# Patient Record
Sex: MALE | Race: WHITE | Employment: OTHER | URBAN - METROPOLITAN AREA
[De-identification: names, ages, dates, MRNs, and addresses within clinical notes are randomized per-mention and may not be internally consistent; named-entity substitution may affect disease eponyms.]

---

## 2018-01-22 ENCOUNTER — HOSPITAL ENCOUNTER (OUTPATIENT)
Dept: NON INVASIVE DIAGNOSTICS | Age: 58
Discharge: HOME | End: 2018-01-22
Attending: INTERNAL MEDICINE
Payer: COMMERCIAL

## 2018-01-22 ENCOUNTER — HOSPITAL ENCOUNTER (OUTPATIENT)
Dept: ULTRASOUND IMAGING | Age: 58
Discharge: HOME | End: 2018-01-22
Attending: INTERNAL MEDICINE
Payer: COMMERCIAL

## 2018-01-22 DIAGNOSIS — R09.89 BILATERAL CAROTID BRUITS: ICD-10-CM

## 2018-01-22 DIAGNOSIS — R01.1 MURMUR, CARDIAC: ICD-10-CM

## 2018-01-22 PROCEDURE — 93880 EXTRACRANIAL BILAT STUDY: CPT

## 2018-01-22 PROCEDURE — 93306 TTE W/DOPPLER COMPLETE: CPT

## 2018-01-22 NOTE — PROGRESS NOTES
Let him know ECHO shows severe aortic stenosis-valve will likely need replacement. He should see a cardiologist. Surgeon wont be able to do hernia repair until this is addressed. Let me know.

## 2018-01-22 NOTE — PROGRESS NOTES
Spoke to patient ,detailed message was relayed and understanding expressed. Patient would like to go ahead with cardiology referral as recommended.

## 2018-01-22 NOTE — PROGRESS NOTES
Spoke to patient, message was relayed and understanding expressed. Patient will go ahead with recommendations.

## 2018-01-22 NOTE — PROGRESS NOTES
Left carotid artery shows left carotid artery has a moderate plaque with ulceration. He will need a CTA of the carotids.

## 2018-01-29 ENCOUNTER — HOSPITAL ENCOUNTER (OUTPATIENT)
Dept: CT IMAGING | Age: 58
Discharge: HOME | End: 2018-01-29
Attending: INTERNAL MEDICINE
Payer: COMMERCIAL

## 2018-01-29 DIAGNOSIS — I65.22 LEFT CAROTID STENOSIS: ICD-10-CM

## 2018-01-29 PROCEDURE — 70498 CT ANGIOGRAPHY NECK: CPT

## 2018-01-29 PROCEDURE — 74011000258 HC RX REV CODE- 258

## 2018-01-29 PROCEDURE — 74011636320 HC RX REV CODE- 636/320

## 2018-01-29 RX ORDER — SODIUM CHLORIDE 0.9 % (FLUSH) 0.9 %
10 SYRINGE (ML) INJECTION
Status: COMPLETED | OUTPATIENT
Start: 2018-01-29 | End: 2018-01-29

## 2018-01-29 RX ADMIN — Medication 10 ML: at 10:37

## 2018-01-29 RX ADMIN — SODIUM CHLORIDE 100 ML: 900 INJECTION, SOLUTION INTRAVENOUS at 10:37

## 2018-01-29 RX ADMIN — IOPAMIDOL 80 ML: 755 INJECTION, SOLUTION INTRAVENOUS at 10:36

## 2018-02-07 ENCOUNTER — HOSPITAL ENCOUNTER (OUTPATIENT)
Dept: LAB | Age: 58
Discharge: HOME | End: 2018-02-07
Attending: INTERNAL MEDICINE
Payer: COMMERCIAL

## 2018-02-07 DIAGNOSIS — R06.02 SHORTNESS OF BREATH: ICD-10-CM

## 2018-02-07 PROBLEM — Q23.1 BICUSPID AORTIC VALVE: Status: ACTIVE | Noted: 2018-02-07

## 2018-02-07 PROBLEM — I35.0 NONRHEUMATIC AORTIC VALVE STENOSIS: Status: ACTIVE | Noted: 2018-02-07

## 2018-02-07 LAB
ANION GAP SERPL CALC-SCNC: 4 MMOL/L
BASOPHILS # BLD: 0 K/UL (ref 0–0.2)
BASOPHILS NFR BLD: 0 % (ref 0–2)
BUN SERPL-MCNC: 31 MG/DL (ref 6–23)
CALCIUM SERPL-MCNC: 8.4 MG/DL (ref 8.3–10.4)
CHLORIDE SERPL-SCNC: 107 MMOL/L (ref 98–107)
CO2 SERPL-SCNC: 32 MMOL/L (ref 21–32)
CREAT SERPL-MCNC: 0.9 MG/DL (ref 0.8–1.5)
DIFFERENTIAL METHOD BLD: ABNORMAL
EOSINOPHIL # BLD: 0.5 K/UL (ref 0–0.8)
EOSINOPHIL NFR BLD: 5 % (ref 0.5–7.8)
ERYTHROCYTE [DISTWIDTH] IN BLOOD BY AUTOMATED COUNT: 14 % (ref 11.9–14.6)
GLUCOSE SERPL-MCNC: 97 MG/DL (ref 65–100)
HCT VFR BLD AUTO: 42.9 % (ref 41.1–50.3)
HGB BLD-MCNC: 14.2 G/DL (ref 13.6–17.2)
INR PPP: 1.1
LYMPHOCYTES # BLD: 2.2 K/UL (ref 0.5–4.6)
LYMPHOCYTES NFR BLD: 21 % (ref 13–44)
MCH RBC QN AUTO: 30.4 PG (ref 26.1–32.9)
MCHC RBC AUTO-ENTMCNC: 33.1 G/DL (ref 31.4–35)
MCV RBC AUTO: 91.9 FL (ref 79.6–97.8)
MONOCYTES # BLD: 0.9 K/UL (ref 0.1–1.3)
MONOCYTES NFR BLD: 9 % (ref 4–12)
NEUTS SEG # BLD: 6.7 K/UL (ref 1.7–8.2)
NEUTS SEG NFR BLD: 65 % (ref 43–78)
PLATELET # BLD AUTO: 206 K/UL (ref 150–450)
PMV BLD AUTO: 10 FL (ref 10.8–14.1)
POTASSIUM SERPL-SCNC: 4.1 MMOL/L (ref 3.5–5.1)
PROTHROMBIN TIME: 14.5 SEC (ref 11.5–14.5)
RBC # BLD AUTO: 4.67 M/UL (ref 4.23–5.67)
SODIUM SERPL-SCNC: 143 MMOL/L (ref 136–145)
WBC # BLD AUTO: 10.4 K/UL (ref 4.3–11.1)

## 2018-02-07 PROCEDURE — 85610 PROTHROMBIN TIME: CPT | Performed by: INTERNAL MEDICINE

## 2018-02-07 PROCEDURE — 85025 COMPLETE CBC W/AUTO DIFF WBC: CPT | Performed by: INTERNAL MEDICINE

## 2018-02-07 PROCEDURE — 80048 BASIC METABOLIC PNL TOTAL CA: CPT | Performed by: INTERNAL MEDICINE

## 2018-02-07 PROCEDURE — 36415 COLL VENOUS BLD VENIPUNCTURE: CPT | Performed by: INTERNAL MEDICINE

## 2018-02-12 ENCOUNTER — HOSPITAL ENCOUNTER (OUTPATIENT)
Dept: CARDIAC CATH/INVASIVE PROCEDURES | Age: 58
Discharge: HOME | End: 2018-02-12

## 2018-02-23 ENCOUNTER — HOSPITAL ENCOUNTER (OUTPATIENT)
Dept: CARDIAC CATH/INVASIVE PROCEDURES | Age: 58
Discharge: HOME | End: 2018-02-23
Attending: INTERNAL MEDICINE | Admitting: INTERNAL MEDICINE
Payer: COMMERCIAL

## 2018-02-23 VITALS
WEIGHT: 155 LBS | RESPIRATION RATE: 20 BRPM | SYSTOLIC BLOOD PRESSURE: 123 MMHG | HEART RATE: 60 BPM | BODY MASS INDEX: 20.54 KG/M2 | TEMPERATURE: 98 F | HEIGHT: 73 IN | DIASTOLIC BLOOD PRESSURE: 65 MMHG | OXYGEN SATURATION: 99 %

## 2018-02-23 LAB
ANION GAP SERPL CALC-SCNC: 4 MMOL/L (ref 7–16)
BUN SERPL-MCNC: 23 MG/DL (ref 6–23)
CALCIUM SERPL-MCNC: 8.6 MG/DL (ref 8.3–10.4)
CHLORIDE SERPL-SCNC: 106 MMOL/L (ref 98–107)
CO2 SERPL-SCNC: 31 MMOL/L (ref 21–32)
CREAT SERPL-MCNC: 0.78 MG/DL (ref 0.8–1.5)
ERYTHROCYTE [DISTWIDTH] IN BLOOD BY AUTOMATED COUNT: 14.1 % (ref 11.9–14.6)
GLUCOSE SERPL-MCNC: 110 MG/DL (ref 65–100)
HCT VFR BLD AUTO: 46.4 % (ref 41.1–50.3)
HGB BLD-MCNC: 16 G/DL (ref 13.6–17.2)
INR PPP: 1.1
MAGNESIUM SERPL-MCNC: 2 MG/DL (ref 1.8–2.4)
MCH RBC QN AUTO: 31.4 PG (ref 26.1–32.9)
MCHC RBC AUTO-ENTMCNC: 34.5 G/DL (ref 31.4–35)
MCV RBC AUTO: 91 FL (ref 79.6–97.8)
PLATELET # BLD AUTO: 182 K/UL (ref 150–450)
PMV BLD AUTO: 9.9 FL (ref 10.8–14.1)
POTASSIUM SERPL-SCNC: 4 MMOL/L (ref 3.5–5.1)
PROTHROMBIN TIME: 13.9 SEC (ref 11.5–14.5)
RBC # BLD AUTO: 5.1 M/UL (ref 4.23–5.67)
SODIUM SERPL-SCNC: 141 MMOL/L (ref 136–145)
WBC # BLD AUTO: 10.1 K/UL (ref 4.3–11.1)

## 2018-02-23 PROCEDURE — 93458 L HRT ARTERY/VENTRICLE ANGIO: CPT

## 2018-02-23 PROCEDURE — C1894 INTRO/SHEATH, NON-LASER: HCPCS

## 2018-02-23 PROCEDURE — 99153 MOD SED SAME PHYS/QHP EA: CPT

## 2018-02-23 PROCEDURE — 74011636320 HC RX REV CODE- 636/320: Performed by: INTERNAL MEDICINE

## 2018-02-23 PROCEDURE — 85027 COMPLETE CBC AUTOMATED: CPT | Performed by: INTERNAL MEDICINE

## 2018-02-23 PROCEDURE — 74011250636 HC RX REV CODE- 250/636

## 2018-02-23 PROCEDURE — 93005 ELECTROCARDIOGRAM TRACING: CPT | Performed by: INTERNAL MEDICINE

## 2018-02-23 PROCEDURE — 77030004534 HC CATH ANGI DX INFN CARD -A

## 2018-02-23 PROCEDURE — 74011250636 HC RX REV CODE- 250/636: Performed by: INTERNAL MEDICINE

## 2018-02-23 PROCEDURE — 77030019569 HC BND COMPR RAD TERU -B

## 2018-02-23 PROCEDURE — 85610 PROTHROMBIN TIME: CPT | Performed by: INTERNAL MEDICINE

## 2018-02-23 PROCEDURE — 77030015766

## 2018-02-23 PROCEDURE — 99152 MOD SED SAME PHYS/QHP 5/>YRS: CPT

## 2018-02-23 PROCEDURE — 74011000250 HC RX REV CODE- 250: Performed by: INTERNAL MEDICINE

## 2018-02-23 PROCEDURE — 80048 BASIC METABOLIC PNL TOTAL CA: CPT | Performed by: INTERNAL MEDICINE

## 2018-02-23 PROCEDURE — C1769 GUIDE WIRE: HCPCS

## 2018-02-23 PROCEDURE — 83735 ASSAY OF MAGNESIUM: CPT | Performed by: INTERNAL MEDICINE

## 2018-02-23 RX ORDER — SODIUM CHLORIDE 0.9 % (FLUSH) 0.9 %
5-10 SYRINGE (ML) INJECTION AS NEEDED
Status: DISCONTINUED | OUTPATIENT
Start: 2018-02-23 | End: 2018-02-23 | Stop reason: HOSPADM

## 2018-02-23 RX ORDER — LIDOCAINE HYDROCHLORIDE 20 MG/ML
15 SOLUTION OROPHARYNGEAL AS NEEDED
Status: DISCONTINUED | OUTPATIENT
Start: 2018-02-23 | End: 2018-02-23 | Stop reason: HOSPADM

## 2018-02-23 RX ORDER — SODIUM CHLORIDE 9 MG/ML
75 INJECTION, SOLUTION INTRAVENOUS CONTINUOUS
Status: DISCONTINUED | OUTPATIENT
Start: 2018-02-23 | End: 2018-02-23 | Stop reason: HOSPADM

## 2018-02-23 RX ORDER — GUAIFENESIN 100 MG/5ML
81-324 LIQUID (ML) ORAL
Status: DISCONTINUED | OUTPATIENT
Start: 2018-02-23 | End: 2018-02-23 | Stop reason: HOSPADM

## 2018-02-23 RX ORDER — ONDANSETRON 2 MG/ML
4 INJECTION INTRAMUSCULAR; INTRAVENOUS
Status: DISCONTINUED | OUTPATIENT
Start: 2018-02-23 | End: 2018-02-23 | Stop reason: HOSPADM

## 2018-02-23 RX ORDER — HEPARIN SODIUM 200 [USP'U]/100ML
3 INJECTION, SOLUTION INTRAVENOUS CONTINUOUS
Status: DISCONTINUED | OUTPATIENT
Start: 2018-02-23 | End: 2018-02-23 | Stop reason: HOSPADM

## 2018-02-23 RX ORDER — SODIUM CHLORIDE 0.9 % (FLUSH) 0.9 %
5-10 SYRINGE (ML) INJECTION EVERY 8 HOURS
Status: DISCONTINUED | OUTPATIENT
Start: 2018-02-23 | End: 2018-02-23 | Stop reason: HOSPADM

## 2018-02-23 RX ORDER — LIDOCAINE HYDROCHLORIDE 20 MG/ML
2-10 INJECTION, SOLUTION INFILTRATION; PERINEURAL
Status: DISCONTINUED | OUTPATIENT
Start: 2018-02-23 | End: 2018-02-23 | Stop reason: HOSPADM

## 2018-02-23 RX ORDER — ACETAMINOPHEN 325 MG/1
650 TABLET ORAL
Status: DISCONTINUED | OUTPATIENT
Start: 2018-02-23 | End: 2018-02-23 | Stop reason: HOSPADM

## 2018-02-23 RX ORDER — FENTANYL CITRATE 50 UG/ML
25-75 INJECTION, SOLUTION INTRAMUSCULAR; INTRAVENOUS
Status: DISCONTINUED | OUTPATIENT
Start: 2018-02-23 | End: 2018-02-23 | Stop reason: HOSPADM

## 2018-02-23 RX ORDER — HYDROCODONE BITARTRATE AND ACETAMINOPHEN 5; 325 MG/1; MG/1
1 TABLET ORAL
Status: DISCONTINUED | OUTPATIENT
Start: 2018-02-23 | End: 2018-02-23 | Stop reason: HOSPADM

## 2018-02-23 RX ORDER — DIAZEPAM 5 MG/1
5 TABLET ORAL ONCE
Status: DISCONTINUED | OUTPATIENT
Start: 2018-02-23 | End: 2018-02-23 | Stop reason: HOSPADM

## 2018-02-23 RX ORDER — MIDAZOLAM HYDROCHLORIDE 1 MG/ML
.5-2 INJECTION, SOLUTION INTRAMUSCULAR; INTRAVENOUS
Status: DISCONTINUED | OUTPATIENT
Start: 2018-02-23 | End: 2018-02-23 | Stop reason: HOSPADM

## 2018-02-23 RX ADMIN — MIDAZOLAM HYDROCHLORIDE 2 MG: 1 INJECTION, SOLUTION INTRAMUSCULAR; INTRAVENOUS at 13:01

## 2018-02-23 RX ADMIN — LIDOCAINE HYDROCHLORIDE 15 ML: 20 SOLUTION ORAL; TOPICAL at 13:00

## 2018-02-23 RX ADMIN — HEPARIN SODIUM 2 ML: 10000 INJECTION, SOLUTION INTRAVENOUS; SUBCUTANEOUS at 13:48

## 2018-02-23 RX ADMIN — FENTANYL CITRATE 50 MCG: 50 INJECTION, SOLUTION INTRAMUSCULAR; INTRAVENOUS at 13:00

## 2018-02-23 RX ADMIN — SODIUM CHLORIDE 75 ML/HR: 900 INJECTION, SOLUTION INTRAVENOUS at 11:15

## 2018-02-23 RX ADMIN — HEPARIN SODIUM 3 UNITS/HR: 200 INJECTION, SOLUTION INTRAVENOUS at 13:05

## 2018-02-23 RX ADMIN — MIDAZOLAM HYDROCHLORIDE 1 MG: 1 INJECTION, SOLUTION INTRAMUSCULAR; INTRAVENOUS at 13:46

## 2018-02-23 RX ADMIN — MIDAZOLAM HYDROCHLORIDE 2 MG: 1 INJECTION, SOLUTION INTRAMUSCULAR; INTRAVENOUS at 13:00

## 2018-02-23 RX ADMIN — LIDOCAINE HYDROCHLORIDE 60 MG: 20 INJECTION, SOLUTION INFILTRATION; PERINEURAL at 13:47

## 2018-02-23 RX ADMIN — IOPAMIDOL 120 ML: 755 INJECTION, SOLUTION INTRAVENOUS at 14:08

## 2018-02-23 RX ADMIN — FENTANYL CITRATE 25 MCG: 50 INJECTION, SOLUTION INTRAMUSCULAR; INTRAVENOUS at 13:46

## 2018-02-23 NOTE — ROUTINE PROCESS
TRANSFER - OUT REPORT:    YARON/[facility]  Dr. Herman Roland  RRA access    Versed 5mg, fentanyl 75mcg  YARON completed without difficulty  Diagnostic cath, pt to have valve surgery  TR band placed @ 1406 with 14ml air    Verbal report given to Frankey Crick RN(name) on [facility]  being transferred to cpru(unit) for routine progression of care       Report consisted of patients Situation, Background, Assessment and   Recommendations(SBAR). Information from the following report(s) Procedure Summary was reviewed with the receiving nurse. Lines:   Peripheral IV 02/23/18 Right Antecubital (Active)       Peripheral IV 02/23/18 Left Antecubital (Active)        Opportunity for questions and clarification was provided.       Patient transported with:   [employer]

## 2018-02-23 NOTE — PROCEDURES
Brief Cardiac Procedure Note    Patient: Zak Osuna MRN: [id number]  SSN: [id number]    YOB: 1960  Age: 59 y.o. Sex: male      Date of Procedure: 2/23/2018     Pre-procedure Diagnosis: Aortic Stenosis    Post-procedure Diagnosis: Aortic Stenosis    Procedure: Left Heart Catheterization and YARON    Brief Description of Procedure: As above    Performed By: Tomas Koroma MD     Assistants: None    Anesthesia: Moderate Sedation    Estimated Blood Loss: Less than 10 mL      Specimens: None    Implants: None    Findings: Severe Critical AS. Bicuspid on YARON  MG 53. Mild CAD. Normal LV function. Aortic root appears upper normal tin size. Complications: None    Recommendations: Continue medical therapy.     Signed By: Tomas Koroma MD     February 23, 2018

## 2018-02-23 NOTE — PROGRESS NOTES
Patient received to [address] [place] [address] room # 2  Ambulatory from [facility]. Patient scheduled for [facility] today with Dr Parminder Willis. Procedure reviewed & questions answered, voiced good understanding consent obtained & placed on chart. All medications and medical history reviewed. Will prep patient per orders. Patient & family updated on plan of care.

## 2018-02-23 NOTE — IP AVS SNAPSHOT
[address] 
[phone] Patient: Tacho Cristobal MRN: [id number] Discharge Summary 2/23/2018 Tacho Cristobal MRN[de-identified]  [id number] Admission Information Provider Pager Service Admission Date Expected D/C Date Branden Cervantes MD  CARDIAC CATH LAB 2/23/2018 Actual LOS Patient Class 0 days OUTPATIENT Follow-up Information Follow up With Details Comments Contact Info Delilah Carranza MD On 2/28/2018 A follow up appointment has been scheduled for you for Wednesday, February 28 at 3:30pm with Dr. Nneka Coelho. 217 [facility] 120 [facility] [address] [place] [id number] [phone] My Medications ASK your physician about these medications Instructions Each Dose to Equal  
 Morning Noon Evening Bedtime  
 aspirin delayed-release 81 mg tablet Your last dose was: Your next dose is: Take  by mouth daily. FISH -160-1,000 mg Cap Generic drug:  omega 3-dha-epa-fish oil Your last dose was: Your next dose is: Take  by mouth nightly. multivitamin tablet Commonly known as:  ONE A DAY Your last dose was: Your next dose is: Take 1 Tab by mouth daily. 1 Tab General Information Please provide this summary of care documentation to your next provider. Allergies No Known Allergies Current Immunizations  Never Reviewed No immunizations on file. Discharge Instructions Discharge Instructions HEART CATHETERIZATION/ANGIOGRAPHY DISCHARGE INSTRUCTIONS 1. Check puncture site frequently for swelling or bleeding.  If there is any bleeding, lie down and apply pressure over the area with a clean towel or washcloth. Call 911. Notify your doctor for any redness, swelling, drainage, or oozing from the puncture site. Notify your doctor for any fever or chills. 2. If the extremity becomes cold, numb, or painful call [facility] at [phone]. 
3. Activity should be limited for the next 48 hours. Climb stairs as little as possible and avoid any stooping, bending, or strenuous activity for 48 hours. No heavy lifting (anything over 10 pounds) for 3 days. 4. You may resume your usual diet. Drink more fluids than usual. 
5. Have a responsible person drive you home and stay with you for at least 24 hours after your heart catheterization/angiography. Do not drive for the next 24 hours. 6. You may remove bandage from your Right wrist in 24 hours. You may shower in 24 hours. No tub baths, hot tubs, or swimming for 1 week. Do not place any lotions, creams, powders, or ointments over puncture site for 1 week. You may place a clean band-aid over the puncture site each day for 5 days. Change daily. 7. Please continue your medications as prescribed by your physician. I have read the above instructions and have had the opportunity to ask questions. Patient: ________________________   Date: 2/23/2018 Witness: _______________________   Date: 2/23/2018 Discharge Orders None  
  
` Patient Signature:  ____________________________________________________________ Date:  ____________________________________________________________  
  
 [place] Current Provider Signature:  ____________________________________________________________ Date:  ____________________________________________________________

## 2018-02-23 NOTE — PROGRESS NOTES
TRANSFER - IN REPORT:    Verbal report received from [facility]) on Carol Hernandez  being received from cath lab(unit) for routine progression of care      Report consisted of patients Situation, Background, Assessment and   Recommendations(SBAR). Information from the following report(s) Procedure Summary was reviewed with the receiving nurse. Opportunity for questions and clarification was provided. Assessment completed upon patients arrival to unit and care assumed.

## 2018-02-25 LAB
ATRIAL RATE: 60 BPM
CALCULATED P AXIS, ECG09: 40 DEGREES
CALCULATED R AXIS, ECG10: 67 DEGREES
CALCULATED T AXIS, ECG11: 75 DEGREES
DIAGNOSIS, 93000: NORMAL
P-R INTERVAL, ECG05: 128 MS
Q-T INTERVAL, ECG07: 434 MS
QRS DURATION, ECG06: 96 MS
QTC CALCULATION (BEZET), ECG08: 434 MS
VENTRICULAR RATE, ECG03: 60 BPM

## 2018-02-27 NOTE — PROCEDURES
Anish  MR#: [id number]  : 1960  ACCOUNT #: [de-identified]   DATE OF SERVICE: 2018    PROCEDURE PERFORMED:  Left heart catheterization, selective coronary arteriography, left ventriculogram.      INDICATIONS:  Severe/critical aortic stenosis in a patient with a bicuspid aortic valve. Preoperative angiography was requested. TECHNICAL FACTORS:  After informed consent was obtained, the patient brought to the cardiac catheterization lab. The right radial artery was prepped and draped in usual sterile fashion. Utilizing modified Seldinger technique and micropuncture needle, the right radial artery was entered. A 6-[place] Terumo Slender sheath was placed without difficulty. A radial cocktail consisting of 2000 units of heparin, 2 mg of verapamil, 200 mcg of nitroglycerin was administered. A 5-[place] Tiger 4.0 catheter was used to selectively engage the ostium of the right coronary artery. Selective injections in various projections performed. Using the Tiger catheter and a J-wire, I was able to cross the aortic valve. This was then exchanged for a pigtail catheter. Hemodynamic measurements and left ventriculogram were obtained. Left ventricular aortic pressure gradient was obtained with pullback technique. A JL-3.5 catheter was used to selectively engage the ostium of the left main coronary artery. Selective injections in various projections were performed. At the conclusion of the diagnostic procedure, the radial sheath was removed and a pneumatic band was placed with excellent hemostasis. No complications were encountered. SEDATION:  The patient received 2 mg of Versed. Sedation start time was 1:42 p.m. with the procedure completion time of 2:01 p.m. Sedation was administered by Samira Cespedes RN, under my supervision. CONTRAST:  Isovue 120. HEMODYNAMICS   1. Aortic pressure 115/82.   2.  There was a 53 mm mean gradient across the aortic valve consistent with severe/critical aortic stenosis. 3.  Left ventricular end-diastolic pressure was 13. ANGIOGRAPHIC RESULTS   1. Left main coronary artery:  Large-caliber vessel. Angiographically normal.  2.  LAD:  Medium-caliber vessel. It contains a 30% proximal stenosis. Mid vessel contains 10% luminal irregularities. 3.  First diagonal artery is a medium-caliber vessel. 20% ostial stenosis. 4.  Left circumflex:  Medium-caliber nondominant vessel. Angiographically normal.  5.  First obtuse marginal artery:  Medium-caliber vessel. 20% ostial stenosis. 6.  Right coronary artery:  Medium-caliber dominant vessel. Angiographically normal.  7.  Right PDA:  Medium-caliber vessel. Normal.  8.  Right posterolateral branch:  Very small-caliber vessel. Appeared normal.  9.  Left ventriculogram performed in the CHAUDHRY projection shows low normal left ventricular systolic function, EF 05%. Mild diffuse inferior hypokinesis. The aortic valve is heavily calcified. 10.  Aortic angiography with visualization of the aortic root shows mild aortic regurgitation. Aortic root appears upper normal in size, but not significantly dilated. CONCLUSIONS   1. Mild nonobstructive coronary artery disease. 2.  Severe/critical aortic stenosis with mean gradient of 53.  3.  Low normal left ventricular systolic function with inferior hypokinesis. 4.  Upper normal ascending aorta without significant aneurysmal formation. 5.  1+ aortic regurgitation. PLAN:  CT Surgery evaluation for aortic valve replacement.       MD ANNEMARIE Morillo / PN  D: 02/27/2018 17:58     T: 02/27/2018 18:45  JOB #: [id number]

## 2018-02-28 PROBLEM — Z82.49 FAMILY HISTORY OF PREMATURE CAD: Status: ACTIVE | Noted: 2018-02-28

## 2018-02-28 PROBLEM — Z59.00 HOMELESS FAMILY: Chronic | Status: ACTIVE | Noted: 2018-02-28

## 2018-04-06 ENCOUNTER — HOSPITAL ENCOUNTER (OUTPATIENT)
Dept: ULTRASOUND IMAGING | Age: 58
Discharge: HOME | End: 2018-04-06
Attending: THORACIC SURGERY (CARDIOTHORACIC VASCULAR SURGERY)
Payer: COMMERCIAL

## 2018-04-06 ENCOUNTER — HOSPITAL ENCOUNTER (OUTPATIENT)
Dept: SURGERY | Age: 58
Discharge: HOME | End: 2018-04-06
Payer: COMMERCIAL

## 2018-04-06 ENCOUNTER — ANESTHESIA EVENT (OUTPATIENT)
Dept: SURGERY | Age: 58
DRG: 219 | End: 2018-04-06
Payer: COMMERCIAL

## 2018-04-06 ENCOUNTER — HOSPITAL ENCOUNTER (OUTPATIENT)
Dept: GENERAL RADIOLOGY | Age: 58
Discharge: HOME | End: 2018-04-06
Attending: THORACIC SURGERY (CARDIOTHORACIC VASCULAR SURGERY)
Payer: COMMERCIAL

## 2018-04-06 VITALS
SYSTOLIC BLOOD PRESSURE: 115 MMHG | WEIGHT: 155.38 LBS | OXYGEN SATURATION: 97 % | RESPIRATION RATE: 20 BRPM | HEIGHT: 73 IN | BODY MASS INDEX: 20.59 KG/M2 | DIASTOLIC BLOOD PRESSURE: 69 MMHG | HEART RATE: 77 BPM | TEMPERATURE: 98 F

## 2018-04-06 LAB
ALBUMIN SERPL-MCNC: 3.7 G/DL (ref 3.5–5)
ANION GAP SERPL CALC-SCNC: 5 MMOL/L (ref 7–16)
APPEARANCE UR: CLEAR
APTT PPP: 34 SEC (ref 23.2–35.3)
BACTERIA SPEC CULT: NORMAL
BILIRUB SERPL-MCNC: 0.3 MG/DL (ref 0.2–1.1)
BILIRUB UR QL: NEGATIVE
BUN SERPL-MCNC: 25 MG/DL (ref 6–23)
CALCIUM SERPL-MCNC: 9.1 MG/DL (ref 8.3–10.4)
CHLORIDE SERPL-SCNC: 106 MMOL/L (ref 98–107)
CO2 SERPL-SCNC: 30 MMOL/L (ref 21–32)
COLLECTION COMMENT, COLCM: NORMAL
COLOR UR: YELLOW
CREAT SERPL-MCNC: 0.86 MG/DL (ref 0.8–1.5)
ERYTHROCYTE [DISTWIDTH] IN BLOOD BY AUTOMATED COUNT: 13.5 % (ref 11.9–14.6)
EST. AVERAGE GLUCOSE BLD GHB EST-MCNC: 114 MG/DL
GLUCOSE SERPL-MCNC: 92 MG/DL (ref 65–100)
GLUCOSE UR STRIP.AUTO-MCNC: NEGATIVE MG/DL
HBA1C MFR BLD: 5.6 % (ref 4.8–6)
HCT VFR BLD AUTO: 45 % (ref 41.1–50.3)
HGB BLD-MCNC: 15.2 G/DL (ref 13.6–17.2)
HGB UR QL STRIP: NEGATIVE
INR PPP: 1.1
KETONES UR QL STRIP.AUTO: NEGATIVE MG/DL
LEUKOCYTE ESTERASE UR QL STRIP.AUTO: NEGATIVE
MCH RBC QN AUTO: 30.3 PG (ref 26.1–32.9)
MCHC RBC AUTO-ENTMCNC: 33.8 G/DL (ref 31.4–35)
MCV RBC AUTO: 89.6 FL (ref 79.6–97.8)
NITRITE UR QL STRIP.AUTO: NEGATIVE
PH UR STRIP: 6 [PH] (ref 5–9)
PLATELET # BLD AUTO: 179 K/UL (ref 150–450)
PMV BLD AUTO: 10.4 FL (ref 10.8–14.1)
POTASSIUM SERPL-SCNC: 3.9 MMOL/L (ref 3.5–5.1)
PROT UR STRIP-MCNC: NEGATIVE MG/DL
PROTHROMBIN TIME: 13.9 SEC (ref 11.5–14.5)
RBC # BLD AUTO: 5.02 M/UL (ref 4.23–5.67)
SERVICE CMNT-IMP: NORMAL
SODIUM SERPL-SCNC: 141 MMOL/L (ref 136–145)
SP GR UR REFRACTOMETRY: 1.02 (ref 1–1.02)
UROBILINOGEN UR QL STRIP.AUTO: 0.2 EU/DL (ref 0.2–1)
WBC # BLD AUTO: 9.4 K/UL (ref 4.3–11.1)

## 2018-04-06 PROCEDURE — 82247 BILIRUBIN TOTAL: CPT | Performed by: THORACIC SURGERY (CARDIOTHORACIC VASCULAR SURGERY)

## 2018-04-06 PROCEDURE — 82040 ASSAY OF SERUM ALBUMIN: CPT | Performed by: THORACIC SURGERY (CARDIOTHORACIC VASCULAR SURGERY)

## 2018-04-06 PROCEDURE — 81003 URINALYSIS AUTO W/O SCOPE: CPT | Performed by: THORACIC SURGERY (CARDIOTHORACIC VASCULAR SURGERY)

## 2018-04-06 PROCEDURE — 71046 X-RAY EXAM CHEST 2 VIEWS: CPT

## 2018-04-06 PROCEDURE — 87641 MR-STAPH DNA AMP PROBE: CPT | Performed by: THORACIC SURGERY (CARDIOTHORACIC VASCULAR SURGERY)

## 2018-04-06 PROCEDURE — 85730 THROMBOPLASTIN TIME PARTIAL: CPT | Performed by: THORACIC SURGERY (CARDIOTHORACIC VASCULAR SURGERY)

## 2018-04-06 PROCEDURE — 85027 COMPLETE CBC AUTOMATED: CPT | Performed by: THORACIC SURGERY (CARDIOTHORACIC VASCULAR SURGERY)

## 2018-04-06 PROCEDURE — 83036 HEMOGLOBIN GLYCOSYLATED A1C: CPT | Performed by: THORACIC SURGERY (CARDIOTHORACIC VASCULAR SURGERY)

## 2018-04-06 PROCEDURE — 94010 BREATHING CAPACITY TEST: CPT

## 2018-04-06 PROCEDURE — 85610 PROTHROMBIN TIME: CPT | Performed by: THORACIC SURGERY (CARDIOTHORACIC VASCULAR SURGERY)

## 2018-04-06 PROCEDURE — 93880 EXTRACRANIAL BILAT STUDY: CPT

## 2018-04-06 PROCEDURE — 80048 BASIC METABOLIC PNL TOTAL CA: CPT | Performed by: THORACIC SURGERY (CARDIOTHORACIC VASCULAR SURGERY)

## 2018-04-06 PROCEDURE — 93005 ELECTROCARDIOGRAM TRACING: CPT | Performed by: THORACIC SURGERY (CARDIOTHORACIC VASCULAR SURGERY)

## 2018-04-06 RX ORDER — AMIODARONE HYDROCHLORIDE 400 MG/1
600 TABLET ORAL ONCE
COMMUNITY
Start: 2018-04-09 | End: 2021-10-05

## 2018-04-06 RX ORDER — GUAIFENESIN 100 MG/5ML
81 LIQUID (ML) ORAL
COMMUNITY
End: 2020-03-06

## 2018-04-06 RX ORDER — METOPROLOL TARTRATE 50 MG/1
12.5 TABLET ORAL ONCE
COMMUNITY
Start: 2018-04-09 | End: 2021-10-05

## 2018-04-06 RX ORDER — SODIUM CHLORIDE 9 MG/ML
250 INJECTION, SOLUTION INTRAVENOUS AS NEEDED
Status: CANCELLED | OUTPATIENT
Start: 2018-04-06

## 2018-04-06 NOTE — PERIOP NOTES
Patient verified name, , and surgery as listed in [facility]. Patient provided medical/health information and PTA medications to the best of their ability. TYPE  CASE: 3  Labs per surgeon: cbc, bmp, pt/ptt, ha1c, nasal swab, bilirubin, albumin, urine, pfts, cxr, bilat carotid, -- all collected and sent to lab-- pt sent to x-ray with copy of orders and orders in cc----PT TO COME BACK MON 18  For type and cross, ffp, plt-- orders placed in cc--- verbal and  written instructions given to pt  Labs per anesthesia protocol: no add't  EKG:  Today-- also ekg, echo, cath all 2018 reviewed and oked by dr Lali Desai while she was in to see pt    Patient provided with and instructed on educaitonal handouts including Heart Guide to Surgery, blood transfusions, pain management, central line infection prevention, being on a ventilator and hand hygiene for the family and community, and [facility] brochure. Instructed that family must be present in building at all times. Incentive spirometry completed with return demonstration. FEV1 completed as ordered. Patient viewed pre-operative DVD. Instructed on chest-xray procedure and carotid ultrasound. Instructed on type and cross match procedure and not to remove green blood bank bracelet. Instructed on medications- Amiodarone, Lopressor and Mupirocin with Rx called into  [phone]--- spoke to daren. Mupirocin ointment to be used the night before surgery and the am of surgery. Hibiclens and instructions given per hospital policy. Instructed patient to continue previous medications as prescribed prior to surgery unless otherwise directed and to take the following medications the day of surgery according to anesthesia guidelines : mupirocin nasal oint . Instructed patient to hold  the following medications: fish oil, multi vit. Original medication prescription bottles was not visualized during patient appointment.      Patient teach back successful and patient demonstrates knowledge of instruction.        orders on chart and dated 4/5/18

## 2018-04-06 NOTE — ANESTHESIA PREPROCEDURE EVALUATION
Anesthetic History   No history of anesthetic complications            Review of Systems / Medical History  Patient summary reviewed and pertinent labs reviewed    Pulmonary          Smoker         Neuro/Psych     seizures (x 1 2001)    Psychiatric history     Cardiovascular      Valvular problems/murmurs        CAD    Exercise tolerance: >4 METS  Comments: LVH some inferior HK EF 55%  Critical stenosis RICKIE not stated \"heavily calcified bicuspid valve with critical stenosis\" with gradient 48    Denies CP, syncope, does have POOLE    Cath mild nonobstructive CAD,   GI/Hepatic/Renal  Within defined limits              Endo/Other             Other Findings   Comments: Depression  Hx drug abuse, none since 2001         Physical Exam    Airway  Mallampati: II  TM Distance: 4 - 6 cm  Neck ROM: normal range of motion   Mouth opening: Normal     Cardiovascular      Rate: normal    Murmur: Grade 3, Aortic area     Dental    Dentition: Upper partial plate     Pulmonary  Breath sounds clear to auscultation               Abdominal  GI exam deferred       Other Findings            Anesthetic Plan    ASA: 4  Anesthesia type: general    Monitoring Plan: Arterial line, [place], YARON and BIS      Induction: Intravenous  Anesthetic plan and risks discussed with: Patient      No dysphagia

## 2018-04-06 NOTE — PERIOP NOTES
todays test reviewed--ok for surg called office to inform-- spoke to david---[facility]  Had move pt to thurs--4/12/18-- so she was going to make a phone update appt tues 4/10/18-- and pt will have to come for type and cross, shweta, plt on 4/11/18---- she was going to call pt about this also was going to inform pt nasal swab was neg-- just use mupirocin nasal oint evening prior and am of surg.===front of chart marked--also david to have dr Elizabeth Millan to update h &p--- out of date---

## 2018-04-07 LAB
ATRIAL RATE: 59 BPM
CALCULATED P AXIS, ECG09: 60 DEGREES
CALCULATED R AXIS, ECG10: 61 DEGREES
CALCULATED T AXIS, ECG11: 84 DEGREES
DIAGNOSIS, 93000: NORMAL
P-R INTERVAL, ECG05: 134 MS
Q-T INTERVAL, ECG07: 460 MS
QRS DURATION, ECG06: 98 MS
QTC CALCULATION (BEZET), ECG08: 455 MS
VENTRICULAR RATE, ECG03: 59 BPM

## 2018-04-11 ENCOUNTER — HOSPITAL ENCOUNTER (OUTPATIENT)
Dept: LAB | Age: 58
Discharge: HOME | End: 2018-04-11
Payer: COMMERCIAL

## 2018-04-11 PROCEDURE — 86900 BLOOD TYPING SEROLOGIC ABO: CPT | Performed by: THORACIC SURGERY (CARDIOTHORACIC VASCULAR SURGERY)

## 2018-04-11 PROCEDURE — 86923 COMPATIBILITY TEST ELECTRIC: CPT | Performed by: THORACIC SURGERY (CARDIOTHORACIC VASCULAR SURGERY)

## 2018-04-11 PROCEDURE — 36415 COLL VENOUS BLD VENIPUNCTURE: CPT | Performed by: THORACIC SURGERY (CARDIOTHORACIC VASCULAR SURGERY)

## 2018-04-11 RX ORDER — SODIUM CHLORIDE 9 MG/ML
250 INJECTION, SOLUTION INTRAVENOUS AS NEEDED
Status: CANCELLED | OUTPATIENT
Start: 2018-04-11

## 2018-04-12 ENCOUNTER — APPOINTMENT (OUTPATIENT)
Dept: GENERAL RADIOLOGY | Age: 58
DRG: 219 | End: 2018-04-12
Attending: THORACIC SURGERY (CARDIOTHORACIC VASCULAR SURGERY)
Payer: COMMERCIAL

## 2018-04-12 ENCOUNTER — ANESTHESIA (OUTPATIENT)
Dept: SURGERY | Age: 58
DRG: 219 | End: 2018-04-12
Payer: COMMERCIAL

## 2018-04-12 ENCOUNTER — HOSPITAL ENCOUNTER (INPATIENT)
Age: 58
LOS: 5 days | Discharge: HOME HEALTH | DRG: 219 | End: 2018-04-17
Attending: THORACIC SURGERY (CARDIOTHORACIC VASCULAR SURGERY) | Admitting: THORACIC SURGERY (CARDIOTHORACIC VASCULAR SURGERY)
Payer: COMMERCIAL

## 2018-04-12 ENCOUNTER — ANESTHESIA EVENT (OUTPATIENT)
Dept: SURGERY | Age: 58
DRG: 219 | End: 2018-04-12
Payer: COMMERCIAL

## 2018-04-12 DIAGNOSIS — Z95.2 STATUS POST AORTIC VALVE REPLACEMENT: ICD-10-CM

## 2018-04-12 DIAGNOSIS — D50.0 BLOOD LOSS ANEMIA: ICD-10-CM

## 2018-04-12 DIAGNOSIS — I35.0 NONRHEUMATIC AORTIC VALVE STENOSIS: ICD-10-CM

## 2018-04-12 DIAGNOSIS — Z99.11 ENCOUNTER FOR WEANING FROM VENTILATOR (HCC): ICD-10-CM

## 2018-04-12 DIAGNOSIS — D69.59 THROMBOCYTOPENIA DUE TO EXTRA CORPOREAL BY-PASS CIRCULATION: ICD-10-CM

## 2018-04-12 DIAGNOSIS — R06.02 SHORTNESS OF BREATH: ICD-10-CM

## 2018-04-12 DIAGNOSIS — J95.821 RESPIRATORY FAILURE, POST-OPERATIVE (HCC): ICD-10-CM

## 2018-04-12 DIAGNOSIS — Q23.1 BICUSPID AORTIC VALVE: Primary | ICD-10-CM

## 2018-04-12 DIAGNOSIS — D62 POSTOPERATIVE ANEMIA DUE TO ACUTE BLOOD LOSS: ICD-10-CM

## 2018-04-12 DIAGNOSIS — F17.200 TOBACCO USE DISORDER: Chronic | ICD-10-CM

## 2018-04-12 DIAGNOSIS — R09.02 HYPOXIA: ICD-10-CM

## 2018-04-12 PROBLEM — Z82.49 FAMILY HISTORY OF PREMATURE CAD: Chronic | Status: ACTIVE | Noted: 2018-02-28

## 2018-04-12 LAB
ANION GAP SERPL CALC-SCNC: 5 MMOL/L (ref 7–16)
ANION GAP SERPL CALC-SCNC: 5 MMOL/L (ref 7–16)
APTT PPP: 35.3 SEC (ref 23.2–35.3)
APTT PPP: 45.8 SEC (ref 23.2–35.3)
ARTERIAL PATENCY WRIST A: ABNORMAL
ATRIAL RATE: 88 BPM
BASE DEFICIT BLD-SCNC: 1 MMOL/L
BASE DEFICIT BLD-SCNC: 2 MMOL/L
BASE DEFICIT BLDA-SCNC: 2.9 MMOL/L (ref 0–2)
BASE DEFICIT BLDA-SCNC: 3.8 MMOL/L (ref 0–2)
BASE DEFICIT BLDA-SCNC: 5.1 MMOL/L (ref 0–2)
BASE DEFICIT BLDA-SCNC: 5.2 MMOL/L (ref 0–2)
BASE EXCESS BLD CALC-SCNC: 1 MMOL/L
BASE EXCESS BLD CALC-SCNC: 1 MMOL/L
BASE EXCESS BLD CALC-SCNC: 4 MMOL/L
BASE EXCESS BLD CALC-SCNC: 4 MMOL/L
BDY SITE: ABNORMAL
BUN SERPL-MCNC: 18 MG/DL (ref 6–23)
BUN SERPL-MCNC: 21 MG/DL (ref 6–23)
CA-I BLD-MCNC: 1.08 MMOL/L (ref 1.12–1.32)
CA-I BLD-MCNC: 1.08 MMOL/L (ref 1.12–1.32)
CA-I BLD-MCNC: 1.1 MMOL/L (ref 1.12–1.32)
CA-I BLD-MCNC: 1.17 MMOL/L (ref 1.12–1.32)
CA-I BLD-MCNC: 1.17 MMOL/L (ref 1.12–1.32)
CA-I BLD-MCNC: 1.18 MMOL/L (ref 1.12–1.32)
CA-I BLD-SCNC: 1.03 MMOL/L (ref 1–1.3)
CA-I BLD-SCNC: 1.16 MMOL/L (ref 1–1.3)
CALCIUM SERPL-MCNC: 6.8 MG/DL (ref 8.3–10.4)
CALCIUM SERPL-MCNC: 7.4 MG/DL (ref 8.3–10.4)
CALCULATED P AXIS, ECG09: 66 DEGREES
CALCULATED R AXIS, ECG10: 72 DEGREES
CALCULATED T AXIS, ECG11: 134 DEGREES
CHLORIDE BLDA-SCNC: 111 MMOL/L (ref 98–106)
CHLORIDE BLDA-SCNC: 115 MMOL/L (ref 98–106)
CHLORIDE SERPL-SCNC: 114 MMOL/L (ref 98–107)
CHLORIDE SERPL-SCNC: 118 MMOL/L (ref 98–107)
CO2 SERPL-SCNC: 25 MMOL/L (ref 21–32)
CO2 SERPL-SCNC: 27 MMOL/L (ref 21–32)
COHGB MFR BLD: 0.2 % (ref 0.5–1.5)
COHGB MFR BLD: 0.3 % (ref 0.5–1.5)
COHGB MFR BLD: 0.7 % (ref 0.5–1.5)
COHGB MFR BLD: 1.1 % (ref 0.5–1.5)
CREAT SERPL-MCNC: 0.81 MG/DL (ref 0.8–1.5)
CREAT SERPL-MCNC: 0.97 MG/DL (ref 0.8–1.5)
DIAGNOSIS, 93000: NORMAL
DO-HGB BLD-MCNC: 1 % (ref 0–5)
DO-HGB BLD-MCNC: 1 % (ref 0–5)
DO-HGB BLD-MCNC: 2 % (ref 0–5)
DO-HGB BLD-MCNC: 3 % (ref 0–5)
ERYTHROCYTE [DISTWIDTH] IN BLOOD BY AUTOMATED COUNT: 13.4 % (ref 11.9–14.6)
ERYTHROCYTE [DISTWIDTH] IN BLOOD BY AUTOMATED COUNT: 13.6 % (ref 11.9–14.6)
ERYTHROCYTE [DISTWIDTH] IN BLOOD BY AUTOMATED COUNT: 13.6 % (ref 11.9–14.6)
FIBRINOGEN PPP-MCNC: 177 MG/DL (ref 190–501)
FIBRINOGEN PPP-MCNC: 230 MG/DL (ref 190–501)
GLUCOSE BLD STRIP.AUTO-MCNC: 109 MG/DL (ref 65–100)
GLUCOSE BLD STRIP.AUTO-MCNC: 113 MG/DL (ref 65–100)
GLUCOSE BLD STRIP.AUTO-MCNC: 113 MG/DL (ref 65–100)
GLUCOSE BLD STRIP.AUTO-MCNC: 118 MG/DL (ref 65–100)
GLUCOSE BLD STRIP.AUTO-MCNC: 119 MG/DL (ref 65–100)
GLUCOSE BLD STRIP.AUTO-MCNC: 128 MG/DL (ref 65–100)
GLUCOSE BLD STRIP.AUTO-MCNC: 129 MG/DL (ref 65–100)
GLUCOSE BLD STRIP.AUTO-MCNC: 133 MG/DL (ref 65–100)
GLUCOSE BLD STRIP.AUTO-MCNC: 148 MG/DL (ref 65–100)
GLUCOSE BLD STRIP.AUTO-MCNC: 158 MG/DL (ref 65–100)
GLUCOSE BLD STRIP.AUTO-MCNC: 160 MG/DL (ref 65–100)
GLUCOSE BLD STRIP.AUTO-MCNC: 160 MG/DL (ref 65–100)
GLUCOSE BLD STRIP.AUTO-MCNC: 161 MG/DL (ref 65–100)
GLUCOSE BLD STRIP.AUTO-MCNC: 163 MG/DL (ref 65–100)
GLUCOSE BLD STRIP.AUTO-MCNC: 173 MG/DL (ref 65–100)
GLUCOSE BLD STRIP.AUTO-MCNC: 181 MG/DL (ref 65–100)
GLUCOSE SERPL-MCNC: 125 MG/DL (ref 65–100)
GLUCOSE SERPL-MCNC: 169 MG/DL (ref 65–100)
HCO3 BLD-SCNC: 25.3 MMOL/L (ref 22–26)
HCO3 BLD-SCNC: 25.6 MMOL/L (ref 22–26)
HCO3 BLD-SCNC: 27 MMOL/L (ref 22–26)
HCO3 BLD-SCNC: 27.1 MMOL/L (ref 22–26)
HCO3 BLD-SCNC: 28.8 MMOL/L (ref 22–26)
HCO3 BLD-SCNC: 29 MMOL/L (ref 22–26)
HCO3 BLDA-SCNC: 21 MMOL/L (ref 22–26)
HCO3 BLDA-SCNC: 21 MMOL/L (ref 22–26)
HCO3 BLDA-SCNC: 23 MMOL/L (ref 22–26)
HCO3 BLDA-SCNC: 24 MMOL/L (ref 22–26)
HCT VFR BLD AUTO: 25.1 % (ref 41.1–50.3)
HCT VFR BLD AUTO: 25.7 % (ref 41.1–50.3)
HCT VFR BLD AUTO: 30.9 % (ref 41.1–50.3)
HCT VFR BLD AUTO: 34.7 % (ref 41.1–50.3)
HCT VFR BLD AUTO: 40.9 % (ref 41.1–50.3)
HGB BLD-MCNC: 10.2 G/DL (ref 13.6–17.2)
HGB BLD-MCNC: 11.5 G/DL (ref 13.6–17.2)
HGB BLD-MCNC: 13.7 G/DL (ref 13.6–17.2)
HGB BLD-MCNC: 8.3 G/DL (ref 13.6–17.2)
HGB BLD-MCNC: 8.5 G/DL (ref 13.6–17.2)
HGB BLDMV-MCNC: 11.5 GM/DL (ref 11.7–15)
HGB BLDMV-MCNC: 14.4 GM/DL (ref 11.7–15)
HGB BLDMV-MCNC: 9 GM/DL (ref 11.7–15)
HGB BLDMV-MCNC: 9.3 GM/DL (ref 11.7–15)
INR PPP: 1.5
INR PPP: 1.7
MAGNESIUM SERPL-MCNC: 2.1 MG/DL (ref 1.8–2.4)
MAGNESIUM SERPL-MCNC: 2.1 MG/DL (ref 1.8–2.4)
MAGNESIUM SERPL-MCNC: 2.9 MG/DL (ref 1.8–2.4)
MCH RBC QN AUTO: 29.7 PG (ref 26.1–32.9)
MCH RBC QN AUTO: 30 PG (ref 26.1–32.9)
MCH RBC QN AUTO: 30.1 PG (ref 26.1–32.9)
MCHC RBC AUTO-ENTMCNC: 33 G/DL (ref 31.4–35)
MCHC RBC AUTO-ENTMCNC: 33.1 G/DL (ref 31.4–35)
MCHC RBC AUTO-ENTMCNC: 33.5 G/DL (ref 31.4–35)
MCV RBC AUTO: 89.9 FL (ref 79.6–97.8)
MCV RBC AUTO: 90.1 FL (ref 79.6–97.8)
MCV RBC AUTO: 90.8 FL (ref 79.6–97.8)
METHGB MFR BLD: 0.5 % (ref 0–1.5)
METHGB MFR BLD: 0.6 % (ref 0–1.5)
METHGB MFR BLD: 0.7 % (ref 0–1.5)
METHGB MFR BLD: 0.7 % (ref 0–1.5)
OXYHGB MFR BLDA: 95.7 % (ref 94–97)
OXYHGB MFR BLDA: 96.4 % (ref 94–97)
OXYHGB MFR BLDA: 97.4 % (ref 94–97)
OXYHGB MFR BLDA: 97.7 % (ref 94–97)
P-R INTERVAL, ECG05: 200 MS
PCO2 BLD: 47.5 MMHG (ref 35–45)
PCO2 BLD: 49.3 MMHG (ref 35–45)
PCO2 BLD: 50.8 MMHG (ref 35–45)
PCO2 BLD: 51.7 MMHG (ref 35–45)
PCO2 BLD: 51.8 MMHG (ref 35–45)
PCO2 BLD: 63.9 MMHG (ref 35–45)
PCO2 BLDA: 43 MMHG (ref 35–45)
PCO2 BLDA: 44 MMHG (ref 35–45)
PCO2 BLDA: 46 MMHG (ref 35–45)
PCO2 BLDA: 52 MMHG (ref 35–45)
PEEP RESPIRATORY: 8 CM[H2O]
PH BLD: 7.21 [PH] (ref 7.35–7.45)
PH BLD: 7.3 [PH] (ref 7.35–7.45)
PH BLD: 7.33 [PH] (ref 7.35–7.45)
PH BLD: 7.33 [PH] (ref 7.35–7.45)
PH BLD: 7.38 [PH] (ref 7.35–7.45)
PH BLD: 7.39 [PH] (ref 7.35–7.45)
PH BLDA: 7.28 [PH] (ref 7.35–7.45)
PH BLDA: 7.28 [PH] (ref 7.35–7.45)
PH BLDA: 7.3 [PH] (ref 7.35–7.45)
PH BLDA: 7.34 [PH] (ref 7.35–7.45)
PLATELET # BLD AUTO: 117 K/UL (ref 150–450)
PLATELET # BLD AUTO: 130 K/UL (ref 150–450)
PLATELET # BLD AUTO: 76 K/UL (ref 150–450)
PMV BLD AUTO: 9.6 FL (ref 10.8–14.1)
PMV BLD AUTO: 9.8 FL (ref 10.8–14.1)
PMV BLD AUTO: 9.9 FL (ref 10.8–14.1)
PO2 BLD: 215 MMHG (ref 80–105)
PO2 BLD: 242 MMHG (ref 80–105)
PO2 BLD: 352 MMHG (ref 80–105)
PO2 BLD: 400 MMHG (ref 80–105)
PO2 BLD: 45 MMHG (ref 80–105)
PO2 BLD: 457 MMHG (ref 80–105)
PO2 BLDA: 100 MMHG (ref 80–105)
PO2 BLDA: 128 MMHG (ref 80–105)
PO2 BLDA: 283 MMHG (ref 80–105)
PO2 BLDA: 289 MMHG (ref 80–105)
POTASSIUM BLD-SCNC: 4.1 MMOL/L (ref 3.5–5.1)
POTASSIUM BLD-SCNC: 4.2 MMOL/L (ref 3.5–5.1)
POTASSIUM BLD-SCNC: 4.3 MMOL/L (ref 3.5–5.1)
POTASSIUM BLD-SCNC: 4.8 MMOL/L (ref 3.5–5.1)
POTASSIUM BLD-SCNC: 5 MMOL/L (ref 3.5–5.1)
POTASSIUM BLD-SCNC: 5.2 MMOL/L (ref 3.5–5.1)
POTASSIUM BLDA-SCNC: 4.22 MMOL/L (ref 3.5–5.3)
POTASSIUM BLDA-SCNC: 4.57 MMOL/L (ref 3.5–5.3)
POTASSIUM SERPL-SCNC: 4.3 MMOL/L (ref 3.5–5.1)
POTASSIUM SERPL-SCNC: 4.7 MMOL/L (ref 3.5–5.1)
POTASSIUM SERPL-SCNC: 4.8 MMOL/L (ref 3.5–5.1)
PROTHROMBIN TIME: 17.6 SEC (ref 11.5–14.5)
PROTHROMBIN TIME: 19.2 SEC (ref 11.5–14.5)
Q-T INTERVAL, ECG07: 452 MS
QRS DURATION, ECG06: 130 MS
QTC CALCULATION (BEZET), ECG08: 546 MS
RBC # BLD AUTO: 2.83 M/UL (ref 4.23–5.67)
RBC # BLD AUTO: 3.43 M/UL (ref 4.23–5.67)
RBC # BLD AUTO: 4.55 M/UL (ref 4.23–5.67)
RESP RATE: 14
RESP RATE: 16
RESP RATE: 16
SAO2 % BLD: 100 % (ref 95–98)
SAO2 % BLD: 77 % (ref 95–98)
SAO2 % BLD: 97 % (ref 92–98.5)
SAO2 % BLD: 98 % (ref 92–98.5)
SAO2 % BLD: 99 % (ref 92–98.5)
SAO2 % BLD: 99 % (ref 92–98.5)
SERVICE CMNT-IMP: ABNORMAL
SODIUM BLD-SCNC: 138 MMOL/L (ref 136–145)
SODIUM BLD-SCNC: 140 MMOL/L (ref 136–145)
SODIUM BLD-SCNC: 142 MMOL/L (ref 136–145)
SODIUM BLD-SCNC: 145 MMOL/L (ref 136–145)
SODIUM BLDA-SCNC: 139.2 MMOL/L (ref 135–148)
SODIUM BLDA-SCNC: 140.4 MMOL/L (ref 135–148)
SODIUM SERPL-SCNC: 146 MMOL/L (ref 136–145)
SODIUM SERPL-SCNC: 148 MMOL/L (ref 136–145)
VENTILATION MODE VENT: ABNORMAL
VENTRICULAR RATE, ECG03: 88 BPM
VT SETTING VENT: 550 ML
WBC # BLD AUTO: 13.1 K/UL (ref 4.3–11.1)
WBC # BLD AUTO: 22.7 K/UL (ref 4.3–11.1)
WBC # BLD AUTO: 25.5 K/UL (ref 4.3–11.1)

## 2018-04-12 PROCEDURE — P9012 CRYOPRECIPITATE EACH UNIT: HCPCS | Performed by: THORACIC SURGERY (CARDIOTHORACIC VASCULAR SURGERY)

## 2018-04-12 PROCEDURE — 74011250636 HC RX REV CODE- 250/636: Performed by: THORACIC SURGERY (CARDIOTHORACIC VASCULAR SURGERY)

## 2018-04-12 PROCEDURE — 77030027138 HC INCENT SPIROMETER -A

## 2018-04-12 PROCEDURE — 77030020751 HC FLTR TBNG TRNSFUS HAEM -A: Performed by: THORACIC SURGERY (CARDIOTHORACIC VASCULAR SURGERY)

## 2018-04-12 PROCEDURE — 74011000258 HC RX REV CODE- 258

## 2018-04-12 PROCEDURE — C1751 CATH, INF, PER/CENT/MIDLINE: HCPCS | Performed by: ANESTHESIOLOGY

## 2018-04-12 PROCEDURE — 74011250637 HC RX REV CODE- 250/637: Performed by: THORACIC SURGERY (CARDIOTHORACIC VASCULAR SURGERY)

## 2018-04-12 PROCEDURE — 74011000250 HC RX REV CODE- 250: Performed by: THORACIC SURGERY (CARDIOTHORACIC VASCULAR SURGERY)

## 2018-04-12 PROCEDURE — C1729 CATH, DRAINAGE: HCPCS | Performed by: THORACIC SURGERY (CARDIOTHORACIC VASCULAR SURGERY)

## 2018-04-12 PROCEDURE — 77030003037 HC SUT WRE STRNOTMY AEMC -B: Performed by: THORACIC SURGERY (CARDIOTHORACIC VASCULAR SURGERY)

## 2018-04-12 PROCEDURE — P9045 ALBUMIN (HUMAN), 5%, 250 ML: HCPCS

## 2018-04-12 PROCEDURE — 85730 THROMBOPLASTIN TIME PARTIAL: CPT | Performed by: THORACIC SURGERY (CARDIOTHORACIC VASCULAR SURGERY)

## 2018-04-12 PROCEDURE — 77030020407 HC IV BLD WRMR ST 3M -A: Performed by: ANESTHESIOLOGY

## 2018-04-12 PROCEDURE — 99253 IP/OBS CNSLTJ NEW/EST LOW 45: CPT | Performed by: INTERNAL MEDICINE

## 2018-04-12 PROCEDURE — 77030003034 HC SUT WRE CRD J&J -B: Performed by: THORACIC SURGERY (CARDIOTHORACIC VASCULAR SURGERY)

## 2018-04-12 PROCEDURE — 83735 ASSAY OF MAGNESIUM: CPT | Performed by: THORACIC SURGERY (CARDIOTHORACIC VASCULAR SURGERY)

## 2018-04-12 PROCEDURE — 85384 FIBRINOGEN ACTIVITY: CPT | Performed by: THORACIC SURGERY (CARDIOTHORACIC VASCULAR SURGERY)

## 2018-04-12 PROCEDURE — 77030018836 HC SOL IRR NACL ICUM -A: Performed by: THORACIC SURGERY (CARDIOTHORACIC VASCULAR SURGERY)

## 2018-04-12 PROCEDURE — 77030018729 HC ELECTRD DEFIB PAD CARD -B: Performed by: THORACIC SURGERY (CARDIOTHORACIC VASCULAR SURGERY)

## 2018-04-12 PROCEDURE — P9047 ALBUMIN (HUMAN), 25%, 50ML: HCPCS

## 2018-04-12 PROCEDURE — 77030005537 HC CATH URETH BARD -A: Performed by: THORACIC SURGERY (CARDIOTHORACIC VASCULAR SURGERY)

## 2018-04-12 PROCEDURE — 77030011640 HC PAD GRND REM COVD -A: Performed by: THORACIC SURGERY (CARDIOTHORACIC VASCULAR SURGERY)

## 2018-04-12 PROCEDURE — 77030026009 HC VLV AORT TIS MGNA EDWD -K2: Performed by: THORACIC SURGERY (CARDIOTHORACIC VASCULAR SURGERY)

## 2018-04-12 PROCEDURE — 77030009355 HC CRDPLG DEL SET QUES -C: Performed by: THORACIC SURGERY (CARDIOTHORACIC VASCULAR SURGERY)

## 2018-04-12 PROCEDURE — 77030016687: Performed by: THORACIC SURGERY (CARDIOTHORACIC VASCULAR SURGERY)

## 2018-04-12 PROCEDURE — 77030002986 HC SUT PROL J&J -A: Performed by: THORACIC SURGERY (CARDIOTHORACIC VASCULAR SURGERY)

## 2018-04-12 PROCEDURE — 77030020782 HC GWN BAIR PAWS FLX 3M -B: Performed by: ANESTHESIOLOGY

## 2018-04-12 PROCEDURE — 77030002888 HC SUT CHRMC J&J -A: Performed by: THORACIC SURGERY (CARDIOTHORACIC VASCULAR SURGERY)

## 2018-04-12 PROCEDURE — 74011250636 HC RX REV CODE- 250/636: Performed by: SURGERY

## 2018-04-12 PROCEDURE — 77030003422 HC NDL ASPIR NOVO -A: Performed by: THORACIC SURGERY (CARDIOTHORACIC VASCULAR SURGERY)

## 2018-04-12 PROCEDURE — 77030034927 HC PK PROC CPB INSPIRE PERF LIVA -F: Performed by: THORACIC SURGERY (CARDIOTHORACIC VASCULAR SURGERY)

## 2018-04-12 PROCEDURE — 77030034936 HC DEV MIN COR-KNOT KT LSIS -F: Performed by: THORACIC SURGERY (CARDIOTHORACIC VASCULAR SURGERY)

## 2018-04-12 PROCEDURE — 74011000250 HC RX REV CODE- 250

## 2018-04-12 PROCEDURE — 82962 GLUCOSE BLOOD TEST: CPT

## 2018-04-12 PROCEDURE — 77030033069 HC RLD QLC SGL COR-KNOT LSIS -B: Performed by: THORACIC SURGERY (CARDIOTHORACIC VASCULAR SURGERY)

## 2018-04-12 PROCEDURE — 77030013797 HC KT TRNSDUC PRSSR EDWD -A: Performed by: THORACIC SURGERY (CARDIOTHORACIC VASCULAR SURGERY)

## 2018-04-12 PROCEDURE — 77030012890

## 2018-04-12 PROCEDURE — B24BZZ4 ULTRASONOGRAPHY OF HEART WITH AORTA, TRANSESOPHAGEAL: ICD-10-PCS | Performed by: THORACIC SURGERY (CARDIOTHORACIC VASCULAR SURGERY)

## 2018-04-12 PROCEDURE — 5A1223Z PERFORMANCE OF CARDIAC PACING, CONTINUOUS: ICD-10-PCS | Performed by: THORACIC SURGERY (CARDIOTHORACIC VASCULAR SURGERY)

## 2018-04-12 PROCEDURE — 77030005401 HC CATH RAD ARRO -A: Performed by: ANESTHESIOLOGY

## 2018-04-12 PROCEDURE — 76010000113 HC CV SURG 1 TO 1.5 HR: Performed by: THORACIC SURGERY (CARDIOTHORACIC VASCULAR SURGERY)

## 2018-04-12 PROCEDURE — 77030008477 HC STYL SATN SLP COVD -A: Performed by: ANESTHESIOLOGY

## 2018-04-12 PROCEDURE — 86580 TB INTRADERMAL TEST: CPT | Performed by: THORACIC SURGERY (CARDIOTHORACIC VASCULAR SURGERY)

## 2018-04-12 PROCEDURE — 36600 WITHDRAWAL OF ARTERIAL BLOOD: CPT

## 2018-04-12 PROCEDURE — 84295 ASSAY OF SERUM SODIUM: CPT

## 2018-04-12 PROCEDURE — 84132 ASSAY OF SERUM POTASSIUM: CPT | Performed by: THORACIC SURGERY (CARDIOTHORACIC VASCULAR SURGERY)

## 2018-04-12 PROCEDURE — 5A1221Z PERFORMANCE OF CARDIAC OUTPUT, CONTINUOUS: ICD-10-PCS | Performed by: THORACIC SURGERY (CARDIOTHORACIC VASCULAR SURGERY)

## 2018-04-12 PROCEDURE — 30233K1 TRANSFUSION OF NONAUTOLOGOUS FROZEN PLASMA INTO PERIPHERAL VEIN, PERCUTANEOUS APPROACH: ICD-10-PCS | Performed by: THORACIC SURGERY (CARDIOTHORACIC VASCULAR SURGERY)

## 2018-04-12 PROCEDURE — 77030002966 HC SUT PDS J&J -A: Performed by: THORACIC SURGERY (CARDIOTHORACIC VASCULAR SURGERY)

## 2018-04-12 PROCEDURE — 74011250636 HC RX REV CODE- 250/636

## 2018-04-12 PROCEDURE — 77030002970 HC SUT PLEDG TELE -A: Performed by: THORACIC SURGERY (CARDIOTHORACIC VASCULAR SURGERY)

## 2018-04-12 PROCEDURE — 77030020751 HC FLTR TBNG TRNSFUS HAEM -A: Performed by: ANESTHESIOLOGY

## 2018-04-12 PROCEDURE — 77030018793 HC ORG SUT GAB FRAT TELE -B: Performed by: THORACIC SURGERY (CARDIOTHORACIC VASCULAR SURGERY)

## 2018-04-12 PROCEDURE — 77030002996 HC SUT SLK J&J -A: Performed by: THORACIC SURGERY (CARDIOTHORACIC VASCULAR SURGERY)

## 2018-04-12 PROCEDURE — 77030016564 HC BLD STRNL SAW4 CNMD -B: Performed by: THORACIC SURGERY (CARDIOTHORACIC VASCULAR SURGERY)

## 2018-04-12 PROCEDURE — 77030005521 HC CATH URETH FOL38 BARD -B: Performed by: THORACIC SURGERY (CARDIOTHORACIC VASCULAR SURGERY)

## 2018-04-12 PROCEDURE — 3E080GC INTRODUCTION OF OTHER THERAPEUTIC SUBSTANCE INTO HEART, OPEN APPROACH: ICD-10-PCS | Performed by: THORACIC SURGERY (CARDIOTHORACIC VASCULAR SURGERY)

## 2018-04-12 PROCEDURE — 30233M1 TRANSFUSION OF NONAUTOLOGOUS PLASMA CRYOPRECIPITATE INTO PERIPHERAL VEIN, PERCUTANEOUS APPROACH: ICD-10-PCS | Performed by: THORACIC SURGERY (CARDIOTHORACIC VASCULAR SURGERY)

## 2018-04-12 PROCEDURE — 88305 TISSUE EXAM BY PATHOLOGIST: CPT | Performed by: THORACIC SURGERY (CARDIOTHORACIC VASCULAR SURGERY)

## 2018-04-12 PROCEDURE — 77030014007 HC SPNG HEMSTAT J&J -B: Performed by: THORACIC SURGERY (CARDIOTHORACIC VASCULAR SURGERY)

## 2018-04-12 PROCEDURE — 77030018547 HC SUT ETHBND1 J&J -B: Performed by: THORACIC SURGERY (CARDIOTHORACIC VASCULAR SURGERY)

## 2018-04-12 PROCEDURE — 77030025646 HC AUTOTRNSFUS KT TERU -C: Performed by: THORACIC SURGERY (CARDIOTHORACIC VASCULAR SURGERY)

## 2018-04-12 PROCEDURE — C1769 GUIDE WIRE: HCPCS | Performed by: THORACIC SURGERY (CARDIOTHORACIC VASCULAR SURGERY)

## 2018-04-12 PROCEDURE — 77030008771 HC TU NG SALEM SUMP -A: Performed by: ANESTHESIOLOGY

## 2018-04-12 PROCEDURE — 77030019908 HC STETH ESOPH SIMS -A: Performed by: ANESTHESIOLOGY

## 2018-04-12 PROCEDURE — 94002 VENT MGMT INPAT INIT DAY: CPT

## 2018-04-12 PROCEDURE — 77030003010 HC SUT SURG STL J&J -B: Performed by: THORACIC SURGERY (CARDIOTHORACIC VASCULAR SURGERY)

## 2018-04-12 PROCEDURE — 77030031139 HC SUT VCRL2 J&J -A: Performed by: THORACIC SURGERY (CARDIOTHORACIC VASCULAR SURGERY)

## 2018-04-12 PROCEDURE — 74011250637 HC RX REV CODE- 250/637: Performed by: SURGERY

## 2018-04-12 PROCEDURE — 65610000006 HC RM INTENSIVE CARE

## 2018-04-12 PROCEDURE — 77030025827 HC BG BLD DNR AUTLG MEDT -A: Performed by: THORACIC SURGERY (CARDIOTHORACIC VASCULAR SURGERY)

## 2018-04-12 PROCEDURE — 74011000258 HC RX REV CODE- 258: Performed by: THORACIC SURGERY (CARDIOTHORACIC VASCULAR SURGERY)

## 2018-04-12 PROCEDURE — 76010000155 HC AUTO TRANSFUSION/CELL SAVER: Performed by: THORACIC SURGERY (CARDIOTHORACIC VASCULAR SURGERY)

## 2018-04-12 PROCEDURE — 74011000302 HC RX REV CODE- 302: Performed by: THORACIC SURGERY (CARDIOTHORACIC VASCULAR SURGERY)

## 2018-04-12 PROCEDURE — 02RF08Z REPLACEMENT OF AORTIC VALVE WITH ZOOPLASTIC TISSUE, OPEN APPROACH: ICD-10-PCS | Performed by: THORACIC SURGERY (CARDIOTHORACIC VASCULAR SURGERY)

## 2018-04-12 PROCEDURE — 85027 COMPLETE CBC AUTOMATED: CPT | Performed by: THORACIC SURGERY (CARDIOTHORACIC VASCULAR SURGERY)

## 2018-04-12 PROCEDURE — 74011636637 HC RX REV CODE- 636/637: Performed by: THORACIC SURGERY (CARDIOTHORACIC VASCULAR SURGERY)

## 2018-04-12 PROCEDURE — 77030002520 HC INSRT CLMP LATIS STLTH AMR -B: Performed by: THORACIC SURGERY (CARDIOTHORACIC VASCULAR SURGERY)

## 2018-04-12 PROCEDURE — 76010000201 HC CV SURG 3.5 TO 4 HR INTENSV-TIER 1: Performed by: THORACIC SURGERY (CARDIOTHORACIC VASCULAR SURGERY)

## 2018-04-12 PROCEDURE — 77030018548 HC SUT ETHBND2 J&J -B: Performed by: THORACIC SURGERY (CARDIOTHORACIC VASCULAR SURGERY)

## 2018-04-12 PROCEDURE — 85610 PROTHROMBIN TIME: CPT | Performed by: THORACIC SURGERY (CARDIOTHORACIC VASCULAR SURGERY)

## 2018-04-12 PROCEDURE — 0WCC0ZZ EXTIRPATION OF MATTER FROM MEDIASTINUM, OPEN APPROACH: ICD-10-PCS | Performed by: THORACIC SURGERY (CARDIOTHORACIC VASCULAR SURGERY)

## 2018-04-12 PROCEDURE — 82803 BLOOD GASES ANY COMBINATION: CPT

## 2018-04-12 PROCEDURE — 77030013292 HC BOWL MX PRSM J&J -A: Performed by: ANESTHESIOLOGY

## 2018-04-12 PROCEDURE — 76060000033 HC ANESTHESIA 1 TO 1.5 HR: Performed by: THORACIC SURGERY (CARDIOTHORACIC VASCULAR SURGERY)

## 2018-04-12 PROCEDURE — P9059 PLASMA, FRZ BETWEEN 8-24HOUR: HCPCS | Performed by: THORACIC SURGERY (CARDIOTHORACIC VASCULAR SURGERY)

## 2018-04-12 PROCEDURE — 77030016570 HC BLNKT BAIR HGGR 3M -B: Performed by: ANESTHESIOLOGY

## 2018-04-12 PROCEDURE — 77030008703 HC TU ET UNCUF COVD -A: Performed by: ANESTHESIOLOGY

## 2018-04-12 PROCEDURE — 71045 X-RAY EXAM CHEST 1 VIEW: CPT

## 2018-04-12 PROCEDURE — 77030013794 HC KT TRNSDUC BLD EDWD -B: Performed by: ANESTHESIOLOGY

## 2018-04-12 PROCEDURE — 93005 ELECTROCARDIOGRAM TRACING: CPT | Performed by: THORACIC SURGERY (CARDIOTHORACIC VASCULAR SURGERY)

## 2018-04-12 PROCEDURE — 80051 ELECTROLYTE PANEL: CPT

## 2018-04-12 PROCEDURE — 36430 TRANSFUSION BLD/BLD COMPNT: CPT

## 2018-04-12 PROCEDURE — 74011250636 HC RX REV CODE- 250/636: Performed by: ANESTHESIOLOGY

## 2018-04-12 PROCEDURE — 77030018792 HC NDL SUT TFSH -A: Performed by: THORACIC SURGERY (CARDIOTHORACIC VASCULAR SURGERY)

## 2018-04-12 PROCEDURE — 0W380ZZ CONTROL BLEEDING IN CHEST WALL, OPEN APPROACH: ICD-10-PCS | Performed by: THORACIC SURGERY (CARDIOTHORACIC VASCULAR SURGERY)

## 2018-04-12 PROCEDURE — 80048 BASIC METABOLIC PNL TOTAL CA: CPT | Performed by: THORACIC SURGERY (CARDIOTHORACIC VASCULAR SURGERY)

## 2018-04-12 PROCEDURE — 74011636637 HC RX REV CODE- 636/637

## 2018-04-12 PROCEDURE — 76060000038 HC ANESTHESIA 3.5 TO 4 HR: Performed by: THORACIC SURGERY (CARDIOTHORACIC VASCULAR SURGERY)

## 2018-04-12 PROCEDURE — 77030029099 HC BN WAX SSPC -A: Performed by: THORACIC SURGERY (CARDIOTHORACIC VASCULAR SURGERY)

## 2018-04-12 PROCEDURE — 85018 HEMOGLOBIN: CPT | Performed by: THORACIC SURGERY (CARDIOTHORACIC VASCULAR SURGERY)

## 2018-04-12 DEVICE — CARPENTIER-EDWARDS PERIMOUNT MAGNA EASE PERICARDIAL AORTIC BIOPROSTHESIS
Type: IMPLANTABLE DEVICE | Site: AORTIC VALVE | Status: FUNCTIONAL
Brand: CARPENTIER-EDWARDS PERIMOUNT MAGNA EASE PERICARDIAL BIOPROSTHESIS - AORTIC

## 2018-04-12 RX ORDER — PROPOFOL 10 MG/ML
0-50 VIAL (ML) INTRAVENOUS
Status: DISCONTINUED | OUTPATIENT
Start: 2018-04-12 | End: 2018-04-13

## 2018-04-12 RX ORDER — SODIUM CHLORIDE 9 MG/ML
INJECTION, SOLUTION INTRAVENOUS
Status: DISCONTINUED | OUTPATIENT
Start: 2018-04-12 | End: 2018-04-12 | Stop reason: HOSPADM

## 2018-04-12 RX ORDER — SODIUM CHLORIDE 9 MG/ML
250 INJECTION, SOLUTION INTRAVENOUS AS NEEDED
Status: DISCONTINUED | OUTPATIENT
Start: 2018-04-12 | End: 2018-04-13

## 2018-04-12 RX ORDER — SUFENTANIL CITRATE 50 UG/ML
INJECTION EPIDURAL; INTRAVENOUS AS NEEDED
Status: DISCONTINUED | OUTPATIENT
Start: 2018-04-12 | End: 2018-04-12

## 2018-04-12 RX ORDER — CEFAZOLIN SODIUM/WATER 2 G/20 ML
2 SYRINGE (ML) INTRAVENOUS EVERY 8 HOURS
Status: COMPLETED | OUTPATIENT
Start: 2018-04-12 | End: 2018-04-13

## 2018-04-12 RX ORDER — GUAIFENESIN 100 MG/5ML
81 LIQUID (ML) ORAL DAILY
Status: DISCONTINUED | OUTPATIENT
Start: 2018-04-13 | End: 2018-04-13

## 2018-04-12 RX ORDER — DEXTROSE 50 % IN WATER (D50W) INTRAVENOUS SYRINGE
25 AS NEEDED
Status: DISCONTINUED | OUTPATIENT
Start: 2018-04-12 | End: 2018-04-13

## 2018-04-12 RX ORDER — ATORVASTATIN CALCIUM 40 MG/1
80 TABLET, FILM COATED ORAL
Status: DISCONTINUED | OUTPATIENT
Start: 2018-04-12 | End: 2018-04-13

## 2018-04-12 RX ORDER — METOPROLOL TARTRATE 25 MG/1
25 TABLET, FILM COATED ORAL EVERY 12 HOURS
Status: DISCONTINUED | OUTPATIENT
Start: 2018-04-13 | End: 2018-04-13

## 2018-04-12 RX ORDER — AMIODARONE HYDROCHLORIDE 200 MG/1
200 TABLET ORAL EVERY 12 HOURS
Status: DISCONTINUED | OUTPATIENT
Start: 2018-04-12 | End: 2018-04-12

## 2018-04-12 RX ORDER — CHLORHEXIDINE GLUCONATE 1.2 MG/ML
10 RINSE ORAL 2 TIMES DAILY
Status: DISCONTINUED | OUTPATIENT
Start: 2018-04-12 | End: 2018-04-13

## 2018-04-12 RX ORDER — LIDOCAINE HYDROCHLORIDE 10 MG/ML
0.3 INJECTION INFILTRATION; PERINEURAL ONCE
Status: DISCONTINUED | OUTPATIENT
Start: 2018-04-12 | End: 2018-04-12 | Stop reason: HOSPADM

## 2018-04-12 RX ORDER — SODIUM CHLORIDE 0.9 % (FLUSH) 0.9 %
5-10 SYRINGE (ML) INJECTION AS NEEDED
Status: DISCONTINUED | OUTPATIENT
Start: 2018-04-12 | End: 2018-04-12 | Stop reason: HOSPADM

## 2018-04-12 RX ORDER — PROTAMINE SULFATE 10 MG/ML
25 INJECTION, SOLUTION INTRAVENOUS
Status: COMPLETED | OUTPATIENT
Start: 2018-04-12 | End: 2018-04-12

## 2018-04-12 RX ORDER — SODIUM CHLORIDE 0.9 % (FLUSH) 0.9 %
5-10 SYRINGE (ML) INJECTION EVERY 8 HOURS
Status: DISCONTINUED | OUTPATIENT
Start: 2018-04-12 | End: 2018-04-13

## 2018-04-12 RX ORDER — CEFAZOLIN SODIUM/WATER 2 G/20 ML
2 SYRINGE (ML) INTRAVENOUS ONCE
Status: COMPLETED | OUTPATIENT
Start: 2018-04-12 | End: 2018-04-12

## 2018-04-12 RX ORDER — NITROGLYCERIN 20 MG/100ML
INJECTION INTRAVENOUS
Status: DISCONTINUED | OUTPATIENT
Start: 2018-04-12 | End: 2018-04-12 | Stop reason: HOSPADM

## 2018-04-12 RX ORDER — SUFENTANIL CITRATE 50 UG/ML
INJECTION EPIDURAL; INTRAVENOUS AS NEEDED
Status: DISCONTINUED | OUTPATIENT
Start: 2018-04-12 | End: 2018-04-12 | Stop reason: HOSPADM

## 2018-04-12 RX ORDER — MIDAZOLAM HYDROCHLORIDE 1 MG/ML
2 INJECTION, SOLUTION INTRAMUSCULAR; INTRAVENOUS
Status: COMPLETED | OUTPATIENT
Start: 2018-04-12 | End: 2018-04-12

## 2018-04-12 RX ORDER — NALOXONE HYDROCHLORIDE 0.4 MG/ML
0.4 INJECTION, SOLUTION INTRAMUSCULAR; INTRAVENOUS; SUBCUTANEOUS AS NEEDED
Status: DISCONTINUED | OUTPATIENT
Start: 2018-04-12 | End: 2018-04-13

## 2018-04-12 RX ORDER — HEPARIN SODIUM 1000 [USP'U]/ML
INJECTION, SOLUTION INTRAVENOUS; SUBCUTANEOUS AS NEEDED
Status: DISCONTINUED | OUTPATIENT
Start: 2018-04-12 | End: 2018-04-12 | Stop reason: HOSPADM

## 2018-04-12 RX ORDER — MORPHINE SULFATE 2 MG/ML
3-5 INJECTION, SOLUTION INTRAMUSCULAR; INTRAVENOUS
Status: DISCONTINUED | OUTPATIENT
Start: 2018-04-12 | End: 2018-04-13

## 2018-04-12 RX ORDER — MUPIROCIN 20 MG/G
OINTMENT TOPICAL ONCE
Status: DISCONTINUED | OUTPATIENT
Start: 2018-04-12 | End: 2018-04-12 | Stop reason: HOSPADM

## 2018-04-12 RX ORDER — MIDAZOLAM HYDROCHLORIDE 1 MG/ML
1 INJECTION, SOLUTION INTRAMUSCULAR; INTRAVENOUS
Status: DISCONTINUED | OUTPATIENT
Start: 2018-04-12 | End: 2018-04-13

## 2018-04-12 RX ORDER — MUPIROCIN 20 MG/G
OINTMENT TOPICAL 2 TIMES DAILY
Status: DISCONTINUED | OUTPATIENT
Start: 2018-04-12 | End: 2018-04-13

## 2018-04-12 RX ORDER — METOPROLOL TARTRATE 25 MG/1
12.5 TABLET, FILM COATED ORAL ONCE
COMMUNITY
End: 2018-04-17

## 2018-04-12 RX ORDER — AMIODARONE HYDROCHLORIDE 200 MG/1
400 TABLET ORAL ONCE
Status: COMPLETED | OUTPATIENT
Start: 2018-04-12 | End: 2018-04-12

## 2018-04-12 RX ORDER — OXYCODONE AND ACETAMINOPHEN 5; 325 MG/1; MG/1
1 TABLET ORAL
Status: DISCONTINUED | OUTPATIENT
Start: 2018-04-12 | End: 2018-04-17 | Stop reason: HOSPADM

## 2018-04-12 RX ORDER — POTASSIUM CHLORIDE 14.9 MG/ML
10 INJECTION INTRAVENOUS AS NEEDED
Status: ACTIVE | OUTPATIENT
Start: 2018-04-12 | End: 2018-04-13

## 2018-04-12 RX ORDER — ALBUMIN HUMAN 50 G/1000ML
SOLUTION INTRAVENOUS AS NEEDED
Status: DISCONTINUED | OUTPATIENT
Start: 2018-04-12 | End: 2018-04-12 | Stop reason: HOSPADM

## 2018-04-12 RX ORDER — CEFAZOLIN SODIUM 1 G/3ML
INJECTION, POWDER, FOR SOLUTION INTRAMUSCULAR; INTRAVENOUS AS NEEDED
Status: DISCONTINUED | OUTPATIENT
Start: 2018-04-12 | End: 2018-04-12 | Stop reason: HOSPADM

## 2018-04-12 RX ORDER — ROCURONIUM BROMIDE 10 MG/ML
INJECTION, SOLUTION INTRAVENOUS AS NEEDED
Status: DISCONTINUED | OUTPATIENT
Start: 2018-04-12 | End: 2018-04-12 | Stop reason: HOSPADM

## 2018-04-12 RX ORDER — MAGNESIUM SULFATE 1 G/100ML
1 INJECTION INTRAVENOUS AS NEEDED
Status: DISCONTINUED | OUTPATIENT
Start: 2018-04-12 | End: 2018-04-13

## 2018-04-12 RX ORDER — SODIUM CHLORIDE 0.9 % (FLUSH) 0.9 %
5-10 SYRINGE (ML) INJECTION AS NEEDED
Status: DISCONTINUED | OUTPATIENT
Start: 2018-04-12 | End: 2018-04-13

## 2018-04-12 RX ORDER — SODIUM BICARBONATE 1 MEQ/ML
50 SYRINGE (ML) INTRAVENOUS AS NEEDED
Status: DISCONTINUED | OUTPATIENT
Start: 2018-04-12 | End: 2018-04-13

## 2018-04-12 RX ORDER — DEXTROSE, SODIUM CHLORIDE, AND POTASSIUM CHLORIDE 5; .45; .15 G/100ML; G/100ML; G/100ML
25 INJECTION INTRAVENOUS CONTINUOUS
Status: DISCONTINUED | OUTPATIENT
Start: 2018-04-12 | End: 2018-04-13

## 2018-04-12 RX ORDER — ETOMIDATE 2 MG/ML
INJECTION INTRAVENOUS AS NEEDED
Status: DISCONTINUED | OUTPATIENT
Start: 2018-04-12 | End: 2018-04-12 | Stop reason: HOSPADM

## 2018-04-12 RX ORDER — SODIUM CHLORIDE 9 MG/ML
25 INJECTION, SOLUTION INTRAVENOUS CONTINUOUS
Status: DISCONTINUED | OUTPATIENT
Start: 2018-04-12 | End: 2018-04-13

## 2018-04-12 RX ORDER — VECURONIUM BROMIDE FOR INJECTION 1 MG/ML
INJECTION, POWDER, LYOPHILIZED, FOR SOLUTION INTRAVENOUS AS NEEDED
Status: DISCONTINUED | OUTPATIENT
Start: 2018-04-12 | End: 2018-04-12 | Stop reason: HOSPADM

## 2018-04-12 RX ORDER — SODIUM CHLORIDE, SODIUM LACTATE, POTASSIUM CHLORIDE, CALCIUM CHLORIDE 600; 310; 30; 20 MG/100ML; MG/100ML; MG/100ML; MG/100ML
100 INJECTION, SOLUTION INTRAVENOUS CONTINUOUS
Status: DISCONTINUED | OUTPATIENT
Start: 2018-04-12 | End: 2018-04-12 | Stop reason: HOSPADM

## 2018-04-12 RX ORDER — SODIUM CHLORIDE, SODIUM LACTATE, POTASSIUM CHLORIDE, CALCIUM CHLORIDE 600; 310; 30; 20 MG/100ML; MG/100ML; MG/100ML; MG/100ML
INJECTION, SOLUTION INTRAVENOUS
Status: DISCONTINUED | OUTPATIENT
Start: 2018-04-12 | End: 2018-04-12 | Stop reason: HOSPADM

## 2018-04-12 RX ORDER — NOREPINEPHRINE BIT/0.9 % NACL 4MG/250ML
.05-.2 PLASTIC BAG, INJECTION (ML) INTRAVENOUS
Status: DISCONTINUED | OUTPATIENT
Start: 2018-04-12 | End: 2018-04-13

## 2018-04-12 RX ORDER — SODIUM CHLORIDE 9 MG/ML
250 INJECTION, SOLUTION INTRAVENOUS AS NEEDED
Status: DISCONTINUED | OUTPATIENT
Start: 2018-04-12 | End: 2018-04-12 | Stop reason: HOSPADM

## 2018-04-12 RX ORDER — SODIUM CHLORIDE 0.9 % (FLUSH) 0.9 %
5-10 SYRINGE (ML) INJECTION EVERY 8 HOURS
Status: DISCONTINUED | OUTPATIENT
Start: 2018-04-12 | End: 2018-04-12 | Stop reason: HOSPADM

## 2018-04-12 RX ORDER — INSULIN GLARGINE 100 [IU]/ML
45 INJECTION, SOLUTION SUBCUTANEOUS ONCE
Status: COMPLETED | OUTPATIENT
Start: 2018-04-12 | End: 2018-04-12

## 2018-04-12 RX ORDER — LIDOCAINE HCL/PF 100 MG/5ML
50-100 SYRINGE (ML) INTRAVENOUS
Status: DISCONTINUED | OUTPATIENT
Start: 2018-04-12 | End: 2018-04-13

## 2018-04-12 RX ORDER — AMIODARONE HYDROCHLORIDE 200 MG/1
200 TABLET ORAL EVERY 12 HOURS
Status: DISCONTINUED | OUTPATIENT
Start: 2018-04-12 | End: 2018-04-13

## 2018-04-12 RX ORDER — MIDAZOLAM HYDROCHLORIDE 1 MG/ML
INJECTION, SOLUTION INTRAMUSCULAR; INTRAVENOUS AS NEEDED
Status: DISCONTINUED | OUTPATIENT
Start: 2018-04-12 | End: 2018-04-12 | Stop reason: HOSPADM

## 2018-04-12 RX ORDER — FENTANYL CITRATE 50 UG/ML
INJECTION, SOLUTION INTRAMUSCULAR; INTRAVENOUS AS NEEDED
Status: DISCONTINUED | OUTPATIENT
Start: 2018-04-12 | End: 2018-04-12 | Stop reason: HOSPADM

## 2018-04-12 RX ORDER — NITROGLYCERIN 20 MG/100ML
10-100 INJECTION INTRAVENOUS
Status: DISCONTINUED | OUTPATIENT
Start: 2018-04-12 | End: 2018-04-13

## 2018-04-12 RX ORDER — PROTAMINE SULFATE 10 MG/ML
INJECTION, SOLUTION INTRAVENOUS AS NEEDED
Status: DISCONTINUED | OUTPATIENT
Start: 2018-04-12 | End: 2018-04-12 | Stop reason: HOSPADM

## 2018-04-12 RX ORDER — AMIODARONE HYDROCHLORIDE 200 MG/1
200 TABLET ORAL ONCE
COMMUNITY
End: 2018-04-17

## 2018-04-12 RX ADMIN — MIDAZOLAM HYDROCHLORIDE 1 MG: 1 INJECTION, SOLUTION INTRAMUSCULAR; INTRAVENOUS at 16:19

## 2018-04-12 RX ADMIN — PROPOFOL 5 MCG/KG/MIN: 10 INJECTION, EMULSION INTRAVENOUS at 17:50

## 2018-04-12 RX ADMIN — VECURONIUM BROMIDE FOR INJECTION 4 MG: 1 INJECTION, POWDER, LYOPHILIZED, FOR SOLUTION INTRAVENOUS at 09:45

## 2018-04-12 RX ADMIN — MIDAZOLAM HYDROCHLORIDE 2 MG: 1 INJECTION, SOLUTION INTRAMUSCULAR; INTRAVENOUS at 07:41

## 2018-04-12 RX ADMIN — MIDAZOLAM 1 MG: 1 INJECTION INTRAMUSCULAR; INTRAVENOUS at 14:25

## 2018-04-12 RX ADMIN — OXYCODONE HYDROCHLORIDE AND ACETAMINOPHEN 1 TABLET: 5; 325 TABLET ORAL at 20:48

## 2018-04-12 RX ADMIN — TUBERCULIN PURIFIED PROTEIN DERIVATIVE 5 UNITS: 5 INJECTION INTRADERMAL at 20:36

## 2018-04-12 RX ADMIN — AMIODARONE HYDROCHLORIDE 200 MG: 200 TABLET ORAL at 20:49

## 2018-04-12 RX ADMIN — SUFENTANIL CITRATE 50 MCG: 50 INJECTION EPIDURAL; INTRAVENOUS at 10:03

## 2018-04-12 RX ADMIN — SODIUM CHLORIDE, SODIUM LACTATE, POTASSIUM CHLORIDE, AND CALCIUM CHLORIDE 100 ML/HR: 600; 310; 30; 20 INJECTION, SOLUTION INTRAVENOUS at 06:31

## 2018-04-12 RX ADMIN — ETOMIDATE 6 MG: 2 INJECTION INTRAVENOUS at 09:11

## 2018-04-12 RX ADMIN — FENTANYL CITRATE 50 MCG: 50 INJECTION, SOLUTION INTRAMUSCULAR; INTRAVENOUS at 15:52

## 2018-04-12 RX ADMIN — MIDAZOLAM HYDROCHLORIDE 2 MG: 1 INJECTION, SOLUTION INTRAMUSCULAR; INTRAVENOUS at 15:52

## 2018-04-12 RX ADMIN — FAMOTIDINE 20 MG: 10 INJECTION, SOLUTION INTRAVENOUS at 21:28

## 2018-04-12 RX ADMIN — FENTANYL CITRATE 50 MCG: 50 INJECTION, SOLUTION INTRAMUSCULAR; INTRAVENOUS at 16:40

## 2018-04-12 RX ADMIN — MIDAZOLAM HYDROCHLORIDE 2 MG: 1 INJECTION, SOLUTION INTRAMUSCULAR; INTRAVENOUS at 09:03

## 2018-04-12 RX ADMIN — SUFENTANIL CITRATE 50 MCG: 50 INJECTION EPIDURAL; INTRAVENOUS at 10:19

## 2018-04-12 RX ADMIN — SUFENTANIL CITRATE 50 MCG: 50 INJECTION EPIDURAL; INTRAVENOUS at 10:01

## 2018-04-12 RX ADMIN — Medication 2 G: at 21:08

## 2018-04-12 RX ADMIN — MIDAZOLAM HYDROCHLORIDE 3 MG: 1 INJECTION, SOLUTION INTRAMUSCULAR; INTRAVENOUS at 09:08

## 2018-04-12 RX ADMIN — ROCURONIUM BROMIDE 50 MG: 10 INJECTION, SOLUTION INTRAVENOUS at 09:11

## 2018-04-12 RX ADMIN — DEXTROSE MONOHYDRATE, SODIUM CHLORIDE, AND POTASSIUM CHLORIDE 25 ML/HR: 50; 4.5; 1.49 INJECTION, SOLUTION INTRAVENOUS at 17:38

## 2018-04-12 RX ADMIN — SODIUM CHLORIDE, SODIUM LACTATE, POTASSIUM CHLORIDE, CALCIUM CHLORIDE: 600; 310; 30; 20 INJECTION, SOLUTION INTRAVENOUS at 09:35

## 2018-04-12 RX ADMIN — SUFENTANIL CITRATE 50 MCG: 50 INJECTION EPIDURAL; INTRAVENOUS at 10:04

## 2018-04-12 RX ADMIN — MIDAZOLAM HYDROCHLORIDE 5 MG: 1 INJECTION, SOLUTION INTRAMUSCULAR; INTRAVENOUS at 11:21

## 2018-04-12 RX ADMIN — ROCURONIUM BROMIDE 50 MG: 10 INJECTION, SOLUTION INTRAVENOUS at 11:21

## 2018-04-12 RX ADMIN — PROTAMINE SULFATE 200 MG: 10 INJECTION, SOLUTION INTRAVENOUS at 11:57

## 2018-04-12 RX ADMIN — CHLORHEXIDINE GLUCONATE 10 ML: 1.2 RINSE ORAL at 20:47

## 2018-04-12 RX ADMIN — CHLORHEXIDINE GLUCONATE 10 ML: 1.2 RINSE ORAL at 17:42

## 2018-04-12 RX ADMIN — INSULIN GLARGINE 45 UNITS: 100 INJECTION, SOLUTION SUBCUTANEOUS at 22:12

## 2018-04-12 RX ADMIN — SODIUM CHLORIDE: 9 INJECTION, SOLUTION INTRAVENOUS at 15:48

## 2018-04-12 RX ADMIN — CEFAZOLIN SODIUM 2 G: 1 INJECTION, POWDER, FOR SOLUTION INTRAMUSCULAR; INTRAVENOUS at 15:56

## 2018-04-12 RX ADMIN — MUPIROCIN: 20 OINTMENT TOPICAL at 17:43

## 2018-04-12 RX ADMIN — MIDAZOLAM 1 MG: 1 INJECTION INTRAMUSCULAR; INTRAVENOUS at 15:22

## 2018-04-12 RX ADMIN — Medication 2 G: at 09:45

## 2018-04-12 RX ADMIN — MIDAZOLAM 1 MG: 1 INJECTION INTRAMUSCULAR; INTRAVENOUS at 13:35

## 2018-04-12 RX ADMIN — Medication 10 ML: at 14:00

## 2018-04-12 RX ADMIN — ATORVASTATIN CALCIUM 80 MG: 40 TABLET, FILM COATED ORAL at 20:47

## 2018-04-12 RX ADMIN — VECURONIUM BROMIDE FOR INJECTION 4 MG: 1 INJECTION, POWDER, LYOPHILIZED, FOR SOLUTION INTRAVENOUS at 10:30

## 2018-04-12 RX ADMIN — MIDAZOLAM HYDROCHLORIDE 2 MG: 1 INJECTION, SOLUTION INTRAMUSCULAR; INTRAVENOUS at 15:58

## 2018-04-12 RX ADMIN — VECURONIUM BROMIDE FOR INJECTION 1 MG: 1 INJECTION, POWDER, LYOPHILIZED, FOR SOLUTION INTRAVENOUS at 16:12

## 2018-04-12 RX ADMIN — VECURONIUM BROMIDE FOR INJECTION 5 MG: 1 INJECTION, POWDER, LYOPHILIZED, FOR SOLUTION INTRAVENOUS at 15:56

## 2018-04-12 RX ADMIN — SODIUM CHLORIDE 25 ML/HR: 900 INJECTION, SOLUTION INTRAVENOUS at 13:39

## 2018-04-12 RX ADMIN — SUFENTANIL CITRATE 50 MCG: 50 INJECTION EPIDURAL; INTRAVENOUS at 09:11

## 2018-04-12 RX ADMIN — CEFAZOLIN SODIUM 2 G: 1 INJECTION, POWDER, FOR SOLUTION INTRAMUSCULAR; INTRAVENOUS at 12:54

## 2018-04-12 RX ADMIN — MIDAZOLAM 1 MG: 1 INJECTION INTRAMUSCULAR; INTRAVENOUS at 13:58

## 2018-04-12 RX ADMIN — VECURONIUM BROMIDE FOR INJECTION 2 MG: 1 INJECTION, POWDER, LYOPHILIZED, FOR SOLUTION INTRAVENOUS at 10:04

## 2018-04-12 RX ADMIN — SODIUM CHLORIDE 1 G: 900 INJECTION, SOLUTION INTRAVENOUS at 09:45

## 2018-04-12 RX ADMIN — SODIUM CHLORIDE 500 MG/HR: 9 INJECTION, SOLUTION INTRAVENOUS at 09:55

## 2018-04-12 RX ADMIN — AMIODARONE HYDROCHLORIDE 400 MG: 200 TABLET ORAL at 06:32

## 2018-04-12 RX ADMIN — MIDAZOLAM 1 MG: 1 INJECTION INTRAMUSCULAR; INTRAVENOUS at 14:55

## 2018-04-12 RX ADMIN — MORPHINE SULFATE 4 MG: 2 INJECTION, SOLUTION INTRAMUSCULAR; INTRAVENOUS at 17:10

## 2018-04-12 RX ADMIN — HEPARIN SODIUM 25000 UNITS: 1000 INJECTION, SOLUTION INTRAVENOUS; SUBCUTANEOUS at 10:10

## 2018-04-12 RX ADMIN — ALBUMIN HUMAN 250 ML: 50 SOLUTION INTRAVENOUS at 12:10

## 2018-04-12 RX ADMIN — PROTAMINE SULFATE 25 MG: 10 INJECTION, SOLUTION INTRAVENOUS at 13:43

## 2018-04-12 RX ADMIN — Medication 10 ML: at 20:48

## 2018-04-12 RX ADMIN — NITROGLYCERIN 10 MCG/MIN: 20 INJECTION INTRAVENOUS at 11:41

## 2018-04-12 RX ADMIN — NOREPINEPHRINE BITARTRATE 0.05 MCG/KG/MIN: 1 INJECTION INTRAVENOUS at 18:30

## 2018-04-12 RX ADMIN — SODIUM CHLORIDE: 9 INJECTION, SOLUTION INTRAVENOUS at 09:35

## 2018-04-12 RX ADMIN — ALBUMIN HUMAN 250 ML: 50 SOLUTION INTRAVENOUS at 16:06

## 2018-04-12 NOTE — ANESTHESIA POSTPROCEDURE EVALUATION
Post-Anesthesia Evaluation and Assessment    Patient: Bishnu Harding MRN: [id number]  SSN: [id number]    YOB: 1960  Age: 59 y.o. Sex: male       Cardiovascular Function/Vital Signs  Visit Vitals    /42    Pulse (!) 115    Temp 36.8 °C (98.2 °F)    Resp 16    Ht 6' 1\" (1.854 m)    Wt 70.8 kg (156 lb 3 oz)    SpO2 99%    BMI 20.61 kg/m2       Patient is status post general anesthesia for Procedure(s):  AORTIC VALVE REPLACEMENT  ESOPHAGEAL TRANS ECHOCARDIOGRAM / ANES PROBE  . Nausea/Vomiting: None    Postoperative hydration reviewed and adequate. Pain:  Pain Scale 1: Visual (04/12/18 1250)  Pain Intensity 1: 0 (04/12/18 1250)   Managed    Neurological Status:   Neuro (WDL): Within Defined Limits (04/12/18 4661)   Sedated     Mental Status and Level of Consciousness: Sedated    Pulmonary Status:     Adequate oxygenation and airway patent, intubated     Complications related to anesthesia: None    Post-anesthesia assessment completed. No immediate concerns. SVR in 600s, and our nursing staff is treating with volume and pressors.     Signed By: Hannah Retana MD     April 12, 2018

## 2018-04-12 NOTE — PROGRESS NOTES
TRANSFER - IN REPORT:    Verbal report received from [facility]) on Moreno [place]  being received from OR(unit) for routine post - op      Report consisted of patients Situation, Background, Assessment and   Recommendations(SBAR). Information from the following report(s) OR Summary, Procedure Summary and Intake/Output was reviewed with the receiving nurse. Opportunity for questions and clarification was provided. Assessment completed upon patients arrival to unit and care assumed. Primary Rn is Clifford CORDOVA Dual skin assessment completed. No redness or breakdown noted.

## 2018-04-12 NOTE — PROGRESS NOTES
Bedside shift report received from [place], [facility].     Rg Franz RN

## 2018-04-12 NOTE — CONSULTS
Cardiovascular ICU Consult Note: 4/12/2018  Timothy Moreira  Admission Date: 4/12/2018     The patient's chart is reviewed and the patient is discussed with the staff. HPI:     Patient is seen at the request of Dr. Eder Sanz for respiratory management status post cardiac surgery. Patient had aortic valve replacement surgery. He was recently referred to cardiology after he was found to have a heart murmur during an exam. Echo and cardiac cath revealed severe AS. He also has mild, nonobstructive CAD. He has smoked all of his adult life - about 40 years, 1/2 pack per day. His wife has noticed some dyspnea but no wheezing. He has never been evaluated for any chronic lung disease. He is currently is sedated in CV-ICU and orally intubated receiving mechanical ventilation. Post-op his course has been marked by 800mL bloody output from mediastinal tube with hypotension and he has been given protamine, cryo and FFP/ blood is ordered. Currently SBP is in 100s. Prior to Admission Medications   Prescriptions Last Dose Informant Patient Reported? Taking?   amiodarone (CORDARONE) 200 mg tablet 4/11/2018 at 1700  Yes Yes   Sig: Take 200 mg by mouth once. aspirin 81 mg chewable tablet 4/12/2018 at 0330  Yes Yes   Sig: Take 81 mg by mouth nightly. metoprolol tartrate (LOPRESSOR) 25 mg tablet 4/11/2018 at 1700  Yes Yes   Sig: Take 12.5 mg by mouth once. multivitamin (ONE A DAY) tablet 4/5/2018 at Unknown time  Yes Yes   Sig: Take 1 Tab by mouth daily. Stopped 4/6/18   mupirocin calcium (BACTROBAN NASAL) 2 % nasal ointment 4/12/2018 at 0330  Yes Yes   Sig: by Both Nostrils route two (2) times a day. omega 3-dha-epa-fish oil (FISH OIL) 100-160-1,000 mg cap 4/5/2018 at Unknown time  Yes Yes   Sig: Take  by mouth nightly.  Stopped 4/6/18      Facility-Administered Medications: None       Review of Systems  A comprehensive review of systems was negative except for: Constitutional: positive for none  Eyes: positive for contacts/glasses  Ears, nose, mouth, throat, and face: positive for none  Respiratory: positive for dyspnea on exertion  Cardiovascular: positive for dyspnea  Gastrointestinal: positive for none  Genitourinary: positive for none  Integument/breast: positive for none  Hematologic/lymphatic: positive for none  Musculoskeletal: positive for none  Neurological: positive for previous seizure associated with alcohol withdrawal  Behvioral/Psych: positive for history of depression with drug use. Now in recovery  Endocrine: positive for none  Allergic/Immunologic: positive for none    Past Medical History:   Diagnosis Date    CAD (coronary artery disease)     no mi/ no stents-- followed by dr. Rodger Melendez Drug addiction in remission [facility])     none since 2001    Family history of premature CAD 2/28/2018    Homeless family     living with step dtr at present    Seizures ([facility] [address] 2001    x 1--- ETOH  WITHDRAWAL-- pt thinks---no meds    Severe aortic stenosis     last echo 2/2018    Tobacco use disorder      Past Surgical History:   Procedure Laterality Date    HX HEENT  age 27's    wisdom teeth removed     Social History     Social History    Marital status:      Spouse name: N/A    Number of children: N/A    Years of education: N/A     Occupational History     [address] [place] [address]      Social History Main Topics    Smoking status: Current Every Day Smoker     Packs/day: 0.50     Years: 40.00     Types: Cigarettes    Smokeless tobacco: Never Used    Alcohol use Yes      Comment: rare    Drug use: No    Sexual activity: Not on file     Other Topics Concern    Not on file     Social History Narrative    .  Lives with wife     Family History   Problem Relation Age of Onset    Cancer Mother     Coronary Artery Disease Mother     Cancer Father     Heart Disease Brother     Coronary Artery Disease Brother     Cancer Sister      breast     No Known Allergies    Current Facility-Administered Medications   Medication Dose Route Frequency    sodium chloride (NS) flush 5-10 mL  5-10 mL IntraVENous Q8H    sodium chloride (NS) flush 5-10 mL  5-10 mL IntraVENous PRN    0.9% sodium chloride infusion  25 mL/hr IntraVENous CONTINUOUS    dextrose 5% - 0.45% NaCl with KCl 20 mEq/L infusion  25 mL/hr IntraVENous CONTINUOUS    sodium chloride (NS) flush 5-10 mL  5-10 mL IntraVENous Q8H    sodium chloride (NS) flush 5-10 mL  5-10 mL IntraVENous PRN    oxyCODONE-acetaminophen (PERCOCET) 5-325 mg per tablet 1 Tab  1 Tab Oral Q4H PRN    morphine injection 3-5 mg  3-5 mg IntraVENous Q1H PRN    naloxone (NARCAN) injection 0.4 mg  0.4 mg IntraVENous PRN    mupirocin (BACTROBAN) 2 % ointment   Both Nostrils BID    ceFAZolin (ANCEF) 2 g/20 mL in sterile water IV syringe  2 g IntraVENous Q8H    sodium bicarbonate 8.4 % (1 mEq/mL) injection 50 mEq  50 mEq IntraVENous PRN    EPINEPHrine (ADRENALIN) 4 mg in 0.9% sodium chloride 250 mL infusion  0.05-0.1 mcg/kg/min IntraVENous TITRATE    nitroglycerin (Tridil) 200 mcg/ml infusion   mcg/min IntraVENous TITRATE    PHENYLephrine (ALHAJI-SYNEPHRINE) 30 mg in 0.9% sodium chloride 250 mL infusion   mcg/min IntraVENous TITRATE    lidocaine (PF) (XYLOCAINE) 100 mg/5 mL (2 %) injection syringe  mg   mg IntraVENous ONCE PRN    amiodarone (CORDARONE) tablet 200 mg  200 mg Oral Q12H    [START ON 4/13/2018] metoprolol tartrate (LOPRESSOR) tablet 25 mg  25 mg Oral Q12H    atorvastatin (LIPITOR) tablet 80 mg  80 mg Oral QHS    insulin regular (NOVOLIN R, HUMULIN R) 100 Units in 0.9% sodium chloride 100 mL infusion  1 Units/hr IntraVENous TITRATE    dextrose (D50W) injection syrg 12.5 g  25 mL IntraVENous PRN    [START ON 4/13/2018] aspirin chewable tablet 81 mg  81 mg Oral DAILY    magnesium sulfate 1 g/100 ml IVPB (premix or compounded)  1 g IntraVENous PRN    potassium chloride 10 mEq in 50 ml IVPB 10 mEq IntraVENous PRN    midazolam (VERSED) injection 1 mg  1 mg IntraVENous Q1H PRN    propofol (DIPRIVAN) infusion  0-50 mcg/kg/min IntraVENous TITRATE    meperidine (DEMEROL) injection 25 mg  25 mg IntraVENous Q1H PRN    chlorhexidine (PERIDEX) 0.12 % mouthwash 10 mL  10 mL Oral BID    tuberculin injection 5 Units  5 Units IntraDERMal ONCE         Objective:     Vitals:    04/12/18 0604 04/12/18 1250   BP: 127/62 100/42   Pulse: (!) 54 86   Resp: 18 14   Temp: 97.5 °F (36.4 °C) 98.2 °F (36.8 °C)   SpO2: 99% 98%   Weight: 70.8 kg (156 lb 3 oz)    Height: 6' 1\" (1.854 m)        Intake and Output:      04/12 0701 - 04/12 1900  In: 1050 [I.V.:400]  Out: 750 [Urine:750]    Physical Exam:          Constitutional:  Sedated, intubated and mechanically ventilated. HEENT:  Sclera clear, pupils equal, oral mucosa moist and orally intubated  RESPIRATORY:  Intubated and mechanically ventilated   CARDIOVASCULAR:  RRR with no M,G,R  ABDOMEN:  soft; positive bowel sounds present  EXTREMITIES:  warm with no cyanosis, no lower leg edema. SKIN:  no jaundice or ecchymosis   NEURO:  sedated. Musculoskeletal: cannot assess - sedated    CHEST XRAY:    4/12/18 4/6/2018    IMPRESSION:   No acute cardiopulmonary process evident by plain film imaging. LINES:  Gomez, swan toyin, arterial line. Oral ETT, oral gastric tube, chest tube(s)    DRIPS:  Epinephrine, D5 1/2NS, Insulin gtt, nitroglycerin, Kobi, propofol    CI:  3.0    Ventilator Settings  Mode FIO2 Rate Tidal Volume Pressure PEEP   SIMV, PRVC, Pressure support  70 %    550 ml  10 cm H2O  8 cm H20      Peak airway pressure: 20 cm H2O   Minute ventilation: 7.8 l/min     ABG:   Recent Labs      04/12/18   1252   PH  7.28*   PCO2  52*   PO2  283*   HCO3  24      PFT 4/6 : no airflow obstruction, normal PFT    LAB      No results for input(s): WBC, HGB, HCT, PLT, INR, HGBEXT, HCTEXT, PLTEXT, HGBEXT, HCTEXT, PLTEXT in the last 72 hours.     No lab exists for component: INREXT, INREXT  No results for input(s): NA, K, CL, CO2, GLU, BUN, CREA, MG, PHOS, CA, ALB, TBIL, GPT, SGOT, BNPP in the last 72 hours. No lab exists for component: TROIP    Assessment and Plan :  (Medical Decision Making)     Hospital Problems  Date Reviewed: 4/12/2018          Codes Class Noted POA    Status post aortic valve replacement ICD-10-CM: Z95.2  ICD-9-CM: V43.3  4/12/2018 No    Per CT surgery    Respiratory failure, post-operative ([facility] [address] ICD-10-CM: Z88.629  ICD-9-CM: 518.51  4/12/2018 No    Intubated, will wean per protocol    Hypoxia ICD-10-CM: R09.02  ICD-9-CM: 799.02  4/12/2018 No    Intubated, will wean once bleeding resolved. * (Principal)Nonrheumatic aortic valve stenosis ICD-10-CM: I35.0  ICD-9-CM: 424.1  2/7/2018 Yes        Tobacco use disorder (Chronic) ICD-10-CM: F47.184  ICD-9-CM: 305.1  Unknown Yes    Chronic      Encounter for weaning from vent:  Hold on vent weaning until bleeding stabilized  Anemia due to acute blood loss: Active bleeding from mediastinal chest tube     Plan:          --Wean ventilator per protocol  --IS every hour while awake once extubated  --Bronchodilators per protocol  --Review CXR    Elin Browne NP    I have spoken with and examined the patient. I agree with the above assessment and plan as documented. Dr. Duval [place] is following bleeding closely. For now agree with supportive care and reversal of coagulopathy. Will hold on vent weaning until bleeding has stabilized. Gen: sedated  Lungs:  CTAB  Heart:  RRR  Abd: +BS  Ext: no edema    --Agree with transfusion of blood products for resuscitation and coagulopathy correction  --Hold off on vent weaning until bleeding has stabilized. --Available for assistance if needed; don't hesitate to call [phone].     Vern Jeffries MD      More than 50% of time documented was spent face-to-face contact with the patient and in the care of the patient on the floor/unit where the patient is located

## 2018-04-12 NOTE — PERIOP NOTES
TRANSFER - OUT REPORT:    Verbal report given to Clifford ALBA on Neisha Hackett  being transferred to CVICU(unit) for routine progression of care       Report consisted of patients Situation, Background, Assessment and   Recommendations(SBAR). Information from the following report(s) OR Summary was reviewed with the receiving nurse. Lines:   Double Lumen 04/12/18 Right Internal jugular (Active)       Yaneth Ao Triple 04/12/18 Right Neck (Active)       Peripheral IV 04/12/18 Right Hand (Active)   Site Assessment Clean, dry, & intact 4/12/2018  6:30 AM   Phlebitis Assessment 0 4/12/2018  6:30 AM   Infiltration Assessment 0 4/12/2018  6:30 AM   Dressing Status Clean, dry, & intact 4/12/2018  6:30 AM   Dressing Type Tape;Transparent 4/12/2018  6:30 AM   Hub Color/Line Status Infusing;Patent 4/12/2018  6:30 AM       Arterial Line 04/12/18 Left Radial artery (Active)        Opportunity for questions and clarification was provided.       Patient transported with:   Monitor  O2 @ 15 liters  Registered Nurse   CRNA

## 2018-04-12 NOTE — PROGRESS NOTES
TRANSFER - IN REPORT:    Verbal report received from [facility]) on Con Jeronimo  being received from OR(unit) for routine post - op      Report consisted of patients Situation, Background, Assessment and   Recommendations(SBAR). Information from the following report(s) OR Summary, Procedure Summary and Intake/Output was reviewed with the receiving nurse. Opportunity for questions and clarification was provided. Assessment completed upon patients arrival to unit and care assumed. Primary RN is [phone]

## 2018-04-12 NOTE — ANESTHESIA PROCEDURE NOTES
YARON for monitoring, evaluate surgical repair, and look for intracardiac air prior to coming off bypass  Date/Time: 4/12/2018 9:25 AM  Ordering Provider: Claudio Avalos performed, 09:18  Risks and benefits discussed with the patient and plans are to proceed    Procedure Note  YARON prepump:    Normal hypertrophied LV with normal contractily, mild MR, mild TR, severely thickened and stenotic AV    YARON postpump:    YARON done prior to weaning from CPB, minimal intracardiac air with extensive manipulation to try to agitate and remove the remaining air. The prosthetic aortic valve was well positioned without a perivalvular leak.   Performed by: Lucas Amador  Authorized by: Lucas Amador

## 2018-04-12 NOTE — ANESTHESIA PREPROCEDURE EVALUATION
Anesthetic History   No history of anesthetic complications            Review of Systems / Medical History  Patient summary reviewed and pertinent labs reviewed    Pulmonary          Smoker         Neuro/Psych     seizures (x 1 2001)    Psychiatric history     Cardiovascular      Valvular problems/murmurs        CAD    Exercise tolerance: >4 METS  Comments: LVH some inferior HK EF 55%  Critical stenosis RICKIE not stated \"heavily calcified bicuspid valve with critical stenosis\" with gradient 48    Denies CP, syncope, does have POOLE    Cath mild nonobstructive CAD,   GI/Hepatic/Renal  Within defined limits              Endo/Other             Other Findings   Comments: Depression  Hx drug abuse, none since 2001           Physical Exam    Airway  Mallampati: II  TM Distance: 4 - 6 cm  Neck ROM: normal range of motion   Mouth opening: Normal     Cardiovascular      Rate: normal    Murmur: Grade 3, Aortic area     Dental    Dentition: Upper partial plate     Pulmonary  Breath sounds clear to auscultation               Abdominal  GI exam deferred       Other Findings            Anesthetic Plan    ASA: 4  Anesthesia type: general    Monitoring Plan: Arterial line, [place], YARON and BIS      Induction: Intravenous  Anesthetic plan and risks discussed with: Patient      preop is from AVR earlier today, significant chest tube output and concern for bleeding, plan reexploration

## 2018-04-12 NOTE — PROGRESS NOTES
Patient's Chest tube output > 250/hr. Dr. Brie Davies ordered to keep pt sedated and give blood products. Dr. Brie Davies to bedside to assess pt. Wife has been at bedside and Dr. Andrade Rincon has updated family. Awaiting OR staff to arrive for pt to go to OR.

## 2018-04-12 NOTE — ANESTHESIA PROCEDURE NOTES
Right IJ MAC introducer with pulmonary artery catheter    Start time: 4/12/2018 9:18 AM  End time: 4/12/2018 9:28 AM  Performed by: Andrez Parra  Authorized by: Ruthy LASSITER     Indications: vascular access, central pressure monitoring and need for vasopressors  Preanesthetic Checklist: patient identified, risks and benefits discussed, anesthesia consent, site marked, patient being monitored and timeout performed    Timeout Time: 09:18       Pre-procedure: All elements of maximal sterile barrier technique followed? Yes    2% Chlorhexidine for cutaneous antisepsis, Hand hygiene performed prior to catheter insertion and Ultrasound guidance    Sterile Ultrasound Technique followed?: Yes        Ultrasound Image Stored? Image stored    Procedure:   Prep:  Chlorhexidine    Orientation:  Right  Patient position:  Trendelenburg  Catheter type:  Double lumen  Catheter size:  9 Fr  Catheter length:  12 cm  Number of attempts:  1  Successful placement: Yes      Assessment:   Post-procedure:  Catheter secured and sterile dressing applied  Assessment:  Guidewire removal verified, blood return through all ports and free fluid flow  Insertion:  Uncomplicated  Patient tolerance:  Patient tolerated the procedure well with no immediate complications  Ultrasound used to identify location and patency of the right internal jugular vein, and then I cannulated it easily with an 18g angiocath. Manometry fell and then wire placed and was visualized with ultrasound. The MAC introducer threaded easily and then the PA catheter threaded on the second attempt into the PA with minimal ectopy at approximately 50cm and I left it at approximately 54cm in a position that it would not wedge.

## 2018-04-12 NOTE — BRIEF OP NOTE
BRIEF OPERATIVE NOTE    Date of Procedure: 4/12/2018   Preoperative Diagnosis: Nonrheumatic aortic valve stenosis [I35.0]  Bicuspid aortic valve [Q23.1]  Postoperative Diagnosis: Nonrheumatic aortic valve stenosis [I35.0]    Procedure(s):  AORTIC VALVE REPLACEMENT  ESOPHAGEAL TRANS ECHOCARDIOGRAM / ANES PROBE    Surgeon(s) and Role:     * Anna Goncalves MD - Primary         Surgical Assistant:     Surgical Staff:  Circ-1: Shari Thayer  Perfusionist: Bindu Reyes  Scrub Tech-1: Carmela Calabrese  Scrub Tech-2: Laureen Ibarra  Scrub RN-1: Neli Camargo RN  Event Time In   Incision Start 0957   Incision Close 1233     Anesthesia: General   Estimated Blood Loss: minimal  Specimens:   ID Type Source Tests Collected by Time Destination   1 : Aortiv Valve Preservative Aortic Valve  Anna Goncalves MD 4/12/2018 1021 Pathology      Findings: as   Complications: none  Implants:   Implant Name Type Inv.  Item Serial No.  Lot No. LRB No. Used Action   VALVE AORT PERIMT MAGNA 25MM -- PERIMOUNT - [id number]   VALVE AORT PERIMT MAGNA 25MM -- PERIMOUNT [id number] Hailo   N/A 1 Implanted

## 2018-04-12 NOTE — IP AVS SNAPSHOT
303 [facility] 
 
 
 2329 [facility] [address] [facility] 
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  They will call you within 24 hours of discharge to schedule your home health visit. [facility] [address] [place] [address] 
[phone] [employer]   You may go between 8:00 a.m. and 12:00 p.m. to apply for SNAP/Food Stamp Benefits or apply online. [facility] [address] 
[phone] 
[email or web address] [employer] can contact them about public housing and Section [address]. [address] [place] [address]. [address] 
[phone] 
[email or web address]  
 [facility] On 5/29/2018 Your Cardiac Rehab orientation is scheduled for May 29th at 1pm. Please arrive at 12:45 to check in with registration in the lobby before coming to your appt on the 2nd floor. [address] [place]olas Smalls [address] 
[phone] Jose Rafael Anthony MD On 5/9/2018 2:30  [facility] [address] [facility] CARDIOVASC THORACIC [address] [place] [address] 
[phone] Luis Fernando Sandhu MD On 4/24/2018 10:00 AM [address] [facility] [address] [facility] Cardiology [facility] [address] 
[phone] Carmen Gray MD   [address]  [phone] Your Scheduled Appointments Tuesday April 24, 2018 10:00 AM EDT TRANSITIONAL CARE MANAGEMENT with Georgiana Pimentel MD  
[address] ([address] [facility]) [address] 
[address] Misha Osborn 79  
[phone] Wednesday May 09, 2018  2:30 PM EDT Discharge Followup with Jackie Mcclure MD  
1141 [facility] ([address] [id number] [phone] Tuesday May 29, 2018  1:00 PM EDT  
ORIENTATION with An[facility] ([facility] [address]  
[phone] Discharge Orders Procedure Order Date Status Priority Quantity Spec Type Associated Dx REFERRAL TO CARDIAC [facility] 04/17/18 0931 Normal Routine 1  Status post aortic valve replacement [[id number]] A check jaylin indicates which time of day the medication should be taken. My Medications START taking these medications Instructions Each Dose to Equal  
 Morning Noon Evening Bedtime  
 acetaminophen 325 mg tablet Commonly known as:  TYLENOL Your next dose is:  As needed for mild pain Over the counter medication, use for pain as needed prn  
     
   
   
   
  
 atorvastatin 20 mg tablet Commonly known as:  LIPITOR Your next dose is: Tonight Take 1 Tab by mouth nightly. 20 mg  
    
   
   
   
  
  
 ferrous sulfate 325 mg (65 mg iron) tablet Start taking on:  4/18/2018 Your next dose is:  Tomorrow Take 1 Tab by mouth daily (with breakfast). 325 mg  
    
  
   
   
   
  
 guaiFENesin 1,200 mg Ta12 ER tablet Commonly known as:  Kade & Kade Your next dose is:  As needed for chest congestion Over the counter, take as needed to keep secretions thin and easy to cough up  
     
   
   
   
  
 oxyCODONE-acetaminophen 5-325 mg per tablet Commonly known as:  PERCOCET Your next dose is:  As Needed for mod to severe pain Take 1 Tab by mouth every four (4) hours as needed. Max Daily Amount: 6 Tabs. 1 Tab  
    
   
   
   
  
 senna-docusate 8.6-50 mg per tablet Commonly known as:  Lender Motto Your next dose is:  As needed for constipation Over the counter Senakot and Colace for constipation as needed  
     
   
   
   
  
 traMADol 50 mg tablet Commonly known as:  ULTRAM  
Your next dose is:  As needed for mild to moderate pain Take 1 Tab by mouth every six (6) hours as needed. Max Daily Amount: 200 mg.  
 50 mg CHANGE how you take these medications Instructions Each Dose to Equal  
 Morning Noon Evening Bedtime  
 metoprolol tartrate 25 mg tablet Commonly known as:  LOPRESSOR What changed:   
- how much to take - when to take this Your next dose is: This evening Take 1 Tab by mouth every twelve (12) hours. 25 mg CONTINUE taking these medications Instructions Each Dose to Equal  
 Morning Noon Evening Bedtime  
 aspirin 81 mg chewable tablet Your next dose is:  tomorrow Take 81 mg by mouth nightly. 81 mg FISH -160-1,000 mg Cap Generic drug:  omega 3-dha-epa-fish oil Your next dose is: Tonight Take  by mouth nightly. Stopped 4/6/18  
     
   
   
  
   
  
 multivitamin tablet Commonly known as:  ONE A DAY Your next dose is:  Tomorrow Take 1 Tab by mouth daily. Stopped 4/6/18  
 1 Tab STOP taking these medications BACTROBAN NASAL 2 % nasal ointment Generic drug:  mupirocin calcium CORDARONE 200 mg tablet Generic drug:  amiodarone Where to Get Your Medications Information on where to get these meds will be given to you by the nurse or doctor. ! Ask your nurse or doctor about these medications acetaminophen 325 mg tablet  
 atorvastatin 20 mg tablet  
 ferrous sulfate 325 mg (65 mg iron) tablet  
 guaiFENesin 1,200 mg Ta12 ER tablet  
 metoprolol tartrate 25 mg tablet  
 oxyCODONE-acetaminophen 5-325 mg per tablet  
 senna-docusate 8.6-50 mg per tablet  
 traMADol 50 mg tablet Opioid Education Prescription Opioids: What You Need to Know: 
 
Prescription opioids can be used to help relieve moderate-to-severe pain and are often prescribed following a surgery or injury, or for certain health conditions. These medications can be an important part of treatment but also come with serious risks. Opioids are strong pain medicines. Examples include hydrocodone, oxycodone, fentanyl, and morphine. Heroin is an example of an illegal opioid. It is important to work with your health care provider to make sure you are getting the safest, most effective care. WHAT ARE THE RISKS AND SIDE EFFECTS OF OPIOID USE? Prescription opioids carry serious risks of addiction and overdose, especially with prolonged use. An opioid overdose, often marked by slow breathing, can cause sudden death. The use of prescription opioids can have a number of side effects as well, even when taken as directed. · Tolerance-meaning you might need to take more of a medication for the same pain relief · Physical dependence-meaning you have symptoms of withdrawal when the medication is stopped. Withdrawal symptoms can include nausea, sweating, chills, diarrhea, stomach cramps, and muscle aches. Withdrawal can last up to several weeks, depending on which drug you took and how long you took it. · Increased sensitivity to pain · Constipation · Nausea, vomiting, and dry mouth · Sleepiness and dizziness · Confusion · Depression · Low levels of testosterone that can result in lower sex drive, energy, and strength · Itching and sweating RISKS ARE GREATER WITH:      
 · History of drug misuse, substance use disorder, or overdose · Mental health conditions (such as depression or anxiety) · Sleep apnea · Older age (72 years or older) · Pregnancy Avoid alcohol while taking prescription opioids. Also, unless specifically advised by your health care provider, medications to avoid include: · Benzodiazepines (such as Xanax or Valium) · Muscle relaxants (such as Soma or Flexeril) · Hypnotics (such as Ambien or Lunesta) · Other prescription opioids KNOW YOUR OPTIONS Talk to your health care provider about ways to manage your pain that don't involve prescription opioids. Some of these options may actually work better and have fewer risks and side effects. Options may include: 
· Pain relievers such as acetaminophen, ibuprofen, and naproxen · Some medications that are also used for depression or seizures · Physical therapy and exercise · Counseling to help patients learn how to cope better with triggers of pain and stress. · Application of heat or cold compress · Massage therapy · Relaxation techniques Be Informed Make sure you know the name of your medication, how much and how often to take it, and its potential risks & side effects. IF YOU ARE PRESCRIBED OPIOIDS FOR PAIN: 
· Never take opioids in greater amounts or more often than prescribed. Remember the goal is not to be pain-free but to manage your pain at a tolerable level. · Follow up with your primary care provider to: · Work together to create a plan on how to manage your pain. · Talk about ways to help manage your pain that don't involve prescription opioids. · Talk about any and all concerns and side effects. · Help prevent misuse and abuse. · Never sell or share prescription opioids · Help prevent misuse and abuse. · Store prescription opioids in a secure place and out of reach of others (this may include visitors, children, friends, and family). · Safely dispose of unused/unwanted prescription opioids: Find your community drug take-back program or your pharmacy mail-back program, or flush them down the toilet, following guidance from the Food and Drug Administration (www.fda.gov/Drugs/ResourcesForYou). · Visit www.cdc.gov/drugoverdose to learn about the risks of opioid abuse and overdose. · If you believe you may be struggling with addiction, tell your health care provider and ask for guidance or call [address] [place] [address] at [phone]. Discharge Instructions None [employer] Announcement We are excited to announce that we are making your provider's discharge notes available to you in [employer]. You will see these notes when they are completed and signed by the physician that discharged you from your recent hospital stay. If you have any questions or concerns about any information you see in [employer], please call the Health Information Department where you were seen or reach out to your Primary Care Provider for more information about your plan of care. Introducing [facility]! [employer] introduces [employer] patient portal. Now you can access parts of your medical record, email your doctor's office, and request medication refills online. 1. In your internet browser, go to [email or web address] 2. Click on the First Time User? Click Here link in the Sign In box. You will see the New Member Sign Up page. 3. Enter your [employer] Access Code exactly as it appears below. You will not need to use this code after youve completed the sign-up process. If you do not sign up before the expiration date, you must request a new code. · [employer] Access Code: [email or web address] Expires: 4/19/2018 11:38 AM 
 
4.  Enter the last four digits of your Social Security Number (xxxx) and Date of Birth (mm/dd/yyyy) as indicated and click Submit. You will be taken to the next sign-up page. 5. Create a Sherpaat ID. This will be your [employer] login ID and cannot be changed, so think of one that is secure and easy to remember. 6. Create a [employer] password. You can change your password at any time. 7. Enter your Password Reset Question and Answer. This can be used at a later time if you forget your password. 8. Enter your e-mail address. You will receive e-mail notification when new information is available in [address] 9. Click Sign Up. You can now view and download portions of your medical record. 10. Click the Download Summary menu link to download a portable copy of your medical information. If you have questions, please visit the Frequently Asked Questions section of the [employer] website. Remember, [employer] is NOT to be used for urgent needs. For medical emergencies, dial 911. Now available from your iPhone and Android! Introducing Cristian Barnes As a [facility]ga [facility] patient, I wanted to make you aware of our electronic visit tool called Cristian Barnes. [facility] 24/7 allows you to connect within minutes with a medical provider 24 hours a day, seven days a week via a mobile device or tablet or logging into a secure website from your computer. You can access Cristian Barnes from anywhere in the [place]. A virtual visit might be right for you when you have a simple condition and feel like you just dont want to get out of bed, or cant get away from work for an appointment, when your regular [facility] Mackay provider is not available (evenings, weekends or holidays), or when youre out of town and need minor care. Electronic visits cost only $49 and if the [facility] Mackay 24/7 provider determines a prescription is needed to treat your condition, one can be electronically transmitted to a nearby pharmacy*. Please take a moment to enroll today if you have not already done so. The enrollment process is free and takes just a few minutes. To enroll, please download the [employer] 24/7 lulu to your tablet or phone, or visit [email or web address] to enroll on your computer. And, as an [address] [place] [address] patient with a [employer] account, the results of your visits will be scanned into your electronic medical record and your primary care provider will be able to view the scanned results. We urge you to continue to see your regular [employer] provider for your ongoing medical care. And while your primary care provider may not be the one available when you seek a [employer] virtual visit, the peace of mind you get from getting a real diagnosis real time can be priceless. For more information on [employer], view our Frequently Asked Questions (FAQs) at [email or web address]. Sincerely, 
 
Isabel Diane MD 
Chief Medical Officer [address] [place] [address] *:  certain medications cannot be prescribed via [employer] Providers Seen During Your Hospitalization Provider Specialty Primary office phone Salvador Carrillo MD Cardiothoracic Surgery [phone] Immunizations Administered for This Admission Name Date  
 TB Skin Test (PPD) Intradermal  Deferred (), 4/12/2018 Your Primary Care Physician (PCP) Primary Care Physician Office Phone Office Fax [facility] [phone] You are allergic to the following No active allergies Recent Documentation Height Weight BMI Smoking Status 1.854 m 71.5 kg 20.79 kg/m2 Current Every Day Smoker Emergency Contacts Name Discharge Info Relation Home Work Mobile [address] [place] [address] Robertwillam CAREGIVER [3] Spouse [3] [phone] Patient Belongings The following personal items are in your possession at time of discharge: Dental Appliances: Partials, Uppers  Visual Aid: Glasses      Home Medications: None   Jewelry: None  Clothing: Footwear, Pants, Shirt, Socks, Undergarments, Jacket/Coat    Other Valuables: Eyeglasses  Personal Items Sent to Safe: none Discharge Instructions Attachments/References CARDIAC REHABILITATION (ENGLISH) AORTIC VALVE REPLACEMENT: POST-OP (ENGLISH) HEALTHY DIET: HEART (ENGLISH) SECONDHAND SMOKE (ENGLISH) SMOKING: STOPPING (ENGLISH) HEART ATTACK: MEDICINE TO REDUCE RISK (ENGLISH) Patient Handouts Cardiac Rehabilitation: Care Instructions Your Care Instructions Cardiac rehabilitation is a program for people who have a heart problem, such as a heart attack, heart failure, or a heart valve disease. The program includes exercise, lifestyle changes, education, and emotional support. Cardiac rehab can help you improve the quality of your life through better overall health. It can help you lose weight and feel better about yourself. On your cardiac rehab team, you may have your doctor, a nurse specialist, a dietitian, and a physical therapist. They will design your cardiac rehab program specifically for you. You will learn how to reduce your risk for heart problems, how to manage stress, and how to eat a heart-healthy diet. By the end of the program, you will be ready to maintain a healthier lifestyle on your own. Follow-up care is a key part of your treatment and safety. Be sure to make and go to all appointments, and call your doctor if you are having problems. It's also a good idea to know your test results and keep a list of the medicines you take. How can you care for yourself at home? · Take your medicines exactly as prescribed. Call your doctor if you think you are having a problem with your medicine. You will get more details on the specific medicines your doctor prescribes. · Weigh yourself every day if your doctor tells you to.  Watch for sudden weight gain. Weigh yourself on the same scale with the same amount of clothing at the same time of day. · Plan your meals so that you are eating heart-healthy foods. ¨ Eat a variety of foods daily. Fresh fruits and vegetables and whole-grains are good choices. ¨ Limit your fat intake, especially saturated and trans fat. ¨ Limit salt (sodium). ¨ Increase fiber in your diet. ¨ Limit alcohol. · Learn how to take your pulse so that you can track your heart rate during exercise. · Always check with your doctor before you begin a new exercise program. 
· Warm up before you exercise and cool down afterward for at least 15 minutes each. This will help your heart gradually prepare for and recover from exercise and avoid pushing your heart too hard. · Stop exercising if you have any unusual discomfort, such as chest pain. · Do not smoke. Smoking can make heart problems worse. If you need help quitting, talk to your doctor about stop-smoking programs and medicines. These can increase your chances of quitting for good. When should you call for help? Call 911 anytime you think you may need emergency care. For example, call if: 
? · You have severe trouble breathing. ? · You cough up pink, foamy mucus and you have trouble breathing. ? · You have symptoms of a heart attack. These may include: ¨ Chest pain or pressure, or a strange feeling in the chest. 
¨ Sweating. ¨ Shortness of breath. ¨ Nausea or vomiting. ¨ Pain, pressure, or a strange feeling in the back, neck, jaw, or upper belly or in one or both shoulders or arms. ¨ Lightheadedness or sudden weakness. ¨ A fast or irregular heartbeat. After you call 911, the  may tell you to chew 1 adult-strength or 2 to 4 low-dose aspirin. Wait for an ambulance. Do not try to drive yourself.   
? · You have angina symptoms (such as chest pain or pressure) that do not go away with rest or are not getting better within 5 minutes after you take a dose of nitroglycerin. ? · You have symptoms of a stroke. These may include: 
¨ Sudden numbness, tingling, weakness, or loss of movement in your face, arm, or leg, especially on only one side of your body. ¨ Sudden vision changes. ¨ Sudden trouble speaking. ¨ Sudden confusion or trouble understanding simple statements. ¨ Sudden problems with walking or balance. ¨ A sudden, severe headache that is different from past headaches. ? · You passed out (lost consciousness). ?Call your doctor now or seek immediate medical care if: 
? · You have new or increased shortness of breath. ? · You are dizzy or lightheaded, or you feel like you may faint. ? · You gain weight suddenly, such as more than 2 to 3 pounds in a day or 5 pounds in a week. (Your doctor may suggest a different range of weight gain.) ? · You have increased swelling in your legs, ankles, or feet. ? Watch closely for changes in your health, and be sure to contact your doctor if you have any problems. Where can you learn more? Go to http://ruthyBuzzillamarshal.info/. Enter L113 in the search box to learn more about \"Cardiac Rehabilitation: Care Instructions. \" Current as of: September 21, 2016 Content Version: 11.4 © 2693-6491 [employer]. Care instructions adapted under license by [employer] (which disclaims liability or warranty for this information). If you have questions about a medical condition or this instruction, always ask your healthcare professional. [place] [address] any warranty or liability for your use of this information. Aortic Valve Replacement: What to Expect at Home Your Recovery You have had surgery to replace your heart's aortic valve. Your doctor did the surgery through a cut, called an incision, in your chest. 
You will feel tired and sore for the first few weeks after surgery.  You may have some brief, sharp pains on either side of your chest. Your chest, shoulders, and upper back may ache. The incision in your chest may be sore or swollen. These symptoms usually get better after 4 to 6 weeks. You will probably be able to do many of your usual activities after 4 to 6 weeks. But for at least 6 weeks, you will not be able to lift heavy objects or do activities that strain your chest or upper arm muscles. At first you may notice that you get tired easily and need to rest often. It may take 1 to 2 months to get your energy back. Some people find that they are more emotional after this surgery. You may cry easily or show emotion in ways that are unusual for you. This is common and may last for up to a year. Some people get depressed after this surgery. Talk with your doctor if you have sadness that continues or you are concerned about how you are feeling. Treatment and other support can help you feel better. Even though you have a new aortic valve, it is still important to eat a heart-healthy diet, get regular exercise, not smoke, take your heart medicines, and reduce stress. Your doctor may recommend that you work with a nurse, a dietitian, and a physical therapist to make these changes. This is sometimes called cardiac rehabilitation. This care sheet gives you a general idea about how long it will take for you to recover. But each person recovers at a different pace. Follow the steps below to get better as quickly as possible. How can you care for yourself at home? Activity ? · Rest when you feel tired. Getting enough sleep will help you recover. Try to sleep on your back while your breastbone (sternum) heals. This usually takes about 4 to 6 weeks. ? · Try to walk each day. Start by walking a little more than you did the day before. Bit by bit, increase the amount you walk. Walking boosts blood flow and helps prevent pneumonia and constipation. ? · Avoid strenuous activities, such as bicycle riding, jogging, weight lifting, or heavy aerobic exercise, until your doctor says it is okay. ? · For 3 months, avoid activities that strain your chest or upper arm muscles. This includes pushing a  or vacuum, mopping floors, or swinging a golf club or tennis racquet. ? · For at least 6 weeks, avoid lifting anything that would make you strain. This may include a child, heavy grocery bags and milk containers, a heavy briefcase or backpack, or cat litter or dog food bags. ? · Hold a pillow firmly over your chest incision when you cough or take deep breaths. This will support your chest and reduce your pain. ? · Do breathing exercises at home as instructed by your doctor. This will help prevent pneumonia. ? · Ask your doctor when you can drive again. ? · You will probably need to take 4 to 12 weeks off from work. It depends on the type of work you do and how you feel. ? · You may shower as usual. Pat the incision dry. Do not take a bath for the first 3 weeks, or until your doctor tells you it is okay. ? · Do not swim or use a hot tub for at least 1 month, or until your doctor says it is okay. ? · Ask your doctor when it is okay for you to have sex. Diet ? · Eat a heart-healthy, low-salt diet. If you have not been eating this way, talk to your doctor. You also may want to talk to a dietitian. A dietitian can help you plan meals and learn about healthy foods. ? · Drink plenty of fluids (unless your doctor tells you not to). ? · You may notice that your bowel movements are not regular right after your surgery. This is common. Try to avoid constipation and straining with bowel movements. You may want to take a fiber supplement every day. If you have not had a bowel movement after a couple of days, ask your doctor about taking a mild laxative. Medicines ? · Your doctor will tell you if and when you can restart your medicines. He or she will also give you instructions about taking any new medicines. ? · If you take blood thinners, such as warfarin (Coumadin), clopidogrel (Plavix), or aspirin, be sure to talk to your doctor. He or she will tell you if and when to start taking those medicines again. Make sure that you understand exactly what your doctor wants you to do. ? · Be safe with medicines. Take your medicines exactly as prescribed. Call your doctor if you think you are having a problem with your medicine. ? · Take pain medicines exactly as directed. ¨ If the doctor gave you a prescription medicine for pain, take it as prescribed. ¨ If you are not taking a prescription pain medicine, ask your doctor if you can take an over-the-counter medicine. ¨ Do not take aspirin, ibuprofen (Advil, Motrin), naproxen (Aleve), or other nonsteroidal anti-inflammatory drugs (NSAIDs) unless your doctor says it is okay. ? · If you think your pain medicine is making you sick to your stomach: 
¨ Take your medicine after meals (unless your doctor has told you not to). ¨ Ask your doctor for a different pain medicine. ? · If your doctor prescribed antibiotics, take them as directed. Do not stop taking them just because you feel better. You need to take the full course of antibiotics. ? · Your doctor may give you a blood thinner to prevent blood clots. If you take a blood thinner, be sure you get instructions about how to take your medicine safely. Blood thinners can cause serious bleeding problems. Incision care ? · If you have strips of tape on the incision the doctor made, leave the tape on for a week or until it falls off.  
? · Wash the area daily with warm, soapy water and pat it dry. Don't use hydrogen peroxide or alcohol, which may delay healing. You may cover the area with a gauze bandage if it weeps or rubs against clothing. Change the bandage every day. ? · Keep the area clean and dry. Other instructions ? · Keep track of your weight. Weigh yourself every day at the same time of day, on the same scale, in the same amount of clothing. A sudden increase in weight can be a sign of a problem with your heart. Tell your doctor if you suddenly gain weight, such as 3 pounds or more in 2 to 3 days. ? · Be sure to tell all your doctors and your dentist that you have an artificial aortic valve. This is important, because you may need to take antibiotics before certain procedures to prevent infection. Follow-up care is a key part of your treatment and safety. Be sure to make and go to all appointments, and call your doctor if you are having problems. It's also a good idea to know your test results and keep a list of the medicines you take. When should you call for help? Call 911 anytime you think you may need emergency care. For example, call if: 
? · You passed out (lost consciousness). ? · You have trouble breathing. ? · You have severe pain in your chest.  
? · You have symptoms of a stroke. These may include: 
¨ Sudden numbness, tingling, weakness, or loss of movement in your face, arm, or leg, especially on only one side of your body. ¨ Sudden vision changes. ¨ Sudden trouble speaking. ¨ Sudden confusion or trouble understanding simple statements. ¨ Sudden problems with walking or balance. ¨ A sudden, severe headache that is different from past headaches. ? · You have symptoms of a heart attack. These may include: ¨ Chest pain or pressure, or a strange feeling in the chest. 
¨ Sweating. ¨ Shortness of breath. ¨ Nausea or vomiting. ¨ Pain, pressure, or a strange feeling in the back, neck, jaw, or upper belly or in one or both shoulders orarms. ¨ Lightheadedness or sudden weakness. ¨ A fast or irregular heartbeat. ? After you call 911, the  may tell you to chew 1 adult-strength or 2 to 4 low-dose aspirin. Wait for anambulance. Do not try to drive yourself. ?Call your doctor now or seek immediate medical care if: 
? · You have pain that does not get better after you take pain medicine. ? · You have loose stitches, or your incision comes open. ? · Bright red blood has soaked through the bandage over your incision. ? · You have signs of infection, such as: 
¨ Increased pain, swelling, warmth, or redness. ¨ Red streaks leading from the incision. ¨ Pus draining from the incision. ¨ A fever. ? · You have signs of a blood clot, such as: 
¨ Pain in your calf, back of the knee, thigh, or groin. ¨ Redness and swelling in your leg or groin. ? · You have new or changed symptoms of heart failure, such as: ¨ New or increased shortness of breath. ¨ New or worse swelling in your legs, ankles, or feet. ¨ Sudden weight gain, such as more than 2 to 3 pounds in a day or 5 pounds in a week. (Your doctor may suggest a different range of weight gain.) ¨ Feeling dizzy or lightheaded or like you may faint. ¨ Feeling so tired or weak that you cannot do your usual activities. ¨ Not sleeping well. Shortness of breath wakes you at night. You need extra pillows to prop yourself up to breathe easier. ? Watch closely for changes in your health, and be sure to contact your doctor if you have any problems. Where can you learn more? Go to http://ruthy-marshal.info/. Enter R903 in the search box to learn more about \"Aortic Valve Replacement: What to Expect at Home. \" Current as of: September 21, 2016 Content Version: 11.4 © 5408-5378 [employer]. Care instructions adapted under license by [employer] (which disclaims liability or warranty for this information). If you have questions about a medical condition or this instruction, always ask your healthcare professional. [address] any warranty or liability for your use of this information. Heart-Healthy Diet: Care Instructions Your Care Instructions A heart-healthy diet has lots of vegetables, fruits, nuts, beans, and whole grains, and is low in salt. It limits foods that are high in saturated fat, such as meats, cheeses, and fried foods. It may be hard to change your diet, but even small changes can lower your risk of heart attack and heart disease. Follow-up care is a key part of your treatment and safety. Be sure to make and go to all appointments, and call your doctor if you are having problems. It's also a good idea to know your test results and keep a list of the medicines you take. How can you care for yourself at home? Watch your portions · Learn what a serving is. A \"serving\" and a \"portion\" are not always the same thing. Make sure that you are not eating larger portions than are recommended. For example, a serving of pasta is ½ cup. A serving size of meat is 2 to 3 ounces. A 3-ounce serving is about the size of a deck of cards. Measure serving sizes until you are good at [place]" them. Keep in mind that restaurants often serve portions that are 2 or 3 times the size of one serving. · To keep your energy level up and keep you from feeling hungry, eat often but in smaller portions. · Eat only the number of calories you need to stay at a healthy weight. If you need to lose weight, eat fewer calories than your body burns (through exercise and other physical activity). Eat more fruits and vegetables · Eat a variety of fruit and vegetables every day. Dark green, deep orange, red, or yellow fruits and vegetables are especially good for you. Examples include spinach, carrots, peaches, and berries. · Keep carrots, celery, and other veggies handy for snacks. Buy fruit that is in season and store it where you can see it so that you will be tempted to eat it. · Cook dishes that have a lot of veggies in them, such as stir-fries and soups. Limit saturated and trans fat · Read food labels, and try to avoid saturated and trans fats. They increase your risk of heart disease. Trans fat is found in many processed foods such as cookies and crackers. · Use olive or canola oil when you cook. Try cholesterol-lowering spreads, such as Benecol or Take Control. · Bake, broil, grill, or steam foods instead of frying them. · Choose lean meats instead of high-fat meats such as hot dogs and sausages. Cut off all visible fat when you prepare meat. · Eat fish, skinless poultry, and meat alternatives such as soy products instead of high-fat meats. Soy products, such as tofu, may be especially good for your heart. · Choose low-fat or fat-free milk and dairy products. Eat fish · Eat at least two servings of fish a week. Certain fish, such as salmon and tuna, contain omega-3 fatty acids, which may help reduce your risk of heart attack. Eat foods high in fiber · Eat a variety of grain products every day. Include whole-grain foods that have lots of fiber and nutrients. Examples of whole-grain foods include oats, whole wheat bread, and brown rice. · Buy whole-grain breads and cereals, instead of white bread or pastries. Limit salt and sodium · Limit how much salt and sodium you eat to help lower your blood pressure. · Taste food before you salt it. Add only a little salt when you think you need it. With time, your taste buds will adjust to less salt. · Eat fewer snack items, fast foods, and other high-salt, processed foods. Check food labels for the amount of sodium in packaged foods. · Choose low-sodium versions of canned goods (such as soups, vegetables, and beans). Limit sugar · Limit drinks and foods with added sugar. These include candy, desserts, and soda pop. Limit alcohol · Limit alcohol to no more than 2 drinks a day for men and 1 drink a day for women. Too much alcohol can cause health problems. When should you call for help? Watch closely for changes in your health, and be sure to contact your doctor if: 
? · You would like help planning heart-healthy meals. Where can you learn more? Go to http://ruthy-marshal.info/. Enter V137 in the search box to learn more about \"Heart-Healthy Diet: Care Instructions. \" Current as of: September 21, 2016 Content Version: 11.4 © 9139-5802 [employer]. Care instructions adapted under license by [employer] (which disclaims liability or warranty for this information). If you have questions about a medical condition or this instruction, always ask your healthcare professional. [place] [address] any warranty or liability for your use of this information. Secondhand Smoke: Care Instructions Your Care Instructions Secondhand smoke comes from the burning end of a cigarette, cigar, or pipe and the smoke that a smoker exhales. The smoke contains nicotine and many other harmful chemicals. Breathing secondhand smoke can cause or worsen health problems including cancer, asthma, coronary artery disease, and respiratory infections. It can make your eyes and nose burn and cause a sore throat. Secondhand smoke is especially bad for babies and young children whose lungs are still developing. Babies whose parents smoke are more likely to have ear infections, pneumonia, and bronchitis in the first few years of their lives. Secondhand smoke can make asthma symptoms worse in children. If you are pregnant, it is important that you not smoke and that you avoid secondhand smoke. You are more likely to give birth to a baby who weighs less than expected (low birth weight) if you smoke. And your baby may have a greater risk for sudden infant death syndrome (SIDS). Babies whose mothers are exposed to secondhand smoke during pregnancy have a higher risk for health problems. Follow-up care is a key part of your treatment and safety. Be sure to make and go to all appointments, and call your doctor if you are having problems. It's also a good idea to know your test results and keep a list of the medicines you take. How can you care for yourself at home? · Do not smoke or let anyone else smoke in your home. If people must smoke, ask them to go outside. · If people do smoke in your home, choose a room where you can open a window or use a fan to get the smoke outside. · Do not let anyone smoke in your car. If someone must smoke, pull over in a safe place and let him or her smoke away from the car. · Ask your employer to make sure that you have a smoke-free work area. · Make sure that your children are not exposed to secondhand smoke at day care, school, and after-school programs. · Try to choose nonsmoking bars, restaurants, and other public places when you go out. · Help your family and friends who smoke to quit by encouraging them to try. Tell them about treatment resources. Having support from others often helps. · If you smoke, quit. Quitting is hard, but there are ways to boost your chance of quitting tobacco for good. ¨ Use nicotine gum, patches, or lozenges. Call a quitline. Ask your doctor about stop-smoking programs and medicines. ¨ Keep trying. When should you call for help? Watch closely for changes in your health, and be sure to contact your doctor if you have any problems. Where can you learn more? Go to http://ruthy-marshal.info/. Enter L004 in the search box to learn more about \"Secondhand Smoke: Care Instructions. \" Current as of: March 20, 2017 Content Version: 11.4 © 1962-9250 [employer], [employer]. Care instructions adapted under license by [employer] (which disclaims liability or warranty for this information).  If you have questions about a medical condition or this instruction, always ask your healthcare professional. [address] any warranty or liability for your use of this information. Stopping Smoking: Care Instructions Your Care Instructions Cigarette smokers crave the nicotine in cigarettes. Giving it up is much harder than simply changing a habit. Your body has to stop craving the nicotine. It is hard to quit, but you can do it. There are many tools that people use to quit smoking. You may find that combining tools works best for you. There are several steps to quitting. First you get ready to quit. Then you get support to help you. After that, you learn new skills and behaviors to become a nonsmoker. For many people, a necessary step is getting and using medicine. Your doctor will help you set up the plan that best meets your needs. You may want to attend a smoking cessation program to help you quit smoking. When you choose a program, look for one that has proven success. Ask your doctor for ideas. You will greatly increase your chances of success if you take medicine as well as get counseling or join a cessation program. 
Some of the changes you feel when you first quit tobacco are uncomfortable. Your body will miss the nicotine at first, and you may feel short-tempered and grumpy. You may have trouble sleeping or concentrating. Medicine can help you deal with these symptoms. You may struggle with changing your smoking habits and rituals. The last step is the tricky one: Be prepared for the smoking urge to continue for a time. This is a lot to deal with, but keep at it. You will feel better. Follow-up care is a key part of your treatment and safety. Be sure to make and go to all appointments, and call your doctor if you are having problems. It's also a good idea to know your test results and keep a list of the medicines you take. How can you care for yourself at home? · Ask your family, friends, and coworkers for support. You have a better chance of quitting if you have help and support. · Join a support group, such as Nicotine Anonymous, for people who are trying to quit smoking. · Consider signing up for a smoking cessation program, such as the American Lung Association's Freedom from Smoking program. 
· Set a quit date. Pick your date carefully so that it is not right in the middle of a big deadline or stressful time. Once you quit, do not even take a puff. Get rid of all ashtrays and lighters after your last cigarette. Clean your house and your clothes so that they do not smell of smoke. · Learn how to be a nonsmoker. Think about ways you can avoid those things that make you reach for a cigarette. ¨ Avoid situations that put you at greatest risk for smoking. For some people, it is hard to have a drink with friends without smoking. For others, they might skip a coffee break with coworkers who smoke. ¨ Change your daily routine. Take a different route to work or eat a meal in a different place. · Cut down on stress. Calm yourself or release tension by doing an activity you enjoy, such as reading a book, taking a hot bath, or gardening. · Talk to your doctor or pharmacist about nicotine replacement therapy, which replaces the nicotine in your body. You still get nicotine but you do not use tobacco. Nicotine replacement products help you slowly reduce the amount of nicotine you need. These products come in several forms, many of them available over-the-counter: ¨ Nicotine patches ¨ Nicotine gum and lozenges ¨ Nicotine inhaler · Ask your doctor about bupropion (Wellbutrin) or varenicline (Chantix), which are prescription medicines. They do not contain nicotine. They help you by reducing withdrawal symptoms, such as stress and anxiety. · Some people find hypnosis, acupuncture, and massage helpful for ending the smoking habit. · Eat a healthy diet and get regular exercise. Having healthy habits will help your body move past its craving for nicotine. · Be prepared to keep trying. Most people are not successful the first few times they try to quit. Do not get mad at yourself if you smoke again. Make a list of things you learned and think about when you want to try again, such as next week, next month, or next year. Where can you learn more? Go to http://ruthy-marshal.info/. Enter S027 in the search box to learn more about \"Stopping Smoking: Care Instructions. \" Current as of: March 20, 2017 Content Version: 11.4 © 3883-5355 [employer]. Care instructions adapted under license by [employer] (which disclaims liability or warranty for this information). If you have questions about a medical condition or this instruction, always ask your healthcare professional. [place] [address] any warranty or liability for your use of this information. Reducing Heart Attack Risk With Daily Medicine: Care Instructions Your Care Instructions Heart disease is the number one cause of death. If you are at risk for heart disease, there are many medicines that can reduce your risk. These include: · ACE inhibitors. These are a type of blood pressure medicine. They can reduce the risk of heart attacks and strokes if you are at high risk. · Statin medicines. These lower cholesterol. They can also reduce the risk of heart disease and strokes. · Aspirin. It can help certain people lower their risk of a heart attack or stroke. · Beta-blocker medicines. These are a type of blood pressure and heart medicine. They can reduce the chance of early death if you have had a heart attack. All medicines can cause side effects. So it is important to understand the pros and cons of any medicine you take. It is also important to take your medicines exactly as your doctor tells you to. Follow-up care is a key part of your treatment and safety. Be sure to make and go to all appointments, and call your doctor if you are having problems. It's also a good idea to know your test results and keep a list of the medicines you take. ACE inhibitors ACE (angiotensin-converting enzyme) inhibitors are used for three main reasons. They lower blood pressure, protect the kidneys, and prevent heart attacks and strokes. Examples include benazepril (Lotensin), lisinopril (Prinivil, Zestril), and ramipril (Altace). Before you start taking an ACE inhibitor, make sure your doctor knows if: 
· You are taking a water pill (diuretic). · You are taking potassium pills or using salt substitutes. · You are pregnant or breastfeeding. · You have had a kidney transplant or other kidney problems. ACE inhibitors can cause side effects. Call your doctor right away if you have: · Trouble breathing. · Swelling in your face, head, neck, or tongue. · Dizziness or lightheadedness. · A dry cough. Statins Statins lower cholesterol. Examples include atorvastatin (Lipitor), lovastatin (Mevacor), pravastatin (Pravachol), and simvastatin (Zocor). Before you start taking a statin, make sure your doctor knows if: 
· You have had a kidney transplant or other kidney problems. · You have liver disease. · You take any other prescription medicine, over-the-counter medicine, vitamins, supplements, or herbal remedies. · You are pregnant or breastfeeding. Statins can cause side effects. Call your doctor right away if you have: · New, severe muscle aches. · Brown urine. Aspirin Taking an aspirin every day can lower your risk for a heart attack. A heart attack occurs when a blood vessel in the heart gets blocked. When this happens, oxygen can't get to the heart muscle, and part of the heart dies. Aspirin can help prevent blood clots that can block the blood vessels. Talk to your doctor before you start taking aspirin every day. He or she may recommend that you take one low-dose aspirin (81 mg) tablet each day, with a meal and a full glass of water. Taking aspirin isn't right for everyone. This is because it can cause serious bleeding. And you may not be able to use aspirin if you: 
· Have asthma. · Have an ulcer or other stomach problem. · Take some other medicine (called a blood thinner) that prevents blood clots. · Are allergic to aspirin. Before having a surgery or procedure, tell your doctor or dentist that you take aspirin. He or she will tell you if you should stop taking aspirin beforehand. Make sure that you understand exactly what your doctor wants you to do. Aspirin can cause side effects. Call your doctor right away if you have: · Unusual bleeding or bruising. · Nausea, vomiting, or heartburn. · Black or bloody stools. Beta-blockers Beta-blockers are used for three main reasons. They lower blood pressure, relieve angina symptoms (such as chest pain or pressure), and reduce the chances of a second heart attack. They include atenolol (Tenormin), carvedilol (Coreg), and metoprolol (Lopressor). Before you start taking a beta-blocker, make sure your doctor knows if you have: · Severe asthma or frequent asthma attacks. · A very slow pulse (less than 55 beats a minute). Beta-blockers can cause side effects. Call your doctor right away if you have: · Wheezing or trouble breathing. · Dizziness or lightheadedness. · Asthma that gets worse. When should you call for help? Watch closely for changes in your health, and be sure to contact your doctor if you have any problems. Where can you learn more? Go to http://ruthy-marshal.info/. Enter R428 in the search box to learn more about \"Reducing Heart Attack Risk With Daily Medicine: Care Instructions. \" Current as of: September 21, 2016 Content Version: 11.4 © 8137-6083 [employer]wise[employer]. Care instructions adapted under license by [employer] (which disclaims liability or warranty for this information). If you have questions about a medical condition or this instruction, always ask your healthcare professional. [address] any warranty or liability for your use of this information. Please provide this summary of care documentation to your next provider. Signatures-by signing, you are acknowledging that this After Visit Summary has been reviewed with you and you have received a copy. Patient Signature:  ____________________________________________________________ Date:  ____________________________________________________________  
  
Eloisa Echols Provider Signature:  ____________________________________________________________ Date:  ____________________________________________________________

## 2018-04-12 NOTE — ANESTHESIA PROCEDURE NOTES
Arterial Line Placement    Start time: 4/12/2018 9:03 AM  End time: 4/12/2018 9:06 AM  Performed by: Mary Lou Schmidt by: Inna Nunez     Pre-Procedure  Indications:  Arterial pressure monitoring and blood sampling  Preanesthetic Checklist: patient identified, risks and benefits discussed, anesthesia consent, site marked, patient being monitored, timeout performed and patient being monitored    Timeout Time: 09:02        Procedure:   Prep:  ChloraPrep  Seldinger Technique?: No    Orientation:  Left  Location:  Radial artery  Catheter size:  20 G  Number of attempts:  1  Cont Cardiac Output Sensor: No      Assessment:   Post-procedure:  Line secured and sterile dressing applied  Patient Tolerance:  Patient tolerated the procedure well with no immediate complications

## 2018-04-12 NOTE — PROGRESS NOTES
Patient in atrial tach per monitor with  and 's. Dr. Zoe Severe notified. Orders received for amio bolus and gtt in addition to cardiology consult.

## 2018-04-12 NOTE — BRIEF OP NOTE
BRIEF OPERATIVE NOTE    Date of Procedure: 4/12/2018   Preoperative Diagnosis: CAD  Postoperative Diagnosis: CAD    Procedure(s):  CARDIAC RE ENTRY  Surgeon(s) and Role:     * Beartiz Sin MD - Primary         Surgical Assistant:     Surgical Staff:  Circ-1: Marisela Orona  Perfusionist: Josephine Berger  Scrub Tech-1: Lee Hernandez  Scrub RN-1: Samantha Fontenot RN  Event Time In   Incision Start 1604   Incision Close 1650     Anesthesia: General   Estimated Blood Loss: minimal  Specimens: * No specimens in log *   Findings: small clot   Complications: none  Implants: * No implants in log *

## 2018-04-12 NOTE — CONSULTS
[address] Cardiology Consult                Date of  Admission: 4/12/2018  5:26 AM     Primary Care Physician: Dr. Ghassan Abbott  Primary Cardiologist: Dr. Renée Avila  Referring Physician: Dr. Chinmay Narvaez Physician: Dr. Kandace Jimenez    CC/Reason for consult: SVT       Srinivasan Keenan is a 59 y.o. male who underwent AVR today. Post operatively, in the CVICU, he developed tachycardia. Rhythm looks consistent with atrial tachycardia. He was hypotensive. Tachycardia resolved without treatment. He is placed on pressor support for hypotension.      PMH Includes: severe AS, bicuspid AV, tobacco abuse     Patient Active Problem List   Diagnosis Code    Tobacco use disorder F17.200    Nonrheumatic aortic valve stenosis I35.0    Bicuspid aortic valve Q23.1    Shortness of breath R06.02    Homeless family Z59.0   [de-identified] Family history of premature CAD Z80.55    Status post aortic valve replacement Z95.2    Respiratory failure, post-operative ([address] J95.821    Hypoxia R09.02       Past Medical History:   Diagnosis Date    CAD (coronary artery disease)     no mi/ no stents-- followed by dr. Dar Carranza Drug addiction in remission [facility])     none since 2001    Family history of premature CAD 2/28/2018    Homeless family     living with step dtr at present    Seizures ([facility] [address] 2001    x 1--- ETOH  WITHDRAWAL-- pt thinks---no meds    Severe aortic stenosis     last echo 2/2018    Tobacco use disorder       Past Surgical History:   Procedure Laterality Date    HX HEENT  age 27's    wisdom teeth removed     No Known Allergies   Family History   Problem Relation Age of Onset    Cancer Mother     Coronary Artery Disease Mother     Cancer Father     Heart Disease Brother     Coronary Artery Disease Brother     Cancer Sister      breast        Current Facility-Administered Medications   Medication Dose Route Frequency    sodium chloride (NS) flush 5-10 mL  5-10 mL IntraVENous Q8H    sodium chloride (NS) flush 5-10 mL  5-10 mL IntraVENous PRN    0.9% sodium chloride infusion  25 mL/hr IntraVENous CONTINUOUS    dextrose 5% - 0.45% NaCl with KCl 20 mEq/L infusion  25 mL/hr IntraVENous CONTINUOUS    sodium chloride (NS) flush 5-10 mL  5-10 mL IntraVENous Q8H    sodium chloride (NS) flush 5-10 mL  5-10 mL IntraVENous PRN    oxyCODONE-acetaminophen (PERCOCET) 5-325 mg per tablet 1 Tab  1 Tab Oral Q4H PRN    morphine injection 3-5 mg  3-5 mg IntraVENous Q1H PRN    naloxone (NARCAN) injection 0.4 mg  0.4 mg IntraVENous PRN    mupirocin (BACTROBAN) 2 % ointment   Both Nostrils BID    ceFAZolin (ANCEF) 2 g/20 mL in sterile water IV syringe  2 g IntraVENous Q8H    sodium bicarbonate 8.4 % (1 mEq/mL) injection 50 mEq  50 mEq IntraVENous PRN    EPINEPHrine (ADRENALIN) 4 mg in 0.9% sodium chloride 250 mL infusion  0.05-0.1 mcg/kg/min IntraVENous TITRATE    nitroglycerin (Tridil) 200 mcg/ml infusion   mcg/min IntraVENous TITRATE    PHENYLephrine (ALHAJI-SYNEPHRINE) 30 mg in 0.9% sodium chloride 250 mL infusion   mcg/min IntraVENous TITRATE    lidocaine (PF) (XYLOCAINE) 100 mg/5 mL (2 %) injection syringe  mg   mg IntraVENous ONCE PRN    [START ON 4/13/2018] metoprolol tartrate (LOPRESSOR) tablet 25 mg  25 mg Oral Q12H    atorvastatin (LIPITOR) tablet 80 mg  80 mg Oral QHS    insulin regular (NOVOLIN R, HUMULIN R) 100 Units in 0.9% sodium chloride 100 mL infusion  1 Units/hr IntraVENous TITRATE    dextrose (D50W) injection syrg 12.5 g  25 mL IntraVENous PRN    [START ON 4/13/2018] aspirin chewable tablet 81 mg  81 mg Oral DAILY    magnesium sulfate 1 g/100 ml IVPB (premix or compounded)  1 g IntraVENous PRN    potassium chloride 10 mEq in 50 ml IVPB  10 mEq IntraVENous PRN    midazolam (VERSED) injection 1 mg  1 mg IntraVENous Q1H PRN    propofol (DIPRIVAN) infusion  0-50 mcg/kg/min IntraVENous TITRATE    meperidine (DEMEROL) injection 25 mg  25 mg IntraVENous Q1H PRN    chlorhexidine (PERIDEX) 0.12 % mouthwash 10 mL  10 mL Oral BID    tuberculin injection 5 Units  5 Units IntraDERMal ONCE    amiodarone (CORDARONE) 450 mg in dextrose 5% 250 mL infusion  1 mg/min IntraVENous TITRATE    amiodarone (CORDARONE) 450 mg in dextrose 5% 250 mL infusion  0.5-1 mg/min IntraVENous TITRATE       Review of Systems   Unable to perform ROS: intubated          Physical Exam  Vitals:    04/12/18 1250 04/12/18 1300 04/12/18 1303 04/12/18 1315   BP: 100/42      Pulse: 86 86 85 (!) 115   Resp: 14 14 14 16   Temp: 98.2 °F (36.8 °C)      SpO2: 98% 99% 99% 99%   Weight:       Height:           Physical Exam:  General: Well Developed, Well Nourished, No Acute Distress  HEENT: pupils equal and round, no abnormalities noted  Neck: supple, no JVD  Heart: S1S2 with tachycardic rate, regular rhythm   Lungs: mostly clear bilaterally  Abd: soft, nontender  Ext: warm, no edema  Skin: warm and dry  Psychiatric: pt intubated  Neurologic: pt intubated      Cardiographics    Telemetry: sinus tachycardia     Labs:   Recent Labs      04/12/18   1308   NA  146*   K  4.3   MG  2.9*   BUN  21   CREA  0.97   GLU  125*   WBC  25.5*   HGB  13.7   HCT  40.9*   PLT  130*   INR  1.7       Assessment/Plan:      Principal Problem:    Nonrheumatic aortic valve stenosis (2/7/2018)- S/P AVR- supportive care  S/p AVR    Active Problems:    Tobacco use disorder ()  Cessation will be stressed      Status post aortic valve replacement (4/12/2018)  On pressor support - as needed      Respiratory failure, post-operative ([address] (4/12/2018)  On vent      Hypoxia (4/12/2018)    On vent     SVT  Rhythm consistent with atrial tachycardia, will start IV amiodarone and monitor       Thank you very much for this referral. We appreciate the opportunity to participate in this patient's care. We will follow along with above stated plan. Fredi Sorenson PA-C  Consulting MD: Dr. Sandra Bhat:    Patient seen and examined by me.   Agree with above note by physician extender. Key findings are:  Intubated immediately post AVR, no ROS possible. Nursing notes acute onset increase in regular tachycardia to 120-130's consistent with atrial tachycardia/accelerated junctional, shortly after moving patient for XR. Large volume blood drainage from mediastinal tubes at that time, but after drainage stopped, converted back to sinus 90-100bpm.  Still on ary gtt at present, titrate as needed to keep SBP>100. CV- tachy but RRR without murmur  Lungs- Clear bilaterally anteriorly, decreased/coarse post op  Abd- soft, nontender, nondistended  Ext- no edema    Plan: As above. IV amio without bolus for now, follow on tele, pacing wires in place. Chest tube drainage abrupt, now stopped (? Clotted off transiently?). We will follow with you. Continue ary as needed, keep SBP >100mmHg. Recheck BMP, CBC in AM.  Serial H/H and ? Re-exploration if chest tube large volume drainage continues. .. ANN MARIE.  Cassandra Nova MD  [facility] Cardiology  Pager [phone]

## 2018-04-12 NOTE — PROGRESS NOTES
Patient out from operating room and placed on the ventilator on documented settings. Patient is orally intubated with a # 8.0 ET Tube secured at the 23 cm jaylin at the lip. Breath sounds are diminished. Trachea is midline. Negative for subcutaneous air, chest excursion is symmetric. Negative for pitting edema. Patient is also Negative for cyanosis. Patient has a Left Radial arterial line. Patient has 1 Chest tube. All alarms are set and audible. Resuscitation bag is at the head of the bed.       Ventilator Settings  Mode FIO2 Rate Tidal Volume Pressure PEEP I:E Ratio   SIMV, PRVC, Pressure support  70 %   14 550 ml  10 cm H2O  8 cm H20  1:3.33      Peak airway pressure: 20 cm H2O   Minute ventilation: 7.8 l/min     ABG:   Recent Labs      04/12/18   1252   PH  7.28*   PCO2  52*   PO2  283*   HCO3  24         Gayle Huerta, RT

## 2018-04-12 NOTE — PROGRESS NOTES
FAST TRACK PROTOCOL     Verbal report received from [facility] on Nigel Cardona  being received from OR(unit) for routine post - op      Report consisted of patients Situation, Background, Assessment and   Recommendations(SBAR). Information from the following report(s) OR Summary, Procedure Summary and Intake/Output was reviewed with the receiving nurse. Opportunity for questions and clarification was provided. Assessment completed upon patients arrival to unit and care assumed.        Risk Factors:      Surgery > 3 hours

## 2018-04-12 NOTE — PROGRESS NOTES
[address] [facility]  Lydia Montiel. MD Ermelinda Jacobs. MD Ananda Euceda. MD Daisy Araujocy Garth                                                                                                                                                         [id number]  1960           CHIEF COMPLAINT:  Severe AS     HISTORY OF PRESENT ILLNESS:  The patient is a 59 y.o. male who is seen in Consultation for Severe Aortic Stenosis. He established himself with PCP for evaluation of a hernia when a concerning heart murmur was detected. The patient admits to a \"heart murmur\" his whole life. He was referred on to [facility] Cardiology and has undergone an extensive work up for a Bicuspid Aortic Valve with Severe stenosis. YARON 18 showed severe stenosis of his bicuspid valve with no thrombus in the LEAH, no PFO or aortic root dilation with  LV function 55-60%. TTE 18 showed RICKIE 0.4cm2, peak velocity of AV 4.05 m/s and mean gradient 44 mmHg. LHC 18 showed Mild nonobstructive coronary artery disease, Severe/Critical AS with mean gradient 53 mmHg, and LV function with inferior hypokinesis, upper ascending aorta normal and +1 regurgitation.      Risk Factors: Severe/critical AS, mild non obs CAD, Tobacco use, and homelessness. Family history premature CAD significant for brother is  from heart disease/MI -age 59.            Past Medical History:   Diagnosis Date    Family history of premature CAD 2018    Homeless family 2018    Psychiatric disorder      Seizures ([address]       once - not on medications currently    Tobacco use disorder           History reviewed.  No pertinent surgical history.           Family History   Problem Relation Age of Onset    Cancer Mother      Coronary Artery Disease Mother      Cancer Father      Heart Disease Brother      Coronary Artery Disease Brother           Social History            Social History    Marital status:        Spouse name: N/A    Number of children: N/A    Years of education: N/A           Occupational History    [address]                Social History Main Topics    Smoking status: Current Every Day Smoker       Packs/day: 0.50       Years: 40.00       Types: Cigarettes    Smokeless tobacco: Never Used    Alcohol use Yes          Comment: rare    Drug use: No    Sexual activity: Yes       Partners: Female           Other Topics Concern    Not on file      Social History Narrative         No Known Allergies            Current Outpatient Prescriptions on File Prior to Visit   Medication Sig Dispense Refill    multivitamin (ONE A DAY) tablet Take 1 Tab by mouth daily.        aspirin delayed-release 81 mg tablet Take  by mouth daily.        omega 3-dha-epa-fish oil (FISH OIL) 100-160-1,000 mg cap Take  by mouth nightly.          No current facility-administered medications on file prior to visit.          REVIEW OF SYSTEMS:  GENERAL:  No weight loss. No fever. EYES:  No diplopia. No vision loss. ENTM:  No hearing loss. No trouble swallowing. No hoarseness. CARDIAC:  See present illness. PULMONARY:  Denies asthma or chronic pulmonary disease. GI:  No change in bowel habits. No bleeding. :  No dysuria. No history of renal stones or renal insufficiency. MS:  Denies osteoarthritis. NEURO:  +seizure disorder history with last seizure once and not currently on medications , no history of CVA or TIA. HEME/LYMPHATIC:  No history of bleeding tendencies. No hx DVT or PE  PSYCHIATRIC:  No history of depression.     PHYSICAL EXAMINATION:  GENERAL APPEARANCE:  Well developed. Well nourished. Alert, cooperative and oriented times three. HEENT:  Normocephalic. Extraocular muscles are intact. No scleral icterus. NECK/LYMPHATIC:  Supple with referred aortic sounds in carotid systems. No jugular venous distention. LUNGS: Lungs are clear to auscultation. HEART:  Heart is regular rate and rhythm with a murmur. ABDOMEN:  Soft and non-tender. SKIN:  No rashes, cyanosis, jaundice, ecchymoses or evidence of skin breakdown present. EXTREMITIES:  Full range of motion. No deformity. No peripheral edema. VASCULAR:  Pulses are full and equal at the radial arteries and the popliteal arteries bilaterally. There is no venous stasis disease. NEURO:  Grossly intact.     IMPRESSION:       Encounter Diagnoses   Name Primary?  Bicuspid aortic valve Yes    Nonrheumatic aortic valve stenosis      Shortness of breath      Tobacco use disorder      Homeless family           PLAN:  I discussed in detail Aortic Valve Replacement, both the alternatives to and risks and benefits of Mechanical valve vs Bioprosthetic valve. Patient saw our teaching video. Risk  include but are not limited to:  1. Bleeding  2. Infection including mediastinitis  3. Myocardial infarction  4. Stroke  5. Return to the operating room  6. Renal or Pulmonary complications  7. Potential death  8. STS risk score approximately 0.6%  The patient is currently homeless and will contact us when he establishes a place to live for he and his wife then will call to schedule surgery.    from [facility] provided written information for shelter and other housing options for this patient.          I have personally viewed the cardiac catheterization and reviewed labs, chest x-ray and echocardiogram.     Eliezer Cruz MD           Office Visit on 2/28/2018              Detailed Report         Note Details   Author Radhika Arboleda NP File Time 02/28/18 9989   Author Type Nurse Practitioner Status Unsigned   Last  Radhika Arboleda NP Specialty Cardiothoracic Surgery

## 2018-04-12 NOTE — OP NOTES
[facility] REPORT    James Che  MR#: [id number]  : 1960  ACCOUNT #: [de-identified]   DATE OF SERVICE: 2018    PREOPERATIVE DIAGNOSIS:  Severe aortic stenosis. POSTOPERATIVE DIAGNOSIS:  Severe aortic stenosis. ATTENDING SURGEON:  Soila Chairez MD.    ASSISTANT:      ANESTHESIA:  General endotracheal with transesophageal echo. ESTIMATED D LOSS:      SPECIMENS REMOVED:      IMPLANTS:      OPERATIVE PROCEDURE PERFORMED:  Aortic valve replacement with a 25 mm Magna Ease Cherelle-Prince pericardial valve. OPERATIVE FINDINGS:  Aorta was soft. He did have a strongly palpable thrill. Aortic valve was a true bicuspid valve with two commissures, heavily calcified and formed in a fish mouth fashion. INDICATIONS:  The patient is a very pleasant 64-year-old gentleman who has had a lifelong heart murmur, who presented for hernia surgery and was referred to [facility] Cardiology for this murmur. He underwent echo, which showed an RICKIE of 0.4 cm2 with a 53 mm mean gradient across the valve with normal LV function. Left heart catheterization showed clean coronaries. DESCRIPTION OF PROCEDURE:  After informed consent was obtained, the patient was brought to the operating suite and placed in supine position and underwent general endotracheal anesthesia without difficulty. Transesophageal echo performed which confirmed severe AS. He was prepped and draped in the usual sterile fashion. A standard median sternotomy incision was then made. Sternum carefully divided. Pericardium was tacked into a pericardial well. Systemic heparinization was then given. Aorta palpated and noted to be soft. Pursestrings were placed in the aorta, right atrium and right pulmonary vein. After adequate ACT was obtained, he was cannulated through these pursestrings.   Cardiopulmonary bypass was then instituted without difficulty with good decompression of the heart. At this point, the aorta was cross clamped. He received one dose of antegrade del Nido cardioplegia. A left ventricular vent was placed in the pulmonary vein. The aorta was opened in hockey stick fashion, revealing a true bicuspid with two commissures of the valve that had calcified and formed a fish mouth appearance. This was meticulously debrided, taking great care not drop any debris in the left ventricle. After adequate debridement of the annulus, the left ventricle was copiously irrigated with iced saline and measured at 25 mm Magna Ease. Sutures were then passed through the annulus. The valve was marked and sutures passed through sewing ring, and the valve seated without difficulty. Both left and right coronary ostia were easily visualized. The aortotomy was closed in double layer fashion with pledgeted 4-0 Prolene. He was then placed in steep Trendelenburg position, warmed to 37 degrees Centigrade, given a hot shot dose of cardioplegia. Meticulous deairing maneuvers were undertaken and were noted to be adequate by YARON. Crossclamp was removed. Ventricular pacing wires were placed upon the heart and brought out through separate stab incisions inferiorly and affixed to the skin. He was initially VVI paced but soon resumed his normal sinus rhythm. He was then weaned from cardiopulmonary bypass without difficulty and decannulated. All pursestrings were tied and noted to be hemostatic. Protamine sulfate was given. Meticulous hemostasis was achieved. Pericardium was copiously irrigated with warm saline. A single 32-Bahamian mediastinal tube was brought through a separate stab incision inferiorly and affixed to the skin. The sternum was reapproximated with stainless steel wire, fascia with #1 PDS, subcutaneous closed with 3-0 Vicryl, skin with 4-0 Vicryl subcuticular. COMPLICATIONS:  None.     COUNTS:  All instrument and sponge counts were correct at the end of the case.      MD EVELIN Kraus / MN  D: 04/12/2018 12:59     T: 04/12/2018 15:20  JOB #: [id number]

## 2018-04-13 ENCOUNTER — APPOINTMENT (OUTPATIENT)
Dept: GENERAL RADIOLOGY | Age: 58
DRG: 219 | End: 2018-04-13
Attending: INTERNAL MEDICINE
Payer: COMMERCIAL

## 2018-04-13 ENCOUNTER — APPOINTMENT (OUTPATIENT)
Dept: GENERAL RADIOLOGY | Age: 58
DRG: 219 | End: 2018-04-13
Attending: THORACIC SURGERY (CARDIOTHORACIC VASCULAR SURGERY)
Payer: COMMERCIAL

## 2018-04-13 PROBLEM — D62 POSTOPERATIVE ANEMIA DUE TO ACUTE BLOOD LOSS: Status: ACTIVE | Noted: 2018-04-13

## 2018-04-13 PROBLEM — D69.59 THROMBOCYTOPENIA DUE TO EXTRA CORPOREAL BY-PASS CIRCULATION: Status: ACTIVE | Noted: 2018-04-13

## 2018-04-13 LAB
ANION GAP SERPL CALC-SCNC: 5 MMOL/L (ref 7–16)
ATRIAL RATE: 95 BPM
BLD PROD TYP BPU: NORMAL
BPU ID: NORMAL
BUN SERPL-MCNC: 19 MG/DL (ref 6–23)
CALCIUM SERPL-MCNC: 7.1 MG/DL (ref 8.3–10.4)
CALCULATED P AXIS, ECG09: 62 DEGREES
CALCULATED R AXIS, ECG10: 52 DEGREES
CALCULATED T AXIS, ECG11: 81 DEGREES
CHLORIDE SERPL-SCNC: 117 MMOL/L (ref 98–107)
CO2 SERPL-SCNC: 26 MMOL/L (ref 21–32)
CREAT SERPL-MCNC: 0.72 MG/DL (ref 0.8–1.5)
DIAGNOSIS, 93000: NORMAL
ERYTHROCYTE [DISTWIDTH] IN BLOOD BY AUTOMATED COUNT: 13.9 % (ref 11.9–14.6)
GLUCOSE BLD STRIP.AUTO-MCNC: 108 MG/DL (ref 65–100)
GLUCOSE BLD STRIP.AUTO-MCNC: 114 MG/DL (ref 65–100)
GLUCOSE BLD STRIP.AUTO-MCNC: 120 MG/DL (ref 65–100)
GLUCOSE BLD STRIP.AUTO-MCNC: 132 MG/DL (ref 65–100)
GLUCOSE BLD STRIP.AUTO-MCNC: 137 MG/DL (ref 65–100)
GLUCOSE BLD STRIP.AUTO-MCNC: 151 MG/DL (ref 65–100)
GLUCOSE BLD STRIP.AUTO-MCNC: 151 MG/DL (ref 65–100)
GLUCOSE BLD STRIP.AUTO-MCNC: 157 MG/DL (ref 65–100)
GLUCOSE BLD STRIP.AUTO-MCNC: 97 MG/DL (ref 65–100)
GLUCOSE SERPL-MCNC: 121 MG/DL (ref 65–100)
HCT VFR BLD AUTO: 23.4 % (ref 41.1–50.3)
HGB BLD-MCNC: 7.7 G/DL (ref 13.6–17.2)
MAGNESIUM SERPL-MCNC: 2.1 MG/DL (ref 1.8–2.4)
MCH RBC QN AUTO: 29.8 PG (ref 26.1–32.9)
MCHC RBC AUTO-ENTMCNC: 32.9 G/DL (ref 31.4–35)
MCV RBC AUTO: 90.7 FL (ref 79.6–97.8)
MM INDURATION POC: 0 MM (ref 0–5)
P-R INTERVAL, ECG05: 204 MS
PLATELET # BLD AUTO: 81 K/UL (ref 150–450)
PMV BLD AUTO: 10 FL (ref 10.8–14.1)
POTASSIUM SERPL-SCNC: 4.1 MMOL/L (ref 3.5–5.1)
PPD POC: NORMAL NEGATIVE
Q-T INTERVAL, ECG07: 286 MS
QRS DURATION, ECG06: 102 MS
QTC CALCULATION (BEZET), ECG08: 359 MS
RBC # BLD AUTO: 2.58 M/UL (ref 4.23–5.67)
SODIUM SERPL-SCNC: 148 MMOL/L (ref 136–145)
STATUS OF UNIT,%ST: NORMAL
UNIT DIVISION, %UDIV: 0
VENTRICULAR RATE, ECG03: 95 BPM
WBC # BLD AUTO: 10.7 K/UL (ref 4.3–11.1)

## 2018-04-13 PROCEDURE — 82962 GLUCOSE BLOOD TEST: CPT

## 2018-04-13 PROCEDURE — 65660000004 HC RM CVT STEPDOWN

## 2018-04-13 PROCEDURE — 99233 SBSQ HOSP IP/OBS HIGH 50: CPT | Performed by: INTERNAL MEDICINE

## 2018-04-13 PROCEDURE — 74011000250 HC RX REV CODE- 250: Performed by: THORACIC SURGERY (CARDIOTHORACIC VASCULAR SURGERY)

## 2018-04-13 PROCEDURE — 97530 THERAPEUTIC ACTIVITIES: CPT

## 2018-04-13 PROCEDURE — 74011250636 HC RX REV CODE- 250/636: Performed by: THORACIC SURGERY (CARDIOTHORACIC VASCULAR SURGERY)

## 2018-04-13 PROCEDURE — 85027 COMPLETE CBC AUTOMATED: CPT | Performed by: THORACIC SURGERY (CARDIOTHORACIC VASCULAR SURGERY)

## 2018-04-13 PROCEDURE — 77010033678 HC OXYGEN DAILY

## 2018-04-13 PROCEDURE — 97161 PT EVAL LOW COMPLEX 20 MIN: CPT

## 2018-04-13 PROCEDURE — 71045 X-RAY EXAM CHEST 1 VIEW: CPT

## 2018-04-13 PROCEDURE — 83735 ASSAY OF MAGNESIUM: CPT | Performed by: THORACIC SURGERY (CARDIOTHORACIC VASCULAR SURGERY)

## 2018-04-13 PROCEDURE — 93005 ELECTROCARDIOGRAM TRACING: CPT | Performed by: THORACIC SURGERY (CARDIOTHORACIC VASCULAR SURGERY)

## 2018-04-13 PROCEDURE — 74011250637 HC RX REV CODE- 250/637: Performed by: THORACIC SURGERY (CARDIOTHORACIC VASCULAR SURGERY)

## 2018-04-13 PROCEDURE — 74022 RADEX COMPL AQT ABD SERIES: CPT

## 2018-04-13 PROCEDURE — 80048 BASIC METABOLIC PNL TOTAL CA: CPT | Performed by: THORACIC SURGERY (CARDIOTHORACIC VASCULAR SURGERY)

## 2018-04-13 RX ORDER — NITROGLYCERIN 400 UG/1
1 SPRAY ORAL
Status: DISCONTINUED | OUTPATIENT
Start: 2018-04-13 | End: 2018-04-17 | Stop reason: HOSPADM

## 2018-04-13 RX ORDER — ASPIRIN 81 MG/1
81 TABLET ORAL DAILY
Status: DISCONTINUED | OUTPATIENT
Start: 2018-04-14 | End: 2018-04-17 | Stop reason: HOSPADM

## 2018-04-13 RX ORDER — SODIUM CHLORIDE 0.9 % (FLUSH) 0.9 %
5-10 SYRINGE (ML) INJECTION EVERY 8 HOURS
Status: DISCONTINUED | OUTPATIENT
Start: 2018-04-13 | End: 2018-04-17 | Stop reason: HOSPADM

## 2018-04-13 RX ORDER — POTASSIUM CHLORIDE 20 MEQ/1
40 TABLET, EXTENDED RELEASE ORAL
Status: DISCONTINUED | OUTPATIENT
Start: 2018-04-13 | End: 2018-04-17 | Stop reason: HOSPADM

## 2018-04-13 RX ORDER — METOPROLOL TARTRATE 25 MG/1
25 TABLET, FILM COATED ORAL EVERY 12 HOURS
Status: DISCONTINUED | OUTPATIENT
Start: 2018-04-13 | End: 2018-04-17 | Stop reason: HOSPADM

## 2018-04-13 RX ORDER — MUPIROCIN 20 MG/G
OINTMENT TOPICAL 2 TIMES DAILY
Status: DISCONTINUED | OUTPATIENT
Start: 2018-04-13 | End: 2018-04-17 | Stop reason: HOSPADM

## 2018-04-13 RX ORDER — LANOLIN ALCOHOL/MO/W.PET/CERES
400 CREAM (GRAM) TOPICAL
Status: DISCONTINUED | OUTPATIENT
Start: 2018-04-13 | End: 2018-04-17 | Stop reason: HOSPADM

## 2018-04-13 RX ORDER — LANOLIN ALCOHOL/MO/W.PET/CERES
1 CREAM (GRAM) TOPICAL
Status: DISCONTINUED | OUTPATIENT
Start: 2018-04-13 | End: 2018-04-17 | Stop reason: HOSPADM

## 2018-04-13 RX ORDER — MAG HYDROX/ALUMINUM HYD/SIMETH 200-200-20
30 SUSPENSION, ORAL (FINAL DOSE FORM) ORAL
Status: DISCONTINUED | OUTPATIENT
Start: 2018-04-13 | End: 2018-04-17 | Stop reason: HOSPADM

## 2018-04-13 RX ORDER — FUROSEMIDE 40 MG/1
40 TABLET ORAL DAILY
Status: COMPLETED | OUTPATIENT
Start: 2018-04-14 | End: 2018-04-16

## 2018-04-13 RX ORDER — DOCUSATE SODIUM 100 MG/1
100 CAPSULE, LIQUID FILLED ORAL DAILY
Status: DISCONTINUED | OUTPATIENT
Start: 2018-04-14 | End: 2018-04-14

## 2018-04-13 RX ORDER — ACETAMINOPHEN 325 MG/1
650 TABLET ORAL
Status: DISCONTINUED | OUTPATIENT
Start: 2018-04-13 | End: 2018-04-17 | Stop reason: HOSPADM

## 2018-04-13 RX ORDER — POTASSIUM CHLORIDE 750 MG/1
10 TABLET, EXTENDED RELEASE ORAL DAILY
Status: COMPLETED | OUTPATIENT
Start: 2018-04-14 | End: 2018-04-16

## 2018-04-13 RX ORDER — SODIUM CHLORIDE 0.9 % (FLUSH) 0.9 %
5-10 SYRINGE (ML) INJECTION AS NEEDED
Status: DISCONTINUED | OUTPATIENT
Start: 2018-04-13 | End: 2018-04-17 | Stop reason: HOSPADM

## 2018-04-13 RX ORDER — ONDANSETRON 2 MG/ML
4 INJECTION INTRAMUSCULAR; INTRAVENOUS
Status: DISCONTINUED | OUTPATIENT
Start: 2018-04-13 | End: 2018-04-17 | Stop reason: HOSPADM

## 2018-04-13 RX ORDER — ATORVASTATIN CALCIUM 40 MG/1
80 TABLET, FILM COATED ORAL
Status: DISCONTINUED | OUTPATIENT
Start: 2018-04-13 | End: 2018-04-17 | Stop reason: HOSPADM

## 2018-04-13 RX ORDER — POTASSIUM CHLORIDE 20 MEQ/1
20 TABLET, EXTENDED RELEASE ORAL
Status: DISCONTINUED | OUTPATIENT
Start: 2018-04-13 | End: 2018-04-17 | Stop reason: HOSPADM

## 2018-04-13 RX ORDER — TRAMADOL HYDROCHLORIDE 50 MG/1
50 TABLET ORAL
Status: DISCONTINUED | OUTPATIENT
Start: 2018-04-13 | End: 2018-04-17 | Stop reason: HOSPADM

## 2018-04-13 RX ORDER — FAMOTIDINE 20 MG/1
20 TABLET, FILM COATED ORAL 2 TIMES DAILY
Status: DISCONTINUED | OUTPATIENT
Start: 2018-04-13 | End: 2018-04-13

## 2018-04-13 RX ORDER — AMIODARONE HYDROCHLORIDE 200 MG/1
200 TABLET ORAL EVERY 12 HOURS
Status: DISCONTINUED | OUTPATIENT
Start: 2018-04-13 | End: 2018-04-17 | Stop reason: HOSPADM

## 2018-04-13 RX ORDER — FUROSEMIDE 10 MG/ML
20 INJECTION INTRAMUSCULAR; INTRAVENOUS ONCE
Status: COMPLETED | OUTPATIENT
Start: 2018-04-13 | End: 2018-04-13

## 2018-04-13 RX ORDER — FAMOTIDINE 20 MG/1
20 TABLET, FILM COATED ORAL EVERY 12 HOURS
Status: DISCONTINUED | OUTPATIENT
Start: 2018-04-13 | End: 2018-04-17 | Stop reason: HOSPADM

## 2018-04-13 RX ADMIN — FAMOTIDINE 20 MG: 10 INJECTION, SOLUTION INTRAVENOUS at 09:07

## 2018-04-13 RX ADMIN — FAMOTIDINE 20 MG: 20 TABLET ORAL at 19:28

## 2018-04-13 RX ADMIN — TAPENTADOL HYDROCHLORIDE 100 MG: 50 TABLET, FILM COATED ORAL at 18:04

## 2018-04-13 RX ADMIN — Medication 2 G: at 04:38

## 2018-04-13 RX ADMIN — OXYCODONE HYDROCHLORIDE AND ACETAMINOPHEN 1 TABLET: 5; 325 TABLET ORAL at 01:31

## 2018-04-13 RX ADMIN — ATORVASTATIN CALCIUM 80 MG: 40 TABLET, FILM COATED ORAL at 19:28

## 2018-04-13 RX ADMIN — FERROUS SULFATE TAB 325 MG (65 MG ELEMENTAL FE) 325 MG: 325 (65 FE) TAB at 09:07

## 2018-04-13 RX ADMIN — METOPROLOL TARTRATE 12.5 MG: 25 TABLET ORAL at 09:31

## 2018-04-13 RX ADMIN — OXYCODONE HYDROCHLORIDE AND ACETAMINOPHEN 1 TABLET: 5; 325 TABLET ORAL at 21:09

## 2018-04-13 RX ADMIN — AMIODARONE HYDROCHLORIDE 200 MG: 200 TABLET ORAL at 09:07

## 2018-04-13 RX ADMIN — Medication 10 ML: at 15:00

## 2018-04-13 RX ADMIN — Medication 10 ML: at 19:29

## 2018-04-13 RX ADMIN — METOPROLOL TARTRATE 25 MG: 25 TABLET ORAL at 19:29

## 2018-04-13 RX ADMIN — Medication 10 ML: at 05:45

## 2018-04-13 RX ADMIN — FUROSEMIDE 20 MG: 10 INJECTION, SOLUTION INTRAMUSCULAR; INTRAVENOUS at 09:07

## 2018-04-13 RX ADMIN — OXYCODONE HYDROCHLORIDE AND ACETAMINOPHEN 1 TABLET: 5; 325 TABLET ORAL at 05:44

## 2018-04-13 RX ADMIN — MUPIROCIN: 20 OINTMENT TOPICAL at 19:43

## 2018-04-13 RX ADMIN — OXYCODONE HYDROCHLORIDE AND ACETAMINOPHEN 1 TABLET: 5; 325 TABLET ORAL at 16:00

## 2018-04-13 RX ADMIN — TAPENTADOL HYDROCHLORIDE 50 MG: 50 TABLET, FILM COATED ORAL at 13:31

## 2018-04-13 RX ADMIN — MUPIROCIN: 20 OINTMENT TOPICAL at 09:08

## 2018-04-13 RX ADMIN — AMIODARONE HYDROCHLORIDE 200 MG: 200 TABLET ORAL at 19:29

## 2018-04-13 RX ADMIN — TAPENTADOL HYDROCHLORIDE 50 MG: 50 TABLET, FILM COATED ORAL at 13:21

## 2018-04-13 RX ADMIN — Medication 10 ML: at 18:05

## 2018-04-13 NOTE — PROGRESS NOTES
Cardiology called updated rhythm on sinus tach vs a fib orders received to give amiodarone and lopressor early.  Vital signs stable and will continue to monitor

## 2018-04-13 NOTE — PROGRESS NOTES
Bedside and Verbal shift change report given to Rose Mendez (oncoming nurse) by Inessa Galan and Joel Sun (offgoing nurse). Report included the following information SBAR, Kardex, Intake/Output, MAR, Recent Results and Med Rec Status.

## 2018-04-13 NOTE — PROGRESS NOTES
Respiratory Mechanics completed and are as follows:  Weaning Parameters  Spontaneous Breathing Trial Complete: Yes  Resp Rate Observed: 12  Ve: 8.4  VT: 700  RSBI: 17  NIF: -30  VC: 984  Barton Agitation Sedation Scale (RASS): Alert and calm  Patient extubated to Airvo with 40L flow and 40% FiO2. Patient is able to communicate and is negative for stridor. Breath sounds are clear. No complications with extubation.      Janusz Caro, RT

## 2018-04-13 NOTE — PROGRESS NOTES
Problem: Mobility Impaired (Adult and Pediatric)  Goal: *Acute Goals and Plan of Care (Insert Text)  STG:  (1.)Mr. Mercedez Robledo will move from supine to sit and sit to supine , scoot up and down and roll side to side with STAND BY ASSIST within 3 treatment day(s). (2.)Mr. Mercedez Robledo will transfer from bed to chair and chair to bed with SUPERVISION using the least restrictive device within 3 treatment day(s). (3.)Mr. Mercedez Robledo will ambulate with SUPERVISION for 250 feet with the least restrictive device within 3 treatment day(s). (4.)Mr. Mercedez Robledo will perform standing static and dynamic balance activities x 15 minutes with SUPERVISION to improve safety within 3 day(s). (5.)Mr. Mercedez Robledo will perform LE exercises with 1 to 2 cues for form within 3 days to improve strength for functional transfers and ambulation. (6.)Mr. Mercedez Robledo will maintain stable vital signs throughout all functional mobility within 3 days. LTG:  (1.)Mr. Mercedez Robledo will move from supine to sit and sit to supine , scoot up and down and roll side to side in bed with MODIFIED INDEPENDENCE within 7 treatment day(s). (2.)Mr. Mercedez Robledo will transfer from bed to chair and chair to bed with MODIFIED INDEPENDENCE using the least restrictive device within 7 treatment day(s). (3.)Mr. Mercedez Robledo will ambulate with MODIFIED INDEPENDENCE for 500+ feet with the least restrictive device within 7 treatment day(s). (4.)Mr. Mercedez Robledo will perform standing static and dynamic balance activities x 15+ minutes with MODIFIED INDEPENDENCE to improve safety within 7 day(s). (5.)Mr. Mercedez Robledo will ascend and descend 5 stairs using 0-2 hand rail(s) with MODIFIED INDEPENDENCE to improve functional mobility and safety within 7 day(s).   ________________________________________________________________________________________________      PHYSICAL THERAPY: Initial Assessment, AM 4/13/2018  INPATIENT: Hospital Day: 2  Payor[place] BLUE [place] / [place] / Product Type: PPO / NAME/AGE/GENDER: Eligio Stack is a 59 y.o. male   PRIMARY DIAGNOSIS: Nonrheumatic aortic valve stenosis [I35.0]  Bicuspid aortic valve [Q23.1] Nonrheumatic aortic valve stenosis Nonrheumatic aortic valve stenosis  Procedure(s) (LRB):  CARDIAC RE ENTRY (N/A)  1 Day Post-Op  ICD-10: Treatment Diagnosis:    · Difficulty in walking, Not elsewhere classified (R26.2)   Precaution/Allergies:  Review of patient's allergies indicates no known allergies. ASSESSMENT:     Mr. Cassidy Clemens is a 59 y.o. s/p above surgery. He lives in his daughter's home currently with spouse and typically performs all mobility independently, drives and works. He is sitting in chair on contact and is agreeable to therapy. RN removed O2 prior to mobility, patient's SpO2 98% on room air. He stood with cues and ambulated with minimal assist 100' pushing CVICU walker. No c/o lightheadedness, SOB and patient with fair dynamic standing balance. SPO2 >90% upon return to chair. Feel he will progress well with therapy. Patient reports some concern about d/c because he has been staying with his daughter. Eligio Stack is currently functioning below his baseline and would benefit from skilled PT during acute care stay to maximize safety and independence with functional mobility. This section established at most recent assessment   PROBLEM LIST (Impairments causing functional limitations):  1. Decreased Strength  2. Decreased ADL/Functional Activities  3. Decreased Transfer Abilities  4. Decreased Ambulation Ability/Technique  5. Decreased Balance  6. Increased Pain  7. Decreased Activity Tolerance  8. Decreased Pacing Skills  9. Increased Shortness of Breath  10. Decreased Knowledge of Precautions  11. Decreased [place] with Home Exercise Program   INTERVENTIONS PLANNED: (Benefits and precautions of physical therapy have been discussed with the patient.)  1. Balance Exercise  2. Bed Mobility  3. Family Education  4.  Gait Training  5. Home Exercise Program (HEP)  6. Therapeutic Activites  7. Therapeutic Exercise/Strengthening  8. Transfer Training  9. Patient Education  10. Group Therapy     TREATMENT PLAN: Frequency/Duration: twice daily for duration of hospital stay  Rehabilitation Potential For Stated Goals: Good     RECOMMENDED REHABILITATION/EQUIPMENT: (at time of discharge pending progress): Due to the probability of continued deficits (see above) this patient will likely need continued skilled physical therapy after discharge. Equipment:    None at this time              HISTORY:   History of Present Injury/Illness (Reason for Referral): Nonrheumatic aortic valve stenosis [I35.0]  Bicuspid aortic valve [Q23.1] Nonrheumatic aortic valve stenosis Nonrheumatic aortic valve stenosis  Procedure(s) (LRB):  CARDIAC RE ENTRY (N/A)  Past Medical History/Comorbidities:   Mr. Saúl Mason  has a past medical history of CAD (coronary artery disease); Depression; Drug addiction in remission [facility]); Family history of premature CAD (2/28/2018); Homeless family; Seizures ([facility] [address] (2001); Severe aortic stenosis; and Tobacco use disorder. Mr. Saúl Mason  has a past surgical history that includes hx heent (age 27's). Social History/Living Environment:   Home Environment: Private residence  One/Two Story Residence: One story  Living Alone: No  Support Systems: Family member(s)  Patient Expects to be Discharged to[de-identified] Private residence  Current DME Used/Available at Home: None  Prior Level of Function/Work/Activity:  Patient independent with all mobility, drives and works at baseline.      Number of Personal Factors/Comorbidities that affect the Plan of Care: 1-2: MODERATE COMPLEXITY   EXAMINATION:   Most Recent Physical Functioning:   Gross Assessment:  AROM: Generally decreased, functional  PROM: Generally decreased, functional  Strength: Generally decreased, functional  Coordination: Generally decreased, functional  Tone: Normal  Sensation: Intact Posture:  Posture (WDL): Exceptions to WDL  Posture Assessment: Forward head  Balance:  Sitting: Intact  Standing: Impaired  Standing - Static: Good  Standing - Dynamic : Fair Bed Mobility:     Wheelchair Mobility:     Transfers:  Sit to Stand: Minimum assistance  Stand to Sit: Minimum assistance  Gait:     Base of Support: Center of gravity altered;Narrowed  Speed/[place]: Slow;Shuffled;Pace decreased (<100 feet/min)  Step Length: Right shortened;Left shortened  Gait Abnormalities: Decreased step clearance; Path deviations;Trunk sway increased  Distance (ft): 100 Feet (ft)  Assistive Device: Walker, rolling  Ambulation - Level of Assistance: Minimal assistance  Interventions: Manual cues; Safety awareness training;Verbal cues; Visual/Demos      Body Structures Involved:  1. Nerves  2. Heart  3. Lungs  4. Bones  5. Joints  6. Muscles  7. Ligaments Body Functions Affected:  1. Sensory/Pain  2. Cardio  3. Respiratory  4. Neuromusculoskeletal  5. Movement Related Activities and Participation Affected:  1. Mobility  2. Self Care  3. Domestic Life  4. Interpersonal Interactions and Relationships  5. Community, Social and [place]   Number of elements that affect the Plan of Care: 4+: HIGH COMPLEXITY   CLINICAL PRESENTATION:   Presentation: Stable and uncomplicated: LOW COMPLEXITY   CLINICAL DECISION MAKIN [facility] Inpatient Short Form  How much difficulty does the patient currently have. .. Unable A Lot A Little None   1. Turning over in bed (including adjusting bedclothes, sheets and blankets)? [] 1   [] 2   [x] 3   [] 4   2. Sitting down on and standing up from a chair with arms ( e.g., wheelchair, bedside commode, etc.)   [] 1   [] 2   [x] 3   [] 4   3. Moving from lying on back to sitting on the side of the bed? [] 1   [] 2   [x] 3   [] 4   How much help from another person does the patient currently need. .. Total A Lot A Little None   4.   Moving to and from a bed to a chair (including a wheelchair)? [] 1   [] 2   [x] 3   [] 4   5. Need to walk in hospital room? [] 1   [] 2   [x] 3   [] 4   6. Climbing 3-5 steps with a railing? [] 1   [x] 2   [] 3   [] 4   © 2007, Trustees of [address] [place] [address] [address] [id number], under license to [employer]. All rights reserved      Score:  Initial: 17 Most Recent: X (Date: -- )    Interpretation of Tool:  Represents activities that are increasingly more difficult (i.e. Bed mobility, Transfers, Gait). Score 24 23 22-20 19-15 14-10 9-7 6     Modifier CH CI CJ CK CL CM CN      ? Mobility - Walking and Moving Around:     - CURRENT STATUS: CK - 40%-59% impaired, limited or restricted    - GOAL STATUS: CI - 1%-19% impaired, limited or restricted    - D/C STATUS:  ---------------To be determined---------------  Payor[place]UE CR[place] / [place] [facility] / Product Type: PPO /      Medical Necessity:     · Patient demonstrates good rehab potential due to higher previous functional level. Reason for Services/Other Comments:  · Patient continues to require modification of therapeutic interventions to increase complexity of exercises. Use of outcome tool(s) and clinical judgement create a POC that gives a: Clear prediction of patient's progress: LOW COMPLEXITY            TREATMENT:   (In addition to Assessment/Re-Assessment sessions the following treatments were rendered)   Pre-treatment Symptoms/Complaints:    Pain: Initial:   Pain Intensity 1: 2  Post Session:  2/10     Assessment/Reassessment only, no treatment provided today    Braces/Orthotics/Lines/Etc:   · IV  · simon catheter  · chest tube  · O2 Device: Nasal cannula  Treatment/Session Assessment:    · Response to Treatment:  Patient tolerated treatment well with fatigue.   · Interdisciplinary Collaboration:   o Physical Therapist  o Registered Nurse  · After treatment position/precautions:   o Up in chair  o Bed/Chair-wheels locked  o Bed in low position  o Call light within reach  o RN notified   · Compliance with Program/Exercises: Will assess as treatment progresses. · Recommendations/Intent for next treatment session: \"Next visit will focus on advancements to more challenging activities and reduction in assistance provided\".   Total Treatment Duration:  PT Patient Time In/Time Out  Time In: 0909  Time Out: 37[address] [facility]

## 2018-04-13 NOTE — PROGRESS NOTES
RT at bedside. Pt able to follow commands. Pt able to lift head off pillow. Pt nods appropriately. Pt extubated to Airvo 40l/min and 40% FiO2. No stridor noted. Bilateral breath sounds equal. Pt able to vocalize name.     Kj Callahan RN

## 2018-04-13 NOTE — PROGRESS NOTES
Initial visit to assess pt's spiritual needs. Ministry of presence & prayer to demonstrate caring & concern, convey emotional & spiritual support.     carlos Osman, MDiv,ThM,PhD

## 2018-04-13 NOTE — ANESTHESIA POSTPROCEDURE EVALUATION
Post-Anesthesia Evaluation and Assessment    Patient: Eligio Stack MRN: [id number]  SSN: [id number]    YOB: 1960  Age: 59 y.o. Sex: male       Cardiovascular Function/Vital Signs  Visit Vitals    BP 93/54    Pulse 83    Temp 37.8 °C (100 °F)    Resp 8    Ht 6' 1\" (1.854 m)    Wt 70.8 kg (156 lb 3 oz)    SpO2 100%    BMI 20.61 kg/m2       Patient is status post general anesthesia for Procedure(s):  CARDIAC RE ENTRY. Nausea/Vomiting: None    Postoperative hydration reviewed and adequate. Pain:  Pain Scale 1: Visual (04/12/18 2129)  Pain Intensity 1: 0 (04/12/18 2129)   Managed    Neurological Status:   Neuro (WDL): Within Defined Limits (04/12/18 9611)  Neuro  Neurologic State: Alert;Eyes open spontaneously (04/12/18 2100)  Orientation Level: Oriented to person;Oriented to place;Oriented to situation;Disoriented to time (04/12/18 2100)  Cognition: Follows commands (04/12/18 1900)  Speech: Intubated; Nods appropriately (04/12/18 1900)  Assessment L Pupil: Brisk;Round (04/12/18 1900)  Size L Pupil (mm): 2 (04/12/18 1900)  Assessment R Pupil: Brisk;Round (04/12/18 1900)  Size R Pupil (mm): 2 (04/12/18 1900)  LUE Motor Response: Purposeful;Spontaneous  (04/12/18 1900)  LLE Motor Response: Purposeful;Spontaneous  (04/12/18 1900)  RUE Motor Response: Purposeful;Spontaneous  (04/12/18 1900)  RLE Motor Response: Purposeful;Spontaneous  (04/12/18 1900)   Following commands    Mental Status and Level of Consciousness: Arousable    Pulmonary Status:   O2 Device: Heated; Hi flow nasal cannula (04/12/18 2300)   Adequate oxygenation and airway patent    Complications related to anesthesia: None    Post-anesthesia assessment completed.  No immediate concerns    Signed By: Estevan Yo MD     April 13, 2018

## 2018-04-13 NOTE — PROGRESS NOTES
[facility] CARDIOLOGY PROGRESS NOTE           4/13/2018 3:33 PM    Admit Date: 4/12/2018         Subjective: 59 yom s/p AVR yesterday. C/o abdominal pain and mild SOB. Otherwise doing well post surgery. Short run of atrial tach, had to go back to OR 2/2 bleeding from his chest tube but since has been steadily improving. ROS:  Cardiovascular:  As noted above    Objective:      Vitals:    04/13/18 1432 04/13/18 1447 04/13/18 1502 04/13/18 1515   BP: 99/51 98/51 108/57    Pulse: 89 94 89 96   Resp: 15 (!) 56 22 19   Temp:       SpO2: 94% 96% 97% 95%   Weight:       Height:           On telemetry:      Physical Exam:  General: Well Developed, Well Nourished, No Acute Distress, Alert & Oriented x 3, Appropriate mood  Neck: supple, no JVD  Heart: S1S2 with RRR without murmurs or gallops  Lungs: Clear throughout auscultation bilaterally without adventitious sounds  Abd: soft, nontender, nondistended, with good bowel sounds  Ext: no edema bilaterally  Skin: warm and dry      Data Review:   Recent Labs      04/13/18   0314  04/12/18   2104  04/12/18   1720   04/12/18   1308   NA  148*   --   148*   --   146*   K  4.1  4.8  4.7   --   4.3   MG  2.1  2.1  2.1   --   2.9*   BUN  19   --   18   --   21   CREA  0.72*   --   0.81   --   0.97   GLU  121*   --   169*   --   125*   WBC  10.7   --   13.1*   < >  25.5*   HGB  7.7*  8.3*  8.5*   < >  13.7   HCT  23.4*  25.1*  25.7*   < >  40.9*   PLT  81*   --   76*   < >  130*   INR   --    --   1.5   --   1.7    < > = values in this interval not displayed. No results for input(s): Guera Higgins in the last 72 hours. Assessment/Plan:     Principal Problem:    Nonrheumatic aortic valve stenosis (2/7/2018)      Overview: 4/12/18 (Dr Arya Mclean) Aortic valve replacement with a 25 mm Magna Ease       Cherelle-Prince pericardial valve      4/12/18 (Dr Arya Mclean) Cardiac reentry with evacuation of clot.     Active Problems:    Tobacco use disorder () Status post aortic valve replacement (4/12/2018)      Respiratory failure, post-operative (HCC) (4/12/2018)      Hypoxia (4/12/2018)      Thrombocytopenia due to extra corporeal by-pass circulation (4/13/2018)      Postoperative anemia due to acute blood loss (4/13/2018)    1) AV - s/p surgery doing well POD#1  2) Anemia - stable H/H 7.7/23.4 today will continue to monitor would prefer above 8  3) Hypoxia - improving with respirometer  4) Tobacco abuse - counseling as outpatient      Lex Galvez MD  4/13/2018 3:33 PM

## 2018-04-13 NOTE — PROGRESS NOTES
Problem: Mobility Impaired (Adult and Pediatric)  Goal: *Acute Goals and Plan of Care (Insert Text)  STG:  (1.)Mr. Mitch Skinner will move from supine to sit and sit to supine , scoot up and down and roll side to side with STAND BY ASSIST within 3 treatment day(s). (2.)Mr. Mitch Skinner will transfer from bed to chair and chair to bed with SUPERVISION using the least restrictive device within 3 treatment day(s). (3.)Mr. Mitch Skinner will ambulate with SUPERVISION for 250 feet with the least restrictive device within 3 treatment day(s). (4.)Mr. Mitch Skinner will perform standing static and dynamic balance activities x 15 minutes with SUPERVISION to improve safety within 3 day(s). (5.)Mr. Mitch Skinner will perform LE exercises with 1 to 2 cues for form within 3 days to improve strength for functional transfers and ambulation. (6.)Mr. Mitch Skinner will maintain stable vital signs throughout all functional mobility within 3 days. LTG:  (1.)Mr. Mitch Skinner will move from supine to sit and sit to supine , scoot up and down and roll side to side in bed with MODIFIED INDEPENDENCE within 7 treatment day(s). (2.)Mr. Mitch Skinner will transfer from bed to chair and chair to bed with MODIFIED INDEPENDENCE using the least restrictive device within 7 treatment day(s). (3.)Mr. Mitch Skinner will ambulate with MODIFIED INDEPENDENCE for 500+ feet with the least restrictive device within 7 treatment day(s). (4.)Mr. Mitch Skinner will perform standing static and dynamic balance activities x 15+ minutes with MODIFIED INDEPENDENCE to improve safety within 7 day(s). (5.)Mr. Mitch Skinner will ascend and descend 5 stairs using 0-2 hand rail(s) with MODIFIED INDEPENDENCE to improve functional mobility and safety within 7 day(s).   ________________________________________________________________________________________________      PHYSICAL THERAPY: Daily Note, Treatment Day: Day of Assessment, PM 4/13/2018  INPATIENT: Hospital Day: 2  Payor: AURELIA HERRON / Plan: [address] [facility] [place] / Product Type: PPO /      NAME/AGE/GENDER: Partha Rodriguez is a 59 y.o. male   PRIMARY DIAGNOSIS: Nonrheumatic aortic valve stenosis [I35.0]  Bicuspid aortic valve [Q23.1] Nonrheumatic aortic valve stenosis Nonrheumatic aortic valve stenosis  Procedure(s) (LRB):  CARDIAC RE ENTRY (N/A)  1 Day Post-Op  ICD-10: Treatment Diagnosis:    · Difficulty in walking, Not elsewhere classified (R26.2)   Precaution/Allergies:  Review of patient's allergies indicates no known allergies. ASSESSMENT:     Mr. Julian Giles is a 59 y.o. s/p above surgery. He lives in his daughter's home currently with spouse and typically performs all mobility independently, drives and works. He is sitting in chair on contact and is agreeable to therapy. RN removed O2 prior to mobility, patient's SpO2 98% on room air. He stood with cues and ambulated with minimal assist 100' pushing CVICU walker. No c/o lightheadedness, SOB and patient with fair dynamic standing balance. SPO2 >90% upon return to chair. Feel he will progress well with therapy. Patient reports some concern about d/c because he has been staying with his daughter. Partha Rodriguez is currently functioning below his baseline and would benefit from skilled PT during acute care stay to maximize safety and independence with functional mobility. PM Note: patient sitting in recliner on contact, on room air and agreeable to therapy. CGA to stand and minimal assist to ambulate 100' with rolling walker. Frequent cues required for posture and pursed lip breathing technique during ambulation. Impaired balance appreciated. He fatigued quickly and c/o SOB (spO2 95% on room air post ambulation). No progress this PM, however feel he will progress well with continued therapy. Will continue therapy efforts. This section established at most recent assessment   PROBLEM LIST (Impairments causing functional limitations):  1. Decreased Strength  2.  Decreased ADL/Functional Activities  3. Decreased Transfer Abilities  4. Decreased Ambulation Ability/Technique  5. Decreased Balance  6. Increased Pain  7. Decreased Activity Tolerance  8. Decreased Pacing Skills  9. Increased Shortness of Breath  10. Decreased Knowledge of Precautions  11. Decreased [place] with Home Exercise Program   INTERVENTIONS PLANNED: (Benefits and precautions of physical therapy have been discussed with the patient.)  1. Balance Exercise  2. Bed Mobility  3. Family Education  4. Gait Training  5. Home Exercise Program (HEP)  6. Therapeutic Activites  7. Therapeutic Exercise/Strengthening  8. Transfer Training  9. Patient Education  10. Group Therapy     TREATMENT PLAN: Frequency/Duration: twice daily for duration of hospital stay  Rehabilitation Potential For Stated Goals: Good     RECOMMENDED REHABILITATION/EQUIPMENT: (at time of discharge pending progress): Due to the probability of continued deficits (see above) this patient will likely need continued skilled physical therapy after discharge. Equipment:    None at this time              HISTORY:   History of Present Injury/Illness (Reason for Referral): Nonrheumatic aortic valve stenosis [I35.0]  Bicuspid aortic valve [Q23.1] Nonrheumatic aortic valve stenosis Nonrheumatic aortic valve stenosis  Procedure(s) (LRB):  CARDIAC RE ENTRY (N/A)  Past Medical History/Comorbidities:   Mr. Elvia Polanco  has a past medical history of CAD (coronary artery disease); Depression; Drug addiction in remission [facility]); Family history of premature CAD (2/28/2018); Homeless family; Seizures ([facility] [address] (2001); Severe aortic stenosis; and Tobacco use disorder. Mr. Elvia Polanco  has a past surgical history that includes hx heent (age 27's).   Social History/Living Environment:   Home Environment: Private residence  One/Two Story Residence: One story  Living Alone: No  Support Systems: Family member(s)  Patient Expects to be Discharged to[de-identified] Private residence  Current DME Used/Available at Home: None  Prior Level of Function/Work/Activity:  Patient independent with all mobility, drives and works at baseline. Number of Personal Factors/Comorbidities that affect the Plan of Care: 1-2: MODERATE COMPLEXITY   EXAMINATION:   Most Recent Physical Functioning:   Gross Assessment:  AROM: Generally decreased, functional  PROM: Generally decreased, functional  Strength: Generally decreased, functional  Coordination: Generally decreased, functional  Tone: Normal  Sensation: Intact               Posture:  Posture (WDL): Exceptions to WDL  Posture Assessment: Forward head  Balance:  Sitting: Intact  Standing: Impaired  Standing - Static: Good  Standing - Dynamic : Fair Bed Mobility:     Wheelchair Mobility:     Transfers:  Sit to Stand: Contact guard assistance  Stand to Sit: Contact guard assistance  Interventions: Safety awareness training;Manual cues; Verbal cues; Visual cues  Gait:     Base of Support: Narrowed; Center of gravity altered  Speed/[place]: Slow  Step Length: Left shortened;Right shortened  Gait Abnormalities: Decreased step clearance; Path deviations;Trunk sway increased  Distance (ft): 100 Feet (ft)  Assistive Device: Walker, rolling  Ambulation - Level of Assistance: Minimal assistance  Interventions: Safety awareness training;Verbal cues; Visual/Demos      Body Structures Involved:  1. Nerves  2. Heart  3. Lungs  4. Bones  5. Joints  6. Muscles  7. Ligaments Body Functions Affected:  1. Sensory/Pain  2. Cardio  3. Respiratory  4. Neuromusculoskeletal  5. Movement Related Activities and Participation Affected:  1. Mobility  2. Self Care  3. Domestic Life  4. Interpersonal Interactions and Relationships  5.  Community, Social and [place]   Number of elements that affect the Plan of Care: 4+: HIGH COMPLEXITY   CLINICAL PRESENTATION:   Presentation: Stable and uncomplicated: LOW COMPLEXITY   CLINICAL DECISION MAKIN [facility] Inpatient Short Form  How much difficulty does the patient currently have. .. Unable A Lot A Little None   1. Turning over in bed (including adjusting bedclothes, sheets and blankets)? [] 1   [] 2   [x] 3   [] 4   2. Sitting down on and standing up from a chair with arms ( e.g., wheelchair, bedside commode, etc.)   [] 1   [] 2   [x] 3   [] 4   3. Moving from lying on back to sitting on the side of the bed? [] 1   [] 2   [x] 3   [] 4   How much help from another person does the patient currently need. .. Total A Lot A Little None   4. Moving to and from a bed to a chair (including a wheelchair)? [] 1   [] 2   [x] 3   [] 4   5. Need to walk in hospital room? [] 1   [] 2   [x] 3   [] 4   6. Climbing 3-5 steps with a railing? [] 1   [x] 2   [] 3   [] 4   © 2007, Trustees of [address] [place] [address], under license to [employer]. All rights reserved      Score:  Initial: 17 Most Recent: X (Date: -- )    Interpretation of Tool:  Represents activities that are increasingly more difficult (i.e. Bed mobility, Transfers, Gait). Score 24 23 22-20 19-15 14-10 9-7 6     Modifier CH CI CJ CK CL CM CN      ? Mobility - Walking and Moving Around:     - CURRENT STATUS: CK - 40%-59% impaired, limited or restricted    - GOAL STATUS: CI - 1%-19% impaired, limited or restricted    - D/C STATUS:  ---------------To be determined---------------  Payor: BLUE CROSS / Plan: [place] [facility] / Product Type: PPO /      Medical Necessity:     · Patient demonstrates good rehab potential due to higher previous functional level. Reason for Services/Other Comments:  · Patient continues to require modification of therapeutic interventions to increase complexity of exercises.    Use of outcome tool(s) and clinical judgement create a POC that gives a: Clear prediction of patient's progress: LOW COMPLEXITY            TREATMENT:   (In addition to Assessment/Re-Assessment sessions the following treatments were rendered)   Pre-treatment Symptoms/Complaints:    Pain: Initial:   Pain Intensity 1: 0  Post Session:  2/10     Therapeutic Activity: (    12 minutes): Therapeutic activities including Chair transfers, Ambulation on level ground and standing balance activities to improve mobility, strength, balance and activity tolerance. Required minimal Safety awareness training;Verbal cues; Visual/Demos to promote static and dynamic balance in standing. Braces/Orthotics/Lines/Etc:   · O2 Room Air   Treatment/Session Assessment:    · Response to Treatment:  Patient tolerated treatment well with fatigue. · Interdisciplinary Collaboration:   o Physical Therapist  o Registered Nurse  · After treatment position/precautions:   o Up in chair  o Bed/Chair-wheels locked  o Bed in low position  o Call light within reach  o RN notified   · Compliance with Program/Exercises: Will assess as treatment progresses. · Recommendations/Intent for next treatment session: \"Next visit will focus on advancements to more challenging activities and reduction in assistance provided\".   Total Treatment Duration:  PT Patient Time In/Time Out  Time In: 1501  Time Out: [id number] [facility]

## 2018-04-13 NOTE — PROGRESS NOTES
TRANSFER - IN REPORT:    Verbal report received from Clifford(name) on Ferd Liter  being received from CVICU(unit) for routine progression of care      Report consisted of patients Situation, Background, Assessment and   Recommendations(SBAR). Information from the following report(s) SBAR, Procedure Summary, Intake/Output, MAR and Recent Results was reviewed with the receiving nurse. Opportunity for questions and clarification was provided. Assessment completed upon patients arrival to unit and care assumed. Dual skin assessment performed patient had no bruises, and skin breakdown.  Allevyn was removed and repositioned, sacrum skin was intact with no skin break down

## 2018-04-13 NOTE — PROGRESS NOTES
Pt's left radial art line removed at this time. Manual pressure held until hemostasis achieved. No bleeding or hematoma at site. Pressure dressing to site. Will monitor. Pt's [place] removed at this time. Pt given instructions for [place] pull and Pt v/u. [place] removed without difficulty, no ectopy seen on monitor. Line hub capped. Pt tolerated procedure well. No acute distress noted. VSS throughout. Will continue to monitor.      Helen Prasad RN

## 2018-04-13 NOTE — PROGRESS NOTES
Ventilator check complete; patient has a #8. 0 ET tube secured at the 23 at the lip. Patient is sedated. Patient is able to follow commands. Breath sounds are clear and diminished. Trachea is midline, Negative for subcutaneous air, and chest excursion is symmetric. Patient is also Negative for cyanosis and is Negative for pitting edema. All alarms are set and audible. Resuscitation bag is at the head of the bed.       Ventilator Settings  Mode FIO2 Rate Tidal Volume Pressure PEEP I:E Ratio   Pressure support  30 %      8 cm H2O  8 cm H20  1:3.33      Peak airway pressure: 17.5 cm H2O   Minute ventilation: 9.6 l/min     ABG:   Recent Labs      04/12/18   2045  04/12/18   1712  04/12/18   1410   PH  7.34*  7.30*  7.28*   PCO2  43  44  46*   PO2  100  289*  128*   HCO3  23  21*  21*         Radha Coley, RT

## 2018-04-13 NOTE — PROGRESS NOTES
Chest tubes off suction for >2 hours, no air leak noted with breathing or coughing. Pt ambulatory in hallway 200 feet prior to chest tube removal with minimal output after.     Chest tubes removed per orders during maximum inspiration. Sutures triple tied. Petroleum gauze placed over site and bandaged with 4x4 and tape. Pacer wires isolated and taped.     SWAN introducer cath removed with cath tip intact. Pressure held until hemostasis achieved. Site bandaged with gauze, antibiotic ointment, and tegaderm.     Pt remains resting in NAD, no s/sx SOB, SaO2 98% on supplemental O2 at 2lnc, breath sounds CTA bilaterally and diminished in bases as prior, no subcutaneous emphysema noted.  Will continue to monitor.

## 2018-04-13 NOTE — PROGRESS NOTES
TRANSFER - OUT REPORT:    Verbal report given to PARTH Costa on Con Jeronimo  being transferred to [facility] for routine progression of care       Report consisted of patients Situation, Background, Assessment and Recommendations(SBAR). Information from the following report(s) SBAR, Procedure Summary, MAR, Recent Results and Cardiac Rhythm NSR, ST was reviewed with the receiving nurse. Opportunity for questions and clarification was provided.

## 2018-04-13 NOTE — PROGRESS NOTES
POD 1 Day Post-Op    Procedure:  Procedure(s):  CARDIAC RE ENTRY      Subjective:     Patient has No significant medical complaints      Objective:     Patient Vitals for the past 8 hrs:   BP Temp Pulse Resp SpO2 Weight   18 0733 122/55 99.5 °F (37.5 °C) 96 (!) 32 98 % -   18 0631 99/52 - 91 16 99 % -   18 0616 94/54 - 89 20 98 % -   18 0602 93/50 - 90 15 99 % -   18 0548 98/52 - 94 26 98 % -   18 0517 102/57 - 93 - 99 % -   18 0502 101/59 - 96 - 98 % -   18 0500 - - - - - 162 lb 1.6 oz (73.5 kg)   18 0447 104/57 - 95 13 98 % -   18 0431 103/52 - 92 - 100 % -   18 0416 109/54 - 94 (!) 44 100 % -   18 0402 103/56 - 91 14 100 % -   18 0347 103/59 - 91 15 100 % -   18 0332 98/55 - 90 - 100 % -   18 0317 100/58 - 91 9 100 % -   18 0302 97/56 - 90 - 100 % -   18 0300 - 100.2 °F (37.9 °C) - - - -   18 0246 101/52 - 89 16 100 % -   18 0231 94/55 - 86 10 100 % -   18 0217 96/52 - 86 - 100 % -   18 0202 102/55 - 87 28 100 % -   18 0200 - 100.1 °F (37.8 °C) - - - -   18 0144 - - 87 17 100 % -   18 0130 - - 88 14 100 % -   18 0125 92/50 - 89 (!) 0 100 % -   18 0101 (!) 86/53 - 82 - 100 % -   18 0100 - 100.1 °F (37.8 °C) - - - -   18 0044 - - 86 (!) 0 100 % -   18 0029 - - 85 - 100 % -     Temp (24hrs), Av.4 °F (37.4 °C), Min:97.4 °F (36.3 °C), Max:100.3 °F (37.9 °C)        Hemodynamics  PAP Systolic: 26 PAP  CO (l/min): 7.4 l/min CO  CI (l/min/m2): 3.8 l/min/m2 CI     0701 - 1900  In: 1295.2 [P.O.:360; I.V.:935.2]  Out: 108 [Urine:88]  1901 -  0700  In: 8954 [P.O.:360; I.V.:3400]  Out: 4483 [Urine:3310]    CT Drainage              total of all CT's    Heart:  regular rate and rhythm, S1, S2 normal, no murmur, click, rub or gallop  Lung:  clear to auscultation bilaterally  Neuro: Grossly non focal  Incisions: Clean, dry, and intact    Labs:  Recent Results (from the past 24 hour(s))   POC CG8I    Collection Time: 04/12/18  9:49 AM   Result Value Ref Range    pH (POC) 7.377 7.35 - 7.45      pCO2 (POC) 49.3 (H) 35 - 45 MMHG    pO2 (POC) 400 (H) 80 - 105 MMHG    HCO3 (POC) 29.0 (H) 22 - 26 MMOL/L    sO2 (POC) 100 (H) 95 - 98 %    Base excess (POC) 4 mmol/L    Sodium,  136 - 145 MMOL/L    Potassium, POC 4.2 3.5 - 5.1 MMOL/L    Glucose,  (H) 65 - 100 MG/DL    Calcium, ionized (POC) 1.17 1.12 - 1.32 mmol/L   POC CG8I    Collection Time: 04/12/18 10:19 AM   Result Value Ref Range    pH (POC) 7.391 7.35 - 7.45      pCO2 (POC) 47.5 (H) 35 - 45 MMHG    pO2 (POC) 242 (H) 80 - 105 MMHG    HCO3 (POC) 28.8 (H) 22 - 26 MMOL/L    sO2 (POC) 100 (H) 95 - 98 %    Base excess (POC) 4 mmol/L    Sodium,  136 - 145 MMOL/L    Potassium, POC 4.1 3.5 - 5.1 MMOL/L    Glucose,  (H) 65 - 100 MG/DL    Calcium, ionized (POC) 1.18 1.12 - 1.32 mmol/L   POC CG8I    Collection Time: 04/12/18 10:47 AM   Result Value Ref Range    pH (POC) 7.333 (L) 7.35 - 7.45      pCO2 (POC) 50.8 (H) 35 - 45 MMHG    pO2 (POC) 45 (L) 80 - 105 MMHG    HCO3 (POC) 27.0 (H) 22 - 26 MMOL/L    sO2 (POC) 77 (L) 95 - 98 %    Base excess (POC) 1 mmol/L    Sodium,  136 - 145 MMOL/L    Potassium, POC 5.2 (H) 3.5 - 5.1 MMOL/L    Glucose,  (H) 65 - 100 MG/DL    Calcium, ionized (POC) 1.08 (L) 1.12 - 1.32 mmol/L   POC CG8I    Collection Time: 04/12/18 11:20 AM   Result Value Ref Range    pH (POC) 7.327 (L) 7.35 - 7.45      pCO2 (POC) 51.8 (H) 35 - 45 MMHG    pO2 (POC) 215 (H) 80 - 105 MMHG    HCO3 (POC) 27.1 (H) 22 - 26 MMOL/L    sO2 (POC) 100 (H) 95 - 98 %    Base excess (POC) 1 mmol/L    Sodium,  136 - 145 MMOL/L    Potassium, POC 4.8 3.5 - 5.1 MMOL/L    Glucose,  (H) 65 - 100 MG/DL    Calcium, ionized (POC) 1.10 (L) 1.12 - 1.32 mmol/L   POC CG8I    Collection Time: 04/12/18 12:19 PM   Result Value Ref Range    pH (POC) 7.297 (L) 7.35 - 7.45 pCO2 (POC) 51.7 (H) 35 - 45 MMHG    pO2 (POC) 457 (H) 80 - 105 MMHG    HCO3 (POC) 25.3 22 - 26 MMOL/L    sO2 (POC) 100 (H) 95 - 98 %    Base deficit (POC) 1 mmol/L    Sodium,  136 - 145 MMOL/L    Potassium, POC 4.3 3.5 - 5.1 MMOL/L    Glucose,  (H) 65 - 100 MG/DL    Calcium, ionized (POC) 1.17 1.12 - 1.32 mmol/L   BLOOD GAS & LYTES, ARTERIAL    Collection Time: 04/12/18 12:52 PM   Result Value Ref Range    pH 7.28 (L) 7.35 - 7.45      PCO2 52 (H) 35 - 45 mmHg    PO2 283 (H) 80 - 105 mmHg    BICARBONATE 24 22 - 26 mmol/L    BASE DEFICIT 3.8 (H) 0 - 2 mmol/L    TOTAL HEMOGLOBIN 14.4 11.7 - 15.0 GM/DL    O2 SAT 99 (H) 92 - 98.5 %    Arterial O2 Hgb 97.4 (H) 94 - 97 %    CARBOXYHEMOGLOBIN 1.1 0.5 - 1.5 %    METHEMOGLOBIN 0.6 0.0 - 1.5 %    DEOXYHEMOGLOBIN 1 0.0 - 5.0 %    Sodium 139.2 135 - 148 MMOL/L    POTASSIUM 4.22 3.5 - 5.3 MMOL/L    CHLORIDE 111 (H) 98 - 106      Calcium, ionized 1.16 1.0 - 1.3 mmol/L    SITE JOHN     ALLENS TEST NA     MODE SIMV PRVC     Tidal volume 550.0      RATE 14.0      PEEP/CPAP 8.0     GLUCOSE, POC    Collection Time: 04/12/18 12:57 PM   Result Value Ref Range    Glucose (POC) 118 (H) 65 - 100 mg/dL   EKG, 12 LEAD, INITIAL    Collection Time: 04/12/18  1:04 PM   Result Value Ref Range    Ventricular Rate 88 BPM    Atrial Rate 88 BPM    P-R Interval 200 ms    QRS Duration 130 ms    Q-T Interval 452 ms    QTC Calculation (Bezet) 546 ms    Calculated P Axis 66 degrees    Calculated R Axis 72 degrees    Calculated T Axis 134 degrees    Diagnosis       Normal sinus rhythm  Non-specific intra-ventricular conduction block Incomplete left bundle branch   block  Cannot rule out Anterior infarct , age undetermined  T wave abnormality, consider lateral ischemia  Abnormal ECG  When compared with ECG of 06-APR-2018 09:28,  Significant changes have occurred  Confirmed by CEBE  MD (), Emmett Pena ([id number]) on 4/12/2018 0:37:43 PM     METABOLIC PANEL, BASIC    Collection Time: 04/12/18  1:08 PM Result Value Ref Range    Sodium 146 (H) 136 - 145 mmol/L    Potassium 4.3 3.5 - 5.1 mmol/L    Chloride 114 (H) 98 - 107 mmol/L    CO2 27 21 - 32 mmol/L    Anion gap 5 (L) 7 - 16 mmol/L    Glucose 125 (H) 65 - 100 mg/dL    BUN 21 6 - 23 MG/DL    Creatinine 0.97 0.8 - 1.5 MG/DL    GFR est AA >60 >60 ml/min/1.73m2    GFR est non-AA >60 >60 ml/min/1.73m2    Calcium 7.4 (L) 8.3 - 10.4 MG/DL   MAGNESIUM    Collection Time: 04/12/18  1:08 PM   Result Value Ref Range    Magnesium 2.9 (H) 1.8 - 2.4 mg/dL   CBC W/O DIFF    Collection Time: 04/12/18  1:08 PM   Result Value Ref Range    WBC 25.5 (H) 4.3 - 11.1 K/uL    RBC 4.55 4.23 - 5.67 M/uL    HGB 13.7 13.6 - 17.2 g/dL    HCT 40.9 (L) 41.1 - 50.3 %    MCV 89.9 79.6 - 97.8 FL    MCH 30.1 26.1 - 32.9 PG    MCHC 33.5 31.4 - 35.0 g/dL    RDW 13.4 11.9 - 14.6 %    PLATELET 962 (L) 639 - 450 K/uL    MPV 9.9 (L) 10.8 - 14.1 FL   PTT    Collection Time: 04/12/18  1:08 PM   Result Value Ref Range    aPTT 45.8 (H) 23.2 - 35.3 SEC   PROTHROMBIN TIME + INR    Collection Time: 04/12/18  1:08 PM   Result Value Ref Range    Prothrombin time 19.2 (H) 11.5 - 14.5 sec    INR 1.7     FIBRINOGEN    Collection Time: 04/12/18  1:08 PM   Result Value Ref Range    Fibrinogen 177 (L) 190 - 501 mg/dL   HGB & HCT    Collection Time: 04/12/18  1:35 PM   Result Value Ref Range    HGB 11.5 (L) 13.6 - 17.2 g/dL    HCT 34.7 (L) 41.1 - 50.3 %   CRYOPRECIPITATE, ALLOCATE    Collection Time: 04/12/18  1:45 PM   Result Value Ref Range    Unit number [id number]     Blood component type CRYO,5U Thaw     Unit division 00     Status of unit TRANSFUSED     Unit number [id number]     Blood component type CRYO,5U Thaw     Unit division 00     Status of unit TRANSFUSED    BLOOD GAS, ARTERIAL    Collection Time: 04/12/18  2:10 PM   Result Value Ref Range    pH 7.28 (L) 7.35 - 7.45      PCO2 46 (H) 35 - 45 mmHg    PO2 128 (H) 80 - 105 mmHg    BICARBONATE 21 (L) 22 - 26 mmol/L    BASE DEFICIT 5.2 (H) 0 - 2 mmol/L    TOTAL HEMOGLOBIN 11.5 (L) 11.7 - 15.0 GM/DL    O2 SAT 98 92 - 98.5 %    Arterial O2 Hgb 96.4 94 - 97 %    CARBOXYHEMOGLOBIN 0.7 0.5 - 1.5 %    METHEMOGLOBIN 0.5 0.0 - 1.5 %    DEOXYHEMOGLOBIN 2 0.0 - 5.0 %    SITE JOHN     ALLENS TEST NA     MODE SIMV PRVC     Tidal volume 550.0      RATE 16.0      PEEP/CPAP 8.0     GLUCOSE, POC    Collection Time: 04/12/18  2:14 PM   Result Value Ref Range    Glucose (POC) 113 (H) 65 - 100 mg/dL   FFP, ALLOCATE    Collection Time: 04/12/18  2:15 PM   Result Value Ref Range    Unit number [id number]     Blood component type FP 24h, Thaw     Unit division 00     Status of unit TRANSFUSED     Unit number [id number]     Blood component type FP 24h, Thaw     Unit division 00     Status of unit TRANSFUSED    CBC W/O DIFF    Collection Time: 04/12/18  2:45 PM   Result Value Ref Range    WBC 22.7 (H) 4.3 - 11.1 K/uL    RBC 3.43 (L) 4.23 - 5.67 M/uL    HGB 10.2 (L) 13.6 - 17.2 g/dL    HCT 30.9 (L) 41.1 - 50.3 %    MCV 90.1 79.6 - 97.8 FL    MCH 29.7 26.1 - 32.9 PG    MCHC 33.0 31.4 - 35.0 g/dL    RDW 13.6 11.9 - 14.6 %    PLATELET 046 (L) 971 - 450 K/uL    MPV 9.8 (L) 10.8 - 14.1 FL   GLUCOSE, POC    Collection Time: 04/12/18  2:45 PM   Result Value Ref Range    Glucose (POC) 129 (H) 65 - 100 mg/dL   POC CG8I    Collection Time: 04/12/18  4:01 PM   Result Value Ref Range    pH (POC) 7.210 (L) 7.35 - 7.45      pCO2 (POC) 63.9 (H) 35 - 45 MMHG    pO2 (POC) 352 (H) 80 - 105 MMHG    HCO3 (POC) 25.6 22 - 26 MMOL/L    sO2 (POC) 100 (H) 95 - 98 %    Base deficit (POC) 2 mmol/L    Sodium,  136 - 145 MMOL/L    Potassium, POC 5.0 3.5 - 5.1 MMOL/L    Glucose,  (H) 65 - 100 MG/DL    Calcium, ionized (POC) 1.08 (L) 1.12 - 1.32 mmol/L   BLOOD GAS & LYTES, ARTERIAL    Collection Time: 04/12/18  5:12 PM   Result Value Ref Range    pH 7.30 (L) 7.35 - 7.45      PCO2 44 35 - 45 mmHg    PO2 289 (H) 80 - 105 mmHg    BICARBONATE 21 (L) 22 - 26 mmol/L    BASE DEFICIT 5.1 (H) 0 - 2 mmol/L    TOTAL HEMOGLOBIN 9.3 (L) 11.7 - 15.0 GM/DL    O2 SAT 99 (H) 92 - 98.5 %    Arterial O2 Hgb 97.7 (H) 94 - 97 %    CARBOXYHEMOGLOBIN 0.3 (L) 0.5 - 1.5 %    METHEMOGLOBIN 0.7 0.0 - 1.5 %    DEOXYHEMOGLOBIN 1 0.0 - 5.0 %    Sodium 140.4 135 - 148 MMOL/L    POTASSIUM 4.57 3.5 - 5.3 MMOL/L    CHLORIDE 115 (H) 98 - 106      Calcium, ionized 1.03 1.0 - 1.3 mmol/L    SITE JOHN     ALLENS TEST NA     MODE SIMV PRVC     Tidal volume 550.0      RATE 16.0      PEEP/CPAP 8.0     GLUCOSE, POC    Collection Time: 04/12/18  5:16 PM   Result Value Ref Range    Glucose (POC) 181 (H) 65 - 100 mg/dL   CBC W/O DIFF    Collection Time: 04/12/18  5:20 PM   Result Value Ref Range    WBC 13.1 (H) 4.3 - 11.1 K/uL    RBC 2.83 (L) 4.23 - 5.67 M/uL    HGB 8.5 (L) 13.6 - 17.2 g/dL    HCT 25.7 (L) 41.1 - 50.3 %    MCV 90.8 79.6 - 97.8 FL    MCH 30.0 26.1 - 32.9 PG    MCHC 33.1 31.4 - 35.0 g/dL    RDW 13.6 11.9 - 14.6 %    PLATELET 76 (L) 274 - 450 K/uL    MPV 9.6 (L) 10.8 - 72.5 FL   METABOLIC PANEL, BASIC    Collection Time: 04/12/18  5:20 PM   Result Value Ref Range    Sodium 148 (H) 136 - 145 mmol/L    Potassium 4.7 3.5 - 5.1 mmol/L    Chloride 118 (H) 98 - 107 mmol/L    CO2 25 21 - 32 mmol/L    Anion gap 5 (L) 7 - 16 mmol/L    Glucose 169 (H) 65 - 100 mg/dL    BUN 18 6 - 23 MG/DL    Creatinine 0.81 0.8 - 1.5 MG/DL    GFR est AA >60 >60 ml/min/1.73m2    GFR est non-AA >60 >60 ml/min/1.73m2    Calcium 6.8 (L) 8.3 - 10.4 MG/DL   MAGNESIUM    Collection Time: 04/12/18  5:20 PM   Result Value Ref Range    Magnesium 2.1 1.8 - 2.4 mg/dL   PTT    Collection Time: 04/12/18  5:20 PM   Result Value Ref Range    aPTT 35.3 23.2 - 35.3 SEC   FIBRINOGEN    Collection Time: 04/12/18  5:20 PM   Result Value Ref Range    Fibrinogen 230 190 - 501 mg/dL   PROTHROMBIN TIME + INR    Collection Time: 04/12/18  5:20 PM   Result Value Ref Range    Prothrombin time 17.6 (H) 11.5 - 14.5 sec    INR 1.5     GLUCOSE, POC    Collection Time: 04/12/18  6:19 PM Result Value Ref Range    Glucose (POC) 173 (H) 65 - 100 mg/dL   GLUCOSE, POC    Collection Time: 04/12/18  7:17 PM   Result Value Ref Range    Glucose (POC) 163 (H) 65 - 100 mg/dL   GLUCOSE, POC    Collection Time: 04/12/18  8:05 PM   Result Value Ref Range    Glucose (POC) 158 (H) 65 - 100 mg/dL   BLOOD GAS, ARTERIAL    Collection Time: 04/12/18  8:45 PM   Result Value Ref Range    pH 7.34 (L) 7.35 - 7.45      PCO2 43 35 - 45 mmHg    PO2 100 80 - 105 mmHg    BICARBONATE 23 22 - 26 mmol/L    BASE DEFICIT 2.9 (H) 0 - 2 mmol/L    TOTAL HEMOGLOBIN 9.0 (L) 11.7 - 15.0 GM/DL    O2 SAT 97 92 - 98.5 %    Arterial O2 Hgb 95.7 94 - 97 %    CARBOXYHEMOGLOBIN 0.2 (L) 0.5 - 1.5 %    METHEMOGLOBIN 0.7 0.0 - 1.5 %    DEOXYHEMOGLOBIN 3 0.0 - 5.0 %    SITE JOHN     ALLENS TEST NA     MODE PSV 8     PEEP/CPAP 8.0      Respiratory comment: Lula ALBA at 4 12 2018 8 50 42 PM. Read back.     GLUCOSE, POC    Collection Time: 04/12/18  9:03 PM   Result Value Ref Range    Glucose (POC) 160 (H) 65 - 100 mg/dL   MAGNESIUM    Collection Time: 04/12/18  9:04 PM   Result Value Ref Range    Magnesium 2.1 1.8 - 2.4 mg/dL   POTASSIUM    Collection Time: 04/12/18  9:04 PM   Result Value Ref Range    Potassium 4.8 3.5 - 5.1 mmol/L   HGB & HCT    Collection Time: 04/12/18  9:04 PM   Result Value Ref Range    HGB 8.3 (L) 13.6 - 17.2 g/dL    HCT 25.1 (L) 41.1 - 50.3 %   GLUCOSE, POC    Collection Time: 04/12/18 10:16 PM   Result Value Ref Range    Glucose (POC) 148 (H) 65 - 100 mg/dL   GLUCOSE, POC    Collection Time: 04/12/18 11:06 PM   Result Value Ref Range    Glucose (POC) 133 (H) 65 - 100 mg/dL   GLUCOSE, POC    Collection Time: 04/12/18 11:58 PM   Result Value Ref Range    Glucose (POC) 120 (H) 65 - 100 mg/dL   GLUCOSE, POC    Collection Time: 04/13/18  1:27 AM   Result Value Ref Range    Glucose (POC) 108 (H) 65 - 100 mg/dL   GLUCOSE, POC    Collection Time: 04/13/18  2:30 AM   Result Value Ref Range    Glucose (POC) 97 65 - 100 mg/dL MAGNESIUM    Collection Time: 04/13/18  3:14 AM   Result Value Ref Range    Magnesium 2.1 1.8 - 2.4 mg/dL   METABOLIC PANEL, BASIC    Collection Time: 04/13/18  3:14 AM   Result Value Ref Range    Sodium 148 (H) 136 - 145 mmol/L    Potassium 4.1 3.5 - 5.1 mmol/L    Chloride 117 (H) 98 - 107 mmol/L    CO2 26 21 - 32 mmol/L    Anion gap 5 (L) 7 - 16 mmol/L    Glucose 121 (H) 65 - 100 mg/dL    BUN 19 6 - 23 MG/DL    Creatinine 0.72 (L) 0.8 - 1.5 MG/DL    GFR est AA >60 >60 ml/min/1.73m2    GFR est non-AA >60 >60 ml/min/1.73m2    Calcium 7.1 (L) 8.3 - 10.4 MG/DL   CBC W/O DIFF    Collection Time: 04/13/18  3:14 AM   Result Value Ref Range    WBC 10.7 4.3 - 11.1 K/uL    RBC 2.58 (L) 4.23 - 5.67 M/uL    HGB 7.7 (L) 13.6 - 17.2 g/dL    HCT 23.4 (L) 41.1 - 50.3 %    MCV 90.7 79.6 - 97.8 FL    MCH 29.8 26.1 - 32.9 PG    MCHC 32.9 31.4 - 35.0 g/dL    RDW 13.9 11.9 - 14.6 %    PLATELET 81 (L) 430 - 450 K/uL    MPV 10.0 (L) 10.8 - 14.1 FL   GLUCOSE, POC    Collection Time: 04/13/18  4:16 AM   Result Value Ref Range    Glucose (POC) 132 (H) 65 - 100 mg/dL   GLUCOSE, POC    Collection Time: 04/13/18  5:33 AM   Result Value Ref Range    Glucose (POC) 114 (H) 65 - 100 mg/dL   EKG, 12 LEAD, SUBSEQUENT    Collection Time: 04/13/18  6:30 AM   Result Value Ref Range    Ventricular Rate 95 BPM    Atrial Rate 95 BPM    P-R Interval 204 ms    QRS Duration 102 ms    Q-T Interval 286 ms    QTC Calculation (Bezet) 359 ms    Calculated P Axis 62 degrees    Calculated R Axis 52 degrees    Calculated T Axis 81 degrees    Diagnosis       Normal sinus rhythm  Voltage criteria for left ventricular hypertrophy  Nonspecific ST and T wave abnormality  Abnormal ECG  When compared with ECG of 12-APR-2018 13:04,  Significant changes have occurred  Confirmed by Beau Spence MD (), GHADA KIRBY ([id number]) on 4/13/2018 8:02:11 AM         Assessment:     Principal Problem:    Nonrheumatic aortic valve stenosis (2/7/2018)    Active Problems:    Tobacco use disorder ()      Status post aortic valve replacement (4/12/2018)      Respiratory failure, post-operative ([address] (4/12/2018)      Hypoxia (4/12/2018)        Plan/Recommendations/Medical Decision Making:     Discontinue:  chest tubes    See orders    Recheck Hct, transfuse as needed, to floor, protocol

## 2018-04-13 NOTE — OP NOTES
[facility] REPORT    James Che  MR#: [id number]  : 1960  ACCOUNT #: [de-identified]   DATE OF SERVICE: 2018    PREOPERATIVE DIAGNOSIS:  Postoperative bleed. POSTOPERATIVE DIAGNOSIS:  Postoperative bleed. PROCEDURE PERFORMED:  Cardiac reentry with evacuation of clot. SURGEON: Soila Chairez MD    ASSISTANT:      ANESTHESIA:  General endotracheal.     ESTIMATED BLOOD LOSS:      SPECIMENS REMOVED:     IMPLANTS:      INDICATION:  The patient is status post AVR early this morning where he is about an hour postoperatively noted to have increased chest tube output. This continued despite correction of his coagulopathy with dropping hematocrit. He was taken back to the operating room for reexploration. DESCRIPTION:  The patient was taken back to the operating room and placed in supine position. General anesthesia was then induced. He was prepped and draped in usual sterile fashion. The previous incision was then entered without difficulty. Sternal wires identified, clipped and removed. A Jurado retractor was then placed. He was noted to have a small to moderate amount of clot in his mediastinum. It was thoroughly irrigated with antibiotic saline. He was noted to have 3 thymic fat pad oozing vessels which were cauterized. There was also noted to be significant amount of sternal marrow bleeding at the superior portion. This was treated with bone wax. There was a small amount of oozing at the vent site. This was taken care of with a 4-0 Prolene. We drove his pressure up to 301 systolic and there were 2 oozing sites along his aortotomy. These were taken care of with pledgeted 4-0 Prolene. Again, the pericardium was copiously irrigated with warm antibiotic saline. There was no further bleeding noted. Chest tube was replaced.   The sternum was reapproximated with stainless-steel wire, fascia with #1 PDS, subcuticular with 3-0 Vicryl, skin was closed with 4-0 Vicryl subcuticular. COMPLICATIONS:  None. All instrument and sponge counts were correct at the end of the case.       MD EVELIN Tellez / Lauren  D: 04/12/2018 17:00     T: 04/12/2018 20:04  JOB #: [id number]

## 2018-04-13 NOTE — PROGRESS NOTES
Critical Care Daily Progress Note: 4/13/2018    JoA-nn Campbell   Admission Date: 4/12/2018         The patient's chart is reviewed and the patient is discussed with the staff.        Subjective:     Up in a chair  Complains of some abdominal pain more then CP    Current Facility-Administered Medications   Medication Dose Route Frequency    0.9% sodium chloride infusion  25 mL/hr IntraVENous CONTINUOUS    dextrose 5% - 0.45% NaCl with KCl 20 mEq/L infusion  25 mL/hr IntraVENous CONTINUOUS    sodium chloride (NS) flush 5-10 mL  5-10 mL IntraVENous Q8H    sodium chloride (NS) flush 5-10 mL  5-10 mL IntraVENous PRN    oxyCODONE-acetaminophen (PERCOCET) 5-325 mg per tablet 1 Tab  1 Tab Oral Q4H PRN    morphine injection 3-5 mg  3-5 mg IntraVENous Q1H PRN    naloxone (NARCAN) injection 0.4 mg  0.4 mg IntraVENous PRN    mupirocin (BACTROBAN) 2 % ointment   Both Nostrils BID    sodium bicarbonate 8.4 % (1 mEq/mL) injection 50 mEq  50 mEq IntraVENous PRN    EPINEPHrine (ADRENALIN) 4 mg in 0.9% sodium chloride 250 mL infusion  0.05-0.1 mcg/kg/min IntraVENous TITRATE    nitroglycerin (Tridil) 200 mcg/ml infusion   mcg/min IntraVENous TITRATE    PHENYLephrine (ALHAJI-SYNEPHRINE) 30 mg in 0.9% sodium chloride 250 mL infusion   mcg/min IntraVENous TITRATE    lidocaine (PF) (XYLOCAINE) 100 mg/5 mL (2 %) injection syringe  mg   mg IntraVENous ONCE PRN    metoprolol tartrate (LOPRESSOR) tablet 25 mg  25 mg Oral Q12H    atorvastatin (LIPITOR) tablet 80 mg  80 mg Oral QHS    insulin regular (NOVOLIN R, HUMULIN R) 100 Units in 0.9% sodium chloride 100 mL infusion  1 Units/hr IntraVENous TITRATE    dextrose (D50W) injection syrg 12.5 g  25 mL IntraVENous PRN    aspirin chewable tablet 81 mg  81 mg Oral DAILY    magnesium sulfate 1 g/100 ml IVPB (premix or compounded)  1 g IntraVENous PRN    potassium chloride 10 mEq in 50 ml IVPB  10 mEq IntraVENous PRN    midazolam (VERSED) injection 1 mg  1 mg IntraVENous Q1H PRN    propofol (DIPRIVAN) infusion  0-50 mcg/kg/min IntraVENous TITRATE    meperidine (DEMEROL) injection 25 mg  25 mg IntraVENous Q1H PRN    chlorhexidine (PERIDEX) 0.12 % mouthwash 10 mL  10 mL Oral BID    tuberculin injection 5 Units  5 Units IntraDERMal ONCE    amiodarone (CORDARONE) 450 mg in dextrose 5% 250 mL infusion  1 mg/min IntraVENous TITRATE    amiodarone (CORDARONE) 450 mg in dextrose 5% 250 mL infusion  0.5-1 mg/min IntraVENous TITRATE    0.9% sodium chloride infusion 250 mL  250 mL IntraVENous PRN    0.9% sodium chloride infusion 250 mL  250 mL IntraVENous PRN    NOREPINephrine (LEVOPHED) 4 mg/250 mL (16 mcg/mL) in NS infusion  0.05-0.2 mcg/kg/min IntraVENous TITRATE    amiodarone (CORDARONE) tablet 200 mg  200 mg Oral Q12H    famotidine (PF) (PEPCID) 20 mg in sodium chloride 0.9% 10 mL injection  20 mg IntraVENous Q12H       Review of Systems    Constitutional:  negative for fever, chills, sweats  Cardiovascular:  negative for chest pain, palpitations, syncope, edema  Gastrointestinal:  negative for dysphagia, reflux, vomiting, diarrhea, abdominal pain, or melena  Neurologic:  negative for focal weakness, numbness, headache      Objective:     Vitals:    04/13/18 0517 04/13/18 0548 04/13/18 0602 04/13/18 0616   BP: 102/57 98/52 93/50 94/54   Pulse: 93 94 90 89   Resp:  26 15 20   Temp:       SpO2: 99% 98% 99% 98%   Weight:       Height:           Intake and Output:   04/11 0701 - 04/12 1900  In: 5086 [I.V.:3400]  Out: 3305 [Urine:2190]  04/12 1901 - 04/13 0700  In: 360 [P.O.:360]  Out: 1410 [Urine:1120]    Physical Exam:          Constitutional:  the patient is well developed and in no acute distress  EENMT:  Sclera clear, pupils equal, oral mucosa moist  Respiratory: clear  Cardiovascular:  RRR without M,G,R  Gastrointestinal: soft and non-tender; with positive bowel sounds. Musculoskeletal: warm without cyanosis. There is no lower leg edema.   Skin: no jaundice or rashes, sternal  Wound-  Neurologic: no gross neuro deficits     Psychiatric:  alert and oriented x 3    LINES:  RIJ central line, [place], CT    DRIPS:   None     CXR:       LAB  Recent Labs      04/13/18   0533  04/13/18   0416  04/13/18   0230  04/13/18   0127  04/12/18   2358   GLUCPOC  114*  132*  97  108*  120*      Recent Labs      04/13/18   0314  04/12/18   2104  04/12/18   1720  04/12/18   1445   04/12/18   1308   WBC  10.7   --   13.1*  22.7*   --   25.5*   HGB  7.7*  8.3*  8.5*  10.2*   < >  13.7   HCT  23.4*  25.1*  25.7*  30.9*   < >  40.9*   PLT  81*   --   76*  117*   --   130*   INR   --    --   1.5   --    --   1.7    < > = values in this interval not displayed. Recent Labs      04/13/18   0314  04/12/18   2104  04/12/18   1720  04/12/18   1308   NA  148*   --   148*  146*   K  4.1  4.8  4.7  4.3   CL  117*   --   118*  114*   CO2  26   --   25  27   GLU  121*   --   169*  125*   BUN  19   --   18  21   CREA  0.72*   --   0.81  0.97   MG  2.1  2.1  2.1  2.9*   CA  7.1*   --   6.8*  7.4*     Recent Labs      04/12/18   2045  04/12/18   1712  04/12/18   1410   PH  7.34*  7.30*  7.28*   PCO2  43  44  46*   PO2  100  289*  128*   HCO3  23  21*  21*         Assessment:  (Medical Decision Making)     Hospital Problems  Date Reviewed: 4/12/2018          Codes Class Noted POA    Status post aortic valve replacement ICD-10-CM: Z95.2  ICD-9-CM: V43.3  4/12/2018 No        Respiratory failure, post-operative ([address] extubated  ICD-10-CM: X20.014  ICD-9-CM: 518.51  4/12/2018 No        Hypoxia ICD-10-CM: R09.02  ICD-9-CM: 799.02  4/12/2018 No        * (Principal)Nonrheumatic aortic valve stenosis ICD-10-CM: I35.0  ICD-9-CM: 424.1  2/7/2018 Yes        Tobacco use disorder (Chronic) ICD-10-CM: F17.200  ICD-9-CM: 305.1  Unknown Yes              Plan:  (Medical Decision Making)     --  Will get abdominal films right now  , ?  CXR with free air , he is having abdominal pain as well,     More than 50% of the time documented was spent in face-to-face contact with the patient and in the care of the patient on the floor/unit where the patient is located.     Giuliano Melchor MD

## 2018-04-14 ENCOUNTER — HOME HEALTH ADMISSION (OUTPATIENT)
Dept: HOME HEALTH SERVICES | Facility: HOME HEALTH | Age: 58
End: 2018-04-14

## 2018-04-14 LAB
ANION GAP SERPL CALC-SCNC: 5 MMOL/L (ref 7–16)
BUN SERPL-MCNC: 26 MG/DL (ref 6–23)
CALCIUM SERPL-MCNC: 8.1 MG/DL (ref 8.3–10.4)
CHLORIDE SERPL-SCNC: 108 MMOL/L (ref 98–107)
CO2 SERPL-SCNC: 29 MMOL/L (ref 21–32)
CREAT SERPL-MCNC: 0.79 MG/DL (ref 0.8–1.5)
ERYTHROCYTE [DISTWIDTH] IN BLOOD BY AUTOMATED COUNT: 14 % (ref 11.9–14.6)
GLUCOSE BLD STRIP.AUTO-MCNC: 119 MG/DL (ref 65–100)
GLUCOSE BLD STRIP.AUTO-MCNC: 142 MG/DL (ref 65–100)
GLUCOSE BLD STRIP.AUTO-MCNC: 168 MG/DL (ref 65–100)
GLUCOSE BLD STRIP.AUTO-MCNC: 173 MG/DL (ref 65–100)
GLUCOSE SERPL-MCNC: 139 MG/DL (ref 65–100)
HCT VFR BLD AUTO: 22.7 % (ref 41.1–50.3)
HCT VFR BLD AUTO: 23.7 % (ref 41.1–50.3)
HGB BLD-MCNC: 7.6 G/DL (ref 13.6–17.2)
HGB BLD-MCNC: 7.9 G/DL (ref 13.6–17.2)
MAGNESIUM SERPL-MCNC: 2.2 MG/DL (ref 1.8–2.4)
MCH RBC QN AUTO: 30 PG (ref 26.1–32.9)
MCHC RBC AUTO-ENTMCNC: 33.5 G/DL (ref 31.4–35)
MCV RBC AUTO: 89.7 FL (ref 79.6–97.8)
MM INDURATION POC: 0 MM (ref 0–5)
PLATELET # BLD AUTO: 85 K/UL (ref 150–450)
PMV BLD AUTO: 10.6 FL (ref 10.8–14.1)
POTASSIUM SERPL-SCNC: 4.1 MMOL/L (ref 3.5–5.1)
PPD POC: NORMAL NEGATIVE
RBC # BLD AUTO: 2.53 M/UL (ref 4.23–5.67)
SODIUM SERPL-SCNC: 142 MMOL/L (ref 136–145)
WBC # BLD AUTO: 19.1 K/UL (ref 4.3–11.1)

## 2018-04-14 PROCEDURE — 65660000004 HC RM CVT STEPDOWN

## 2018-04-14 PROCEDURE — P9016 RBC LEUKOCYTES REDUCED: HCPCS | Performed by: THORACIC SURGERY (CARDIOTHORACIC VASCULAR SURGERY)

## 2018-04-14 PROCEDURE — 36430 TRANSFUSION BLD/BLD COMPNT: CPT

## 2018-04-14 PROCEDURE — 83735 ASSAY OF MAGNESIUM: CPT | Performed by: THORACIC SURGERY (CARDIOTHORACIC VASCULAR SURGERY)

## 2018-04-14 PROCEDURE — 30233N1 TRANSFUSION OF NONAUTOLOGOUS RED BLOOD CELLS INTO PERIPHERAL VEIN, PERCUTANEOUS APPROACH: ICD-10-PCS | Performed by: THORACIC SURGERY (CARDIOTHORACIC VASCULAR SURGERY)

## 2018-04-14 PROCEDURE — 74011250637 HC RX REV CODE- 250/637: Performed by: NURSE PRACTITIONER

## 2018-04-14 PROCEDURE — 97110 THERAPEUTIC EXERCISES: CPT

## 2018-04-14 PROCEDURE — 80048 BASIC METABOLIC PNL TOTAL CA: CPT | Performed by: THORACIC SURGERY (CARDIOTHORACIC VASCULAR SURGERY)

## 2018-04-14 PROCEDURE — 85027 COMPLETE CBC AUTOMATED: CPT | Performed by: THORACIC SURGERY (CARDIOTHORACIC VASCULAR SURGERY)

## 2018-04-14 PROCEDURE — 74011636637 HC RX REV CODE- 636/637: Performed by: THORACIC SURGERY (CARDIOTHORACIC VASCULAR SURGERY)

## 2018-04-14 PROCEDURE — 36415 COLL VENOUS BLD VENIPUNCTURE: CPT | Performed by: THORACIC SURGERY (CARDIOTHORACIC VASCULAR SURGERY)

## 2018-04-14 PROCEDURE — 85018 HEMOGLOBIN: CPT | Performed by: NURSE PRACTITIONER

## 2018-04-14 PROCEDURE — 99232 SBSQ HOSP IP/OBS MODERATE 35: CPT | Performed by: INTERNAL MEDICINE

## 2018-04-14 PROCEDURE — 82962 GLUCOSE BLOOD TEST: CPT

## 2018-04-14 PROCEDURE — 74011250637 HC RX REV CODE- 250/637: Performed by: THORACIC SURGERY (CARDIOTHORACIC VASCULAR SURGERY)

## 2018-04-14 PROCEDURE — 74011250636 HC RX REV CODE- 250/636: Performed by: NURSE PRACTITIONER

## 2018-04-14 PROCEDURE — 97530 THERAPEUTIC ACTIVITIES: CPT

## 2018-04-14 RX ORDER — AMOXICILLIN 250 MG
2 CAPSULE ORAL
Status: DISCONTINUED | OUTPATIENT
Start: 2018-04-14 | End: 2018-04-17 | Stop reason: HOSPADM

## 2018-04-14 RX ORDER — SODIUM CHLORIDE 9 MG/ML
250 INJECTION, SOLUTION INTRAVENOUS AS NEEDED
Status: DISCONTINUED | OUTPATIENT
Start: 2018-04-14 | End: 2018-04-17 | Stop reason: HOSPADM

## 2018-04-14 RX ORDER — FUROSEMIDE 10 MG/ML
20 INJECTION INTRAMUSCULAR; INTRAVENOUS ONCE
Status: COMPLETED | OUTPATIENT
Start: 2018-04-14 | End: 2018-04-14

## 2018-04-14 RX ADMIN — Medication 10 ML: at 21:03

## 2018-04-14 RX ADMIN — OXYCODONE HYDROCHLORIDE AND ACETAMINOPHEN 1 TABLET: 5; 325 TABLET ORAL at 17:26

## 2018-04-14 RX ADMIN — AMIODARONE HYDROCHLORIDE 200 MG: 200 TABLET ORAL at 20:55

## 2018-04-14 RX ADMIN — MUPIROCIN: 20 OINTMENT TOPICAL at 09:20

## 2018-04-14 RX ADMIN — INSULIN HUMAN 5 UNITS: 100 INJECTION, SOLUTION PARENTERAL at 09:19

## 2018-04-14 RX ADMIN — ACETAMINOPHEN 650 MG: 325 TABLET ORAL at 20:54

## 2018-04-14 RX ADMIN — Medication 10 ML: at 16:18

## 2018-04-14 RX ADMIN — Medication 10 ML: at 13:38

## 2018-04-14 RX ADMIN — DOCUSATE SODIUM 100 MG: 100 CAPSULE, LIQUID FILLED ORAL at 09:18

## 2018-04-14 RX ADMIN — FAMOTIDINE 20 MG: 20 TABLET ORAL at 20:55

## 2018-04-14 RX ADMIN — FUROSEMIDE 20 MG: 10 INJECTION, SOLUTION INTRAMUSCULAR; INTRAVENOUS at 16:15

## 2018-04-14 RX ADMIN — FERROUS SULFATE TAB 325 MG (65 MG ELEMENTAL FE) 325 MG: 325 (65 FE) TAB at 09:18

## 2018-04-14 RX ADMIN — INSULIN HUMAN 5 UNITS: 100 INJECTION, SOLUTION PARENTERAL at 12:58

## 2018-04-14 RX ADMIN — Medication 10 ML: at 05:40

## 2018-04-14 RX ADMIN — TAPENTADOL HYDROCHLORIDE 100 MG: 50 TABLET, FILM COATED ORAL at 20:56

## 2018-04-14 RX ADMIN — OXYCODONE HYDROCHLORIDE AND ACETAMINOPHEN 1 TABLET: 5; 325 TABLET ORAL at 05:39

## 2018-04-14 RX ADMIN — STANDARDIZED SENNA CONCENTRATE AND DOCUSATE SODIUM 2 TABLET: 8.6; 5 TABLET, FILM COATED ORAL at 20:54

## 2018-04-14 RX ADMIN — FUROSEMIDE 40 MG: 40 TABLET ORAL at 09:18

## 2018-04-14 RX ADMIN — METOPROLOL TARTRATE 25 MG: 25 TABLET ORAL at 09:19

## 2018-04-14 RX ADMIN — ATORVASTATIN CALCIUM 80 MG: 40 TABLET, FILM COATED ORAL at 20:54

## 2018-04-14 RX ADMIN — METOPROLOL TARTRATE 25 MG: 25 TABLET ORAL at 20:54

## 2018-04-14 RX ADMIN — FAMOTIDINE 20 MG: 20 TABLET ORAL at 09:18

## 2018-04-14 RX ADMIN — POTASSIUM CHLORIDE 10 MEQ: 10 TABLET, EXTENDED RELEASE ORAL at 09:18

## 2018-04-14 RX ADMIN — OXYCODONE HYDROCHLORIDE AND ACETAMINOPHEN 1 TABLET: 5; 325 TABLET ORAL at 09:21

## 2018-04-14 RX ADMIN — AMIODARONE HYDROCHLORIDE 200 MG: 200 TABLET ORAL at 09:18

## 2018-04-14 RX ADMIN — MUPIROCIN: 20 OINTMENT TOPICAL at 21:04

## 2018-04-14 RX ADMIN — OXYCODONE HYDROCHLORIDE AND ACETAMINOPHEN 1 TABLET: 5; 325 TABLET ORAL at 12:56

## 2018-04-14 RX ADMIN — TAPENTADOL HYDROCHLORIDE 100 MG: 50 TABLET, FILM COATED ORAL at 00:53

## 2018-04-14 NOTE — PROGRESS NOTES
Call received from telemetry stating patient occasionally \"dropping a beat\". [id number] [facility]. No new orders received at this time.

## 2018-04-14 NOTE — PROGRESS NOTES
HARLAN assessed pt s/p AVR on 4/12. He and his wife live with his daughter and FRANCES in a one level home with two steps at entrance. He is ADL-independent, doesn't use anything for ambulation, and doesn't have or need any DMEs at this time. He confirmed his PCP and insurance as listed and doesn't have any trouble getting medications. STR vs HH was discussed and he prefers [facility] and confirmed that someone could be with him for the first week 24/7. He also wanted to discuss SSDI and the requirements. SW explained that this procedure was done so that he could get back to his prior level of functioning. He currently works at [employer] as a  and does other jobs there. He wife works at [employer] but is currently out on short-term disability through them. Short-term and long-term disability through his employer were discussed. HARLAN explained that his present condition would typically fall under short-term disability and that Dr. Stoddard Nurse' office can complete the paperwork for him. He also mentioned that he will have a hernia repair soon and wanted to know if that would qualify for SSDI. HARLAN advised that more than likely not. He also inquired at [employer] (SNAP). His income from work is approximately $1,600 and his wife's is $1,500 and he is unsure of his daughter's and FRANCES's.  DSS (for SNAP) might just use his and his wife's income and with them being on employer-sponsored STD, they might qualify (unsure of the amount they will be getting during this time of STD). DSS threshold is 130% of the FPL. For a two person household, the FPL is $16,460 annually or $1371.67 monthly. HARLAN notified him of this and how he can apply on the website at https://scmapp.sc.gov/ or in person at the office. He understood. HARLAN notified Maryellen Lizz with [facility] of the referral.    Care Management Interventions  PCP Verified by CM: Yes  Last Visit to PCP: 02/14/18  Mode of Transport at Discharge:  Other (see comment) (Family)  Transition of Care Consult (CM Consult): [address]: Yes  Physical Therapy Consult: Yes  Current Support Network: Relative's Home  Confirm Follow Up Transport: Family  Plan discussed with Pt/Family/Caregiver: Yes  Freedom of Choice Offered: Yes  Discharge Location  Discharge Placement: Home with home health [facility])

## 2018-04-14 NOTE — PROGRESS NOTES
[facility] CARDIOLOGY PROGRESS NOTE           4/14/2018 12:18 PM    Admit Date: 4/12/2018         Subjective: s/p AVR with atrial arrhythmias. Doing well now, had an episode of ? heart block this AM.  Now Stable eating breakfast    ROS:  Cardiovascular:  As noted above    Objective:      Vitals:    04/14/18 1120 04/14/18 1126 04/14/18 1131 04/14/18 1136   BP: (!) 88/54 90/56 91/56 96/53   Pulse: 89 84 90 84   Resp: 16 16 16 16   Temp: 97.8 °F (36.6 °C) 98.4 °F (36.9 °C) 97.6 °F (36.4 °C) 97.8 °F (36.6 °C)   SpO2: 96% 97% 100%    Weight:       Height:             Physical Exam:  General: Well Developed, Well Nourished, No Acute Distress, Alert & Oriented x 3, Appropriate mood  Neck: supple, no JVD  Heart: S1S2 with RRR without murmurs or gallops  Lungs: Clear throughout auscultation bilaterally without adventitious sounds  Abd: soft, nontender, nondistended, with good bowel sounds  Ext: no edema bilaterally  Skin: warm and dry      Data Review:   Recent Labs      04/14/18   0419  04/13/18   0314   04/12/18   1720   04/12/18   1308   NA  142  148*   --   148*   --   146*   K  4.1  4.1   < >  4.7   --   4.3   MG  2.2  2.1   < >  2.1   --   2.9*   BUN  26*  19   --   18   --   21   CREA  0.79*  0.72*   --   0.81   --   0.97   GLU  139*  121*   --   169*   --   125*   WBC  19.1*  10.7   --   13.1*   < >  25.5*   HGB  7.6*  7.7*   < >  8.5*   < >  13.7   HCT  22.7*  23.4*   < >  25.7*   < >  40.9*   PLT  85*  81*   --   76*   < >  130*   INR   --    --    --   1.5   --   1.7    < > = values in this interval not displayed. No results for input(s): Christopher Huntleys in the last 72 hours. Assessment/Plan:     Principal Problem:    Nonrheumatic aortic valve stenosis (2/7/2018)      Overview: 4/12/18 (Dr Abelino Duke) Aortic valve replacement with a 25 mm Magna Ease       Cherelle-Prince pericardial valve      4/12/18 (Dr Abelino Duke) Cardiac reentry with evacuation of clot.     Active Problems: Tobacco use disorder ()      Status post aortic valve replacement (4/12/2018)      Respiratory failure, post-operative (HCC) (4/12/2018)      Hypoxia (4/12/2018)      Thrombocytopenia due to extra corporeal by-pass circulation (4/13/2018)      Postoperative anemia due to acute blood loss (4/13/2018)    1) AVR - stable post op  2) Atrial arrhythmias - likely related to AVR now in NSR still has pacing wires will follow continue amiodarone and metoprolol  3) HLD - statin  4) DM - SSI       Amanda Polanco MD  4/14/2018 12:18 PM

## 2018-04-14 NOTE — PROGRESS NOTES
Roberta Black  Admission Date: 4/12/2018             Daily Progress Note: 4/14/2018    The patient's chart is reviewed and the patient is discussed with the staff. 58 yo male with a history of tobacco / drug abuse, depression, AS, and CAD. He was recently referred to Cardiology after he was found to have a heart murmur during an exam. Echo and cardiac cath revealed severe AS. He also has mild, nonobstructive CAD. He has smoked all of his adult life - about 40 years, 1/2 pack per day. His wife has noticed some dyspnea but no wheezing. He has never been evaluated for any chronic lung disease. Patient is now s/p AVR on 4/12/18 per Dr. Mtia Mo. Post-op his course has been marked by 800mL bloody output from mediastinal tube with hypotension and he has been given protamine, cryo, and FFP/ blood. Required return to OR with evacuation of clot on the same day. Subjective:     Currently on o2 at 2 lpm via NC with o2 sat 92%. Occasional, minimally productive cough. Using IS and drawing around 500 ml. Incisional pain controlled with pain medications.       Current Facility-Administered Medications   Medication Dose Route Frequency    sodium chloride (NS) flush 5-10 mL  5-10 mL IntraVENous Q8H    sodium chloride (NS) flush 5-10 mL  5-10 mL IntraVENous PRN    metoprolol tartrate (LOPRESSOR) tablet 25 mg  25 mg Oral Q12H    furosemide (LASIX) tablet 40 mg  40 mg Oral DAILY    docusate sodium (COLACE) capsule 100 mg  100 mg Oral DAILY    alum-mag hydroxide-simeth (MYLANTA) oral suspension 30 mL  30 mL Oral Q4H PRN    ondansetron (ZOFRAN) injection 4 mg  4 mg IntraVENous Q6H PRN    acetaminophen (TYLENOL) tablet 650 mg  650 mg Oral Q4H PRN    atorvastatin (LIPITOR) tablet 80 mg  80 mg Oral QHS    insulin regular (NOVOLIN R, HUMULIN R) injection   SubCUTAneous AC&HS    amiodarone (CORDARONE) tablet 200 mg  200 mg Oral Q12H    mupirocin (BACTROBAN) 2 % ointment   Both Nostrils BID  aspirin delayed-release tablet 81 mg  81 mg Oral DAILY    magnesium oxide (MAG-OX) tablet 400 mg  400 mg Oral TID PRN    magnesium oxide (MAG-OX) tablet 400 mg  400 mg Oral QID PRN    potassium chloride (KLOR-CON) tablet 10 mEq  10 mEq Oral DAILY    potassium chloride (K-DUR, KLOR-CON) SR tablet 20 mEq  20 mEq Oral BID PRN    potassium chloride (K-DUR, KLOR-CON) SR tablet 40 mEq  40 mEq Oral BID PRN    nitroglycerin (NITROLINGUAL) sublingual 0.4 mg/spray  1 Spray SubLINGual Q5MIN PRN    tapentadol (NUCYNTA) tablet 100 mg  100 mg Oral Q4H PRN    ferrous sulfate tablet 325 mg  1 Tab Oral DAILY WITH BREAKFAST    traMADol (ULTRAM) tablet 50 mg  50 mg Oral Q6H PRN    famotidine (PEPCID) tablet 20 mg  20 mg Oral Q12H    oxyCODONE-acetaminophen (PERCOCET) 5-325 mg per tablet 1 Tab  1 Tab Oral Q4H PRN       Review of Systems  Constitutional: negative for fever, chills, sweats  Cardiovascular: negative for chest pain, palpitations, syncope, edema  Gastrointestinal:  negative for dysphagia, reflux, vomiting, diarrhea, abdominal pain, or melena  Neurologic:  negative for focal weakness, numbness, headache    Objective:     Vitals:    04/13/18 1943 04/13/18 2248 04/14/18 0346 04/14/18 0744   BP: 121/61 113/58 101/58 104/60   Pulse: 100 (!) 104 100 99   Resp: 18 18 18 16   Temp: 99.5 °F (37.5 °C) 99 °F (37.2 °C) 99.4 °F (37.4 °C) 98.3 °F (36.8 °C)   SpO2: 95% 93% 92% 92%   Weight:   162 lb (73.5 kg)    Height:         Intake and Output:   04/12 1901 - 04/14 0700  In: 2497.4 [P.O.:1460; I.V.:1037.4]  Out: 2662 [Urine:2317]       Physical Exam:   Constitution:  the patient is well developed and in no acute distress  EENMT:  Sclera clear, pupils equal, oral mucosa moist  Respiratory: diminished bilaterally, O2 at 2 lpm  Cardiovascular:  RRR without M,G,R  Gastrointestinal: soft and non-tender; with positive bowel sounds. Musculoskeletal: warm without cyanosis. There is no lower leg edema.   Skin:  no jaundice or rashes, midline sternal incision without redness, swelling or drainage   Neurologic: no gross neuro deficits     Psychiatric:  alert and oriented x 3    CXR:   4/13 4/13 KUB  IMPRESSION: Probable small pneumoperitoneum. Left lateral decubitus view of the  abdomen or CT scan would help definitively differentiate this from a small  basilar pneumothorax if clinically indicated. LAB  Recent Labs      04/14/18   0547  04/13/18   2047  04/13/18   1759  04/13/18   1114  04/13/18   0917   GLUCPOC  173*  151*  137*  151*  157*      Recent Labs      04/14/18 0419 04/13/18 0314  04/12/18   2104 04/12/18   1720  04/12/18   1445   04/12/18   1308   WBC  19.1*  10.7   --   13.1*  22.7*   --   25.5*   HGB  7.6*  7.7*  8.3*  8.5*  10.2*   < >  13.7   HCT  22.7*  23.4*  25.1*  25.7*  30.9*   < >  40.9*   PLT  85*  81*   --   76*  117*   --   130*   INR   --    --    --   1.5   --    --   1.7    < > = values in this interval not displayed. Recent Labs      04/14/18 0419 04/13/18 0314 04/12/18 2104 04/12/18   1720   NA  142  148*   --   148*   K  4.1  4.1  4.8  4.7   CL  108*  117*   --   118*   CO2  29  26   --   25   GLU  139*  121*   --   169*   BUN  26*  19   --   18   CREA  0.79*  0.72*   --   0.81   MG  2.2  2.1  2.1  2.1   CA  8.1*  7.1*   --   6.8*     Recent Labs      04/12/18 2045 04/12/18   1712  04/12/18   1410   PH  7.34*  7.30*  7.28*   PCO2  43  44  46*   PO2  100  289*  128*   HCO3  23  21*  21*     No results for input(s): LCAD, LAC in the last 72 hours.       Assessment:  (Medical Decision Making)     Hospital Problems  Date Reviewed: 4/14/2018          Codes Class Noted POA    Thrombocytopenia due to extra corporeal by-pass circulation ICD-10-CM: D69.59  ICD-9-CM: 287.49  4/13/2018 No    plt 85      Postoperative anemia due to acute blood loss ICD-10-CM: D62  ICD-9-CM: 285.1  4/13/2018 No    Hgb 7.6  Transfuse today per primary      Status post aortic valve replacement ICD-10-CM: Z95.2  ICD-9-CM: V43.3  4/12/2018 No        Respiratory failure, post-operative ([facility] [address] ICD-10-CM: Y60.755  ICD-9-CM: 518.51  4/12/2018 No    Currently on o2 at 2 lpm      Hypoxia ICD-10-CM: R09.02  ICD-9-CM: 799.02  4/12/2018 No        * (Principal)Nonrheumatic aortic valve stenosis ICD-10-CM: I35.0  ICD-9-CM: 424.1  2/7/2018 Yes     4/12/18 (Dr Jacki Camara) Aortic valve replacement with a 25 mm Magna Ease Cherelle-Prince pericardial valve  4/12/18 (Dr Jacki Camara) Cardiac reentry with evacuation of clot. Tobacco use disorder (Chronic) ICD-10-CM: F17.200  ICD-9-CM: 305.1  Unknown Yes            BS range 137-173    WBC 19.1      Plan:  (Medical Decision Making)     --wean O2 as tolerated  --continue IS  --transfusing PRBCs today per CT surgery  --PT following for mobility - ambulated 100' yesterday    More than 50% of the time documented was spent in face-to-face contact with the patient and in the care of the patient on the floor/unit where the patient is located. Quan Tovar, NP        Lungs: decreased sounds in the left lung base no wheezing  Heart S1 and S2 audible, no murmers or rubs appreciated  Other     Check saturation on RA and with exertion. I have spoken with and examined the patient. I have reviewed the history, examination, assessment, and plan and agree with the above. Elva Jung MD      This note was signed electronically.

## 2018-04-14 NOTE — PROGRESS NOTES
[facility] [place] - INPATIENT  Face to Face Encounter    Patients Name: Yamini Slaughter    YOB: 1960    Ordering Physician: Emiliano Basurto    Primary Diagnosis: Nonrheumatic aortic valve stenosis [I35.0]  Bicuspid aortic valve [Q23.1]    Date of Face to Face:   4/13/2018                                  Face to Face Encounter findings are related to primary reason for home care:   yes. 1. I certify that the patient needs intermittent care as follows: skilled nursing care:  skilled observation/assessment, patient education  physical therapy: strengthening and pt/caregiver education    2. I certify that this patient is homebound, that is: 1) patient requires the use of a None device, special transportation, or assistance of another to leave the home; or 2) patient's condition makes leaving the home medically contraindicated; and 3) patient has a normal inability to leave the home and leaving the home requires considerable and taxing effort. Patient may leave the home for infrequent and short duration for medical reasons, and occasional absences for non-medical reasons. Homebound status is due to the following functional limitations: Patient currently under activity restrictions secondary to recent surgical procedure, this hinders their ability to safely leave the home. Patient with strength deficits limiting the performance of all ADL's without caregiver assistance or the use of an assistive device. 3. I certify that this patient is under my care and that I, or a nurse practitioner or [phone], or clinical nurse specialist, or certified nurse midwife, working with me, had a Face-to-Face Encounter that meets the physician Face-to-Face Encounter requirements. The following are the clinical findings from the [address] [place] [address] encounter that support the need for skilled services and is a summary of the encounter:     See hospital chart.       Reza Mason LMSW  4/14/2018      THE FOLLOWING TO BE COMPLETED BY THE COMMUNITY PHYSICIAN:    I concur with the findings described above from the F2F encounter that this patient is homebound and in need of a skilled service.     Certifying Physician: _____________________________________      Printed Certifying Physician Name: _____________________________________    Date: _________________

## 2018-04-14 NOTE — PROGRESS NOTES
POD 2 Day Post-Op    Procedure:  Procedure(s):  CARDIAC RE ENTRY      Subjective:     Patient has No significant medical complaints. Denies abdominal pain. Objective:     Patient Vitals for the past 8 hrs:   BP Temp Pulse Resp SpO2 Weight   18 0744 104/60 98.3 °F (36.8 °C) 99 16 92 % -   18 0346 101/58 99.4 °F (37.4 °C) 100 18 92 % 162 lb (73.5 kg)     Temp (24hrs), Av °F (37.2 °C), Min:98.3 °F (36.8 °C), Max:99.5 °F (37.5 °C)        Hemodynamics  PAP Systolic: 26 PAP  CO (l/min): 7.4 l/min CO  CI (l/min/m2): 3.8 l/min/m2 CI     0701 -  1900  In: 120 [P.O.:120]  Out: -    1901 -  0700  In: 2497.4 [P.O.:1460; I.V.:1037.4]  Out: 2662 [Urine:2317]        Heart:  regular rate and rhythm, S1, S2 normal, no murmur, click, rub or gallop  Lung:  clear to auscultation bilaterally  Neuro: Grossly non focal  Abd: soft, NT with exam  Incisions: Clean, dry, and intact.   TPW: intact    Labs:  Recent Results (from the past 24 hour(s))   GLUCOSE, POC    Collection Time: 18  9:17 AM   Result Value Ref Range    Glucose (POC) 157 (H) 65 - 100 mg/dL   GLUCOSE, POC    Collection Time: 18 11:14 AM   Result Value Ref Range    Glucose (POC) 151 (H) 65 - 100 mg/dL   GLUCOSE, POC    Collection Time: 18  5:59 PM   Result Value Ref Range    Glucose (POC) 137 (H) 65 - 100 mg/dL   PLEASE READ & DOCUMENT PPD TEST IN 24 HRS    Collection Time: 18  8:11 PM   Result Value Ref Range    PPD  Negative    mm Induration 0 mm   GLUCOSE, POC    Collection Time: 18  8:47 PM   Result Value Ref Range    Glucose (POC) 151 (H) 65 - 812 mg/dL   METABOLIC PANEL, BASIC    Collection Time: 18  4:19 AM   Result Value Ref Range    Sodium 142 136 - 145 mmol/L    Potassium 4.1 3.5 - 5.1 mmol/L    Chloride 108 (H) 98 - 107 mmol/L    CO2 29 21 - 32 mmol/L    Anion gap 5 (L) 7 - 16 mmol/L    Glucose 139 (H) 65 - 100 mg/dL    BUN 26 (H) 6 - 23 MG/DL    Creatinine 0.79 (L) 0.8 - 1.5 MG/DL    GFR est AA >60 >60 ml/min/1.73m2    GFR est non-AA >60 >60 ml/min/1.73m2    Calcium 8.1 (L) 8.3 - 10.4 MG/DL   MAGNESIUM    Collection Time: 04/14/18  4:19 AM   Result Value Ref Range    Magnesium 2.2 1.8 - 2.4 mg/dL   CBC W/O DIFF    Collection Time: 04/14/18  4:19 AM   Result Value Ref Range    WBC 19.1 (H) 4.3 - 11.1 K/uL    RBC 2.53 (L) 4.23 - 5.67 M/uL    HGB 7.6 (L) 13.6 - 17.2 g/dL    HCT 22.7 (L) 41.1 - 50.3 %    MCV 89.7 79.6 - 97.8 FL    MCH 30.0 26.1 - 32.9 PG    MCHC 33.5 31.4 - 35.0 g/dL    RDW 14.0 11.9 - 14.6 %    PLATELET 85 (L) 047 - 450 K/uL    MPV 10.6 (L) 10.8 - 14.1 FL   GLUCOSE, POC    Collection Time: 04/14/18  5:47 AM   Result Value Ref Range    Glucose (POC) 173 (H) 65 - 100 mg/dL       Assessment:     Principal Problem:    Nonrheumatic aortic valve stenosis (2/7/2018)      Overview: 4/12/18 (Dr Duval [place]) Aortic valve replacement with a 25 mm Magna Ease       Cherelle-Prince pericardial valve      4/12/18 (Dr Mukund Mcgowan) Cardiac reentry with evacuation of clot.     Active Problems:    Tobacco use disorder ()      Status post aortic valve replacement (4/12/2018)      Respiratory failure, post-operative (HCC) (4/12/2018)      Hypoxia (4/12/2018)      Thrombocytopenia due to extra corporeal by-pass circulation (4/13/2018)      Postoperative anemia due to acute blood loss (4/13/2018)        Plan/Recommendations/Medical Decision Making:   Hgb down 7.6 today- tachy, weak, pale- will transfuse x 1 PRBC  Pneumoperitoneum on KUB today- defer to Dr Kelly Palma any additional imaging  Cardiology following- rate control addressed- will hold off on pulling TPW today  IS  Pain control

## 2018-04-14 NOTE — PROGRESS NOTES
Patient asleep with O2 saturation of 85%. Placed on [facility]. O2 saturation increased to 91%. Temperature of 100.0 F. Bedside report received from Madelyn Richardson RN. PM assessment complete. Denies any needs. Will continue to monitor.

## 2018-04-14 NOTE — PROGRESS NOTES
Patient placed on room air by physician. Spot check shows 02 sat of 97% on room air. Patient voices no complaints.

## 2018-04-14 NOTE — PROGRESS NOTES
Problem: Mobility Impaired (Adult and Pediatric)  Goal: *Acute Goals and Plan of Care (Insert Text)  STG:  (1.)Mr. Baldemar Capellan will move from supine to sit and sit to supine , scoot up and down and roll side to side with STAND BY ASSIST within 3 treatment day(s). (2.)Mr. Baldemar Capellan will transfer from bed to chair and chair to bed with SUPERVISION using the least restrictive device within 3 treatment day(s). (3.)Mr. Baldemar Capellan will ambulate with SUPERVISION for 250 feet with the least restrictive device within 3 treatment day(s). (4.)Mr. Baldemar Capellan will perform standing static and dynamic balance activities x 15 minutes with SUPERVISION to improve safety within 3 day(s). (5.)Mr. Baldemar Capellan will perform LE exercises with 1 to 2 cues for form within 3 days to improve strength for functional transfers and ambulation. (6.)Mr. Baldemar Capellan will maintain stable vital signs throughout all functional mobility within 3 days. LTG:  (1.)Mr. Baldemar Capellan will move from supine to sit and sit to supine , scoot up and down and roll side to side in bed with MODIFIED INDEPENDENCE within 7 treatment day(s). (2.)Mr. Baldemar Capellan will transfer from bed to chair and chair to bed with MODIFIED INDEPENDENCE using the least restrictive device within 7 treatment day(s). (3.)Mr. Baldemar Capellan will ambulate with MODIFIED INDEPENDENCE for 500+ feet with the least restrictive device within 7 treatment day(s). (4.)Mr. Baldemar Capellan will perform standing static and dynamic balance activities x 15+ minutes with MODIFIED INDEPENDENCE to improve safety within 7 day(s). (5.)Mr. Baldemar Capellan will ascend and descend 5 stairs using 0-2 hand rail(s) with MODIFIED INDEPENDENCE to improve functional mobility and safety within 7 day(s).   ________________________________________________________________________________________________      PHYSICAL THERAPY: Daily Note, Treatment Day: 1st, AM 4/14/2018  INPATIENT: Hospital Day: 3  Payor: Kayla Riley / Plan: David Menjivar [id number] / Product Type: PPO /      NAME/AGE/GENDER: Miguel A Lackey is a 59 y.o. male   PRIMARY DIAGNOSIS: Nonrheumatic aortic valve stenosis [I35.0]  Bicuspid aortic valve [Q23.1] Nonrheumatic aortic valve stenosis Nonrheumatic aortic valve stenosis  Procedure(s) (LRB):  CARDIAC RE ENTRY (N/A)  2 Days Post-Op  ICD-10: Treatment Diagnosis:    · Difficulty in walking, Not elsewhere classified (R26.2)   Precaution/Allergies:  Review of patient's allergies indicates no known allergies. ASSESSMENT:     Mr. Hari Bower presents sitting up in bedside chair. He is agreeable to treatment today. He increased his ambulation distance this treatment with rolling walker. He ambulated on 2 liters of O2 and his O2 sats were 98%. He was instructed in seated exercises. He demonstrated good technique and ROM with all exercises. Good progress noted with mobility. Will continue PT efforts. This section established at most recent assessment   PROBLEM LIST (Impairments causing functional limitations):  1. Decreased Strength  2. Decreased ADL/Functional Activities  3. Decreased Transfer Abilities  4. Decreased Ambulation Ability/Technique  5. Decreased Balance  6. Increased Pain  7. Decreased Activity Tolerance  8. Decreased Pacing Skills  9. Increased Shortness of Breath  10. Decreased Knowledge of Precautions  11. Decreased [place] with Home Exercise Program   INTERVENTIONS PLANNED: (Benefits and precautions of physical therapy have been discussed with the patient.)  1. Balance Exercise  2. Bed Mobility  3. Family Education  4. Gait Training  5. Home Exercise Program (HEP)  6. Therapeutic Activites  7. Therapeutic Exercise/Strengthening  8. Transfer Training  9. Patient Education  10.  Group Therapy     TREATMENT PLAN: Frequency/Duration: twice daily for duration of hospital stay  Rehabilitation Potential For Stated Goals: Good     RECOMMENDED REHABILITATION/EQUIPMENT: (at time of discharge pending progress): Due to the probability of continued deficits (see above) this patient will likely need continued skilled physical therapy after discharge. Equipment:    None at this time              HISTORY:   History of Present Injury/Illness (Reason for Referral): Nonrheumatic aortic valve stenosis [I35.0]  Bicuspid aortic valve [Q23.1] Nonrheumatic aortic valve stenosis Nonrheumatic aortic valve stenosis  Procedure(s) (LRB):  CARDIAC RE ENTRY (N/A)  Past Medical History/Comorbidities:   Mr. Kamala Reilly  has a past medical history of CAD (coronary artery disease); Depression; Drug addiction in remission [facility]); Family history of premature CAD (2/28/2018); Homeless family; Seizures ([facility] [address] (2001); Severe aortic stenosis; and Tobacco use disorder. Mr. Kamala Reilly  has a past surgical history that includes hx heent (age 27's). Social History/Living Environment:   Home Environment: Private residence  One/Two Story Residence: One story  Living Alone: No  Support Systems: Family member(s)  Patient Expects to be Discharged to[de-identified] Private residence  Current DME Used/Available at Home: None  Prior Level of Function/Work/Activity:  Patient independent with all mobility, drives and works at baseline. Number of Personal Factors/Comorbidities that affect the Plan of Care: 1-2: MODERATE COMPLEXITY   EXAMINATION:   Most Recent Physical Functioning:   Gross Assessment:                  Posture:  Posture (WDL): Exceptions to WDL  Posture Assessment:  Forward head  Balance:  Sitting: Intact  Standing: Impaired  Standing - Static: Good  Standing - Dynamic : Fair Bed Mobility:     Wheelchair Mobility:     Transfers:  Sit to Stand: Stand-by assistance  Stand to Sit: Stand-by assistance  Interventions: Safety awareness training;Verbal cues  Gait:     Base of Support: Narrowed  Speed/[place]: Slow;Shuffled  Step Length: Left shortened;Right shortened  Gait Abnormalities: Decreased step clearance  Distance (ft): 240 Feet (ft)  Assistive Device: Walker, rolling  Ambulation - Level of Assistance: Stand-by assistance  Interventions: Safety awareness training;Verbal cues      Body Structures Involved:  1. Nerves  2. Heart  3. Lungs  4. Bones  5. Joints  6. Muscles  7. Ligaments Body Functions Affected:  1. Sensory/Pain  2. Cardio  3. Respiratory  4. Neuromusculoskeletal  5. Movement Related Activities and Participation Affected:  1. Mobility  2. Self Care  3. Domestic Life  4. Interpersonal Interactions and Relationships  5. Community, Social and [place]   Number of elements that affect the Plan of Care: 4+: HIGH COMPLEXITY   CLINICAL PRESENTATION:   Presentation: Stable and uncomplicated: LOW COMPLEXITY   CLINICAL DECISION MAKIN [facility] Mobility Inpatient Short Form  How much difficulty does the patient currently have. .. Unable A Lot A Little None   1. Turning over in bed (including adjusting bedclothes, sheets and blankets)? [] 1   [] 2   [x] 3   [] 4   2. Sitting down on and standing up from a chair with arms ( e.g., wheelchair, bedside commode, etc.)   [] 1   [] 2   [x] 3   [] 4   3. Moving from lying on back to sitting on the side of the bed? [] 1   [] 2   [x] 3   [] 4   How much help from another person does the patient currently need. .. Total A Lot A Little None   4. Moving to and from a bed to a chair (including a wheelchair)? [] 1   [] 2   [x] 3   [] 4   5. Need to walk in hospital room? [] 1   [] 2   [x] 3   [] 4   6. Climbing 3-5 steps with a railing? [] 1   [x] 2   [] 3   [] 4   © , Trustees of [address] [place] [address] [address] [address], under license to [employer]. All rights reserved      Score:  Initial: 17 Most Recent: X (Date: -- )    Interpretation of Tool:  Represents activities that are increasingly more difficult (i.e. Bed mobility, Transfers, Gait). Score 24 23 22-20 19-15 14-10 9-7 6     Modifier CH CI CJ CK CL CM CN      ?  Mobility - Walking and Moving Around:     - CURRENT STATUS: CK - 40%-59% impaired, limited or restricted    - GOAL STATUS: CI - 1%-19% impaired, limited or restricted    - D/C STATUS:  ---------------To be determined---------------  Payor: BLUE CROSS / Plan: [place] BLUE CROSS [facility] / Product Type: PPO /      Medical Necessity:     · Patient demonstrates good rehab potential due to higher previous functional level. Reason for Services/Other Comments:  · Patient continues to require modification of therapeutic interventions to increase complexity of exercises. Use of outcome tool(s) and clinical judgement create a POC that gives a: Clear prediction of patient's progress: LOW COMPLEXITY            TREATMENT:   (In addition to Assessment/Re-Assessment sessions the following treatments were rendered)   Pre-treatment Symptoms/Complaints:    Pain: Initial:   Pain Intensity 1:  (None noted)  Post Session:  2/10     Therapeutic Activity: (    13 minutes): Therapeutic activities including Chair transfers, Ambulation on level ground and standing balance activities to improve mobility, strength, balance and activity tolerance. Required minimal Safety awareness training;Verbal cues to promote static and dynamic balance in standing. Therapeutic Exercise: (12 Minutes):  Exercises per grid below to improve mobility, strength and activity tolerance. Required minimal verbal cues to promote proper body mechanics and promote proper body breathing techniques. Progressed repetitions and complexity of movement as indicated.        Date:  4-14-18 Date:   Date:     ACTIVITY/EXERCISE AM PM AM PM AM PM   Ambulation:           Distance  Device  Duration         Seated Heel Raises x20        Seated Toe Raises x20        Seated Long Arc Quads x20        Seated Marching x20        Seated Hip Abduction x20                 B = bilateral; AA = active assistive; A = active; P = passive          Braces/Orthotics/Lines/Etc:   · O2 Room Air   Treatment/Session Assessment:    · Response to Treatment:  Patient tolerated treatment well with fatigue. · Interdisciplinary Collaboration:   o Physical Therapy Assistant  o Registered Nurse  · After treatment position/precautions:   o Up in chair  o Bed/Chair-wheels locked  o Bed in low position  o Call light within reach  o RN notified   · Compliance with Program/Exercises: Will assess as treatment progresses. · Recommendations/Intent for next treatment session: \"Next visit will focus on advancements to more challenging activities and reduction in assistance provided\".   Total Treatment Duration:  PT Patient Time In/Time Out  Time In: 0945  Time Out: [address] [facility], [facility]

## 2018-04-15 LAB
ABO + RH BLD: NORMAL
ANION GAP SERPL CALC-SCNC: 8 MMOL/L (ref 7–16)
BLD PROD TYP BPU: NORMAL
BLOOD GROUP ANTIBODIES SERPL: NORMAL
BPU ID: NORMAL
BUN SERPL-MCNC: 34 MG/DL (ref 6–23)
CALCIUM SERPL-MCNC: 8.1 MG/DL (ref 8.3–10.4)
CHLORIDE SERPL-SCNC: 107 MMOL/L (ref 98–107)
CO2 SERPL-SCNC: 28 MMOL/L (ref 21–32)
CREAT SERPL-MCNC: 0.73 MG/DL (ref 0.8–1.5)
CROSSMATCH RESULT,%XM: NORMAL
ERYTHROCYTE [DISTWIDTH] IN BLOOD BY AUTOMATED COUNT: 14.1 % (ref 11.9–14.6)
GLUCOSE BLD STRIP.AUTO-MCNC: 112 MG/DL (ref 65–100)
GLUCOSE BLD STRIP.AUTO-MCNC: 114 MG/DL (ref 65–100)
GLUCOSE BLD STRIP.AUTO-MCNC: 146 MG/DL (ref 65–100)
GLUCOSE BLD STRIP.AUTO-MCNC: 94 MG/DL (ref 65–100)
GLUCOSE SERPL-MCNC: 85 MG/DL (ref 65–100)
HCT VFR BLD AUTO: 23.9 % (ref 41.1–50.3)
HGB BLD-MCNC: 7.9 G/DL (ref 13.6–17.2)
MCH RBC QN AUTO: 29.6 PG (ref 26.1–32.9)
MCHC RBC AUTO-ENTMCNC: 33.1 G/DL (ref 31.4–35)
MCV RBC AUTO: 89.5 FL (ref 79.6–97.8)
PLATELET # BLD AUTO: 84 K/UL (ref 150–450)
PMV BLD AUTO: 11.2 FL (ref 10.8–14.1)
POTASSIUM SERPL-SCNC: 3.7 MMOL/L (ref 3.5–5.1)
RBC # BLD AUTO: 2.67 M/UL (ref 4.23–5.67)
SODIUM SERPL-SCNC: 143 MMOL/L (ref 136–145)
SPECIMEN EXP DATE BLD: NORMAL
STATUS OF UNIT,%ST: NORMAL
UNIT DIVISION, %UDIV: 0
WBC # BLD AUTO: 15.2 K/UL (ref 4.3–11.1)

## 2018-04-15 PROCEDURE — 97110 THERAPEUTIC EXERCISES: CPT

## 2018-04-15 PROCEDURE — 65660000004 HC RM CVT STEPDOWN

## 2018-04-15 PROCEDURE — 36415 COLL VENOUS BLD VENIPUNCTURE: CPT | Performed by: NURSE PRACTITIONER

## 2018-04-15 PROCEDURE — 80048 BASIC METABOLIC PNL TOTAL CA: CPT | Performed by: NURSE PRACTITIONER

## 2018-04-15 PROCEDURE — 74011250637 HC RX REV CODE- 250/637: Performed by: NURSE PRACTITIONER

## 2018-04-15 PROCEDURE — 82962 GLUCOSE BLOOD TEST: CPT

## 2018-04-15 PROCEDURE — 77030012891

## 2018-04-15 PROCEDURE — 99232 SBSQ HOSP IP/OBS MODERATE 35: CPT | Performed by: INTERNAL MEDICINE

## 2018-04-15 PROCEDURE — 97530 THERAPEUTIC ACTIVITIES: CPT

## 2018-04-15 PROCEDURE — 74011250637 HC RX REV CODE- 250/637: Performed by: THORACIC SURGERY (CARDIOTHORACIC VASCULAR SURGERY)

## 2018-04-15 PROCEDURE — 85027 COMPLETE CBC AUTOMATED: CPT | Performed by: NURSE PRACTITIONER

## 2018-04-15 RX ORDER — BISACODYL 5 MG
10 TABLET, DELAYED RELEASE (ENTERIC COATED) ORAL DAILY PRN
Status: DISCONTINUED | OUTPATIENT
Start: 2018-04-15 | End: 2018-04-17 | Stop reason: HOSPADM

## 2018-04-15 RX ORDER — ADHESIVE BANDAGE
30 BANDAGE TOPICAL DAILY PRN
Status: DISCONTINUED | OUTPATIENT
Start: 2018-04-15 | End: 2018-04-17 | Stop reason: HOSPADM

## 2018-04-15 RX ORDER — GUAIFENESIN 600 MG/1
1200 TABLET, EXTENDED RELEASE ORAL EVERY 12 HOURS
Status: DISCONTINUED | OUTPATIENT
Start: 2018-04-15 | End: 2018-04-17 | Stop reason: HOSPADM

## 2018-04-15 RX ADMIN — AMIODARONE HYDROCHLORIDE 200 MG: 200 TABLET ORAL at 09:05

## 2018-04-15 RX ADMIN — POTASSIUM CHLORIDE 20 MEQ: 20 TABLET, EXTENDED RELEASE ORAL at 09:03

## 2018-04-15 RX ADMIN — FAMOTIDINE 20 MG: 20 TABLET ORAL at 09:05

## 2018-04-15 RX ADMIN — MUPIROCIN: 20 OINTMENT TOPICAL at 09:06

## 2018-04-15 RX ADMIN — ATORVASTATIN CALCIUM 80 MG: 40 TABLET, FILM COATED ORAL at 21:38

## 2018-04-15 RX ADMIN — OXYCODONE HYDROCHLORIDE AND ACETAMINOPHEN 1 TABLET: 5; 325 TABLET ORAL at 16:19

## 2018-04-15 RX ADMIN — TAPENTADOL HYDROCHLORIDE 100 MG: 50 TABLET, FILM COATED ORAL at 09:09

## 2018-04-15 RX ADMIN — OXYCODONE HYDROCHLORIDE AND ACETAMINOPHEN 1 TABLET: 5; 325 TABLET ORAL at 07:25

## 2018-04-15 RX ADMIN — MAGNESIUM HYDROXIDE 30 ML: 400 SUSPENSION ORAL at 15:24

## 2018-04-15 RX ADMIN — AMIODARONE HYDROCHLORIDE 200 MG: 200 TABLET ORAL at 21:38

## 2018-04-15 RX ADMIN — METOPROLOL TARTRATE 25 MG: 25 TABLET ORAL at 09:05

## 2018-04-15 RX ADMIN — FERROUS SULFATE TAB 325 MG (65 MG ELEMENTAL FE) 325 MG: 325 (65 FE) TAB at 09:03

## 2018-04-15 RX ADMIN — GUAIFENESIN 1200 MG: 600 TABLET, EXTENDED RELEASE ORAL at 21:38

## 2018-04-15 RX ADMIN — Medication 10 ML: at 21:38

## 2018-04-15 RX ADMIN — FUROSEMIDE 40 MG: 40 TABLET ORAL at 09:04

## 2018-04-15 RX ADMIN — POTASSIUM CHLORIDE 10 MEQ: 10 TABLET, EXTENDED RELEASE ORAL at 09:04

## 2018-04-15 RX ADMIN — POTASSIUM CHLORIDE 20 MEQ: 20 TABLET, EXTENDED RELEASE ORAL at 16:18

## 2018-04-15 RX ADMIN — STANDARDIZED SENNA CONCENTRATE AND DOCUSATE SODIUM 2 TABLET: 8.6; 5 TABLET, FILM COATED ORAL at 21:38

## 2018-04-15 RX ADMIN — GUAIFENESIN 1200 MG: 600 TABLET, EXTENDED RELEASE ORAL at 09:04

## 2018-04-15 RX ADMIN — METOPROLOL TARTRATE 25 MG: 25 TABLET ORAL at 21:38

## 2018-04-15 RX ADMIN — FAMOTIDINE 20 MG: 20 TABLET ORAL at 21:38

## 2018-04-15 RX ADMIN — OXYCODONE HYDROCHLORIDE AND ACETAMINOPHEN 1 TABLET: 5; 325 TABLET ORAL at 21:44

## 2018-04-15 RX ADMIN — MUPIROCIN: 20 OINTMENT TOPICAL at 21:39

## 2018-04-15 RX ADMIN — Medication 10 ML: at 06:00

## 2018-04-15 RX ADMIN — Medication 10 ML: at 15:27

## 2018-04-15 NOTE — PROGRESS NOTES
POD 3 Day Post-Op    Procedure:  Procedure(s):  CARDIAC RE ENTRY      Subjective:     Patient has No significant medical complaints. Feeling better, independent      Objective:     Patient Vitals for the past 8 hrs:   BP Temp Pulse Resp SpO2 Weight   04/15/18 0759 107/54 98.1 °F (36.7 °C) 84 20 92 % -   04/15/18 0337 98/53 98.2 °F (36.8 °C) 88 18 97 % 161 lb 8 oz (73.3 kg)     Temp (24hrs), Av.2 °F (36.8 °C), Min:97 °F (36.1 °C), Max:100 °F (37.8 °C)        Hemodynamics  PAP Systolic: 26 PAP  CO (l/min): 7.4 l/min CO  CI (l/min/m2): 3.8 l/min/m2 CI        1901 - 04/15 0700  In: 1120 [P.O.:810]  Out: -         Heart:  regular rate and rhythm, S1, S2 normal, no murmur, click, rub or gallop  Lung:  clear to auscultation bilaterally, coughing sputum  Neuro: Grossly non focal  Abd: soft, NT with exam  Incisions: Clean, dry, and intact.   TPW: removed    Labs:  Recent Results (from the past 24 hour(s))   GLUCOSE, POC    Collection Time: 18 11:27 AM   Result Value Ref Range    Glucose (POC) 168 (H) 65 - 100 mg/dL   GLUCOSE, POC    Collection Time: 18  4:02 PM   Result Value Ref Range    Glucose (POC) 119 (H) 65 - 100 mg/dL   HGB & HCT    Collection Time: 18  7:45 PM   Result Value Ref Range    HGB 7.9 (L) 13.6 - 17.2 g/dL    HCT 23.7 (L) 41.1 - 50.3 %   GLUCOSE, POC    Collection Time: 18  9:02 PM   Result Value Ref Range    Glucose (POC) 142 (H) 65 - 100 mg/dL   PLEASE READ & DOCUMENT PPD TEST IN 48 HRS    Collection Time: 18  9:09 PM   Result Value Ref Range    PPD  Negative    mm Induration 0 mm   CBC W/O DIFF    Collection Time: 04/15/18  5:35 AM   Result Value Ref Range    WBC 15.2 (H) 4.3 - 11.1 K/uL    RBC 2.67 (L) 4.23 - 5.67 M/uL    HGB 7.9 (L) 13.6 - 17.2 g/dL    HCT 23.9 (L) 41.1 - 50.3 %    MCV 89.5 79.6 - 97.8 FL    MCH 29.6 26.1 - 32.9 PG    MCHC 33.1 31.4 - 35.0 g/dL    RDW 14.1 11.9 - 14.6 %    PLATELET 84 (L) 788 - 450 K/uL    MPV 11.2 10.8 - 83.6 FL   METABOLIC PANEL, BASIC    Collection Time: 04/15/18  5:35 AM   Result Value Ref Range    Sodium 143 136 - 145 mmol/L    Potassium 3.7 3.5 - 5.1 mmol/L    Chloride 107 98 - 107 mmol/L    CO2 28 21 - 32 mmol/L    Anion gap 8 7 - 16 mmol/L    Glucose 85 65 - 100 mg/dL    BUN 34 (H) 6 - 23 MG/DL    Creatinine 0.73 (L) 0.8 - 1.5 MG/DL    GFR est AA >60 >60 ml/min/1.73m2    GFR est non-AA >60 >60 ml/min/1.73m2    Calcium 8.1 (L) 8.3 - 10.4 MG/DL   GLUCOSE, POC    Collection Time: 04/15/18  6:03 AM   Result Value Ref Range    Glucose (POC) 94 65 - 100 mg/dL       Assessment:     Principal Problem:    Nonrheumatic aortic valve stenosis (2/7/2018)      Overview: 4/12/18 (Dr Sondra Bowie) Aortic valve replacement with a 25 mm Magna Ease       Cherelle-Prince pericardial valve      4/12/18 (Dr Sondra Bowie) Cardiac reentry with evacuation of clot. Active Problems:    Tobacco use disorder ()      Status post aortic valve replacement (4/12/2018)      Respiratory failure, post-operative (HCC) (4/12/2018)      Hypoxia (4/12/2018)      Thrombocytopenia due to extra corporeal by-pass circulation (4/13/2018)      Postoperative anemia due to acute blood loss (4/13/2018)        Plan/Recommendations/Medical Decision Making:   Hgb down 7.6 today- tachy, weak, pale- will transfuse x 1 PRBC  Pneumoperitoneum on KUB today- monitor but pt asymptomatic  Cardiology following- rate control addressed- TPW to be removed.   IS  Pain control

## 2018-04-15 NOTE — PROGRESS NOTES
Bedside and Verbal shift change report given to Yissel Mancini (oncoming nurse) by Sheeba Mason (offgoing nurse). Report included the following information SBAR, Kardex, Intake/Output, MAR, Recent Results and Med Rec Status.

## 2018-04-15 NOTE — PROGRESS NOTES
Bedside and Verbal shift change report given to Michael Mayo (oncoming nurse) by Shailesh San (offgoing nurse). Report included the following information SBAR, Kardex, Intake/Output, MAR, Recent Results and Med Rec Status.

## 2018-04-15 NOTE — PROGRESS NOTES
[facility] CARDIOLOGY PROGRESS NOTE           4/15/2018 9:02 AM    Admit Date: 4/12/2018         Subjective: did well overnight no new atrial arrthymias    ROS:  Cardiovascular:  As noted above    Objective:      Vitals:    04/14/18 1920 04/14/18 2322 04/15/18 0337 04/15/18 0759   BP:  102/57 98/53 107/54   Pulse:  77 88 84   Resp:  18 18 20   Temp:  98.8 °F (37.1 °C) 98.2 °F (36.8 °C) 98.1 °F (36.7 °C)   SpO2: 91% 94% 97% 92%   Weight:   73.3 kg (161 lb 8 oz)    Height:           On telemetry:      Physical Exam:  General: Well Developed, Well Nourished, No Acute Distress, Alert & Oriented x 3, Appropriate mood  Neck: supple, no JVD  Heart: S1S2 with RRR without murmurs or gallops  Lungs: Clear throughout auscultation bilaterally without adventitious sounds  Abd: soft, nontender, nondistended, with good bowel sounds  Ext: no edema bilaterally  Skin: warm and dry      Data Review:   Recent Labs      04/15/18   0535  04/14/18   1945  04/14/18   0419  04/13/18   0314   04/12/18   1720   04/12/18   1308   NA  143   --   142  148*   --   148*   --   146*   K  3.7   --   4.1  4.1   < >  4.7   --   4.3   MG   --    --   2.2  2.1   < >  2.1   --   2.9*   BUN  34*   --   26*  19   --   18   --   21   CREA  0.73*   --   0.79*  0.72*   --   0.81   --   0.97   GLU  85   --   139*  121*   --   169*   --   125*   WBC  15.2*   --   19.1*  10.7   --   13.1*   < >  25.5*   HGB  7.9*  7.9*  7.6*  7.7*   < >  8.5*   < >  13.7   HCT  23.9*  23.7*  22.7*  23.4*   < >  25.7*   < >  40.9*   PLT  84*   --   85*  81*   --   76*   < >  130*   INR   --    --    --    --    --   1.5   --   1.7    < > = values in this interval not displayed. No results for input(s): Christopher Puga in the last 72 hours.       EKG: yesterday show normal MT interval    Assessment/Plan:     Principal Problem:    Nonrheumatic aortic valve stenosis (2/7/2018)      Overview: 4/12/18 (Dr Abelino Duke) Aortic valve replacement with a 25 mm Magna Ease       Cherelle-Prince pericardial valve      4/12/18 (Dr Jesus [facility] [employer]) Cardiac reentry with evacuation of clot.     Active Problems:    Tobacco use disorder ()      Status post aortic valve replacement (4/12/2018)      Respiratory failure, post-operative (HCC) (4/12/2018)      Hypoxia (4/12/2018)      Thrombocytopenia due to extra corporeal by-pass circulation (4/13/2018)      Postoperative anemia due to acute blood loss (4/13/2018)    1) Atrial tach/cari - okay to pull wires, continue amio and metop  2) AVR - stable  3) HLD - continue Lipitor  4) CAD - continue ASA  5) Anemia - CT to give 1 unit today      John Hernandez MD  4/15/2018 9:02 AM

## 2018-04-15 NOTE — PROGRESS NOTES
Miguelina Metzger  Admission Date: 4/12/2018             Daily Progress Note: 4/15/2018    The patient's chart is reviewed and the patient is discussed with the staff. 58 yo male with a history of tobacco / drug abuse, depression, AS, and CAD. He was recently referred to Cardiology after he was found to have a heart murmur during an exam. Echo and cardiac cath revealed severe AS. He also has mild, nonobstructive CAD. He has smoked all of his adult life - about 40 years, 1/2 pack per day. His wife has noticed some dyspnea but no wheezing. He has never been evaluated for any chronic lung disease. Patient is now s/p AVR on 4/12/18 per Dr. Annita Munoz. Post-op his course has been marked by 800mL bloody output from mediastinal tube with hypotension and he has been given protamine, cryo, and FFP/ blood. Required return to OR with evacuation of clot on the same day. Subjective:     RA sat 97% at rest and 93% with ambulation - sleeping o2 sat 85% and was placed on O2 at 2 lpm. + cough productive of moderate amounts of tan mucus. Using IS and drawing around 750+ ml. Incisional pain controlled with pain medications. Admits to snoring, witnessed apneas, ? Waking with choking, has daytime somnolence but works night shift, denies am HA.       Current Facility-Administered Medications   Medication Dose Route Frequency    0.9% sodium chloride infusion 250 mL  250 mL IntraVENous PRN    senna-docusate (PERICOLACE) 8.6-50 mg per tablet 2 Tab  2 Tab Oral QHS    sodium chloride (NS) flush 5-10 mL  5-10 mL IntraVENous Q8H    sodium chloride (NS) flush 5-10 mL  5-10 mL IntraVENous PRN    metoprolol tartrate (LOPRESSOR) tablet 25 mg  25 mg Oral Q12H    furosemide (LASIX) tablet 40 mg  40 mg Oral DAILY    alum-mag hydroxide-simeth (MYLANTA) oral suspension 30 mL  30 mL Oral Q4H PRN    ondansetron (ZOFRAN) injection 4 mg  4 mg IntraVENous Q6H PRN    acetaminophen (TYLENOL) tablet 650 mg  650 mg Oral Q4H PRN    atorvastatin (LIPITOR) tablet 80 mg  80 mg Oral QHS    insulin regular (NOVOLIN R, HUMULIN R) injection   SubCUTAneous AC&HS    amiodarone (CORDARONE) tablet 200 mg  200 mg Oral Q12H    mupirocin (BACTROBAN) 2 % ointment   Both Nostrils BID    aspirin delayed-release tablet 81 mg  81 mg Oral DAILY    magnesium oxide (MAG-OX) tablet 400 mg  400 mg Oral TID PRN    magnesium oxide (MAG-OX) tablet 400 mg  400 mg Oral QID PRN    potassium chloride (KLOR-CON) tablet 10 mEq  10 mEq Oral DAILY    potassium chloride (K-DUR, KLOR-CON) SR tablet 20 mEq  20 mEq Oral BID PRN    potassium chloride (K-DUR, KLOR-CON) SR tablet 40 mEq  40 mEq Oral BID PRN    nitroglycerin (NITROLINGUAL) sublingual 0.4 mg/spray  1 Spray SubLINGual Q5MIN PRN    tapentadol (NUCYNTA) tablet 100 mg  100 mg Oral Q4H PRN    ferrous sulfate tablet 325 mg  1 Tab Oral DAILY WITH BREAKFAST    traMADol (ULTRAM) tablet 50 mg  50 mg Oral Q6H PRN    famotidine (PEPCID) tablet 20 mg  20 mg Oral Q12H    oxyCODONE-acetaminophen (PERCOCET) 5-325 mg per tablet 1 Tab  1 Tab Oral Q4H PRN       Review of Systems  Constitutional: negative for fever, chills, sweats  Cardiovascular: negative for chest pain, palpitations, syncope, edema  Gastrointestinal:  negative for dysphagia, reflux, vomiting, diarrhea, abdominal pain, or melena  Neurologic:  negative for focal weakness, numbness, headache    Objective:     Vitals:    04/14/18 1918 04/14/18 1920 04/14/18 2322 04/15/18 0337   BP:   102/57 98/53   Pulse:   77 88   Resp:   18 18   Temp:   98.8 °F (37.1 °C) 98.2 °F (36.8 °C)   SpO2: 91% 91% 94% 97%   Weight:    161 lb 8 oz (73.3 kg)   Height:         Intake and Output:   04/13 1901 - 04/15 0700  In: 1120 [P.O.:810]  Out: -        Physical Exam:   Constitution:  the patient is well developed and in no acute distress  EENMT:  Sclera clear, pupils equal, oral mucosa moist  Respiratory: diminished bilaterally with crackles at posterior bases, O2 at 2 lpm  Cardiovascular:  RRR without M,G,R  Gastrointestinal: soft and non-tender; with positive bowel sounds. Musculoskeletal: warm without cyanosis. There is no lower leg edema. Skin:  no jaundice or rashes, midline sternal incision without redness, swelling or drainage   Neurologic: no gross neuro deficits     Psychiatric:  alert and oriented x 3    CXR:   4/13 4/13 KUB  IMPRESSION: Probable small pneumoperitoneum. Left lateral decubitus view of the  abdomen or CT scan would help definitively differentiate this from a small  basilar pneumothorax if clinically indicated. LAB  Recent Labs      04/15/18   0603  04/14/18   2102  04/14/18   1602  04/14/18   1127  04/14/18   0547   GLUCPOC  94  142*  119*  168*  173*      Recent Labs      04/15/18   0535  04/14/18   1945  04/14/18   0419  04/13/18   0314   04/12/18   1720   04/12/18   1308   WBC  15.2*   --   19.1*  10.7   --   13.1*   < >  25.5*   HGB  7.9*  7.9*  7.6*  7.7*   < >  8.5*   < >  13.7   HCT  23.9*  23.7*  22.7*  23.4*   < >  25.7*   < >  40.9*   PLT  84*   --   85*  81*   --   76*   < >  130*   INR   --    --    --    --    --   1.5   --   1.7    < > = values in this interval not displayed. Recent Labs      04/15/18   0535  04/14/18   0419 04/13/18   0314  04/12/18   2104   NA  143  142  148*   --    K  3.7  4.1  4.1  4.8   CL  107  108*  117*   --    CO2  28  29  26   --    GLU  85  139*  121*   --    BUN  34*  26*  19   --    CREA  0.73*  0.79*  0.72*   --    MG   --   2.2  2.1  2.1   CA  8.1*  8.1*  7.1*   --      Recent Labs      04/12/18   2045  04/12/18   1712  04/12/18   1410   PH  7.34*  7.30*  7.28*   PCO2  43  44  46*   PO2  100  289*  128*   HCO3  23  21*  21*     No results for input(s): LCAD, LAC in the last 72 hours.       Assessment:  (Medical Decision Making)     Hospital Problems  Date Reviewed: 4/14/2018          Codes Class Noted POA    Thrombocytopenia due to extra corporeal by-pass circulation ICD-10-CM: D69.59  ICD-9-CM: 287.49  4/13/2018 No    plt 84      Postoperative anemia due to acute blood loss ICD-10-CM: D62  ICD-9-CM: 285.1  4/13/2018 No    Hgb 7.9 post transfusion      Status post aortic valve replacement ICD-10-CM: Z95.2  ICD-9-CM: V43.3  4/12/2018 No        Respiratory failure, post-operative ([address] ICD-10-CM: L33.387  ICD-9-CM: 518.51  4/12/2018 No    Currently on o2 at 2 lpm      Hypoxia ICD-10-CM: R09.02  ICD-9-CM: 799.02  4/12/2018 No        * (Principal)Nonrheumatic aortic valve stenosis ICD-10-CM: I35.0  ICD-9-CM: 424.1  2/7/2018 Yes     4/12/18 (Dr Hetal Diggs) Aortic valve replacement with a 25 mm Magna Ease Cherelle-Prince pericardial valve  4/12/18 (Dr Hetal Diggs) Cardiac reentry with evacuation of clot. Tobacco use disorder (Chronic) ICD-10-CM: F17.200  ICD-9-CM: 305.1  Unknown Yes         WBC 19.1 >>>15.2    Plan:  (Medical Decision Making)     --add mucinex  --patient requesting nicotine patch - will defer to CT surgery  --wean O2 as tolerated  --will check IAN on RA - + snoring / witnessed apneas / ? Waking with choking  --continue IS  --s/p transfusion 1 unit PRBCs - follow up Hgb 7.9  --PT following for mobility - ambulated 240' yesterday    More than 50% of the time documented was spent in face-to-face contact with the patient and in the care of the patient on the floor/unit where the patient is located. Nya Barroso, EFREN      Lungs: decreased sounds in the bases. No wheezing  Heart S1 and S2 audible, no murmers or rubs appreciated  Other    Does have narrow airway -- Modified Buck Stage III. At risk for DIEGO if overnight shows marked hypxomeia. Further recommendations based on results. I have spoken with and examined the patient. I have reviewed the history, examination, assessment, and plan and agree with the above. Abel Cruz MD      This note was signed electronically. Errors are unfortunately her likely due to dictation software.

## 2018-04-15 NOTE — PROGRESS NOTES
Problem: Mobility Impaired (Adult and Pediatric)  Goal: *Acute Goals and Plan of Care (Insert Text)  STG:  (1.)Mr. Madeline Denny will move from supine to sit and sit to supine , scoot up and down and roll side to side with STAND BY ASSIST within 3 treatment day(s). (2.)Mr. Madeline Denny will transfer from bed to chair and chair to bed with SUPERVISION using the least restrictive device within 3 treatment day(s). (3.)Mr. Madeline Denny will ambulate with SUPERVISION for 250 feet with the least restrictive device within 3 treatment day(s). (4.)Mr. Madeline Denny will perform standing static and dynamic balance activities x 15 minutes with SUPERVISION to improve safety within 3 day(s). (5.)Mr. Madeline Denny will perform LE exercises with 1 to 2 cues for form within 3 days to improve strength for functional transfers and ambulation. (6.)Mr. Madeline Denny will maintain stable vital signs throughout all functional mobility within 3 days. LTG:  (1.)Mr. Madeline Denny will move from supine to sit and sit to supine , scoot up and down and roll side to side in bed with MODIFIED INDEPENDENCE within 7 treatment day(s). (2.)Mr. Madeline Denny will transfer from bed to chair and chair to bed with MODIFIED INDEPENDENCE using the least restrictive device within 7 treatment day(s). (3.)Mr. Madeline Denny will ambulate with MODIFIED INDEPENDENCE for 500+ feet with the least restrictive device within 7 treatment day(s). (4.)Mr. Madeline Denny will perform standing static and dynamic balance activities x 15+ minutes with MODIFIED INDEPENDENCE to improve safety within 7 day(s). (5.)Mr. Madeline Denny will ascend and descend 5 stairs using 0-2 hand rail(s) with MODIFIED INDEPENDENCE to improve functional mobility and safety within 7 day(s).   ________________________________________________________________________________________________      PHYSICAL THERAPY: Daily Note, Treatment Day: 1st, AM 4/15/2018  INPATIENT: Hospital Day: 4  Payor: Vazquez Frances / Plan: David Menjivar 954 / Product Type: PPO /      NAME/AGE/GENDER: Kristan Terrazas is a 59 y.o. male   PRIMARY DIAGNOSIS: Nonrheumatic aortic valve stenosis [I35.0]  Bicuspid aortic valve [Q23.1] Nonrheumatic aortic valve stenosis Nonrheumatic aortic valve stenosis  Procedure(s) (LRB):  CARDIAC RE ENTRY (N/A)  3 Days Post-Op  ICD-10: Treatment Diagnosis:    · Difficulty in walking, Not elsewhere classified (R26.2)   Precaution/Allergies:  Review of patient's allergies indicates no known allergies. ASSESSMENT:     Mr. Elvia Polanco presents sitting up in bedside chair. He is agreeable to treatment today. He ambulated on room air per RN. His O2 sats were 89 -90%, and 87-88% during exercises, but after doing his breathing he was 93%. RN requested he be put back on oxygen while sitting in room. Good progress noted with mobility. Patient left up in chair with all needs in reach and RN notified. Will continue PT efforts. This section established at most recent assessment   PROBLEM LIST (Impairments causing functional limitations):  1. Decreased Strength  2. Decreased ADL/Functional Activities  3. Decreased Transfer Abilities  4. Decreased Ambulation Ability/Technique  5. Decreased Balance  6. Increased Pain  7. Decreased Activity Tolerance  8. Decreased Pacing Skills  9. Increased Shortness of Breath  10. Decreased Knowledge of Precautions  11. Decreased [place] with Home Exercise Program   INTERVENTIONS PLANNED: (Benefits and precautions of physical therapy have been discussed with the patient.)  1. Balance Exercise  2. Bed Mobility  3. Family Education  4. Gait Training  5. Home Exercise Program (HEP)  6. Therapeutic Activites  7. Therapeutic Exercise/Strengthening  8. Transfer Training  9. Patient Education  10.  Group Therapy     TREATMENT PLAN: Frequency/Duration: twice daily for duration of hospital stay  Rehabilitation Potential For Stated Goals: Good     RECOMMENDED REHABILITATION/EQUIPMENT: (at time of discharge pending progress): Due to the probability of continued deficits (see above) this patient will likely need continued skilled physical therapy after discharge. Equipment:    None at this time              HISTORY:   History of Present Injury/Illness (Reason for Referral): Nonrheumatic aortic valve stenosis [I35.0]  Bicuspid aortic valve [Q23.1] Nonrheumatic aortic valve stenosis Nonrheumatic aortic valve stenosis  Procedure(s) (LRB):  CARDIAC RE ENTRY (N/A)  Past Medical History/Comorbidities:   Mr. Alison Mendez  has a past medical history of CAD (coronary artery disease); Depression; Drug addiction in remission [facility]); Family history of premature CAD (2/28/2018); Homeless family; Seizures ([facility] [address] (2001); Severe aortic stenosis; and Tobacco use disorder. Mr. Alison Mendez  has a past surgical history that includes hx heent (age 27's). Social History/Living Environment:   Home Environment: Private residence  One/Two Story Residence: One story  Living Alone: No  Support Systems: Family member(s)  Patient Expects to be Discharged to[de-identified] Private residence  Current DME Used/Available at Home: None  Prior Level of Function/Work/Activity:  Patient independent with all mobility, drives and works at baseline. Number of Personal Factors/Comorbidities that affect the Plan of Care: 1-2: MODERATE COMPLEXITY   EXAMINATION:   Most Recent Physical Functioning:   Gross Assessment:                  Posture:     Balance:    Bed Mobility:     Wheelchair Mobility:     Transfers:  Sit to Stand: Stand-by assistance  Stand to Sit: Stand-by assistance  Gait:     Base of Support: Narrowed  Speed/[place]: Shuffled; Slow  Step Length: Left shortened;Right shortened  Gait Abnormalities: Decreased step clearance  Distance (ft): 240 Feet (ft)  Assistive Device: Walker, rolling  Ambulation - Level of Assistance: Stand-by assistance  Interventions: Safety awareness training      Body Structures Involved:  1. Nerves  2. Heart  3. Lungs  4. Bones  5. Joints  6. Muscles  7. Ligaments Body Functions Affected:  1. Sensory/Pain  2. Cardio  3. Respiratory  4. Neuromusculoskeletal  5. Movement Related Activities and Participation Affected:  1. Mobility  2. Self Care  3. Domestic Life  4. Interpersonal Interactions and Relationships  5. Community, Social and [place]   Number of elements that affect the Plan of Care: 4+: HIGH COMPLEXITY   CLINICAL PRESENTATION:   Presentation: Stable and uncomplicated: LOW COMPLEXITY   CLINICAL DECISION MAKIN [facility] Mobility Inpatient Short Form  How much difficulty does the patient currently have. .. Unable A Lot A Little None   1. Turning over in bed (including adjusting bedclothes, sheets and blankets)? [] 1   [] 2   [x] 3   [] 4   2. Sitting down on and standing up from a chair with arms ( e.g., wheelchair, bedside commode, etc.)   [] 1   [] 2   [x] 3   [] 4   3. Moving from lying on back to sitting on the side of the bed? [] 1   [] 2   [x] 3   [] 4   How much help from another person does the patient currently need. .. Total A Lot A Little None   4. Moving to and from a bed to a chair (including a wheelchair)? [] 1   [] 2   [x] 3   [] 4   5. Need to walk in hospital room? [] 1   [] 2   [x] 3   [] 4   6. Climbing 3-5 steps with a railing? [] 1   [x] 2   [] 3   [] 4   © , Trustees of [address] [place] [address] [address] [address], under license to [employer]. All rights reserved      Score:  Initial: 17 Most Recent: X (Date: -- )    Interpretation of Tool:  Represents activities that are increasingly more difficult (i.e. Bed mobility, Transfers, Gait). Score 24 23 22-20 19-15 14-10 9-7 6     Modifier CH CI CJ CK CL CM CN      ?  Mobility - Walking and Moving Around:     - CURRENT STATUS: CK - 40%-59% impaired, limited or restricted    - GOAL STATUS: CI - 1%-19% impaired, limited or restricted    - D/C STATUS:  ---------------To be determined---------------  Payor: BLUE CROSS / Plan: [place] BLUE [facility] / Product Type: PPO /      Medical Necessity:     · Patient demonstrates good rehab potential due to higher previous functional level. Reason for Services/Other Comments:  · Patient continues to require modification of therapeutic interventions to increase complexity of exercises. Use of outcome tool(s) and clinical judgement create a POC that gives a: Clear prediction of patient's progress: LOW COMPLEXITY            TREATMENT:   (In addition to Assessment/Re-Assessment sessions the following treatments were rendered)   Pre-treatment Symptoms/Complaints: \"Been waiting on you \"  Pain: Initial:     no complaints Post Session:  No complaints     Therapeutic Activity: (    13 minutes): Therapeutic activities including Chair transfers, Ambulation on level ground and standing balance activities to improve mobility, strength, balance and activity tolerance. Required minimal Safety awareness training to promote static and dynamic balance in standing. Therapeutic Exercise: ( 10 minutes):  Exercises per grid below to improve mobility, strength and activity tolerance. Required minimal verbal cues to promote proper body mechanics and promote proper body breathing techniques. Progressed repetitions and complexity of movement as indicated. Date:  4-14-18 Date:  4-15-18 Date:     ACTIVITY/EXERCISE AM PM AM PM AM PM   Ambulation:           Distance  Device  Duration         Seated Heel Raises x20  X 20 B      Seated Toe Raises x20  X 20 B      Seated Long Arc Quads x20  X 20 B      Seated Marching x20  X 20 B      Seated Hip Abduction x20  X 20 B               B = bilateral; AA = active assistive; A = active; P = passive          Braces/Orthotics/Lines/Etc:   · O2 Room Air   Treatment/Session Assessment:    · Response to Treatment:  Patient tolerated treatment well with fatigue.   · Interdisciplinary Collaboration:   o Physical Therapy Assistant  o Registered Nurse  · After treatment position/precautions:   o Up in chair  o Bed/Chair-wheels locked  o Bed in low position  o Call light within reach  o RN notified   · Compliance with Program/Exercises: Will assess as treatment progresses. · Recommendations/Intent for next treatment session: \"Next visit will focus on advancements to more challenging activities and reduction in assistance provided\".   Total Treatment Duration: 23 minutes  PT Patient Time In/Time Out  Time In: 1010  Time Out: 1492 [address]

## 2018-04-16 LAB
GLUCOSE BLD STRIP.AUTO-MCNC: 106 MG/DL (ref 65–100)
HCT VFR BLD AUTO: 23.7 % (ref 41.1–50.3)
HGB BLD-MCNC: 7.9 G/DL (ref 13.6–17.2)
MAGNESIUM SERPL-MCNC: 2.3 MG/DL (ref 1.8–2.4)
MM INDURATION POC: 0 MM (ref 0–5)
POTASSIUM SERPL-SCNC: 3.7 MMOL/L (ref 3.5–5.1)
PPD POC: NEGATIVE NEGATIVE

## 2018-04-16 PROCEDURE — 77030012891

## 2018-04-16 PROCEDURE — 74011250637 HC RX REV CODE- 250/637: Performed by: NURSE PRACTITIONER

## 2018-04-16 PROCEDURE — 97530 THERAPEUTIC ACTIVITIES: CPT

## 2018-04-16 PROCEDURE — 97110 THERAPEUTIC EXERCISES: CPT

## 2018-04-16 PROCEDURE — 83735 ASSAY OF MAGNESIUM: CPT | Performed by: THORACIC SURGERY (CARDIOTHORACIC VASCULAR SURGERY)

## 2018-04-16 PROCEDURE — 36415 COLL VENOUS BLD VENIPUNCTURE: CPT | Performed by: NURSE PRACTITIONER

## 2018-04-16 PROCEDURE — 65660000004 HC RM CVT STEPDOWN

## 2018-04-16 PROCEDURE — 85018 HEMOGLOBIN: CPT | Performed by: NURSE PRACTITIONER

## 2018-04-16 PROCEDURE — 82962 GLUCOSE BLOOD TEST: CPT

## 2018-04-16 PROCEDURE — 74011250637 HC RX REV CODE- 250/637: Performed by: THORACIC SURGERY (CARDIOTHORACIC VASCULAR SURGERY)

## 2018-04-16 PROCEDURE — 84132 ASSAY OF SERUM POTASSIUM: CPT | Performed by: THORACIC SURGERY (CARDIOTHORACIC VASCULAR SURGERY)

## 2018-04-16 PROCEDURE — 99232 SBSQ HOSP IP/OBS MODERATE 35: CPT | Performed by: INTERNAL MEDICINE

## 2018-04-16 RX ADMIN — METOPROLOL TARTRATE 25 MG: 25 TABLET ORAL at 08:53

## 2018-04-16 RX ADMIN — Medication 10 ML: at 06:18

## 2018-04-16 RX ADMIN — GUAIFENESIN 1200 MG: 600 TABLET, EXTENDED RELEASE ORAL at 08:52

## 2018-04-16 RX ADMIN — STANDARDIZED SENNA CONCENTRATE AND DOCUSATE SODIUM 2 TABLET: 8.6; 5 TABLET, FILM COATED ORAL at 20:51

## 2018-04-16 RX ADMIN — POTASSIUM CHLORIDE 20 MEQ: 20 TABLET, EXTENDED RELEASE ORAL at 17:21

## 2018-04-16 RX ADMIN — ATORVASTATIN CALCIUM 80 MG: 40 TABLET, FILM COATED ORAL at 20:50

## 2018-04-16 RX ADMIN — MUPIROCIN: 20 OINTMENT TOPICAL at 20:41

## 2018-04-16 RX ADMIN — Medication 10 ML: at 20:51

## 2018-04-16 RX ADMIN — MUPIROCIN: 20 OINTMENT TOPICAL at 08:59

## 2018-04-16 RX ADMIN — FUROSEMIDE 40 MG: 40 TABLET ORAL at 08:54

## 2018-04-16 RX ADMIN — POTASSIUM CHLORIDE 20 MEQ: 20 TABLET, EXTENDED RELEASE ORAL at 09:46

## 2018-04-16 RX ADMIN — FAMOTIDINE 20 MG: 20 TABLET ORAL at 08:52

## 2018-04-16 RX ADMIN — POTASSIUM CHLORIDE 10 MEQ: 10 TABLET, EXTENDED RELEASE ORAL at 08:55

## 2018-04-16 RX ADMIN — GUAIFENESIN 1200 MG: 600 TABLET, EXTENDED RELEASE ORAL at 20:50

## 2018-04-16 RX ADMIN — Medication 10 ML: at 13:02

## 2018-04-16 RX ADMIN — AMIODARONE HYDROCHLORIDE 200 MG: 200 TABLET ORAL at 20:48

## 2018-04-16 RX ADMIN — METOPROLOL TARTRATE 25 MG: 25 TABLET ORAL at 20:51

## 2018-04-16 RX ADMIN — AMIODARONE HYDROCHLORIDE 200 MG: 200 TABLET ORAL at 08:55

## 2018-04-16 RX ADMIN — FERROUS SULFATE TAB 325 MG (65 MG ELEMENTAL FE) 325 MG: 325 (65 FE) TAB at 08:53

## 2018-04-16 RX ADMIN — FAMOTIDINE 20 MG: 20 TABLET ORAL at 20:48

## 2018-04-16 RX ADMIN — OXYCODONE HYDROCHLORIDE AND ACETAMINOPHEN 1 TABLET: 5; 325 TABLET ORAL at 08:59

## 2018-04-16 NOTE — PROGRESS NOTES
Problem: Mobility Impaired (Adult and Pediatric)  Goal: *Acute Goals and Plan of Care (Insert Text)  STG:  (1.)Mr. Severa Cash will move from supine to sit and sit to supine , scoot up and down and roll side to side with STAND BY ASSIST within 3 treatment day(s). (2.)Mr. Severa Cash will transfer from bed to chair and chair to bed with SUPERVISION using the least restrictive device within 3 treatment day(s). (3.)Mr. Severa Cash will ambulate with SUPERVISION for 250 feet with the least restrictive device within 3 treatment day(s). (4.)Mr. Severa Cash will perform standing static and dynamic balance activities x 15 minutes with SUPERVISION to improve safety within 3 day(s). (5.)Mr. Severa Cash will perform LE exercises with 1 to 2 cues for form within 3 days to improve strength for functional transfers and ambulation. (6.)Mr. Severa Cash will maintain stable vital signs throughout all functional mobility within 3 days. LTG:  (1.)Mr. Severa Cash will move from supine to sit and sit to supine , scoot up and down and roll side to side in bed with MODIFIED INDEPENDENCE within 7 treatment day(s). (2.)Mr. Severa Cash will transfer from bed to chair and chair to bed with MODIFIED INDEPENDENCE using the least restrictive device within 7 treatment day(s). (3.)Mr. Severa Cash will ambulate with MODIFIED INDEPENDENCE for 500+ feet with the least restrictive device within 7 treatment day(s). (4.)Mr. Severa Cash will perform standing static and dynamic balance activities x 15+ minutes with MODIFIED INDEPENDENCE to improve safety within 7 day(s). (5.)Mr. Severa Cash will ascend and descend 5 stairs using 0-2 hand rail(s) with MODIFIED INDEPENDENCE to improve functional mobility and safety within 7 day(s).   ________________________________________________________________________________________________      PHYSICAL THERAPY: Daily Note, Treatment Day: 2nd, PM 4/16/2018  INPATIENT: Hospital Day: 5  Payor: Hadley Epley / Plan: David Menjivar 954 / Product Type: PPO /      NAME/AGE/GENDER: Miguel A Lackey is a 59 y.o. male   PRIMARY DIAGNOSIS: Nonrheumatic aortic valve stenosis [I35.0]  Bicuspid aortic valve [Q23.1] Nonrheumatic aortic valve stenosis Nonrheumatic aortic valve stenosis  Procedure(s) (LRB):  CARDIAC RE ENTRY (N/A)  4 Days Post-Op  ICD-10: Treatment Diagnosis:    · Difficulty in walking, Not elsewhere classified (R26.2)   Precaution/Allergies:  Review of patient's allergies indicates no known allergies. ASSESSMENT:     Mr. Hari Bower presents standing in doorway agreeable to treatment. He increased his ambulation distance with no assistive device this treatment. He returned to room and participated in seated exercises for increased number of repetitions. Good progress noted with mobility and activity tolerance. Will continue PT efforts. PM NOTE: Patient presents coming out of bathroom agreeable to treatment. He increased his ambulation distance with no assistive device and Supervision. He returned to room and was instructed in standing exercises this treatment. Good progress toward goals noted this treatment. Will continue PT efforts. This section established at most recent assessment   PROBLEM LIST (Impairments causing functional limitations):  1. Decreased Strength  2. Decreased ADL/Functional Activities  3. Decreased Transfer Abilities  4. Decreased Ambulation Ability/Technique  5. Decreased Balance  6. Increased Pain  7. Decreased Activity Tolerance  8. Decreased Pacing Skills  9. Increased Shortness of Breath  10. Decreased Knowledge of Precautions  11. Decreased Rufe with Home Exercise Program   INTERVENTIONS PLANNED: (Benefits and precautions of physical therapy have been discussed with the patient.)  1. Balance Exercise  2. Bed Mobility  3. Family Education  4. Gait Training  5. Home Exercise Program (HEP)  6. Therapeutic Activites  7. Therapeutic Exercise/Strengthening  8. Transfer Training  9. Patient Education  10.  Group Therapy     TREATMENT PLAN: Frequency/Duration: twice daily for duration of hospital stay  Rehabilitation Potential For Stated Goals: Good     RECOMMENDED REHABILITATION/EQUIPMENT: (at time of discharge pending progress): Due to the probability of continued deficits (see above) this patient will likely need continued skilled physical therapy after discharge. Equipment:    None at this time              HISTORY:   History of Present Injury/Illness (Reason for Referral): Nonrheumatic aortic valve stenosis [I35.0]  Bicuspid aortic valve [Q23.1] Nonrheumatic aortic valve stenosis Nonrheumatic aortic valve stenosis  Procedure(s) (LRB):  CARDIAC RE ENTRY (N/A)  Past Medical History/Comorbidities:   Mr. Cheyanne Mae  has a past medical history of CAD (coronary artery disease); Depression; Drug addiction in remission [facility]); Family history of premature CAD (2/28/2018); Homeless family; Seizures ([facility] [address] (2001); Severe aortic stenosis; and Tobacco use disorder. Mr. Cheyanne Mae  has a past surgical history that includes hx heent (age 27's). Social History/Living Environment:   Home Environment: Private residence  One/Two Story Residence: One story  Living Alone: No  Support Systems: Family member(s)  Patient Expects to be Discharged to[de-identified] Private residence  Current DME Used/Available at Home: None  Prior Level of Function/Work/Activity:  Patient independent with all mobility, drives and works at baseline. Number of Personal Factors/Comorbidities that affect the Plan of Care: 1-2: MODERATE COMPLEXITY   EXAMINATION:   Most Recent Physical Functioning:   Gross Assessment:                  Posture:  Posture (WDL): Exceptions to WDL  Posture Assessment:  Forward head  Balance:  Sitting: Intact  Standing: Impaired  Standing - Static: Good  Standing - Dynamic : Fair Bed Mobility:     Wheelchair Mobility:     Transfers:  Sit to Stand: Supervision  Stand to Sit: Supervision  Gait:     Base of Support: Narrowed  Speed/Laurel: Pace decreased (<100 feet/min)  Step Length: Left shortened;Right shortened  Gait Abnormalities: Decreased step clearance  Distance (ft): 490 Feet (ft)  Assistive Device:  (none)  Ambulation - Level of Assistance: Stand-by assistance  Number of Stairs Trained: 3 (x 2)  Stairs - Level of Assistance: Stand-by assistance  Rail Use: Both  Interventions: Safety awareness training;Verbal cues      Body Structures Involved:  1. Nerves  2. Heart  3. Lungs  4. Bones  5. Joints  6. Muscles  7. Ligaments Body Functions Affected:  1. Sensory/Pain  2. Cardio  3. Respiratory  4. Neuromusculoskeletal  5. Movement Related Activities and Participation Affected:  1. Mobility  2. Self Care  3. Domestic Life  4. Interpersonal Interactions and Relationships  5. Community, Social and [place]   Number of elements that affect the Plan of Care: 4+: HIGH COMPLEXITY   CLINICAL PRESENTATION:   Presentation: Stable and uncomplicated: LOW COMPLEXITY   CLINICAL DECISION MAKIN [facility] Mobility Inpatient Short Form  How much difficulty does the patient currently have. .. Unable A Lot A Little None   1. Turning over in bed (including adjusting bedclothes, sheets and blankets)? [] 1   [] 2   [x] 3   [] 4   2. Sitting down on and standing up from a chair with arms ( e.g., wheelchair, bedside commode, etc.)   [] 1   [] 2   [x] 3   [] 4   3. Moving from lying on back to sitting on the side of the bed? [] 1   [] 2   [x] 3   [] 4   How much help from another person does the patient currently need. .. Total A Lot A Little None   4. Moving to and from a bed to a chair (including a wheelchair)? [] 1   [] 2   [x] 3   [] 4   5. Need to walk in hospital room? [] 1   [] 2   [x] 3   [] 4   6. Climbing 3-5 steps with a railing? [] 1   [x] 2   [] 3   [] 4   © , Trustees of [address] [place] [address] [address] [address], under license to [employer].  All rights reserved      Score:  Initial: 17 Most Recent: X (Date: -- )    Interpretation of Tool: Represents activities that are increasingly more difficult (i.e. Bed mobility, Transfers, Gait). Score 24 23 22-20 19-15 14-10 9-7 6     Modifier CH CI CJ CK CL CM CN      ? Mobility - Walking and Moving Around:     - CURRENT STATUS: CK - 40%-59% impaired, limited or restricted    - GOAL STATUS: CI - 1%-19% impaired, limited or restricted    - D/C STATUS:  ---------------To be determined---------------  Payor: BLUE CROSS / Plan: [place] [employer] [place] / Product Type: PPO /      Medical Necessity:     · Patient demonstrates good rehab potential due to higher previous functional level. Reason for Services/Other Comments:  · Patient continues to require modification of therapeutic interventions to increase complexity of exercises. Use of outcome tool(s) and clinical judgement create a POC that gives a: Clear prediction of patient's progress: LOW COMPLEXITY            TREATMENT:   (In addition to Assessment/Re-Assessment sessions the following treatments were rendered)   Pre-treatment Symptoms/Complaints:  \"I may go home tomorrow. \"  Pain: Initial:   Pain Intensity 1: 0 no complaints Post Session:  No complaints     Therapeutic Activity: (    10 minutes): Therapeutic activities including Chair transfers, Ambulation on level ground and standing balance activities to improve mobility, strength, balance and activity tolerance. Required minimal Safety awareness training;Verbal cues to promote static and dynamic balance in standing. Therapeutic Exercise: (15 Minutes):  Exercises per grid below to improve mobility, strength and activity tolerance. Required minimal verbal cues to promote proper body mechanics and promote proper body breathing techniques. Progressed repetitions and complexity of movement as indicated.        Date:  4-14-18 Date:  4-15-18 Date:  4-16-18   ACTIVITY/EXERCISE AM PM AM PM AM PM   Ambulation:           Distance  Device  Duration         Seated Heel Raises x20  X 20 B  x30    Seated Toe Raises x20  X 20 B  x30    Seated Long Arc Quads x20  X 20 B  x30    Seated Marching x20  X 20 B  x30    Seated Hip Abduction x20  X 20 B      Standing heel raises      x10   Standing toe raises      x10   Standing marching      x10   Standing hp abduction      x10   Standing hip extension      x10   Standing hamstring curls      x10   Standing mini squats      x10   B = bilateral; AA = active assistive; A = active; P = passive    Braces/Orthotics/Lines/Etc:   · O2 Room Air   Treatment/Session Assessment:    · Response to Treatment:  Patient tolerated treatment well with fatigue. · Interdisciplinary Collaboration:   o Physical Therapy Assistant  o Registered Nurse  · After treatment position/precautions:   o Up in chair  o Bed/Chair-wheels locked  o Bed in low position  o Call light within reach  o RN notified   · Compliance with Program/Exercises: Will assess as treatment progresses. · Recommendations/Intent for next treatment session: \"Next visit will focus on advancements to more challenging activities and reduction in assistance provided\".   Total Treatment Duration: 23 minutes  PT Patient Time In/Time Out  Time In: 1355  Time Out: 157 [facility], [facility]

## 2018-04-16 NOTE — PROGRESS NOTES
Problem: Falls - Risk of  Goal: *Absence of Falls  Document Unruly Fall Risk and appropriate interventions in the flowsheet.    Outcome: Progressing Towards Goal  Fall Risk Interventions:  Mobility Interventions: Assess mobility with egress test, Communicate number of staff needed for ambulation/transfer, Patient to call before getting OOB         Medication Interventions: Assess postural VS orthostatic hypotension, Bed/chair exit alarm, Evaluate medications/consider consulting pharmacy, Teach patient to arise slowly    Elimination Interventions: Bed/chair exit alarm, Call light in reach

## 2018-04-16 NOTE — PROGRESS NOTES
Pt had questions about low-income housing. SW informed him that Section 8 opens up their application annually and he would have to following the local news to see when it opens or their website at [email or web address] net. HARLAN also told him about other public, subsidized housing that is available that he would have to call to find out when they take applications and then go to fill them out. He understood. Information in follow-up. CM following.

## 2018-04-16 NOTE — PROGRESS NOTES
Luz Marina Eric  Admission Date: 4/12/2018             Daily Progress Note: 4/16/2018    The patient's chart is reviewed and the patient is discussed with the staff. 60 yo male with a history of tobacco / drug abuse, depression, AS, and CAD. He was recently referred to Cardiology after he was found to have a heart murmur during an exam. Echo and cardiac cath revealed severe AS. He also has mild, nonobstructive CAD. He has smoked all of his adult life - about 40 years, 1/2 pack per day. His wife has noticed some dyspnea but no wheezing. He has never been evaluated for any chronic lung disease. Patient is now s/p AVR on 4/12/18 per Dr. Margarito Ruiz. Post-op his course has been marked by 800mL bloody output from mediastinal tube with hypotension and he has been given protamine, cryo, and FFP/ blood. Required return to OR with evacuation of clot on the same day. Subjective:      On RA   No complains      Current Facility-Administered Medications   Medication Dose Route Frequency    guaiFENesin ER (MUCINEX) tablet 1,200 mg  1,200 mg Oral Q12H    bisacodyl (DULCOLAX) tablet 10 mg  10 mg Oral DAILY PRN    magnesium hydroxide (MILK OF MAGNESIA) 400 mg/5 mL oral suspension 30 mL  30 mL Oral DAILY PRN    0.9% sodium chloride infusion 250 mL  250 mL IntraVENous PRN    senna-docusate (PERICOLACE) 8.6-50 mg per tablet 2 Tab  2 Tab Oral QHS    sodium chloride (NS) flush 5-10 mL  5-10 mL IntraVENous Q8H    sodium chloride (NS) flush 5-10 mL  5-10 mL IntraVENous PRN    metoprolol tartrate (LOPRESSOR) tablet 25 mg  25 mg Oral Q12H    alum-mag hydroxide-simeth (MYLANTA) oral suspension 30 mL  30 mL Oral Q4H PRN    ondansetron (ZOFRAN) injection 4 mg  4 mg IntraVENous Q6H PRN    acetaminophen (TYLENOL) tablet 650 mg  650 mg Oral Q4H PRN    atorvastatin (LIPITOR) tablet 80 mg  80 mg Oral QHS    insulin regular (NOVOLIN R, HUMULIN R) injection   SubCUTAneous AC&HS    amiodarone (CORDARONE) tablet 200 mg  200 mg Oral Q12H    mupirocin (BACTROBAN) 2 % ointment   Both Nostrils BID    aspirin delayed-release tablet 81 mg  81 mg Oral DAILY    magnesium oxide (MAG-OX) tablet 400 mg  400 mg Oral TID PRN    magnesium oxide (MAG-OX) tablet 400 mg  400 mg Oral QID PRN    potassium chloride (K-DUR, KLOR-CON) SR tablet 20 mEq  20 mEq Oral BID PRN    potassium chloride (K-DUR, KLOR-CON) SR tablet 40 mEq  40 mEq Oral BID PRN    nitroglycerin (NITROLINGUAL) sublingual 0.4 mg/spray  1 Spray SubLINGual Q5MIN PRN    tapentadol (NUCYNTA) tablet 100 mg  100 mg Oral Q4H PRN    ferrous sulfate tablet 325 mg  1 Tab Oral DAILY WITH BREAKFAST    traMADol (ULTRAM) tablet 50 mg  50 mg Oral Q6H PRN    famotidine (PEPCID) tablet 20 mg  20 mg Oral Q12H    oxyCODONE-acetaminophen (PERCOCET) 5-325 mg per tablet 1 Tab  1 Tab Oral Q4H PRN       Review of Systems  Constitutional: negative for fever, chills, sweats  Cardiovascular: negative for chest pain, palpitations, syncope, edema  Gastrointestinal:  negative for dysphagia, reflux, vomiting, diarrhea, abdominal pain, or melena  Neurologic:  negative for focal weakness, numbness, headache    Objective:     Vitals:    04/15/18 2300 04/16/18 0307 04/16/18 0540 04/16/18 0730   BP: 106/57 133/60  117/57   Pulse: 75 89  85   Resp: 18 18  20   Temp: 100 °F (37.8 °C) 99.7 °F (37.6 °C)  98.1 °F (36.7 °C)   SpO2: 92% 92%  91%   Weight:   186 lb 6.4 oz (84.6 kg)    Height:         Intake and Output:   04/14 1901 - 04/16 0700  In: 1000 [P.O.:1000]  Out: -   04/16 0701 - 04/16 1900  In: 120 [P.O.:120]  Out: -     Physical Exam:   Constitution:  the patient is well developed and in no acute distress  EENMT:  Sclera clear, pupils equal, oral mucosa moist  Respiratory: diminished bilaterally with crackles at posterior bases, O2 at 2 lpm  Cardiovascular:  RRR without M,G,R  Gastrointestinal: soft and non-tender; with positive bowel sounds. Musculoskeletal: warm without cyanosis.  There is no lower leg edema. Skin:  no jaundice or rashes, midline sternal incision without redness, swelling or drainage   Neurologic: no gross neuro deficits     Psychiatric:  alert and oriented x 3    CXR:   4/13 4/13 KUB  IMPRESSION: Probable small pneumoperitoneum. Left lateral decubitus view of the  abdomen or CT scan would help definitively differentiate this from a small  basilar pneumothorax if clinically indicated. LAB  Recent Labs      04/16/18   0602  04/15/18   2049  04/15/18   1600  04/15/18   1116  04/15/18   0603   GLUCPOC  106*  146*  112*  114*  94      Recent Labs      04/16/18   0508  04/15/18   0535  04/14/18   1945  04/14/18   0419   WBC   --   15.2*   --   19.1*   HGB  7.9*  7.9*  7.9*  7.6*   HCT  23.7*  23.9*  23.7*  22.7*   PLT   --   84*   --   85*     Recent Labs      04/16/18   0508  04/15/18   0535  04/14/18   0419   NA   --   143  142   K  3.7  3.7  4.1   CL   --   107  108*   CO2   --   28  29   GLU   --   85  139*   BUN   --   34*  26*   CREA   --   0.73*  0.79*   MG  2.3   --   2.2   CA   --   8.1*  8.1*     No results for input(s): PH, PCO2, PO2, HCO3 in the last 72 hours. No results for input(s): LCAD, LAC in the last 72 hours.       Assessment:  (Medical Decision Making)     Hospital Problems  Date Reviewed: 4/14/2018          Codes Class Noted POA    Thrombocytopenia due to extra corporeal by-pass circulation ICD-10-CM: D69.59  ICD-9-CM: 287.49  4/13/2018 No    plt 84      Postoperative anemia due to acute blood loss ICD-10-CM: D62  ICD-9-CM: 285.1  4/13/2018 No    Hgb 7.9 post transfusion      Status post aortic valve replacement ICD-10-CM: Z95.2  ICD-9-CM: V43.3  4/12/2018 No        Respiratory failure, post-operative ([address] ICD-10-CM: R88.495  ICD-9-CM: 518.51  4/12/2018 No    Currently on o2 at 2 lpm      Hypoxia ICD-10-CM: R09.02  ICD-9-CM: 799.02  4/12/2018 No        * (Principal)Nonrheumatic aortic valve stenosis ICD-10-CM: I35.0  ICD-9-CM: 424.1  2/7/2018 Yes 4/12/18 (Dr Kristine Pereyra) Aortic valve replacement with a 25 mm Magna Ease Cherelle-Prince pericardial valve  4/12/18 (Dr Kristine Pereyra) Cardiac reentry with evacuation of clot. Tobacco use disorder (Chronic) ICD-10-CM: F17.200  ICD-9-CM: 305.1  Unknown Yes         WBC 19.1 >>>15.2    Plan:  (Medical Decision Making)     --  -on RA,home in AM  -abnormal overnight oximetry, has symptoms of DIEGO ,agree to be set up for PSG ,and we will   add mucinex      This note was signed electronically. Errors are unfortunately her likely due to dictation software.

## 2018-04-16 NOTE — PROGRESS NOTES
Subjective:   No complaint    Objective:     Patient Vitals for the past 8 hrs:   BP Temp Pulse Resp SpO2 Weight   18 0730 117/57 98.1 °F (36.7 °C) 85 20 91 % -   18 0540 - - - - - 186 lb 6.4 oz (84.6 kg)   18 0307 133/60 99.7 °F (37.6 °C) 89 18 92 % -     Temp (24hrs), Av.1 °F (37.3 °C), Min:98.1 °F (36.7 °C), Max:100.4 °F (38 °C)        Heart:  regular rate and rhythm, S1, S2 normal, no murmur, click, rub or gallop  Lung:  clear to auscultation bilaterally   Incisions: Clean, dry, and intact    Labs:  Recent Results (from the past 24 hour(s))   GLUCOSE, POC    Collection Time: 04/15/18 11:16 AM   Result Value Ref Range    Glucose (POC) 114 (H) 65 - 100 mg/dL   GLUCOSE, POC    Collection Time: 04/15/18  4:00 PM   Result Value Ref Range    Glucose (POC) 112 (H) 65 - 100 mg/dL   GLUCOSE, POC    Collection Time: 04/15/18  8:49 PM   Result Value Ref Range    Glucose (POC) 146 (H) 65 - 100 mg/dL   PLEASE READ & DOCUMENT PPD TEST IN 72 HRS    Collection Time: 04/15/18  9:00 PM   Result Value Ref Range    PPD negative Negative    mm Induration 0 mm   HGB & HCT    Collection Time: 18  5:08 AM   Result Value Ref Range    HGB 7.9 (L) 13.6 - 17.2 g/dL    HCT 23.7 (L) 41.1 - 50.3 %   MAGNESIUM    Collection Time: 18  5:08 AM   Result Value Ref Range    Magnesium 2.3 1.8 - 2.4 mg/dL   POTASSIUM    Collection Time: 18  5:08 AM   Result Value Ref Range    Potassium 3.7 3.5 - 5.1 mmol/L   GLUCOSE, POC    Collection Time: 18  6:02 AM   Result Value Ref Range    Glucose (POC) 106 (H) 65 - 100 mg/dL       Assessment:     Principal Problem:    Nonrheumatic aortic valve stenosis (2018)      Overview: 18 (Dr Krys Coburn) Aortic valve replacement with a 25 mm Magna Ease       Cherelle-Prince pericardial valve      18 (Dr Krys Coburn) Cardiac reentry with evacuation of clot.     Active Problems:    Tobacco use disorder ()      Status post aortic valve replacement (2018) Respiratory failure, post-operative ([facility] [address] (4/12/2018)      Hypoxia (4/12/2018)      Thrombocytopenia due to extra corporeal by-pass circulation (4/13/2018)      Postoperative anemia due to acute blood loss (4/13/2018)        Plan/Recommendations/Medical Decision Making:     Stable, Hct low but asymptomatic, home in am    See orders

## 2018-04-16 NOTE — PROGRESS NOTES
[facility] CARDIOLOGY PROGRESS NOTE           4/16/2018 8:32 AM    Admit Date: 4/12/2018         Subjective: no complaints remains in NSR    ROS:  Cardiovascular:  As noted above    Objective:      Vitals:    04/15/18 2300 04/16/18 0307 04/16/18 0540 04/16/18 0730   BP: 106/57 133/60  117/57   Pulse: 75 89  85   Resp: 18 18  20   Temp: 100 °F (37.8 °C) 99.7 °F (37.6 °C)  98.1 °F (36.7 °C)   SpO2: 92% 92%  91%   Weight:   84.6 kg (186 lb 6.4 oz)    Height:           On telemetry: no arrhythmia were noted      Physical Exam:  General: Well Developed, Well Nourished, No Acute Distress, Alert & Oriented x 3, Appropriate mood  Neck: supple, no JVD  Heart: S1S2 with RRR without murmurs or gallops  Lungs: Clear throughout auscultation bilaterally without adventitious sounds  Abd: soft, nontender, nondistended, with good bowel sounds  Ext: no edema bilaterally  Skin: warm and dry      Data Review:   Recent Labs      04/16/18   0508  04/15/18   0535   04/14/18   0419   NA   --   143   --   142   K  3.7  3.7   --   4.1   MG  2.3   --    --   2.2   BUN   --   34*   --   26*   CREA   --   0.73*   --   0.79*   GLU   --   85   --   139*   WBC   --   15.2*   --   19.1*   HGB  7.9*  7.9*   < >  7.6*   HCT  23.7*  23.9*   < >  22.7*   PLT   --   84*   --   85*    < > = values in this interval not displayed. No results for input(s): [place] in the last 72 hours. Assessment/Plan:     Principal Problem:    Nonrheumatic aortic valve stenosis (2/7/2018)      Overview: 4/12/18 (Dr Cindy Solomon) Aortic valve replacement with a 25 mm Magna Ease       Cherelle-Prince pericardial valve      4/12/18 (Dr Cindy Solomon) Cardiac reentry with evacuation of clot.     Active Problems:    Tobacco use disorder ()      Status post aortic valve replacement (4/12/2018)      Respiratory failure, post-operative (H[facility]) (4/12/2018)      Hypoxia (4/12/2018)      Thrombocytopenia due to extra corporeal by-pass circulation (4/13/2018)      Postoperative anemia due to acute blood loss (4/13/2018)    1) AVR surgery - temp wires pulled yesterday doing well on metop and amiodarone  2) Anemia - HGB less that 8 consider transfusion will defer to CTS  3) HTN - BP is low  4) Fluid mgmt euvolemic continue yvonne Knight MD  4/16/2018 8:32 AM

## 2018-04-16 NOTE — PROGRESS NOTES
Cardiac Rehab: Spoke with patient regarding referral to cardiac rehab. Patient meets admission criteria based on Aortic Valve Replacement(4/12/18). Discussed lifestyle modifications to promote cardiac wellness. Patient indicated that he wants to participate in the cardiac rehab program if financial obligation allows. We will pre-cert with pt insurance and inform him of estimation of coverage. Pt patient will be contacted. Cardiologist is Dr. Hannah Christianson.       Thank you,  JOE CardosoN, RN  Cardiopulmonary Rehabilitation Nurse Liaison  [facility]

## 2018-04-16 NOTE — PROGRESS NOTES
Problem: Mobility Impaired (Adult and Pediatric)  Goal: *Acute Goals and Plan of Care (Insert Text)  STG:  (1.)Mr. Alison Mendez will move from supine to sit and sit to supine , scoot up and down and roll side to side with STAND BY ASSIST within 3 treatment day(s). (2.)Mr. Alison Mendez will transfer from bed to chair and chair to bed with SUPERVISION using the least restrictive device within 3 treatment day(s). (3.)Mr. Alison Mendez will ambulate with SUPERVISION for 250 feet with the least restrictive device within 3 treatment day(s). (4.)Mr. Alison Mendez will perform standing static and dynamic balance activities x 15 minutes with SUPERVISION to improve safety within 3 day(s). (5.)Mr. Alison Mendez will perform LE exercises with 1 to 2 cues for form within 3 days to improve strength for functional transfers and ambulation. (6.)Mr. Alison Mendez will maintain stable vital signs throughout all functional mobility within 3 days. LTG:  (1.)Mr. Alison Mendez will move from supine to sit and sit to supine , scoot up and down and roll side to side in bed with MODIFIED INDEPENDENCE within 7 treatment day(s). (2.)Mr. Alison Mendez will transfer from bed to chair and chair to bed with MODIFIED INDEPENDENCE using the least restrictive device within 7 treatment day(s). (3.)Mr. Alison Mendez will ambulate with MODIFIED INDEPENDENCE for 500+ feet with the least restrictive device within 7 treatment day(s). (4.)Mr. Ailson Mendez will perform standing static and dynamic balance activities x 15+ minutes with MODIFIED INDEPENDENCE to improve safety within 7 day(s). (5.)Mr. Alison Mendez will ascend and descend 5 stairs using 0-2 hand rail(s) with MODIFIED INDEPENDENCE to improve functional mobility and safety within 7 day(s).   ________________________________________________________________________________________________      PHYSICAL THERAPY: Daily Note, Treatment Day: 2nd, AM 4/16/2018  INPATIENT: Hospital Day: 5  Payor: Lanier Rubinstein / Plan: David Menjivar 954 / Product Type: PPO /      NAME/AGE/GENDER: Jabari Rea is a 59 y.o. male   PRIMARY DIAGNOSIS: Nonrheumatic aortic valve stenosis [I35.0]  Bicuspid aortic valve [Q23.1] Nonrheumatic aortic valve stenosis Nonrheumatic aortic valve stenosis  Procedure(s) (LRB):  CARDIAC RE ENTRY (N/A)  4 Days Post-Op  ICD-10: Treatment Diagnosis:    · Difficulty in walking, Not elsewhere classified (R26.2)   Precaution/Allergies:  Review of patient's allergies indicates no known allergies. ASSESSMENT:     Mr. Whitney Jauregui presents standing in doorway agreeable to treatment. He increased his ambulation distance with no assistive device this treatment. He returned to room and participated in seated exercises for increased number of repetitions. Good progress noted with mobility and activity tolerance. Will continue PT efforts. This section established at most recent assessment   PROBLEM LIST (Impairments causing functional limitations):  1. Decreased Strength  2. Decreased ADL/Functional Activities  3. Decreased Transfer Abilities  4. Decreased Ambulation Ability/Technique  5. Decreased Balance  6. Increased Pain  7. Decreased Activity Tolerance  8. Decreased Pacing Skills  9. Increased Shortness of Breath  10. Decreased Knowledge of Precautions  11. Decreased [place] with Home Exercise Program   INTERVENTIONS PLANNED: (Benefits and precautions of physical therapy have been discussed with the patient.)  1. Balance Exercise  2. Bed Mobility  3. Family Education  4. Gait Training  5. Home Exercise Program (HEP)  6. Therapeutic Activites  7. Therapeutic Exercise/Strengthening  8. Transfer Training  9. Patient Education  10.  Group Therapy     TREATMENT PLAN: Frequency/Duration: twice daily for duration of hospital stay  Rehabilitation Potential For Stated Goals: Good     RECOMMENDED REHABILITATION/EQUIPMENT: (at time of discharge pending progress): Due to the probability of continued deficits (see above) this patient will likely need continued skilled physical therapy after discharge. Equipment:    None at this time              HISTORY:   History of Present Injury/Illness (Reason for Referral): Nonrheumatic aortic valve stenosis [I35.0]  Bicuspid aortic valve [Q23.1] Nonrheumatic aortic valve stenosis Nonrheumatic aortic valve stenosis  Procedure(s) (LRB):  CARDIAC RE ENTRY (N/A)  Past Medical History/Comorbidities:   Mr. Omar Hong  has a past medical history of CAD (coronary artery disease); Depression; Drug addiction in remission [facility]); Family history of premature CAD (2/28/2018); Homeless family; Seizures ([facility] [address] (2001); Severe aortic stenosis; and Tobacco use disorder. Mr. Omar Hong  has a past surgical history that includes hx heent (age 27's). Social History/Living Environment:   Home Environment: Private residence  One/Two Story Residence: One story  Living Alone: No  Support Systems: Family member(s)  Patient Expects to be Discharged to[de-identified] Private residence  Current DME Used/Available at Home: None  Prior Level of Function/Work/Activity:  Patient independent with all mobility, drives and works at baseline. Number of Personal Factors/Comorbidities that affect the Plan of Care: 1-2: MODERATE COMPLEXITY   EXAMINATION:   Most Recent Physical Functioning:   Gross Assessment:                  Posture:  Posture (WDL): Exceptions to WDL  Posture Assessment: Forward head  Balance:  Sitting: Intact  Standing: Impaired  Standing - Static: Good  Standing - Dynamic : Fair Bed Mobility:     Wheelchair Mobility:     Transfers:  Sit to Stand:  (Patient is standing upon contacd)  Stand to Sit: Stand-by assistance  Gait:     Base of Support: Narrowed  Speed/[place]: Shuffled; Slow  Step Length: Left shortened;Right shortened  Gait Abnormalities: Decreased step clearance  Distance (ft): 250 Feet (ft)  Assistive Device:  (handheld assistance)  Ambulation - Level of Assistance: Stand-by assistance  Interventions: Safety awareness training;Verbal cues Body Structures Involved:  1. Nerves  2. Heart  3. Lungs  4. Bones  5. Joints  6. Muscles  7. Ligaments Body Functions Affected:  1. Sensory/Pain  2. Cardio  3. Respiratory  4. Neuromusculoskeletal  5. Movement Related Activities and Participation Affected:  1. Mobility  2. Self Care  3. Domestic Life  4. Interpersonal Interactions and Relationships  5. Community, Social and [place]   Number of elements that affect the Plan of Care: 4+: HIGH COMPLEXITY   CLINICAL PRESENTATION:   Presentation: Stable and uncomplicated: LOW COMPLEXITY   CLINICAL DECISION MAKIN [facility] Inpatient Short Form  How much difficulty does the patient currently have. .. Unable A Lot A Little None   1. Turning over in bed (including adjusting bedclothes, sheets and blankets)? [] 1   [] 2   [x] 3   [] 4   2. Sitting down on and standing up from a chair with arms ( e.g., wheelchair, bedside commode, etc.)   [] 1   [] 2   [x] 3   [] 4   3. Moving from lying on back to sitting on the side of the bed? [] 1   [] 2   [x] 3   [] 4   How much help from another person does the patient currently need. .. Total A Lot A Little None   4. Moving to and from a bed to a chair (including a wheelchair)? [] 1   [] 2   [x] 3   [] 4   5. Need to walk in hospital room? [] 1   [] 2   [x] 3   [] 4   6. Climbing 3-5 steps with a railing? [] 1   [x] 2   [] 3   [] 4   © , Trustees of [address] [place], [place] [id number], under license to [employer]. All rights reserved      Score:  Initial: 17 Most Recent: X (Date: -- )    Interpretation of Tool:  Represents activities that are increasingly more difficult (i.e. Bed mobility, Transfers, Gait). Score 24 23 22-20 19-15 14-10 9-7 6     Modifier CH CI CJ CK CL CM CN      ?  Mobility - Walking and Moving Around:     - CURRENT STATUS: CK - 40%-59% impaired, limited or restricted    - GOAL STATUS: CI - 1%-19% impaired, limited or restricted    - D/C STATUS:  ---------------To be determined---------------  Payor: BLUE CROSS / Plan: [place] BLUE [facility] / Product Type: PPO /      Medical Necessity:     · Patient demonstrates good rehab potential due to higher previous functional level. Reason for Services/Other Comments:  · Patient continues to require modification of therapeutic interventions to increase complexity of exercises. Use of outcome tool(s) and clinical judgement create a POC that gives a: Clear prediction of patient's progress: LOW COMPLEXITY            TREATMENT:   (In addition to Assessment/Re-Assessment sessions the following treatments were rendered)   Pre-treatment Symptoms/Complaints:  \"I will try it without the walker. \"  Pain: Initial:   Pain Intensity 1: 0 no complaints Post Session:  No complaints     Therapeutic Activity: (    8 minutes): Therapeutic activities including Chair transfers, Ambulation on level ground and standing balance activities to improve mobility, strength, balance and activity tolerance. Required minimal Safety awareness training;Verbal cues to promote static and dynamic balance in standing. Therapeutic Exercise: (15 Minutes):  Exercises per grid below to improve mobility, strength and activity tolerance. Required minimal verbal cues to promote proper body mechanics and promote proper body breathing techniques. Progressed repetitions and complexity of movement as indicated.        Date:  4-14-18 Date:  4-15-18 Date:  4-16-18   ACTIVITY/EXERCISE AM PM AM PM AM PM   Ambulation:           Distance  Device  Duration         Seated Heel Raises x20  X 20 B  x30    Seated Toe Raises x20  X 20 B  x30    Seated Long Arc Quads x20  X 20 B  x30    Seated Marching x20  X 20 B  x30    Seated Hip Abduction x20  X 20 B               B = bilateral; AA = active assistive; A = active; P = passive    Braces/Orthotics/Lines/Etc:   · O2 Room Air   Treatment/Session Assessment:    · Response to Treatment:  Patient tolerated treatment well with fatigue. · Interdisciplinary Collaboration:   o Physical Therapy Assistant  o Registered Nurse  · After treatment position/precautions:   o Up in chair  o Bed/Chair-wheels locked  o Bed in low position  o Call light within reach  o RN notified   · Compliance with Program/Exercises: Will assess as treatment progresses. · Recommendations/Intent for next treatment session: \"Next visit will focus on advancements to more challenging activities and reduction in assistance provided\".   Total Treatment Duration: 23 minutes  PT Patient Time In/Time Out  Time In: 1055  Time Out: 87 Leslie Holcomb, PTA

## 2018-04-17 VITALS
DIASTOLIC BLOOD PRESSURE: 56 MMHG | HEIGHT: 73 IN | OXYGEN SATURATION: 92 % | HEART RATE: 84 BPM | RESPIRATION RATE: 18 BRPM | BODY MASS INDEX: 20.89 KG/M2 | TEMPERATURE: 99.8 F | WEIGHT: 157.6 LBS | SYSTOLIC BLOOD PRESSURE: 108 MMHG

## 2018-04-17 PROBLEM — R09.02 HYPOXIA: Status: RESOLVED | Noted: 2018-04-12 | Resolved: 2018-04-17

## 2018-04-17 LAB
ERYTHROCYTE [DISTWIDTH] IN BLOOD BY AUTOMATED COUNT: 13.7 % (ref 11.9–14.6)
HCT VFR BLD AUTO: 26.3 % (ref 41.1–50.3)
HGB BLD-MCNC: 8.7 G/DL (ref 13.6–17.2)
MCH RBC QN AUTO: 30 PG (ref 26.1–32.9)
MCHC RBC AUTO-ENTMCNC: 33.1 G/DL (ref 31.4–35)
MCV RBC AUTO: 90.7 FL (ref 79.6–97.8)
PLATELET # BLD AUTO: 186 K/UL (ref 150–450)
PMV BLD AUTO: 9.6 FL (ref 10.8–14.1)
RBC # BLD AUTO: 2.9 M/UL (ref 4.23–5.67)
WBC # BLD AUTO: 11.8 K/UL (ref 4.3–11.1)

## 2018-04-17 PROCEDURE — 97530 THERAPEUTIC ACTIVITIES: CPT

## 2018-04-17 PROCEDURE — 85027 COMPLETE CBC AUTOMATED: CPT | Performed by: NURSE PRACTITIONER

## 2018-04-17 PROCEDURE — 74011250637 HC RX REV CODE- 250/637: Performed by: THORACIC SURGERY (CARDIOTHORACIC VASCULAR SURGERY)

## 2018-04-17 PROCEDURE — 77030012891

## 2018-04-17 PROCEDURE — 36415 COLL VENOUS BLD VENIPUNCTURE: CPT | Performed by: NURSE PRACTITIONER

## 2018-04-17 PROCEDURE — 97110 THERAPEUTIC EXERCISES: CPT

## 2018-04-17 PROCEDURE — 74011250637 HC RX REV CODE- 250/637: Performed by: NURSE PRACTITIONER

## 2018-04-17 RX ORDER — METOPROLOL TARTRATE 25 MG/1
25 TABLET, FILM COATED ORAL EVERY 12 HOURS
Qty: 60 TAB | Refills: 5 | Status: SHIPPED | OUTPATIENT
Start: 2018-04-17 | End: 2018-05-08 | Stop reason: SDUPTHER

## 2018-04-17 RX ORDER — AMOXICILLIN 250 MG
CAPSULE ORAL
Qty: 1 TAB | Refills: 0 | Status: SHIPPED
Start: 2018-04-17 | End: 2018-04-24

## 2018-04-17 RX ORDER — OXYCODONE AND ACETAMINOPHEN 5; 325 MG/1; MG/1
1 TABLET ORAL
Qty: 30 TAB | Refills: 0 | Status: SHIPPED | OUTPATIENT
Start: 2018-04-17 | End: 2018-05-31

## 2018-04-17 RX ORDER — ATORVASTATIN CALCIUM 20 MG/1
20 TABLET, FILM COATED ORAL
Qty: 30 TAB | Refills: 5 | Status: SHIPPED | OUTPATIENT
Start: 2018-04-17 | End: 2018-05-08 | Stop reason: SDUPTHER

## 2018-04-17 RX ORDER — TRAMADOL HYDROCHLORIDE 50 MG/1
50 TABLET ORAL
Qty: 20 TAB | Refills: 0 | Status: SHIPPED | OUTPATIENT
Start: 2018-04-17 | End: 2018-05-31

## 2018-04-17 RX ORDER — ACETAMINOPHEN 325 MG/1
TABLET ORAL
Qty: 1 TAB | Refills: 0 | Status: SHIPPED
Start: 2018-04-17 | End: 2018-04-24

## 2018-04-17 RX ORDER — LANOLIN ALCOHOL/MO/W.PET/CERES
325 CREAM (GRAM) TOPICAL
Qty: 30 TAB | Refills: 0 | Status: SHIPPED | OUTPATIENT
Start: 2018-04-18 | End: 2018-05-08 | Stop reason: SDUPTHER

## 2018-04-17 RX ADMIN — FERROUS SULFATE TAB 325 MG (65 MG ELEMENTAL FE) 325 MG: 325 (65 FE) TAB at 08:39

## 2018-04-17 RX ADMIN — TRAMADOL HYDROCHLORIDE 50 MG: 50 TABLET, FILM COATED ORAL at 08:39

## 2018-04-17 RX ADMIN — ASPIRIN 81 MG: 81 TABLET, COATED ORAL at 11:21

## 2018-04-17 RX ADMIN — FAMOTIDINE 20 MG: 20 TABLET ORAL at 08:39

## 2018-04-17 RX ADMIN — METOPROLOL TARTRATE 25 MG: 25 TABLET ORAL at 08:39

## 2018-04-17 RX ADMIN — AMIODARONE HYDROCHLORIDE 200 MG: 200 TABLET ORAL at 08:39

## 2018-04-17 RX ADMIN — GUAIFENESIN 1200 MG: 600 TABLET, EXTENDED RELEASE ORAL at 08:39

## 2018-04-17 RX ADMIN — Medication 10 ML: at 05:10

## 2018-04-17 NOTE — PROGRESS NOTES
[facility] CARDIOLOGY PROGRESS NOTE           4/17/2018 8:32 AM    Admit Date: 4/12/2018         Subjective: no complaints remains in NSR prob home today    ROS:  Cardiovascular:  As noted above    Objective:      Vitals:    04/16/18 2226 04/17/18 0342 04/17/18 0344 04/17/18 0642   BP: 113/57 128/63  108/56   Pulse: 77 84  84   Resp: 18 18 18   Temp: 98.8 °F (37.1 °C) 99.5 °F (37.5 °C)  99.8 °F (37.7 °C)   SpO2: 92% 92%  92%   Weight:   71.5 kg (157 lb 9.6 oz)    Height:   6' 1\" (1.854 m)        On telemetry: no arrhythmia were noted      Physical Exam:  General: Well Developed, Well Nourished, No Acute Distress, Alert & Oriented x 3, Appropriate mood  Neck: supple, no JVD  Heart: S1S2 with RRR without murmurs or gallops  Lungs: Clear throughout auscultation bilaterally without adventitious sounds  Abd: soft, nontender, nondistended, with good bowel sounds  Ext: no edema bilaterally  Skin: warm and dry      Data Review:   Recent Labs      04/16/18   0508  04/15/18   0535   NA   --   143   K  3.7  3.7   MG  2.3   --    BUN   --   34*   CREA   --   0.73*   GLU   --   85   WBC   --   15.2*   HGB  7.9*  7.9*   HCT  23.7*  23.9*   PLT   --   84*       No results for input(s): TNIPOC, TROIQ in the last 72 hours. Assessment/Plan:     Principal Problem:    Nonrheumatic aortic valve stenosis (2/7/2018)      Overview: 4/12/18 (Dr Malcolm Mercedes) Aortic valve replacement with a 25 mm Magna Ease       Cherelle-Prince pericardial valve      4/12/18 (Dr Malcolm Mercedes) Cardiac reentry with evacuation of clot.     Active Problems:    Tobacco use disorder ()      Status post aortic valve replacement (4/12/2018)      Respiratory failure, post-operative (HCC) (4/12/2018)      Hypoxia (4/12/2018)      Thrombocytopenia due to extra corporeal by-pass circulation (4/13/2018)      Postoperative anemia due to acute blood loss (4/13/2018)    1) AVR surgery - temp wires pulled yesterday doing well on metop and amiodarone  2) Anemia - HGB less that 8 consider transfusion will defer to CTS  3) HTN - BP is low  4) Fluid mgmt euvolemic continue yvonne Matute MD  4/17/2018 8:32 AM

## 2018-04-17 NOTE — PROGRESS NOTES
Chart reviewed. Patient still on RA. In shower when tried to see. No additional pulmonary input at this time. We will sign off but please let us know if new issues arise.      Kimberly Vee MD

## 2018-04-17 NOTE — PROGRESS NOTES
Cardiac Rehab: Spoke with patient regarding referral to cardiac rehab. Patient meets admission criteria based on AVR(4/12/18). Discussed lifestyle modifications to promote cardiac wellness. Patient indicated that he want to participate in the cardiac rehab program. He states that he is scheduled to have hernia surgery in 3 to 4 weeks as allowed. Orientation has been scheduled for 5/29/18 at 1pm.  Cardiologist is Dr. Bryce Farmer.       Thank you,  Eddie Miller, JOEN, RN  Cardiopulmonary Rehabilitation Nurse Liaison  [facility]

## 2018-04-17 NOTE — PROGRESS NOTES
Bedside shift report received from Lupillo Colbert RN (offgoing nurse). Report given to Frida Goetz RN. Vital signs stable. No complaints present. Patient in stable condition.

## 2018-04-17 NOTE — PROGRESS NOTES
Patient shown discharge video and given the recovery diary. Opportunity for questions provided. Patient voices understanding.

## 2018-04-17 NOTE — PROGRESS NOTES
Problem: Mobility Impaired (Adult and Pediatric)  Goal: *Acute Goals and Plan of Care (Insert Text)  STG:  (1.)Mr. Fisher will move from supine to sit and sit to supine , scoot up and down and roll side to side with STAND BY ASSIST within 3 treatment day(s). (2.)Mr. Fisher will transfer from bed to chair and chair to bed with SUPERVISION using the least restrictive device within 3 treatment day(s). (3.)Mr. Fisher will ambulate with SUPERVISION for 250 feet with the least restrictive device within 3 treatment day(s). (4.)Mr. Fisher will perform standing static and dynamic balance activities x 15 minutes with SUPERVISION to improve safety within 3 day(s). (5.)Mr. Fisher will perform LE exercises with 1 to 2 cues for form within 3 days to improve strength for functional transfers and ambulation. (6.)Mr. Fisher will maintain stable vital signs throughout all functional mobility within 3 days. LTG:  (1.)Mr. Fisher will move from supine to sit and sit to supine , scoot up and down and roll side to side in bed with MODIFIED INDEPENDENCE within 7 treatment day(s). (2.)Mr. Fisher will transfer from bed to chair and chair to bed with MODIFIED INDEPENDENCE using the least restrictive device within 7 treatment day(s). (3.)Mr. Fisher will ambulate with MODIFIED INDEPENDENCE for 500+ feet with the least restrictive device within 7 treatment day(s). (4.)Mr. Fisher will perform standing static and dynamic balance activities x 15+ minutes with MODIFIED INDEPENDENCE to improve safety within 7 day(s). (5.)Mr. Fisher will ascend and descend 5 stairs using 0-2 hand rail(s) with MODIFIED INDEPENDENCE to improve functional mobility and safety within 7 day(s).   ________________________________________________________________________________________________      PHYSICAL THERAPY: Daily Note, Treatment Day: 3rd, AM 4/17/2018  INPATIENT: Hospital Day: 6  Payor: Rajeev Valdes / Plan: [address] / Product Type: PPO /      NAME/AGE/GENDER: Chris Harden is a 59 y.o. male   PRIMARY DIAGNOSIS: Nonrheumatic aortic valve stenosis [I35.0]  Bicuspid aortic valve [Q23.1] Nonrheumatic aortic valve stenosis Nonrheumatic aortic valve stenosis  Procedure(s) (LRB):  CARDIAC RE ENTRY (N/A)  5 Days Post-Op  ICD-10: Treatment Diagnosis:    · Difficulty in walking, Not elsewhere classified (R26.2)   Precaution/Allergies:  Review of patient's allergies indicates no known allergies. ASSESSMENT:     Mr. Itzel Meza presents sitting in chair, and is agreeable to treatment. Patient perform sit to stand transfer, Supervision. Patient ambulated 500 ft, SBA. Patient returned to room, and performed standing exercises, verbal cues for good exercise technique. Gave patient a home exercise program consisting of seated and standing exercises. Patient perform stand to sit transfer, Supervision. Patient is making good progress toward goals. Will continue PT efforts. This section established at most recent assessment   PROBLEM LIST (Impairments causing functional limitations):  1. Decreased Strength  2. Decreased ADL/Functional Activities  3. Decreased Transfer Abilities  4. Decreased Ambulation Ability/Technique  5. Decreased Balance  6. Increased Pain  7. Decreased Activity Tolerance  8. Decreased Pacing Skills  9. Increased Shortness of Breath  10. Decreased Knowledge of Precautions  11. Decreased [place] with Home Exercise Program   INTERVENTIONS PLANNED: (Benefits and precautions of physical therapy have been discussed with the patient.)  1. Balance Exercise  2. Bed Mobility  3. Family Education  4. Gait Training  5. Home Exercise Program (HEP)  6. Therapeutic Activites  7. Therapeutic Exercise/Strengthening  8. Transfer Training  9. Patient Education  10.  Group Therapy     TREATMENT PLAN: Frequency/Duration: twice daily for duration of hospital stay  Rehabilitation Potential For Stated Goals: Good     RECOMMENDED REHABILITATION/EQUIPMENT: (at time of discharge pending progress): Due to the probability of continued deficits (see above) this patient will likely need continued skilled physical therapy after discharge. Equipment:    None at this time              HISTORY:   History of Present Injury/Illness (Reason for Referral): Nonrheumatic aortic valve stenosis [I35.0]  Bicuspid aortic valve [Q23.1] Nonrheumatic aortic valve stenosis Nonrheumatic aortic valve stenosis  Procedure(s) (LRB):  CARDIAC RE ENTRY (N/A)  Past Medical History/Comorbidities:   Mr. Karl Spears  has a past medical history of CAD (coronary artery disease); Depression; Drug addiction in remission [facility]); Family history of premature CAD (2/28/2018); Homeless family; Seizures ([address] (2001); Severe aortic stenosis; and Tobacco use disorder. Mr. Karl Spears  has a past surgical history that includes hx heent (age 27's). Social History/Living Environment:   Home Environment: Private residence  One/Two Story Residence: One story  Living Alone: No  Support Systems: Family member(s)  Patient Expects to be Discharged to[de-identified] Private residence  Current DME Used/Available at Home: None  Prior Level of Function/Work/Activity:  Patient independent with all mobility, drives and works at baseline. Number of Personal Factors/Comorbidities that affect the Plan of Care: 1-2: MODERATE COMPLEXITY   EXAMINATION:   Most Recent Physical Functioning:   Gross Assessment:                  Posture:  Posture (WDL): Exceptions to WDL  Posture Assessment:  Forward head  Balance:  Sitting: Intact  Standing: Impaired  Standing - Static: Good  Standing - Dynamic : Fair Bed Mobility:     Wheelchair Mobility:     Transfers:  Sit to Stand: Supervision  Stand to Sit: Supervision  Gait:     Base of Support: Narrowed  Speed/[place]: Pace decreased (<100 feet/min)  Gait Abnormalities: Decreased step clearance  Distance (ft): 500 Feet (ft)  Ambulation - Level of Assistance: Stand-by assistance      Body Structures Involved:  1. Nerves  2. Heart  3. Lungs  4. Bones  5. Joints  6. Muscles  7. Ligaments Body Functions Affected:  1. Sensory/Pain  2. Cardio  3. Respiratory  4. Neuromusculoskeletal  5. Movement Related Activities and Participation Affected:  1. Mobility  2. Self Care  3. Domestic Life  4. Interpersonal Interactions and Relationships  5. Community, Social and [place]   Number of elements that affect the Plan of Care: 4+: HIGH COMPLEXITY   CLINICAL PRESENTATION:   Presentation: Stable and uncomplicated: LOW COMPLEXITY   CLINICAL DECISION MAKIN [facility] Inpatient Short Form  How much difficulty does the patient currently have. .. Unable A Lot A Little None   1. Turning over in bed (including adjusting bedclothes, sheets and blankets)? [] 1   [] 2   [x] 3   [] 4   2. Sitting down on and standing up from a chair with arms ( e.g., wheelchair, bedside commode, etc.)   [] 1   [] 2   [x] 3   [] 4   3. Moving from lying on back to sitting on the side of the bed? [] 1   [] 2   [x] 3   [] 4   How much help from another person does the patient currently need. .. Total A Lot A Little None   4. Moving to and from a bed to a chair (including a wheelchair)? [] 1   [] 2   [x] 3   [] 4   5. Need to walk in hospital room? [] 1   [] 2   [x] 3   [] 4   6. Climbing 3-5 steps with a railing? [] 1   [x] 2   [] 3   [] 4   © , Trustees of [address] [place] [address] [address] [id number], under license to [employer]. All rights reserved      Score:  Initial: 17 Most Recent: X (Date: -- )    Interpretation of Tool:  Represents activities that are increasingly more difficult (i.e. Bed mobility, Transfers, Gait). Score 24 23 22-20 19-15 14-10 9-7 6     Modifier CH CI CJ CK CL CM CN      ?  Mobility - Walking and Moving Around:     - CURRENT STATUS: CK - 40%-59% impaired, limited or restricted    - GOAL STATUS: CI - 1%-19% impaired, limited or restricted    - D/C STATUS:  ---------------To be determined---------------  Payor: BLUE CROSS / Plan: [place] BLUE [employer] [facility] / Product Type: PPO /      Medical Necessity:     · Patient demonstrates good rehab potential due to higher previous functional level. Reason for Services/Other Comments:  · Patient continues to require modification of therapeutic interventions to increase complexity of exercises. Use of outcome tool(s) and clinical judgement create a POC that gives a: Clear prediction of patient's progress: LOW COMPLEXITY            TREATMENT:   (In addition to Assessment/Re-Assessment sessions the following treatments were rendered)   Pre-treatment Symptoms/Complaints:  \"I keep waiting for them to tell me to go. \"  Pain: Initial:   Pain Intensity 1: 0 no complaints Post Session:  No complaints     Therapeutic Activity: (    11 minutes): Therapeutic activities including Chair transfers, Ambulation on level ground and standing balance activities to improve mobility, strength, balance and activity tolerance. Required minimal verbal cues   to promote static and dynamic balance in standing. Therapeutic Exercise: (13 Minutes):  Exercises per grid below to improve mobility, strength and activity tolerance. Required minimal verbal cues to promote proper body mechanics and promote proper body breathing techniques. Progressed repetitions and complexity of movement as indicated.        Date:  4-14-18 Date:  4-15-18 Date:  4-16-18 Date:  4-17-18    ACTIVITY/EXERCISE AM PM AM PM AM PM AM    Ambulation:           Distance  Device  Duration           Seated Heel Raises x20  X 20 B  x30      Seated Toe Raises x20  X 20 B  x30      Seated Long Arc Quads x20  X 20 B  x30      Seated Marching x20  X 20 B  x30      Seated Hip Abduction x20  X 20 B        Standing heel raises      x10 x15    Standing toe raises      x10 x15    Standing marching      x10 x10    Standing hp abduction      x10 x10    Standing hip extension      x10 x10    Standing hamstring curls      x10 x10    Standing mini squats      x10 x10    B = bilateral; AA = active assistive; A = active; P = passive    Braces/Orthotics/Lines/Etc:   · O2 Room Air   Treatment/Session Assessment:    · Response to Treatment:  Patient tolerated treatment well with fatigue. · Interdisciplinary Collaboration:   o Physical Therapy Assistant  o Registered Nurse  o SPTA  · After treatment position/precautions:   o Up in chair  o Bed/Chair-wheels locked  o Bed in low position  o Call light within reach  o RN notified  o Family at bedside   · Compliance with Program/Exercises: Will assess as treatment progresses. · Recommendations/Intent for next treatment session: \"Next visit will focus on advancements to more challenging activities and reduction in assistance provided\".   Total Treatment Duration: 24 minutes  PT Patient Time In/Time Out  Time In: 1355  Time Out: [facility], [facility]

## 2018-04-17 NOTE — PROGRESS NOTES
Subjective:   No complaints  Feels good    Objective:     Patient Vitals for the past 8 hrs:   BP Temp Pulse Resp SpO2 Height Weight   18 0642 108/56 99.8 °F (37.7 °C) 84 18 92 % - -   18 0344 - - - - - 6' 1\" (1.854 m) 157 lb 9.6 oz (71.5 kg)   18 0342 128/63 99.5 °F (37.5 °C) 84 18 92 % - -     Temp (24hrs), Av.1 °F (37.3 °C), Min:98.7 °F (37.1 °C), Max:99.8 °F (37.7 °C)        Heart:  regular rate and rhythm, S1, S2 normal, no murmur, click, rub or gallop  Lung:  clear to auscultation bilaterally  Incisions: Clean, dry, and intact    Labs:  No results found for this or any previous visit (from the past 24 hour(s)). Assessment:     Principal Problem:    Nonrheumatic aortic valve stenosis (2018)      Overview: 18 (Dr Margie Benjamin) Aortic valve replacement with a 25 mm Magna Ease       Cherelle-Prince pericardial valve      18 (Dr Margie Benjamin) Cardiac reentry with evacuation of clot.     Active Problems:    Tobacco use disorder ()      Status post aortic valve replacement (2018)      Respiratory failure, post-operative (HCC) (2018)      Hypoxia (2018)      Thrombocytopenia due to extra corporeal by-pass circulation (2018)      Postoperative anemia due to acute blood loss (2018)        Plan/Recommendations/Medical Decision Making:     Stable, Hct low but asymptomatic- rechecking cbc prior to DC  Smoking cessation 100% discussed  Staying at daughters home     See orders

## 2018-04-17 NOTE — PROGRESS NOTES
Discharge instructions reviewed with patient and his wife. Medications, incision care, activity restrictions and appointments discussed. Time allowed for questions. Patient escorted to discharge area via wheelchair where his wife drove him home. DISCHARGE SUMMARY from Nurse    PATIENT INSTRUCTIONS:    After general anesthesia or intravenous sedation, for 24 hours or while taking prescription Narcotics:  · Limit your activities  · Do not drive and operate hazardous machinery  · Do not make important personal or business decisions  · Do  not drink alcoholic beverages  · If you have not urinated within 8 hours after discharge, please contact your surgeon on call. Report the following to your surgeon:  · Excessive pain, swelling, redness or odor of or around the surgical area  · Temperature over 100.5  · Nausea and vomiting lasting longer than 4 hours or if unable to take medications  · Any signs of decreased circulation or nerve impairment to extremity: change in color, persistent  numbness, tingling, coldness or increase pain  · Any questions    What to do at Home:  Recommended   *  Please give a list of your current medications to your Primary Care Provider. *  Please update this list whenever your medications are discontinued, doses are      changed, or new medications (including over-the-counter products) are added. *  Please carry medication information at all times in case of emergency situations. These are general instructions for a healthy lifestyle:    No smoking/ No tobacco products/ Avoid exposure to second hand smoke  Surgeon General's Warning:  Quitting smoking now greatly reduces serious risk to your health.     Obesity, smoking, and sedentary lifestyle greatly increases your risk for illness    A healthy diet, regular physical exercise & weight monitoring are important for maintaining a healthy lifestyle    You may be retaining fluid if you have a history of heart failure or if you experience any of the following symptoms:  Weight gain of 3 pounds or more overnight or 5 pounds in a week, increased swelling in our hands or feet or shortness of breath while lying flat in bed. Please call your doctor as soon as you notice any of these symptoms; do not wait until your next office visit. Recognize signs and symptoms of STROKE:    F-face looks uneven    A-arms unable to move or move unevenly    S-speech slurred or non-existent    T-time-call 911 as soon as signs and symptoms begin-DO NOT go       Back to bed or wait to see if you get better-TIME IS BRAIN. Warning Signs of HEART ATTACK     Call 911 if you have these symptoms:   Chest discomfort. Most heart attacks involve discomfort in the center of the chest that lasts more than a few minutes, or that goes away and comes back. It can feel like uncomfortable pressure, squeezing, fullness, or pain.  Discomfort in other areas of the upper body. Symptoms can include pain or discomfort in one or both arms, the back, neck, jaw, or stomach.  Shortness of breath with or without chest discomfort.  Other signs may include breaking out in a cold sweat, nausea, or lightheadedness. Don't wait more than five minutes to call [address]! Fast action can save your life. Calling 911 is almost always the fastest way to get lifesaving treatment. Emergency Medical Services staff can begin treatment when they arrive  up to an hour sooner than if someone gets to the hospital by car. The discharge information has been reviewed with the patient and spouse. The patient and spouse verbalized understanding. Discharge medications reviewed with the patient and spouse and appropriate educational materials and side effects teaching were provided.   ___________________________________________________________________________________________________________________________________

## 2018-04-17 NOTE — PROGRESS NOTES
Bedside shift report received from Marycarmen Askew RN (offgoing nurse). Report given to Kristi Dahl RN. Vital signs stable. No complaints present. Patient in stable condition.

## 2018-04-17 NOTE — PROGRESS NOTES
HARLAN spoke with Lis Villegas NP, who said pt is discharging today. HARLAN notified [facility] and [facility] of this. Care Management Interventions  PCP Verified by CM: Yes  Last Visit to PCP: 02/14/18  Mode of Transport at Discharge:  Other (see comment) (Family)  Transition of Care Consult (CM Consult): [address]: Yes  Physical Therapy Consult: Yes  Current Support Network: Relative's Home  Confirm Follow Up Transport: Family  Plan discussed with Pt/Family/Caregiver: Yes  Freedom of Choice Offered: Yes  Discharge Location  Discharge Placement: Home with [facility])

## 2018-04-17 NOTE — DISCHARGE SUMMARY
Physician Discharge Summary     Patient: Parveen Monterroso MRN: [id number]  SSN: [id number]    YOB: 1960  Age: 59 y.o. Sex: male       Admit Date: 4/12/2018    Discharge Date: 4/17/2018      Admitting Physician: Salvador Carrillo MD     Discharge Physician: Monique Potts NP    Admission Diagnoses: Nonrheumatic aortic valve stenosis [I35.0]  Bicuspid aortic valve [Q23.1]    Discharge Diagnoses:   Problem List as of 4/17/2018  Date Reviewed: 4/17/2018          Codes Class Noted - Resolved    Thrombocytopenia due to extra corporeal by-pass circulation ICD-10-CM: D69.59  ICD-9-CM: 287.49  4/13/2018 - Present        Postoperative anemia due to acute blood loss ICD-10-CM: D62  ICD-9-CM: 285.1  4/13/2018 - Present        Status post aortic valve replacement ICD-10-CM: Z95.2  ICD-9-CM: V43.3  4/12/2018 - Present        Respiratory failure, post-operative ([facility] [address] ICD-10-CM: P36.216  ICD-9-CM: 518.51  4/12/2018 - Present        Homeless family (Chronic) ICD-10-CM: Z59.0  ICD-9-CM: V60.0  2/28/2018 - Present        Family history of premature CAD (Chronic) ICD-10-CM: Z82.49  ICD-9-CM: V17.3  2/28/2018 - Present        * (Principal)Nonrheumatic aortic valve stenosis ICD-10-CM: I35.0  ICD-9-CM: 424.1  2/7/2018 - Present    Overview Signed 4/13/2018 11:57 AM by Monique Potts NP     4/12/18 (Dr Elgin Maddox) Aortic valve replacement with a 25 mm Magna Ease Cherelle-Prince pericardial valve  4/12/18 (Dr Elgin Maddox) Cardiac reentry with evacuation of clot.              Bicuspid aortic valve ICD-10-CM: Q23.1  ICD-9-CM: 746.4  2/7/2018 - Present        Shortness of breath ICD-10-CM: R06.02  ICD-9-CM: 786.05  2/7/2018 - Present        Tobacco use disorder (Chronic) ICD-10-CM: F17.200  ICD-9-CM: 305.1  Unknown - Present        RESOLVED: Hypoxia ICD-10-CM: R09.02  ICD-9-CM: 799.02  4/12/2018 - 4/17/2018               Procedures for this admission: Procedure(s):  CARDIAC RE ENTRY    Discharged Condition: stable    Hospital Course: The patient is a very pleasant 52-year-old gentleman who has had a lifelong heart murmur, who presented for hernia surgery and was referred to [facility] for this murmur. He underwent echo, which showed an RICKIE of 0.4 cm2 with a 53 mm mean gradient across the valve with normal LV function. Left heart catheterization showed clean coronaries. 4/12/18 (Dr Jacki Camara) performed successful Aortic valve replacement with a 25 mm Magna Ease Cherelle-Prince pericardial valve. The patient tolerated the surgery well and transitioned to stepdown POD 1. His recovery has been slow and steady with him receiving 1 UPRBC POD 3  for Hgb down to 7.6. Today at discharge Hgb up to 8.7. He is discharged on oral Fe for 30 days. He otherwise has with him remained in NSR, afebrile, and hemodynamically stable. All incisions are healing. He has done well participating in PT and IS. He smoked until admission and he was slow to wean off oxygen, and did have copious thick sputum post op. He is discharged on Mucinex to keep secretions thin. He is stable on RA and will continue IS as directed at discharge. 100% smoking cessation discussed. He is stable for discharge today. Consults: Cardiac Rehab, Cardiology and Pulmonary/Critical Care    Significant Diagnostic Studies: labs  HgbA1C: 5.6  microbiology  radiology: CXR 4/13 There is a small lucency in the right hemidiaphragm suggesting small  pneumoperitoneum. Basilar pneumothorax less likely. Left lower lobe atelectasis. Otherwise lungs are clear. Right IJ introducer sheath and sternal wires and  epicardial pacing wires remain. Bowel gas pattern is unremarkable.    cardiac graphics: Telemetry and ECG    Treatments:   Cardiac Meds: metoprolol, furosemide, amiodarone and potassium  Anticoagulation: ASA  Statins: atorvastatin  IV Hydration  Antibiotics: perioperative  Analgesia  Insulin: Regular - Sliding Scale  Respiratory Therapy: O2 and albuterol/atropine nebulizer  Therapies: PT  Surgery: as above    Discharge Exam:   BP Temp Pulse Resp SpO2 Height Weight    18 0642 108/56 99.8 °F (37.7 °C) 84 18 92 % - -   18 0344 - - - - - 6' 1\" (1.854 m) 157 lb 9.6 oz (71.5 kg)   18 0342 128/63 99.5 °F (37.5 °C) 84 18 92 % - -     Temp (24hrs), Av.1 °F (37.3 °C), Min:98.7 °F (37.1 °C), Max:99.8 °F (37.7 °C)         Heart:  regular rate and rhythm, S1, S2 normal, no murmur, click, rub or gallop  Lung:  clear to auscultation bilaterally  Incisions: Clean, dry, and intact      Disposition: Home Health Care [facility]     Patient Instructions:   Current Discharge Medication List      START taking these medications    Details   acetaminophen (TYLENOL) 325 mg tablet Over the counter medication, use for pain as needed prn  Qty: 1 Tab, Refills: 0      atorvastatin (LIPITOR) 20 mg tablet Take 1 Tab by mouth nightly. Qty: 30 Tab, Refills: 5      ferrous sulfate 325 mg (65 mg iron) tablet Take 1 Tab by mouth daily (with breakfast). Qty: 30 Tab, Refills: 0      guaiFENesin ER (MUCINEX) 1,200 mg Ta12 ER tablet Over the counter, take as needed to keep secretions thin and easy to cough up  Qty: 30 Tab, Refills: 0      oxyCODONE-acetaminophen (PERCOCET) 5-325 mg per tablet Take 1 Tab by mouth every four (4) hours as needed. Max Daily Amount: 6 Tabs. Qty: 30 Tab, Refills: 0    Associated Diagnoses: Status post aortic valve replacement      senna-docusate (PERICOLACE) 8.6-50 mg per tablet Over the counter Senakot and Colace for constipation as needed  Qty: 1 Tab, Refills: 0      traMADol (ULTRAM) 50 mg tablet Take 1 Tab by mouth every six (6) hours as needed. Max Daily Amount: 200 mg. Qty: 20 Tab, Refills: 0    Associated Diagnoses: Status post aortic valve replacement         CONTINUE these medications which have CHANGED    Details   metoprolol tartrate (LOPRESSOR) 25 mg tablet Take 1 Tab by mouth every twelve (12) hours.   Qty: 60 Tab, Refills: 5 CONTINUE these medications which have NOT CHANGED    Details   aspirin 81 mg chewable tablet Take 81 mg by mouth nightly. multivitamin (ONE A DAY) tablet Take 1 Tab by mouth daily. Stopped 4/6/18      omega 3-dha-epa-fish oil (FISH OIL) 100-160-1,000 mg cap Take  by mouth nightly. Stopped 4/6/18         STOP taking these medications       mupirocin calcium (BACTROBAN NASAL) 2 % nasal ointment Comments:   Reason for Stopping:         amiodarone (CORDARONE) 200 mg tablet Comments:   Reason for Stopping:               Reference discharge instructions as provided by nursing for diet, labs, medications, activity, wound care and any outpatient referrals. Follow-up Appointments   Procedures    FOLLOW UP VISIT Appointment in: [facility] [address] [facility]     Standing Status:   Standing     Number of Occurrences:   1     Order Specific Question:   Appointment in     Answer: One Week    FOLLOW UP VISIT Appointment in: Other (Specify) Dr Grace Trevizo     Standing Status:   Standing     Number of Occurrences:   1     Standing Expiration Date:   4/18/2018     Order Specific Question:   Appointment in     Answer:    Other (Specify)        Signed:  Jimmy Sumner NP  4/17/2018  9:32 AM

## 2018-04-17 NOTE — PROGRESS NOTES
Problem: Falls - Risk of  Goal: *Absence of Falls  Document Unruly Fall Risk and appropriate interventions in the flowsheet.    Outcome: Progressing Towards Goal  Fall Risk Interventions:  Mobility Interventions: Assess mobility with egress test, Bed/chair exit alarm, Communicate number of staff needed for ambulation/transfer         Medication Interventions: Assess postural VS orthostatic hypotension, Bed/chair exit alarm, Patient to call before getting OOB, Teach patient to arise slowly    Elimination Interventions: Call light in reach, Bed/chair exit alarm, Urinal in reach

## 2018-04-19 ENCOUNTER — HOME CARE VISIT (OUTPATIENT)
Dept: SCHEDULING | Facility: HOME HEALTH | Age: 58
End: 2018-04-19

## 2018-04-20 ENCOUNTER — TELEPHONE (OUTPATIENT)
Dept: HOME HEALTH SERVICES | Facility: HOME HEALTH | Age: 58
End: 2018-04-20

## 2018-04-20 NOTE — TELEPHONE ENCOUNTER
[address] [place] [address] Lion Mandel Pharmacist consult. Mr. Darya Young was discharged prior to my consult. I have called and gotten voice mail. I left my name and cell phone number. I asked him to call me with any medication questions or issues.

## 2018-05-02 ENCOUNTER — HOSPITAL ENCOUNTER (OUTPATIENT)
Dept: GENERAL RADIOLOGY | Age: 58
Discharge: HOME | End: 2018-05-02
Payer: COMMERCIAL

## 2018-05-02 ENCOUNTER — HOSPITAL ENCOUNTER (OUTPATIENT)
Dept: LAB | Age: 58
Discharge: HOME | End: 2018-05-02
Payer: COMMERCIAL

## 2018-05-02 DIAGNOSIS — R06.02 SHORTNESS OF BREATH: ICD-10-CM

## 2018-05-02 DIAGNOSIS — Q23.1 BICUSPID AORTIC VALVE: ICD-10-CM

## 2018-05-02 DIAGNOSIS — I35.0 NONRHEUMATIC AORTIC VALVE STENOSIS: ICD-10-CM

## 2018-05-02 LAB
ALBUMIN SERPL-MCNC: 3.1 G/DL (ref 3.5–5)
ALBUMIN/GLOB SERPL: 0.7 {RATIO}
ALP SERPL-CCNC: 102 U/L (ref 50–136)
ALT SERPL-CCNC: 22 U/L (ref 12–65)
ANION GAP SERPL CALC-SCNC: 2 MMOL/L
AST SERPL-CCNC: 17 U/L (ref 15–37)
BASOPHILS # BLD: 0.1 K/UL (ref 0–0.2)
BASOPHILS NFR BLD: 1 % (ref 0–2)
BILIRUB SERPL-MCNC: 0.4 MG/DL (ref 0.2–1.1)
BNP SERPL-MCNC: 228 PG/ML
BUN SERPL-MCNC: 20 MG/DL (ref 6–23)
CALCIUM SERPL-MCNC: 8.8 MG/DL (ref 8.3–10.4)
CHLORIDE SERPL-SCNC: 106 MMOL/L (ref 98–107)
CO2 SERPL-SCNC: 33 MMOL/L (ref 21–32)
CREAT SERPL-MCNC: 0.9 MG/DL (ref 0.8–1.5)
DIFFERENTIAL METHOD BLD: ABNORMAL
EOSINOPHIL # BLD: 0.3 K/UL (ref 0–0.8)
EOSINOPHIL NFR BLD: 3 % (ref 0.5–7.8)
ERYTHROCYTE [DISTWIDTH] IN BLOOD BY AUTOMATED COUNT: 13.4 % (ref 11.9–14.6)
GLOBULIN SER CALC-MCNC: 4.5 G/DL
GLUCOSE SERPL-MCNC: 93 MG/DL (ref 65–100)
HCT VFR BLD AUTO: 34.1 % (ref 41.1–50.3)
HGB BLD-MCNC: 10.6 G/DL (ref 13.6–17.2)
LYMPHOCYTES # BLD: 1.3 K/UL (ref 0.5–4.6)
LYMPHOCYTES NFR BLD: 14 % (ref 13–44)
MAGNESIUM SERPL-MCNC: 2.2 MG/DL (ref 1.8–2.4)
MCH RBC QN AUTO: 28.3 PG (ref 26.1–32.9)
MCHC RBC AUTO-ENTMCNC: 31.1 G/DL (ref 31.4–35)
MCV RBC AUTO: 91.2 FL (ref 79.6–97.8)
MONOCYTES # BLD: 1 K/UL (ref 0.1–1.3)
MONOCYTES NFR BLD: 10 % (ref 4–12)
NEUTS SEG # BLD: 6.9 K/UL (ref 1.7–8.2)
NEUTS SEG NFR BLD: 72 % (ref 43–78)
PLATELET # BLD AUTO: 365 K/UL (ref 150–450)
PMV BLD AUTO: 8.3 FL (ref 10.8–14.1)
POTASSIUM SERPL-SCNC: 4.3 MMOL/L (ref 3.5–5.1)
PROT SERPL-MCNC: 7.6 G/DL (ref 6.3–8.2)
RBC # BLD AUTO: 3.74 M/UL (ref 4.23–5.67)
SODIUM SERPL-SCNC: 141 MMOL/L (ref 136–145)
WBC # BLD AUTO: 9.5 K/UL (ref 4.3–11.1)

## 2018-05-02 PROCEDURE — 80053 COMPREHEN METABOLIC PANEL: CPT | Performed by: INTERNAL MEDICINE

## 2018-05-02 PROCEDURE — 85025 COMPLETE CBC W/AUTO DIFF WBC: CPT | Performed by: INTERNAL MEDICINE

## 2018-05-02 PROCEDURE — 83880 ASSAY OF NATRIURETIC PEPTIDE: CPT | Performed by: INTERNAL MEDICINE

## 2018-05-02 PROCEDURE — 83735 ASSAY OF MAGNESIUM: CPT | Performed by: INTERNAL MEDICINE

## 2018-05-02 PROCEDURE — 71046 X-RAY EXAM CHEST 2 VIEWS: CPT

## 2018-05-02 PROCEDURE — 36415 COLL VENOUS BLD VENIPUNCTURE: CPT | Performed by: INTERNAL MEDICINE

## 2018-05-02 NOTE — PROGRESS NOTES
Please call him. CXR was good. Anemia getting better on labs. But, I do feel that some lasix should help him breathe better by getting off some fluid. Take lasix 40 per day in the AM, follow weights and any edema. Take for next 4-5 days until I see him in the office. Call us for more issues in the meantime.   Thanks

## 2018-05-03 NOTE — PROGRESS NOTES
I called and informed the patient of results and Dr. Yaakov Almendarez response. Patient verbalized understanding and appreciation. rx sent to pharmacy.

## 2018-05-06 ENCOUNTER — HOSPITAL ENCOUNTER (OUTPATIENT)
Dept: SLEEP MEDICINE | Age: 58
Discharge: HOME | End: 2018-05-06
Payer: COMMERCIAL

## 2018-05-06 PROCEDURE — 95810 POLYSOM 6/> YRS 4/> PARAM: CPT

## 2018-05-07 PROBLEM — I35.0 NONRHEUMATIC AORTIC VALVE STENOSIS: Status: RESOLVED | Noted: 2018-02-07 | Resolved: 2018-05-07

## 2018-05-07 PROBLEM — I50.32 DIASTOLIC CHF, CHRONIC (HCC): Status: ACTIVE | Noted: 2018-05-07

## 2018-05-17 PROBLEM — G47.33 OSA (OBSTRUCTIVE SLEEP APNEA): Status: ACTIVE | Noted: 2018-05-17

## 2018-05-17 PROBLEM — R09.02 HYPOXEMIA: Status: ACTIVE | Noted: 2018-05-17

## 2018-05-17 PROBLEM — G47.31 CENTRAL SLEEP APNEA: Status: ACTIVE | Noted: 2018-05-17

## 2018-05-30 ENCOUNTER — HOSPITAL ENCOUNTER (OUTPATIENT)
Dept: LAB | Age: 58
Discharge: HOME | End: 2018-05-30
Payer: COMMERCIAL

## 2018-05-30 DIAGNOSIS — I50.32 DIASTOLIC CHF, CHRONIC (HCC): ICD-10-CM

## 2018-05-30 LAB
ANION GAP SERPL CALC-SCNC: 3 MMOL/L
BNP SERPL-MCNC: 72 PG/ML
BUN SERPL-MCNC: 22 MG/DL (ref 6–23)
CALCIUM SERPL-MCNC: 8.6 MG/DL (ref 8.3–10.4)
CHLORIDE SERPL-SCNC: 106 MMOL/L (ref 98–107)
CO2 SERPL-SCNC: 31 MMOL/L (ref 21–32)
CREAT SERPL-MCNC: 0.8 MG/DL (ref 0.8–1.5)
GLUCOSE SERPL-MCNC: 82 MG/DL (ref 65–100)
MAGNESIUM SERPL-MCNC: 2.2 MG/DL (ref 1.8–2.4)
POTASSIUM SERPL-SCNC: 4.4 MMOL/L (ref 3.5–5.1)
SODIUM SERPL-SCNC: 140 MMOL/L (ref 136–145)

## 2018-05-30 PROCEDURE — 36415 COLL VENOUS BLD VENIPUNCTURE: CPT | Performed by: INTERNAL MEDICINE

## 2018-05-30 PROCEDURE — 80048 BASIC METABOLIC PNL TOTAL CA: CPT | Performed by: INTERNAL MEDICINE

## 2018-05-30 PROCEDURE — 83735 ASSAY OF MAGNESIUM: CPT | Performed by: INTERNAL MEDICINE

## 2018-05-30 PROCEDURE — 83880 ASSAY OF NATRIURETIC PEPTIDE: CPT | Performed by: INTERNAL MEDICINE

## 2018-05-31 PROBLEM — Z01.810 PRE-OPERATIVE CARDIOVASCULAR EXAMINATION: Status: ACTIVE | Noted: 2018-05-31

## 2018-06-13 ENCOUNTER — ANESTHESIA EVENT (OUTPATIENT)
Dept: SURGERY | Age: 58
End: 2018-06-13
Payer: COMMERCIAL

## 2018-06-13 ENCOUNTER — APPOINTMENT (OUTPATIENT)
Dept: CT IMAGING | Age: 58
End: 2018-06-13
Attending: EMERGENCY MEDICINE
Payer: COMMERCIAL

## 2018-06-13 ENCOUNTER — HOSPITAL ENCOUNTER (EMERGENCY)
Age: 58
Discharge: HOME | End: 2018-06-13
Attending: EMERGENCY MEDICINE
Payer: COMMERCIAL

## 2018-06-13 VITALS
RESPIRATION RATE: 17 BRPM | WEIGHT: 155 LBS | OXYGEN SATURATION: 97 % | BODY MASS INDEX: 20.54 KG/M2 | TEMPERATURE: 98.4 F | HEART RATE: 64 BPM | HEIGHT: 73 IN | DIASTOLIC BLOOD PRESSURE: 74 MMHG | SYSTOLIC BLOOD PRESSURE: 121 MMHG

## 2018-06-13 DIAGNOSIS — K40.90 LEFT INGUINAL HERNIA: Primary | ICD-10-CM

## 2018-06-13 DIAGNOSIS — R10.32 ABDOMINAL PAIN, LLQ (LEFT LOWER QUADRANT): ICD-10-CM

## 2018-06-13 LAB
ALBUMIN SERPL-MCNC: 3.8 G/DL (ref 3.5–5)
ALBUMIN/GLOB SERPL: 0.9 {RATIO} (ref 1.2–3.5)
ALP SERPL-CCNC: 90 U/L (ref 50–136)
ALT SERPL-CCNC: 44 U/L (ref 12–65)
ANION GAP SERPL CALC-SCNC: 8 MMOL/L (ref 7–16)
AST SERPL-CCNC: 25 U/L (ref 15–37)
BASOPHILS # BLD: 0.1 K/UL (ref 0–0.2)
BASOPHILS NFR BLD: 0 % (ref 0–2)
BILIRUB SERPL-MCNC: 0.5 MG/DL (ref 0.2–1.1)
BUN SERPL-MCNC: 20 MG/DL (ref 6–23)
CALCIUM SERPL-MCNC: 9.2 MG/DL (ref 8.3–10.4)
CHLORIDE SERPL-SCNC: 103 MMOL/L (ref 98–107)
CO2 SERPL-SCNC: 31 MMOL/L (ref 21–32)
CREAT SERPL-MCNC: 0.93 MG/DL (ref 0.8–1.5)
DIFFERENTIAL METHOD BLD: ABNORMAL
EOSINOPHIL # BLD: 0.3 K/UL (ref 0–0.8)
EOSINOPHIL NFR BLD: 3 % (ref 0.5–7.8)
ERYTHROCYTE [DISTWIDTH] IN BLOOD BY AUTOMATED COUNT: 14.1 % (ref 11.9–14.6)
GLOBULIN SER CALC-MCNC: 4.4 G/DL (ref 2.3–3.5)
GLUCOSE SERPL-MCNC: 100 MG/DL (ref 65–100)
HCT VFR BLD AUTO: 43.5 % (ref 41.1–50.3)
HGB BLD-MCNC: 14.4 G/DL (ref 13.6–17.2)
IMM GRANULOCYTES # BLD: 0.1 K/UL (ref 0–0.5)
IMM GRANULOCYTES NFR BLD AUTO: 1 % (ref 0–5)
LACTATE BLD-SCNC: 0.6 MMOL/L (ref 0.5–1.9)
LIPASE SERPL-CCNC: 207 U/L (ref 73–393)
LYMPHOCYTES # BLD: 1.6 K/UL (ref 0.5–4.6)
LYMPHOCYTES NFR BLD: 15 % (ref 13–44)
MCH RBC QN AUTO: 28.3 PG (ref 26.1–32.9)
MCHC RBC AUTO-ENTMCNC: 33.1 G/DL (ref 31.4–35)
MCV RBC AUTO: 85.6 FL (ref 79.6–97.8)
MONOCYTES # BLD: 0.7 K/UL (ref 0.1–1.3)
MONOCYTES NFR BLD: 6 % (ref 4–12)
NEUTS SEG # BLD: 8.5 K/UL (ref 1.7–8.2)
NEUTS SEG NFR BLD: 75 % (ref 43–78)
PLATELET # BLD AUTO: 167 K/UL (ref 150–450)
PMV BLD AUTO: 8.9 FL (ref 10.8–14.1)
POTASSIUM SERPL-SCNC: 4.2 MMOL/L (ref 3.5–5.1)
PROT SERPL-MCNC: 8.2 G/DL (ref 6.3–8.2)
RBC # BLD AUTO: 5.08 M/UL (ref 4.23–5.67)
SODIUM SERPL-SCNC: 142 MMOL/L (ref 136–145)
WBC # BLD AUTO: 11.2 K/UL (ref 4.3–11.1)

## 2018-06-13 PROCEDURE — 96375 TX/PRO/DX INJ NEW DRUG ADDON: CPT | Performed by: EMERGENCY MEDICINE

## 2018-06-13 PROCEDURE — 96376 TX/PRO/DX INJ SAME DRUG ADON: CPT | Performed by: EMERGENCY MEDICINE

## 2018-06-13 PROCEDURE — 83605 ASSAY OF LACTIC ACID: CPT

## 2018-06-13 PROCEDURE — 74177 CT ABD & PELVIS W/CONTRAST: CPT

## 2018-06-13 PROCEDURE — 74011250636 HC RX REV CODE- 250/636: Performed by: EMERGENCY MEDICINE

## 2018-06-13 PROCEDURE — 80053 COMPREHEN METABOLIC PANEL: CPT | Performed by: EMERGENCY MEDICINE

## 2018-06-13 PROCEDURE — 74011000258 HC RX REV CODE- 258: Performed by: EMERGENCY MEDICINE

## 2018-06-13 PROCEDURE — 85025 COMPLETE CBC W/AUTO DIFF WBC: CPT | Performed by: EMERGENCY MEDICINE

## 2018-06-13 PROCEDURE — 99284 EMERGENCY DEPT VISIT MOD MDM: CPT | Performed by: EMERGENCY MEDICINE

## 2018-06-13 PROCEDURE — 81003 URINALYSIS AUTO W/O SCOPE: CPT | Performed by: EMERGENCY MEDICINE

## 2018-06-13 PROCEDURE — 96374 THER/PROPH/DIAG INJ IV PUSH: CPT | Performed by: EMERGENCY MEDICINE

## 2018-06-13 PROCEDURE — 96361 HYDRATE IV INFUSION ADD-ON: CPT | Performed by: EMERGENCY MEDICINE

## 2018-06-13 PROCEDURE — 74011636320 HC RX REV CODE- 636/320: Performed by: EMERGENCY MEDICINE

## 2018-06-13 PROCEDURE — 83690 ASSAY OF LIPASE: CPT | Performed by: EMERGENCY MEDICINE

## 2018-06-13 RX ORDER — ONDANSETRON 2 MG/ML
4 INJECTION INTRAMUSCULAR; INTRAVENOUS
Status: COMPLETED | OUTPATIENT
Start: 2018-06-13 | End: 2018-06-13

## 2018-06-13 RX ORDER — MORPHINE SULFATE 2 MG/ML
4 INJECTION, SOLUTION INTRAMUSCULAR; INTRAVENOUS ONCE
Status: COMPLETED | OUTPATIENT
Start: 2018-06-13 | End: 2018-06-13

## 2018-06-13 RX ORDER — SODIUM CHLORIDE 0.9 % (FLUSH) 0.9 %
10 SYRINGE (ML) INJECTION
Status: COMPLETED | OUTPATIENT
Start: 2018-06-13 | End: 2018-06-13

## 2018-06-13 RX ORDER — ONDANSETRON 4 MG/1
4 TABLET, ORALLY DISINTEGRATING ORAL
Qty: 8 TAB | Refills: 0 | Status: SHIPPED | OUTPATIENT
Start: 2018-06-13 | End: 2018-06-17

## 2018-06-13 RX ORDER — HYDROCODONE BITARTRATE AND ACETAMINOPHEN 5; 325 MG/1; MG/1
1 TABLET ORAL
Qty: 3 TAB | Refills: 0 | Status: SHIPPED | OUTPATIENT
Start: 2018-06-13 | End: 2018-06-14

## 2018-06-13 RX ADMIN — MORPHINE SULFATE 4 MG: 2 INJECTION, SOLUTION INTRAMUSCULAR; INTRAVENOUS at 15:00

## 2018-06-13 RX ADMIN — Medication 10 ML: at 18:42

## 2018-06-13 RX ADMIN — ONDANSETRON 4 MG: 2 INJECTION INTRAMUSCULAR; INTRAVENOUS at 18:55

## 2018-06-13 RX ADMIN — SODIUM CHLORIDE 1000 ML: 900 INJECTION, SOLUTION INTRAVENOUS at 15:00

## 2018-06-13 RX ADMIN — IOPAMIDOL 100 ML: 755 INJECTION, SOLUTION INTRAVENOUS at 18:42

## 2018-06-13 RX ADMIN — SODIUM CHLORIDE 100 ML: 900 INJECTION, SOLUTION INTRAVENOUS at 18:42

## 2018-06-13 RX ADMIN — MORPHINE SULFATE 4 MG: 2 INJECTION, SOLUTION INTRAMUSCULAR; INTRAVENOUS at 18:55

## 2018-06-13 RX ADMIN — ONDANSETRON 4 MG: 2 INJECTION INTRAMUSCULAR; INTRAVENOUS at 15:00

## 2018-06-13 NOTE — ED TRIAGE NOTES
Patient reports having inguinal hernia surgery scheduled for tomorrow.  Pain worse today, reports some vomiting

## 2018-06-13 NOTE — DISCHARGE INSTRUCTIONS
Hernia: Care Instructions  Your Care Instructions    A hernia develops when tissue bulges through a weak spot in the wall of your belly. The groin area and the navel are common areas for a hernia. A hernia can also develop near the area of a surgery you had before. Pressure from lifting, straining, or coughing can tear the weak area, causing the hernia to bulge and be painful. If you cannot push a hernia back into place, the tissue may become trapped outside the belly wall. If the hernia gets twisted and loses its blood supply, it will swell and die. This is called a strangulated hernia. It usually causes a lot of pain. It needs treatment right away. Some hernias need to be repaired to prevent a strangulated hernia. If your hernia causes symptoms or is large, you may need surgery. Follow-up care is a key part of your treatment and safety. Be sure to make and go to all appointments, and call your doctor if you are having problems. It's also a good idea to know your test results and keep a list of the medicines you take. How can you care for yourself at home? · Take care when lifting heavy objects. · Stay at a healthy weight. · Do not smoke. Smoking can cause coughing, which can cause your hernia to bulge. If you need help quitting, talk to your doctor about stop-smoking programs and medicines. These can increase your chances of quitting for good. · Talk with your doctor before wearing a corset or truss for a hernia. These devices are not recommended for treating hernias and sometimes can do more harm than good. There may be certain situations when your doctor thinks a truss would work, but these are rare. When should you call for help? Call your doctor now or seek immediate medical care if:  ? · You have new or worse belly pain. ? · You are vomiting. ? · You cannot pass stools or gas. ? · You cannot push the hernia back into place with gentle pressure when you are lying down.    ? · The area over the hernia turns red or becomes tender. ? Watch closely for changes in your health, and be sure to contact your doctor if you have any problems. Where can you learn more? Go to http://ruthy-marshal.info/. Enter C129 in the search box to learn more about \"Hernia: Care Instructions. \"  Current as of: May 12, 2017  Content Version: 11.4  © 8071-0291 [employer]. Care instructions adapted under license by [employer] (which disclaims liability or warranty for this information). If you have questions about a medical condition or this instruction, always ask your healthcare professional. [place] [address] any warranty or liability for your use of this information. Abdominal Pain: Care Instructions  Your Care Instructions    Abdominal pain has many possible causes. Some aren't serious and get better on their own in a few days. Others need more testing and treatment. If your pain continues or gets worse, you need to be rechecked and may need more tests to find out what is wrong. You may need surgery to correct the problem. Don't ignore new symptoms, such as fever, nausea and vomiting, urination problems, pain that gets worse, and dizziness. These may be signs of a more serious problem. Your doctor may have recommended a follow-up visit in the next 8 to 12 hours. If you are not getting better, you may need more tests or treatment. The doctor has checked you carefully, but problems can develop later. If you notice any problems or new symptoms, get medical treatment right away. Follow-up care is a key part of your treatment and safety. Be sure to make and go to all appointments, and call your doctor if you are having problems. It's also a good idea to know your test results and keep a list of the medicines you take. How can you care for yourself at home? · Rest until you feel better.   · To prevent dehydration, drink plenty of fluids, enough so that your urine is light yellow or clear like water. Choose water and other caffeine-free clear liquids until you feel better. If you have kidney, heart, or liver disease and have to limit fluids, talk with your doctor before you increase the amount of fluids you drink. · If your stomach is upset, eat mild foods, such as rice, dry toast or crackers, bananas, and applesauce. Try eating several small meals instead of two or three large ones. · Wait until 48 hours after all symptoms have gone away before you have spicy foods, alcohol, and drinks that contain caffeine. · Do not eat foods that are high in fat. · Avoid anti-inflammatory medicines such as aspirin, ibuprofen (Advil, Motrin), and naproxen (Aleve). These can cause stomach upset. Talk to your doctor if you take daily aspirin for another health problem. When should you call for help? Call 911 anytime you think you may need emergency care. For example, call if:  ? · You passed out (lost consciousness). ? · You pass maroon or very bloody stools. ? · You vomit blood or what looks like coffee grounds. ? · You have new, severe belly pain. ?Call your doctor now or seek immediate medical care if:  ? · Your pain gets worse, especially if it becomes focused in one area of your belly. ? · You have a new or higher fever. ? · Your stools are black and look like tar, or they have streaks of blood. ? · You have unexpected vaginal bleeding. ? · You have symptoms of a urinary tract infection. These may include:  ¨ Pain when you urinate. ¨ Urinating more often than usual.  ¨ Blood in your urine. ? · You are dizzy or lightheaded, or you feel like you may faint. ? Watch closely for changes in your health, and be sure to contact your doctor if:  ? · You are not getting better after 1 day (24 hours). Where can you learn more? Go to http://ruthy-marshal.info/.   Enter G431 in the search box to learn more about \"Abdominal Pain: Care Instructions. \"  Current as of: March 20, 2017  Content Version: 11.4  © 6541-3114 [email or web address]. Care instructions adapted under license by [employer] (which disclaims liability or warranty for this information). If you have questions about a medical condition or this instruction, always ask your healthcare professional. [address] any warranty or liability for your use of this information. Inguinal Hernia: Care Instructions  Your Care Instructions    An inguinal hernia occurs when tissue bulges through a weak spot in your groin area. You may see or feel a tender bulge in the groin or scrotum. You may also have pain, pressure or burning, or a feeling that something has \"given way. \"  Hernias are caused by a weakness in the belly wall. The bulge or discomfort may occur after heavy lifting, straining, or coughing. Hernias do not heal on their own, and they tend to get worse over time. If your hernia does not bother you, you most likely can wait to have surgery. Your hernia may get worse, but it may not. In some cases, hernias that are small and painless may never need to be repaired. Follow-up care is a key part of your treatment and safety. Be sure to make and go to all appointments, and call your doctor if you are having problems. It's also a good idea to know your test results and keep a list of the medicines you take. How can you care for yourself at home? · Take pain medicines exactly as directed. ¨ If the doctor gave you a prescription medicine for pain, take it as prescribed. ¨ If you are not taking a prescription pain medicine, ask your doctor if you can take an over-the-counter medicine. · Use proper lifting techniques, and avoid heavy lifting if you can. To lift things more safely, bend your knees and let your arms and legs do the work. Keep your back straight, and do not bend over at the waist. Keep the load as close to your body as you can.  Move your feet instead of turning or twisting your body. · Lose weight if you are overweight. · Include fruits, vegetables, legumes, and whole grains in your diet each day. These foods are high in fiber and will make it easier to avoid straining during bowel movements. · Do not smoke. Smoking can cause coughing, which can cause your hernia to bulge. If you need help quitting, talk to your doctor about stop-smoking programs and medicines. These can increase your chances of quitting for good. When should you call for help? Call your doctor now or seek immediate medical care if:  ? · You have new or worse belly pain. ? · You are vomiting. ? · You cannot pass stools or gas. ? · You cannot push the hernia back into place with gentle pressure when you are lying down. ? · The area over the hernia turns red or becomes tender. ? Watch closely for changes in your health, and be sure to contact your doctor if you have any problems. Where can you learn more? Go to http://ruthy-marshal.info/. Enter C679 in the search box to learn more about \"Inguinal Hernia: Care Instructions. \"  Current as of: May 12, 2017  Content Version: 11.4  © 0060-9514 [employer]. Care instructions adapted under license by [employer] (which disclaims liability or warranty for this information). If you have questions about a medical condition or this instruction, always ask your healthcare professional. [place] [address] any warranty or liability for your use of this information. Inguinal Hernia Repair: Before Your Surgery  What is inguinal hernia repair? Inguinal hernia repair is a type of surgery. An inguinal hernia is a bulge under the skin in your groin. It happens when there is a weak spot in the groin muscle and a piece of the intestines or tissue pokes through the muscle. This can be painful. You may have pain when you're active.  Or it may be painful when you strain during a bowel movement or lift something heavy. Surgery can help with your pain. It can also prevent serious problems that can happen if an organ or tissue gets stuck in the hernia. There are two ways to do this surgery. In open surgery, the doctor makes one cut near the hernia. This cut is called an incision. In laparoscopic surgery, the doctor makes several very small incisions and uses a thin, lighted scope and small tools. During surgery, the doctor pushes the bulge back in place. He or she may place a piece of mesh on top of the bulge to help keep it in place. The doctor then sews the healthy tissue back together. Laparoscopic surgery leaves several small scars. Open surgery leaves one long scar. The scars fade with time. After the surgery, you can probably return to light activity after 1 to 3 weeks. How long it takes will depend on the type of surgery. Follow-up care is a key part of your treatment and safety. Be sure to make and go to all appointments, and call your doctor if you are having problems. It's also a good idea to know your test results and keep a list of the medicines you take. What happens before surgery? ?Surgery can be stressful. This information will help you understand what you can expect. And it will help you safely prepare for surgery. ? Preparing for surgery  ? · Understand exactly what surgery is planned, along with the risks, benefits, and other options. · Tell your doctors ALL the medicines, vitamins, supplements, and herbal remedies you take. Some of these can increase the risk of bleeding or interact with anesthesia. ? · If you take aspirin or some other blood thinner, be sure to talk to your doctor. He or she will tell you if you should stop taking it before your surgery. Make sure that you understand exactly what your doctor wants you to do.   ? · Your doctor will tell you which medicines to take or stop before your surgery.  You may need to stop taking certain medicines a week or more before surgery. So talk to your doctor as soon as you can.   ? · If you have an advance directive, let your doctor know. It may include a living will and a durable power of  for health care. Bring a copy to the hospital. If you don't have one, you may want to prepare one. It lets your doctor and loved ones know your health care wishes. Doctors advise that everyone prepare these papers before any type of surgery or procedure. What happens on the day of surgery? · Follow the instructions exactly about when to stop eating and drinking. If you don't, your surgery may be canceled. If your doctor told you to take your medicines on the day of surgery, take them with only a sip of water. ? · Take a bath or shower before you come in for your surgery. Do not apply lotions, perfumes, deodorants, or nail polish. ? · Do not shave the surgical site yourself. ? · Take off all jewelry and piercings. And take out contact lenses, if you wear them. ? At the hospital or surgery center   · Bring a picture ID. ? · The area for surgery is often marked to make sure there are no errors. ? · You will be kept comfortable and safe by your anesthesia provider. The anesthesia may make you sleep. Or it may just numb the area being worked on. ? · The surgery will take about 1 to 2 hours. Going home   · Be sure you have someone to drive you home. Anesthesia and pain medicine make it unsafe for you to drive. ? · You will be given more specific instructions about recovering from your surgery. They will cover things like diet, wound care, follow-up care, driving, and getting back to your normal routine. When should you call your doctor? · You have questions or concerns. ? · You don't understand how to prepare for your surgery. ? · You become ill before the surgery (such as fever, flu, or a cold). ? · You need to reschedule or have changed your mind about having the surgery.    Where can you learn more? Go to http://ruthy-marshal.info/. Enter 972 [id number] in the search box to learn more about \"Inguinal Hernia Repair: Before Your Surgery. \"  Current as of: May 12, 2017  Content Version: 11.4  © 3432-5749 [employer]. Care instructions adapted under license by [employer] (which disclaims liability or warranty for this information). If you have questions about a medical condition or this instruction, always ask your healthcare professional. [address] any warranty or liability for your use of this information.

## 2018-06-13 NOTE — PERIOP NOTES
Pt is on his way to the emergency room at this time with his hernia pain he stated. he is vomiting at this time. He will call us with update.

## 2018-06-13 NOTE — ED NOTES
I have reviewed discharge instructions with the patient. The patient verbalized understanding. Patient left ED via Discharge Method: wheelchair to Home with spouse. Kole Rucker Opportunity for questions and clarification provided. Patient given 2 scripts. To continue your aftercare when you leave the hospital, you may receive an automated call from our care team to check in on how you are doing. This is a free service and part of our promise to provide the best care and service to meet your aftercare needs.  If you have questions, or wish to unsubscribe from this service please call [phone]. Thank you for Choosing our [facility] Emergency Department.

## 2018-06-13 NOTE — ED PROVIDER NOTES
HPI Comments: 57-year-old male with history of left inguinal hernia scheduled to undergo left inguinal hernia repair on tomorrow with Dr. Reinier Pittman presents with complaint of worsening left lower quadrant pain without radiation with associated nausea and vomiting since this morning. Reports 2-3 episodes of NBNB emesis. Denies fever, chills, testicular pain or swelling, dysuria, hematuria, flank pain, chest pain, shortness of breath, diarrhea, constipation, melena, hematochezia. States that he contacted Dr. Linda John office and was instructed to present to ED for evaluation. Patient is a 59 y.o. male presenting with vomiting. The history is provided by the patient. No  was used. Vomiting    This is a new problem. The current episode started 3 to 5 hours ago. The problem occurs 2 to 4 times per day. The problem has not changed since onset. The emesis has an appearance of stomach contents. There has been no fever. Associated symptoms include abdominal pain. Pertinent negatives include no chills, no fever, no sweats, no diarrhea, no headaches, no arthralgias, no myalgias, no cough and no headaches. Past Medical History:   Diagnosis Date    CAD (coronary artery disease)     no mi/ no stents-- followed by dr. Sheree Truong; affirms SOB with 1 flight of steps.     Depression     Drug addiction in remission ([facility] [address]     none since 2001    Family history of premature CAD 2/28/2018    Homeless family     living with step dtr at present    Seizures ([facility] [address] 2001    x 1--- ETOH  WITHDRAWAL-- pt thinks---no meds    Severe aortic stenosis     last echo 2/2018    Sleep apnea     uses CPAP    Tobacco use disorder        Past Surgical History:   Procedure Laterality Date    CARDIAC SURG PROCEDURE UNLIST  04/12/2018    AVR    HX HEENT  age 27's    wisdom teeth removed         Family History:   Problem Relation Age of Onset    Cancer Mother     Coronary Artery Disease Mother     Heart Disease Brother 64     MI X 3    Coronary Artery Disease Brother     Cancer Sister      breast       Social History     Social History    Marital status:      Spouse name: N/A    Number of children: N/A    Years of education: N/A     Occupational History     [address]      Social History Main Topics    Smoking status: Former Smoker     Packs/day: 0.50     Years: 40.00     Types: Cigarettes    Smokeless tobacco: Never Used      Comment: quit April '18    Alcohol use No    Drug use: No    Sexual activity: Not on file     Other Topics Concern    Not on file     Social History Narrative    . Lives with wife         ALLERGIES: Review of patient's allergies indicates no known allergies. Review of Systems   Constitutional: Negative for chills and fever. HENT: Negative for congestion, facial swelling and sore throat. Respiratory: Negative for cough and shortness of breath. Cardiovascular: Negative for chest pain, palpitations and leg swelling. Gastrointestinal: Positive for abdominal pain, nausea and vomiting. Negative for anal bleeding, blood in stool, constipation and diarrhea. Genitourinary: Negative for dysuria, flank pain, hematuria, penile pain, penile swelling, scrotal swelling and testicular pain. Musculoskeletal: Negative for arthralgias, back pain, gait problem and myalgias. Skin: Negative for pallor and wound. Neurological: Negative for dizziness, weakness, numbness and headaches. Psychiatric/Behavioral: Negative for agitation and confusion. Vitals:    06/13/18 1356   BP: 128/78   Pulse: 66   Resp: 16   Temp: 97.9 °F (36.6 °C)   SpO2: 98%   Weight: 70.3 kg (155 lb)   Height: 6' 1\" (1.854 m)            Physical Exam   Constitutional: He is oriented to person, place, and time. He appears well-developed and well-nourished. Patient well-appearing in no acute distress. HENT:   Head: Normocephalic.    Mouth/Throat: Oropharynx is clear and moist.   MMM. Eyes: Conjunctivae and EOM are normal. Pupils are equal, round, and reactive to light. Neck: Normal range of motion. No JVD present. No tracheal deviation present. Cardiovascular: Normal rate, regular rhythm, normal heart sounds and intact distal pulses. Pulses 2+ and equal throughout. Pulmonary/Chest: Effort normal and breath sounds normal. He exhibits no tenderness. CTAB. Abdominal: Soft. Bowel sounds are normal. He exhibits no mass. There is tenderness. There is no rebound and no guarding. Mild-moderate LLQ TTP overlying L inguinal hernia; reducible; no overlying skin changes noted. No CVAT. Genitourinary:   Genitourinary Comments: Patient denies  exam.    Musculoskeletal: Normal range of motion. He exhibits no edema, tenderness or deformity. Neurological: He is alert and oriented to person, place, and time. No cranial nerve deficit. Coordination normal.   Skin: Skin is warm and dry. No rash noted. No erythema. Nursing note and vitals reviewed. MDM  Number of Diagnoses or Management Options  Abdominal pain, LLQ (left lower quadrant): new and requires workup  Left inguinal hernia: new and requires workup  Diagnosis management comments: Lactic acid within normal limits. No concern currently of strangulation or incarceration. Vital signs stable. Patient afebrile. Pain improved. Patient states ready for discharge home. Will discharge home with Zofran and pain control. Patient scheduled for hernia repair in the morning w/ Dr. Lindy Solomon.  Patient given strict return precautions.        Amount and/or Complexity of Data Reviewed  Clinical lab tests: ordered and reviewed  Tests in the radiology section of CPT®: ordered and reviewed  Tests in the medicine section of CPT®: ordered and reviewed  Review and summarize past medical records: yes  Discuss the patient with other providers: yes  Independent visualization of images, tracings, or specimens: yes    Risk of Complications, Morbidity, and/or Mortality  Presenting problems: moderate  Diagnostic procedures: moderate  Management options: moderate    Patient Progress  Patient progress: stable        ED Course   Comment By Time   CT abd/pelvis IMPRESSION:  1. Small left inguinal hernia containing a portion of small bowel without obstruction. 2. Sigmoid diverticulosis. Giorgi Cifuentes MD 06/13 1906       Procedures    Results Include:    Recent Results (from the past 24 hour(s))   CBC WITH AUTOMATED DIFF    Collection Time: 06/13/18  2:04 PM   Result Value Ref Range    WBC 11.2 (H) 4.3 - 11.1 K/uL    RBC 5.08 4.23 - 5.67 M/uL    HGB 14.4 13.6 - 17.2 g/dL    HCT 43.5 41.1 - 50.3 %    MCV 85.6 79.6 - 97.8 FL    MCH 28.3 26.1 - 32.9 PG    MCHC 33.1 31.4 - 35.0 g/dL    RDW 14.1 11.9 - 14.6 %    PLATELET 448 171 - 721 K/uL    MPV 8.9 (L) 10.8 - 14.1 FL    DF AUTOMATED      NEUTROPHILS 75 43 - 78 %    LYMPHOCYTES 15 13 - 44 %    MONOCYTES 6 4.0 - 12.0 %    EOSINOPHILS 3 0.5 - 7.8 %    BASOPHILS 0 0.0 - 2.0 %    IMMATURE GRANULOCYTES 1 0.0 - 5.0 %    ABS. NEUTROPHILS 8.5 (H) 1.7 - 8.2 K/UL    ABS. LYMPHOCYTES 1.6 0.5 - 4.6 K/UL    ABS. MONOCYTES 0.7 0.1 - 1.3 K/UL    ABS. EOSINOPHILS 0.3 0.0 - 0.8 K/UL    ABS. BASOPHILS 0.1 0.0 - 0.2 K/UL    ABS. IMM. GRANS. 0.1 0.0 - 0.5 K/UL   METABOLIC PANEL, COMPREHENSIVE    Collection Time: 06/13/18  2:04 PM   Result Value Ref Range    Sodium 142 136 - 145 mmol/L    Potassium 4.2 3.5 - 5.1 mmol/L    Chloride 103 98 - 107 mmol/L    CO2 31 21 - 32 mmol/L    Anion gap 8 7 - 16 mmol/L    Glucose 100 65 - 100 mg/dL    BUN 20 6 - 23 MG/DL    Creatinine 0.93 0.8 - 1.5 MG/DL    GFR est AA >60 >60 ml/min/1.73m2    GFR est non-AA >60 >60 ml/min/1.73m2    Calcium 9.2 8.3 - 10.4 MG/DL    Bilirubin, total 0.5 0.2 - 1.1 MG/DL    ALT (SGPT) 44 12 - 65 U/L    AST (SGOT) 25 15 - 37 U/L    Alk.  phosphatase 90 50 - 136 U/L    Protein, total 8.2 6.3 - 8.2 g/dL    Albumin 3.8 3.5 - 5.0 g/dL    Globulin 4.4 (H) 2.3 - 3.5 g/dL    A-G Ratio 0.9 (L) 1.2 - 3.5     LIPASE    Collection Time: 06/13/18  2:04 PM   Result Value Ref Range    Lipase 207 73 - 393 U/L   POC LACTIC ACID    Collection Time: 06/13/18  3:27 PM   Result Value Ref Range    Lactic Acid (POC) 0.6 0.5 - 1.9 mmol/L           Nino Bynum MD; 6/13/2018 @3:04 PM Voice dictation software was used during the making of this note. This software is not perfect and grammatical and other typographical errors may be present.   This note has not been proofread for errors.  ===================================================================

## 2018-06-14 ENCOUNTER — ANESTHESIA (OUTPATIENT)
Dept: SURGERY | Age: 58
End: 2018-06-14
Payer: COMMERCIAL

## 2018-06-14 ENCOUNTER — HOSPITAL ENCOUNTER (OUTPATIENT)
Age: 58
Setting detail: OUTPATIENT SURGERY
Discharge: HOME | End: 2018-06-14
Attending: SURGERY | Admitting: SURGERY
Payer: COMMERCIAL

## 2018-06-14 VITALS
BODY MASS INDEX: 20.45 KG/M2 | RESPIRATION RATE: 16 BRPM | WEIGHT: 155 LBS | HEART RATE: 70 BPM | OXYGEN SATURATION: 96 % | DIASTOLIC BLOOD PRESSURE: 70 MMHG | SYSTOLIC BLOOD PRESSURE: 152 MMHG | TEMPERATURE: 97.3 F

## 2018-06-14 DIAGNOSIS — K40.90 LEFT INGUINAL HERNIA: Primary | ICD-10-CM

## 2018-06-14 PROCEDURE — 76210000063 HC OR PH I REC FIRST 0.5 HR: Performed by: SURGERY

## 2018-06-14 PROCEDURE — 76060000035 HC ANESTHESIA 2 TO 2.5 HR: Performed by: SURGERY

## 2018-06-14 PROCEDURE — 74011250636 HC RX REV CODE- 250/636: Performed by: SURGERY

## 2018-06-14 PROCEDURE — 77030019940 HC BLNKT HYPOTHRM STRY -B: Performed by: ANESTHESIOLOGY

## 2018-06-14 PROCEDURE — 76210000026 HC REC RM PH II 1 TO 1.5 HR: Performed by: SURGERY

## 2018-06-14 PROCEDURE — 77030032490 HC SLV COMPR SCD KNE COVD -B: Performed by: SURGERY

## 2018-06-14 PROCEDURE — 77030016151 HC PROTCTR LNS DFOG COVD -B: Performed by: SURGERY

## 2018-06-14 PROCEDURE — 77030020268 HC MISC GENERAL SUPPLY: Performed by: SURGERY

## 2018-06-14 PROCEDURE — 74011250636 HC RX REV CODE- 250/636: Performed by: ANESTHESIOLOGY

## 2018-06-14 PROCEDURE — C1781 MESH (IMPLANTABLE): HCPCS | Performed by: SURGERY

## 2018-06-14 PROCEDURE — 74011250636 HC RX REV CODE- 250/636

## 2018-06-14 PROCEDURE — 77030008703 HC TU ET UNCUF COVD -A: Performed by: ANESTHESIOLOGY

## 2018-06-14 PROCEDURE — 77030019908 HC STETH ESOPH SIMS -A: Performed by: ANESTHESIOLOGY

## 2018-06-14 PROCEDURE — 74011000250 HC RX REV CODE- 250

## 2018-06-14 PROCEDURE — 77030035277 HC OBTRTR BLDELSS DISP INTU -B: Performed by: SURGERY

## 2018-06-14 PROCEDURE — 76010000876 HC OR TIME 2 TO 2.5HR INTENSV - TIER 2: Performed by: SURGERY

## 2018-06-14 PROCEDURE — 77030031139 HC SUT VCRL2 J&J -A: Performed by: SURGERY

## 2018-06-14 PROCEDURE — 77030008522 HC TBNG INSUF LAPRO STRY -B: Performed by: SURGERY

## 2018-06-14 PROCEDURE — 77030034849: Performed by: SURGERY

## 2018-06-14 PROCEDURE — 77030022704 HC SUT VLOC COVD -B: Performed by: SURGERY

## 2018-06-14 PROCEDURE — 77030008477 HC STYL SATN SLP COVD -A: Performed by: ANESTHESIOLOGY

## 2018-06-14 PROCEDURE — 77030021158 HC TRCR BLN GELPRT AMR -B: Performed by: SURGERY

## 2018-06-14 PROCEDURE — 74011000250 HC RX REV CODE- 250: Performed by: SURGERY

## 2018-06-14 PROCEDURE — 77030034744 HC WRMR SCOPE DISP STRL ADLR -A: Performed by: SURGERY

## 2018-06-14 DEVICE — MESH HERN L W4.1XL6.2IN L POLYPR L PORE KNIT LT 3DMAX: Type: IMPLANTABLE DEVICE | Site: GROIN | Status: FUNCTIONAL

## 2018-06-14 RX ORDER — DEXAMETHASONE SODIUM PHOSPHATE 4 MG/ML
INJECTION, SOLUTION INTRA-ARTICULAR; INTRALESIONAL; INTRAMUSCULAR; INTRAVENOUS; SOFT TISSUE AS NEEDED
Status: DISCONTINUED | OUTPATIENT
Start: 2018-06-14 | End: 2018-06-14 | Stop reason: HOSPADM

## 2018-06-14 RX ORDER — ACETAMINOPHEN 10 MG/ML
INJECTION, SOLUTION INTRAVENOUS AS NEEDED
Status: DISCONTINUED | OUTPATIENT
Start: 2018-06-14 | End: 2018-06-14 | Stop reason: HOSPADM

## 2018-06-14 RX ORDER — NEOSTIGMINE METHYLSULFATE 1 MG/ML
INJECTION INTRAVENOUS AS NEEDED
Status: DISCONTINUED | OUTPATIENT
Start: 2018-06-14 | End: 2018-06-14 | Stop reason: HOSPADM

## 2018-06-14 RX ORDER — CEFAZOLIN SODIUM/WATER 2 G/20 ML
2 SYRINGE (ML) INTRAVENOUS ONCE
Status: COMPLETED | OUTPATIENT
Start: 2018-06-14 | End: 2018-06-14

## 2018-06-14 RX ORDER — PROPOFOL 10 MG/ML
INJECTION, EMULSION INTRAVENOUS AS NEEDED
Status: DISCONTINUED | OUTPATIENT
Start: 2018-06-14 | End: 2018-06-14 | Stop reason: HOSPADM

## 2018-06-14 RX ORDER — OXYCODONE HYDROCHLORIDE 5 MG/1
5 TABLET ORAL
Status: DISCONTINUED | OUTPATIENT
Start: 2018-06-14 | End: 2018-06-14 | Stop reason: HOSPADM

## 2018-06-14 RX ORDER — GLYCOPYRROLATE 0.2 MG/ML
INJECTION INTRAMUSCULAR; INTRAVENOUS AS NEEDED
Status: DISCONTINUED | OUTPATIENT
Start: 2018-06-14 | End: 2018-06-14 | Stop reason: HOSPADM

## 2018-06-14 RX ORDER — SODIUM CHLORIDE, SODIUM LACTATE, POTASSIUM CHLORIDE, CALCIUM CHLORIDE 600; 310; 30; 20 MG/100ML; MG/100ML; MG/100ML; MG/100ML
100 INJECTION, SOLUTION INTRAVENOUS CONTINUOUS
Status: DISCONTINUED | OUTPATIENT
Start: 2018-06-14 | End: 2018-06-14 | Stop reason: HOSPADM

## 2018-06-14 RX ORDER — LIDOCAINE HYDROCHLORIDE 20 MG/ML
INJECTION, SOLUTION EPIDURAL; INFILTRATION; INTRACAUDAL; PERINEURAL AS NEEDED
Status: DISCONTINUED | OUTPATIENT
Start: 2018-06-14 | End: 2018-06-14 | Stop reason: HOSPADM

## 2018-06-14 RX ORDER — MIDAZOLAM HYDROCHLORIDE 1 MG/ML
2 INJECTION, SOLUTION INTRAMUSCULAR; INTRAVENOUS ONCE
Status: COMPLETED | OUTPATIENT
Start: 2018-06-14 | End: 2018-06-14

## 2018-06-14 RX ORDER — HYDROMORPHONE HYDROCHLORIDE 2 MG/ML
0.5 INJECTION, SOLUTION INTRAMUSCULAR; INTRAVENOUS; SUBCUTANEOUS
Status: DISCONTINUED | OUTPATIENT
Start: 2018-06-14 | End: 2018-06-14 | Stop reason: HOSPADM

## 2018-06-14 RX ORDER — FENTANYL CITRATE 50 UG/ML
100 INJECTION, SOLUTION INTRAMUSCULAR; INTRAVENOUS ONCE
Status: DISCONTINUED | OUTPATIENT
Start: 2018-06-14 | End: 2018-06-14 | Stop reason: HOSPADM

## 2018-06-14 RX ORDER — FENTANYL CITRATE 50 UG/ML
INJECTION, SOLUTION INTRAMUSCULAR; INTRAVENOUS AS NEEDED
Status: DISCONTINUED | OUTPATIENT
Start: 2018-06-14 | End: 2018-06-14 | Stop reason: HOSPADM

## 2018-06-14 RX ORDER — NALOXONE HYDROCHLORIDE 0.4 MG/ML
0.04 INJECTION, SOLUTION INTRAMUSCULAR; INTRAVENOUS; SUBCUTANEOUS
Status: DISCONTINUED | OUTPATIENT
Start: 2018-06-14 | End: 2018-06-14 | Stop reason: HOSPADM

## 2018-06-14 RX ORDER — ROCURONIUM BROMIDE 10 MG/ML
INJECTION, SOLUTION INTRAVENOUS AS NEEDED
Status: DISCONTINUED | OUTPATIENT
Start: 2018-06-14 | End: 2018-06-14 | Stop reason: HOSPADM

## 2018-06-14 RX ORDER — ONDANSETRON 2 MG/ML
INJECTION INTRAMUSCULAR; INTRAVENOUS AS NEEDED
Status: DISCONTINUED | OUTPATIENT
Start: 2018-06-14 | End: 2018-06-14 | Stop reason: HOSPADM

## 2018-06-14 RX ORDER — MIDAZOLAM HYDROCHLORIDE 1 MG/ML
2 INJECTION, SOLUTION INTRAMUSCULAR; INTRAVENOUS
Status: DISCONTINUED | OUTPATIENT
Start: 2018-06-14 | End: 2018-06-14 | Stop reason: HOSPADM

## 2018-06-14 RX ORDER — BUPIVACAINE HYDROCHLORIDE 2.5 MG/ML
INJECTION, SOLUTION EPIDURAL; INFILTRATION; INTRACAUDAL AS NEEDED
Status: DISCONTINUED | OUTPATIENT
Start: 2018-06-14 | End: 2018-06-14 | Stop reason: HOSPADM

## 2018-06-14 RX ORDER — OXYCODONE AND ACETAMINOPHEN 5; 325 MG/1; MG/1
2 TABLET ORAL
Qty: 40 TAB | Refills: 0 | Status: SHIPPED | OUTPATIENT
Start: 2018-06-14 | End: 2018-07-17

## 2018-06-14 RX ORDER — LIDOCAINE HYDROCHLORIDE 10 MG/ML
0.1 INJECTION INFILTRATION; PERINEURAL AS NEEDED
Status: DISCONTINUED | OUTPATIENT
Start: 2018-06-14 | End: 2018-06-14 | Stop reason: HOSPADM

## 2018-06-14 RX ORDER — SUCCINYLCHOLINE CHLORIDE 20 MG/ML
INJECTION INTRAMUSCULAR; INTRAVENOUS AS NEEDED
Status: DISCONTINUED | OUTPATIENT
Start: 2018-06-14 | End: 2018-06-14 | Stop reason: HOSPADM

## 2018-06-14 RX ADMIN — PROPOFOL 200 MG: 10 INJECTION, EMULSION INTRAVENOUS at 07:26

## 2018-06-14 RX ADMIN — GLYCOPYRROLATE 0.6 MG: 0.2 INJECTION INTRAMUSCULAR; INTRAVENOUS at 09:24

## 2018-06-14 RX ADMIN — ROCURONIUM BROMIDE 20 MG: 10 INJECTION, SOLUTION INTRAVENOUS at 07:36

## 2018-06-14 RX ADMIN — DEXAMETHASONE SODIUM PHOSPHATE 4 MG: 4 INJECTION, SOLUTION INTRA-ARTICULAR; INTRALESIONAL; INTRAMUSCULAR; INTRAVENOUS; SOFT TISSUE at 08:08

## 2018-06-14 RX ADMIN — SODIUM CHLORIDE, SODIUM LACTATE, POTASSIUM CHLORIDE, AND CALCIUM CHLORIDE: 600; 310; 30; 20 INJECTION, SOLUTION INTRAVENOUS at 08:36

## 2018-06-14 RX ADMIN — MIDAZOLAM HYDROCHLORIDE 2 MG: 1 INJECTION, SOLUTION INTRAMUSCULAR; INTRAVENOUS at 07:13

## 2018-06-14 RX ADMIN — FENTANYL CITRATE 100 MCG: 50 INJECTION, SOLUTION INTRAMUSCULAR; INTRAVENOUS at 07:24

## 2018-06-14 RX ADMIN — ROCURONIUM BROMIDE 5 MG: 10 INJECTION, SOLUTION INTRAVENOUS at 09:09

## 2018-06-14 RX ADMIN — LIDOCAINE HYDROCHLORIDE 60 MG: 20 INJECTION, SOLUTION EPIDURAL; INFILTRATION; INTRACAUDAL; PERINEURAL at 07:26

## 2018-06-14 RX ADMIN — ROCURONIUM BROMIDE 10 MG: 10 INJECTION, SOLUTION INTRAVENOUS at 08:29

## 2018-06-14 RX ADMIN — FENTANYL CITRATE 50 MCG: 50 INJECTION, SOLUTION INTRAMUSCULAR; INTRAVENOUS at 08:51

## 2018-06-14 RX ADMIN — ROCURONIUM BROMIDE 10 MG: 10 INJECTION, SOLUTION INTRAVENOUS at 07:26

## 2018-06-14 RX ADMIN — ACETAMINOPHEN 1000 MG: 10 INJECTION, SOLUTION INTRAVENOUS at 09:22

## 2018-06-14 RX ADMIN — ROCURONIUM BROMIDE 10 MG: 10 INJECTION, SOLUTION INTRAVENOUS at 08:03

## 2018-06-14 RX ADMIN — SUCCINYLCHOLINE CHLORIDE 160 MG: 20 INJECTION INTRAMUSCULAR; INTRAVENOUS at 07:26

## 2018-06-14 RX ADMIN — ONDANSETRON 4 MG: 2 INJECTION INTRAMUSCULAR; INTRAVENOUS at 08:15

## 2018-06-14 RX ADMIN — FENTANYL CITRATE 50 MCG: 50 INJECTION, SOLUTION INTRAMUSCULAR; INTRAVENOUS at 08:06

## 2018-06-14 RX ADMIN — SODIUM CHLORIDE, SODIUM LACTATE, POTASSIUM CHLORIDE, AND CALCIUM CHLORIDE 100 ML/HR: 600; 310; 30; 20 INJECTION, SOLUTION INTRAVENOUS at 06:25

## 2018-06-14 RX ADMIN — Medication 2 G: at 07:36

## 2018-06-14 RX ADMIN — NEOSTIGMINE METHYLSULFATE 3 MG: 1 INJECTION INTRAVENOUS at 09:24

## 2018-06-14 NOTE — OP NOTES
[facility] REPORT    Magalie Gaming  MR#: [id number]  : 1960  ACCOUNT #: [de-identified]   DATE OF SERVICE: 2018    A 78-year-old male. PREOPERATIVE DIAGNOSIS:  Left inguinal hernia. POSTOPERATIVE DIAGNOSIS:  Left inguinal hernia. PROCEDURE PERFORMED:  Robotic left inguinal hernia repair with 3DMax large mesh. ANESTHESIA:  General endotracheal.    SURGEON:  Alanna Calle DO    ASSISTANT:  None. COMPLICATIONS:  None. CONDITION:  Stable. COUNTS:  Sponge count, needle count and instrument count all correct x3. SPECIMENS REMOVED:  None. IMPLANTS:  Large 3DMax mesh. ESTIMATED BLOOD LOSS:  Minimal.    DESCRIPTION OF PROCEDURE:  This is a 78-year-old male with left inguinal hernia. He was prepared for robotic left inguinal hernia repair with mesh, possible bilateral repair. Consent was obtained by describing the procedure to the patient including potential complications to include infection, bleeding, use of mesh and possible bilateral repair. Consent was obtained, placed on the chart. He was administered Ancef 2 grams IV preoperatively, taken to the operating room, placed in a supine position. General anesthesia was initiated without complications. He was then prepped and draped in usual sterile fashion. Timeout was taken to confirm the patient and proper procedure. Following this, a supraumbilical incision was planned. 0.25% Marcaine with epinephrine was used to anesthetize skin and subcutaneous tissue. A #11 scalpel blade was used to make a skin incision. Bovie cauterization was used to dissect down to the rectus fascia. The fascia was then incised and then 0 Vicryl sutures was placed as an anchoring stitch. The peritoneum was elevated between tonsil Schnidt, entered with Metzenbaum scissors. Tricia trocar was placed into the peritoneum and the balloon was insufflated.   We then insufflated the abdomen using CO2 gas to 15 mmHg. The laparoscope was passed into the abdomen. A brief survey revealed a large left inguinal hernia and no evidence of right inguinal hernia. We decided to proceed with left inguinal hernia repair. A large 3DMax mesh left-sided was placed into the peritoneum, as well as a 2-0 Vicryl and 2-0 V-Loc suture. The mesh was then placed and centered over the hernia defect and then the peritoneum was scored along the superior edge. We then dissected in the preperitoneal plane down to the pubic tubercle medial and just inside the ASIS laterally. We then began a long tedious dissection to dissect the hernia sac from the inguinal canal.  The hernia was large and deep and traction revealed in some tearing. We decided to transect the hernia sac and then reflect the peritoneum inferiorly. At this point, the preperitoneal space was developed. We placed the mesh into the preperitoneal space and centered over the defect with a nice even orientation with minimal wrinkling. The mesh was then anchored at the pubic tubercle with 2-0 Vicryl along the rectus muscle medially with a 2-0 Vicryl. We then anchored the mesh laterally with 2-0 Vicryl just inside the ASIS and then along the superior edge. The mesh once again had a nice even orientation with minimal wrinkling. At this point, we reapproximated the peritoneum using a 2-0 V-Loc in a simple running fashion. The hernia defect, hernia sac was then reapproximated using a 2-0 Vicryl in simple running fashion. At this point, we concluded the case. We removed all needles and accounted for these on the back table. We performed a brief survey of the abdomen revealing no evidence of bowel or bladder injury. There was no evidence of bleeding or bile staining or evidence of arcing injury. At this point, we removed the robot from the patient safely, all trocars were removed in the usual fashion.   The periumbilical port site was closed with 0 Vicryl in figure-of-eight fashion. We irrigated each incision with saline until clear then reapproximated skin edges using a 3-0 Vicryl in an interrupted fashion. Mastisol and Steri-Strips were placed on top the incision. Sterile dressing applied. The patient will be extubated and transferred to [facility]. FINDINGS:  A 33-year-old male with a left inguinal hernia. Robotic left inguinal hernia repair with mesh was performed without immediate complications. There was no evidence of right inguinal hernia. He tolerated the procedure well. DO LARRY ABAD /   D: 06/14/2018 09:34     T: 06/14/2018 10:25  JOB #: [id number]

## 2018-06-14 NOTE — ANESTHESIA POSTPROCEDURE EVALUATION
Post-Anesthesia Evaluation and Assessment    Patient: Bishnu Harding MRN: [id number]  SSN: [id number]    YOB: 1960  Age: 59 y.o. Sex: male       Cardiovascular Function/Vital Signs  Visit Vitals    /60    Pulse 68    Temp 36.3 °C (97.3 °F)    Resp 18    Wt 70.3 kg (155 lb)    SpO2 96%    BMI 20.45 kg/m2       Patient is status post general anesthesia for Procedure(s):  ROBOTIC ASSISTED LEFT INGUINAL HERNIA REPAIR  WITH MESH . Nausea/Vomiting: None    Postoperative hydration reviewed and adequate. Pain:  Pain Scale 1: Numeric (0 - 10) (06/14/18 0948)  Pain Intensity 1: 0 (06/14/18 0948)   Managed    Neurological Status:   Neuro (WDL): Exceptions to WDL (06/14/18 2832)  Neuro  Neurologic State: Drowsy (06/14/18 0948)   At baseline    Mental Status and Level of Consciousness: Awake. Pulmonary Status:   O2 Device: Nasal cannula (06/14/18 0948)   Adequate oxygenation and airway patent    Complications related to anesthesia: None    Post-anesthesia assessment completed.  No concerns    Signed By: Brandyn Moraes MD     June 14, 2018

## 2018-06-14 NOTE — H&P (VIEW-ONLY)
Donal Hercules DO  [address]  [place]  Phone [phone]   Fax [phone]      Date of visit: 2018     Primary/Requesting provider: Hansel Gottron, MD    Chief Complaint   Patient presents with    Follow-up     Left Inguinal Hernia              Name: Tiney Kanner      MRN: [id number]       : 1960       Age: 59 y.o. Sex: male        PCP: Hansel Gottron, MD       CC:    Chief Complaint   Patient presents with    Follow-up     Left Inguinal Hernia        HPI:     Tiney Kanner is a 59 y.o. male who presents for evaluation of left inguinal hernia. This is a 68-year-old male with a left inguinal hernia that was seen back in January complaining of left inguinal pain for 6 weeks. At that time he was to see his cardiologist in February and he has since been cleared by the cardiologist to proceed with surgery. Patient states that the hernia has become larger since January. It causes him pain rated 6 out of 10 when he is lifting and straining. When he is at rest the pain is better. There is a visible bulge in left inguinal region. He has no previous inguinal hernia repair as a child. He denies any associated testicle pain. He denies any associated nausea vomiting or fever. So far the hernia is reducible by lying down and massaging the hernia back inside. Isaac Bradford      PMH:    Past Medical History:   Diagnosis Date    CAD (coronary artery disease)     no mi/ no stents-- followed by dr. Esperanza Elizondo Drug addiction in remission [facility])     none since     Family history of premature CAD 2018    Homeless family     living with step dtr at present    Seizures ([facility] [address] 2001    x 1--- ETOH  WITHDRAWAL-- pt thinks---no meds    Severe aortic stenosis     last echo 2018    Tobacco use disorder        PSH:    Past Surgical History:   Procedure Laterality Date    HX HEENT  age 27's    wisdom teeth removed       MEDS:    Current Outpatient Prescriptions   Medication Sig    ferrous sulfate 325 mg (65 mg iron) tablet Take 1 Tab by mouth daily (with breakfast).  atorvastatin (LIPITOR) 20 mg tablet Take 1 Tab by mouth nightly.  metoprolol tartrate (LOPRESSOR) 25 mg tablet Take 1 Tab by mouth every twelve (12) hours.  furosemide (LASIX) 40 mg tablet Take 1 Tab by mouth daily.  aspirin 81 mg chewable tablet Take 81 mg by mouth nightly. No current facility-administered medications for this visit. ALLERGIES:      No Known Allergies    SH:    Social History   Substance Use Topics    Smoking status: Former Smoker     Packs/day: 0.50     Years: 40.00     Types: Cigarettes    Smokeless tobacco: Never Used    Alcohol use Yes      Comment: rare       FH:    Family History   Problem Relation Age of Onset   Mary Carmen Asa Cancer Mother     Coronary Artery Disease Mother     Cancer Father     Heart Disease Brother     Coronary Artery Disease Brother     Cancer Sister      breast         Review of Systems   Constitutional: Negative. HENT: Negative. Eyes: Negative.         -wears glasses    Respiratory: Negative. Negative for cough, hemoptysis, sputum production and shortness of breath. Cardiovascular: Positive for chest pain. Negative for palpitations, orthopnea, claudication, leg swelling and PND. Gastrointestinal: Negative. Negative for abdominal pain, blood in stool, constipation, diarrhea, heartburn, nausea and vomiting. Genitourinary: Negative. Musculoskeletal: Negative. Skin:        -left groin discomfort    Neurological: Negative. Negative for headaches. Endo/Heme/Allergies: Bruises/bleeds easily. Psychiatric/Behavioral: Negative. Negative for depression, hallucinations, substance abuse and suicidal ideas. The patient is not nervous/anxious.            Physical Exam:     Visit Vitals    /77    Pulse 66    Ht 6' (1.829 m)    Wt 144 lb (65.3 kg)    BMI 19.53 kg/m2         Physical Exam Constitutional: He is oriented to person, place, and time and well-developed, well-nourished, and in no distress. No distress. HENT:   Head: Normocephalic and atraumatic. Eyes: EOM are normal. Pupils are equal, round, and reactive to light. Neck: Normal range of motion. Neck supple. No tracheal deviation present. No thyromegaly present. Cardiovascular: Normal rate and regular rhythm. No murmur heard. Pulmonary/Chest: Effort normal and breath sounds normal. No respiratory distress. He has no wheezes. He has no rales. Abdominal: Soft. Bowel sounds are normal. He exhibits no distension. There is no tenderness. There is no rebound and no guarding. Musculoskeletal: Normal range of motion. He exhibits no edema. Neurological: He is alert and oriented to person, place, and time. No cranial nerve deficit. Skin: Skin is warm and dry. Psychiatric: Affect normal.       Assessment/Plan:  Timothy Moreira is a 59 y.o. male who has signs and symptoms consistent with left inguinal hernia. I have recommended a robotic left inguinal hernia repair with mesh possible bilateral repair. I discussed the details the patient today in the office. I explained risk and benefits and risk of missed visits associated with anesthesia as well as mesh. The bleeding he wishes to be per schedule at the next available date and time. ICD-10-CM ICD-9-CM    1. Left inguinal hernia K40.90 550.90        I went through the risks of bleeding, infection and anesthesia.      Counseling time:not applicable    Signed: James Valverde DO   6/4/2018  3:21 PM

## 2018-06-14 NOTE — IP AVS SNAPSHOT
[address] [facility] [address] 
 
 
 [address] [facility] [address] [facility] 
[phone] Patient: Shania Gonzalez MRN: [id number] OBL:92/15/[birth year]26 About your hospitalization You were admitted on:  June 14, 2018 You last received care in the:  [facility] OP PACU You were discharged on:  June 14, 2018 Why you were hospitalized Your primary diagnosis was:  Not on File Follow-up Information Follow up With Details Comments Contact Info Kacy Hager MD   [address] [facility] [address],[address] 
[phone] Sonia Estrella, DO  Keep appointment as scheduled. [address] [facility] [address] [facility] [address] 
[phone] Your Scheduled Appointments Tuesday June 26, 2018  1:00 PM EDT Global Post Op with NILTON Mcguire  
[facility] ([address] [facility]) [address] [id number] [phone] Tuesday July 24, 2018 11:20 AM EDT  
(Arrive by 11:00 AM) Return appointment with [address] [place] [address] [address] ([facility]) [address] [facility] [address] [place] [address] [place] [address]  
[phone] Discharge Orders None A check jaylin indicates which time of day the medication should be taken. My Medications START taking these medications Instructions Each Dose to Equal  
 Morning Noon Evening Bedtime  
 oxyCODONE-acetaminophen 5-325 mg per tablet Commonly known as:  PERCOCET Your last dose was: Your next dose is: Take 2 Tabs by mouth every four (4) hours as needed for Pain. Max Daily Amount: 12 Tabs. 2 Tab CHANGE how you take these medications Instructions Each Dose to Equal  
 Morning Noon Evening Bedtime  
 furosemide 40 mg tablet Commonly known as:  LASIX What changed:  additional instructions Your last dose was: Your next dose is: Take 1 Tab by mouth daily. 40 mg CONTINUE taking these medications Instructions Each Dose to Equal  
 Morning Noon Evening Bedtime  
 aspirin 81 mg chewable tablet Your last dose was: Your next dose is: Take 81 mg by mouth nightly. 81 mg  
    
   
   
   
  
 atorvastatin 20 mg tablet Commonly known as:  LIPITOR Your last dose was: Your next dose is: Take 1 Tab by mouth nightly. 20 mg  
    
   
   
   
  
 ferrous sulfate 325 mg (65 mg iron) tablet Your last dose was: Your next dose is: Take 1 Tab by mouth daily (with breakfast). 325 mg HYDROcodone-acetaminophen 5-325 mg per tablet Commonly known as:  Josephine Spencer Your last dose was: Your next dose is: Take 1 Tab by mouth every eight (8) hours as needed for Pain for up to 1 day. Max Daily Amount: 3 Tabs. 1 Tab  
    
   
   
   
  
 metoprolol tartrate 25 mg tablet Commonly known as:  LOPRESSOR Your last dose was: Your next dose is: Take 1 Tab by mouth every twelve (12) hours. 25 mg  
    
   
   
   
  
 ondansetron 4 mg disintegrating tablet Commonly known as:  ZOFRAN ODT Your last dose was: Your next dose is: Take 1 Tab by mouth every twelve (12) hours as needed for Nausea for up to 4 days. 4 mg STOOL SOFTENER PO Your last dose was: Your next dose is: Take  by mouth as needed. Where to Get Your Medications Information on where to get these meds will be given to you by the nurse or doctor. ! Ask your nurse or doctor about these medications  
  oxyCODONE-acetaminophen 5-325 mg per tablet Opioid Education Prescription Opioids: What You Need to Know: Prescription opioids can be used to help relieve moderate-to-severe pain and are often prescribed following a surgery or injury, or for certain health conditions. These medications can be an important part of treatment but also come with serious risks. Opioids are strong pain medicines. Examples include hydrocodone, oxycodone, fentanyl, and morphine. Heroin is an example of an illegal opioid. It is important to work with your health care provider to make sure you are getting the safest, most effective care. WHAT ARE THE RISKS AND SIDE EFFECTS OF OPIOID USE? Prescription opioids carry serious risks of addiction and overdose, especially with prolonged use. An opioid overdose, often marked by slow breathing, can cause sudden death. The use of prescription opioids can have a number of side effects as well, even when taken as directed. · Tolerance-meaning you might need to take more of a medication for the same pain relief · Physical dependence-meaning you have symptoms of withdrawal when the medication is stopped. Withdrawal symptoms can include nausea, sweating, chills, diarrhea, stomach cramps, and muscle aches. Withdrawal can last up to several weeks, depending on which drug you took and how long you took it. · Increased sensitivity to pain · Constipation · Nausea, vomiting, and dry mouth · Sleepiness and dizziness · Confusion · Depression · Low levels of testosterone that can result in lower sex drive, energy, and strength · Itching and sweating RISKS ARE GREATER WITH:      
· History of drug misuse, substance use disorder, or overdose · Mental health conditions (such as depression or anxiety) · Sleep apnea · Older age (72 years or older) · Pregnancy Avoid alcohol while taking prescription opioids. Also, unless specifically advised by your health care provider, medications to avoid include: · Benzodiazepines (such as Xanax or Valium) · Muscle relaxants (such as Soma or Flexeril) · Hypnotics (such as Ambien or Lunesta) · Other prescription opioids KNOW YOUR OPTIONS Talk to your health care provider about ways to manage your pain that don't involve prescription opioids. Some of these options may actually work better and have fewer risks and side effects. Options may include: 
· Pain relievers such as acetaminophen, ibuprofen, and naproxen · Some medications that are also used for depression or seizures · Physical therapy and exercise · Counseling to help patients learn how to cope better with triggers of pain and stress. · Application of heat or cold compress · Massage therapy · Relaxation techniques Be Informed Make sure you know the name of your medication, how much and how often to take it, and its potential risks & side effects. IF YOU ARE PRESCRIBED OPIOIDS FOR PAIN: 
· Never take opioids in greater amounts or more often than prescribed. Remember the goal is not to be pain-free but to manage your pain at a tolerable level. · Follow up with your primary care provider to: · Work together to create a plan on how to manage your pain. · Talk about ways to help manage your pain that don't involve prescription opioids. · Talk about any and all concerns and side effects. · Help prevent misuse and abuse. · Never sell or share prescription opioids · Help prevent misuse and abuse. · Store prescription opioids in a secure place and out of reach of others (this may include visitors, children, friends, and family). · Safely dispose of unused/unwanted prescription opioids: Find your community drug take-back program or your pharmacy mail-back program, or flush them down the toilet, following guidance from the Food and Drug Administration (www.fda.gov/Drugs/ResourcesForYou). · Visit www.cdc.gov/drugoverdose to learn about the risks of opioid abuse and overdose.  
· If you believe you may be struggling with addiction, tell your health care provider and ask for guidance or call Leeanna [employer] at 4-368-776-YXUN. Discharge Instructions Cholecystectomy: What to Expect at [facility] Your Recovery After your surgery, it is normal to feel weak and tired for several days after you return home. Your belly may be swollen. If you had laparoscopic surgery, you may also have pain in your shoulder for about 24 hours. You may have gas or need to burp a lot at first, and a few people get diarrhea. The diarrhea usually goes away in 2 to 4 weeks, but it may last longer. How quickly you recover depends on whether you had a laparoscopic or open surgery. · For a laparoscopic surgery, most people can go back to work or their normal routine in 1 to 2 weeks, but it may take longer, depending on the type of work you do. · For an open surgery, it will probably take 4 to 6 weeks before you get back to your normal routine. This care sheet gives you a general idea about how long it will take for you to recover. However, each person recovers at a different pace. Follow the steps below to get better as quickly as possible. How can you care for yourself at home? Activity ? · Rest when you feel tired. Getting enough sleep will help you recover. ? · Try to walk each day. Start out by walking a little more than you did the day before. Gradually increase the amount you walk. Walking boosts blood flow and helps prevent pneumonia and constipation. ? · For about 2 to 4 weeks, avoid lifting anything that would make you strain. This may include a child, heavy grocery bags and milk containers, a heavy briefcase or backpack, cat litter or dog food bags, or a vacuum . ? · Avoid strenuous activities, such as biking, jogging, weightlifting, and aerobic exercise, until your doctor says it is okay. ? · You may shower 24 to 48 hours after surgery, if your doctor okays it. Pat the cut (incision) dry. Do not take a bath for the first 2 weeks, or until your doctor tells you it is okay. ? · You may drive when you are no longer taking pain medicine and can quickly move your foot from the gas pedal to the brake. You must also be able to sit comfortably for a long period of time, even if you do not plan to go far. You might get caught in traffic. ? · For a laparoscopic surgery, most people can go back to work or their normal routine in 1 to 2 weeks, but it may take longer. For an open surgery, it will probably take 4 to 6 weeks before you get back to your normal routine. ? · Your doctor will tell you when you can have sex again. ? Diet ? · Eat smaller meals more often instead of fewer larger meals. You can eat a normal diet, but avoid eating fatty foods for about 1 month. Fatty foods include hamburger, whole milk, cheese, and many snack foods. If your stomach is upset, try bland, low-fat foods like plain rice, broiled chicken, toast, and yogurt. ? · Drink plenty of fluids (unless your doctor tells you not to). ? · If you have diarrhea, try avoiding spicy foods, dairy products, fatty foods, and alcohol. You can also watch to see if specific foods cause it, and stop eating them. If the diarrhea continues for more than 2 weeks, talk to your doctor. ? · You may notice that your bowel movements are not regular right after your surgery. This is common. Try to avoid constipation and straining with bowel movements. You may want to take a fiber supplement every day. If you have not had a bowel movement after a couple of days, ask your doctor about taking a mild laxative. Medicines ? · Your doctor will tell you if and when you can restart your medicines. He or she will also give you instructions about taking any new medicines. ? · If you take blood thinners, such as warfarin (Coumadin), clopidogrel (Plavix), or aspirin, be sure to talk to your doctor.  He or she will tell you if and when to start taking those medicines again. Make sure that you understand exactly what your doctor wants you to do. ? · Take pain medicines exactly as directed. ¨ If the doctor gave you a prescription medicine for pain, take it as prescribed. ¨ If you are not taking a prescription pain medicine, take an over-the-counter medicine such as acetaminophen (Tylenol), ibuprofen (Advil, Motrin), or naproxen (Aleve). Read and follow all instructions on the label. ¨ Do not take two or more pain medicines at the same time unless the doctor told you to. Many pain medicines contain acetaminophen, which is Tylenol. Too much Tylenol can be harmful. ? · If you think your pain medicine is making you sick to your stomach: 
¨ Take your medicine after meals (unless your doctor tells you not to). ¨ Ask your doctor for a different pain medicine. ? · If your doctor prescribed antibiotics, take them as directed. Do not stop taking them just because you feel better. You need to take the full course of antibiotics. Incision care ? · If you have strips of tape on the incision, or cut, leave the tape on for a week or until it falls off.  
? · After 24 to 48 hours, wash the area daily with warm, soapy water, and pat it dry. ? · You may have staples to hold the cut together. Keep them dry until your doctor takes them out. This is usually in 7 to 10 days. ? · Keep the area clean and dry. You may cover it with a gauze bandage if it weeps or rubs against clothing. Change the bandage every day. ?Ice ? · To reduce swelling and pain, put ice or a cold pack on your belly for 10 to 20 minutes at a time. Do this every 1 to 2 hours. Put a thin cloth between the ice and your skin. Follow-up care is a key part of your treatment and safety. Be sure to make and go to all appointments, and call your doctor if you are having problems. It's also a good idea to know your test results and keep a list of the medicines you take. When should you call for help? Call 911 anytime you think you may need emergency care. For example, call if: 
? · You passed out (lost consciousness). ? · You are short of breath. Teresa Loose ? Call your doctor now or seek immediate medical care if: 
? · You are sick to your stomach and cannot drink fluids. ? · You have pain that does not get better when you take your pain medicine. ? · You cannot pass stools or gas. ? · You have signs of infection, such as: 
¨ Increased pain, swelling, warmth, or redness. ¨ Red streaks leading from the incision. ¨ Pus draining from the incision. ¨ A fever. ? · Bright red blood has soaked through the bandage over your incision. ? · You have loose stitches, or your incision comes open. ? · You have signs of a blood clot in your leg (called a deep vein thrombosis), such as: 
¨ Pain in your calf, back of knee, thigh, or groin. ¨ Redness and swelling in your leg or groin. ? Watch closely for any changes in your health, and be sure to contact your doctor if you have any problems. Where can you learn more? Go to http://ruthy-marshal.info/. Enter [phone] in the search box to learn more about \"Cholecystectomy: What to Expect at Home. \" Current as of: May 12, 2017 Content Version: 11.4 © 2668-2489 Narr8. Care instructions adapted under license by [employer] (which disclaims liability or warranty for this information). If you have questions about a medical condition or this instruction, always ask your healthcare professional. [place] [address] any warranty or liability for your use of this information. After general anesthesia or intravenous sedation, for 24 hours or while taking prescription Narcotics: · Limit your activities · Do not drive and operate hazardous machinery · Do not make important personal or business decisions · Do  not drink alcoholic beverages · If you have not urinated within 8 hours after discharge, please contact your surgeon on call. *  Please give a list of your current medications to your Primary Care Provider. *  Please update this list whenever your medications are discontinued, doses are 
    changed, or new medications (including over-the-counter products) are added. *  Please carry medication information at all times in case of emergency situations. These are general instructions for a healthy lifestyle: No smoking/ No tobacco products/ Avoid exposure to second hand smoke Surgeon General's Warning:  Quitting smoking now greatly reduces serious risk to your health. Obesity, smoking, and sedentary lifestyle greatly increases your risk for illness A healthy diet, regular physical exercise & weight monitoring are important for maintaining a healthy lifestyle You may be retaining fluid if you have a history of heart failure or if you experience any of the following symptoms:  Weight gain of 3 pounds or more overnight or 5 pounds in a week, increased swelling in our hands or feet or shortness of breath while lying flat in bed. Please call your doctor as soon as you notice any of these symptoms; do not wait until your next office visit. Recognize signs and symptoms of STROKE: 
F-face looks uneven A-arms unable to move or move unevenly S-speech slurred or non-existent T-time-call 911 as soon as signs and symptoms begin-DO NOT go Back to bed or wait to see if you get better-TIME IS BRAIN. Introducing [facility]! Dear Olga Rees: 
Thank you for requesting a Mr[employer] Youth account. Our records indicate that you already have an active Mr[employer] Youth account. You can access your account anytime at [email or web address]. [employer]/Sevence Did you know that you can access your hospital and ER discharge instructions at any time in Mr. Youth? You can also review all of your test results from your hospital stay or ER visit. Additional Information If you have questions, please visit the Frequently Asked Questions section of the MyChart website at [email or web address] Remember, [employer] is NOT to be used for urgent needs. For medical emergencies, dial 911. Now available from your iPhone and Android! Introducing Cristian Barnes As a [employer] [facility] patient, I wanted to make you aware of our electronic visit tool called Cristian Barnes. Healthcare IT/DynaPump allows you to connect within minutes with a medical provider 24 hours a day, seven days a week via a mobile device or tablet or logging into a secure website from your computer. You can access Cristian Barnes from anywhere in the [place]. A virtual visit might be right for you when you have a simple condition and feel like you just dont want to get out of bed, or cant get away from work for an appointment, when your regular [facility] provider is not available (evenings, weekends or holidays), or when youre out of town and need minor care. Electronic visits cost only $49 and if the Healthcare [employer]/DynaPump provider determines a prescription is needed to treat your condition, one can be electronically transmitted to a nearby pharmacy*. Please take a moment to enroll today if you have not already done so. The enrollment process is free and takes just a few minutes. To enroll, please download the "[employer]" lulu to your tablet or phone, or visit [email or web address] to enroll on your computer. And, as an [address] [place] [address] patient with a Swift[employer] account, the results of your visits will be scanned into your electronic medical record and your primary care provider will be able to view the scanned results. We urge you to continue to see your regular [facility] provider for your ongoing medical care.   And while your primary care provider may not be the one available when you seek a Cristian Barnes virtual visit, the peace of mind you get from getting a real diagnosis real time can be priceless. For more information on Cristian Barnes, view our Frequently Asked Questions (FAQs) at [email or web address]. Sincerely, 
 
Sabas Steel MD 
Chief Medical Officer [employer] *:  certain medications cannot be prescribed via Cristian Barnes Providers Seen During Your Hospitalization Provider Specialty Primary office phone Shira Mirna, [address] [facility] [phone] Your Primary Care Physician (PCP) Primary Care Physician Office Phone Office Fax Angely Yap, [address] [facility] [phone] You are allergic to the following No active allergies Recent Documentation Weight BMI Smoking Status 70.3 kg 20.45 kg/m2 Former Smoker Emergency Contacts Name Discharge Info Relation Home Work Mobile [address] CAREGIVER [3] Spouse [3] [phone] Xochitl Ribeiro  Daughter [21] [phone] Patient Belongings The following personal items are in your possession at time of discharge: 
  Dental Appliances: Uppers         Home Medications: Kept at bedside   Jewelry: None  Clothing: Footwear, Undergarments, Pants, Shirt    Other Valuables: None Please provide this summary of care documentation to your next provider. Signatures-by signing, you are acknowledging that this After Visit Summary has been reviewed with you and you have received a copy. Patient Signature:  ____________________________________________________________ Date:  ____________________________________________________________  
  
Evans Mince Provider Signature:  ____________________________________________________________ Date:  ____________________________________________________________

## 2018-06-14 NOTE — ANESTHESIA PREPROCEDURE EVALUATION
Anesthetic History   No history of anesthetic complications            Review of Systems / Medical History  Pertinent labs reviewed    Pulmonary        Sleep apnea: CPAP  Smoker         Neuro/Psych         Psychiatric history     Cardiovascular      Valvular problems/murmurs (s/p AVR 4/18): aortic stenosis            Exercise tolerance: >4 METS     GI/Hepatic/Renal  Within defined limits              Endo/Other  Within defined limits           Other Findings            Physical Exam    Airway  Mallampati: II  TM Distance: 4 - 6 cm  Neck ROM: normal range of motion   Mouth opening: Normal     Cardiovascular          Murmur: Grade 2, Aortic area     Dental  No notable dental hx       Pulmonary  Breath sounds clear to auscultation               Abdominal  GI exam deferred       Other Findings            Anesthetic Plan    ASA: 3  Anesthesia type: general          Induction: Intravenous  Anesthetic plan and risks discussed with: Patient

## 2018-06-14 NOTE — BRIEF OP NOTE
BRIEF OPERATIVE NOTE    Date of Procedure: 6/14/2018   Preoperative Diagnosis: Hernia, inguinal, left [K40.90]  Postoperative Diagnosis: Hernia, inguinal, left [K40.90]    Procedure(s):  ROBOTIC ASSISTED LEFT INGUINAL HERNIA REPAIR  WITH MESH   Surgeon(s) and Role:     * Sienna Pineda, DO - Primary         Surgical Assistant: None    Surgical Staff:  Circ-1: Bernadette Ramos RN  Scrub Tech-1: Juan Carlos Torres  Scrub Tech-2: Nu Russell  Event Time In   Incision Start 8014   Incision Close 0930     Anesthesia: General   Estimated Blood Loss: Minimal  Specimens: * No specimens in log *   Findings: Large LIH indirect. No RIH   Complications: none  Implants:   Implant Name Type Inv.  Item Serial No.  Lot No. LRB No. Used Action   MESH ORBIT FLR 3DMAX 10.3X15.7 --  - [id number]   MESH ORBIT FLR 3DMAX 10.3X15.7 --    [address] [place] [id number] Left 1 Implanted   Render Blank [address], [address] [facility]

## 2018-06-14 NOTE — ADDENDUM NOTE
Addendum  created 06/14/18 1045 by Satish Easley CRNA    Anesthesia Intra LDAs edited, LDA properties accepted

## 2018-06-14 NOTE — DISCHARGE INSTRUCTIONS
Cholecystectomy: What to Expect at [address] [place] [address]  After your surgery, it is normal to feel weak and tired for several days after you return home. Your belly may be swollen. If you had laparoscopic surgery, you may also have pain in your shoulder for about 24 hours. You may have gas or need to burp a lot at first, and a few people get diarrhea. The diarrhea usually goes away in 2 to 4 weeks, but it may last longer. How quickly you recover depends on whether you had a laparoscopic or open surgery. · For a laparoscopic surgery, most people can go back to work or their normal routine in 1 to 2 weeks, but it may take longer, depending on the type of work you do. · For an open surgery, it will probably take 4 to 6 weeks before you get back to your normal routine. This care sheet gives you a general idea about how long it will take for you to recover. However, each person recovers at a different pace. Follow the steps below to get better as quickly as possible. How can you care for yourself at home? Activity  ? · Rest when you feel tired. Getting enough sleep will help you recover. ? · Try to walk each day. Start out by walking a little more than you did the day before. Gradually increase the amount you walk. Walking boosts blood flow and helps prevent pneumonia and constipation. ? · For about 2 to 4 weeks, avoid lifting anything that would make you strain. This may include a child, heavy grocery bags and milk containers, a heavy briefcase or backpack, cat litter or dog food bags, or a vacuum . ? · Avoid strenuous activities, such as biking, jogging, weightlifting, and aerobic exercise, until your doctor says it is okay. ? · You may shower 24 to 48 hours after surgery, if your doctor okays it. Pat the cut (incision) dry. Do not take a bath for the first 2 weeks, or until your doctor tells you it is okay.    ? · You may drive when you are no longer taking pain medicine and can quickly move your foot from the gas pedal to the brake. You must also be able to sit comfortably for a long period of time, even if you do not plan to go far. You might get caught in traffic. ? · For a laparoscopic surgery, most people can go back to work or their normal routine in 1 to 2 weeks, but it may take longer. For an open surgery, it will probably take 4 to 6 weeks before you get back to your normal routine. ? · Your doctor will tell you when you can have sex again. ? Diet  ? · Eat smaller meals more often instead of fewer larger meals. You can eat a normal diet, but avoid eating fatty foods for about 1 month. Fatty foods include hamburger, whole milk, cheese, and many snack foods. If your stomach is upset, try bland, low-fat foods like plain rice, broiled chicken, toast, and yogurt. ? · Drink plenty of fluids (unless your doctor tells you not to). ? · If you have diarrhea, try avoiding spicy foods, dairy products, fatty foods, and alcohol. You can also watch to see if specific foods cause it, and stop eating them. If the diarrhea continues for more than 2 weeks, talk to your doctor. ? · You may notice that your bowel movements are not regular right after your surgery. This is common. Try to avoid constipation and straining with bowel movements. You may want to take a fiber supplement every day. If you have not had a bowel movement after a couple of days, ask your doctor about taking a mild laxative. Medicines  ? · Your doctor will tell you if and when you can restart your medicines. He or she will also give you instructions about taking any new medicines. ? · If you take blood thinners, such as warfarin (Coumadin), clopidogrel (Plavix), or aspirin, be sure to talk to your doctor. He or she will tell you if and when to start taking those medicines again. Make sure that you understand exactly what your doctor wants you to do. ? · Take pain medicines exactly as directed.   ¨ If the doctor gave you a prescription medicine for pain, take it as prescribed. ¨ If you are not taking a prescription pain medicine, take an over-the-counter medicine such as acetaminophen (Tylenol), ibuprofen (Advil, Motrin), or naproxen (Aleve). Read and follow all instructions on the label. ¨ Do not take two or more pain medicines at the same time unless the doctor told you to. Many pain medicines contain acetaminophen, which is Tylenol. Too much Tylenol can be harmful. ? · If you think your pain medicine is making you sick to your stomach:  ¨ Take your medicine after meals (unless your doctor tells you not to). ¨ Ask your doctor for a different pain medicine. ? · If your doctor prescribed antibiotics, take them as directed. Do not stop taking them just because you feel better. You need to take the full course of antibiotics. Incision care  ? · If you have strips of tape on the incision, or cut, leave the tape on for a week or until it falls off.   ? · After 24 to 48 hours, wash the area daily with warm, soapy water, and pat it dry. ? · You may have staples to hold the cut together. Keep them dry until your doctor takes them out. This is usually in 7 to 10 days. ? · Keep the area clean and dry. You may cover it with a gauze bandage if it weeps or rubs against clothing. Change the bandage every day. ?Ice  ? · To reduce swelling and pain, put ice or a cold pack on your belly for 10 to 20 minutes at a time. Do this every 1 to 2 hours. Put a thin cloth between the ice and your skin. Follow-up care is a key part of your treatment and safety. Be sure to make and go to all appointments, and call your doctor if you are having problems. It's also a good idea to know your test results and keep a list of the medicines you take. When should you call for help? Call 911 anytime you think you may need emergency care. For example, call if:  ? · You passed out (lost consciousness). ? · You are short of breath. Betha Lute ? Call your doctor now or seek immediate medical care if:  ? · You are sick to your stomach and cannot drink fluids. ? · You have pain that does not get better when you take your pain medicine. ? · You cannot pass stools or gas. ? · You have signs of infection, such as:  ¨ Increased pain, swelling, warmth, or redness. ¨ Red streaks leading from the incision. ¨ Pus draining from the incision. ¨ A fever. ? · Bright red blood has soaked through the bandage over your incision. ? · You have loose stitches, or your incision comes open. ? · You have signs of a blood clot in your leg (called a deep vein thrombosis), such as:  ¨ Pain in your calf, back of knee, thigh, or groin. ¨ Redness and swelling in your leg or groin. ? Watch closely for any changes in your health, and be sure to contact your doctor if you have any problems. Where can you learn more? Go to http://ruthy-marshal.info/. Enter [phone] in the search box to learn more about \"Cholecystectomy: What to Expect at Home. \"  Current as of: May 12, 2017  Content Version: 11.4  © 9422-4382 [employer]. Care instructions adapted under license by [employer] (which disclaims liability or warranty for this information). If you have questions about a medical condition or this instruction, always ask your healthcare professional. [address] any warranty or liability for your use of this information. After general anesthesia or intravenous sedation, for 24 hours or while taking prescription Narcotics:  · Limit your activities  · Do not drive and operate hazardous machinery  · Do not make important personal or business decisions  · Do  not drink alcoholic beverages  · If you have not urinated within 8 hours after discharge, please contact your surgeon on call. *  Please give a list of your current medications to your Primary Care Provider.   *  Please update this list whenever your medications are discontinued, doses are      changed, or new medications (including over-the-counter products) are added. *  Please carry medication information at all times in case of emergency situations. These are general instructions for a healthy lifestyle:  No smoking/ No tobacco products/ Avoid exposure to second hand smoke  Surgeon General's Warning:  Quitting smoking now greatly reduces serious risk to your health. Obesity, smoking, and sedentary lifestyle greatly increases your risk for illness  A healthy diet, regular physical exercise & weight monitoring are important for maintaining a healthy lifestyle    You may be retaining fluid if you have a history of heart failure or if you experience any of the following symptoms:  Weight gain of 3 pounds or more overnight or 5 pounds in a week, increased swelling in our hands or feet or shortness of breath while lying flat in bed. Please call your doctor as soon as you notice any of these symptoms; do not wait until your next office visit. Recognize signs and symptoms of STROKE:  F-face looks uneven  A-arms unable to move or move unevenly  S-speech slurred or non-existent  T-time-call 911 as soon as signs and symptoms begin-DO NOT go       Back to bed or wait to see if you get better-TIME IS BRAIN.

## 2018-06-14 NOTE — INTERVAL H&P NOTE
H&P Update:  Maria Elena Baer was seen and examined. History and physical has been reviewed. Significant clinical changes have occurred as noted: Had some LLQ abdominal pain with N/V. Was sent to the ED yesterday. CT showed LIH with small intestine without obstruction.     Signed By: Chelsea aVlverde DO     June 14, 2018 6:59 AM

## 2018-06-14 NOTE — PERIOP NOTES
Advised patient during discharge instructions to stop taking Hydrocodone that is at home while he is taking the Percocet. Patient and friend voiced understanding.

## 2018-07-18 PROBLEM — R09.02 HYPOXEMIA: Status: RESOLVED | Noted: 2018-05-17 | Resolved: 2018-07-18

## 2018-07-18 PROBLEM — G47.33 OSA (OBSTRUCTIVE SLEEP APNEA): Status: RESOLVED | Noted: 2018-05-17 | Resolved: 2018-07-18

## 2018-07-18 PROBLEM — Z82.49 FAMILY HISTORY OF PREMATURE CAD: Chronic | Status: RESOLVED | Noted: 2018-02-28 | Resolved: 2018-07-18

## 2018-07-18 PROBLEM — R06.02 SHORTNESS OF BREATH: Status: RESOLVED | Noted: 2018-02-07 | Resolved: 2018-07-18

## 2018-07-18 PROBLEM — D69.59 THROMBOCYTOPENIA DUE TO EXTRA CORPOREAL BY-PASS CIRCULATION: Status: RESOLVED | Noted: 2018-04-13 | Resolved: 2018-07-18

## 2018-07-18 PROBLEM — Q23.1 BICUSPID AORTIC VALVE: Status: RESOLVED | Noted: 2018-02-07 | Resolved: 2018-07-18

## 2018-07-18 PROBLEM — J95.821 RESPIRATORY FAILURE, POST-OPERATIVE (HCC): Status: RESOLVED | Noted: 2018-04-12 | Resolved: 2018-07-18

## 2018-07-18 PROBLEM — D62 POSTOPERATIVE ANEMIA DUE TO ACUTE BLOOD LOSS: Status: RESOLVED | Noted: 2018-04-13 | Resolved: 2018-07-18

## 2018-07-18 PROBLEM — Z59.00 HOMELESS FAMILY: Chronic | Status: RESOLVED | Noted: 2018-02-28 | Resolved: 2018-07-18

## 2018-07-18 PROBLEM — Z01.810 PRE-OPERATIVE CARDIOVASCULAR EXAMINATION: Status: RESOLVED | Noted: 2018-05-31 | Resolved: 2018-07-18

## 2018-07-18 PROBLEM — G47.31 CENTRAL SLEEP APNEA: Status: RESOLVED | Noted: 2018-05-17 | Resolved: 2018-07-18

## 2018-07-18 PROBLEM — Z95.2 S/P AORTIC VALVE REPLACEMENT: Status: ACTIVE | Noted: 2018-04-01

## 2018-09-21 PROBLEM — I10 ESSENTIAL HYPERTENSION: Status: ACTIVE | Noted: 2018-09-21

## 2018-11-07 ENCOUNTER — HOSPITAL ENCOUNTER (OUTPATIENT)
Dept: PHYSICAL THERAPY | Age: 58
Discharge: HOME | End: 2018-11-07
Payer: COMMERCIAL

## 2018-11-07 NOTE — PROGRESS NOTES
Patient came to PT eval but declined to sign consent form because of legal issues. He left and said he would reschedule later.

## 2018-11-26 ENCOUNTER — HOSPITAL ENCOUNTER (OUTPATIENT)
Dept: PHYSICAL THERAPY | Age: 58
Discharge: HOME | End: 2018-11-26
Payer: COMMERCIAL

## 2018-11-26 PROCEDURE — 97161 PT EVAL LOW COMPLEX 20 MIN: CPT

## 2018-11-26 PROCEDURE — 97110 THERAPEUTIC EXERCISES: CPT

## 2018-11-26 NOTE — THERAPY EVALUATION
Joan Katerina  : 1960  Payor: Stephany Burgos / Plan: Replaced by [facility] / Product Type: PPO /  [id number]  at [facility] TISHA DICKERSON  [facility], [address], [facility] [address]  [phone]   [phone]       OUTPATIENT PHYSICAL THERAPY:Initial Assessment 2018     ICD-10: Treatment Diagnosis: Pain in left shoulder (M25.512)   Precautions/Allergies:   Patient has no known allergies. MD Orders: Evaluate and treat MEDICAL/REFERRING DIAGNOSIS:  Pain in left shoulder [M25.512]   DATE OF ONSET: ~2018  REFERRING PHYSICIAN: Janel Thakur MD  RETURN PHYSICIAN APPOINTMENT: 19     INITIAL ASSESSMENT:  Mr. Davida Mendoza presents with complaints of L shoulder pain. Pain, weakness, and decreased posture are limiting normalized use and function of left UE. He will benefit from PT for guided shoulder rehab to promote return to normalized use of the UE with daily and work activities. PROBLEM LIST (Impacting functional limitations):  1. Pain left shoulder   2. Weakness left shoulder  3. Decreased posture INTERVENTIONS PLANNED:  1. Thermal and electric modalities, manual therapies for pain   2. Manual therapies, therapeutic exercises, HEP for ROM    3. Therapeutic exercises and HEP for strength   TREATMENT PLAN:  Effective Dates: 2018 TO 2019 (60 days). Frequency/Duration: 2 times a week for 60 days  GOALS: (Goals have been discussed and agreed upon with patient.)  Discharge Goals: Time Frame: 4-6 weeks  1. Pt will report 0/10 shoulder pain with active movements and work activities. 2. Pt will increase UE strength to 5/5 for improved mobility. 3. Pt will demonstrate improved sitting and standing posture for decreased stress on the shoulder. 4. Pt will be independent with HEP.   Rehabilitation Potential For Stated Goals: Good  Regarding Kole Dawkins's therapy, I certify that the treatment plan above will be carried out by a therapist or under their direction. Thank you for this referral,    Xavier Ocasio, PT                The information in this section was collected on 11-27-18 (except where otherwise noted). HISTORY:   History of Present Injury/Illness (Reason for Referral): Shoulder pain for a few months. Pain with reaching back or far out. No pain at rest. Sharp pain if he moves it a certain way. Pain at worst 8-9/10. Pain with sleeping on the left side. Past Medical History/Comorbidities: Mr. Vero Ramachandran  has a past medical history of CAD (coronary artery disease), Drug addiction in remission ([facility] Dyslipidemia, DIEGO on CPAP, Recurrent depression ([facility] [address] S/P aortic valve replacement, Seizures ([facility] [address] Sigmoid diverticulosis, and Tobacco use disorder. Mr. Vero Ramachandran  has a past surgical history that includes hx hernia repair (Left, 06/14/2018); pr cardiac surg procedure unlist (04/12/2018); hx heart catheterization (02/23/2018); ROBOTIC ASSISTED LEFT INGUINAL HERNIA REPAIR  WITH MESH (Left, 6/14/2018); CARDIAC RE ENTRY (N/A, 4/12/2018); AORTIC VALVE REPLACEMENT (N/A, 4/12/2018); and ESOPHAGEAL TRANS ECHOCARDIOGRAM / ANES PROBE   (N/A, 4/12/2018). Social History/Living Environment:  Lives with wife. 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 Ambulatory/Rehab Services H2 Model Falls Risk Assessment    Risk Factors:       (1)  Gender [Male] Ability to Rise from Chair:       (0)  Ability to rise in a single movement    Falls Prevention Plan:       No modifications necessary   Total: (5 or greater = High Risk): 1    ©2010 [facility] [employer]. All Rights Reserved. [facility] States Patent #[id number].  Federal Law prohibits the replication, distribution or use without written permission from [facility] [employer]       Current Medications:       Current Outpatient Medications:     atorvastatin (LIPITOR) 20 mg tablet, Take 1 Tab by mouth nightly., Disp: 30 Tab, Rfl: 5   metoprolol tartrate (LOPRESSOR) 25 mg tablet, Take 1 Tab by mouth every twelve (12) hours. , Disp: 60 Tab, Rfl: 5    ferrous sulfate 325 mg (65 mg iron) tablet, Take 1 Tab by mouth daily (with breakfast). , Disp: 90 Tab, Rfl: 3    escitalopram oxalate (LEXAPRO) 10 mg tablet, Take 1 Tab by mouth daily. , Disp: 30 Tab, Rfl: 2    docusate sodium (STOOL SOFTENER PO), Take  by mouth as needed. , Disp: , Rfl:     furosemide (LASIX) 40 mg tablet, Take 1 Tab by mouth daily. (Patient taking differently: Take 40 mg by mouth daily. Pt is taking prn- about every 3 days), Disp: 30 Tab, Rfl: 5    aspirin 81 mg chewable tablet, Take 81 mg by mouth nightly., Disp: , Rfl:    Date Last Reviewed:  11-27-18   Number of Personal Factors/Comorbidities that affect the Plan of Care: 1-2: MODERATE COMPLEXITY   EXAMINATION:     Observation/Orthostatic Postural Assessment: He sits with rounded shoulders and forward head posture.    Palpation: no tenderness to palpation     ROM:      LUE AROM  L Shoulder Flexion: 140  L Shoulder Internal Rotation: 65(at 90 deg abd, T9 behind back with pain)  L Shoulder External Rotation: 45(at neutr, 90 at 90 deg abd with pain)          RUE AROM  R Shoulder Flexion: 140  R Shoulder Internal Rotation: (60 at 90 deg abd, T9 behind back)  R Shoulder External Rotation: 65(at neutral, 90 at 90 deg abd)         Strength:   LUE Strength  L Scapular Depression: 4(lower trap)  L Scapular Retraction: 4+(middle trap)  L Shoulder Flexion: 5  L Shoulder Extension: 5  L Shoulder ABduction: 4+  L Shoulder Internal Rotation: 5  L Shoulder External Rotation: 4    RUE Strength  R Scapular Depression: 4(lower trap)  R Scapular Retraction: 4+(middle trap)  R Shoulder Flexion: 5  R Shoulder Extension: 5  R Shoulder ABduction: 5  R Shoulder Internal Rotation: 5  R Shoulder External Rotation: 5         Special Tests:  Impingement tests: negative Neers, negative Montaño alem; Rotator Cuff tests: negative full can, negative subscap lift off; Labral test: positive speeds; Cervical/Thoracic Clearing tests: stiffness to thoracic spine with central PAs in prone. Neurological Screen: Upper quarter Neural Tension Tests: negative median nerve glide  Functional Mobility:  Sit to/from Stand: independent. Bed Mobility: independent. Independent with basic mobility. independent with dressing and grooming ADL's. Body Structures Involved:  1. Joints  2. Muscles Body Functions Affected:  1. Neuromusculoskeletal Activities and Participation Affected:  1. Community, Social and [place]   Number of elements (examined above) that affect the Plan of Care: 3: MODERATE COMPLEXITY   CLINICAL PRESENTATION:   Presentation: Stable and uncomplicated: LOW COMPLEXITY   CLINICAL DECISION MAKING:   Outcome Measure: Tool Used: Disabilities of the Arm, Shoulder and Hand (DASH) Questionnaire - Quick Version  Score:  Initial: 22/55  Most Recent: X/55 (Date: -- )   Interpretation of Score: The DASH is designed to measure the activities of daily living in person's with upper extremity dysfunction or pain. Each section is scored on a 1-5 scale, 5 representing the greatest disability. The scores of each section are added together for a total score of 55. This number is divided by 11, followed by subtracting 1 and multiplying by 25 to get a percent score of disability. This value represents the percentage disability: 0-20% minimal disability; 20-40% moderate disability; 40-60% severe disability; % dependent for care or exaggerated symptom behavior. Minimal detectable change is 12%. Medical Necessity:   · Patient is expected to demonstrate progress in strength and range of motion to improve mobility. Reason for Services/Other Comments:  · Patient continues to require skilled intervention due to increased pain, decreased UE strength, and decreased posture.    Use of outcome tool(s) and clinical judgement create a POC that gives a: Clear prediction of patient's progress: LOW COMPLEXITY            TREATMENT:   (In addition to Assessment/Re-Assessment sessions the following treatments were rendered)  Pre-treatment Symptoms/Complaints:  Pt reports shoulder pain if he moves his arm a certain way. Pain: Initial:   0/10 at rest Post Session:  0/10       Therapeutic Exercise (15 Minutes):  Exercises to improve mobility and strength. Required moderate visual and verbal cues to promote proper body alignment. Pt supine and PROM to the Left shoulder for ER at neutral, ER at 90 deg abd, IR, and forward elevation. Instructed in sleeper stretch in side lying for IR ROM. Prone scapular retraction 10x, prone middle trap 10x with verbal cues for scapula positioning. Sitting scapular retraction with moderate verbal and visual cues for posture. Discussed trying to turn palms up when folding laundry and reaching overhead to decreased shoulder impingement. Pt verbalizes understanding. Manual Therapy (     ): none today  Therapeutic Modalities:                                                                                             HEP: Handout given for sitting scapular retraction and sleeper stretch. Patient verbalizes understanding. Treatment/Session Assessment:    · Response to Treatment:  Good return demonstration of HEP. · Compliance with Program/Exercises: Will assess as treatment progresses. · Recommendations/Intent for next treatment session: \"Next visit will focus on postural strengthening\".  L shoulder ROM  Total Treatment Duration: 45 Minutes  PT Patient Time In/Time Out  Time In: 1250  Time Out: 101 Dates

## 2018-11-28 ENCOUNTER — HOSPITAL ENCOUNTER (OUTPATIENT)
Dept: PHYSICAL THERAPY | Age: 58
Discharge: HOME | End: 2018-11-28
Payer: COMMERCIAL

## 2018-11-28 PROCEDURE — 97110 THERAPEUTIC EXERCISES: CPT

## 2018-11-28 NOTE — PROGRESS NOTES
Beau Lozano  : 1960  Payor: Cherry Gomez / Plan: [place] [employer] / Product Type: PPO /  [address]  at [facility] TISHA DICKERSON  [address] [facility], [address] [place] [address],[address], [facility] [address]  [phone]   [phone]       OUTPATIENT PHYSICAL THERAPY:Daily Note 2018     ICD-10: Treatment Diagnosis: Pain in left shoulder (M25.512)   Precautions/Allergies:   Patient has no known allergies. MD Orders: Evaluate and treat MEDICAL/REFERRING DIAGNOSIS:  Pain in left shoulder [M25.512]   DATE OF ONSET: ~2018  REFERRING PHYSICIAN: Vincenzo De La Rosa MD  RETURN PHYSICIAN APPOINTMENT: 19     INITIAL ASSESSMENT:  Mr. Ottoniel Allison presents with complaints of L shoulder pain. Pain, weakness, and decreased posture are limiting normalized use and function of left UE. He will benefit from PT for guided shoulder rehab to promote return to normalized use of the UE with daily and work activities. PROBLEM LIST (Impacting functional limitations):  1. Pain left shoulder   2. Weakness left shoulder  3. Decreased posture INTERVENTIONS PLANNED:  1. Thermal and electric modalities, manual therapies for pain   2. Manual therapies, therapeutic exercises, HEP for ROM    3. Therapeutic exercises and HEP for strength   TREATMENT PLAN:  Effective Dates: 2018 TO 2019 (60 days). Frequency/Duration: 2 times a week for 60 days  GOALS: (Goals have been discussed and agreed upon with patient.)  Discharge Goals: Time Frame: 4-6 weeks  1. Pt will report 0/10 shoulder pain with active movements and work activities. 2. Pt will increase UE strength to 5/5 for improved mobility. 3. Pt will demonstrate improved sitting and standing posture for decreased stress on the shoulder. 4. Pt will be independent with HEP.   Rehabilitation Potential For Stated Goals: Good  Regarding Billie Dawkins's therapy, I certify that the treatment plan above will be carried out by a therapist or under their direction. Thank you for this referral,    Meron Kasper PT                The information in this section was collected on 11-27-18 (except where otherwise noted). HISTORY:   History of Present Injury/Illness (Reason for Referral): Shoulder pain for a few months. Pain with reaching back or far out. No pain at rest. Sharp pain if he moves it a certain way. Pain at worst 8-9/10. Pain with sleeping on the left side. Past Medical History/Comorbidities: Mr. Bel Peoples  has a past medical history of CAD (coronary artery disease), Drug addiction in remission ([facility] [address] Dyslipidemia, DIEGO on CPAP, Recurrent depression ([facility] [address] S/P aortic valve replacement, Seizures ([facility] [address] Sigmoid diverticulosis, and Tobacco use disorder. Mr. Bel Peoples  has a past surgical history that includes hx hernia repair (Left, 06/14/2018); pr cardiac surg procedure unlist (04/12/2018); hx heart catheterization (02/23/2018); ROBOTIC ASSISTED LEFT INGUINAL HERNIA REPAIR  WITH MESH (Left, 6/14/2018); CARDIAC RE ENTRY (N/A, 4/12/2018); AORTIC VALVE REPLACEMENT (N/A, 4/12/2018); and ESOPHAGEAL TRANS ECHOCARDIOGRAM / ANES PROBE   (N/A, 4/12/2018). Social History/Living Environment:  Lives with wife. 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 Ambulatory/Rehab Services H2 Model Falls Risk Assessment    Risk Factors:       (1)  Gender [Male] Ability to Rise from Chair:       (0)  Ability to rise in a single movement    Falls Prevention Plan:       No modifications necessary   Total: (5 or greater = High Risk): 1    ©2010 [facility] [employer]. All Rights Reserved. [facility] Patent #[id number].  Federal Law prohibits the replication, distribution or use without written permission from [employer]       Current Medications:       Current Outpatient Medications:     atorvastatin (LIPITOR) 20 mg tablet, Take 1 Tab by mouth nightly., Disp: 30 Tab, Rfl: 5   metoprolol tartrate (LOPRESSOR) 25 mg tablet, Take 1 Tab by mouth every twelve (12) hours. , Disp: 60 Tab, Rfl: 5    ferrous sulfate 325 mg (65 mg iron) tablet, Take 1 Tab by mouth daily (with breakfast). , Disp: 90 Tab, Rfl: 3    escitalopram oxalate (LEXAPRO) 10 mg tablet, Take 1 Tab by mouth daily. , Disp: 30 Tab, Rfl: 2    docusate sodium (STOOL SOFTENER PO), Take  by mouth as needed. , Disp: , Rfl:     furosemide (LASIX) 40 mg tablet, Take 1 Tab by mouth daily. (Patient taking differently: Take 40 mg by mouth daily. Pt is taking prn- about every 3 days), Disp: 30 Tab, Rfl: 5    aspirin 81 mg chewable tablet, Take 81 mg by mouth nightly., Disp: , Rfl:    Date Last Reviewed:  11-27-18   Number of Personal Factors/Comorbidities that affect the Plan of Care: 1-2: MODERATE COMPLEXITY   EXAMINATION:     Observation/Orthostatic Postural Assessment: He sits with rounded shoulders and forward head posture. Palpation: no tenderness to palpation     ROM:                           Strength:                  Special Tests:  Impingement tests: negative Neers, negative Montaño alem; Rotator Cuff tests: negative full can, negative subscap lift off; Labral test: positive speeds; Cervical/Thoracic Clearing tests: stiffness to thoracic spine with central PAs in prone. Neurological Screen: Upper quarter Neural Tension Tests: negative median nerve glide  Functional Mobility:  Sit to/from Stand: independent. Bed Mobility: independent. Independent with basic mobility. independent with dressing and grooming ADL's. Body Structures Involved:  1. Joints  2. Muscles Body Functions Affected:  1. Neuromusculoskeletal Activities and Participation Affected:  1. Community, Social and [place]   Number of elements (examined above) that affect the Plan of Care: 3: MODERATE COMPLEXITY   CLINICAL PRESENTATION:   Presentation: Stable and uncomplicated: LOW COMPLEXITY   CLINICAL DECISION MAKING:   Outcome Measure:    Tool Used: Disabilities of the Arm, Shoulder and Hand (DASH) Questionnaire - Quick Version  Score:  Initial: 22/55  Most Recent: X/55 (Date: -- )   Interpretation of Score: The DASH is designed to measure the activities of daily living in person's with upper extremity dysfunction or pain. Each section is scored on a 1-5 scale, 5 representing the greatest disability. The scores of each section are added together for a total score of 55. This number is divided by 11, followed by subtracting 1 and multiplying by 25 to get a percent score of disability. This value represents the percentage disability: 0-20% minimal disability; 20-40% moderate disability; 40-60% severe disability; % dependent for care or exaggerated symptom behavior. Minimal detectable change is 12%. Medical Necessity:   · Patient is expected to demonstrate progress in strength and range of motion to improve mobility. Reason for Services/Other Comments:  · Patient continues to require skilled intervention due to increased pain, decreased UE strength, and decreased posture. Use of outcome tool(s) and clinical judgement create a POC that gives a: Clear prediction of patient's progress: LOW COMPLEXITY            TREATMENT:   (In addition to Assessment/Re-Assessment sessions the following treatments were rendered)  Pre-treatment Symptoms/Complaints:  Pt reports his shoulder feels about the same. Pain: Initial:   0/10 at rest Post Session:  0/10       Therapeutic Exercise (38 Minutes):  Exercises to improve mobility and strength. Required moderate visual and verbal cues to promote proper body alignment. Pt supine and PROM to the Left shoulder for ER at neutral, ER at 90 deg abd, IR, and forward elevation.  Exercises per grid below   Date:  11-28-18 Date:   Date:     Activity/Exercise Parameters Parameters Parameters   Serratus punch 2# 2x10     Side lying ER 1# 2x15     Prone shoulder extension B UE 2x10     Prone middle trap 2x10     Prone ER L 1# 2x10     Band IR and ER Red 2x10 ea     Seated scapular retraction 2x10     pec stretch Sitting 30\"x3         Manual Therapy (     ): none today  Therapeutic Modalities:                                                                                             HEP: Handout given for sitting scapular retraction and sleeper stretch. Patient verbalizes understanding. Treatment/Session Assessment:    · Response to Treatment: minimal complaints of shoulder pain with exercises. No increase in shoulder pain reported at end of session. · Compliance with Program/Exercises: Will assess as treatment progresses. · Recommendations/Intent for next treatment session: \"Next visit will focus on postural strengthening\".  L shoulder ROM  Total Treatment Duration: 38 Minutes  PT Patient Time In/Time Out  Time In: 1010  Time Out: 506 [facility]

## 2018-12-03 ENCOUNTER — HOSPITAL ENCOUNTER (OUTPATIENT)
Dept: PHYSICAL THERAPY | Age: 58
Discharge: HOME | End: 2018-12-03
Payer: COMMERCIAL

## 2018-12-03 PROCEDURE — 97110 THERAPEUTIC EXERCISES: CPT

## 2018-12-03 NOTE — PROGRESS NOTES
Amy Barbour : 1960 Payor: Selena Flannery / Plan: [place] [facility] / Product Type: PPO /  [address]  at [facility] TISHA DICKERSON [address] [facility], [address] [place] [address],[address], [address] [phone]   [phone] OUTPATIENT PHYSICAL THERAPY:Daily Note 12/3/2018 ICD-10: Treatment Diagnosis: Pain in left shoulder (M25.512) Precautions/Allergies:  
Patient has no known allergies. MD Orders: Evaluate and treat MEDICAL/REFERRING DIAGNOSIS: 
Pain in left shoulder [M25.512] DATE OF ONSET: ~2018 REFERRING PHYSICIAN: Anant Roberts MD 
RETURN PHYSICIAN APPOINTMENT: 19 INITIAL ASSESSMENT:  Mr. Brenna Majano presents with complaints of L shoulder pain. Pain, weakness, and decreased posture are limiting normalized use and function of left UE. He will benefit from PT for guided shoulder rehab to promote return to normalized use of the UE with daily and work activities. PROBLEM LIST (Impacting functional limitations): 1. Pain left shoulder 2. Weakness left shoulder 3. Decreased posture INTERVENTIONS PLANNED: 
1. Thermal and electric modalities, manual therapies for pain 2. Manual therapies, therapeutic exercises, HEP for ROM 3. Therapeutic exercises and HEP for strength TREATMENT PLAN: 
Effective Dates: 2018 TO 2019 (60 days). Frequency/Duration: 2 times a week for 60 days GOALS: (Goals have been discussed and agreed upon with patient.) Discharge Goals: Time Frame: 4-6 weeks 1. Pt will report 0/10 shoulder pain with active movements and work activities. 2. Pt will increase UE strength to 5/5 for improved mobility. 3. Pt will demonstrate improved sitting and standing posture for decreased stress on the shoulder. 4. Pt will be independent with HEP. Rehabilitation Potential For Stated Goals: Good The information in this section was collected on 18 (except where otherwise noted).  
HISTORY:  
 History of Present Injury/Illness (Reason for Referral): Shoulder pain for a few months. Pain with reaching back or far out. No pain at rest. Sharp pain if he moves it a certain way. Pain at worst 8-9/10. Pain with sleeping on the left side. Past Medical History/Comorbidities: Mr. Yaritza Metcalf  has a past medical history of CAD (coronary artery disease), Drug addiction in remission ([facility] [address] Dyslipidemia, DIEGO on CPAP, Recurrent depression ([facility] [address] S/P aortic valve replacement, Seizures ([facility] [address] Sigmoid diverticulosis, and Tobacco use disorder. Mr. Yaritza Metcalf  has a past surgical history that includes hx hernia repair (Left, 06/14/2018); pr cardiac surg procedure unlist (04/12/2018); hx heart catheterization (02/23/2018); ROBOTIC ASSISTED LEFT INGUINAL HERNIA REPAIR  WITH MESH (Left, 6/14/2018); CARDIAC RE ENTRY (N/A, 4/12/2018); AORTIC VALVE REPLACEMENT (N/A, 4/12/2018); and ESOPHAGEAL TRANS ECHOCARDIOGRAM / ANES PROBE   (N/A, 4/12/2018). Social History/Living Environment:  Lives with wife. 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 Ambulatory/Rehab Services H2 Model Falls Risk Assessment Risk Factors: 
     (1)  Gender [Male] Ability to Rise from Chair: 
     (0)  Ability to rise in a single movement Falls Prevention Plan: No modifications necessary Total: (5 or greater = High Risk): 1 ©2010 [facility] [address] Patent #[id number]. Federal Law prohibits the replication, distribution or use without written permission from [facility] [place] Current Medications:   
  
Current Outpatient Medications:  
  atorvastatin (LIPITOR) 20 mg tablet, Take 1 Tab by mouth nightly., Disp: 30 Tab, Rfl: 5 
  metoprolol tartrate (LOPRESSOR) 25 mg tablet, Take 1 Tab by mouth every twelve (12) hours. , Disp: 60 Tab, Rfl: 5 
  ferrous sulfate 325 mg (65 mg iron) tablet, Take 1 Tab by mouth daily (with breakfast). , Disp: 90 Tab, Rfl: 3 
  escitalopram oxalate (LEXAPRO) 10 mg tablet, Take 1 Tab by mouth daily. , Disp: 30 Tab, Rfl: 2 
  docusate sodium (STOOL SOFTENER PO), Take  by mouth as needed. , Disp: , Rfl:  
  furosemide (LASIX) 40 mg tablet, Take 1 Tab by mouth daily. (Patient taking differently: Take 40 mg by mouth daily. Pt is taking prn- about every 3 days), Disp: 30 Tab, Rfl: 5 
  aspirin 81 mg chewable tablet, Take 81 mg by mouth nightly., Disp: , Rfl:   
Date Last Reviewed:  11-27-18 Number of Personal Factors/Comorbidities that affect the Plan of Care: 1-2: MODERATE COMPLEXITY EXAMINATION:  
 
Observation/Orthostatic Postural Assessment: He sits with rounded shoulders and forward head posture. Palpation: no tenderness to palpation ROM:  
  
  
  
  
    
   
  
Strength:  
  
    
   
  
Special Tests:  Impingement tests: negative Neers, negative Montaño alem; Rotator Cuff tests: negative full can, negative subscap lift off; Labral test: positive speeds; Cervical/Thoracic Clearing tests: stiffness to thoracic spine with central PAs in prone. Neurological Screen: Upper quarter Neural Tension Tests: negative median nerve glide Functional Mobility:  Sit to/from Stand: independent. Bed Mobility: independent. Independent with basic mobility. independent with dressing and grooming ADL's. Body Structures Involved: 1. Joints 2. Muscles Body Functions Affected: 1. Neuromusculoskeletal Activities and Participation Affected: 1. Community, Social and [place] Number of elements (examined above) that affect the Plan of Care: 3: MODERATE COMPLEXITY CLINICAL PRESENTATION:  
Presentation: Stable and uncomplicated: LOW COMPLEXITY CLINICAL DECISION MAKING:  
Outcome Measure: Tool Used: Disabilities of the Arm, Shoulder and Hand (DASH) Questionnaire - Quick Version Score:  Initial: 22/55  Most Recent: X/55 (Date: -- ) Interpretation of Score: The DASH is designed to measure the activities of daily living in person's with upper extremity dysfunction or pain. Each section is scored on a 1-5 scale, 5 representing the greatest disability. The scores of each section are added together for a total score of 55. This number is divided by 11, followed by subtracting 1 and multiplying by 25 to get a percent score of disability. This value represents the percentage disability: 0-20% minimal disability; 20-40% moderate disability; 40-60% severe disability; % dependent for care or exaggerated symptom behavior. Minimal detectable change is 12%. Medical Necessity:  
· Patient is expected to demonstrate progress in strength and range of motion to improve mobility. Reason for Services/Other Comments: 
· Patient continues to require skilled intervention due to increased pain, decreased UE strength, and decreased posture. Use of outcome tool(s) and clinical judgement create a POC that gives a: Clear prediction of patient's progress: LOW COMPLEXITY  
  
 
 
 
TREATMENT:  
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 Pain: Initial:   0/10 at rest Post Session:  0/10 Therapeutic Exercise (30 Minutes):  Exercises to improve mobility and strength. Required moderate visual and verbal cues to promote proper body alignment. Pt supine and PROM to the Left shoulder for ER at neutral, ER at 90 deg abd, IR, and forward elevation. Exercises per grid below Date: 
11-28-18 Date: 
12-3-18 Date: Activity/Exercise Parameters Parameters Parameters Serratus punch 2# 2x10 2# 2x15 Side lying ER 1# 2x15 1# 2x15 Prone shoulder extension B UE 2x10 2x15 Prone middle trap 2x10 2x15 Prone ER L 1# 2x10 L 2# 2x10 Band IR and ER Red 2x10 ea Red 2x15 Seated scapular retraction 2x10 10x, red 15x   
pec stretch Sitting 30\"x3 Sitting 30\" Manual Therapy (     ): none today Therapeutic Modalities: HEP: Handout given for sitting scapular retraction and sleeper stretch. Patient verbalizes understanding. Treatment/Session Assessment:   
· Response to Treatment:No complaints of pain with exercises. · Compliance with Program/Exercises: Will assess as treatment progresses. · Recommendations/Intent for next treatment session: \"Next visit will focus on postural strengthening\". L shoulder ROM Total Treatment Duration: 30 Minutes PT Patient Time In/Time Out Time In: 1874 Time Out: 7410 Babs Harding, PT

## 2018-12-05 ENCOUNTER — APPOINTMENT (OUTPATIENT)
Dept: PHYSICAL THERAPY | Age: 58
End: 2018-12-05
Payer: COMMERCIAL

## 2018-12-06 ENCOUNTER — HOSPITAL ENCOUNTER (OUTPATIENT)
Dept: PHYSICAL THERAPY | Age: 58
Discharge: HOME | End: 2018-12-06
Payer: COMMERCIAL

## 2018-12-06 PROCEDURE — 97110 THERAPEUTIC EXERCISES: CPT

## 2018-12-06 NOTE — PROGRESS NOTES
Timothy Bullard : 1960 Payor: Cheikh Beauchamp / Plan: [place] [facility] / Product Type: PPO /  [id number]  at On license of [facility] TISHA DICKERSON [address] [facility], [address] [place] [address],[address], [facility] [address]. [phone]   [phone] OUTPATIENT PHYSICAL THERAPY:Daily Note 2018 ICD-10: Treatment Diagnosis: Pain in left shoulder (M25.512) Precautions/Allergies:  
Patient has no known allergies. MD Orders: Evaluate and treat MEDICAL/REFERRING DIAGNOSIS: 
Pain in left shoulder [M25.512] DATE OF ONSET: ~2018 REFERRING PHYSICIAN: Nancy Carolina MD 
RETURN PHYSICIAN APPOINTMENT: 19 INITIAL ASSESSMENT:  Mr. Ev Ortiz presents with complaints of L shoulder pain. Pain, weakness, and decreased posture are limiting normalized use and function of left UE. He will benefit from PT for guided shoulder rehab to promote return to normalized use of the UE with daily and work activities. PROBLEM LIST (Impacting functional limitations): 1. Pain left shoulder 2. Weakness left shoulder 3. Decreased posture INTERVENTIONS PLANNED: 
1. Thermal and electric modalities, manual therapies for pain 2. Manual therapies, therapeutic exercises, HEP for ROM 3. Therapeutic exercises and HEP for strength TREATMENT PLAN: 
Effective Dates: 2018 TO 2019 (60 days). Frequency/Duration: 2 times a week for 60 days GOALS: (Goals have been discussed and agreed upon with patient.) Discharge Goals: Time Frame: 4-6 weeks 1. Pt will report 0/10 shoulder pain with active movements and work activities. 2. Pt will increase UE strength to 5/5 for improved mobility. 3. Pt will demonstrate improved sitting and standing posture for decreased stress on the shoulder. 4. Pt will be independent with HEP. Rehabilitation Potential For Stated Goals: Good The information in this section was collected on 18 (except where otherwise noted).  
HISTORY:  
 History of Present Injury/Illness (Reason for Referral): Shoulder pain for a few months. Pain with reaching back or far out. No pain at rest. Sharp pain if he moves it a certain way. Pain at worst 8-9/10. Pain with sleeping on the left side. Past Medical History/Comorbidities: Mr. Yaritza Metcalf  has a past medical history of CAD (coronary artery disease), Drug addiction in remission ([facility] [address] Dyslipidemia, DIEGO on CPAP, Recurrent depression ([facility] [address] S/P aortic valve replacement, Seizures ([facility] [address] Sigmoid diverticulosis, and Tobacco use disorder. Mr. Yaritza Metcalf  has a past surgical history that includes hx hernia repair (Left, 06/14/2018); pr cardiac surg procedure unlist (04/12/2018); hx heart catheterization (02/23/2018); ROBOTIC ASSISTED LEFT INGUINAL HERNIA REPAIR  WITH MESH (Left, 6/14/2018); CARDIAC RE ENTRY (N/A, 4/12/2018); AORTIC VALVE REPLACEMENT (N/A, 4/12/2018); and ESOPHAGEAL TRANS ECHOCARDIOGRAM / ANES PROBE   (N/A, 4/12/2018). Social History/Living Environment:  Lives with wife. 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 Ambulatory/Rehab Services H2 Model Falls Risk Assessment Risk Factors: 
     (1)  Gender [Male] Ability to Rise from Chair: 
     (0)  Ability to rise in a single movement Falls Prevention Plan: No modifications necessary Total: (5 or greater = High Risk): 1 ©2010 [facility] of [address] Patent #[id number]. Federal Law prohibits the replication, distribution or use without written permission from [facility] of [address] [place] [address] Current Medications:   
  
Current Outpatient Medications:  
  escitalopram oxalate (LEXAPRO) 10 mg tablet, Take 1 Tab by mouth daily. , Disp: 30 Tab, Rfl: 2 
  atorvastatin (LIPITOR) 20 mg tablet, Take 1 Tab by mouth nightly., Disp: 30 Tab, Rfl: 5 
  metoprolol tartrate (LOPRESSOR) 25 mg tablet, Take 1 Tab by mouth every twelve (12) hours. , Disp: 60 Tab, Rfl: 5 
  ferrous sulfate 325 mg (65 mg iron) tablet, Take 1 Tab by mouth daily (with breakfast). , Disp: 90 Tab, Rfl: 3 
  docusate sodium (STOOL SOFTENER PO), Take  by mouth as needed. , Disp: , Rfl:  
  furosemide (LASIX) 40 mg tablet, Take 1 Tab by mouth daily. (Patient taking differently: Take 40 mg by mouth daily. Pt is taking prn- about every 3 days), Disp: 30 Tab, Rfl: 5 
  aspirin 81 mg chewable tablet, Take 81 mg by mouth nightly., Disp: , Rfl:   
Date Last Reviewed:  12-6-18 Number of Personal Factors/Comorbidities that affect the Plan of Care: 1-2: MODERATE COMPLEXITY EXAMINATION:  
 
Observation/Orthostatic Postural Assessment: He sits with rounded shoulders and forward head posture. Palpation: no tenderness to palpation ROM:  
  
LUE AROM 
L Shoulder Flexion: 145 L Shoulder External Rotation: 60(at neutral) Strength:  
  
    
   
  
Special Tests:  Impingement tests: negative Neers, negative Montaño alem; Rotator Cuff tests: negative full can, negative subscap lift off; Labral test: positive speeds; Cervical/Thoracic Clearing tests: stiffness to thoracic spine with central PAs in prone. Neurological Screen: Upper quarter Neural Tension Tests: negative median nerve glide Functional Mobility:  Sit to/from Stand: independent. Bed Mobility: independent. Independent with basic mobility. independent with dressing and grooming ADL's. Body Structures Involved: 1. Joints 2. Muscles Body Functions Affected: 1. Neuromusculoskeletal Activities and Participation Affected: 1. Community, Social and [place] Number of elements (examined above) that affect the Plan of Care: 3: MODERATE COMPLEXITY CLINICAL PRESENTATION:  
Presentation: Stable and uncomplicated: LOW COMPLEXITY CLINICAL DECISION MAKING:  
Outcome Measure: Tool Used: Disabilities of the Arm, Shoulder and Hand (DASH) Questionnaire - Quick Version Score:  Initial: 22/55  Most Recent: X/55 (Date: -- ) Interpretation of Score: The DASH is designed to measure the activities of daily living in person's with upper extremity dysfunction or pain. Each section is scored on a 1-5 scale, 5 representing the greatest disability. The scores of each section are added together for a total score of 55. This number is divided by 11, followed by subtracting 1 and multiplying by 25 to get a percent score of disability. This value represents the percentage disability: 0-20% minimal disability; 20-40% moderate disability; 40-60% severe disability; % dependent for care or exaggerated symptom behavior. Minimal detectable change is 12%. Medical Necessity:  
· Patient is expected to demonstrate progress in strength and range of motion to improve mobility. Reason for Services/Other Comments: 
· Patient continues to require skilled intervention due to increased pain, decreased UE strength, and decreased posture. Use of outcome tool(s) and clinical judgement create a POC that gives a: Clear prediction of patient's progress: LOW COMPLEXITY  
  
 
 
 
TREATMENT:  
(In addition to Assessment/Re-Assessment sessions the following treatments were rendered) Pre-treatment Symptoms/Complaints:  Pt reports no pain in the shoulder. Pain: Initial:   0/10 at rest Post Session:  0/10 Therapeutic Exercise (38 Minutes):  Exercises to improve mobility and strength. Required moderate visual and verbal cues to promote proper body alignment. Pt supine and PROM to the Left shoulder for ER at neutral, ER at 90 deg abd, IR, and forward elevation. Exercises per grid below Date: 
11-28-18 Date: 
12-3-18 Date: 
12-6-18 Activity/Exercise Parameters Parameters Parameters Serratus punch 2# 2x10 2# 2x15 3# 3x10 Side lying ER 1# 2x15 1# 2x15 1#3x15 Prone shoulder extension B UE 2x10 2x15 2x15 Prone middle trap 2x10 2x15 2x15 Prone ER L 1# 2x10 L 2# 2x10 L 2#2x10 Band IR and ER Red 2x10 ea Red 2x15 Red 2x15 Seated scapular retraction 2x10 10x, red 15x Red 15 ea   
pec stretch Sitting 30\"x3 Sitting 30\" Sitting 30\" Manual Therapy (     ): none today Therapeutic Modalities: HEP: Handout given for sitting scapular retraction and sleeper stretch. Patient verbalizes understanding. Treatment/Session Assessment:   
· Response to Treatment: Improved L shoulder AROM today. · Compliance with Program/Exercises: compliant · Recommendations/Intent for next treatment session: \"Next visit will focus on postural strengthening\". L shoulder ROM Total Treatment Duration: 38 Minutes PT Patient Time In/Time Out Time In: 4612 Time Out: 7402 Ligia Banegas PT

## 2018-12-10 ENCOUNTER — APPOINTMENT (OUTPATIENT)
Dept: PHYSICAL THERAPY | Age: 58
End: 2018-12-10
Payer: COMMERCIAL

## 2018-12-12 ENCOUNTER — APPOINTMENT (OUTPATIENT)
Dept: PHYSICAL THERAPY | Age: 58
End: 2018-12-12
Payer: COMMERCIAL

## 2018-12-13 ENCOUNTER — HOSPITAL ENCOUNTER (OUTPATIENT)
Dept: PHYSICAL THERAPY | Age: 58
Discharge: HOME | End: 2018-12-13
Payer: COMMERCIAL

## 2018-12-13 PROCEDURE — 97110 THERAPEUTIC EXERCISES: CPT

## 2018-12-13 NOTE — PROGRESS NOTES
Aramis Sharp  : 1960  Payor: Damian Nair / Plan: [place] [facility] / Product Type: PPO /  Vicky Thompsonner at [facility] TISHA DICKERSON  [address] [facility], [address] [place] [address],[address], [address] [facility] [place]  [phone]   [phone]       OUTPATIENT PHYSICAL THERAPY:Daily Note 2018     ICD-10: Treatment Diagnosis: Pain in left shoulder (M25.512)   Precautions/Allergies:   Patient has no known allergies. MD Orders: Evaluate and treat MEDICAL/REFERRING DIAGNOSIS:  Pain in left shoulder [M25.512]   DATE OF ONSET: ~2018  REFERRING PHYSICIAN: Pretty Hollingsworth MD  RETURN PHYSICIAN APPOINTMENT: 19     INITIAL ASSESSMENT:  Mr. Moy Ortiz presents with complaints of L shoulder pain. Pain, weakness, and decreased posture are limiting normalized use and function of left UE. He will benefit from PT for guided shoulder rehab to promote return to normalized use of the UE with daily and work activities. PROBLEM LIST (Impacting functional limitations):  1. Pain left shoulder   2. Weakness left shoulder  3. Decreased posture INTERVENTIONS PLANNED:  1. Thermal and electric modalities, manual therapies for pain   2. Manual therapies, therapeutic exercises, HEP for ROM    3. Therapeutic exercises and HEP for strength   TREATMENT PLAN:  Effective Dates: 2018 TO 2019 (60 days). Frequency/Duration: 2 times a week for 60 days  GOALS: (Goals have been discussed and agreed upon with patient.)  Discharge Goals: Time Frame: 4-6 weeks  1. Pt will report 0/10 shoulder pain with active movements and work activities. 2. Pt will increase UE strength to 5/5 for improved mobility. 3. Pt will demonstrate improved sitting and standing posture for decreased stress on the shoulder. 4. Pt will be independent with HEP. Rehabilitation Potential For Stated Goals: Good              The information in this section was collected on 18 (except where otherwise noted).   HISTORY:   History of Present Injury/Illness (Reason for Referral): Shoulder pain for a few months. Pain with reaching back or far out. No pain at rest. Sharp pain if he moves it a certain way. Pain at worst 8-9/10. Pain with sleeping on the left side. Past Medical History/Comorbidities: Mr. Pascual Isaac  has a past medical history of CAD (coronary artery disease), Drug addiction in remission ([facility] [address] Dyslipidemia, DIEGO on CPAP, Recurrent depression ([facility] [address] S/P aortic valve replacement, Seizures ([facility] [address] Sigmoid diverticulosis, and Tobacco use disorder. Mr. Pascual Isaac  has a past surgical history that includes hx hernia repair (Left, 06/14/2018); pr cardiac surg procedure unlist (04/12/2018); hx heart catheterization (02/23/2018); ROBOTIC ASSISTED LEFT INGUINAL HERNIA REPAIR  WITH MESH (Left, 6/14/2018); CARDIAC RE ENTRY (N/A, 4/12/2018); AORTIC VALVE REPLACEMENT (N/A, 4/12/2018); and ESOPHAGEAL TRANS ECHOCARDIOGRAM / ANES PROBE   (N/A, 4/12/2018). Social History/Living Environment:  Lives with wife. 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 Ambulatory/Rehab Services H2 Model Falls Risk Assessment    Risk Factors:       (1)  Gender [Male] Ability to Rise from Chair:       (0)  Ability to rise in a single movement    Falls Prevention Plan:       No modifications necessary   Total: (5 or greater = High Risk): 1    ©2010 [facility] [employer]. All Rights Reserved. [facility] Patent #[id number]. Federal Law prohibits the replication, distribution or use without written permission from [facility] [employer]       Current Medications:       Current Outpatient Medications:     escitalopram oxalate (LEXAPRO) 10 mg tablet, Take 1 Tab by mouth daily. , Disp: 30 Tab, Rfl: 2    atorvastatin (LIPITOR) 20 mg tablet, Take 1 Tab by mouth nightly., Disp: 30 Tab, Rfl: 5    metoprolol tartrate (LOPRESSOR) 25 mg tablet, Take 1 Tab by mouth every twelve (12) hours. , Disp: 60 Tab, Rfl: 5    ferrous sulfate 325 mg (65 mg iron) tablet, Take 1 Tab by mouth daily (with breakfast). , Disp: 90 Tab, Rfl: 3    docusate sodium (STOOL SOFTENER PO), Take  by mouth as needed. , Disp: , Rfl:     furosemide (LASIX) 40 mg tablet, Take 1 Tab by mouth daily. (Patient taking differently: Take 40 mg by mouth daily. Pt is taking prn- about every 3 days), Disp: 30 Tab, Rfl: 5    aspirin 81 mg chewable tablet, Take 81 mg by mouth nightly., Disp: , Rfl:    Date Last Reviewed:  12-6-18   Number of Personal Factors/Comorbidities that affect the Plan of Care: 1-2: MODERATE COMPLEXITY   EXAMINATION:     Observation/Orthostatic Postural Assessment: He sits with rounded shoulders and forward head posture. Palpation: no tenderness to palpation     ROM:      LUE AROM  L Shoulder Flexion: 150  L Shoulder Internal Rotation: (T9 behind back)  L Shoulder External Rotation: 65(at neutral)                    Strength:                  Special Tests:  Impingement tests: negative Neers, negative Montaño alem; Rotator Cuff tests: negative full can, negative subscap lift off; Labral test: positive speeds; Cervical/Thoracic Clearing tests: stiffness to thoracic spine with central PAs in prone. Neurological Screen: Upper quarter Neural Tension Tests: negative median nerve glide  Functional Mobility:  Sit to/from Stand: independent. Bed Mobility: independent. Independent with basic mobility. independent with dressing and grooming ADL's. Body Structures Involved:  1. Joints  2. Muscles Body Functions Affected:  1. Neuromusculoskeletal Activities and Participation Affected:  1. Community, Social and [place]   Number of elements (examined above) that affect the Plan of Care: 3: MODERATE COMPLEXITY   CLINICAL PRESENTATION:   Presentation: Stable and uncomplicated: LOW COMPLEXITY   CLINICAL DECISION MAKING:   Outcome Measure:    Tool Used: Disabilities of the Arm, Shoulder and Hand (DASH) Questionnaire - Quick Version  Score:  Initial: 22/55  Most Recent: X/55 (Date: -- )   Interpretation of Score: The DASH is designed to measure the activities of daily living in person's with upper extremity dysfunction or pain. Each section is scored on a 1-5 scale, 5 representing the greatest disability. The scores of each section are added together for a total score of 55. This number is divided by 11, followed by subtracting 1 and multiplying by 25 to get a percent score of disability. This value represents the percentage disability: 0-20% minimal disability; 20-40% moderate disability; 40-60% severe disability; % dependent for care or exaggerated symptom behavior. Minimal detectable change is 12%. Medical Necessity:   · Patient is expected to demonstrate progress in strength and range of motion to improve mobility. Reason for Services/Other Comments:  · Patient continues to require skilled intervention due to increased pain, decreased UE strength, and decreased posture. Use of outcome tool(s) and clinical judgement create a POC that gives a: Clear prediction of patient's progress: LOW COMPLEXITY            TREATMENT:   (In addition to Assessment/Re-Assessment sessions the following treatments were rendered)  Pre-treatment Symptoms/Complaints:  Pt reports shoulder feels \"pretty good\". Pain: Initial:   0/10 at rest Post Session:  0/10       Therapeutic Exercise (38 Minutes):  Exercises to improve mobility and strength. Required moderate visual and verbal cues to promote proper body alignment. Pt supine and PROM to the Left shoulder for ER at neutral, ER at 90 deg abd, IR, and forward elevation.  Exercises per grid below   Date:  11-28-18 Date:  12-3-18 Date:  12-6-18 Date  12-13-18   Activity/Exercise Parameters Parameters Parameters parameters   Serratus punch 2# 2x10 2# 2x15 3# 3x10 3# 2x15   Side lying ER 1# 2x15 1# 2x15 1#3x15 1# 2x20   Prone shoulder extension B UE 2x10 2x15 2x15 1# 2x10   Prone middle trap 2x10 2x15 2x15 1# 2x10   Prone ER L 1# 2x10 L 2# 2x10 L 2#2x10 L 2# 2x10   Band IR and ER Red 2x10 ea Red 2x15 Red 2x15 Red 2x15   Seated scapular retraction 2x10 10x, red 15x Red 15 ea  Red 15 ea   pec stretch Sitting 30\"x3 Sitting 30\" Sitting 30\" Sitting 30\"        Manual Therapy (     ): none today  Therapeutic Modalities:                                                                                             HEP: Handout given for sitting scapular retraction and sleeper stretch. Patient verbalizes understanding. Treatment/Session Assessment:    · Response to Treatment: Improved L shoulder AROM and continues to progress with ROM. · Compliance with Program/Exercises: compliant  · Recommendations/Intent for next treatment session: \"Next visit will focus on postural strengthening\".  L shoulder ROM  Total Treatment Duration: 38 Minutes  PT Patient Time In/Time Out  Time In: 1045  Time Out: [address]

## 2018-12-17 ENCOUNTER — HOSPITAL ENCOUNTER (OUTPATIENT)
Dept: PHYSICAL THERAPY | Age: 58
Discharge: HOME | End: 2018-12-17
Payer: COMMERCIAL

## 2018-12-17 PROCEDURE — 97110 THERAPEUTIC EXERCISES: CPT

## 2018-12-17 NOTE — PROGRESS NOTES
Susan Thomas  : 1960  Payor: Jazmine Dominguez / Plan: [facility] / Product Type: PPO /  [address]  at [facility] TISHA DICKERSON  [address] [facility], [address] [place] [address],[address], [address]  [phone]   [phone]       OUTPATIENT PHYSICAL THERAPY:Daily Note 2018     ICD-10: Treatment Diagnosis: Pain in left shoulder (M25.512)   Precautions/Allergies:   Patient has no known allergies. MD Orders: Evaluate and treat MEDICAL/REFERRING DIAGNOSIS:  Pain in left shoulder [M25.512]   DATE OF ONSET: ~2018  REFERRING PHYSICIAN: Cruz Tiwari MD  RETURN PHYSICIAN APPOINTMENT: 19     INITIAL ASSESSMENT:  Mr. Bruna Short presents with complaints of L shoulder pain. Pain, weakness, and decreased posture are limiting normalized use and function of left UE. He will benefit from PT for guided shoulder rehab to promote return to normalized use of the UE with daily and work activities. PROBLEM LIST (Impacting functional limitations):  1. Pain left shoulder   2. Weakness left shoulder  3. Decreased posture INTERVENTIONS PLANNED:  1. Thermal and electric modalities, manual therapies for pain   2. Manual therapies, therapeutic exercises, HEP for ROM    3. Therapeutic exercises and HEP for strength   TREATMENT PLAN:  Effective Dates: 2018 TO 2019 (60 days). Frequency/Duration: 2 times a week for 60 days  GOALS: (Goals have been discussed and agreed upon with patient.)  Discharge Goals: Time Frame: 4-6 weeks  1. Pt will report 0/10 shoulder pain with active movements and work activities. 2. Pt will increase UE strength to 5/5 for improved mobility. 3. Pt will demonstrate improved sitting and standing posture for decreased stress on the shoulder. 4. Pt will be independent with HEP. Rehabilitation Potential For Stated Goals: Good              The information in this section was collected on 18 (except where otherwise noted).   HISTORY:   History of Present Injury/Illness (Reason for Referral): Shoulder pain for a few months. Pain with reaching back or far out. No pain at rest. Sharp pain if he moves it a certain way. Pain at worst 8-9/10. Pain with sleeping on the left side. Past Medical History/Comorbidities: Mr. Kita Hrat  has a past medical history of CAD (coronary artery disease), Drug addiction in remission ([facility] [address] Dyslipidemia, DIEGO on CPAP, Recurrent depression ([facility] [address] S/P aortic valve replacement, Seizures ([facility] [address] Sigmoid diverticulosis, and Tobacco use disorder. Mr. Kita Hart  has a past surgical history that includes hx hernia repair (Left, 06/14/2018); pr cardiac surg procedure unlist (04/12/2018); hx heart catheterization (02/23/2018); ROBOTIC ASSISTED LEFT INGUINAL HERNIA REPAIR  WITH MESH (Left, 6/14/2018); CARDIAC RE ENTRY (N/A, 4/12/2018); AORTIC VALVE REPLACEMENT (N/A, 4/12/2018); and ESOPHAGEAL TRANS ECHOCARDIOGRAM / ANES PROBE   (N/A, 4/12/2018). Social History/Living Environment:  Lives with wife. 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 Ambulatory/Rehab Services H2 Model Falls Risk Assessment    Risk Factors:       (1)  Gender [Male] Ability to Rise from Chair:       (0)  Ability to rise in a single movement    Falls Prevention Plan:       No modifications necessary   Total: (5 or greater = High Risk): 1    ©2010 [facility]. All Rights Reserved. [facility] Patent #[id number]. Federal Law prohibits the replication, distribution or use without written permission from [facility] "[employer]       Current Medications:       Current Outpatient Medications:     escitalopram oxalate (LEXAPRO) 10 mg tablet, Take 1 Tab by mouth daily. , Disp: 30 Tab, Rfl: 2    atorvastatin (LIPITOR) 20 mg tablet, Take 1 Tab by mouth nightly., Disp: 30 Tab, Rfl: 5    metoprolol tartrate (LOPRESSOR) 25 mg tablet, Take 1 Tab by mouth every twelve (12) hours. , Disp: 60 Tab, Rfl: 5    ferrous sulfate 325 mg (65 mg iron) tablet, Take 1 Tab by mouth daily (with breakfast). , Disp: 90 Tab, Rfl: 3    docusate sodium (STOOL SOFTENER PO), Take  by mouth as needed. , Disp: , Rfl:     furosemide (LASIX) 40 mg tablet, Take 1 Tab by mouth daily. (Patient taking differently: Take 40 mg by mouth daily. Pt is taking prn- about every 3 days), Disp: 30 Tab, Rfl: 5    aspirin 81 mg chewable tablet, Take 81 mg by mouth nightly., Disp: , Rfl:    Date Last Reviewed:  12-17-18   Number of Personal Factors/Comorbidities that affect the Plan of Care: 1-2: MODERATE COMPLEXITY   EXAMINATION:     Observation/Orthostatic Postural Assessment: He sits with rounded shoulders and forward head posture. Palpation: no tenderness to palpation     ROM:                           Strength:                  Special Tests:  Impingement tests: negative Neers, negative Montaño aelm; Rotator Cuff tests: negative full can, negative subscap lift off; Labral test: positive speeds; Cervical/Thoracic Clearing tests: stiffness to thoracic spine with central PAs in prone. Neurological Screen: Upper quarter Neural Tension Tests: negative median nerve glide  Functional Mobility:  Sit to/from Stand: independent. Bed Mobility: independent. Independent with basic mobility. independent with dressing and grooming ADL's. Body Structures Involved:  1. Joints  2. Muscles Body Functions Affected:  1. Neuromusculoskeletal Activities and Participation Affected:  1. Community, Social and [place]   Number of elements (examined above) that affect the Plan of Care: 3: MODERATE COMPLEXITY   CLINICAL PRESENTATION:   Presentation: Stable and uncomplicated: LOW COMPLEXITY   CLINICAL DECISION MAKING:   Outcome Measure: Tool Used: Disabilities of the Arm, Shoulder and Hand (DASH) Questionnaire - Quick Version  Score:  Initial: 22/55  Most Recent: X/55 (Date: -- )   Interpretation of Score:  The DASH is designed to measure the activities of daily living in person's with upper extremity dysfunction or pain. Each section is scored on a 1-5 scale, 5 representing the greatest disability. The scores of each section are added together for a total score of 55. This number is divided by 11, followed by subtracting 1 and multiplying by 25 to get a percent score of disability. This value represents the percentage disability: 0-20% minimal disability; 20-40% moderate disability; 40-60% severe disability; % dependent for care or exaggerated symptom behavior. Minimal detectable change is 12%. Medical Necessity:   · Patient is expected to demonstrate progress in strength and range of motion to improve mobility. Reason for Services/Other Comments:  · Patient continues to require skilled intervention due to increased pain, decreased UE strength, and decreased posture. Use of outcome tool(s) and clinical judgement create a POC that gives a: Clear prediction of patient's progress: LOW COMPLEXITY            TREATMENT:   (In addition to Assessment/Re-Assessment sessions the following treatments were rendered)  Pre-treatment Symptoms/Complaints:  Pt reports shoulder feels \"pretty good\". Pain: Initial:   0/10 at rest Post Session:  0/10       Therapeutic Exercise (33 Minutes):  Exercises to improve mobility and strength. Required moderate visual and verbal cues to promote proper body alignment. Pt supine and PROM to the Left shoulder for ER at neutral, ER at 90 deg abd, IR, and forward elevation.  Exercises per grid below   Date:  11-28-18 Date:  12-3-18 Date:  12-6-18 Date  12-13-18 Date  12-17-18   Activity/Exercise Parameters Parameters Parameters parameters parameters   Serratus punch 2# 2x10 2# 2x15 3# 3x10 3# 2x15 3# 2x15   Side lying ER 1# 2x15 1# 2x15 1#3x15 1# 2x20 1#2x20   Prone shoulder extension B UE 2x10 2x15 2x15 1# 2x10 1#2x10   Prone middle trap 2x10 2x15 2x15 1# 2x10 1#2x10   Prone ER L 1# 2x10 L 2# 2x10 L 2#2x10 L 2# 2x10 B 2# 2x10 Band IR and ER Red 2x10 ea Red 2x15 Red 2x15 Red 2x15 Green 2x10   Seated scapular retraction 2x10 10x, red 15x Red 15 ea  Red 15 ea Green 20x   pec stretch Sitting 30\"x3 Sitting 30\" Sitting 30\" Sitting 30\"  Sitting 30\"       Manual Therapy (     ): none today  Therapeutic Modalities:                                                                                             HEP: Handout given for sitting scapular retraction and sleeper stretch. Patient verbalizes understanding. Treatment/Session Assessment:    · Response to Treatment: No complaints of pain with exercises. · Compliance with Program/Exercises: compliant  · Recommendations/Intent for next treatment session: \"Next visit will focus on postural strengthening\".  L shoulder ROM  Total Treatment Duration: 33 Minutes  PT Patient Time In/Time Out  Time In: 1305  Time Out: 22 Darian Ramirez

## 2018-12-19 ENCOUNTER — HOSPITAL ENCOUNTER (OUTPATIENT)
Dept: PHYSICAL THERAPY | Age: 58
Discharge: HOME | End: 2018-12-19
Payer: COMMERCIAL

## 2018-12-19 PROCEDURE — 97110 THERAPEUTIC EXERCISES: CPT

## 2018-12-19 NOTE — PROGRESS NOTES
Kelly Marquez  : 1960  Payor: Alvaro Sicard / Plan: [place] [facility] / Product Type: PPO /  [address]  at [facility] TISHA DICKERSON  [address] [facility], [address] [place] [address],[address], [address]  [phone]   [phone]       OUTPATIENT PHYSICAL THERAPY:Daily Note 2018     ICD-10: Treatment Diagnosis: Pain in left shoulder (M25.512)   Precautions/Allergies:   Patient has no known allergies. MD Orders: Evaluate and treat MEDICAL/REFERRING DIAGNOSIS:  Pain in left shoulder [M25.512]   DATE OF ONSET: ~2018  REFERRING PHYSICIAN: Anat Armstrong MD  RETURN PHYSICIAN APPOINTMENT: 19     INITIAL ASSESSMENT:  Mr. Nasra Tiwari presents with complaints of L shoulder pain. Pain, weakness, and decreased posture are limiting normalized use and function of left UE. He will benefit from PT for guided shoulder rehab to promote return to normalized use of the UE with daily and work activities. PROBLEM LIST (Impacting functional limitations):  1. Pain left shoulder   2. Weakness left shoulder  3. Decreased posture INTERVENTIONS PLANNED:  1. Thermal and electric modalities, manual therapies for pain   2. Manual therapies, therapeutic exercises, HEP for ROM    3. Therapeutic exercises and HEP for strength   TREATMENT PLAN:  Effective Dates: 2018 TO 2019 (60 days). Frequency/Duration: 2 times a week for 60 days  GOALS: (Goals have been discussed and agreed upon with patient.)  Discharge Goals: Time Frame: 4-6 weeks  1. Pt will report 0/10 shoulder pain with active movements and work activities. 2. Pt will increase UE strength to 5/5 for improved mobility. 3. Pt will demonstrate improved sitting and standing posture for decreased stress on the shoulder. 4. Pt will be independent with HEP. Rehabilitation Potential For Stated Goals: Good              The information in this section was collected on 18 (except where otherwise noted).   HISTORY:   History of Present Injury/Illness (Reason for Referral): Shoulder pain for a few months. Pain with reaching back or far out. No pain at rest. Sharp pain if he moves it a certain way. Pain at worst 8-9/10. Pain with sleeping on the left side. Past Medical History/Comorbidities: Mr. Mary Bravo  has a past medical history of CAD (coronary artery disease), Drug addiction in remission ([facility] [address] Dyslipidemia, DIEGO on CPAP, Recurrent depression ([facility] [address] S/P aortic valve replacement, Seizures ([facility] [address] Sigmoid diverticulosis, and Tobacco use disorder. Mr. Mary Bravo  has a past surgical history that includes hx hernia repair (Left, 06/14/2018); pr cardiac surg procedure unlist (04/12/2018); hx heart catheterization (02/23/2018); ROBOTIC ASSISTED LEFT INGUINAL HERNIA REPAIR  WITH MESH (Left, 6/14/2018); CARDIAC RE ENTRY (N/A, 4/12/2018); AORTIC VALVE REPLACEMENT (N/A, 4/12/2018); and ESOPHAGEAL TRANS ECHOCARDIOGRAM / ANES PROBE   (N/A, 4/12/2018). Social History/Living Environment:  Lives with wife. 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 Ambulatory/Rehab Services H2 Model Falls Risk Assessment    Risk Factors:       (1)  Gender [Male] Ability to Rise from Chair:       (0)  Ability to rise in a single movement    Falls Prevention Plan:       No modifications necessary   Total: (5 or greater = High Risk): 1    ©2010 [facility]. All Rights Reserved. [facility] Patent #[id number]. Federal Law prohibits the replication, distribution or use without written permission from [facility]       Current Medications:       Current Outpatient Medications:     tadalafil (CIALIS) 20 mg tablet, Take 1 Tab by mouth as needed. , Disp: 8 Tab, Rfl: 2    sildenafil, antihypertensive, (REVATIO) 20 mg tablet, 2 1/2-5 tabs daily as needed Cash price, Disp: 50 Tab, Rfl: 2    escitalopram oxalate (LEXAPRO) 10 mg tablet, Take 1 Tab by mouth daily. , Disp: 30 Tab, Rfl: 2    atorvastatin (LIPITOR) 20 mg tablet, Take 1 Tab by mouth nightly., Disp: 30 Tab, Rfl: 5    metoprolol tartrate (LOPRESSOR) 25 mg tablet, Take 1 Tab by mouth every twelve (12) hours. , Disp: 60 Tab, Rfl: 5    ferrous sulfate 325 mg (65 mg iron) tablet, Take 1 Tab by mouth daily (with breakfast). , Disp: 90 Tab, Rfl: 3    docusate sodium (STOOL SOFTENER PO), Take  by mouth as needed. , Disp: , Rfl:     furosemide (LASIX) 40 mg tablet, Take 1 Tab by mouth daily. (Patient taking differently: Take 40 mg by mouth daily. Pt is taking prn- about every 3 days), Disp: 30 Tab, Rfl: 5    aspirin 81 mg chewable tablet, Take 81 mg by mouth nightly., Disp: , Rfl:    Date Last Reviewed:  12-17-18   Number of Personal Factors/Comorbidities that affect the Plan of Care: 1-2: MODERATE COMPLEXITY   EXAMINATION:     Observation/Orthostatic Postural Assessment: He sits with rounded shoulders and forward head posture. Palpation: no tenderness to palpation     ROM:                           Strength:                  Special Tests:  Impingement tests: negative Neers, negative Montaño alem; Rotator Cuff tests: negative full can, negative subscap lift off; Labral test: positive speeds; Cervical/Thoracic Clearing tests: stiffness to thoracic spine with central PAs in prone. Neurological Screen: Upper quarter Neural Tension Tests: negative median nerve glide  Functional Mobility:  Sit to/from Stand: independent. Bed Mobility: independent. Independent with basic mobility. independent with dressing and grooming ADL's. Body Structures Involved:  1. Joints  2. Muscles Body Functions Affected:  1. Neuromusculoskeletal Activities and Participation Affected:  1. Community, Social and [place]   Number of elements (examined above) that affect the Plan of Care: 3: MODERATE COMPLEXITY   CLINICAL PRESENTATION:   Presentation: Stable and uncomplicated: LOW COMPLEXITY   CLINICAL DECISION MAKING:   Outcome Measure:    Tool Used: Disabilities of the Arm, Shoulder and Hand (DASH) Questionnaire - Quick Version  Score:  Initial: 22/55  Most Recent: X/55 (Date: -- )   Interpretation of Score: The DASH is designed to measure the activities of daily living in person's with upper extremity dysfunction or pain. Each section is scored on a 1-5 scale, 5 representing the greatest disability. The scores of each section are added together for a total score of 55. This number is divided by 11, followed by subtracting 1 and multiplying by 25 to get a percent score of disability. This value represents the percentage disability: 0-20% minimal disability; 20-40% moderate disability; 40-60% severe disability; % dependent for care or exaggerated symptom behavior. Minimal detectable change is 12%. Medical Necessity:   · Patient is expected to demonstrate progress in strength and range of motion to improve mobility. Reason for Services/Other Comments:  · Patient continues to require skilled intervention due to increased pain, decreased UE strength, and decreased posture. Use of outcome tool(s) and clinical judgement create a POC that gives a: Clear prediction of patient's progress: LOW COMPLEXITY            TREATMENT:   (In addition to Assessment/Re-Assessment sessions the following treatments were rendered)  Pre-treatment Symptoms/Complaints:  Pt reports no complaints of pain. Pain: Initial:   0/10 at rest Post Session:  0/10       Therapeutic Exercise (30 Minutes):  Exercises to improve mobility and strength. Required moderate visual and verbal cues to promote proper body alignment. Pt supine and PROM to the Left shoulder for ER at neutral, ER at 90 deg abd, IR, and forward elevation.  Exercises per grid below   Date:  11-28-18 Date:  12-3-18 Date:  12-6-18 Date  12-13-18 Date  12-17-18 Date  12-19-18   Activity/Exercise Parameters Parameters Parameters parameters parameters parameters   Serratus punch 2# 2x10 2# 2x15 3# 3x10 3# 2x15 3# 2x15 4# 3x10   Side lying ER 1# 2x15 1# 2x15 1#3x15 1# 2x20 1#2x20 2# 3x10   Prone shoulder extension B UE 2x10 2x15 2x15 1# 2x10 1#2x10 1#2x15   Prone middle trap 2x10 2x15 2x15 1# 2x10 1#2x10 1#2x15   Prone ER L 1# 2x10 L 2# 2x10 L 2#2x10 L 2# 2x10 B 2# 2x10 B 2#2x10   Band IR and ER Red 2x10 ea Red 2x15 Red 2x15 Red 2x15 Green 2x10 Green 2x15   Seated scapular retraction 2x10 10x, red 15x Red 15 ea  Red 15 ea Green 20x Green 2x15   pec stretch Sitting 30\"x3 Sitting 30\" Sitting 30\" Sitting 30\"  Sitting 30\" -   Y's on the wall - - - - - 2x10        Manual Therapy (     ): none today  Therapeutic Modalities:                                                                                             HEP: Handout given for sitting scapular retraction and sleeper stretch. Patient verbalizes understanding. Treatment/Session Assessment:    · Response to Treatment: He seems to be progressing with UE strength. · Compliance with Program/Exercises: compliant  · Recommendations/Intent for next treatment session: \"Next visit will focus on postural strengthening\".  L shoulder ROM  Total Treatment Duration: 30 Minutes  PT Patient Time In/Time Out  Time In: 1300  Time Out: 4600 [facility]

## 2018-12-28 ENCOUNTER — HOSPITAL ENCOUNTER (OUTPATIENT)
Dept: PHYSICAL THERAPY | Age: 58
Discharge: HOME | End: 2018-12-28
Payer: COMMERCIAL

## 2018-12-28 PROCEDURE — 97110 THERAPEUTIC EXERCISES: CPT

## 2018-12-28 NOTE — PROGRESS NOTES
Con Jeronimo : 1960 Payor: Kirti Norwood / Plan: [place] [facility] / Product Type: PPO /  [address]  at [facility] TISHA DICKERSON [address] [facility], [address] [place] [address],[address], [address] [facility] [phone]   [phone] OUTPATIENT PHYSICAL THERAPY:Daily Note 2018 ICD-10: Treatment Diagnosis: Pain in left shoulder (M25.512) Precautions/Allergies:  
Patient has no known allergies. MD Orders: Evaluate and treat MEDICAL/REFERRING DIAGNOSIS: 
Pain in left shoulder [M25.512] DATE OF ONSET: ~2018 REFERRING PHYSICIAN: Juliette Mcfarland MD 
RETURN PHYSICIAN APPOINTMENT: 19 INITIAL ASSESSMENT:  Mr. Mick Hu presents with complaints of L shoulder pain. Pain, weakness, and decreased posture are limiting normalized use and function of left UE. He will benefit from PT for guided shoulder rehab to promote return to normalized use of the UE with daily and work activities. PROBLEM LIST (Impacting functional limitations): 1. Pain left shoulder 2. Weakness left shoulder 3. Decreased posture INTERVENTIONS PLANNED: 
1. Thermal and electric modalities, manual therapies for pain 2. Manual therapies, therapeutic exercises, HEP for ROM 3. Therapeutic exercises and HEP for strength TREATMENT PLAN: 
Effective Dates: 2018 TO 2019 (60 days). Frequency/Duration: 2 times a week for 60 days GOALS: (Goals have been discussed and agreed upon with patient.) Discharge Goals: Time Frame: 4-6 weeks 1. Pt will report 0/10 shoulder pain with active movements and work activities. 2. Pt will increase UE strength to 5/5 for improved mobility. 3. Pt will demonstrate improved sitting and standing posture for decreased stress on the shoulder. 4. Pt will be independent with HEP. Rehabilitation Potential For Stated Goals: Good The information in this section was collected on 18 (except where otherwise noted).  
HISTORY:  
 History of Present Injury/Illness (Reason for Referral): Shoulder pain for a few months. Pain with reaching back or far out. No pain at rest. Sharp pain if he moves it a certain way. Pain at worst 8-9/10. Pain with sleeping on the left side. Past Medical History/Comorbidities: Mr. Joseline Cohn  has a past medical history of CAD (coronary artery disease), Drug addiction in remission ([facility] [address] Dyslipidemia, DIEGO on CPAP, Recurrent depression ([facility] [address] S/P aortic valve replacement, Seizures ([facility] [address] and Sigmoid diverticulosis. Mr. Joseline Cohn  has a past surgical history that includes hx hernia repair (Left, 06/14/2018); pr cardiac surg procedure unlist (04/12/2018); hx heart catheterization (02/23/2018); ROBOTIC ASSISTED LEFT INGUINAL HERNIA REPAIR  WITH MESH (Left, 6/14/2018); CARDIAC RE ENTRY (N/A, 4/12/2018); AORTIC VALVE REPLACEMENT (N/A, 4/12/2018); and ESOPHAGEAL TRANS ECHOCARDIOGRAM / ANES PROBE   (N/A, 4/12/2018). Social History/Living Environment:  Lives with wife. 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 Ambulatory/Rehab Services H2 Model Falls Risk Assessment Risk Factors: 
     (1)  Gender [Male] Ability to Rise from Chair: 
     (0)  Ability to rise in a single movement Falls Prevention Plan: No modifications necessary Total: (5 or greater = High Risk): 1 ©2010 [facility] [address] Patent #[id number]. Federal Law prohibits the replication, distribution or use without written permission from [facility] [place] Current Medications:   
  
Current Outpatient Medications:  
  furosemide (LASIX) 40 mg tablet, Take 40 mg by mouth daily as needed. , Disp: , Rfl:  
  sildenafil, antihypertensive, (REVATIO) 20 mg tablet, 2 1/2-5 tabs daily as needed Cash price, Disp: 50 Tab, Rfl: 2 
  escitalopram oxalate (LEXAPRO) 10 mg tablet, Take 1 Tab by mouth daily. , Disp: 30 Tab, Rfl: 2   atorvastatin (LIPITOR) 20 mg tablet, Take 1 Tab by mouth nightly., Disp: 30 Tab, Rfl: 5 
  metoprolol tartrate (LOPRESSOR) 25 mg tablet, Take 1 Tab by mouth every twelve (12) hours. (Patient taking differently: Take 12.5 mg by mouth daily.), Disp: 60 Tab, Rfl: 5 
  ferrous sulfate 325 mg (65 mg iron) tablet, Take 1 Tab by mouth daily (with breakfast). (Patient taking differently: Take 325 mg by mouth two (2) times a day.), Disp: 90 Tab, Rfl: 3 
  docusate sodium (STOOL SOFTENER PO), Take  by mouth as needed. , Disp: , Rfl:  
  aspirin 81 mg chewable tablet, Take 81 mg by mouth nightly., Disp: , Rfl:   
Date Last Reviewed:  12-28-18 Number of Personal Factors/Comorbidities that affect the Plan of Care: 1-2: MODERATE COMPLEXITY EXAMINATION:  
 
Observation/Orthostatic Postural Assessment: He sits with rounded shoulders and forward head posture. Palpation: no tenderness to palpation ROM:  
  
  
  
  
    
   
  
Strength: LUE Strength L Shoulder Internal Rotation: 5 L Shoulder External Rotation: 4+ Special Tests:  Impingement tests: negative Neers, negative Montaño alem; Rotator Cuff tests: negative full can, negative subscap lift off; Labral test: positive speeds; Cervical/Thoracic Clearing tests: stiffness to thoracic spine with central PAs in prone. Neurological Screen: Upper quarter Neural Tension Tests: negative median nerve glide Functional Mobility:  Sit to/from Stand: independent. Bed Mobility: independent. Independent with basic mobility. independent with dressing and grooming ADL's. Body Structures Involved: 1. Joints 2. Muscles Body Functions Affected: 1. Neuromusculoskeletal Activities and Participation Affected: 1. Community, Social and [place] Number of elements (examined above) that affect the Plan of Care: 3: MODERATE COMPLEXITY CLINICAL PRESENTATION:  
Presentation: Stable and uncomplicated: LOW COMPLEXITY CLINICAL DECISION MAKING:  
 Outcome Measure: Tool Used: Disabilities of the Arm, Shoulder and Hand (DASH) Questionnaire - Quick Version Score:  Initial: 22/55  Most Recent: X/55 (Date: -- ) Interpretation of Score: The DASH is designed to measure the activities of daily living in person's with upper extremity dysfunction or pain. Each section is scored on a 1-5 scale, 5 representing the greatest disability. The scores of each section are added together for a total score of 55. This number is divided by 11, followed by subtracting 1 and multiplying by 25 to get a percent score of disability. This value represents the percentage disability: 0-20% minimal disability; 20-40% moderate disability; 40-60% severe disability; % dependent for care or exaggerated symptom behavior. Minimal detectable change is 12%. Medical Necessity:  
· Patient is expected to demonstrate progress in strength and range of motion to improve mobility. Reason for Services/Other Comments: 
· Patient continues to require skilled intervention due to increased pain, decreased UE strength, and decreased posture. Use of outcome tool(s) and clinical judgement create a POC that gives a: Clear prediction of patient's progress: LOW COMPLEXITY  
  
 
 
 
TREATMENT:  
(In addition to Assessment/Re-Assessment sessions the following treatments were rendered) Pre-treatment Symptoms/Complaints:  Pt report his shoulder is \"not bad, pretty good\". Pain: Initial:   0/10 at rest Post Session:  0/10 Therapeutic Exercise (25 Minutes):  Exercises to improve mobility and strength. Required moderate visual and verbal cues to promote proper body alignment. Pt supine and PROM to the Left shoulder for ER at neutral, ER at 90 deg abd, IR, and forward elevation. Exercises per grid below Date: 
11-28-18 Date: 
12-3-18 Date: 
12-6-18 Date 00-33-53 Date 93-22-95 Date 41-01-47 Date 12-28-18 Activity/Exercise Parameters Parameters Parameters parameters parameters parameters parameters Serratus punch 2# 2x10 2# 2x15 3# 3x10 3# 2x15 3# 2x15 4# 3x10 4# 3x10 Side lying ER 1# 2x15 1# 2x15 1#3x15 1# 2x20 1#2x20 2# 3x10 2# 3x10 Prone shoulder extension B UE 2x10 2x15 2x15 1# 2x10 1#2x10 1#2x15 2# 2x10 Prone middle trap 2x10 2x15 2x15 1# 2x10 1#2x10 1#2x15 1#2x15 Prone ER L 1# 2x10 L 2# 2x10 L 2#2x10 L 2# 2x10 B 2# 2x10 B 2#2x10 B1#2x15 Band IR and ER Red 2x10 ea Red 2x15 Red 2x15 Red 2x15 Green 2x10 Green 2x15 Green 2x15 Seated scapular retraction 2x10 10x, red 15x Red 15 ea  Red 15 ea Green 20x Green 2x15 Green 2x15  
pec stretch Sitting 30\"x3 Sitting 30\" Sitting 30\" Sitting 30\"  Sitting 30\" -   
Y's on the wall - - - - - 2x10  2x10 Shoulder elevation - - - - - - 1# 2x10 Manual Therapy (     ): none today Therapeutic Modalities: HEP: Handout given for sitting scapular retraction and sleeper stretch. Patient verbalizes understanding. Treatment/Session Assessment:   
· Response to Treatment: He is progressing with rotator cuff strength. · Compliance with Program/Exercises: compliant · Recommendations/Intent for next treatment session: \"Next visit will focus on postural strengthening\". L shoulder ROM Total Treatment Duration: 25 Minutes PT Patient Time In/Time Out Time In: 2105 Time Out: 1432 Earnest Iglesias, PT

## 2019-01-02 ENCOUNTER — HOSPITAL ENCOUNTER (OUTPATIENT)
Dept: PHYSICAL THERAPY | Age: 59
Discharge: HOME | End: 2019-01-02
Payer: COMMERCIAL

## 2019-01-02 PROCEDURE — 97110 THERAPEUTIC EXERCISES: CPT

## 2019-01-02 NOTE — PROGRESS NOTES
Yonny Garcia : 1960 Payor: Lorri Zhao / Plan: [place] [facility] / Product Type: PPO /  [address] [place]  at [facility] TISHA DICKERSON 1101 [facility], [address] [place] [address],[address], [address] [phone]   [phone] OUTPATIENT PHYSICAL THERAPY:Daily Note and Progress Report 2019 ICD-10: Treatment Diagnosis: Pain in left shoulder (M25.512) Precautions/Allergies:  
Patient has no known allergies. MD Orders: Evaluate and treat MEDICAL/REFERRING DIAGNOSIS: 
Pain in left shoulder [M25.512] DATE OF ONSET: ~2018 REFERRING PHYSICIAN: Maricel Spencer MD 
RETURN PHYSICIAN APPOINTMENT: 19 INITIAL ASSESSMENT:  Mr. Thomas Lee presents with complaints of L shoulder pain. He has progressed with L shoulder ROM and UE strength. He returns to MD 19 and anticipate discharge from physical therapy. PROBLEM LIST (Impacting functional limitations): 1. Pain left shoulder 2. Weakness left shoulder 3. Decreased posture INTERVENTIONS PLANNED: 
1. Thermal and electric modalities, manual therapies for pain 2. Manual therapies, therapeutic exercises, HEP for ROM 3. Therapeutic exercises and HEP for strength TREATMENT PLAN: 
Effective Dates: 2018 TO 2019 (60 days). Frequency/Duration: 2 times a week for 60 days GOALS: (Goals have been discussed and agreed upon with patient.) Discharge Goals: Time Frame: 4-6 weeks 1. Pt will report 0/10 shoulder pain with active movements and work activities. Progressed towards 2. Pt will increase UE strength to 5/5 for improved mobility. Progressed twoards 3. Pt will demonstrate improved sitting and standing posture for decreased stress on the shoulder. GOAL MET 4. Pt will be independent with HEP. GOAL MET Rehabilitation Potential For Stated Goals: Good The information in this section was collected on 19 (except where otherwise noted).  
HISTORY:  
 History of Present Injury/Illness (Reason for Referral): Shoulder pain for a few months. Pain with reaching back or far out. No pain at rest. Sharp pain if he moves it a certain way. Pain at worst 8-9/10. Pain with sleeping on the left side. Past Medical History/Comorbidities: Mr. Severa Cash  has a past medical history of CAD (coronary artery disease), Drug addiction in remission ([facility] [address] Dyslipidemia, DIEGO on CPAP, Recurrent depression ([facility] [address] S/P aortic valve replacement, Seizures ([facility] [address] and Sigmoid diverticulosis. Mr. Severa Cash  has a past surgical history that includes hx hernia repair (Left, 06/14/2018); pr cardiac surg procedure unlist (04/12/2018); hx heart catheterization (02/23/2018); ROBOTIC ASSISTED LEFT INGUINAL HERNIA REPAIR  WITH MESH (Left, 6/14/2018); CARDIAC RE ENTRY (N/A, 4/12/2018); AORTIC VALVE REPLACEMENT (N/A, 4/12/2018); and ESOPHAGEAL TRANS ECHOCARDIOGRAM / ANES PROBE   (N/A, 4/12/2018). Social History/Living Environment:  Lives with wife. 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 Ambulatory/Rehab Services H2 Model Falls Risk Assessment Risk Factors: 
     (1)  Gender [Male] Ability to Rise from Chair: 
     (0)  Ability to rise in a single movement Falls Prevention Plan: No modifications necessary Total: (5 or greater = High Risk): 1 ©2010 [facility] [address] Patent #[id number]. Federal Law prohibits the replication, distribution or use without written permission from [facility] [place] Current Medications:   
  
Current Outpatient Medications:  
  furosemide (LASIX) 40 mg tablet, Take 40 mg by mouth daily as needed. , Disp: , Rfl:  
  sildenafil, antihypertensive, (REVATIO) 20 mg tablet, 2 1/2-5 tabs daily as needed Cash price, Disp: 50 Tab, Rfl: 2 
  escitalopram oxalate (LEXAPRO) 10 mg tablet, Take 1 Tab by mouth daily. , Disp: 30 Tab, Rfl: 2   atorvastatin (LIPITOR) 20 mg tablet, Take 1 Tab by mouth nightly., Disp: 30 Tab, Rfl: 5 
  metoprolol tartrate (LOPRESSOR) 25 mg tablet, Take 1 Tab by mouth every twelve (12) hours. (Patient taking differently: Take 12.5 mg by mouth daily.), Disp: 60 Tab, Rfl: 5 
  ferrous sulfate 325 mg (65 mg iron) tablet, Take 1 Tab by mouth daily (with breakfast). (Patient taking differently: Take 325 mg by mouth two (2) times a day.), Disp: 90 Tab, Rfl: 3 
  docusate sodium (STOOL SOFTENER PO), Take  by mouth as needed. , Disp: , Rfl:  
  aspirin 81 mg chewable tablet, Take 81 mg by mouth nightly., Disp: , Rfl:   
Date Last Reviewed:  1-2-19 Number of Personal Factors/Comorbidities that affect the Plan of Care: 1-2: MODERATE COMPLEXITY EXAMINATION:  
 
Observation/Orthostatic Postural Assessment: He sits with rounded shoulders and forward head posture. Palpation: no tenderness to palpation ROM:  
  
LUE AROM 
L Shoulder Flexion: 150 L Shoulder External Rotation: 70(at neutral) Strength: LUE Strength L Shoulder Flexion: 5 L Shoulder Internal Rotation: 5 L Shoulder External Rotation: 4+ CLINICAL DECISION MAKING:  
Outcome Measure: Tool Used: Disabilities of the Arm, Shoulder and Hand (DASH) Questionnaire - Quick Version Score:  Initial: 22/55  Most Recent: X/55 (Date: -- ) Interpretation of Score: The DASH is designed to measure the activities of daily living in person's with upper extremity dysfunction or pain. Each section is scored on a 1-5 scale, 5 representing the greatest disability. The scores of each section are added together for a total score of 55. This number is divided by 11, followed by subtracting 1 and multiplying by 25 to get a percent score of disability.  This value represents the percentage disability: 0-20% minimal disability; 20-40% moderate disability; 40-60% severe disability; % dependent for care or exaggerated symptom behavior. Minimal detectable change is 12%. Medical Necessity:  
· Patient is expected to demonstrate progress in strength and range of motion to improve mobility. Reason for Services/Other Comments: 
· Patient continues to require skilled intervention due to increased pain, decreased UE strength, and decreased posture. Use of outcome tool(s) and clinical judgement create a POC that gives a: Clear prediction of patient's progress: LOW COMPLEXITY  
  
 
 
 
TREATMENT:  
(In addition to Assessment/Re-Assessment sessions the following treatments were rendered) Pre-treatment Symptoms/Complaints:  Pt report he was digging in the yard yesterday because he got his car stuck in the mud. Pain: Initial:   1-2/10 at rest Post Session:  0/10 Therapeutic Exercise (38 Minutes):  Exercises to improve mobility and strength. Required moderate visual and verbal cues to promote proper body alignment. Pt supine and PROM to the Left shoulder for ER at neutral, ER at 90 deg abd, IR, and forward elevation. Exercises per grid below Date: 
12-3-18 Date: 
12-6-18 Date 24-34-94 Date 19-07-94 Date 67-14-36 Date 97-67-25 Date 
1-2-19 Activity/Exercise Parameters Parameters parameters parameters parameters parameters parameters Serratus punch 2# 2x15 3# 3x10 3# 2x15 3# 2x15 4# 3x10 4# 3x10 4# 3x10 Side lying ER 1# 2x15 1#3x15 1# 2x20 1#2x20 2# 3x10 2# 3x10 2#3x15 Prone shoulder extension B UE 2x15 2x15 1# 2x10 1#2x10 1#2x15 2# 2x10 2#2x10 Prone middle trap 2x15 2x15 1# 2x10 1#2x10 1#2x15 1#2x15 2#2x10 Prone ER L 2# 2x10 L 2#2x10 L 2# 2x10 B 2# 2x10 B 2#2x10 B1#2x15 2#2x10 Band IR and ER Red 2x15 Red 2x15 Red 2x15 Green 2x10 Green 2x15 Green 2x15 Green 2x15 Seated scapular retraction 10x, red 15x Red 15 ea  Red 15 ea Green 20x Green 2x15 Green 2x15 Green 2x15  
pec stretch Sitting 30\" Sitting 30\" Sitting 30\"  Sitting 30\" -    
Y's on the wall - - - - 2x10  2x10 2x10 Shoulder elevation - - - - - 1# 2x10 1#2x10 Sitting ER in scapular plan - - - - - - 2# 2x10 Manual Therapy (     ): none today Therapeutic Modalities: HEP: Handout given for sitting scapular retraction and sleeper stretch. Patient verbalizes understanding. Treatment/Session Assessment:   
· Response to Treatment: He has progressed with UE strength and ROM. · Compliance with Program/Exercises: compliant · Recommendations/Intent for next treatment session: he returns to MD 1-7-19 and will see if patient needs to continue therapy Total Treatment Duration:38 Minutes PT Patient Time In/Time Out Time In: 1110 Time Out: 1150 Kerry Marinelli, PT

## 2019-01-04 ENCOUNTER — APPOINTMENT (OUTPATIENT)
Dept: PHYSICAL THERAPY | Age: 59
End: 2019-01-04
Payer: COMMERCIAL

## 2019-03-07 NOTE — THERAPY DISCHARGE
Bishnu Harding  : 1960  Payor: Mary Serrano / Plan: [facility] / Product Type: PPO /  [address] [place]  at [facility] TISHA DICKERSON  [address] [facility], [address] [place] [address] 83,[address], [address]  [phone]   [phone]       OUTPATIENT PHYSICAL [facility] 3/7/2019     ICD-10: Treatment Diagnosis: Pain in left shoulder (M25.512)   Precautions/Allergies:   Patient has no known allergies. MD Orders: Evaluate and treat MEDICAL/REFERRING DIAGNOSIS:  Pain in left shoulder [M25.512]   DATE OF ONSET: ~2018  REFERRING PHYSICIAN: Amanda Barrera MD     Mr. Omar Hong attended 10 physical therapy appointments from 18 to 19. ASSESSMENT:  Mr. Omar Hong presents with complaints of L shoulder pain. He has progressed with L shoulder ROM and UE strength. He was given HEP to continue to work on shoulder strengthening on his own and is ready to be discharged from physical therapy. PLAN: Discharge patient from physical therapy. Thank you for this referral.     GOALS: (Goals have been discussed and agreed upon with patient.)  Discharge Goals: Time Frame: 4-6 weeks  1. Pt will report 0/10 shoulder pain with active movements and work activities. Progressed towards  2. Pt will increase UE strength to 5/5 for improved mobility. Progressed twoards  3. Pt will demonstrate improved sitting and standing posture for decreased stress on the shoulder. GOAL MET  4. Pt will be independent with HEP. GOAL MET  Rehabilitation Potential For Stated Goals: Gregor Reyes, pt                  The information in this section was collected on 19 (except where otherwise noted). HISTORY:   History of Present Injury/Illness (Reason for Referral): Shoulder pain for a few months. Pain with reaching back or far out. No pain at rest. Sharp pain if he moves it a certain way. Pain at worst 8-9/10. Pain with sleeping on the left side.    Past Medical History/Comorbidities: Mr. Omar Hong  has a past medical history of CAD (coronary artery disease), Drug addiction in remission ([facility] [address] Dyslipidemia, DIEGO on CPAP, Recurrent depression ([facility] [address] S/P aortic valve replacement (04/2018), Seizures ([facility] [address] (2001), and Sigmoid diverticulosis. Mr. Ashley Gaines  has a past surgical history that includes hx hernia repair (Left, 06/14/2018); pr cardiac surg procedure unlist (04/12/2018); and hx heart catheterization (02/23/2018). Social History/Living Environment:  Lives with wife. 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 EXAMINATION:     Observation/Orthostatic Postural Assessment: He sits with rounded shoulders and forward head posture. Palpation: no tenderness to palpation     ROM:      LUE AROM  L Shoulder Flexion: 150  L Shoulder External Rotation: 70(at neutral)                    Strength:   LUE Strength  L Shoulder Flexion: 5  L Shoulder Internal Rotation: 5  L Shoulder External Rotation: 4+                      CLINICAL DECISION MAKING:   Outcome Measure: Tool Used: Disabilities of the Arm, Shoulder and Hand (DASH) Questionnaire - Quick Version  Score:  Initial: 22/55  Most Recent: X/55 (Date: -- )   Interpretation of Score: The DASH is designed to measure the activities of daily living in person's with upper extremity dysfunction or pain. Each section is scored on a 1-5 scale, 5 representing the greatest disability. The scores of each section are added together for a total score of 55. This number is divided by 11, followed by subtracting 1 and multiplying by 25 to get a percent score of disability. This value represents the percentage disability: 0-20% minimal disability; 20-40% moderate disability; 40-60% severe disability; % dependent for care or exaggerated symptom behavior. Minimal detectable change is 12%.       Medical Necessity:   · Patient is expected to demonstrate progress in strength and range of motion to improve mobility. Reason for Services/Other Comments:  · Patient continues to require skilled intervention due to increased pain, decreased UE strength, and decreased posture.    Use of outcome tool(s) and clinical judgement create a POC that gives a: Clear prediction of patient's progress: LOW COMPLEXITY

## 2020-03-06 ENCOUNTER — APPOINTMENT (OUTPATIENT)
Dept: ULTRASOUND IMAGING | Age: 60
End: 2020-03-06
Attending: EMERGENCY MEDICINE
Payer: COMMERCIAL

## 2020-03-06 ENCOUNTER — HOSPITAL ENCOUNTER (EMERGENCY)
Age: 60
Discharge: HOME | End: 2020-03-06
Attending: EMERGENCY MEDICINE
Payer: COMMERCIAL

## 2020-03-06 VITALS
OXYGEN SATURATION: 99 % | TEMPERATURE: 98 F | WEIGHT: 175 LBS | DIASTOLIC BLOOD PRESSURE: 78 MMHG | HEART RATE: 90 BPM | RESPIRATION RATE: 16 BRPM | SYSTOLIC BLOOD PRESSURE: 148 MMHG | HEIGHT: 69 IN | BODY MASS INDEX: 25.92 KG/M2

## 2020-03-06 DIAGNOSIS — M79.661 RIGHT CALF PAIN: Primary | ICD-10-CM

## 2020-03-06 PROCEDURE — 93971 EXTREMITY STUDY: CPT

## 2020-03-06 PROCEDURE — 99283 EMERGENCY DEPT VISIT LOW MDM: CPT

## 2020-03-06 NOTE — ED PROVIDER NOTES
[facility] Emergency Department  Arrival Date/Time: 3/6/2020 @ [address]  MRN: [id number]      62 y.o. male    YOB: 1960   Mobile #:   No relevant phone numbers on file. [facility] EMERGENCY DEPT ER07/07  Seen on 3/6/2020 @ 8:43 AM       Chief Complaint   Patient presents with    Knee Pain     HPI: 22-year-old male presents to the emergency department with pain and swelling in the right calf onset yesterday    No travel no surgery he does take aspirin. No other anticoagulant or antiplatelet medicines    No fever chills no chest pain or shortness of breath    No alleviating, no aggrevating   HPI    Historian: patient    Review of Systems:  No fever  No chills  No cp  No sob    Allergies: No Known Allergies      Key Anti-Platelet Anticoagulant Meds     The patient is on no antiplatelet meds or anticoagulants. Physical Exam:  Nursing documentation reviewed. Vitals:    03/06/20 0825 03/06/20 0835 03/06/20 1021   BP: 159/82  148/78   Pulse: 95  90   Resp: 15  16   Temp: 98 °F (36.7 °C)  98 °F (36.7 °C)   SpO2: 98% 98% 99%    Vital signs were reviewed. Physical Exam  Vitals signs and nursing note reviewed. Constitutional:       Appearance: Normal appearance. Cardiovascular:      Rate and Rhythm: Normal rate and regular rhythm. Pulses: Normal pulses. Heart sounds: Normal heart sounds. Pulmonary:      Breath sounds: Normal breath sounds. Musculoskeletal:      Comments: Right calf swollen and mildly tender  No cords  No homans   Skin:     General: Skin is warm. Neurological:      Mental Status: He is alert.             MEDICAL DECISION MAKING:   This is a new problem that does need additional workup  Labs/Radiographs/ECG were ordered: yes  CT/US/XRay/MRI were visualized by me: yes    The patient's problem is: moderate  The Diagnostic Options are: minimal risk  The Management Options are: minimal risk    Data:    Lab findings during this visit (only abnormal values will be noted, if no value noted then the result   was normal range): Labs Reviewed - No data to display     Radiology studies during this visit:   Duplex Lower Ext Venous Right    Result Date: 3/6/2020  HISTORY: Right lower extremity swelling and pain, one day duration Exam: Right lower extremity ultrasound Technique: Realtime grayscale and color Doppler imaging is performed in the longitudinal and transverse planes. FINDINGS: There is normal flow, augmentation, and compressibility within the deep venous system from the groin to popliteal fossa. Posterior tibial vein and peroneal vein are also normal. No Baker's cyst. IMPRESSIONS: No evidence of deep venous thrombosis within the right lower extremity. Medications given in the ED: Medications - No data to display     Procedure Documentation: Procedures     Assessment and Plan:      Impression:     ICD-10-CM ICD-9-CM   1. Right calf pain M79.661 729.5        Condition at disposition: stable    Disposition:      Follow-up:   Follow-up Information     Follow up With Specialties Details Why Contact Info    Matt Landeros MD Internal Medicine   [address] [place] [address]  [phone]             Discharge Medications: There are no discharge medications for this patient. Terrence Flynn MD; 3/6/2020 @8:43 AM          Past Medical History: Primary Care Doctor:  Matt Landeros MD     Past Medical History:   Diagnosis Date    CAD (coronary artery disease)     Nonobstructive multivessel disease    Drug addiction in remission ([address]     none since 2001    Dyslipidemia     DIEGO on CPAP     Recurrent depression ([address]     S/P aortic valve replacement 04/2018    Seizures [address] [facility] 2001    x 1--- ETOH  WITHDRAWAL    Sigmoid diverticulosis      Past Surgical History:   Procedure Laterality Date    CARDIAC SURG PROCEDURE UNLIST  04/12/2018    AVR    HX HEART CATHETERIZATION  02/23/2018    non obstructive disease, severe AS    HX HERNIA REPAIR Left 06/14/2018    inguinal      Social History     Tobacco Use    Smoking status: Former Smoker     Packs/day: 0.50     Years: 40.00     Pack years: 20.00     Types: Cigarettes    Smokeless tobacco: Never Used    Tobacco comment: quit April '18   Substance Use Topics    Alcohol use: No    Drug use: No      Home Medication:   None

## 2020-03-06 NOTE — ED NOTES
I have reviewed discharge instructions with the patient. The patient verbalized understanding. Patient left ED via Discharge Method: ambulatory to Home with self. Opportunity for questions and clarification provided. Patient given 0 scripts. To continue your aftercare when you leave the hospital, you may receive an automated call from our care team to check in on how you are doing. This is a free service and part of our promise to provide the best care and service to meet your aftercare needs.  If you have questions, or wish to unsubscribe from this service please call [phone]. Thank you for Choosing our [facility].

## 2020-03-06 NOTE — DISCHARGE INSTRUCTIONS
I am not sure what is causing the pain and swelling in your calf. I do not see any signs of infection. You do not have a blood clot    If you continue to have pain after 7 to 10 days please come back or follow-up with your primary care doctor for further evaluation    Tylenol 2 tablets every 6 hours for pain elevate your leg warm compress to the calf    Duplex Lower Ext Venous Right    Result Date: 3/6/2020  HISTORY: Right lower extremity swelling and pain, one day duration Exam: Right lower extremity ultrasound Technique: Realtime grayscale and color Doppler imaging is performed in the longitudinal and transverse planes. FINDINGS: There is normal flow, augmentation, and compressibility within the deep venous system from the groin to popliteal fossa. Posterior tibial vein and peroneal vein are also normal. No Baker's cyst. IMPRESSIONS: No evidence of deep venous thrombosis within the right lower extremity.

## 2020-03-06 NOTE — ED TRIAGE NOTES
Pt in states right knee pain and swelling x 2 days. Denies injury. states pain worse in the morning. Relieved with ibuprofen.

## 2020-03-20 PROBLEM — I10 ESSENTIAL HYPERTENSION: Status: RESOLVED | Noted: 2018-09-21 | Resolved: 2020-03-20

## 2020-05-16 ENCOUNTER — APPOINTMENT (OUTPATIENT)
Dept: GENERAL RADIOLOGY | Age: 60
DRG: 280 | End: 2020-05-16
Attending: EMERGENCY MEDICINE
Payer: COMMERCIAL

## 2020-05-16 ENCOUNTER — APPOINTMENT (OUTPATIENT)
Dept: CT IMAGING | Age: 60
DRG: 280 | End: 2020-05-16
Attending: EMERGENCY MEDICINE
Payer: COMMERCIAL

## 2020-05-16 ENCOUNTER — HOSPITAL ENCOUNTER (INPATIENT)
Age: 60
LOS: 24 days | Discharge: HOME HEALTH | DRG: 280 | End: 2020-06-09
Attending: EMERGENCY MEDICINE | Admitting: HOSPITALIST
Payer: COMMERCIAL

## 2020-05-16 DIAGNOSIS — R05.9 COUGH: ICD-10-CM

## 2020-05-16 DIAGNOSIS — R77.8 ELEVATED TROPONIN: ICD-10-CM

## 2020-05-16 DIAGNOSIS — I10 ESSENTIAL HYPERTENSION: ICD-10-CM

## 2020-05-16 DIAGNOSIS — I25.10 CORONARY ARTERY DISEASE INVOLVING NATIVE CORONARY ARTERY OF NATIVE HEART WITHOUT ANGINA PECTORIS: ICD-10-CM

## 2020-05-16 DIAGNOSIS — E78.5 DYSLIPIDEMIA: ICD-10-CM

## 2020-05-16 DIAGNOSIS — R50.9 FEVER, UNSPECIFIED FEVER CAUSE: ICD-10-CM

## 2020-05-16 DIAGNOSIS — R07.9 CHEST PAIN, UNSPECIFIED TYPE: Primary | ICD-10-CM

## 2020-05-16 DIAGNOSIS — I50.32 DIASTOLIC CHF, CHRONIC (HCC): ICD-10-CM

## 2020-05-16 DIAGNOSIS — I21.4 NSTEMI (NON-ST ELEVATED MYOCARDIAL INFARCTION) (HCC): ICD-10-CM

## 2020-05-16 DIAGNOSIS — R79.89 ELEVATED D-DIMER: ICD-10-CM

## 2020-05-16 DIAGNOSIS — R19.7 DIARRHEA, UNSPECIFIED TYPE: ICD-10-CM

## 2020-05-16 DIAGNOSIS — Z95.2 S/P AORTIC VALVE REPLACEMENT: ICD-10-CM

## 2020-05-16 PROBLEM — D64.9 ANEMIA: Status: ACTIVE | Noted: 2020-05-16

## 2020-05-16 PROBLEM — Z20.822 SUSPECTED COVID-19 VIRUS INFECTION: Status: ACTIVE | Noted: 2020-05-16

## 2020-05-16 PROBLEM — R23.3 PETECHIAE: Status: ACTIVE | Noted: 2020-05-16

## 2020-05-16 LAB
ALBUMIN SERPL-MCNC: 2 G/DL (ref 3.2–4.6)
ALBUMIN/GLOB SERPL: 0.5 {RATIO} (ref 1.2–3.5)
ALP SERPL-CCNC: 70 U/L (ref 50–136)
ALT SERPL-CCNC: 38 U/L (ref 12–65)
ANION GAP SERPL CALC-SCNC: 8 MMOL/L (ref 7–16)
APTT PPP: 33.9 SEC (ref 24.3–35.4)
AST SERPL-CCNC: 49 U/L (ref 15–37)
BASOPHILS # BLD: 0 K/UL (ref 0–0.2)
BASOPHILS NFR BLD: 0 % (ref 0–2)
BILIRUB SERPL-MCNC: 0.7 MG/DL (ref 0.2–1.1)
BUN SERPL-MCNC: 19 MG/DL (ref 8–23)
CALCIUM SERPL-MCNC: 7.8 MG/DL (ref 8.3–10.4)
CHLORIDE SERPL-SCNC: 100 MMOL/L (ref 98–107)
CO2 SERPL-SCNC: 24 MMOL/L (ref 21–32)
CREAT SERPL-MCNC: 0.72 MG/DL (ref 0.8–1.5)
D DIMER PPP FEU-MCNC: 3.66 UG/ML(FEU)
DIFFERENTIAL METHOD BLD: ABNORMAL
EOSINOPHIL # BLD: 0 K/UL (ref 0–0.8)
EOSINOPHIL NFR BLD: 0 % (ref 0.5–7.8)
ERYTHROCYTE [DISTWIDTH] IN BLOOD BY AUTOMATED COUNT: 14.2 % (ref 11.9–14.6)
GLOBULIN SER CALC-MCNC: 4.4 G/DL (ref 2.3–3.5)
GLUCOSE SERPL-MCNC: 146 MG/DL (ref 65–100)
HCT VFR BLD AUTO: 29.3 % (ref 41.1–50.3)
HGB BLD-MCNC: 9.3 G/DL (ref 13.6–17.2)
IMM GRANULOCYTES # BLD AUTO: 0.1 K/UL (ref 0–0.5)
IMM GRANULOCYTES NFR BLD AUTO: 1 % (ref 0–5)
INR PPP: 1.3
LYMPHOCYTES # BLD: 0.6 K/UL (ref 0.5–4.6)
LYMPHOCYTES NFR BLD: 5 % (ref 13–44)
MCH RBC QN AUTO: 26.5 PG (ref 26.1–32.9)
MCHC RBC AUTO-ENTMCNC: 31.7 G/DL (ref 31.4–35)
MCV RBC AUTO: 83.5 FL (ref 79.6–97.8)
MONOCYTES # BLD: 0.6 K/UL (ref 0.1–1.3)
MONOCYTES NFR BLD: 5 % (ref 4–12)
NEUTS SEG # BLD: 11.3 K/UL (ref 1.7–8.2)
NEUTS SEG NFR BLD: 90 % (ref 43–78)
NRBC # BLD: 0 K/UL (ref 0–0.2)
PLATELET # BLD AUTO: 130 K/UL (ref 150–450)
PMV BLD AUTO: 9.5 FL (ref 9.4–12.3)
POTASSIUM SERPL-SCNC: 3.7 MMOL/L (ref 3.5–5.1)
PROT SERPL-MCNC: 6.4 G/DL (ref 6.3–8.2)
PROTHROMBIN TIME: 16.6 SEC (ref 12–14.7)
RBC # BLD AUTO: 3.51 M/UL (ref 4.23–5.6)
SODIUM SERPL-SCNC: 132 MMOL/L (ref 136–145)
TROPONIN I SERPL-MCNC: 2.08 NG/ML (ref 0.02–0.05)
WBC # BLD AUTO: 12.6 K/UL (ref 4.3–11.1)

## 2020-05-16 PROCEDURE — 74011250637 HC RX REV CODE- 250/637: Performed by: HOSPITALIST

## 2020-05-16 PROCEDURE — 93005 ELECTROCARDIOGRAM TRACING: CPT | Performed by: EMERGENCY MEDICINE

## 2020-05-16 PROCEDURE — 85384 FIBRINOGEN ACTIVITY: CPT

## 2020-05-16 PROCEDURE — 96365 THER/PROPH/DIAG IV INF INIT: CPT

## 2020-05-16 PROCEDURE — 84484 ASSAY OF TROPONIN QUANT: CPT

## 2020-05-16 PROCEDURE — 71260 CT THORAX DX C+: CPT

## 2020-05-16 PROCEDURE — 85025 COMPLETE CBC W/AUTO DIFF WBC: CPT

## 2020-05-16 PROCEDURE — 80053 COMPREHEN METABOLIC PANEL: CPT

## 2020-05-16 PROCEDURE — 74011250636 HC RX REV CODE- 250/636: Performed by: HOSPITALIST

## 2020-05-16 PROCEDURE — 87635 SARS-COV-2 COVID-19 AMP PRB: CPT

## 2020-05-16 PROCEDURE — 65660000000 HC RM CCU STEPDOWN

## 2020-05-16 PROCEDURE — 85730 THROMBOPLASTIN TIME PARTIAL: CPT

## 2020-05-16 PROCEDURE — 99285 EMERGENCY DEPT VISIT HI MDM: CPT

## 2020-05-16 PROCEDURE — 71045 X-RAY EXAM CHEST 1 VIEW: CPT

## 2020-05-16 PROCEDURE — 74011250636 HC RX REV CODE- 250/636: Performed by: EMERGENCY MEDICINE

## 2020-05-16 PROCEDURE — 96366 THER/PROPH/DIAG IV INF ADDON: CPT

## 2020-05-16 PROCEDURE — 96375 TX/PRO/DX INJ NEW DRUG ADDON: CPT

## 2020-05-16 PROCEDURE — 96376 TX/PRO/DX INJ SAME DRUG ADON: CPT

## 2020-05-16 PROCEDURE — 85610 PROTHROMBIN TIME: CPT

## 2020-05-16 PROCEDURE — 85379 FIBRIN DEGRADATION QUANT: CPT

## 2020-05-16 PROCEDURE — 85245 CLOT FACTOR VIII VW RISTOCTN: CPT

## 2020-05-16 PROCEDURE — 74011250637 HC RX REV CODE- 250/637: Performed by: EMERGENCY MEDICINE

## 2020-05-16 PROCEDURE — 77030040393 HC DRSG OPTIFOAM GENT MDII -B

## 2020-05-16 PROCEDURE — 74011636320 HC RX REV CODE- 636/320: Performed by: EMERGENCY MEDICINE

## 2020-05-16 PROCEDURE — 74011000258 HC RX REV CODE- 258: Performed by: EMERGENCY MEDICINE

## 2020-05-16 RX ORDER — METOPROLOL SUCCINATE 50 MG/1
25 TABLET, EXTENDED RELEASE ORAL DAILY
Status: DISCONTINUED | OUTPATIENT
Start: 2020-05-17 | End: 2020-05-17

## 2020-05-16 RX ORDER — HEPARIN SODIUM 5000 [USP'U]/ML
60 INJECTION, SOLUTION INTRAVENOUS; SUBCUTANEOUS ONCE
Status: COMPLETED | OUTPATIENT
Start: 2020-05-16 | End: 2020-05-16

## 2020-05-16 RX ORDER — SODIUM CHLORIDE 9 MG/ML
100 INJECTION, SOLUTION INTRAVENOUS CONTINUOUS
Status: DISCONTINUED | OUTPATIENT
Start: 2020-05-16 | End: 2020-05-21

## 2020-05-16 RX ORDER — ESCITALOPRAM OXALATE 10 MG/1
10 TABLET ORAL DAILY
Status: DISCONTINUED | OUTPATIENT
Start: 2020-05-17 | End: 2020-06-09 | Stop reason: HOSPADM

## 2020-05-16 RX ORDER — ONDANSETRON 2 MG/ML
4 INJECTION INTRAMUSCULAR; INTRAVENOUS
Status: DISCONTINUED | OUTPATIENT
Start: 2020-05-16 | End: 2020-06-09 | Stop reason: HOSPADM

## 2020-05-16 RX ORDER — MORPHINE SULFATE 10 MG/ML
5 INJECTION, SOLUTION INTRAMUSCULAR; INTRAVENOUS
Status: COMPLETED | OUTPATIENT
Start: 2020-05-16 | End: 2020-05-16

## 2020-05-16 RX ORDER — ATORVASTATIN CALCIUM 10 MG/1
20 TABLET, FILM COATED ORAL
Status: DISCONTINUED | OUTPATIENT
Start: 2020-05-16 | End: 2020-06-09 | Stop reason: HOSPADM

## 2020-05-16 RX ORDER — ACETAMINOPHEN 325 MG/1
650 TABLET ORAL
Status: DISCONTINUED | OUTPATIENT
Start: 2020-05-16 | End: 2020-06-09 | Stop reason: HOSPADM

## 2020-05-16 RX ORDER — SODIUM CHLORIDE 0.9 % (FLUSH) 0.9 %
5-40 SYRINGE (ML) INJECTION EVERY 8 HOURS
Status: DISCONTINUED | OUTPATIENT
Start: 2020-05-16 | End: 2020-06-04

## 2020-05-16 RX ORDER — SODIUM CHLORIDE 0.9 % (FLUSH) 0.9 %
5-40 SYRINGE (ML) INJECTION AS NEEDED
Status: DISCONTINUED | OUTPATIENT
Start: 2020-05-16 | End: 2020-06-09 | Stop reason: HOSPADM

## 2020-05-16 RX ORDER — HEPARIN SODIUM 5000 [USP'U]/100ML
12-25 INJECTION, SOLUTION INTRAVENOUS
Status: DISCONTINUED | OUTPATIENT
Start: 2020-05-16 | End: 2020-05-19

## 2020-05-16 RX ORDER — NITROGLYCERIN 0.4 MG/1
0.4 TABLET SUBLINGUAL
Status: DISCONTINUED | OUTPATIENT
Start: 2020-05-16 | End: 2020-06-09 | Stop reason: HOSPADM

## 2020-05-16 RX ORDER — ONDANSETRON 2 MG/ML
4 INJECTION INTRAMUSCULAR; INTRAVENOUS
Status: COMPLETED | OUTPATIENT
Start: 2020-05-16 | End: 2020-05-16

## 2020-05-16 RX ORDER — SODIUM CHLORIDE 0.9 % (FLUSH) 0.9 %
10 SYRINGE (ML) INJECTION
Status: COMPLETED | OUTPATIENT
Start: 2020-05-16 | End: 2020-05-16

## 2020-05-16 RX ORDER — GUAIFENESIN 100 MG/5ML
81 LIQUID (ML) ORAL DAILY
Status: DISCONTINUED | OUTPATIENT
Start: 2020-05-17 | End: 2020-06-09 | Stop reason: HOSPADM

## 2020-05-16 RX ORDER — MORPHINE SULFATE 10 MG/ML
2 INJECTION, SOLUTION INTRAMUSCULAR; INTRAVENOUS
Status: DISCONTINUED | OUTPATIENT
Start: 2020-05-16 | End: 2020-06-09 | Stop reason: HOSPADM

## 2020-05-16 RX ADMIN — Medication 10 ML: at 20:20

## 2020-05-16 RX ADMIN — SODIUM CHLORIDE 100 ML/HR: 900 INJECTION, SOLUTION INTRAVENOUS at 23:18

## 2020-05-16 RX ADMIN — ONDANSETRON 4 MG: 2 INJECTION INTRAMUSCULAR; INTRAVENOUS at 21:34

## 2020-05-16 RX ADMIN — HEPARIN SODIUM 4200 UNITS: 5000 INJECTION INTRAVENOUS; SUBCUTANEOUS at 19:32

## 2020-05-16 RX ADMIN — IOPAMIDOL 100 ML: 755 INJECTION, SOLUTION INTRAVENOUS at 20:20

## 2020-05-16 RX ADMIN — MORPHINE SULFATE 5 MG: 10 INJECTION INTRAVENOUS at 21:36

## 2020-05-16 RX ADMIN — ATORVASTATIN CALCIUM 20 MG: 20 TABLET, FILM COATED ORAL at 23:54

## 2020-05-16 RX ADMIN — NITROGLYCERIN 1 INCH: 20 OINTMENT TOPICAL at 19:31

## 2020-05-16 RX ADMIN — Medication 10 ML: at 23:21

## 2020-05-16 RX ADMIN — HEPARIN SODIUM 12 UNITS/KG/HR: 5000 INJECTION, SOLUTION INTRAVENOUS at 19:34

## 2020-05-16 RX ADMIN — SODIUM CHLORIDE 100 ML: 900 INJECTION, SOLUTION INTRAVENOUS at 20:20

## 2020-05-16 NOTE — ACP (ADVANCE CARE PLANNING)
Advance Care Planning     Advance Care Planning Clinical Specialist  Conversation Note      Date of ACP Conversation: 5/16/2020    Conversation Conducted with:   Patient with Decision Making Capacity confirmed with Dr Isabelle Malik. Pt was interviewed from [facility] wearing appropriate PPE. ACP Clinical Specialist: Marko Sampson LMSW    *When Decision Maker makes decisions on behalf of the incapacitated patient: Decision Maker is asked to consider and make decisions based on patient values, known preferences, or best interests. Health Care Decision Maker:    Current Designated Health Care Decision Maker:   (If there is a valid [address] named in the [address] [place] [address] Makers\" box in the ACP activity, but it is not visible above, be sure to open that field and then select the health care decision maker relationship (ie \"primary\") in the blank space to the right of the name.) Validate  this information as still accurate & up-to-date; edit [address] field as needed.)    Note: Assess and validate information in current ACP documents, as indicated. If no Decision Maker listed above or available through scanned documents, then:    If no Authorized Decision Maker has previously been identified, then patient chooses [address]:  \"Who would you like to name as your primary health care decision-maker? \"    Name: Bob Ivy   Relationship: sister Phone number: [phone]  Judah Garcia this person be reached easily? \" YES  \"Who would you like to name as your back-up decision maker? \"   Name: Geraldyne Boxer  Relationship: spouse Phone number: [phone]  Judah Garcia this person be reached easily? \" YES  Spouse has relocated to [place] so pt said to call his sister first.    Note: If the relationship of these Decision-Makers to the patient does NOT follow your state's Next of Kin hierarchy, recommend that patient complete ACP document that meets state-specific requirements to allow them to act on the patient's behalf when appropriate. Care Preferences    Hospitalization: \"If your health worsens and it becomes clear that your chance of recovery is unlikely, what would your preference be regarding hospitalization? \"    Choice:  []  The patient wants hospitalization  []  The patient prefers comfort-focused treatment without hospitalization. Ventilation: \"If you were in your present state of health and suddenly became very ill and were unable to breathe on your own, what would your preference be about the use of a ventilator (breathing machine) if it were available to you? \"      If patient would desire the use of a ventilator (breathing machine), answer \"yes\", if not \"no\":yes    \"If your health worsens and it becomes clear that your chance of recovery is unlikely, what would your preference be about the use of a ventilator (breathing machine) if it were available to you? \"     If patient would desire the use of a ventilator (breathing machine), answer \"yes\", if not \"no\"YES      Resuscitation  \"CPR works best to restart the heart when there is a sudden event, like a heart attack, in someone who is otherwise healthy. Unfortunately, CPR does not typically restart the heart for people who have serious health conditions or who are very sick. \"    \"In the event your heart stopped as a result of an underlying serious health condition, would you want attempts to be made to restart your heart (answer \"yes\" for attempt to resuscitate) or would you prefer a natural death (answer \"no\" for do not attempt to resuscitate)? \" yes      NOTE: If the patient has a valid advance directive AND now provides care preference(s) that are inconsistent with that prior directive, advise the patient to consider either: creating a new advance directive that complies with state-specific requirements; or, if that is not possible, orally revoking that prior directive in accordance with state-specific requirements, which must be documented in the EHR. [x] Yes  [] No   Educated Patient / Silvino Mccarthy regarding differences between Advance Directives and portable DNR orders. Length of ACP Conversation in minutes:   30    Conversation Outcomes:  [x] ACP discussion completed, pt is willing to have a follow up call or visit about [address], but doesn't want to complete anything today. [] Existing advance directive reviewed with patient; no changes to patient's previously recorded wishes   [] New Advance Directive completed   [] Portable Do Not Rescitate prepared for Provider review and signature  [] POLST/POST/MOLST/MOST prepared for Provider review and signature      Follow-up plan:    [x] Schedule follow-up conversation to continue planning, by phone or in person.   [] Referred individual to Provider for additional questions/concerns   [] Advised patient/agent/surrogate to review completed ACP document and update if needed with changes in condition, patient preferences or care setting     [x] This note routed to one or more involved healthcare providers

## 2020-05-16 NOTE — ED NOTES
Report received from [facility] Festus Stacy RN to assume patient care at this time.

## 2020-05-16 NOTE — ED PROVIDER NOTES
The history is provided by the patient. Chest Pain (Angina)    This is a new problem. The current episode started yesterday. The problem has been resolved. The problem occurs daily. The pain is associated with normal activity. The pain is present in the substernal region. The pain is mild. The quality of the pain is described as heavy. The pain does not radiate. Exacerbated by: nothing. Associated symptoms include cough, a fever and shortness of breath. Pertinent negatives include no abdominal pain, no back pain, no diaphoresis, no headaches, no hemoptysis, no leg pain, no nausea, no numbness, no sputum production, no vomiting and no weakness. He has tried aspirin and nitroglycerin for the symptoms. The treatment provided mild relief. Risk factors include hypertension, dyslipidemia and cardiac disease. His past medical history does not include DVT or PE. Procedural history includes cardiac catheterization. Pertinent negatives include no cardiac stents and no CABG.        Past Medical History:   Diagnosis Date    CAD (coronary artery disease)     Nonobstructive multivessel disease    Drug addiction in remission ([facility]     none since 2001    Dyslipidemia     DIEGO on CPAP     Recurrent depression ([facility]     S/P aortic valve replacement 04/2018    Seizures ([facility] [address] [facility] 2001    x 1--- ETOH  WITHDRAWAL    Sigmoid diverticulosis        Past Surgical History:   Procedure Laterality Date    CARDIAC SURG PROCEDURE UNLIST  04/12/2018    AVR    HX HEART CATHETERIZATION  02/23/2018    non obstructive disease, severe AS    HX HERNIA REPAIR Left 06/14/2018    inguinal          Family History:   Problem Relation Age of Onset    Cancer Mother     Coronary Artery Disease Mother     Heart Disease Brother 64        MI X 3    Coronary Artery Disease Brother     Cancer Sister         breast       Social History     Socioeconomic History    Marital status:      Spouse name: Not on file    Number of children: Not on file  Years of education: Not on file    Highest education level: Not on file   Occupational History    Occupation: Works at [address] resource strain: Not on file    Food insecurity     Worry: Not on file     Inability: Not on file   VoAPPs needs     Medical: Not on file     Non-medical: Not on file   Tobacco Use    Smoking status: Former Smoker     Packs/day: 0.50     Years: 40.00     Pack years: 20.00     Types: Cigarettes     Last attempt to quit: 2018     Years since quittin.1    Smokeless tobacco: Never Used    Tobacco comment: quit    Substance and Sexual Activity    Alcohol use: Yes     Comment: 6 drinks a week    Drug use: No    Sexual activity: Not on file   Lifestyle    Physical activity     Days per week: Not on file     Minutes per session: Not on file    Stress: Not on file   Relationships    Social connections     Talks on phone: Not on file     Gets together: Not on file     Attends Restorationism service: Not on file     Active member of club or organization: Not on file     Attends meetings of clubs or organizations: Not on file     Relationship status: Not on file    Intimate partner violence     Fear of current or ex partner: Not on file     Emotionally abused: Not on file     Physically abused: Not on file     Forced sexual activity: Not on file   Other Topics Concern    Not on file   Social History Narrative    Not on file         ALLERGIES: Patient has no known allergies. Review of Systems   Constitutional: Positive for fever. Negative for diaphoresis. HENT: Negative for congestion, rhinorrhea and sore throat. Respiratory: Positive for cough and shortness of breath. Negative for hemoptysis and sputum production. Cardiovascular: Positive for chest pain. Gastrointestinal: Positive for diarrhea. Negative for abdominal pain, blood in stool, nausea and vomiting. Endocrine: Negative for polyuria. Genitourinary: Negative for dysuria. Musculoskeletal: Negative for back pain and myalgias. Neurological: Negative for weakness, numbness and headaches. Vitals:    05/16/20 1757   BP: 126/72   Pulse: (!) 119   Resp: 18   Temp: 100 °F (37.8 °C)   SpO2: 95%   Weight: 70.3 kg (155 lb)   Height: 6' 1\" (1.854 m)            Physical Exam  Vitals signs and nursing note reviewed. Constitutional:       General: He is not in acute distress. Appearance: He is well-developed. He is ill-appearing. He is not toxic-appearing or diaphoretic. HENT:      Mouth/Throat:      Pharynx: No oropharyngeal exudate. Eyes:      Conjunctiva/sclera: Conjunctivae normal.      Pupils: Pupils are equal, round, and reactive to light. Neck:      Musculoskeletal: Neck supple. Vascular: No JVD. Cardiovascular:      Rate and Rhythm: Normal rate and regular rhythm. Pulses:           Carotid pulses are 2+ on the right side and 2+ on the left side. Radial pulses are 2+ on the right side and 2+ on the left side. Heart sounds: Normal heart sounds. Pulmonary:      Effort: Pulmonary effort is normal.      Breath sounds: Examination of the right-lower field reveals rales. Examination of the left-lower field reveals rales. Rales present. Abdominal:      General: Bowel sounds are normal. There is no distension. Palpations: Abdomen is soft. Tenderness: There is no abdominal tenderness. There is no guarding or rebound. Musculoskeletal: Normal range of motion. General: No tenderness. Right lower leg: No edema. Left lower leg: No edema. Lymphadenopathy:      Cervical: No cervical adenopathy. Skin:     General: Skin is warm and dry. Neurological:      Mental Status: He is alert and oriented to person, place, and time. MDM  Number of Diagnoses or Management Options  Diagnosis management comments: Rule out MI and other life-threatening causes of chest pain.   Doubt aortic dissection given equal pulses bilaterally and I will evaluate his mediastinum on chest x-ray. Given fever and diarrhea we will test for coronavirus. Isolation order placed. I [place] PPE with the exception of a gown when I examined this patient but I did not touch my trunk to the bed with the patient.    7:22 PM  Troponin and d-dimer are both significantly elevated. CT to exclude PE and evaluate for multifocal pneumonia. For acute coronary syndrome at this time and I will increase the dose if he has thromboembolism. Coronavirus still in the differential.  Subsequent EKG shows sinus tachycardia but no ischemia. No ST elevation or depression. Amount and/or Complexity of Data Reviewed  Clinical lab tests: ordered and reviewed  Tests in the radiology section of CPT®: ordered and reviewed  Independent visualization of images, tracings, or specimens: yes (EKG x2 reviewed by EDMD.  Sinus tachycardia. No ischemia.   Initial EKG had some baseline wander.)           Procedures

## 2020-05-17 LAB
ANION GAP SERPL CALC-SCNC: 4 MMOL/L (ref 7–16)
APTT PPP: 44.1 SEC (ref 24.3–35.4)
APTT PPP: 52.2 SEC (ref 24.3–35.4)
APTT PPP: 69.3 SEC (ref 24.3–35.4)
APTT PPP: 85.1 SEC (ref 24.3–35.4)
ATRIAL RATE: 107 BPM
ATRIAL RATE: 113 BPM
ATRIAL RATE: 288 BPM
BASOPHILS # BLD: 0 K/UL (ref 0–0.2)
BASOPHILS NFR BLD: 0 % (ref 0–2)
BUN SERPL-MCNC: 16 MG/DL (ref 8–23)
CALCIUM SERPL-MCNC: 7.7 MG/DL (ref 8.3–10.4)
CALCULATED P AXIS, ECG09: 70 DEGREES
CALCULATED R AXIS, ECG10: 49 DEGREES
CALCULATED R AXIS, ECG10: 51 DEGREES
CALCULATED R AXIS, ECG10: 90 DEGREES
CALCULATED T AXIS, ECG11: 155 DEGREES
CALCULATED T AXIS, ECG11: 70 DEGREES
CALCULATED T AXIS, ECG11: 79 DEGREES
CHLORIDE SERPL-SCNC: 100 MMOL/L (ref 98–107)
CO2 SERPL-SCNC: 28 MMOL/L (ref 21–32)
CREAT SERPL-MCNC: 0.69 MG/DL (ref 0.8–1.5)
CRP SERPL-MCNC: 7.2 MG/DL (ref 0–0.9)
DIAGNOSIS, 93000: NORMAL
DIFFERENTIAL METHOD BLD: ABNORMAL
EOSINOPHIL # BLD: 0 K/UL (ref 0–0.8)
EOSINOPHIL NFR BLD: 0 % (ref 0.5–7.8)
ERYTHROCYTE [DISTWIDTH] IN BLOOD BY AUTOMATED COUNT: 14.2 % (ref 11.9–14.6)
FERRITIN SERPL-MCNC: 910 NG/ML (ref 8–388)
FIBRINOGEN PPP-MCNC: 473 MG/DL (ref 190–501)
FOLATE SERPL-MCNC: 18.6 NG/ML (ref 3.1–17.5)
GLUCOSE BLD STRIP.AUTO-MCNC: 190 MG/DL (ref 65–100)
GLUCOSE SERPL-MCNC: 132 MG/DL (ref 65–100)
HCT VFR BLD AUTO: 30.1 % (ref 41.1–50.3)
HGB BLD-MCNC: 9.4 G/DL (ref 13.6–17.2)
HGB RETIC QN AUTO: 26 PG (ref 29–35)
IMM GRANULOCYTES # BLD AUTO: 0.1 K/UL (ref 0–0.5)
IMM GRANULOCYTES NFR BLD AUTO: 1 % (ref 0–5)
IMM RETICS NFR: 21.2 % (ref 2.3–13.4)
IRON SATN MFR SERPL: 9 %
IRON SERPL-MCNC: 16 UG/DL (ref 35–150)
IRON SERPL-MCNC: 29 UG/DL (ref 35–150)
LDH SERPL L TO P-CCNC: 685 U/L (ref 110–210)
LYMPHOCYTES # BLD: 0.7 K/UL (ref 0.5–4.6)
LYMPHOCYTES NFR BLD: 6 % (ref 13–44)
MAGNESIUM SERPL-MCNC: 2 MG/DL (ref 1.8–2.4)
MCH RBC QN AUTO: 26.7 PG (ref 26.1–32.9)
MCHC RBC AUTO-ENTMCNC: 31.2 G/DL (ref 31.4–35)
MCV RBC AUTO: 85.5 FL (ref 79.6–97.8)
MONOCYTES # BLD: 0.6 K/UL (ref 0.1–1.3)
MONOCYTES NFR BLD: 5 % (ref 4–12)
NEUTS SEG # BLD: 10.7 K/UL (ref 1.7–8.2)
NEUTS SEG NFR BLD: 88 % (ref 43–78)
NRBC # BLD: 0 K/UL (ref 0–0.2)
P-R INTERVAL, ECG05: 272 MS
PLATELET # BLD AUTO: 139 K/UL (ref 150–450)
PMV BLD AUTO: 9.5 FL (ref 9.4–12.3)
POTASSIUM SERPL-SCNC: 4.2 MMOL/L (ref 3.5–5.1)
PREALB SERPL-MCNC: 5.4 MG/DL (ref 18–35.7)
Q-T INTERVAL, ECG07: 326 MS
Q-T INTERVAL, ECG07: 340 MS
Q-T INTERVAL, ECG07: 388 MS
QRS DURATION, ECG06: 86 MS
QRS DURATION, ECG06: 88 MS
QRS DURATION, ECG06: 94 MS
QTC CALCULATION (BEZET), ECG08: 435 MS
QTC CALCULATION (BEZET), ECG08: 472 MS
QTC CALCULATION (BEZET), ECG08: 515 MS
RBC # BLD AUTO: 3.52 M/UL (ref 4.23–5.6)
RETICS # AUTO: 0.06 M/UL (ref 0.03–0.1)
RETICS/RBC NFR AUTO: 1.8 % (ref 0.3–2)
SODIUM SERPL-SCNC: 132 MMOL/L (ref 136–145)
TIBC SERPL-MCNC: 173 UG/DL (ref 250–450)
TROPONIN I SERPL-MCNC: 24.9 NG/ML (ref 0.02–0.05)
TROPONIN I SERPL-MCNC: 31.8 NG/ML (ref 0.02–0.05)
TROPONIN I SERPL-MCNC: 36.1 NG/ML (ref 0.02–0.05)
VENTRICULAR RATE, ECG03: 106 BPM
VENTRICULAR RATE, ECG03: 107 BPM
VENTRICULAR RATE, ECG03: 116 BPM
VIT B12 SERPL-MCNC: 424 PG/ML (ref 193–986)
WBC # BLD AUTO: 12.2 K/UL (ref 4.3–11.1)

## 2020-05-17 PROCEDURE — 74011250636 HC RX REV CODE- 250/636: Performed by: HOSPITALIST

## 2020-05-17 PROCEDURE — 83010 ASSAY OF HAPTOGLOBIN QUANT: CPT

## 2020-05-17 PROCEDURE — 82728 ASSAY OF FERRITIN: CPT

## 2020-05-17 PROCEDURE — 84134 ASSAY OF PREALBUMIN: CPT

## 2020-05-17 PROCEDURE — 86140 C-REACTIVE PROTEIN: CPT

## 2020-05-17 PROCEDURE — 85730 THROMBOPLASTIN TIME PARTIAL: CPT

## 2020-05-17 PROCEDURE — 82746 ASSAY OF FOLIC ACID SERUM: CPT

## 2020-05-17 PROCEDURE — 74011250637 HC RX REV CODE- 250/637: Performed by: HOSPITALIST

## 2020-05-17 PROCEDURE — 85046 RETICYTE/HGB CONCENTRATE: CPT

## 2020-05-17 PROCEDURE — 83615 LACTATE (LD) (LDH) ENZYME: CPT

## 2020-05-17 PROCEDURE — 65660000000 HC RM CCU STEPDOWN

## 2020-05-17 PROCEDURE — 74011250637 HC RX REV CODE- 250/637: Performed by: INTERNAL MEDICINE

## 2020-05-17 PROCEDURE — 77010033678 HC OXYGEN DAILY

## 2020-05-17 PROCEDURE — 93005 ELECTROCARDIOGRAM TRACING: CPT | Performed by: HOSPITALIST

## 2020-05-17 PROCEDURE — 85025 COMPLETE CBC W/AUTO DIFF WBC: CPT

## 2020-05-17 PROCEDURE — 80048 BASIC METABOLIC PNL TOTAL CA: CPT

## 2020-05-17 PROCEDURE — 83540 ASSAY OF IRON: CPT

## 2020-05-17 PROCEDURE — 82607 VITAMIN B-12: CPT

## 2020-05-17 PROCEDURE — 82962 GLUCOSE BLOOD TEST: CPT

## 2020-05-17 PROCEDURE — 84484 ASSAY OF TROPONIN QUANT: CPT

## 2020-05-17 PROCEDURE — 83735 ASSAY OF MAGNESIUM: CPT

## 2020-05-17 PROCEDURE — 74011250636 HC RX REV CODE- 250/636: Performed by: INTERNAL MEDICINE

## 2020-05-17 RX ORDER — INSULIN LISPRO 100 [IU]/ML
INJECTION, SOLUTION INTRAVENOUS; SUBCUTANEOUS
Status: DISCONTINUED | OUTPATIENT
Start: 2020-05-18 | End: 2020-06-09 | Stop reason: HOSPADM

## 2020-05-17 RX ORDER — METOPROLOL SUCCINATE 25 MG/1
25 TABLET, EXTENDED RELEASE ORAL 2 TIMES DAILY
Status: DISCONTINUED | OUTPATIENT
Start: 2020-05-17 | End: 2020-05-28

## 2020-05-17 RX ORDER — HEPARIN SODIUM 5000 [USP'U]/ML
35 INJECTION, SOLUTION INTRAVENOUS; SUBCUTANEOUS ONCE
Status: COMPLETED | OUTPATIENT
Start: 2020-05-17 | End: 2020-05-17

## 2020-05-17 RX ADMIN — HEPARIN SODIUM 2300 UNITS: 5000 INJECTION INTRAVENOUS; SUBCUTANEOUS at 16:12

## 2020-05-17 RX ADMIN — HEPARIN SODIUM 20 UNITS/KG/HR: 5000 INJECTION, SOLUTION INTRAVENOUS at 19:10

## 2020-05-17 RX ADMIN — SODIUM CHLORIDE 100 ML/HR: 900 INJECTION, SOLUTION INTRAVENOUS at 17:50

## 2020-05-17 RX ADMIN — HEPARIN SODIUM 2300 UNITS: 5000 INJECTION INTRAVENOUS; SUBCUTANEOUS at 01:21

## 2020-05-17 RX ADMIN — Medication 10 ML: at 21:58

## 2020-05-17 RX ADMIN — SODIUM CHLORIDE 100 ML/HR: 900 INJECTION, SOLUTION INTRAVENOUS at 08:01

## 2020-05-17 RX ADMIN — ESCITALOPRAM OXALATE 10 MG: 10 TABLET ORAL at 08:00

## 2020-05-17 RX ADMIN — Medication 10 ML: at 06:20

## 2020-05-17 RX ADMIN — METOPROLOL SUCCINATE 25 MG: 50 TABLET, EXTENDED RELEASE ORAL at 08:00

## 2020-05-17 RX ADMIN — ASPIRIN 81 MG 81 MG: 81 TABLET ORAL at 08:00

## 2020-05-17 RX ADMIN — MORPHINE SULFATE 2 MG: 10 INJECTION INTRAVENOUS at 00:16

## 2020-05-17 RX ADMIN — Medication 10 ML: at 14:32

## 2020-05-17 RX ADMIN — METOPROLOL SUCCINATE 25 MG: 50 TABLET, EXTENDED RELEASE ORAL at 17:50

## 2020-05-17 RX ADMIN — HEPARIN SODIUM 2300 UNITS: 5000 INJECTION INTRAVENOUS; SUBCUTANEOUS at 09:16

## 2020-05-17 RX ADMIN — ATORVASTATIN CALCIUM 20 MG: 20 TABLET, FILM COATED ORAL at 21:58

## 2020-05-17 NOTE — PROGRESS NOTES
Bedside, Verbal and Written shift change report given to Jaime Jaquez RN (oncoming nurse) by Kristina Lopez RN (offgoing nurse). Report included the following information SBAR, Kardex, Intake/Output, MAR, Med Rec Status, Cardiac Rhythm ST and Alarm Parameters .      Heparin gtt dual verified in [facility]

## 2020-05-17 NOTE — PROGRESS NOTES
IV heparin rate has been adjusted based on the most recent PTT results.     Lab Results   Component Value Date/Time    aPTT 69.3 (H) 05/17/2020 03:22 PM

## 2020-05-17 NOTE — PROGRESS NOTES
TRANSFER - IN REPORT:    Verbal report received from Jimmie Richards RN (name) on Rebekah Al  being received from ER (unit) for routine progression of care      Report consisted of patients Situation, Background, Assessment and   Recommendations(SBAR). Information from the following report(s) SBAR, Kardex, ED Summary, Intake/Output, MAR and Recent Results was reviewed with the receiving nurse. Opportunity for questions and clarification was provided. Assessment completed upon patients arrival to unit and care assumed.

## 2020-05-17 NOTE — PROGRESS NOTES
Hospitalist Note     Admit Date:  2020  5:52 PM   Name:  Melanie Luong   Age:  62 y.o.  :  1960   MRN:  [id number]   PCP:  Ashlyn Mckeon MD  Treatment Team: Attending Provider: Stu Rose MD; Consulting Provider: Esperanza Xie DO; Consulting Provider: Romulo Rodriguez MD; Primary Nurse: Radha Harris    HPI/Subjective:       Mr. Debbi Rivas is a 63 yo male with PMH of CAD (non obstructive  Per [address] [place] [address] ) , DIEGO on CPAP, AS s/p AVR, HTN admitted with acute chest pain and elevated troponin, NSTEMI. He was seen by cardiology  s/p low risk NM stress testing and ECHO 50% EF. He has been cardiology and on IV heparin. he will possibly need a LHC while admitted. He has been tested for COVID 19 due to constellation of petechial LE rash, elevated ddimer, mi,ld thrombocytopenia. CTA chest negative. Discharge plans pending.        20 has LE rash since stress testing, stops at thighs, no more chest pain, wants to eat, no dyspnea, no fever        Objective:     Patient Vitals for the past 24 hrs:   Temp Pulse Resp BP SpO2   20 1541  (!) 112 (!) 75  96 %   20 1530  (!) 117 14 116/70 98 %   20 1501  (!) 107 21  96 %   20 1500    110/68    20 1458  (!) 106 22  96 %   20 1430  (!) 109 17 113/67 97 %   20 1400  (!) 108 21 113/69 98 %   20 1330  (!) 111 29 108/65 98 %   20 1300  (!) 104 20 116/68 97 %   20 1253  (!) 108 17  94 %   20 1230  (!) 112 15 107/68 95 %   20 1222  (!) 111 22  97 %   20 1207  (!) 114 22  95 %   20 1159  (!) 108 16 111/70 95 %   20 1154  (!) 111 16  96 %   20 1148  (!) 109      20 1130 98.6 °F (37 °C) (!) 108 17 104/55 97 %   20 1059  (!) 103 21 108/68 97 %   20 1030  (!) 108 25 112/67 93 %   20 1023  (!) 108 22  93 %   20 1005  (!) 112 24  95 %   20 0951  (!) 109 19  97 %   20 2014 [facility] (!) 104 17  97 %   05/17/20 0930    116/70    05/17/20 0918  (!) 104 15  97 %   05/17/20 0859  (!) 101 20 110/68 97 %   05/17/20 0830  (!) 101 21 113/67 97 %   05/17/20 0800  (!) 101      05/17/20 0759  (!) 110 20 116/72 96 %   05/17/20 0730 99.1 °F (37.3 °C) (!) 110 19 111/66 96 %   05/17/20 0711  (!) 110 21  95 %   05/17/20 0659  (!) 111 22 113/68 95 %   05/17/20 0630  (!) 116 22 115/75 93 %   05/17/20 0559  (!) 110 23 104/67 96 %   05/17/20 0530  (!) 109 19 101/62 95 %   05/17/20 0459  (!) 109 20 102/63 95 %   05/17/20 0430  (!) 110 20 102/64 96 %   05/17/20 0359  (!) 111 21 105/65 96 %   05/17/20 0330  (!) 109 21 106/64 96 %   05/17/20 0318 99.5 °F (37.5 °C) (!) 109 19 105/64 96 %   05/17/20 0259  (!) 107 22 103/61 97 %   05/17/20 0230  (!) 107 18 103/59 97 %   05/17/20 0213  (!) 106 10 94/57 97 %   05/17/20 0159  (!) 109 20 (!) 86/53 90 %   05/17/20 0130  (!) 109 19 96/54 94 %   05/17/20 0059  (!) 108 29 106/64 94 %   05/17/20 0050  (!) 108  114/67 95 %   05/17/20 0030  (!) 109 23 114/67 95 %   05/17/20 0004  (!) 114 18 119/74 97 %   05/16/20 2331  (!) 109 25 120/71 95 %   05/16/20 2315 98.5 °F (36.9 °C) (!) 112 30 131/80 96 %   05/16/20 2240  (!) 109 17 124/75 96 %   05/16/20 2235     95 %   05/16/20 2220  (!) 108 23 123/78 93 %   05/16/20 2200  (!) 106 21 120/75 94 %   05/16/20 2140 98.8 °F (37.1 °C) (!) 107 16 117/72 94 %   05/16/20 2121   21     05/16/20 2120  (!) 107  127/76 95 %   05/16/20 2104     97 %   05/16/20 2100  (!) 106 25 133/78    05/16/20 2051  (!) 104 19  95 %   05/16/20 2043  (!) 105 14 126/77 96 %   05/16/20 2009  (!) 107 25  96 %   05/16/20 1931  (!) 105  124/75    05/16/20 1929     96 %   05/16/20 1928  (!) 104 24 127/75 96 %   05/16/20 1858     95 %   05/16/20 1757 100 °F (37.8 °C) (!) 119 18 126/72 95 %   05/16/20 1756  (!) 120  126/72 96 %     Oxygen Therapy  O2 Sat (%): 96 % (05/17/20 1541)  Pulse via Oximetry: 112 beats per minute (05/17/20 1541)  O2 Device: Nasal cannula (05/17/20 0730)  O2 Flow Rate (L/min): 2 l/min (05/17/20 0730)    Estimated body mass index is 19.23 kg/m² as calculated from the following:    Height as of this encounter: 6' 1\" (1.854 m). Weight as of this encounter: 66.1 kg (145 lb 11.6 oz). Intake/Output Summary (Last 24 hours) at 5/17/2020 1551  Last data filed at 5/17/2020 0456  Gross per 24 hour   Intake 876 ml   Output 550 ml   Net 326 ml       *Note that automatically entered I/Os may not be accurate; dependent on patient compliance with collection and accurate  by techs. General:    Well nourished. Alert. No distress  CV:   RRR. No murmur, rub, or gallop. No edema  Lungs:   CTAB. No wheezing, rhonchi, or rales. Abdomen:   Soft, nontender, nondistended. Extremities: Warm and dry.     Skin:     Nonblanchable petechial rash to LE  Neuro:  No gross focal deficits    Data Review:  I have reviewed all labs, meds, and studies from the last 24 hours:  Recent Results (from the past 24 hour(s))   EKG, 12 LEAD, INITIAL    Collection Time: 05/16/20  6:02 PM   Result Value Ref Range    Ventricular Rate 116 BPM    Atrial Rate 288 BPM    QRS Duration 88 ms    Q-T Interval 340 ms    QTC Calculation (Bezet) 472 ms    Calculated R Axis 49 degrees    Calculated T Axis 79 degrees    Diagnosis       Undetermined rhythm  Abnormal ECG  When compared with ECG of 13-APR-2018 06:30,  Junctional rhythm has replaced Sinus rhythm  ST no longer elevated in Inferior leads  ST now depressed in Anterior leads  Nonspecific T wave abnormality no longer evident in Lateral leads  Confirmed by Karla Coffman ([id number]) on 5/17/2020 6:22:41 AM     METABOLIC PANEL, COMPREHENSIVE    Collection Time: 05/16/20  6:12 PM   Result Value Ref Range    Sodium 132 (L) 136 - 145 mmol/L    Potassium 3.7 3.5 - 5.1 mmol/L    Chloride 100 98 - 107 mmol/L    CO2 24 21 - 32 mmol/L    Anion gap 8 7 - 16 mmol/L Glucose 146 (H) 65 - 100 mg/dL    BUN 19 8 - 23 MG/DL    Creatinine 0.72 (L) 0.8 - 1.5 MG/DL    GFR est AA >60 >60 ml/min/1.73m2    GFR est non-AA >60 >60 ml/min/1.73m2    Calcium 7.8 (L) 8.3 - 10.4 MG/DL    Bilirubin, total 0.7 0.2 - 1.1 MG/DL    ALT (SGPT) 38 12 - 65 U/L    AST (SGOT) 49 (H) 15 - 37 U/L    Alk. phosphatase 70 50 - 136 U/L    Protein, total 6.4 6.3 - 8.2 g/dL    Albumin 2.0 (L) 3.2 - 4.6 g/dL    Globulin 4.4 (H) 2.3 - 3.5 g/dL    A-G Ratio 0.5 (L) 1.2 - 3.5     CBC WITH AUTOMATED DIFF    Collection Time: 05/16/20  6:12 PM   Result Value Ref Range    WBC 12.6 (H) 4.3 - 11.1 K/uL    RBC 3.51 (L) 4.23 - 5.6 M/uL    HGB 9.3 (L) 13.6 - 17.2 g/dL    HCT 29.3 (L) 41.1 - 50.3 %    MCV 83.5 79.6 - 97.8 FL    MCH 26.5 26.1 - 32.9 PG    MCHC 31.7 31.4 - 35.0 g/dL    RDW 14.2 11.9 - 14.6 %    PLATELET 720 (L) 639 - 450 K/uL    MPV 9.5 9.4 - 12.3 FL    ABSOLUTE NRBC 0.00 0.0 - 0.2 K/uL    DF AUTOMATED      NEUTROPHILS 90 (H) 43 - 78 %    LYMPHOCYTES 5 (L) 13 - 44 %    MONOCYTES 5 4.0 - 12.0 %    EOSINOPHILS 0 (L) 0.5 - 7.8 %    BASOPHILS 0 0.0 - 2.0 %    IMMATURE GRANULOCYTES 1 0.0 - 5.0 %    ABS. NEUTROPHILS 11.3 (H) 1.7 - 8.2 K/UL    ABS. LYMPHOCYTES 0.6 0.5 - 4.6 K/UL    ABS. MONOCYTES 0.6 0.1 - 1.3 K/UL    ABS. EOSINOPHILS 0.0 0.0 - 0.8 K/UL    ABS. BASOPHILS 0.0 0.0 - 0.2 K/UL    ABS. IMM.  GRANS. 0.1 0.0 - 0.5 K/UL   TROPONIN I    Collection Time: 05/16/20  6:12 PM   Result Value Ref Range    Troponin-I, Qt. 2.08 (HH) 0.02 - 0.05 NG/ML   D DIMER    Collection Time: 05/16/20  6:12 PM   Result Value Ref Range    D DIMER 3.66 (HH) <0.56 ug/ml(FEU)   PROTHROMBIN TIME + INR    Collection Time: 05/16/20  6:12 PM   Result Value Ref Range    Prothrombin time 16.6 (H) 12.0 - 14.7 sec    INR 1.3     PTT    Collection Time: 05/16/20  6:12 PM   Result Value Ref Range    aPTT 33.9 24.3 - 35.4 SEC   EKG, 12 LEAD, SUBSEQUENT    Collection Time: 05/16/20  7:22 PM   Result Value Ref Range    Ventricular Rate 106 BPM Atrial Rate 113 BPM    QRS Duration 94 ms    Q-T Interval 388 ms    QTC Calculation (Bezet) 515 ms    Calculated P Axis 70 degrees    Calculated R Axis 51 degrees    Calculated T Axis 70 degrees    Diagnosis       Supraventricular tachycardia  Otherwise normal ECG  When compared with ECG of 16-MAY-2020 18:02,  Sinus rhythm has replaced Junctional rhythm  Confirmed by Teena Camargo ([id number]) on 5/17/2020 8:24:02 AM     SARS-COV-2    Collection Time: 05/16/20  7:46 PM   Result Value Ref Range    COVID-19 PENDING    FIBRINOGEN    Collection Time: 05/16/20 11:58 PM   Result Value Ref Range    Fibrinogen 473 190 - 501 mg/dL   PTT    Collection Time: 05/16/20 11:58 PM   Result Value Ref Range    aPTT 52.2 (H) 24.3 - 35.4 SEC   TROPONIN I    Collection Time: 05/17/20 12:50 AM   Result Value Ref Range    Troponin-I, Qt. 24.90 (HH) 0.02 - 0.05 NG/ML   FERRITIN    Collection Time: 05/17/20 12:50 AM   Result Value Ref Range    Ferritin 910 (H) 8 - 388 NG/ML   IRON    Collection Time: 05/17/20 12:50 AM   Result Value Ref Range    Iron 29 (L) 35 - 150 ug/dL   LD    Collection Time: 05/17/20 12:50 AM   Result Value Ref Range     (H) 110 - 210 U/L   RETICULOCYTE COUNT    Collection Time: 05/17/20 12:50 AM   Result Value Ref Range    Reticulocyte count 1.8 0.3 - 2.0 %    Absolute Retic Cnt. 0.0637 0.026 - 0.095 M/ul    Immature Retic Fraction 21.2 (H) 2.3 - 13.4 %    Retic Hgb Conc. 26 (L) 29 - 35 pg   PREALBUMIN    Collection Time: 05/17/20 12:50 AM   Result Value Ref Range    Prealbumin 5.40 (L) 18.0 - 35.7 MG/DL   C REACTIVE PROTEIN, QT    Collection Time: 05/17/20 12:50 AM   Result Value Ref Range    C-Reactive protein 7.2 (H) 0.0 - 0.9 mg/dL   MAGNESIUM    Collection Time: 05/17/20 12:50 AM   Result Value Ref Range    Magnesium 2.0 1.8 - 2.4 mg/dL   CBC WITH AUTOMATED DIFF    Collection Time: 05/17/20 12:50 AM   Result Value Ref Range    WBC 12.2 (H) 4.3 - 11.1 K/uL    RBC 3.52 (L) 4.23 - 5.6 M/uL    HGB 9.4 (L) 13.6 - 17.2 g/dL    HCT 30.1 (L) 41.1 - 50.3 %    MCV 85.5 79.6 - 97.8 FL    MCH 26.7 26.1 - 32.9 PG    MCHC 31.2 (L) 31.4 - 35.0 g/dL    RDW 14.2 11.9 - 14.6 %    PLATELET 635 (L) 185 - 450 K/uL    MPV 9.5 9.4 - 12.3 FL    ABSOLUTE NRBC 0.00 0.0 - 0.2 K/uL    DF AUTOMATED      NEUTROPHILS 88 (H) 43 - 78 %    LYMPHOCYTES 6 (L) 13 - 44 %    MONOCYTES 5 4.0 - 12.0 %    EOSINOPHILS 0 (L) 0.5 - 7.8 %    BASOPHILS 0 0.0 - 2.0 %    IMMATURE GRANULOCYTES 1 0.0 - 5.0 %    ABS. NEUTROPHILS 10.7 (H) 1.7 - 8.2 K/UL    ABS. LYMPHOCYTES 0.7 0.5 - 4.6 K/UL    ABS. MONOCYTES 0.6 0.1 - 1.3 K/UL    ABS. EOSINOPHILS 0.0 0.0 - 0.8 K/UL    ABS. BASOPHILS 0.0 0.0 - 0.2 K/UL    ABS. IMM.  GRANS. 0.1 0.0 - 0.5 K/UL   METABOLIC PANEL, BASIC    Collection Time: 05/17/20 12:50 AM   Result Value Ref Range    Sodium 132 (L) 136 - 145 mmol/L    Potassium 4.2 3.5 - 5.1 mmol/L    Chloride 100 98 - 107 mmol/L    CO2 28 21 - 32 mmol/L    Anion gap 4 (L) 7 - 16 mmol/L    Glucose 132 (H) 65 - 100 mg/dL    BUN 16 8 - 23 MG/DL    Creatinine 0.69 (L) 0.8 - 1.5 MG/DL    GFR est AA >60 >60 ml/min/1.73m2    GFR est non-AA >60 >60 ml/min/1.73m2    Calcium 7.7 (L) 8.3 - 10.4 MG/DL   EKG, 12 LEAD, INITIAL    Collection Time: 05/17/20  2:40 AM   Result Value Ref Range    Ventricular Rate 107 BPM    Atrial Rate 107 BPM    P-R Interval 272 ms    QRS Duration 86 ms    Q-T Interval 326 ms    QTC Calculation (Bezet) 435 ms    Calculated R Axis 90 degrees    Calculated T Axis 155 degrees    Diagnosis       Sinus tachycardia with 1st degree A-V block  Rightward axis  Nonspecific T wave abnormality  Abnormal ECG  When compared with ECG of 16-MAY-2020 19:22,  PA interval has increased  Nonspecific T wave abnormality now evident in Inferior leads  Nonspecific T wave abnormality, worse in Anterolateral leads  QT has shortened  Confirmed by Neno Reed ([id number]) on 5/17/2020 8:25:00 AM     TROPONIN I    Collection Time: 05/17/20  6:10 AM   Result Value Ref Range    Troponin-I, Qt. 36.10 (HH) 0.02 - 0.05 NG/ML   PTT    Collection Time: 05/17/20  7:57 AM   Result Value Ref Range    aPTT 44.1 (H) 24.3 - 35.4 SEC   TRANSFERRIN SATURATION    Collection Time: 05/17/20  7:57 AM   Result Value Ref Range    Iron 16 (L) 35 - 150 ug/dL    TIBC 173 (L) 250 - 450 ug/dL    Transferrin Saturation 9 (L) >20 %   VITAMIN B12    Collection Time: 05/17/20  7:57 AM   Result Value Ref Range    Vitamin B12 424 193 - 986 pg/mL   FOLATE    Collection Time: 05/17/20  7:57 AM   Result Value Ref Range    Folate 18.6 (H) 3.1 - 17.5 ng/mL   TROPONIN I    Collection Time: 05/17/20 11:30 AM   Result Value Ref Range    Troponin-I, Qt. 31.80 (HH) 0.02 - 0.05 NG/ML        Current Meds:  Current Facility-Administered Medications   Medication Dose Route Frequency    metoprolol succinate (TOPROL-XL) XL tablet 25 mg  25 mg Oral BID    heparin 25,000 units in dextrose 500 mL infusion  12-25 Units/kg/hr IntraVENous TITRATE    escitalopram oxalate (LEXAPRO) tablet 10 mg  10 mg Oral DAILY    atorvastatin (LIPITOR) tablet 20 mg  20 mg Oral QHS    aspirin chewable tablet 81 mg  81 mg Oral DAILY    sodium chloride (NS) flush 5-40 mL  5-40 mL IntraVENous Q8H    sodium chloride (NS) flush 5-40 mL  5-40 mL IntraVENous PRN    acetaminophen (TYLENOL) tablet 650 mg  650 mg Oral Q6H PRN    0.9% sodium chloride infusion  100 mL/hr IntraVENous CONTINUOUS    morphine 10 mg/ml injection 2 mg  2 mg IntraVENous Q3H PRN    nitroglycerin (NITROSTAT) tablet 0.4 mg  0.4 mg SubLINGual Q5MIN PRN    ondansetron (ZOFRAN) injection 4 mg  4 mg IntraVENous Q4H PRN       Other Studies:  No results found for this visit on 05/16/20. Ct Chest W Cont    Result Date: 5/16/2020  CT Chest with contrast INDICATION: Chest pain, fever and elevated d-dimer. Elevated troponin. Question PE.  Covid Rule out COMPARISON: Chest x-ray earlier today TECHNIQUE: Contiguous axial images were obtained from the neck base through the upper abdomen with intravenous contrast, 100 mL Isovue 370. Radiation dose reduction techniques were used for this study:  Our CT scanners use one or all of the following: Automated exposure control, adjustment of the mA and/or kVp according to patient's size, iterative reconstruction. FINDINGS: There is no pulmonary embolus. The heart is mildly enlarged. There is no pericardial effusion. There are changes of prior sternotomy. Prosthetic aortic valve is present. There is mild coronary artery calcification. There is slight ectasia of the ascending thoracic aorta. Negative for thoracic aortic dissection. There is no evidence of lymphadenopathy. There is no endobronchial lesion. There is minimal dependent subsegmental atelectasis. There is no focal pulmonary consolidation, pleural effusion or pneumothorax. No pulmonary edema. Included upper abdomen demonstrates micronodular contour of the liver, suggestive of cirrhosis. Spleen is enlarged. Surrounding bones are intact. IMPRESSION: 1. Negative for pulmonary embolism. 2. No acute pulmonary disease. Negative for pneumonia, pulmonary edema or pleural effusion. 3. Mild coronary artery calcification    Xr Chest Port    Result Date: 5/16/2020  Portable chest xray  COMPARISON: May 2, 2018 CLINICAL HISTORY: Chest pain, fever. FINDINGS: Heart appears mildly enlarged. Mediastinal contour is within normal limits. Stable postsurgical changes of sternotomy. There is no focal pulmonary consolidation, pneumothorax or pulmonary edema. No large pleural effusion. Minimal subsegmental atelectasis at the right lung base. Surrounding bones are stable. IMPRESSION: 1. No pulmonary consolidation or pulmonary edema.       All Micro Results     None          SARS-CoV-2 Lab Results  \"Novel Coronavirus\" Test: No results found for: COV2NT   \"Emergent Disease\" Test: No results found for: EDPR  \"SARS-COV-2\" Test:   Lab Results   Component Value Date/Time    XGCOVT PENDING 05/16/2020 07:46 PM     As of: 3:51 PM on 5/17/2020 Assessment and Plan:     Hospital Problems as of 5/17/2020 Date Reviewed: 5/4/2020          Codes Class Noted - Resolved POA    * (Principal) NSTEMI (non-ST elevated myocardial infarction) ([facility] [address] ICD-10-CM: I21.4  ICD-9-CM: 410.70  5/16/2020 - Present Yes        Suspected COVID-19 virus infection ICD-10-CM: Z20.828  ICD-9-CM: V01.79  5/16/2020 - Present Yes        Petechiae ICD-10-CM: R23.3  ICD-9-CM: 782.7  5/16/2020 - Present Yes        Anemia ICD-10-CM: D64.9  ICD-9-CM: 285.9  5/16/2020 - Present Yes        Essential hypertension ICD-10-CM: I10  ICD-9-CM: 401.9  9/21/2018 - Present Yes        CAD (coronary artery disease) ICD-10-CM: I25.10  ICD-9-CM: 414.00  Unknown - Present Yes    Overview Signed 7/18/2018  7:38 AM by Behzad Merino MD     Nonobstructive             DIEGO on CPAP ICD-10-CM: G47.33, Z99.89  ICD-9-CM: 327.23, V46.8  Unknown - Present Yes        Dyslipidemia ICD-10-CM: E78.5  ICD-9-CM: 272.4  Unknown - Present Yes        Diastolic CHF, chronic (HCC) ICD-10-CM: I50.32  ICD-9-CM: 428.32, 428.0  5/7/2018 - Present Yes        S/P aortic valve replacement ICD-10-CM: Z95.2  ICD-9-CM: V43.3  4/1/2018 - Present Yes              Plan:  · CAD/NSTEMI:  · appreciate cardiology input  · Continued IV heparin-watch platelets  · Continued asa, lipitor, metoprolol  · ECHO ordered      · Petechia:  · Pending outpatient derm consult   · followup platelets  · Check DIC/ hemolysis labs  · followup COVID testing      · Anemia and thrombocytopenia:   · followup CBC and anemia/ hemolysis workup      · Hyponatremia:  · followup BMP      · Hyperglycemia:  · followup A1C       DC planning/Dispo:  Pending, attempted to contact family and both numbers are not connecting      Diet:  DIET CARDIAC  DVT ppx:  IV heparin    Signed:  Aj Mcdonald MD

## 2020-05-17 NOTE — H&P
Hospitalist H&P/Consult Note     Admit Date:  2020  5:52 PM   Name:  Lenor Mortimer   Age:  62 y.o.  :  1960   MRN:  [id number]   PCP:  Moni uV MD  Treatment Team: Attending Provider: Mateo Tejada MD; Primary Nurse: Carl Campbell RN    HPI:   Patient is a 61 y/o male with hx CAD, DIEGO CPAP, s/p AVR, depression, HTN, HLD who presents with chest pain that woke him from sleep today at noon. He had worked the 3d shift and had already fallen asleep in am. He described it as a non-radiating heavy pain. He has no hx DVT or PE. Has hx non-obstructive CAD by cath. No reports of fever or chills . He had a recent nuclear stress test and echo which were stable according to office notes. He did develop a rash on his legs after the stress test.    Workup in ED found a D dimer of 3.66 and troponin of 2.08. CT chest shows no PE or acute pulmonary disease. Has mild coronary calcification. He was started on a heparin gtt for suspected NSTEMI. Also tested for COVID-19. Will admit for further workup and treatment. 10 systems reviewed and negative except as noted in HPI. Past Medical History:   Diagnosis Date    CAD (coronary artery disease)     Nonobstructive multivessel disease    Drug addiction in remission ([facility] [address]     none since     Dyslipidemia     DIEGO on CPAP     Recurrent depression ([facility] [address]     S/P aortic valve replacement 2018    Seizures ([facility] [address] 2001    x 1--- ETOH  WITHDRAWAL    Sigmoid diverticulosis       Past Surgical History:   Procedure Laterality Date    CARDIAC SURG PROCEDURE UNLIST  2018    AVR    HX HEART CATHETERIZATION  2018    non obstructive disease, severe AS    HX HERNIA REPAIR Left 2018    inguinal       Prior to Admission Medications   Prescriptions Last Dose Informant Patient Reported? Taking?   aspirin 81 mg chewable tablet   Yes No   Sig: Take 81 mg by mouth daily.    atorvastatin (LIPITOR) 20 mg tablet   No No   Sig: Take 1 Tab by mouth nightly. escitalopram oxalate (LEXAPRO) 10 mg tablet   No No   Sig: Take 1 Tab by mouth daily. metoprolol succinate (TOPROL-XL) 25 mg XL tablet   No No   Sig: Take 1 Tab by mouth daily.       Facility-Administered Medications: None     No Known Allergies   Social History     Tobacco Use    Smoking status: Former Smoker     Packs/day: 0.50     Years: 40.00     Pack years: 20.00     Types: Cigarettes     Last attempt to quit: 2018     Years since quittin.1    Smokeless tobacco: Never Used    Tobacco comment: quit    Substance Use Topics    Alcohol use: Yes     Comment: 6 drinks a week      Family History   Problem Relation Age of Onset    Cancer Mother     Coronary Artery Disease Mother     Heart Disease Brother 64        MI X 3    Coronary Artery Disease Brother     Cancer Sister         breast      Immunization History   Administered Date(s) Administered    Influenza Vaccine (Quad) PF 2018    TB Skin Test (PPD) Intradermal 2018       Objective:     Patient Vitals for the past 24 hrs:   Temp Pulse Resp BP SpO2   20 0213  (!) 106 10 94/57 97 %   20 0159  (!) 109 20 (!) 86/53 90 %   20 2315 98.5 °F (36.9 °C) (!) 112 30 131/80 96 %   20 2240  (!) 109 17 124/75 96 %   20 2235     95 %   20 2220  (!) 108 23 123/78 93 %   20 2200  (!) 106 21 120/75 94 %   20 2140 98.8 °F (37.1 °C) (!) 107 16 117/72 94 %   20 2121   21     200  (!) 107  127/76 95 %   204     97 %   20  (!) 106 25 133/78    20  (!) 104 19  95 %   20  (!) 105 14 126/77 96 %   20  (!) 107 25  96 %   20  (!) 105  124/75    20 1929     96 %   20  (!) 104 24 127/75 96 %   20 1858     95 %   20 1757 100 °F (37.8 °C) (!) 119 18 126/72 95 %   20 1756  (!) 120  126/72 96 %     Oxygen Therapy  O2 Sat (%): 97 % (20 7002)  Pulse via Oximetry: 107 beats per minute (05/17/20 0213)  O2 Device: Nasal cannula (05/17/20 0213)  O2 Flow Rate (L/min): 2 l/min (05/17/20 0213)    Intake/Output Summary (Last 24 hours) at 5/17/2020 0224  Last data filed at 5/16/2020 2014  Gross per 24 hour   Intake    Output 150 ml   Net -150 ml       Physical Exam:  General:    Thin, pale-appearing. Alert. Wearing mask   Eyes:   Normal sclera. Extraocular movements intact. ENT:  Normocephalic, atraumatic. Moist mucous membranes  CV:   Tachycardic. 3/6 NEERAJ   Lungs:  Bilateral crackles  Abdomen: Soft, nontender, nondistended. Bowel sounds normal.   Extremities: Warm and dry. No cyanosis or edema. Neurologic: CN II-XII grossly intact. Sensation intact. Skin:     Diffuse large petechiae on legs. Not raised  Psych:  Normal mood and flat affect. I reviewed the labs, imaging, EKGs, telemetry, and other studies done this admission. Data Review:   Recent Results (from the past 24 hour(s))   METABOLIC PANEL, COMPREHENSIVE    Collection Time: 05/16/20  6:12 PM   Result Value Ref Range    Sodium 132 (L) 136 - 145 mmol/L    Potassium 3.7 3.5 - 5.1 mmol/L    Chloride 100 98 - 107 mmol/L    CO2 24 21 - 32 mmol/L    Anion gap 8 7 - 16 mmol/L    Glucose 146 (H) 65 - 100 mg/dL    BUN 19 8 - 23 MG/DL    Creatinine 0.72 (L) 0.8 - 1.5 MG/DL    GFR est AA >60 >60 ml/min/1.73m2    GFR est non-AA >60 >60 ml/min/1.73m2    Calcium 7.8 (L) 8.3 - 10.4 MG/DL    Bilirubin, total 0.7 0.2 - 1.1 MG/DL    ALT (SGPT) 38 12 - 65 U/L    AST (SGOT) 49 (H) 15 - 37 U/L    Alk.  phosphatase 70 50 - 136 U/L    Protein, total 6.4 6.3 - 8.2 g/dL    Albumin 2.0 (L) 3.2 - 4.6 g/dL    Globulin 4.4 (H) 2.3 - 3.5 g/dL    A-G Ratio 0.5 (L) 1.2 - 3.5     CBC WITH AUTOMATED DIFF    Collection Time: 05/16/20  6:12 PM   Result Value Ref Range    WBC 12.6 (H) 4.3 - 11.1 K/uL    RBC 3.51 (L) 4.23 - 5.6 M/uL    HGB 9.3 (L) 13.6 - 17.2 g/dL    HCT 29.3 (L) 41.1 - 50.3 %    MCV 83.5 79.6 - 97.8 FL MCH 26.5 26.1 - 32.9 PG    MCHC 31.7 31.4 - 35.0 g/dL    RDW 14.2 11.9 - 14.6 %    PLATELET 300 (L) 368 - 450 K/uL    MPV 9.5 9.4 - 12.3 FL    ABSOLUTE NRBC 0.00 0.0 - 0.2 K/uL    DF AUTOMATED      NEUTROPHILS 90 (H) 43 - 78 %    LYMPHOCYTES 5 (L) 13 - 44 %    MONOCYTES 5 4.0 - 12.0 %    EOSINOPHILS 0 (L) 0.5 - 7.8 %    BASOPHILS 0 0.0 - 2.0 %    IMMATURE GRANULOCYTES 1 0.0 - 5.0 %    ABS. NEUTROPHILS 11.3 (H) 1.7 - 8.2 K/UL    ABS. LYMPHOCYTES 0.6 0.5 - 4.6 K/UL    ABS. MONOCYTES 0.6 0.1 - 1.3 K/UL    ABS. EOSINOPHILS 0.0 0.0 - 0.8 K/UL    ABS. BASOPHILS 0.0 0.0 - 0.2 K/UL    ABS. IMM.  GRANS. 0.1 0.0 - 0.5 K/UL   TROPONIN I    Collection Time: 05/16/20  6:12 PM   Result Value Ref Range    Troponin-I, Qt. 2.08 (HH) 0.02 - 0.05 NG/ML   D DIMER    Collection Time: 05/16/20  6:12 PM   Result Value Ref Range    D DIMER 3.66 (HH) <0.56 ug/ml(FEU)   PROTHROMBIN TIME + INR    Collection Time: 05/16/20  6:12 PM   Result Value Ref Range    Prothrombin time 16.6 (H) 12.0 - 14.7 sec    INR 1.3     PTT    Collection Time: 05/16/20  6:12 PM   Result Value Ref Range    aPTT 33.9 24.3 - 35.4 SEC   SARS-COV-2    Collection Time: 05/16/20  7:46 PM   Result Value Ref Range    COVID-19 PENDING    FIBRINOGEN    Collection Time: 05/16/20 11:58 PM   Result Value Ref Range    Fibrinogen 473 190 - 501 mg/dL   PTT    Collection Time: 05/16/20 11:58 PM   Result Value Ref Range    aPTT 52.2 (H) 24.3 - 35.4 SEC   TROPONIN I    Collection Time: 05/17/20 12:50 AM   Result Value Ref Range    Troponin-I, Qt. 24.90 (HH) 0.02 - 0.05 NG/ML   IRON    Collection Time: 05/17/20 12:50 AM   Result Value Ref Range    Iron 29 (L) 35 - 150 ug/dL   LD    Collection Time: 05/17/20 12:50 AM   Result Value Ref Range     (H) 110 - 210 U/L   RETICULOCYTE COUNT    Collection Time: 05/17/20 12:50 AM   Result Value Ref Range    Reticulocyte count 1.8 0.3 - 2.0 %    Absolute Retic Cnt. 0.0637 0.026 - 0.095 M/ul    Immature Retic Fraction 21.2 (H) 2.3 - 13.4 % Retic Hgb Conc. 26 (L) 29 - 35 pg   C REACTIVE PROTEIN, QT    Collection Time: 05/17/20 12:50 AM   Result Value Ref Range    C-Reactive protein 7.2 (H) 0.0 - 0.9 mg/dL   MAGNESIUM    Collection Time: 05/17/20 12:50 AM   Result Value Ref Range    Magnesium 2.0 1.8 - 2.4 mg/dL   CBC WITH AUTOMATED DIFF    Collection Time: 05/17/20 12:50 AM   Result Value Ref Range    WBC 12.2 (H) 4.3 - 11.1 K/uL    RBC 3.52 (L) 4.23 - 5.6 M/uL    HGB 9.4 (L) 13.6 - 17.2 g/dL    HCT 30.1 (L) 41.1 - 50.3 %    MCV 85.5 79.6 - 97.8 FL    MCH 26.7 26.1 - 32.9 PG    MCHC 31.2 (L) 31.4 - 35.0 g/dL    RDW 14.2 11.9 - 14.6 %    PLATELET 191 (L) 094 - 450 K/uL    MPV 9.5 9.4 - 12.3 FL    ABSOLUTE NRBC 0.00 0.0 - 0.2 K/uL    DF AUTOMATED      NEUTROPHILS 88 (H) 43 - 78 %    LYMPHOCYTES 6 (L) 13 - 44 %    MONOCYTES 5 4.0 - 12.0 %    EOSINOPHILS 0 (L) 0.5 - 7.8 %    BASOPHILS 0 0.0 - 2.0 %    IMMATURE GRANULOCYTES 1 0.0 - 5.0 %    ABS. NEUTROPHILS 10.7 (H) 1.7 - 8.2 K/UL    ABS. LYMPHOCYTES 0.7 0.5 - 4.6 K/UL    ABS. MONOCYTES 0.6 0.1 - 1.3 K/UL    ABS. EOSINOPHILS 0.0 0.0 - 0.8 K/UL    ABS. BASOPHILS 0.0 0.0 - 0.2 K/UL    ABS. IMM. GRANS. 0.1 0.0 - 0.5 K/UL   METABOLIC PANEL, BASIC    Collection Time: 05/17/20 12:50 AM   Result Value Ref Range    Sodium 132 (L) 136 - 145 mmol/L    Potassium 4.2 3.5 - 5.1 mmol/L    Chloride 100 98 - 107 mmol/L    CO2 28 21 - 32 mmol/L    Anion gap 4 (L) 7 - 16 mmol/L    Glucose 132 (H) 65 - 100 mg/dL    BUN 16 8 - 23 MG/DL    Creatinine 0.69 (L) 0.8 - 1.5 MG/DL    GFR est AA >60 >60 ml/min/1.73m2    GFR est non-AA >60 >60 ml/min/1.73m2    Calcium 7.7 (L) 8.3 - 10.4 MG/DL       Imaging Studies:  CXR Results  (Last 48 hours)               05/16/20 2004  XR CHEST PORT Final result    Impression:  IMPRESSION:       1. No pulmonary consolidation or pulmonary edema. Narrative:  Portable chest xray         COMPARISON: May 2, 2018       CLINICAL HISTORY: Chest pain, fever.        FINDINGS:       Heart appears mildly enlarged. Mediastinal contour is within normal limits. Stable postsurgical changes of sternotomy. There is no focal pulmonary   consolidation, pneumothorax or pulmonary edema. No large pleural effusion. Minimal subsegmental atelectasis at the right lung base. Surrounding bones are   stable. CT Results  (Last 48 hours)               05/16/20 2025  CT CHEST W CONT Final result    Impression:  IMPRESSION:       1. Negative for pulmonary embolism. 2. No acute pulmonary disease. Negative for pneumonia, pulmonary edema or   pleural effusion. 3. Mild coronary artery calcification       Narrative:  CT Chest with contrast       INDICATION: Chest pain, fever and elevated d-dimer. Elevated troponin. Question   PE. Covid Rule out        COMPARISON: Chest x-ray earlier today       TECHNIQUE: Contiguous axial images were obtained from the neck base through the   upper abdomen with intravenous contrast, 100 mL Isovue 370. Radiation dose   reduction techniques were used for this study:  Our CT scanners use one or all   of the following: Automated exposure control, adjustment of the mA and/or kVp   according to patient's size, iterative reconstruction. FINDINGS:       There is no pulmonary embolus. The heart is mildly enlarged. There is no pericardial effusion. There are   changes of prior sternotomy. Prosthetic aortic valve is present. There is mild   coronary artery calcification. There is slight ectasia of the ascending thoracic   aorta. Negative for thoracic aortic dissection. There is no evidence of   lymphadenopathy. There is no endobronchial lesion. There is minimal dependent subsegmental   atelectasis. There is no focal pulmonary consolidation, pleural effusion or   pneumothorax. No pulmonary edema. Included upper abdomen demonstrates micronodular contour of the liver,   suggestive of cirrhosis. Spleen is enlarged. Surrounding bones are intact. Assessment and Plan:     Hospital Problems as of 5/17/2020 Date Reviewed: 5/4/2020          Codes Class Noted - Resolved POA    * (Principal) NSTEMI (non-ST elevated myocardial infarction) ([facility] [address] ICD-10-CM: I21.4  ICD-9-CM: 410.70  5/16/2020 - Present Yes        Suspected COVID-19 virus infection ICD-10-CM: Z20.828  ICD-9-CM: V01.79  5/16/2020 - Present Yes        Petechiae ICD-10-CM: R23.3  ICD-9-CM: 782.7  5/16/2020 - Present Yes        Anemia ICD-10-CM: D64.9  ICD-9-CM: 285.9  5/16/2020 - Present Yes        Essential hypertension ICD-10-CM: I10  ICD-9-CM: 401.9  9/21/2018 - Present Yes        CAD (coronary artery disease) ICD-10-CM: I25.10  ICD-9-CM: 414.00  Unknown - Present Yes    Overview Signed 7/18/2018  7:38 AM by Michi Johnston MD     Nonobstructive             DIEGO on CPAP ICD-10-CM: G47.33, Z99.89  ICD-9-CM: 327.23, V46.8  Unknown - Present Yes        Dyslipidemia ICD-10-CM: E78.5  ICD-9-CM: 272.4  Unknown - Present Yes        Diastolic CHF, chronic (HCC) ICD-10-CM: I50.32  ICD-9-CM: 428.32, 428.0  5/7/2018 - Present Yes        S/P aortic valve replacement ICD-10-CM: Z95.2  ICD-9-CM: V43.3  4/1/2018 - Present Yes              PLAN:  · Admit to telemetry ICU for NSTEMI  · Would also be concerned for COVID-19 associated coagulopathy  · COVID-19 testing is pending  · If COVID positive possibility for myocarditis  · He has elevated D dimer and troponin  · CT chest is negative for PE. Repeat ECHO in am  · Note: he had stable ECHO and stress testing as performed by Cardiology  · He has been started on heparin Gtt. Will continue  · Check fibrinogen level, LDH, VWF, Factor VIII activity, CRP  · Serial troponin. Prn nitro, morphine. Continue asa  · Cardiology consulted by ED. Will likely need diagnostic LHC  · Continue lipid management. Continue beta blocker  · CPAP @HS for DIEGO.  Supplemental O2  · Question etiology for new petechiae on legs  · His platelets have dropped from 183 to 130 in 2 months  · New anemia noted. Check iron studies  · Serious condition.  FULL code status as documented in ED  · Critical care time spent was 40 minutes  · Full PPE was worn in room during exam with patient        Estimated LOS:  Greater than 2 midnights    Signed:  Calvin Marcial MD

## 2020-05-17 NOTE — ED NOTES
TRANSFER - OUT REPORT:    Verbal report given to Michael Armendariz RN on Anup Malhotra  being transferred to [facility] for routine progression of care       Report consisted of patients Situation, Background, Assessment and   Recommendations(SBAR). Information from the following report(s) SBAR, Kardex, ED Summary, Intake/Output, MAR and Recent Results was reviewed with the receiving nurse. Lines:   Peripheral IV 05/16/20 Right Antecubital (Active)   Site Assessment Clean, dry, & intact 5/16/2020  7:20 PM       Peripheral IV Left Forearm (Active)        Opportunity for questions and clarification was provided.       Patient transported with:   Registered Nurse

## 2020-05-17 NOTE — PROGRESS NOTES
Patient to [phone] via stretcher at this time. Patient able to ambulate to bed with assistance. Head to toe assessment completed. Patient A&Ox4, sinus tach on the monitor. Patient bathed and dual skin assessment completed with PARTH Diehl. Mid-sternal scar to chest noted. Red splotchy rash noted to BLE. Patient states he has had this rash for about two weeks. Patient able to turn self in bed independently, no alevyn applied.  Will encourage turning

## 2020-05-17 NOTE — ED NOTES
Patient sitting on stretcher with HOB elevated at this time, respirations even and unlabored, no apparent signs of distress. Lights of room are on, bed is in low and locked position, call light within reach. Per Isaiah Aguilera MD patient is ok to drink PO fluids, patient given cup of ice water, tolerated well.

## 2020-05-17 NOTE — PROGRESS NOTES
Bedside, Verbal and Written shift change report given to Maine Moser RN (oncoming nurse) by Selma Haney RN (offgoing nurse). Report included the following information SBAR, Kardex, ED Summary, Intake/Output, MAR, Recent Results, Med Rec Status, Cardiac Rhythm ST and Quality Measures. Heparin gtt dual verified.

## 2020-05-17 NOTE — ED NOTES
Patient sitting on stretcher with HOB elevated at this time, respirations even and unlabored, no apparent signs of distress. Lights of room are on, bed is in low and locked position, call light within reach. Patient placed in gown at this time.

## 2020-05-17 NOTE — CONSULTS
[facility] CARDIOLOGY   Consult                 Primary Cardiologist: Dr. Anderson Polanco    Primary Care Physician: Dr. Nohemy Grimes    Admitting Physician: Hospitalist    Consulting Physician: Dr. Anderson Polanco    Subjective:      Please note -- Pt is current on isolation for COVID19 r/o. Pt is not physically examined to limit exposure. Case is discussed with attending provider and RN staff. Chart is reviewed. Patient is a 62 y.o.  male with PMHx of CAD (mild non-obstructing on [facility] 2/2018), HTN, HLD, DIEGO on CPAP, AS (bicuspid valve s/p AVR 4/2018), Depression and prior tobacco abuse who presented to the ED tonight via EMS with initial c/o CP that woke him from sleep around noon (he works 3rd shift). This was a throbbing and non-radiating pain. No N/V or diaphoresis. He also reported cough, fever, SOB and diarrhea. He was given 324mg ASA and 1 SL NTG by EMS and brought to the ED. On arrival to the ED he was swabbed and placed on COVID19 precautions. He was noted to be tachycardic with . He was noted to have bilateral rales by ED MD. Labs revealed DDimer 3.66 and he was sent for CT chest. This was negative for PE or other acute pathology. Troponin was 2.08 and he was placed on IV heparin. EKG without acute ST changes. He had WBC 12.6, H/H 9.3/29.3, Plt 130, INR 1.3, , K 3.7, Cr 0.72. He was admitted to the ICU by the Hospitalist for [address] isolation. After admission, repeat labs showed troponin 24.90, , CRP 7.2. Due to the elevated troponin, a repeat EKG was obtained by staff. Pt has remained CP free since admission and is on IV heparin. Discussed with Dr. Leonard Fajardo and Cardiology consultation requested due to elevated troponin and for EKG review. Pt is known to Dr. Anderson Polanco in our office. He was seen in 2/2018 and underwent LHC which showed mild non-obstructing CAD but critical AS. He underwent AVR 4/2018. He f/u routinely until 2019 when his insurance changed and he was unable to afford visits.  He reestablished with his PCP and Dr. Venu Cantu this year. It appears he was off his statin for approx 6-9 months. However, last lipid panel 3/2020 showed lipids WDL. He saw Dr. Venu Cantu on 20 at which time he was found to have a long 1st degree HB on EKG and had reported some POOLE/SOB but no CP. He was started on Toprol XL 25mg for elevated HR (previously BB stopped 2018 due to bradycardia). He also reported RLE cellulitis at that visit. He underwent ECHO and NST on 20 (see below). He had f/u visit on 20 and was felt stable -- no LHC planned. He was noted to have increased depression following his divorce and was referred back to his PCP for further management.      Echo 2020: EF 50%, AV mean 20mmHg  NST 2020: low risk, normal EF, no ischemia      Past Medical History:   Diagnosis Date    CAD (coronary artery disease)     Nonobstructive multivessel disease    Drug addiction in remission ([address]     none since     Dyslipidemia     DIEGO on CPAP     Recurrent depression ([address]     S/P aortic valve replacement 2018    Seizures ([facility] [address] 2001    x 1--- ETOH  WITHDRAWAL    Sigmoid diverticulosis       Past Surgical History:   Procedure Laterality Date    CARDIAC SURG PROCEDURE UNLIST  2018    AVR    HX HEART CATHETERIZATION  2018    non obstructive disease, severe AS    HX HERNIA REPAIR Left 2018    inguinal       No Known Allergies  Social History     Tobacco Use    Smoking status: Former Smoker     Packs/day: 0.50     Years: 40.00     Pack years: 20.00     Types: Cigarettes     Last attempt to quit: 2018     Years since quittin.1    Smokeless tobacco: Never Used    Tobacco comment: quit    Substance Use Topics    Alcohol use: Yes     Comment: 6 drinks a week      FH:   Family History   Problem Relation Age of Onset    Cancer Mother     Coronary Artery Disease Mother     Heart Disease Brother 64        MI X 3    Coronary Artery Disease Brother     Cancer Sister         breast        Review of Systems -- not fully obtained due to COVID19 isolation -- see HPI      Objective:       Visit Vitals  /64   Pulse (!) 109   Temp 98.5 °F (36.9 °C)   Resp 19   Ht 6' 1\" (1.854 m)   Wt 66.1 kg (145 lb 11.6 oz)   SpO2 96%   BMI 19.23 kg/m²       05/16 1901 - 05/17 0700  In: -   Out: 150 [Urine:150]  No intake/output data recorded. Physical Exam: (not completed due to COVID19 isolation and to limit exposure)  Pt was observed through glass ICU doors. Currently resting quietly in no distress. Breathing is not labored and he is on O2 with sats 96%. Monitor with ST , /64. Heparin gtt infusing. ECG:   In ED: ST with 1st degree HB, no acute ST changes  In ICU: ST with 1st degree HB, lateral T-wave inversion -- tracing somewhat poor -- may be positional as well -- will have EKG tech repeat in AM for better comparison    ECHO: ordered by primary team -- will await COVID19 r/o    CXR:   IMPRESSION:  1. No pulmonary consolidation or pulmonary edema. CT Chest:   IMPRESSION:  1. Negative for pulmonary embolism. 2. No acute pulmonary disease. Negative for pneumonia, pulmonary edema or  pleural effusion. 3. Mild coronary artery calcification      Data Review:      Recent Results (from the past 24 hour(s))   METABOLIC PANEL, COMPREHENSIVE    Collection Time: 05/16/20  6:12 PM   Result Value Ref Range    Sodium 132 (L) 136 - 145 mmol/L    Potassium 3.7 3.5 - 5.1 mmol/L    Chloride 100 98 - 107 mmol/L    CO2 24 21 - 32 mmol/L    Anion gap 8 7 - 16 mmol/L    Glucose 146 (H) 65 - 100 mg/dL    BUN 19 8 - 23 MG/DL    Creatinine 0.72 (L) 0.8 - 1.5 MG/DL    GFR est AA >60 >60 ml/min/1.73m2    GFR est non-AA >60 >60 ml/min/1.73m2    Calcium 7.8 (L) 8.3 - 10.4 MG/DL    Bilirubin, total 0.7 0.2 - 1.1 MG/DL    ALT (SGPT) 38 12 - 65 U/L    AST (SGOT) 49 (H) 15 - 37 U/L    Alk.  phosphatase 70 50 - 136 U/L    Protein, total 6.4 6.3 - 8.2 g/dL    Albumin 2.0 (L) 3.2 - 4.6 g/dL    Globulin 4.4 (H) 2.3 - 3.5 g/dL    A-G Ratio 0.5 (L) 1.2 - 3.5     CBC WITH AUTOMATED DIFF    Collection Time: 05/16/20  6:12 PM   Result Value Ref Range    WBC 12.6 (H) 4.3 - 11.1 K/uL    RBC 3.51 (L) 4.23 - 5.6 M/uL    HGB 9.3 (L) 13.6 - 17.2 g/dL    HCT 29.3 (L) 41.1 - 50.3 %    MCV 83.5 79.6 - 97.8 FL    MCH 26.5 26.1 - 32.9 PG    MCHC 31.7 31.4 - 35.0 g/dL    RDW 14.2 11.9 - 14.6 %    PLATELET 271 (L) 277 - 450 K/uL    MPV 9.5 9.4 - 12.3 FL    ABSOLUTE NRBC 0.00 0.0 - 0.2 K/uL    DF AUTOMATED      NEUTROPHILS 90 (H) 43 - 78 %    LYMPHOCYTES 5 (L) 13 - 44 %    MONOCYTES 5 4.0 - 12.0 %    EOSINOPHILS 0 (L) 0.5 - 7.8 %    BASOPHILS 0 0.0 - 2.0 %    IMMATURE GRANULOCYTES 1 0.0 - 5.0 %    ABS. NEUTROPHILS 11.3 (H) 1.7 - 8.2 K/UL    ABS. LYMPHOCYTES 0.6 0.5 - 4.6 K/UL    ABS. MONOCYTES 0.6 0.1 - 1.3 K/UL    ABS. EOSINOPHILS 0.0 0.0 - 0.8 K/UL    ABS. BASOPHILS 0.0 0.0 - 0.2 K/UL    ABS. IMM.  GRANS. 0.1 0.0 - 0.5 K/UL   TROPONIN I    Collection Time: 05/16/20  6:12 PM   Result Value Ref Range    Troponin-I, Qt. 2.08 (HH) 0.02 - 0.05 NG/ML   D DIMER    Collection Time: 05/16/20  6:12 PM   Result Value Ref Range    D DIMER 3.66 (HH) <0.56 ug/ml(FEU)   PROTHROMBIN TIME + INR    Collection Time: 05/16/20  6:12 PM   Result Value Ref Range    Prothrombin time 16.6 (H) 12.0 - 14.7 sec    INR 1.3     PTT    Collection Time: 05/16/20  6:12 PM   Result Value Ref Range    aPTT 33.9 24.3 - 35.4 SEC   SARS-COV-2    Collection Time: 05/16/20  7:46 PM   Result Value Ref Range    COVID-19 PENDING    FIBRINOGEN    Collection Time: 05/16/20 11:58 PM   Result Value Ref Range    Fibrinogen 473 190 - 501 mg/dL   PTT    Collection Time: 05/16/20 11:58 PM   Result Value Ref Range    aPTT 52.2 (H) 24.3 - 35.4 SEC   TROPONIN I    Collection Time: 05/17/20 12:50 AM   Result Value Ref Range    Troponin-I, Qt. 24.90 (HH) 0.02 - 0.05 NG/ML   FERRITIN    Collection Time: 05/17/20 12:50 AM   Result Value Ref Range    Ferritin 910 (H) 8 - 388 NG/ML   IRON    Collection Time: 05/17/20 12:50 AM   Result Value Ref Range    Iron 29 (L) 35 - 150 ug/dL   LD    Collection Time: 05/17/20 12:50 AM   Result Value Ref Range     (H) 110 - 210 U/L   RETICULOCYTE COUNT    Collection Time: 05/17/20 12:50 AM   Result Value Ref Range    Reticulocyte count 1.8 0.3 - 2.0 %    Absolute Retic Cnt. 0.0637 0.026 - 0.095 M/ul    Immature Retic Fraction 21.2 (H) 2.3 - 13.4 %    Retic Hgb Conc. 26 (L) 29 - 35 pg   PREALBUMIN    Collection Time: 05/17/20 12:50 AM   Result Value Ref Range    Prealbumin 5.40 (L) 18.0 - 35.7 MG/DL   C REACTIVE PROTEIN, QT    Collection Time: 05/17/20 12:50 AM   Result Value Ref Range    C-Reactive protein 7.2 (H) 0.0 - 0.9 mg/dL   MAGNESIUM    Collection Time: 05/17/20 12:50 AM   Result Value Ref Range    Magnesium 2.0 1.8 - 2.4 mg/dL   CBC WITH AUTOMATED DIFF    Collection Time: 05/17/20 12:50 AM   Result Value Ref Range    WBC 12.2 (H) 4.3 - 11.1 K/uL    RBC 3.52 (L) 4.23 - 5.6 M/uL    HGB 9.4 (L) 13.6 - 17.2 g/dL    HCT 30.1 (L) 41.1 - 50.3 %    MCV 85.5 79.6 - 97.8 FL    MCH 26.7 26.1 - 32.9 PG    MCHC 31.2 (L) 31.4 - 35.0 g/dL    RDW 14.2 11.9 - 14.6 %    PLATELET 623 (L) 145 - 450 K/uL    MPV 9.5 9.4 - 12.3 FL    ABSOLUTE NRBC 0.00 0.0 - 0.2 K/uL    DF AUTOMATED      NEUTROPHILS 88 (H) 43 - 78 %    LYMPHOCYTES 6 (L) 13 - 44 %    MONOCYTES 5 4.0 - 12.0 %    EOSINOPHILS 0 (L) 0.5 - 7.8 %    BASOPHILS 0 0.0 - 2.0 %    IMMATURE GRANULOCYTES 1 0.0 - 5.0 %    ABS. NEUTROPHILS 10.7 (H) 1.7 - 8.2 K/UL    ABS. LYMPHOCYTES 0.7 0.5 - 4.6 K/UL    ABS. MONOCYTES 0.6 0.1 - 1.3 K/UL    ABS. EOSINOPHILS 0.0 0.0 - 0.8 K/UL    ABS. BASOPHILS 0.0 0.0 - 0.2 K/UL    ABS. IMM.  GRANS. 0.1 0.0 - 0.5 K/UL   METABOLIC PANEL, BASIC    Collection Time: 05/17/20 12:50 AM   Result Value Ref Range    Sodium 132 (L) 136 - 145 mmol/L    Potassium 4.2 3.5 - 5.1 mmol/L    Chloride 100 98 - 107 mmol/L    CO2 28 21 - 32 mmol/L    Anion gap 4 (L) 7 - 16 mmol/L    Glucose 132 (H) 65 - 100 mg/dL    BUN 16 8 - 23 MG/DL    Creatinine 0.69 (L) 0.8 - 1.5 MG/DL    GFR est AA >60 >60 ml/min/1.73m2    GFR est non-AA >60 >60 ml/min/1.73m2    Calcium 7.7 (L) 8.3 - 10.4 MG/DL         Assessment/Plan:   Principal Problem:    NSTEMI (non-ST elevated myocardial infarction) -- noted troponin elevation, will cont to trend Francisco for peak, currently CP free on IV heparin, BP dropped with NTG so no topical NTG presently, SL NTG PRN, cont ASA (watch platelets), BB, statin, ECHO ordered by primary team but will await final COVID testing results prior to repeating ECHO, med rx for now, Pt has multiple elevated inflammatory markers as well as anemia and thrombocytopenia.  Pt just had stable ECHO and low-risk NST 4/29/20, consider LHC pending clinical course and COVID19 w/u, however, urgent LHC for acute VALERIA or intractable CP    Active Problems:    Diastolic CHF, chronic -- does not appear VO, actually maybe a little depleted, has IVF infusing      S/P aortic valve replacement -- normal valve fxn last ECHO      CAD (coronary artery disease) -- nonobstructive -- recent NST w/o ischemia, cont ASA (wathc platelets), BB and statin therapy, may need LHC at some point      DIEGO on CPAP -- cont CPAP       Dyslipidemia -- cont statin      Essential hypertension -- BP stable but was soft earlier, receiving IVF, BB as tolerated      Suspected COVID-19 virus infection -- on isolation, per Hospitalist      Petechiae -- unclear source -- thought by PCP to be 2/2 meds from NST  -- but noted by Dr. Alan Lane prior to NST -- so unsure exact duration, heme w/u ongoing per Hospitalist      Anemia -- w/u ongoing per Hospitalist      Tachycardia -- BB as tolerated            ZULEMA Mtz  5/17/2020  3:50 AM

## 2020-05-17 NOTE — ED NOTES
Patient used bedside commode at this time, voided 150 mL of urine, no stool, got back in bed independently. Patient is in stretcher at this time with bed in low and locked position. Lights of room on, side rail elevated x1 left and call light within reach.

## 2020-05-18 LAB
ALBUMIN SERPL-MCNC: 1.7 G/DL (ref 3.2–4.6)
ALBUMIN/GLOB SERPL: 0.4 {RATIO} (ref 1.2–3.5)
ALP SERPL-CCNC: 72 U/L (ref 50–136)
ALT SERPL-CCNC: 45 U/L (ref 12–65)
AMORPH CRY URNS QL MICRO: ABNORMAL
ANION GAP SERPL CALC-SCNC: 5 MMOL/L (ref 7–16)
APPEARANCE UR: ABNORMAL
APTT PPP: 67.7 SEC (ref 24.3–35.4)
APTT PPP: 81.4 SEC (ref 24.3–35.4)
APTT PPP: 94 SEC (ref 24.3–35.4)
AST SERPL-CCNC: 135 U/L (ref 15–37)
BACTERIA URNS QL MICRO: 0 /HPF
BASOPHILS # BLD: 0 K/UL (ref 0–0.2)
BASOPHILS NFR BLD: 0 % (ref 0–2)
BILIRUB SERPL-MCNC: 0.5 MG/DL (ref 0.2–1.1)
BILIRUB UR QL: ABNORMAL
BUN SERPL-MCNC: 12 MG/DL (ref 8–23)
CALCIUM SERPL-MCNC: 7.2 MG/DL (ref 8.3–10.4)
CHLORIDE SERPL-SCNC: 104 MMOL/L (ref 98–107)
CHOLEST SERPL-MCNC: <50 MG/DL
CO2 SERPL-SCNC: 27 MMOL/L (ref 21–32)
COLOR UR: ABNORMAL
CREAT SERPL-MCNC: 0.7 MG/DL (ref 0.8–1.5)
DIFFERENTIAL METHOD BLD: ABNORMAL
EMERGENT DISEASE PANEL, EDPR: NOT DETECTED
EOSINOPHIL # BLD: 0 K/UL (ref 0–0.8)
EOSINOPHIL NFR BLD: 0 % (ref 0.5–7.8)
ERYTHROCYTE [DISTWIDTH] IN BLOOD BY AUTOMATED COUNT: 14.1 % (ref 11.9–14.6)
GLOBULIN SER CALC-MCNC: 4.2 G/DL (ref 2.3–3.5)
GLUCOSE BLD STRIP.AUTO-MCNC: 125 MG/DL (ref 65–100)
GLUCOSE BLD STRIP.AUTO-MCNC: 129 MG/DL (ref 65–100)
GLUCOSE BLD STRIP.AUTO-MCNC: 169 MG/DL (ref 65–100)
GLUCOSE BLD STRIP.AUTO-MCNC: 176 MG/DL (ref 65–100)
GLUCOSE SERPL-MCNC: 163 MG/DL (ref 65–100)
GLUCOSE UR STRIP.AUTO-MCNC: NEGATIVE MG/DL
HAPTOGLOB SERPL-MCNC: 131 MG/DL (ref 30–200)
HCT VFR BLD AUTO: 29 % (ref 41.1–50.3)
HDLC SERPL-MCNC: 14 MG/DL (ref 40–60)
HDLC SERPL: ABNORMAL {RATIO}
HGB BLD-MCNC: 9.1 G/DL (ref 13.6–17.2)
HGB UR QL STRIP: ABNORMAL
IMM GRANULOCYTES # BLD AUTO: 0.1 K/UL (ref 0–0.5)
IMM GRANULOCYTES NFR BLD AUTO: 1 % (ref 0–5)
KETONES UR QL STRIP.AUTO: ABNORMAL MG/DL
LACTATE SERPL-SCNC: 1.8 MMOL/L (ref 0.4–2)
LDLC SERPL CALC-MCNC: ABNORMAL MG/DL
LEUKOCYTE ESTERASE UR QL STRIP.AUTO: ABNORMAL
LIPID PROFILE,FLP: ABNORMAL
LYMPHOCYTES # BLD: 0.7 K/UL (ref 0.5–4.6)
LYMPHOCYTES NFR BLD: 7 % (ref 13–44)
MAGNESIUM SERPL-MCNC: 2.2 MG/DL (ref 1.8–2.4)
MCH RBC QN AUTO: 26.5 PG (ref 26.1–32.9)
MCHC RBC AUTO-ENTMCNC: 31.4 G/DL (ref 31.4–35)
MCV RBC AUTO: 84.5 FL (ref 79.6–97.8)
MONOCYTES # BLD: 0.6 K/UL (ref 0.1–1.3)
MONOCYTES NFR BLD: 6 % (ref 4–12)
NEUTS SEG # BLD: 8.8 K/UL (ref 1.7–8.2)
NEUTS SEG NFR BLD: 86 % (ref 43–78)
NITRITE UR QL STRIP.AUTO: NEGATIVE
NRBC # BLD: 0 K/UL (ref 0–0.2)
OTHER OBSERVATIONS,UCOM: ABNORMAL
PH UR STRIP: 6 [PH] (ref 5–9)
PLATELET # BLD AUTO: 138 K/UL (ref 150–450)
PMV BLD AUTO: 9.9 FL (ref 9.4–12.3)
POTASSIUM SERPL-SCNC: 4.6 MMOL/L (ref 3.5–5.1)
PROT SERPL-MCNC: 5.9 G/DL (ref 6.3–8.2)
PROT UR STRIP-MCNC: 100 MG/DL
RBC # BLD AUTO: 3.43 M/UL (ref 4.23–5.6)
RBC #/AREA URNS HPF: ABNORMAL /HPF
SODIUM SERPL-SCNC: 136 MMOL/L (ref 136–145)
SP GR UR REFRACTOMETRY: 1.03 (ref 1–1.02)
TRIGL SERPL-MCNC: 80 MG/DL (ref 35–150)
TROPONIN I SERPL-MCNC: 15.9 NG/ML (ref 0.02–0.05)
UROBILINOGEN UR QL STRIP.AUTO: >8 EU/DL (ref 0.2–1)
VLDLC SERPL CALC-MCNC: 16 MG/DL (ref 6–23)
WBC # BLD AUTO: 10.2 K/UL (ref 4.3–11.1)
WBC URNS QL MICRO: ABNORMAL /HPF
YEAST URNS QL MICRO: ABNORMAL

## 2020-05-18 PROCEDURE — 74011250636 HC RX REV CODE- 250/636: Performed by: HOSPITALIST

## 2020-05-18 PROCEDURE — 36592 COLLECT BLOOD FROM PICC: CPT

## 2020-05-18 PROCEDURE — 80053 COMPREHEN METABOLIC PANEL: CPT

## 2020-05-18 PROCEDURE — 82962 GLUCOSE BLOOD TEST: CPT

## 2020-05-18 PROCEDURE — 87205 SMEAR GRAM STAIN: CPT

## 2020-05-18 PROCEDURE — 74011250636 HC RX REV CODE- 250/636: Performed by: INTERNAL MEDICINE

## 2020-05-18 PROCEDURE — 80061 LIPID PANEL: CPT

## 2020-05-18 PROCEDURE — 74011250637 HC RX REV CODE- 250/637: Performed by: INTERNAL MEDICINE

## 2020-05-18 PROCEDURE — 85730 THROMBOPLASTIN TIME PARTIAL: CPT

## 2020-05-18 PROCEDURE — 87077 CULTURE AEROBIC IDENTIFY: CPT

## 2020-05-18 PROCEDURE — 65660000000 HC RM CCU STEPDOWN

## 2020-05-18 PROCEDURE — 74011250637 HC RX REV CODE- 250/637: Performed by: HOSPITALIST

## 2020-05-18 PROCEDURE — 77010033678 HC OXYGEN DAILY

## 2020-05-18 PROCEDURE — 81001 URINALYSIS AUTO W/SCOPE: CPT

## 2020-05-18 PROCEDURE — 93005 ELECTROCARDIOGRAM TRACING: CPT | Performed by: INTERNAL MEDICINE

## 2020-05-18 PROCEDURE — 85025 COMPLETE CBC W/AUTO DIFF WBC: CPT

## 2020-05-18 PROCEDURE — 36415 COLL VENOUS BLD VENIPUNCTURE: CPT

## 2020-05-18 PROCEDURE — 83605 ASSAY OF LACTIC ACID: CPT

## 2020-05-18 PROCEDURE — 87150 DNA/RNA AMPLIFIED PROBE: CPT

## 2020-05-18 PROCEDURE — 84484 ASSAY OF TROPONIN QUANT: CPT

## 2020-05-18 PROCEDURE — 94760 N-INVAS EAR/PLS OXIMETRY 1: CPT

## 2020-05-18 PROCEDURE — 87186 SC STD MICRODIL/AGAR DIL: CPT

## 2020-05-18 PROCEDURE — 87040 BLOOD CULTURE FOR BACTERIA: CPT

## 2020-05-18 PROCEDURE — 74011636637 HC RX REV CODE- 636/637: Performed by: HOSPITALIST

## 2020-05-18 PROCEDURE — 83735 ASSAY OF MAGNESIUM: CPT

## 2020-05-18 RX ORDER — HEPARIN SODIUM 5000 [USP'U]/ML
35 INJECTION, SOLUTION INTRAVENOUS; SUBCUTANEOUS ONCE
Status: COMPLETED | OUTPATIENT
Start: 2020-05-18 | End: 2020-05-18

## 2020-05-18 RX ADMIN — HEPARIN SODIUM 22 UNITS/KG/HR: 5000 INJECTION, SOLUTION INTRAVENOUS at 11:50

## 2020-05-18 RX ADMIN — ASPIRIN 81 MG 81 MG: 81 TABLET ORAL at 08:09

## 2020-05-18 RX ADMIN — SODIUM CHLORIDE 100 ML/HR: 900 INJECTION, SOLUTION INTRAVENOUS at 04:25

## 2020-05-18 RX ADMIN — SODIUM CHLORIDE 100 ML/HR: 900 INJECTION, SOLUTION INTRAVENOUS at 14:05

## 2020-05-18 RX ADMIN — ACETAMINOPHEN 650 MG: 325 TABLET, FILM COATED ORAL at 18:38

## 2020-05-18 RX ADMIN — HEPARIN SODIUM 2300 UNITS: 5000 INJECTION INTRAVENOUS; SUBCUTANEOUS at 04:23

## 2020-05-18 RX ADMIN — METOPROLOL SUCCINATE 25 MG: 50 TABLET, EXTENDED RELEASE ORAL at 17:26

## 2020-05-18 RX ADMIN — Medication 10 ML: at 05:10

## 2020-05-18 RX ADMIN — INSULIN LISPRO 2 UNITS: 100 INJECTION, SOLUTION INTRAVENOUS; SUBCUTANEOUS at 10:58

## 2020-05-18 RX ADMIN — Medication 5 ML: at 21:57

## 2020-05-18 RX ADMIN — INSULIN LISPRO 2 UNITS: 100 INJECTION, SOLUTION INTRAVENOUS; SUBCUTANEOUS at 21:31

## 2020-05-18 RX ADMIN — ESCITALOPRAM OXALATE 10 MG: 10 TABLET ORAL at 08:08

## 2020-05-18 RX ADMIN — METOPROLOL SUCCINATE 25 MG: 50 TABLET, EXTENDED RELEASE ORAL at 08:08

## 2020-05-18 RX ADMIN — ATORVASTATIN CALCIUM 20 MG: 20 TABLET, FILM COATED ORAL at 21:32

## 2020-05-18 RX ADMIN — Medication 10 ML: at 14:03

## 2020-05-18 NOTE — PROGRESS NOTES
Patient arrived to floor with pulse of 111, temp of 100.1 and rhythm suspect for afib/aflutter. Dr. Colt Alford made aware. EKG ordered and received order to give tylenol. Will administer and staff will continue to monitor.

## 2020-05-18 NOTE — ROUTINE PROCESS
Bedside and Verbal report given to self by Obi Pittman RN. Report included SBAR, Kardex, ED Summary, Procedure Summary, Intake and Output and Cardiac Rhythm ST.  Heparin drip rate verified with off going RN

## 2020-05-18 NOTE — PROGRESS NOTES
Skin assessed with Uma RN at bedside. Bilateral lower extremity rash noted. Heels and sacrum viewed and there was no redness or breakdown noted.

## 2020-05-18 NOTE — PROGRESS NOTES
Chart reviewed after admission to ICU s/p STEMI/Covid r/o/isolation. Pt has PCP and insurance with [employer]. No needs at present identified for d/c. Pt did state that he might want his sister as HCPOA, and spouse as secondary. Needs HCPOA completed if he would like sister to be HCPOA. CM following for any d/c needs per MD.     Care Management Interventions  PCP Verified by CM: Yes(Dr. Gusman Marker)  Mode of Transport at Discharge:  Other (see comment)  Transition of Care Consult (CM Consult): Discharge Planning  Current Support Network: (spouse Imer Bliss, sister, nicolas Gould)  [address] Provided?: ([address])  Discharge Location  Discharge Placement: Home DISPLAY PLAN FREE TEXT

## 2020-05-18 NOTE — PROGRESS NOTES
am  5/18/2020 7:25 AM    Admit Date: 5/16/2020    Admit Diagnosis: NSTEMI (non-ST elevated myocardial infarction) ([facility] [address] [I21.4]; Suspected COVID-19 virus infection [Z20.828]      Subjective:    Patient Please note -- Pt is current on isolation for COVID19 r/o. Pt is not physically examined to limit exposure. Case is discussed with attending provider and RN staff. Chart is reviewed.      Patient is a 62 y.o.  male with PMHx of CAD (mild non-obstructing on [facility] 2/2018), HTN, HLD, DIEGO on CPAP, AS (bicuspid valve s/p AVR 4/2018), Depression and prior tobacco abuse who presented to the ED tonight via EMS with initial c/o CP that woke him from sleep around noon (he works 3rd shift). This was a throbbing and non-radiating pain. No N/V or diaphoresis. He also reported cough, fever, SOB and diarrhea. He was given 324mg ASA and 1 SL NTG by EMS and brought to the ED.      On arrival to the ED he was swabbed and placed on COVID19 precautions. He was noted to be tachycardic with . He was noted to have bilateral rales by ED MD. Labs revealed DDimer 3.66 and he was sent for CT chest. This was negative for PE or other acute pathology. Troponin was 2.08 and he was placed on IV heparin. EKG without acute ST changes. He had WBC 12.6, H/H 9.3/29.3, Plt 130, INR 1.3, , K 3.7, Cr 0.72. He was admitted to the ICU by the Hospitalist for COVID19 isolation.      After admission, repeat labs showed troponin 24.90, , CRP 7.2. Due to the elevated troponin, a repeat EKG was obtained by staff. Pt has remained CP free since admission and is on IV heparin. Discussed with Dr. Elvia Ornelas and Cardiology consultation requested due to elevated troponin and for EKG review.      Pt is known to Dr. Sendy Silva in our office. He was seen in 2/2018 and underwent LHC which showed mild non-obstructing CAD but critical AS. He underwent AVR 4/2018. He f/u routinely until 2019 when his insurance changed and he was unable to afford visits.  He reestablished with his PCP and Dr. Tyrese Chase this year. It appears he was off his statin for approx 6-9 months. However, last lipid panel 3/2020 showed lipids WDL. He saw Dr. Tyrese Chase on 4/28/20 at which time he was found to have a long 1st degree HB on EKG and had reported some POOLE/SOB but no CP. He was started on Toprol XL 25mg for elevated HR (previously BB stopped 12/2018 due to bradycardia). He also reported RLE cellulitis at that visit. He underwent ECHO and NST on 4/29/20 (see below). He had f/u visit on 4/30/20 and was felt stable -- no LHC planned. He was noted to have increased depression following his divorce and was referred back to his PCP for further management.      Echo 4/2020: EF 50%, AV mean 20mmHg  NST 4/2020: low risk, normal EF, no ischemia    05/18/20  Has not had covid-19 resulted. Trop down significantly. No pain.      Objective:      Visit Vitals  BP 96/55   Pulse (!) 101   Temp 99.4 °F (37.4 °C)   Resp 22   Ht 6' 1\" (1.854 m)   Wt 70.4 kg (155 lb 3.3 oz)   SpO2 98%   BMI 20.48 kg/m²       ROS:  General ROS: negative for - chills  Hematological and Lymphatic ROS: negative for - blood clots or jaundice      Physical Exam:      Current Facility-Administered Medications   Medication Dose Route Frequency    metoprolol succinate (TOPROL-XL) XL tablet 25 mg  25 mg Oral BID    insulin lispro (HUMALOG) injection   SubCUTAneous AC&HS    heparin 25,000 units in dextrose 500 mL infusion  12-25 Units/kg/hr IntraVENous TITRATE    escitalopram oxalate (LEXAPRO) tablet 10 mg  10 mg Oral DAILY    atorvastatin (LIPITOR) tablet 20 mg  20 mg Oral QHS    aspirin chewable tablet 81 mg  81 mg Oral DAILY    sodium chloride (NS) flush 5-40 mL  5-40 mL IntraVENous Q8H    sodium chloride (NS) flush 5-40 mL  5-40 mL IntraVENous PRN    acetaminophen (TYLENOL) tablet 650 mg  650 mg Oral Q6H PRN    0.9% sodium chloride infusion  100 mL/hr IntraVENous CONTINUOUS    morphine 10 mg/ml injection 2 mg  2 mg IntraVENous Q3H PRN    nitroglycerin (NITROSTAT) tablet 0.4 mg  0.4 mg SubLINGual Q5MIN PRN    ondansetron (ZOFRAN) injection 4 mg  4 mg IntraVENous Q4H PRN       Data Review:   @LABRCNT(Na,K,BUN,CREA,WBC,HGB,HCT,PLT,INR,TRP,TCHOL*,Triglyceride*,LDL*,LDLCPOC HDL*,HDL])@    TELEMETRY: nsr    Assessment/Plan:     Principal Problem:    NSTEMI (non-ST elevated myocardial infarction) ([facility] [address] (5/16/2020)    Active Problems:    Diastolic CHF, chronic (HCC) (5/7/2018)      S/P aortic valve replacement (4/1/2018)      CAD (coronary artery disease) ()      Overview: Nonobstructive      DIEGO on CPAP ()      Dyslipidemia ()      Essential hypertension (9/21/2018)      Suspected COVID-19 virus infection (5/16/2020)      Petechiae (5/16/2020)      Anemia (5/16/2020)      At this juncture we can wait for covid testing to result before cath.  Continue current care at this point    Suggest IV iron therapy    Rosemarie Moore MD

## 2020-05-18 NOTE — PROGRESS NOTES
Bedside and Verbal shift change report given to Nithya Dean (oncoming nurse) by Junito Araujo (offgoing nurse). Report included the following information SBAR, ED Summary, Procedure Summary, Intake/Output, MAR, Recent Results, Cardiac Rhythm NSR and Alarm Parameters .

## 2020-05-18 NOTE — ROUTINE PROCESS
TRANSFER - IN REPORT: 
 
Verbal report received from WINNIE RN(name) on Leonor Servin  being received from ICU(unit) for routine progression of care Report consisted of patients Situation, Background, Assessment and  
Recommendations(SBAR). Information from the following report(s) SBAR was reviewed with the receiving nurse. Opportunity for questions and clarification was provided. Assessment will be completed upon patients arrival to unit and care will be assumed.

## 2020-05-18 NOTE — PROGRESS NOTES
Hospitalist Note     Admit Date:  2020  5:52 PM   Name:  Donna Maldonado   Age:  62 y.o.  :  1960   MRN:  [id number]   PCP:  Faviola Haro MD  Treatment Team: Attending Provider: Chito Reardon MD; Consulting Provider: Clemente Cardenas DO; Consulting Provider: Daryl Ortega MD; Care Manager: Misti Wetzel RN    HPI/Subjective:       Mr. Louis Barboza is a 63 yo male with PMH of CAD (non obstructive  Per [facility] ) , DIEGO on CPAP, AS s/p AVR, HTN admitted with acute chest pain and elevated troponin, NSTEMI. He was seen by cardiology  s/p low risk NM stress testing and ECHO 50% EF. He has been cardiology and on IV heparin. he will possibly need a LHC while admitted. He has been tested for COVID 19 due to constellation of petechial LE rash, elevated ddimer, mild thrombocytopenia. CTA chest negative. Discharge plans pending to home. 20 has LE rash since stress testing, stops at thighs-denies travel or hiking exposures, rash started after NM stress testing ?  Drug reaction,  no more chest pain, no anorexia, no dyspnea      Objective:     Patient Vitals for the past 24 hrs:   Temp Pulse Resp BP SpO2   20 0808  (!) 108  123/72    20 0629  (!) 101 22 96/55 98 %   20 0600  (!) 103 20 102/67 99 %   20 0550  (!) 103 19 105/57 99 %   20 0530  (!) 103 21 105/57 98 %   20 0507  (!) 104 16 109/70 96 %   20 0430  (!) 105 28 107/65 97 %   20 0359  98 24 109/66 98 %   20 0330  (!) 104 23 106/64 97 %   20 0300 99.4 °F (37.4 °C) (!) 120 29 120/71 97 %   20 0230  (!) 109 22 103/56 98 %   20 0159  (!) 109 20 98/54 98 %   20 0130  (!) 105 22 114/66 98 %   20 0100  (!) 108 23 104/64 98 %   20 0050  (!) 107 22 103/64 98 %   20 0030  (!) 108 24 103/64 97 %   20 2330  (!) 114 14 106/62 92 %   20 2300 99.8 °F (37.7 °C) (!) 111 10 97/54 98 %   20 2230  (!) 116  102/57 97 %   05/17/20 2200  (!) 111 18 111/71 96 %   05/17/20 2130  (!) 109 22 105/66 95 %   05/17/20 2100  (!) 109 23 108/65 97 %   05/17/20 2030  (!) 109 22 105/68 97 %   05/17/20 2000  (!) 109 20 105/68 97 %   05/17/20 1930  (!) 114 24 106/72 97 %   05/17/20 1905 99.3 °F (37.4 °C) (!) 116 16 106/67 97 %   05/17/20 1730  (!) 113 23 106/62 98 %   05/17/20 1700  (!) 109 19 113/67 97 %   05/17/20 1654  (!) 111 17  96 %   05/17/20 1630 98.6 °F (37 °C) (!) 113 20 113/66 97 %   05/17/20 1620  (!) 107 21  97 %   05/17/20 1600  (!) 111      05/17/20 1541  (!) 112 18  96 %   05/17/20 1530  (!) 117 14 116/70 98 %   05/17/20 1501  (!) 107 21  96 %   05/17/20 1500    110/68    05/17/20 1458  (!) 106 22  96 %   05/17/20 1430  (!) 109 17 113/67 97 %   05/17/20 1400  (!) 108 21 113/69 98 %   05/17/20 1330  (!) 111 29 108/65 98 %   05/17/20 1300  (!) 104 20 116/68 97 %   05/17/20 1253  (!) 108 17  94 %   05/17/20 1230  (!) 112 15 107/68 95 %   05/17/20 1222  (!) 111 22  97 %   05/17/20 1207  (!) 114 22  95 %   05/17/20 1159  (!) 108 16 111/70 95 %   05/17/20 1154  (!) 111 16  96 %   05/17/20 1148  (!) 109      05/17/20 1130 98.6 °F (37 °C) (!) 108 17 104/55 97 %   05/17/20 1059  (!) 103 21 108/68 97 %   05/17/20 1030  (!) 108 25 112/67 93 %   05/17/20 1023  (!) 108 22  93 %   05/17/20 1005  (!) 112 24  95 %   05/17/20 0951  (!) 109 19  97 %   05/17/20 0931  (!) 104 17  97 %   05/17/20 0930    116/70    05/17/20 0918  (!) 104 15  97 %     Oxygen Therapy  O2 Sat (%): 98 % (05/18/20 0629)  Pulse via Oximetry: 103 beats per minute (05/18/20 0629)  O2 Device: Nasal cannula (05/18/20 0300)  O2 Flow Rate (L/min): 2 l/min (05/18/20 0300)    Estimated body mass index is 20.48 kg/m² as calculated from the following:    Height as of this encounter: 6' 1\" (1.854 m). Weight as of this encounter: 70.4 kg (155 lb 3.3 oz).       Intake/Output Summary (Last 24 hours) at 5/18/2020 0913  Last data filed at 5/18/2020 1112  Gross per 24 hour   Intake 3107.83 ml   Output 1375 ml   Net 1732.83 ml       *Note that automatically entered I/Os may not be accurate; dependent on patient compliance with collection and accurate  by techs. General:    Well nourished. Alert. No distress  CV:   Regular and slightly tachycardic,  No murmur, rub, or gallop. No edema  Lungs:   CTAB. No wheezing, rhonchi, or rales. anterior  Abdomen:   Soft, nontender, nondistended. Decreased BS  Extremities: Warm and dry. Skin:     Nonblanchable petechial rash to LE  Neuro:  No gross focal deficits    Data Review:  I have reviewed all labs, meds, and studies from the last 24 hours:  Recent Results (from the past 24 hour(s))   TROPONIN I    Collection Time: 05/17/20 11:30 AM   Result Value Ref Range    Troponin-I, Qt. 31.80 (HH) 0.02 - 0.05 NG/ML   PTT    Collection Time: 05/17/20  3:22 PM   Result Value Ref Range    aPTT 69.3 (H) 24.3 - 35.4 SEC   PTT    Collection Time: 05/17/20 10:00 PM   Result Value Ref Range    aPTT 85.1 (H) 24.3 - 35.4 SEC   GLUCOSE, POC    Collection Time: 05/17/20 10:02 PM   Result Value Ref Range    Glucose (POC) 190 (H) 65 - 100 mg/dL   CBC WITH AUTOMATED DIFF    Collection Time: 05/18/20  3:08 AM   Result Value Ref Range    WBC 10.2 4.3 - 11.1 K/uL    RBC 3.43 (L) 4.23 - 5.6 M/uL    HGB 9.1 (L) 13.6 - 17.2 g/dL    HCT 29.0 (L) 41.1 - 50.3 %    MCV 84.5 79.6 - 97.8 FL    MCH 26.5 26.1 - 32.9 PG    MCHC 31.4 31.4 - 35.0 g/dL    RDW 14.1 11.9 - 14.6 %    PLATELET 763 (L) 136 - 450 K/uL    MPV 9.9 9.4 - 12.3 FL    ABSOLUTE NRBC 0.00 0.0 - 0.2 K/uL    DF AUTOMATED      NEUTROPHILS 86 (H) 43 - 78 %    LYMPHOCYTES 7 (L) 13 - 44 %    MONOCYTES 6 4.0 - 12.0 %    EOSINOPHILS 0 (L) 0.5 - 7.8 %    BASOPHILS 0 0.0 - 2.0 %    IMMATURE GRANULOCYTES 1 0.0 - 5.0 %    ABS. NEUTROPHILS 8.8 (H) 1.7 - 8.2 K/UL    ABS. LYMPHOCYTES 0.7 0.5 - 4.6 K/UL    ABS. MONOCYTES 0.6 0.1 - 1.3 K/UL    ABS. EOSINOPHILS 0.0 0.0 - 0.8 K/UL    ABS. BASOPHILS 0.0 0.0 - 0.2 K/UL    ABS. IMM. GRANS. 0.1 0.0 - 0.5 K/UL   METABOLIC PANEL, COMPREHENSIVE    Collection Time: 05/18/20  3:08 AM   Result Value Ref Range    Sodium 136 136 - 145 mmol/L    Potassium 4.6 3.5 - 5.1 mmol/L    Chloride 104 98 - 107 mmol/L    CO2 27 21 - 32 mmol/L    Anion gap 5 (L) 7 - 16 mmol/L    Glucose 163 (H) 65 - 100 mg/dL    BUN 12 8 - 23 MG/DL    Creatinine 0.70 (L) 0.8 - 1.5 MG/DL    GFR est AA >60 >60 ml/min/1.73m2    GFR est non-AA >60 >60 ml/min/1.73m2    Calcium 7.2 (L) 8.3 - 10.4 MG/DL    Bilirubin, total 0.5 0.2 - 1.1 MG/DL    ALT (SGPT) 45 12 - 65 U/L    AST (SGOT) 135 (H) 15 - 37 U/L    Alk.  phosphatase 72 50 - 136 U/L    Protein, total 5.9 (L) 6.3 - 8.2 g/dL    Albumin 1.7 (L) 3.2 - 4.6 g/dL    Globulin 4.2 (H) 2.3 - 3.5 g/dL    A-G Ratio 0.4 (L) 1.2 - 3.5     MAGNESIUM    Collection Time: 05/18/20  3:08 AM   Result Value Ref Range    Magnesium 2.2 1.8 - 2.4 mg/dL   TROPONIN I    Collection Time: 05/18/20  3:08 AM   Result Value Ref Range    Troponin-I, Qt. 15.90 (HH) 0.02 - 0.05 NG/ML   LIPID PANEL    Collection Time: 05/18/20  3:08 AM   Result Value Ref Range    LIPID PROFILE          Cholesterol, total <50 <200 MG/DL    Triglyceride 80 35 - 150 MG/DL    HDL Cholesterol 14 (L) 40 - 60 MG/DL    LDL, calculated Cannot be calculated <100 MG/DL    VLDL, calculated 16 6.0 - 23.0 MG/DL    CHOL/HDL Ratio Cannot be calculated     PTT    Collection Time: 05/18/20  3:08 AM   Result Value Ref Range    aPTT 67.7 (H) 24.3 - 35.4 SEC   GLUCOSE, POC    Collection Time: 05/18/20  8:13 AM   Result Value Ref Range    Glucose (POC) 129 (H) 65 - 100 mg/dL        Current Meds:  Current Facility-Administered Medications   Medication Dose Route Frequency    metoprolol succinate (TOPROL-XL) XL tablet 25 mg  25 mg Oral BID    insulin lispro (HUMALOG) injection   SubCUTAneous AC&HS    heparin 25,000 units in dextrose 500 mL infusion  12-25 Units/kg/hr IntraVENous TITRATE    escitalopram oxalate (LEXAPRO) tablet 10 mg  10 mg Oral DAILY    atorvastatin (LIPITOR) tablet 20 mg  20 mg Oral QHS    aspirin chewable tablet 81 mg  81 mg Oral DAILY    sodium chloride (NS) flush 5-40 mL  5-40 mL IntraVENous Q8H    sodium chloride (NS) flush 5-40 mL  5-40 mL IntraVENous PRN    acetaminophen (TYLENOL) tablet 650 mg  650 mg Oral Q6H PRN    0.9% sodium chloride infusion  100 mL/hr IntraVENous CONTINUOUS    morphine 10 mg/ml injection 2 mg  2 mg IntraVENous Q3H PRN    nitroglycerin (NITROSTAT) tablet 0.4 mg  0.4 mg SubLINGual Q5MIN PRN    ondansetron (ZOFRAN) injection 4 mg  4 mg IntraVENous Q4H PRN       Other Studies:  No results found for this visit on 05/16/20. No results found.     All Micro Results     Procedure Component Value Units Date/Time    EMERGENT DISEASE PANEL [[id number]] Collected:  05/16/20 1946    Order Status:  Completed Updated:  05/18/20 0714          SARS-CoV-2 Lab Results  \"Novel Coronavirus\" Test: No results found for: COV2NT   \"Emergent Disease\" Test: No results found for: EDPR  \"SARS-COV-2\" Test:   No results found for: XGCOVT  As of: 3:51 PM on 5/18/2020        Assessment and Plan:     Hospital Problems as of 5/18/2020 Date Reviewed: 5/4/2020          Codes Class Noted - Resolved POA    * (Principal) NSTEMI (non-ST elevated myocardial infarction) [facility] ICD-10-CM: I21.4  ICD-9-CM: 410.70  5/16/2020 - Present Yes        Suspected COVID-19 virus infection ICD-10-CM: Z20.828  ICD-9-CM: V01.79  5/16/2020 - Present Yes        Petechiae ICD-10-CM: R23.3  ICD-9-CM: 782.7  5/16/2020 - Present Yes        Anemia ICD-10-CM: D64.9  ICD-9-CM: 285.9  5/16/2020 - Present Yes        Essential hypertension ICD-10-CM: I10  ICD-9-CM: 401.9  9/21/2018 - Present Yes        CAD (coronary artery disease) ICD-10-CM: I25.10  ICD-9-CM: 414.00  Unknown - Present Yes    Overview Signed 7/18/2018  7:38 AM by Curvin Galeazzi, MD     Nonobstructive             DIEGO on CPAP ICD-10-CM: G49.32, Z99.89  ICD-9-CM: 327.23, V46.8  Unknown - Present Yes        Dyslipidemia ICD-10-CM: E78.5  ICD-9-CM: 272.4  Unknown - Present Yes        Diastolic CHF, chronic (HCC) ICD-10-CM: I50.32  ICD-9-CM: 428.32, 428.0  5/7/2018 - Present Yes        S/P aortic valve replacement ICD-10-CM: Z95.2  ICD-9-CM: V43.3  4/1/2018 - Present Yes              Plan:      · CAD/NSTEMI:  · appreciate cardiology input  · Continued IV heparin-watch platelets  · Continued asa, lipitor, metoprolol  · ECHO ordered  · Possible LHC this admit      · Petechia:  · Pending outpatient derm consult - possible drug reaction  · followup platelets are stable  · Pending complete DIC workup-haptoglobin not resulted  · followup COVID testing pending      · Anemia and thrombocytopenia:   · followup CBC and anemia stable  · May need GI workup for iron loss if needs anticoagulation/ antiplatelets going forward      · Hyponatremia:  · followup BMP improved      · Hyperglycemia:  · A1C ordered       DC planning/Dispo:  Pending, in ICU as needs tele and COVID pending      Diet:  DIET CARDIAC  DVT ppx:  IV heparin    Signed:  Deborah Cabrera MD

## 2020-05-18 NOTE — PROGRESS NOTES
TRANSFER - OUT REPORT:    Verbal report given to [facility] RN (name) on Evelyn Davis  being transferred to telemetry (unit) for routine progression of care       Report consisted of patients Situation, Background, Assessment and   Recommendations(SBAR). Information from the following report(s) SBAR, Kardex, ED Summary, Procedure Summary, Intake/Output, MAR, Recent Results, Med Rec Status, Cardiac Rhythm nsr/st and Alarm Parameters  was reviewed with the receiving nurse. Lines:   Peripheral IV 05/16/20 Right Antecubital (Active)   Site Assessment Clean, dry, & intact 5/18/2020  6:16 PM   Phlebitis Assessment 0 5/18/2020  6:16 PM   Infiltration Assessment 0 5/18/2020  6:16 PM   Dressing Status Clean, dry, & intact 5/18/2020  6:16 PM   Dressing Type Transparent 5/18/2020  6:16 PM   Hub Color/Line Status Patent; Flushed 5/18/2020  3:00 PM   Action Taken Blood drawn 5/18/2020 11:00 AM   Alcohol Cap Used No 5/18/2020  3:00 PM       Peripheral IV Left Forearm (Active)   Site Assessment Clean, dry, & intact 5/18/2020  6:16 PM   Phlebitis Assessment 0 5/18/2020  6:16 PM   Infiltration Assessment 0 5/18/2020  6:16 PM   Dressing Status Clean, dry, & intact 5/18/2020  6:16 PM   Dressing Type Transparent 5/18/2020  6:16 PM   Hub Color/Line Status Patent; Infusing 5/18/2020  3:00 PM   Alcohol Cap Used No 5/18/2020  3:00 PM        Opportunity for questions and clarification was provided.       Patient transported with:   monitor, RN, 2L NC

## 2020-05-19 LAB
ALBUMIN SERPL-MCNC: 1.6 G/DL (ref 3.2–4.6)
ALBUMIN/GLOB SERPL: 0.4 {RATIO} (ref 1.2–3.5)
ALP SERPL-CCNC: 89 U/L (ref 50–136)
ALT SERPL-CCNC: 50 U/L (ref 12–65)
ANION GAP SERPL CALC-SCNC: 7 MMOL/L (ref 7–16)
APTT PPP: 97.1 SEC (ref 24.3–35.4)
AST SERPL-CCNC: 70 U/L (ref 15–37)
ATRIAL RATE: 112 BPM
BASOPHILS # BLD: 0 K/UL (ref 0–0.2)
BASOPHILS NFR BLD: 0 % (ref 0–2)
BILIRUB SERPL-MCNC: 0.5 MG/DL (ref 0.2–1.1)
BUN SERPL-MCNC: 16 MG/DL (ref 8–23)
CALCIUM SERPL-MCNC: 7.2 MG/DL (ref 8.3–10.4)
CALCULATED R AXIS, ECG10: 43 DEGREES
CALCULATED T AXIS, ECG11: 81 DEGREES
CHLORIDE SERPL-SCNC: 106 MMOL/L (ref 98–107)
CO2 SERPL-SCNC: 26 MMOL/L (ref 21–32)
CREAT SERPL-MCNC: 0.72 MG/DL (ref 0.8–1.5)
DIAGNOSIS, 93000: NORMAL
DIFFERENTIAL METHOD BLD: ABNORMAL
EOSINOPHIL # BLD: 0 K/UL (ref 0–0.8)
EOSINOPHIL NFR BLD: 0 % (ref 0.5–7.8)
ERYTHROCYTE [DISTWIDTH] IN BLOOD BY AUTOMATED COUNT: 14.3 % (ref 11.9–14.6)
EST. AVERAGE GLUCOSE BLD GHB EST-MCNC: 157 MG/DL
GLOBULIN SER CALC-MCNC: 3.9 G/DL (ref 2.3–3.5)
GLUCOSE BLD STRIP.AUTO-MCNC: 103 MG/DL (ref 65–100)
GLUCOSE BLD STRIP.AUTO-MCNC: 124 MG/DL (ref 65–100)
GLUCOSE BLD STRIP.AUTO-MCNC: 237 MG/DL (ref 65–100)
GLUCOSE SERPL-MCNC: 118 MG/DL (ref 65–100)
HBA1C MFR BLD: 7.1 % (ref 4.8–6)
HCT VFR BLD AUTO: 29.8 % (ref 41.1–50.3)
HGB BLD-MCNC: 9.1 G/DL (ref 13.6–17.2)
IMM GRANULOCYTES # BLD AUTO: 0.1 K/UL (ref 0–0.5)
IMM GRANULOCYTES NFR BLD AUTO: 1 % (ref 0–5)
LYMPHOCYTES # BLD: 0.8 K/UL (ref 0.5–4.6)
LYMPHOCYTES NFR BLD: 8 % (ref 13–44)
MAGNESIUM SERPL-MCNC: 2.1 MG/DL (ref 1.8–2.4)
MCH RBC QN AUTO: 26.5 PG (ref 26.1–32.9)
MCHC RBC AUTO-ENTMCNC: 30.5 G/DL (ref 31.4–35)
MCV RBC AUTO: 86.6 FL (ref 79.6–97.8)
MONOCYTES # BLD: 0.5 K/UL (ref 0.1–1.3)
MONOCYTES NFR BLD: 6 % (ref 4–12)
NEUTS SEG # BLD: 7.6 K/UL (ref 1.7–8.2)
NEUTS SEG NFR BLD: 85 % (ref 43–78)
NRBC # BLD: 0 K/UL (ref 0–0.2)
P-R INTERVAL, ECG05: 208 MS
PLATELET # BLD AUTO: 121 K/UL (ref 150–450)
PMV BLD AUTO: 9.6 FL (ref 9.4–12.3)
POTASSIUM SERPL-SCNC: 3.3 MMOL/L (ref 3.5–5.1)
PROT SERPL-MCNC: 5.5 G/DL (ref 6.3–8.2)
Q-T INTERVAL, ECG07: 352 MS
QRS DURATION, ECG06: 88 MS
QTC CALCULATION (BEZET), ECG08: 480 MS
RBC # BLD AUTO: 3.44 M/UL (ref 4.23–5.6)
SODIUM SERPL-SCNC: 139 MMOL/L (ref 136–145)
VENTRICULAR RATE, ECG03: 112 BPM
WBC # BLD AUTO: 9 K/UL (ref 4.3–11.1)

## 2020-05-19 PROCEDURE — 74011636320 HC RX REV CODE- 636/320: Performed by: INTERNAL MEDICINE

## 2020-05-19 PROCEDURE — 77030015766

## 2020-05-19 PROCEDURE — 99152 MOD SED SAME PHYS/QHP 5/>YRS: CPT

## 2020-05-19 PROCEDURE — 85025 COMPLETE CBC W/AUTO DIFF WBC: CPT

## 2020-05-19 PROCEDURE — C1894 INTRO/SHEATH, NON-LASER: HCPCS

## 2020-05-19 PROCEDURE — 77030016699 HC CATH ANGI DX INFN1 CARD -A

## 2020-05-19 PROCEDURE — 74011250637 HC RX REV CODE- 250/637: Performed by: HOSPITALIST

## 2020-05-19 PROCEDURE — 74011250636 HC RX REV CODE- 250/636: Performed by: HOSPITALIST

## 2020-05-19 PROCEDURE — 80053 COMPREHEN METABOLIC PANEL: CPT

## 2020-05-19 PROCEDURE — 83735 ASSAY OF MAGNESIUM: CPT

## 2020-05-19 PROCEDURE — 82306 VITAMIN D 25 HYDROXY: CPT

## 2020-05-19 PROCEDURE — 74011250636 HC RX REV CODE- 250/636: Performed by: FAMILY MEDICINE

## 2020-05-19 PROCEDURE — 65660000000 HC RM CCU STEPDOWN

## 2020-05-19 PROCEDURE — 74011000250 HC RX REV CODE- 250: Performed by: INTERNAL MEDICINE

## 2020-05-19 PROCEDURE — 36415 COLL VENOUS BLD VENIPUNCTURE: CPT

## 2020-05-19 PROCEDURE — 74011250636 HC RX REV CODE- 250/636: Performed by: INTERNAL MEDICINE

## 2020-05-19 PROCEDURE — B2111ZZ FLUOROSCOPY OF MULTIPLE CORONARY ARTERIES USING LOW OSMOLAR CONTRAST: ICD-10-PCS | Performed by: INTERNAL MEDICINE

## 2020-05-19 PROCEDURE — C1769 GUIDE WIRE: HCPCS

## 2020-05-19 PROCEDURE — C8929 TTE W OR WO FOL WCON,DOPPLER: HCPCS

## 2020-05-19 PROCEDURE — 85730 THROMBOPLASTIN TIME PARTIAL: CPT

## 2020-05-19 PROCEDURE — 94760 N-INVAS EAR/PLS OXIMETRY 1: CPT

## 2020-05-19 PROCEDURE — 77010033678 HC OXYGEN DAILY

## 2020-05-19 PROCEDURE — 82962 GLUCOSE BLOOD TEST: CPT

## 2020-05-19 PROCEDURE — 74011636637 HC RX REV CODE- 636/637: Performed by: HOSPITALIST

## 2020-05-19 PROCEDURE — 93454 CORONARY ARTERY ANGIO S&I: CPT

## 2020-05-19 PROCEDURE — 4A023N7 MEASUREMENT OF CARDIAC SAMPLING AND PRESSURE, LEFT HEART, PERCUTANEOUS APPROACH: ICD-10-PCS | Performed by: INTERNAL MEDICINE

## 2020-05-19 PROCEDURE — 74011000250 HC RX REV CODE- 250: Performed by: FAMILY MEDICINE

## 2020-05-19 PROCEDURE — 83036 HEMOGLOBIN GLYCOSYLATED A1C: CPT

## 2020-05-19 PROCEDURE — 74011250637 HC RX REV CODE- 250/637: Performed by: INTERNAL MEDICINE

## 2020-05-19 PROCEDURE — B2151ZZ FLUOROSCOPY OF LEFT HEART USING LOW OSMOLAR CONTRAST: ICD-10-PCS | Performed by: INTERNAL MEDICINE

## 2020-05-19 RX ORDER — LIDOCAINE HYDROCHLORIDE 10 MG/ML
2-20 INJECTION, SOLUTION EPIDURAL; INFILTRATION; INTRACAUDAL; PERINEURAL
Status: DISCONTINUED | OUTPATIENT
Start: 2020-05-19 | End: 2020-05-20

## 2020-05-19 RX ORDER — HEPARIN SODIUM 200 [USP'U]/100ML
2 INJECTION, SOLUTION INTRAVENOUS CONTINUOUS
Status: ACTIVE | OUTPATIENT
Start: 2020-05-19 | End: 2020-05-19

## 2020-05-19 RX ORDER — POTASSIUM CHLORIDE 14.9 MG/ML
20 INJECTION INTRAVENOUS ONCE
Status: COMPLETED | OUTPATIENT
Start: 2020-05-19 | End: 2020-05-19

## 2020-05-19 RX ORDER — MIDAZOLAM HYDROCHLORIDE 1 MG/ML
.5-2 INJECTION, SOLUTION INTRAMUSCULAR; INTRAVENOUS
Status: DISCONTINUED | OUTPATIENT
Start: 2020-05-19 | End: 2020-05-20

## 2020-05-19 RX ADMIN — METOPROLOL SUCCINATE 25 MG: 50 TABLET, EXTENDED RELEASE ORAL at 09:12

## 2020-05-19 RX ADMIN — LIDOCAINE HYDROCHLORIDE 4 ML: 10 INJECTION, SOLUTION EPIDURAL; INFILTRATION; INTRACAUDAL; PERINEURAL at 11:30

## 2020-05-19 RX ADMIN — SODIUM CHLORIDE 100 ML/HR: 900 INJECTION, SOLUTION INTRAVENOUS at 11:00

## 2020-05-19 RX ADMIN — VERAPAMIL HYDROCHLORIDE 2 ML: 5 INJECTION INTRAVENOUS at 11:35

## 2020-05-19 RX ADMIN — SODIUM CHLORIDE 250 MG: 900 INJECTION, SOLUTION INTRAVENOUS at 09:11

## 2020-05-19 RX ADMIN — POTASSIUM CHLORIDE 20 MEQ: 200 INJECTION, SOLUTION INTRAVENOUS at 09:11

## 2020-05-19 RX ADMIN — HEPARIN SODIUM 22 UNITS/KG/HR: 5000 INJECTION, SOLUTION INTRAVENOUS at 03:27

## 2020-05-19 RX ADMIN — MIDAZOLAM 2 MG: 1 INJECTION INTRAMUSCULAR; INTRAVENOUS at 11:30

## 2020-05-19 RX ADMIN — INSULIN LISPRO 6 UNITS: 100 INJECTION, SOLUTION INTRAVENOUS; SUBCUTANEOUS at 17:09

## 2020-05-19 RX ADMIN — PERFLUTREN 1 ML: 6.52 INJECTION, SUSPENSION INTRAVENOUS at 08:00

## 2020-05-19 RX ADMIN — NITROGLYCERIN 0.2 MG: 200 INJECTION, SOLUTION INTRAVENOUS at 11:31

## 2020-05-19 RX ADMIN — HEPARIN SODIUM 2 ML/HR: 5000 INJECTION, SOLUTION INTRAVENOUS; SUBCUTANEOUS at 11:39

## 2020-05-19 RX ADMIN — IOPAMIDOL 70 ML: 755 INJECTION, SOLUTION INTRAVENOUS at 11:50

## 2020-05-19 RX ADMIN — METOPROLOL SUCCINATE 25 MG: 50 TABLET, EXTENDED RELEASE ORAL at 17:09

## 2020-05-19 RX ADMIN — ESCITALOPRAM OXALATE 10 MG: 10 TABLET ORAL at 09:12

## 2020-05-19 RX ADMIN — SODIUM CHLORIDE 100 ML/HR: 900 INJECTION, SOLUTION INTRAVENOUS at 21:50

## 2020-05-19 RX ADMIN — SODIUM CHLORIDE 100 ML/HR: 900 INJECTION, SOLUTION INTRAVENOUS at 00:50

## 2020-05-19 RX ADMIN — Medication 10 ML: at 13:06

## 2020-05-19 RX ADMIN — ASPIRIN 81 MG 81 MG: 81 TABLET ORAL at 09:12

## 2020-05-19 RX ADMIN — ATORVASTATIN CALCIUM 20 MG: 20 TABLET, FILM COATED ORAL at 21:54

## 2020-05-19 RX ADMIN — Medication 10 ML: at 21:55

## 2020-05-19 NOTE — PROCEDURES
Cardiac Catheterization Procedure Note    Patient ID:     Name: Danitza Mathew   Medical Record Number: [id number]   YOB: 1960    Date of Procedure: 5/19/2020     Pre-procedure Diagnosis:  NSTEMI    Post-procedure Diagnosis: Coronary Artery Disease    Reason for Procedure: Suspected CAD    Blood loss less than 5 ml    Sedation. Pt received 2 mg versed and 0 mcg fentanyl for monitored conscious sedation from 1130to 1150. Nurse leatha    Specimen: None    No complications    No assistants    Time out, Mallampati, and ASA performed    Procedure:  After informed consent, patient was prepped and draped in the usual sterile fashion. Right radial approach was used. 70cc Visipaque contrast were utilized for the entire procedure. no closure device used        FINDINGS    Left Ventricle: not done. By echo pt appears to have mobile veg on aortic valve  LVEDP: not done    Left Main:ok    Left Anterior descending coronary artery: mild disease 30-40 prox lesion       Left Circumflex coronary artery: mild disease in circ proper. OM 1 occluded. CATA 0 flow. Unclear if this is plaque ruptue or embolic event        Right coronary artery: mild disease            Graft anatomy: na    Intervention if done: na    Conclusions: subacute OM occlusion    Recommentations: med tx    No complications    Family and or significant other were sought out and discussion of the procedure and findings took place. Procedure and findings including pertinent sequele were discussed with the patient immediately post procedure. Opportunity to ask questions was offered. If no one was available in the post procedure waiting area, I can be reached thru paging system or at [phone].           Signed By: Daisy Chandler MD

## 2020-05-19 NOTE — PROGRESS NOTES
Date of Outreach Update:  Chavo Birch was seen and assessed. MEWS Score: 1 (05/19/20 0038)    Vitals:    05/18/20 1726 05/18/20 1851 05/18/20 2054 05/19/20 0038   BP: 110/66 117/69 106/67 104/63   Pulse: (!) 114 (!) 113 (!) 101 87   Resp:  19 20 18   Temp:  100.1 °F (37.8 °C) 98.3 °F (36.8 °C) 97.9 °F (36.6 °C)   SpO2:  97% 95% 97%   Weight:       Height:              Previous Outreach assessment has been reviewed. There have been no significant clinical changes since the completion of the last dated Outreach assessment. Will continue to follow up per outreach protocol.     Signed By:   Marci Ferguson RN    May 19, 2020 1:28 AM

## 2020-05-19 NOTE — PROGRESS NOTES
Problem: Falls - Risk of  Goal: *Absence of Falls  Description: Document Fred Hankins Fall Risk and appropriate interventions in the flowsheet.   Outcome: Progressing Towards Goal  Note: Fall Risk Interventions:  Mobility Interventions: Communicate number of staff needed for ambulation/transfer, Patient to call before getting OOB         Medication Interventions: Patient to call before getting OOB, Teach patient to arise slowly    Elimination Interventions: Bed/chair exit alarm, Call light in reach, Patient to call for help with toileting needs, Toilet paper/wipes in reach, Toileting schedule/hourly rounds

## 2020-05-19 NOTE — ROUTINE PROCESS
Bedside and Verbal report given to Bao Abbott RN by self.  Report included SBAR, Kardex, ED summary, procedure summary, recent results and cardiac rhythm SR.

## 2020-05-19 NOTE — PROGRESS NOTES
A urinalysis, blood cultures and lactate was ordered this evening due to Pt. having temp of 100.1 and pulse of 111. His urinalysis came back and had blood in it. His platelets are 950 and he is on a heparin drip and his HGB is 9.1. Dr. Santiago Willard notified of lab results. No new orders at this time. Will continue to monitor Pt.

## 2020-05-19 NOTE — PROGRESS NOTES
Return to floor from cath lab/    Patient placed on tele and eagle monitor, blood pressure cycling every 15 minutes. Right radial site assessed, TR band in place, free of bleeding and hematoma. Patient educated on limited movement.

## 2020-05-19 NOTE — PROGRESS NOTES
Critical Care Outreach Nurse Progress Report:    Subjective: In to assess pt secondary to transfer from ICU. MEWS Score: 4 (05/18/20 1500)    Vitals:    05/18/20 1600 05/18/20 1726 05/18/20 1851 05/18/20 2054   BP: 115/69 110/66 117/69 106/67   Pulse: (!) 109 (!) 114 (!) 113 (!) 101   Resp: (!) 36  19 20   Temp:   100.1 °F (37.8 °C) 98.3 °F (36.8 °C)   SpO2: 98%  97% 95%   Weight:       Height:          Objective: Pt watching TV in bed. Heparin gtt infusing. Assessment: Pt is A&O x4 and appears to be in NAD at this time. Lungs CTA bilaterally, O2 sat 97% on 2L NC . Pt denies any pain and voices no complaints at this time. Plan: Will continue to follow per outreach program protocol. Arsh Joshua RN.

## 2020-05-19 NOTE — PROGRESS NOTES
Problem: Falls - Risk of  Goal: *Absence of Falls  Description: Document Fidelia Anna Fall Risk and appropriate interventions in the flowsheet.   Outcome: Progressing Towards Goal  Note: Fall Risk Interventions:  Mobility Interventions: Bed/chair exit alarm         Medication Interventions: Teach patient to arise slowly, Patient to call before getting OOB    Elimination Interventions: Call light in reach              Problem: Patient Education: Go to Patient Education Activity  Goal: Patient/Family Education  Outcome: Progressing Towards Goal

## 2020-05-19 NOTE — PROGRESS NOTES
TRANSFER - IN REPORT:    Verbal report received from Clive Urbina RN(name) on Bassem Comer  being received from cath lab(unit) for routine progression of care      Report consisted of patients Situation, Background, Assessment and   Recommendations(SBAR). Information from the following report(s) Procedure Summary was reviewed with the receiving nurse. Opportunity for questions and clarification was provided. Assessment completed upon patients arrival to unit and care assumed.

## 2020-05-19 NOTE — PROGRESS NOTES
TRANSFER - OUT REPORT:    Verbal report given to Manuela ALBA(name) on Bassem Comer  being transferred to [facility](unit) for routine progression of care       Report consisted of patients Situation, Background, Assessment and   Recommendations(SBAR). Information from the following report(s) Procedure Summary was reviewed with the receiving nurse. Lines:   Peripheral IV 05/16/20 Right Antecubital (Active)   Site Assessment Clean, dry, & intact 5/19/2020  8:59 AM   Phlebitis Assessment 0 5/19/2020  8:59 AM   Infiltration Assessment 0 5/19/2020  8:59 AM   Dressing Status Clean, dry, & intact 5/19/2020  8:59 AM   Dressing Type Tape;Transparent 5/19/2020  8:59 AM   Hub Color/Line Status Patent 5/19/2020  8:59 AM   Action Taken Blood drawn 5/18/2020 11:00 AM   Alcohol Cap Used No 5/19/2020  3:31 AM       Peripheral IV Left Forearm (Active)   Site Assessment Clean, dry, & intact 5/19/2020  8:59 AM   Phlebitis Assessment 0 5/19/2020  8:59 AM   Infiltration Assessment 0 5/19/2020  8:59 AM   Dressing Status Clean, dry, & intact 5/19/2020  8:59 AM   Dressing Type Tape;Transparent 5/19/2020  8:59 AM   Hub Color/Line Status Patent 5/19/2020  8:59 AM   Alcohol Cap Used No 5/19/2020  3:31 AM        Opportunity for questions and clarification was provided.       Patient transported with:   Registered Nurse

## 2020-05-19 NOTE — PROGRESS NOTES
Care Management Interventions  PCP Verified by CM: Yes  Last Visit to PCP: 05/04/20  Mode of Transport at Discharge: Other (see comment)(Camelia Dawkins Spouse [phone] )  Transition of Care Consult (CM Consult): Discharge Planning  Discharge Durable Medical Equipment: No  Physical Therapy Consult: No  Occupational Therapy Consult: No  Current Support Network: Family Lives Nearby, Lives Alone  Confirm Follow Up Transport: Family   Resource Information Provided?: ([place])  Discharge Location  Discharge Placement: Home    Pt admitted to 3rd floor [facility] for NSTEMI. Covid negative. . CM met with pt to discuss CM needs & DCP. Pt is A&Ox4. Pt is indep at home with all ADLS. Pt lives alone in a house, minimal stairs. Pt prefers his sister listed as emergency contact over spouse. Pt's sister lives in [place]. . Pt has no DME needs. Pt has no difficulty with obtaining medications or transport. DCP home with spouse unless pt's sister in town. Pt verifies regular follow up with Dr Racquel Salas. CM to continue to monitor for CM needs.

## 2020-05-19 NOTE — PROGRESS NOTES
Progress Note    Patient: Leonor Servin MRN: [id number]  SSN: [id number]    YOB: 1960  Age: 62 y.o. Sex: male      Admit Date: 5/16/2020    LOS: 3 days     Subjective:   F/U NSTEMI    \"55 yo male with PMH of CAD (non obstructive  Per [facility] 2018) , DIEGO on CPAP, AS s/p AVR, HTN admitted with acute chest pain and elevated troponin, NSTEMI. He was seen by cardiology April, 2020 s/p low risk NM stress testing and ECHO 50% EF. He has been tested for COVID 19 due to constellation of petechial LE rash, elevated ddimer, mild thrombocytopenia. CTA chest negative. \" Troponin 2.08 -->24.9-->36.10. Found to have normocytic anemia. Hg baseline 9.3. Denies ever having colonoscopy or EGD. Iron 16, TIBC 173, transferrin 9, ferritin 910, and haptoglobin 131. IV iron infusions given. No obvious bleeding. A1C 7.1. Platelets continuing to trend down. Hg stable. PTT elevated. K 3.3. No chest pain or SOB.    Current Facility-Administered Medications   Medication Dose Route Frequency    ferric gluconate (FERRLECIT) 250 mg in 0.9% sodium chloride 250 mL IVPB  250 mg IntraVENous DAILY    potassium chloride 20 mEq in 100 ml IVPB  20 mEq IntraVENous ONCE    perflutren lipid microspheres (DEFINITY) in NS bolus IV  1 mL IntraVENous PRN    metoprolol succinate (TOPROL-XL) XL tablet 25 mg  25 mg Oral BID    insulin lispro (HUMALOG) injection   SubCUTAneous AC&HS    escitalopram oxalate (LEXAPRO) tablet 10 mg  10 mg Oral DAILY    atorvastatin (LIPITOR) tablet 20 mg  20 mg Oral QHS    aspirin chewable tablet 81 mg  81 mg Oral DAILY    sodium chloride (NS) flush 5-40 mL  5-40 mL IntraVENous Q8H    sodium chloride (NS) flush 5-40 mL  5-40 mL IntraVENous PRN    acetaminophen (TYLENOL) tablet 650 mg  650 mg Oral Q6H PRN    0.9% sodium chloride infusion  100 mL/hr IntraVENous CONTINUOUS    morphine 10 mg/ml injection 2 mg  2 mg IntraVENous Q3H PRN    nitroglycerin (NITROSTAT) tablet 0.4 mg  0.4 mg SubLINGual Q5MIN PRN    ondansetron (ZOFRAN) injection 4 mg  4 mg IntraVENous Q4H PRN       Objective:     Vitals:    05/18/20 2054 05/19/20 0038 05/19/20 0454 05/19/20 0932   BP: 106/67 104/63 95/62 103/63   Pulse: (!) 101 87 96 95   Resp: 20 18 18 20   Temp: 98.3 °F (36.8 °C) 97.9 °F (36.6 °C) 97.9 °F (36.6 °C) 98.3 °F (36.8 °C)   SpO2: 95% 97% 91% 97%   Weight:   71.3 kg (157 lb 3.2 oz)    Height:             Intake and Output:  Current Shift: No intake/output data recorded. Last three shifts: 05/17 1901 - 05/19 0700  In: 4958.1 [P.O.:720; I.V.:4238.1]  Out: 7241 [Urine:1495]    Physical Exam:   General:  Alert, cooperative, no distress. Pale. Frail appearing. Eyes:  Conjunctivae/corneas clear. Ears:  Normal TMs and external ear canals both ears. Nose: Nares normal. Septum midline. Mouth/Throat: Lips, mucosa, and tongue normal. Teeth and gums normal.   Neck: S no JVD. Back:   deferred. Lungs:   Clear to auscultation bilaterally. Heart:  Regular rate and rhythm, S1, S2 normal   Abdomen:   Soft, non-tender. Bowel sounds normal.    Extremities: Petechiae to LE bilaterally . Pulses: 2+ and symmetric all extremities. Skin: As above   Lymph nodes: Cervical, supraclavicular, and axillary nodes normal.   Neurologic: CNII-XII intact. Good ROM in bed.         Lab/Data Review:    Recent Results (from the past 24 hour(s))   PTT    Collection Time: 05/18/20 10:12 AM   Result Value Ref Range    aPTT 94.0 (H) 24.3 - 35.4 SEC   GLUCOSE, POC    Collection Time: 05/18/20 10:49 AM   Result Value Ref Range    Glucose (POC) 176 (H) 65 - 100 mg/dL   GLUCOSE, POC    Collection Time: 05/18/20  4:01 PM   Result Value Ref Range    Glucose (POC) 125 (H) 65 - 100 mg/dL   PTT    Collection Time: 05/18/20  5:48 PM   Result Value Ref Range    aPTT 81.4 (H) 24.3 - 35.4 SEC   EKG, 12 LEAD, SUBSEQUENT    Collection Time: 05/18/20  6:55 PM   Result Value Ref Range    Ventricular Rate 112 BPM    Atrial Rate 112 BPM    P-R Interval 208 ms    QRS Duration 88 ms    Q-T Interval 352 ms    QTC Calculation (Bezet) 480 ms    Calculated R Axis 43 degrees    Calculated T Axis 81 degrees    Diagnosis       Sinus tachycardia  Otherwise normal ECG  When compared with ECG of 17-MAY-2020 02:40,  MD interval has decreased  Nonspecific T wave abnormality no longer evident in Inferior leads  Nonspecific T wave abnormality no longer evident in Anterolateral leads  Confirmed by Bishop Red MARINA (), Danielle Cano ([id number]) on 5/19/2020 7:45:04 AM     CULTURE, BLOOD    Collection Time: 05/18/20  7:38 PM   Result Value Ref Range    Special Requests: RIGHT  ARM        Culture result: NO GROWTH AFTER 10 HOURS     LACTIC ACID    Collection Time: 05/18/20  7:38 PM   Result Value Ref Range    Lactic acid 1.8 0.4 - 2.0 MMOL/L   URINALYSIS W/ RFLX MICROSCOPIC    Collection Time: 05/18/20  7:42 PM   Result Value Ref Range    Color ORANGE      Appearance CLOUDY      Specific gravity 1.027 (H) 1.001 - 1.023      pH (UA) 6.0 5.0 - 9.0      Protein 100 (A) NEG mg/dL    Glucose Negative mg/dL    Ketone TRACE (A) NEG mg/dL    Bilirubin MODERATE (A) NEG      Blood LARGE (A) NEG      Urobilinogen >8.0 (H) 0.2 - 1.0 EU/dL    Nitrites Negative NEG      Leukocyte Esterase TRACE (A) NEG      WBC 0-3 0 /hpf    RBC 3-5 0 /hpf    Bacteria 0 0 /hpf    Amorphous Crystals 2+ (H) 0    Yeast MARKED      Other observations RESULTS VERIFIED MANUALLY     CULTURE, BLOOD    Collection Time: 05/18/20  7:44 PM   Result Value Ref Range    Special Requests: RIGHT  HAND        Culture result: NO GROWTH AFTER 10 HOURS     GLUCOSE, POC    Collection Time: 05/18/20  9:19 PM   Result Value Ref Range    Glucose (POC) 169 (H) 65 - 100 mg/dL   CBC WITH AUTOMATED DIFF    Collection Time: 05/19/20  3:46 AM   Result Value Ref Range    WBC 9.0 4.3 - 11.1 K/uL    RBC 3.44 (L) 4.23 - 5.6 M/uL    HGB 9.1 (L) 13.6 - 17.2 g/dL    HCT 29.8 (L) 41.1 - 50.3 %    MCV 86.6 79.6 - 97.8 FL    MCH 26.5 26.1 - 32.9 PG    MCHC 30.5 (L) 31.4 - 35.0 g/dL    RDW 14.3 11.9 - 14.6 %    PLATELET 363 (L) 868 - 450 K/uL    MPV 9.6 9.4 - 12.3 FL    ABSOLUTE NRBC 0.00 0.0 - 0.2 K/uL    DF AUTOMATED      NEUTROPHILS 85 (H) 43 - 78 %    LYMPHOCYTES 8 (L) 13 - 44 %    MONOCYTES 6 4.0 - 12.0 %    EOSINOPHILS 0 (L) 0.5 - 7.8 %    BASOPHILS 0 0.0 - 2.0 %    IMMATURE GRANULOCYTES 1 0.0 - 5.0 %    ABS. NEUTROPHILS 7.6 1.7 - 8.2 K/UL    ABS. LYMPHOCYTES 0.8 0.5 - 4.6 K/UL    ABS. MONOCYTES 0.5 0.1 - 1.3 K/UL    ABS. EOSINOPHILS 0.0 0.0 - 0.8 K/UL    ABS. BASOPHILS 0.0 0.0 - 0.2 K/UL    ABS. IMM. GRANS. 0.1 0.0 - 0.5 K/UL   METABOLIC PANEL, COMPREHENSIVE    Collection Time: 05/19/20  3:46 AM   Result Value Ref Range    Sodium 139 136 - 145 mmol/L    Potassium 3.3 (L) 3.5 - 5.1 mmol/L    Chloride 106 98 - 107 mmol/L    CO2 26 21 - 32 mmol/L    Anion gap 7 7 - 16 mmol/L    Glucose 118 (H) 65 - 100 mg/dL    BUN 16 8 - 23 MG/DL    Creatinine 0.72 (L) 0.8 - 1.5 MG/DL    GFR est AA >60 >60 ml/min/1.73m2    GFR est non-AA >60 >60 ml/min/1.73m2    Calcium 7.2 (L) 8.3 - 10.4 MG/DL    Bilirubin, total 0.5 0.2 - 1.1 MG/DL    ALT (SGPT) 50 12 - 65 U/L    AST (SGOT) 70 (H) 15 - 37 U/L    Alk.  phosphatase 89 50 - 136 U/L    Protein, total 5.5 (L) 6.3 - 8.2 g/dL    Albumin 1.6 (L) 3.2 - 4.6 g/dL    Globulin 3.9 (H) 2.3 - 3.5 g/dL    A-G Ratio 0.4 (L) 1.2 - 3.5     MAGNESIUM    Collection Time: 05/19/20  3:46 AM   Result Value Ref Range    Magnesium 2.1 1.8 - 2.4 mg/dL   HEMOGLOBIN A1C WITH EAG    Collection Time: 05/19/20  3:46 AM   Result Value Ref Range    Hemoglobin A1c 7.1 (H) 4.8 - 6.0 %    Est. average glucose 157 mg/dL   GLUCOSE, POC    Collection Time: 05/19/20  6:07 AM   Result Value Ref Range    Glucose (POC) 124 (H) 65 - 100 mg/dL   PTT    Collection Time: 05/19/20  6:16 AM   Result Value Ref Range    aPTT 97.1 (H) 24.3 - 35.4 SEC       Assessment/ Plan:     Principal Problem:    N[facility] (non-ST elevated [facility] infarction[facility] (5/16/2020)    Active Problems:    Diastolic CHF, chronic (HCC) (5/7/2018)      S/P aortic valve replacement (4/1/2018)      CAD (coronary artery disease) ()      Overview: Nonobstructive      DIEGO on CPAP ()      Dyslipidemia ()      Essential hypertension (9/21/2018)      Suspected COVID-19 virus infection (5/16/2020)      Petechiae (5/16/2020)      Anemia (5/16/2020)    CAD with NSTEMI- Plan for cardiac cath today. Heparin drip has been held due to anemia and procedure for today. ASA, statin, BB    Petechia - Drug reaction? Possibly from thrombocytopenia? COVID testing pending. Will need outpatient dermatology appointment. Normocytic anemia with thrombocytopenia - follow CBC. Iron infusions. Will start oral iron after infusions complete. Will need to establish care with GI for colon cancer screening at discharge. Hyponatremia - Resolved. Hypokalemia - Supplement. DM type II - A1C 7.1. Change diet to ADA.  SS. Will need outpatient diabetic education    DVT prophylaxis - SCDs  Signed By: Tom Gaming DO     May 19, 2020

## 2020-05-19 NOTE — PROCEDURES
[facility]  CARDIAC CATH    Name:  Elijah Nunez  MR#:  [id number]  :  1960  ACCOUNT #:  [de-identified]  DATE OF SERVICE:    PROCEDURES PERFORMED:  Left heart cath, selective coronary angiography, left ventriculogram, coronaries only. 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 PREPROCEDURE DIAGNOSIS:  nstemi. POSTPROCEDURE DIAGNOSIS:  Subacutely occluded OM. COMPLICATIONS:  None. :  Jax Esparza MD    ASSISTANT:  None. ANESTHESIA/SEDATION:  2 mg of Versed given between 11:30 and 11:50 by nurse Riaz Acevedo RN. TIG-4, JR-5 were used for catheters. Aortic pressure 86/55. CONTRAST:  60 mL. ESTIMATED BLOOD LOSS:  3 mL. IMPLANTS:  None. SPECIMENS REMOVED:  None. FINDINGS:  Left main was engaged with a TIG-4 without difficulty. Imaging was obtained which showed that the left main had no significant disease. Bifurcates into LAD and circumflex. LAD courses the apex and had a 40% proximal lesion. There was mild nonobstructive 20-30% disease in the mid distal LAD. The two diagonals had mild nonobstructive disease. Circumflex artery in the AV groove is nondominant, supplies a proximal OM1 and an OM2. The circumflex proper has mild nonobstructive disease. The OM1 has what appears to be an occluded OM with CATA zero flow. This is probably subacute occlusion from several days ago. There is CATA zero flow. There are no collaterals in a left-to-left or right-to-left fashion. The OM2 and remainder of the circumflex have mild nonobstructive disease. Right coronary artery arises off the right cusp, courses in the AV groove and has mild nonobstructive 20% irregularity throughout. Posterior descending and posterolateral are patent. Left ventriculogram was not performed due to bioprosthetic aortic valve vegetation.     OM was not intervened on secondary to the fact that this likely infarcted at least four days ago and is outside the window for therapy. It is also unknown whether this is a ruptured plaque or possibly an embolic event and stenting would be potentially contraindicated for the development of mycotic aneurysm. The patient also has decreasing platelets and long-term anticoagulation could be problematic. YARON will now be performed in the next 24-48 hours to assess the severity of the aortic valve vegetation.       Toni Ramirez MD      [id number]  D:  05/19/2020 12:12  T:  05/19/2020 16:40  JOB #:  [id number]

## 2020-05-19 NOTE — PROGRESS NOTES
am  5/19/2020 7:02 AM    Admit Date: 5/16/2020    Admit Diagnosis: NSTEMI (non-ST elevated myocardial infarction) ([facility] [I21.4]; Suspected COVID-19 virus infection [Z20.828]      Subjective:    Patient covid neg and out of unit. Labs appear stable. hgb is not normal but prob ok for cath.     Objective:      Visit Vitals  BP 95/62 (BP 1 Location: Left arm, BP Patient Position: At rest)   Pulse 96   Temp 97.9 °F (36.6 °C)   Resp 18   Ht 6' 1\" (1.854 m)   Wt 71.3 kg (157 lb 3.2 oz)   SpO2 91%   BMI 20.74 kg/m²       ROS:  General ROS: negative for - chills  Hematological and Lymphatic ROS: negative for - blood clots or jaundice  Respiratory ROS: no cough, shortness of breath, or wheezing  Cardiovascular ROS: no chest pain or dyspnea on exertion  Gastrointestinal ROS: no abdominal pain, change in bowel habits, or black or bloody stools  Neurological ROS: no TIA or stroke symptoms    Physical Exam:    Physical Examination: General appearance - alert, well appearing, and in no distress  Mental status - alert, oriented to person, place, and time  Eyes - pupils equal and reactive, extraocular eye movements intact  Neck/lymph - supple, no significant adenopathy  Chest/CV - clear to auscultation, no wheezes, rales or rhonchi, symmetric air entry  Heart - normal rate, regular rhythm, normal S1, S2, no murmurs, rubs, clicks or gallops  Abdomen/GI - soft, nontender, nondistended, no masses or organomegaly  Musculoskeletal - no joint tenderness, deformity or swelling  Extremities - peripheral pulses normal, no pedal edema, no clubbing or cyanosis  Skin - normal coloration and turgor, no rashes, no suspicious skin lesions noted    Current Facility-Administered Medications   Medication Dose Route Frequency    metoprolol succinate (TOPROL-XL) XL tablet 25 mg  25 mg Oral BID    insulin lispro (HUMALOG) injection   SubCUTAneous AC&HS    heparin 25,000 units in dextrose 500 mL infusion  12-25 Units/kg/hr IntraVENous TITRATE    escitalopram oxalate (LEXAPRO) tablet 10 mg  10 mg Oral DAILY    atorvastatin (LIPITOR) tablet 20 mg  20 mg Oral QHS    aspirin chewable tablet 81 mg  81 mg Oral DAILY    sodium chloride (NS) flush 5-40 mL  5-40 mL IntraVENous Q8H    sodium chloride (NS) flush 5-40 mL  5-40 mL IntraVENous PRN    acetaminophen (TYLENOL) tablet 650 mg  650 mg Oral Q6H PRN    0.9% sodium chloride infusion  100 mL/hr IntraVENous CONTINUOUS    morphine 10 mg/ml injection 2 mg  2 mg IntraVENous Q3H PRN    nitroglycerin (NITROSTAT) tablet 0.4 mg  0.4 mg SubLINGual Q5MIN PRN    ondansetron (ZOFRAN) injection 4 mg  4 mg IntraVENous Q4H PRN       Data Review:   @LABRCNT(Na,K,BUN,CREA,WBC,HGB,HCT,PLT,INR,TRP,TCHOL*,Triglyceride*,LDL*,LDLCPOC HDL*,HDL])@    TELEMETRY: nsr    Assessment/Plan:     Principal Problem:    NSTEMI (non-ST elevated myocardial infarction) ([facility] (5/16/2020)    Active Problems:    Diastolic CHF, chronic (HCC) (5/7/2018)      S/P aortic valve replacement (4/1/2018)      CAD (coronary artery disease) ()      Overview: Nonobstructive      DIEGO on CPAP ()      Dyslipidemia ()      Essential hypertension (9/21/2018)      Suspected COVID-19 virus infection (5/16/2020)      Petechiae (5/16/2020)      Anemia (5/16/2020)    plan cath possible today Pt set up for procedure. Risks benefits and alternatives discussed. Pt agrees to proceed. Risks of bleeding infection emergent surgical procedure loss of life or limb renal failure and other known risks discussed. Pt agrees to proceed and agrees to sign consent form. Also has BAMBI and needs IV iron.      bp does not allow for ace/arb at this time    Does not need heparin gtt any more        Mildred Love MD

## 2020-05-19 NOTE — PROGRESS NOTES
Radial compression band removed at 1500 after slowly reducing air from 11 cc to zero as per hospital protocol. No bleeding or hematoma noted. 2 x 2 gauze with tegaderm placed over puncture site. The affected extremity is warm and dry to the touch. Frequent vital signs printed and placed on bedside chart. Patient instructed to call if any bleeding noted on gauze. Patient verbalized understanding the nursing instructions.

## 2020-05-19 NOTE — PROGRESS NOTES
Patient transported to room 310. Right radial assessed with Faheem Johnson RN. No bleeding or hematoma noted.

## 2020-05-19 NOTE — PROGRESS NOTES
TRANSFER - OUT REPORT:    Verbal report given to Oswaldo Koroma RN(name) on Amisha Romero  being transferred to CPRU(unit) for routine progression of care       Report consisted of patients Situation, Background, Assessment and   Recommendations(SBAR). Information from the following report(s) Procedure Summary, MAR and Cardiac Rhythm SR was reviewed with the receiving nurse. Lines:   Peripheral IV 05/16/20 Right Antecubital (Active)   Site Assessment Clean, dry, & intact 5/19/2020  8:59 AM   Phlebitis Assessment 0 5/19/2020  8:59 AM   Infiltration Assessment 0 5/19/2020  8:59 AM   Dressing Status Clean, dry, & intact 5/19/2020  8:59 AM   Dressing Type Tape;Transparent 5/19/2020  8:59 AM   Hub Color/Line Status Patent 5/19/2020  8:59 AM   Action Taken Blood drawn 5/18/2020 11:00 AM   Alcohol Cap Used No 5/19/2020  3:31 AM       Peripheral IV Left Forearm (Active)   Site Assessment Clean, dry, & intact 5/19/2020  8:59 AM   Phlebitis Assessment 0 5/19/2020  8:59 AM   Infiltration Assessment 0 5/19/2020  8:59 AM   Dressing Status Clean, dry, & intact 5/19/2020  8:59 AM   Dressing Type Tape;Transparent 5/19/2020  8:59 AM   Hub Color/Line Status Patent 5/19/2020  8:59 AM   Alcohol Cap Used No 5/19/2020  3:31 AM        Opportunity for questions and clarification was provided. Patient transported with:   Registered Nurse     [facility] Only Dr Jerome Melara only  Right radial access - R band @ 1153 w/ 11 ml air in band - site C/D? I  Versed 2 mg IV

## 2020-05-20 PROBLEM — E11.9 DIABETES MELLITUS, TYPE II (HCC): Status: ACTIVE | Noted: 2020-05-20

## 2020-05-20 PROBLEM — E55.9 VITAMIN D DEFICIENCY: Status: ACTIVE | Noted: 2020-05-20

## 2020-05-20 LAB
25(OH)D3+25(OH)D2 SERPL-MCNC: 12.6 NG/ML (ref 30–100)
ACC. NO. FROM MICRO ORDER, ACCP: ABNORMAL
ANION GAP SERPL CALC-SCNC: 9 MMOL/L (ref 7–16)
APTT PPP: 39.5 SEC (ref 24.3–35.4)
BUN SERPL-MCNC: 19 MG/DL (ref 8–23)
CALCIUM SERPL-MCNC: 7.4 MG/DL (ref 8.3–10.4)
CHLORIDE SERPL-SCNC: 107 MMOL/L (ref 98–107)
CO2 SERPL-SCNC: 23 MMOL/L (ref 21–32)
CREAT SERPL-MCNC: 0.68 MG/DL (ref 0.8–1.5)
ERYTHROCYTE [DISTWIDTH] IN BLOOD BY AUTOMATED COUNT: 14.6 % (ref 11.9–14.6)
GLUCOSE BLD STRIP.AUTO-MCNC: 109 MG/DL (ref 65–100)
GLUCOSE BLD STRIP.AUTO-MCNC: 124 MG/DL (ref 65–100)
GLUCOSE BLD STRIP.AUTO-MCNC: 158 MG/DL (ref 65–100)
GLUCOSE SERPL-MCNC: 107 MG/DL (ref 65–100)
HCT VFR BLD AUTO: 25.4 % (ref 41.1–50.3)
HGB BLD-MCNC: 8.1 G/DL (ref 13.6–17.2)
INTERPRETATION: ABNORMAL
MAGNESIUM SERPL-MCNC: 1.9 MG/DL (ref 1.8–2.4)
MCH RBC QN AUTO: 26.8 PG (ref 26.1–32.9)
MCHC RBC AUTO-ENTMCNC: 31.9 G/DL (ref 31.4–35)
MCV RBC AUTO: 84.1 FL (ref 79.6–97.8)
NRBC # BLD: 0 K/UL (ref 0–0.2)
PLATELET # BLD AUTO: 158 K/UL (ref 150–450)
PMV BLD AUTO: 9.4 FL (ref 9.4–12.3)
POTASSIUM SERPL-SCNC: 3.3 MMOL/L (ref 3.5–5.1)
RBC # BLD AUTO: 3.02 M/UL (ref 4.23–5.6)
SODIUM SERPL-SCNC: 139 MMOL/L (ref 136–145)
STREPTOCOCCUS , STPSP: DETECTED
VWF:RCO ACT/NOR PPP PL AGG: 319 % (ref 50–200)
WBC # BLD AUTO: 9.4 K/UL (ref 4.3–11.1)

## 2020-05-20 PROCEDURE — 93312 ECHO TRANSESOPHAGEAL: CPT

## 2020-05-20 PROCEDURE — 77010033678 HC OXYGEN DAILY

## 2020-05-20 PROCEDURE — 87186 SC STD MICRODIL/AGAR DIL: CPT

## 2020-05-20 PROCEDURE — 74011636637 HC RX REV CODE- 636/637: Performed by: HOSPITALIST

## 2020-05-20 PROCEDURE — 74011250637 HC RX REV CODE- 250/637: Performed by: FAMILY MEDICINE

## 2020-05-20 PROCEDURE — 36415 COLL VENOUS BLD VENIPUNCTURE: CPT

## 2020-05-20 PROCEDURE — 87040 BLOOD CULTURE FOR BACTERIA: CPT

## 2020-05-20 PROCEDURE — 74011250637 HC RX REV CODE- 250/637: Performed by: INTERNAL MEDICINE

## 2020-05-20 PROCEDURE — 65660000000 HC RM CCU STEPDOWN

## 2020-05-20 PROCEDURE — 87205 SMEAR GRAM STAIN: CPT

## 2020-05-20 PROCEDURE — 74011250636 HC RX REV CODE- 250/636: Performed by: FAMILY MEDICINE

## 2020-05-20 PROCEDURE — 83735 ASSAY OF MAGNESIUM: CPT

## 2020-05-20 PROCEDURE — 85027 COMPLETE CBC AUTOMATED: CPT

## 2020-05-20 PROCEDURE — 74011000250 HC RX REV CODE- 250: Performed by: INTERNAL MEDICINE

## 2020-05-20 PROCEDURE — 74011250636 HC RX REV CODE- 250/636: Performed by: INTERNAL MEDICINE

## 2020-05-20 PROCEDURE — 74011250636 HC RX REV CODE- 250/636: Performed by: HOSPITALIST

## 2020-05-20 PROCEDURE — 87077 CULTURE AEROBIC IDENTIFY: CPT

## 2020-05-20 PROCEDURE — 74011250637 HC RX REV CODE- 250/637: Performed by: HOSPITALIST

## 2020-05-20 PROCEDURE — 99152 MOD SED SAME PHYS/QHP 5/>YRS: CPT

## 2020-05-20 PROCEDURE — 80048 BASIC METABOLIC PNL TOTAL CA: CPT

## 2020-05-20 PROCEDURE — 82962 GLUCOSE BLOOD TEST: CPT

## 2020-05-20 PROCEDURE — 85730 THROMBOPLASTIN TIME PARTIAL: CPT

## 2020-05-20 PROCEDURE — 94760 N-INVAS EAR/PLS OXIMETRY 1: CPT

## 2020-05-20 RX ORDER — ERGOCALCIFEROL 1.25 MG/1
50000 CAPSULE ORAL
Status: DISCONTINUED | OUTPATIENT
Start: 2020-05-20 | End: 2020-06-09 | Stop reason: HOSPADM

## 2020-05-20 RX ORDER — LIDOCAINE HYDROCHLORIDE 20 MG/ML
15 SOLUTION OROPHARYNGEAL AS NEEDED
Status: DISCONTINUED | OUTPATIENT
Start: 2020-05-20 | End: 2020-05-20

## 2020-05-20 RX ORDER — VANCOMYCIN 1.75 GRAM/500 ML IN 0.9 % SODIUM CHLORIDE INTRAVENOUS
1750 ONCE
Status: COMPLETED | OUTPATIENT
Start: 2020-05-20 | End: 2020-05-21

## 2020-05-20 RX ORDER — FENTANYL CITRATE 50 UG/ML
25-200 INJECTION, SOLUTION INTRAMUSCULAR; INTRAVENOUS
Status: DISCONTINUED | OUTPATIENT
Start: 2020-05-20 | End: 2020-05-20

## 2020-05-20 RX ORDER — POTASSIUM CHLORIDE 20 MEQ/1
40 TABLET, EXTENDED RELEASE ORAL
Status: COMPLETED | OUTPATIENT
Start: 2020-05-20 | End: 2020-05-20

## 2020-05-20 RX ORDER — MIDAZOLAM HYDROCHLORIDE 1 MG/ML
.5-5 INJECTION, SOLUTION INTRAMUSCULAR; INTRAVENOUS
Status: DISCONTINUED | OUTPATIENT
Start: 2020-05-20 | End: 2020-05-20

## 2020-05-20 RX ADMIN — ASPIRIN 81 MG 81 MG: 81 TABLET ORAL at 08:29

## 2020-05-20 RX ADMIN — Medication 10 ML: at 04:35

## 2020-05-20 RX ADMIN — ESCITALOPRAM OXALATE 10 MG: 10 TABLET ORAL at 08:29

## 2020-05-20 RX ADMIN — FENTANYL CITRATE 25 MCG: 50 INJECTION INTRAMUSCULAR; INTRAVENOUS at 10:42

## 2020-05-20 RX ADMIN — MIDAZOLAM 1 MG: 1 INJECTION INTRAMUSCULAR; INTRAVENOUS at 10:44

## 2020-05-20 RX ADMIN — ATORVASTATIN CALCIUM 20 MG: 20 TABLET, FILM COATED ORAL at 22:26

## 2020-05-20 RX ADMIN — SODIUM CHLORIDE 250 MG: 900 INJECTION, SOLUTION INTRAVENOUS at 08:35

## 2020-05-20 RX ADMIN — SODIUM CHLORIDE 100 ML/HR: 900 INJECTION, SOLUTION INTRAVENOUS at 17:55

## 2020-05-20 RX ADMIN — INSULIN LISPRO 2 UNITS: 100 INJECTION, SOLUTION INTRAVENOUS; SUBCUTANEOUS at 17:55

## 2020-05-20 RX ADMIN — MIDAZOLAM 2 MG: 1 INJECTION INTRAMUSCULAR; INTRAVENOUS at 10:42

## 2020-05-20 RX ADMIN — POTASSIUM CHLORIDE 40 MEQ: 20 TABLET, EXTENDED RELEASE ORAL at 08:29

## 2020-05-20 RX ADMIN — METOPROLOL SUCCINATE 25 MG: 50 TABLET, EXTENDED RELEASE ORAL at 17:55

## 2020-05-20 RX ADMIN — ERGOCALCIFEROL 50000 UNITS: 1.25 CAPSULE ORAL at 13:35

## 2020-05-20 RX ADMIN — METOPROLOL SUCCINATE 25 MG: 50 TABLET, EXTENDED RELEASE ORAL at 08:29

## 2020-05-20 RX ADMIN — Medication 10 ML: at 13:41

## 2020-05-20 RX ADMIN — SODIUM CHLORIDE 100 ML/HR: 900 INJECTION, SOLUTION INTRAVENOUS at 08:29

## 2020-05-20 RX ADMIN — LIDOCAINE HYDROCHLORIDE 15 ML: 20 SOLUTION ORAL; TOPICAL at 10:36

## 2020-05-20 RX ADMIN — Medication 10 ML: at 22:28

## 2020-05-20 RX ADMIN — VANCOMYCIN HYDROCHLORIDE 1750 MG: 10 INJECTION, POWDER, LYOPHILIZED, FOR SOLUTION INTRAVENOUS at 13:37

## 2020-05-20 NOTE — CONSULTS
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 [facility] 5/18/20 with 1 of 4 noting strep spp, pending  · Generalized Phong LE rash of unknown etiology, ?sepsis related  · Diarrhea PTA, resolved. Hx diverticulosis   · Dental caries   · Anemia, of acute disease: follow  · Hx AVR 2018  · DM, A1c 7    Plan:   · Recheck BC today to improve yield  · Vancomycin started by primary, agree  · Defer need for any surgical intervention to cardiology/CT sx    Anti-infectives:   · Vancomycin 5/20-    Subjective:   Date of Consultation:  May 20, 2020  Referring Physician: Dr Cline Rater    Patient is a 62 y.o. male admitted with chest discomfort- he reports 2 weeks PTA he noted diarrhea symptoms without abdominal pain or obvious blood in the stool, phong LE edema, fatigue and a generalized rash to both legs. Denies having fever, chills, sweats, recent procedures/dental work, sore teeth/mouth ulcers, dysuria/UTI, URI or cough. Upon admission CXR noted pulmonary congestion, CT chest negative, trop elevated to 30s, TTE noting a mobile mass associated with prosthetic AV with stenosis. BC were collected 2 days into admission, although he had received no antbx, with 1 of 4 bottles noting strep spp. Vanc started this pm. YARON confirmed vegetation on the AV, with obstruction noted of 2 of 3 leaflets of the valve.      Patient Active Problem List   Diagnosis Code    Status post aortic valve replacement L33.7    Diastolic CHF, chronic (HCC) I50.32    Recurrent depression ([facility] F33.9    S/P aortic valve replacement Z95.2    CAD (coronary artery disease) I25.10    DIEGO on CPAP G47.33, Z99.89    Dyslipidemia E78.5    Sigmoid diverticulosis K57.30    Essential hypertension I10    NSTEMI (non-ST elevated myocardial infarction) ([facility] I21.4    Suspected COVID-19 virus infection Z20.828    Petechiae R23.3    Anemia D64.9  Diabetes mellitus, type II ([facility] E11.9    Vitamin D deficiency E55.9     Past Medical History:   Diagnosis Date    CAD (coronary artery disease)     Nonobstructive multivessel disease    Drug addiction in remission ([facility]     none since     Dyslipidemia     DIEGO on CPAP     Recurrent depression ([facility]     S/P aortic valve replacement 2018    Seizures ([place] [id number] 2001    x 1--- ETOH  WITHDRAWAL    Sigmoid diverticulosis       Family History   Problem Relation Age of Onset    Cancer Mother     Coronary Artery Disease Mother     Heart Disease Brother 64        MI X 3    Coronary Artery Disease Brother     Cancer Sister         breast      Social History     Tobacco Use    Smoking status: Former Smoker     Packs/day: 0.50     Years: 40.00     Pack years: 20.00     Types: Cigarettes     Last attempt to quit: 2018     Years since quittin.1    Smokeless tobacco: Never Used    Tobacco comment: quit    Substance Use Topics    Alcohol use: Yes     Comment: 6 drinks a week     Past Surgical History:   Procedure Laterality Date    CARDIAC SURG PROCEDURE UNLIST  2018    AVR    HX HEART CATHETERIZATION  2018    non obstructive disease, severe AS    HX HERNIA REPAIR Left 2018    inguinal       Prior to Admission medications    Medication Sig Start Date End Date Taking? Authorizing Provider   escitalopram oxalate (LEXAPRO) 10 mg tablet Take 1 Tab by mouth daily. 20  Yes Debbie Lindsey MD   metoprolol succinate (TOPROL-XL) 25 mg XL tablet Take 1 Tab by mouth daily. 20  Yes Prince Zoë DO   atorvastatin (LIPITOR) 20 mg tablet Take 1 Tab by mouth nightly. 3/13/20  Yes Debbie Lindsey MD   aspirin 81 mg chewable tablet Take 81 mg by mouth daily. Provider, Historical       No Known Allergies     Review of Systems:  A comprehensive review of systems was negative except for that written in the History of Present Illness.     Objective:     Visit Vitals  /75 Pulse 93   Temp 97.5 °F (36.4 °C)   Resp 16   Ht 6' 1\" (1.854 m)   Wt 71.2 kg (157 lb)   SpO2 97%   BMI 20.71 kg/m²     Temp (24hrs), Av.1 °F (37.3 °C), Min:97.5 °F (36.4 °C), Max:100.4 °F (38 °C)       Lines:  Peripheral IV:   phong UE intact    Physical Exam:    General:  Alert, cooperative, appears dyspneic, thin   Eyes:  Sclera anicteric. Pupils equally round and reactive to light. Mouth/Throat: Mucous membranes normal, oral pharynx clear   Neck: Supple, +JVD   Lungs: Tachypneic, 2LNC, Clear to auscultation bilaterally, good effort   CV:  Regular rate and rhythm, systolic murmur   Abdomen:   Soft, non-tender.  bowel sounds normal. non-distended   Extremities: No cyanosis or edema   Skin: Skin color, texture, turgor normal. Macular, circular rash noted to Phong LE none on soles/palms, or in toes/fingers, see image   Lymph nodes: Cervical and supraclavicular normal   Musculoskeletal: No swelling or deformity   Lines/Devices:  Intact, no erythema, drainage or tenderness   Psych: Alert and oriented, normal mood affect given the setting           Data Review:     CBC:  Recent Labs     20   WBC 9.4 9.0 10.2   GRANS  --  85* 86*   MONOS  --  6 6   EOS  --  0* 0*   ANEU  --  7.6 8.8*   ABL  --  0.8 0.7   HGB 8.1* 9.1* 9.1*   HCT 25.4* 29.8* 29.0*    121* 138*       BMP:  Recent Labs     20  030   CREA 0.68* 0.72* 0.70*   BUN 19 16 12    139 136   K 3.3* 3.3* 4.6    106 104   CO2 23 26 27   AGAP 9 7 5*   * 118* 163*       LFTS:  Recent Labs     20  030   TBILI 0.5 0.5   ALT 50 45   SGOT 70* 135*   AP 89 72   TP 5.5* 5.9*   ALB 1.6* 1.7*       Microbiology:     All Micro Results     Procedure Component Value Units Date/Time    CULTURE, BLOOD [[id number]]     Order Status:  Sent Specimen:  Blood     CULTURE, BLOOD [[id number]]     Order Status:  Sent Specimen:  Blood     BLOOD CULTURE ID PANEL [[id number]]  (Abnormal) Collected:  05/18/20 1938    Order Status:  Completed Specimen:  Blood Updated:  05/20/20 1117     Acc. no. from Micro Order [id number]     Streptococcus Detected        Comment: RESULTS VERIFIED, PHONED TO AND READ BACK BY  Heriberto Howell RN @ 1120 ON 5/20/20 BY AMM          INTERPRETATION       Gram positive cocci. Identified by realtime PCR as Streptococcus species (not agalactiae, pyogenes, or pneumoniae). Comment: Consider discontinuation of IV vancomycin and using an anti-streptococcal beta-lactam (ceftriaxone/cefotaxime (peds))       CULTURE, BLOOD [[id number]] Collected:  05/18/20 1938    Order Status:  Completed Specimen:  Blood Updated:  05/20/20 1116     Special Requests: --        RIGHT  ARM       GRAM STAIN GRAM POS COCCI IN CHAINS         AEROBIC BOTTLE POSITIVE               RESULTS VERIFIED, PHONED TO AND READ BACK BY Heriberto Howell RN @ 1120 ON 5/20/20 BY AMM           Culture result:       CULTURE IN PROGRESS,FURTHER UPDATES TO FOLLOW            REFER TO [address] PANEL ACCESSION [id number]    CULTURE, BLOOD [[id number]] Collected:  05/18/20 1944    Order Status:  Completed Specimen:  Blood Updated:  05/20/20 0813     Special Requests: --        RIGHT  HAND       Culture result: NO GROWTH 2 DAYS       EMERGENT DISEASE PANEL [[id number]] Collected:  05/16/20 1946    Order Status:  Completed Specimen:  Not Specified Updated:  05/18/20 1614     Emergent disease panel Not detected        Comment: Performed at [facility] [address] [facility]               Imaging:   YARON 5/20 brief report  Transesophageal Echo Note  - pt underwent successful YARON today in cath lab  - start 1042  - stop 1100  - sedation: 3 mg IV Midazolam, 25 mcg Fentanyl IV given by Drake Zaidi RN under my direct supervision. Direct monitoring of vital signs and respiratory status throughout the procedure.    - No complications, pt in stable condition  - YARON Brief Findings: LVEF mild/moderately reduced, bioprosthetic aortic valve with small mobile mass on LVOT side of the prosthesis as well as thickening / restriction of 2/3 leaflets, no other valvular masses/vegetations identified.        Signed By: Lance Tolentino NP     May 20, 2020

## 2020-05-20 NOTE — PROGRESS NOTES
Progress Note    Patient: Anna Chairez MRN: [id number]  SSN: [id number]    YOB: 1960  Age: 62 y.o. Sex: male      Admit Date: 5/16/2020    LOS: 4 days     Subjective:   F/U NSTEMI, suspected endocarditis    \"53 yo male with PMH of CAD (non obstructive  Per [facility] 2018) , DIEGO on CPAP, AS s/p AVR, HTN admitted with acute chest pain and elevated troponin, NSTEMI. He was seen by cardiology April, 2020 s/p low risk NM stress testing and ECHO 50% EF. He has been tested for COVID 19 due to constellation of petechial LE rash, elevated ddimer, mild thrombocytopenia. CTA chest negative. \" Troponin 2.08 -->24.9-->36. 10. Repeat ECHO showed EF 40%, moderate diffuse hypokinesis. RV pressure 45-50mmHg, aortic valve also had a moderate sized mobile mass suspicious for endocarditis. Dilatation of the ascending aorta seen 41mm. Found to have normocytic anemia. Hg baseline 9.3. Denies ever having colonoscopy or EGD. Iron 16, TIBC 173, transferrin 9, ferritin 910, and haptoglobin 131. IV iron infusions given. No obvious bleeding. A1C 7.1. Platelets improved. K 3.3. No chest pain or SOB.  Plan for YARON today   Current Facility-Administered Medications   Medication Dose Route Frequency    midazolam (VERSED) injection 0.5-5 mg  0.5-5 mg IntraVENous Multiple    fentaNYL citrate (PF) injection  mcg   mcg IntraVENous Multiple    ergocalciferol capsule 50,000 Units  50,000 Units Oral Q7D    lidocaine (XYLOCAINE) 2 % viscous solution 15 mL  15 mL Mouth/Throat PRN    ferric gluconate (FERRLECIT) 250 mg in 0.9% sodium chloride 250 mL IVPB  250 mg IntraVENous DAILY    lidocaine (PF) (XYLOCAINE) 10 mg/mL (1 %) injection 2-20 mL  2-20 mL IntraDERMal Multiple    midazolam (VERSED) injection 0.5-2 mg  0.5-2 mg IntraVENous Multiple    metoprolol succinate (TOPROL-XL) XL tablet 25 mg  25 mg Oral BID    insulin lispro (HUMALOG) injection   SubCUTAneous AC&HS    escitalopram oxalate (LEXAPRO) tablet 10 mg  10 mg Oral DAILY    atorvastatin (LIPITOR) tablet 20 mg  20 mg Oral QHS    aspirin chewable tablet 81 mg  81 mg Oral DAILY    sodium chloride (NS) flush 5-40 mL  5-40 mL IntraVENous Q8H    sodium chloride (NS) flush 5-40 mL  5-40 mL IntraVENous PRN    acetaminophen (TYLENOL) tablet 650 mg  650 mg Oral Q6H PRN    0.9% sodium chloride infusion  100 mL/hr IntraVENous CONTINUOUS    morphine 10 mg/ml injection 2 mg  2 mg IntraVENous Q3H PRN    nitroglycerin (NITROSTAT) tablet 0.4 mg  0.4 mg SubLINGual Q5MIN PRN    ondansetron (ZOFRAN) injection 4 mg  4 mg IntraVENous Q4H PRN       Objective:     Vitals:    05/20/20 0012 05/20/20 0533 05/20/20 0821 05/20/20 0903   BP: 101/54 113/61 121/68    Pulse: (!) 108 99 (!) 102    Resp: 20 20 19    Temp: 99.5 °F (37.5 °C) 99.8 °F (37.7 °C) 97.5 °F (36.4 °C)    SpO2: 93% 91% 93% 93%   Weight:  71.2 kg (157 lb)     Height:             Intake and Output:  Current Shift: 05/20 0701 - 05/20 1900  In: 240 [P.O.:240]  Out: -   Last three shifts: 05/18 1901 - 05/20 0700  In: 240 [P.O.:240]  Out: 1370 [Urine:1370]    Physical Exam:   General:  Alert, cooperative, no distress. More color to face. Eyes:  Conjunctivae/corneas clear. Ears:  Normal TMs and external ear canals both ears. Nose: Nares normal. Septum midline. Mouth/Throat: Lips, mucosa, and tongue normal.    Neck: no JVD. Back:   deferred. Lungs:   Clear to auscultation bilaterally. Heart:  Regular rate and rhythm, S1, S2 normal. Faint blowing systolic murmur best heard at right 2nd intercostal space   Abdomen:   Soft, non-tender. Bowel sounds normal.    Extremities: Petechiae to LE bilaterally, no janeway lesions. Pulses: 2+ and symmetric all extremities. Skin: As above   Lymph nodes: Cervical, supraclavicular, and axillary nodes normal.   Neurologic: CNII-XII intact. Good ROM in bed.         Lab/Data Review:    Recent Results (from the past 24 hour(s))   GLUCOSE, POC    Collection Time: 05/19/20  4:12 PM   Result Value Ref Range    Glucose (POC) 237 (H) 65 - 100 mg/dL   GLUCOSE, POC    Collection Time: 05/19/20  9:47 PM   Result Value Ref Range    Glucose (POC) 103 (H) 65 - 100 mg/dL   MAGNESIUM    Collection Time: 05/20/20  4:16 AM   Result Value Ref Range    Magnesium 1.9 1.8 - 2.4 mg/dL   PTT    Collection Time: 05/20/20  4:16 AM   Result Value Ref Range    aPTT 39.5 (H) 24.3 - 98.0 SEC   METABOLIC PANEL, BASIC    Collection Time: 05/20/20  4:16 AM   Result Value Ref Range    Sodium 139 136 - 145 mmol/L    Potassium 3.3 (L) 3.5 - 5.1 mmol/L    Chloride 107 98 - 107 mmol/L    CO2 23 21 - 32 mmol/L    Anion gap 9 7 - 16 mmol/L    Glucose 107 (H) 65 - 100 mg/dL    BUN 19 8 - 23 MG/DL    Creatinine 0.68 (L) 0.8 - 1.5 MG/DL    GFR est AA >60 >60 ml/min/1.73m2    GFR est non-AA >60 >60 ml/min/1.73m2    Calcium 7.4 (L) 8.3 - 10.4 MG/DL   CBC W/O DIFF    Collection Time: 05/20/20  4:16 AM   Result Value Ref Range    WBC 9.4 4.3 - 11.1 K/uL    RBC 3.02 (L) 4.23 - 5.6 M/uL    HGB 8.1 (L) 13.6 - 17.2 g/dL    HCT 25.4 (L) 41.1 - 50.3 %    MCV 84.1 79.6 - 97.8 FL    MCH 26.8 26.1 - 32.9 PG    MCHC 31.9 31.4 - 35.0 g/dL    RDW 14.6 11.9 - 14.6 %    PLATELET 344 363 - 373 K/uL    MPV 9.4 9.4 - 12.3 FL    ABSOLUTE NRBC 0.00 0.0 - 0.2 K/uL   GLUCOSE, POC    Collection Time: 05/20/20  6:12 AM   Result Value Ref Range    Glucose (POC) 109 (H) 65 - 100 mg/dL       Assessment/ Plan:     Principal Problem:    NSTEMI (non-ST elevated myocardial infarction) ([facility] (5/16/2020)    Active Problems:    Diastolic CHF, chronic (HCC) (5/7/2018)      S/P aortic valve replacement (4/1/2018)      CAD (coronary artery disease) ()      Overview: Nonobstructive      DIEGO on CPAP ()      Dyslipidemia ()      Essential hypertension (9/21/2018)      Suspected COVID-19 virus infection (5/16/2020)      Petechiae (5/16/2020)      Anemia (5/16/2020)      Diabetes mellitus, type II ([facility] (5/20/2020)      Vitamin D deficiency (5/20/2020)    CAD with NSTEMI and suspected endocarditis-S/p cardiac cath on 5/19 with no intervention. ASA, statin, BB    Suspected endocarditis - Consult ID on recommendations of abx treatment. Blood cultures with no growth to date. Will start Vancomycin. To have YARON today. Petechia - Drug reaction? Possibly from thrombocytopenia? COVID negative. Will need outpatient dermatology appointment. Normocytic anemia with thrombocytopenia - follow CBC. Iron infusions. Will start oral iron after infusions complete. Will need to establish care with GI for colon cancer screening at discharge. Hyponatremia - Resolved. Hypokalemia - Supplement. Vitamin D deficiency - Supplement. DM type II - A1C 7.1. Changed diet to ADA. SS. Will need outpatient diabetic education    I have updated the sister. I tried to call the wife three times but the number we have is not working.      DVT prophylaxis - SCDs  Signed By: Isidra Amaya,      May 20, 2020

## 2020-05-20 NOTE — ROUTINE PROCESS
Bedside and Verbal report given to [facility] by self. Report included SBAR, Kardex, ED summary, procedure summary, recent results and cardiac rhythm ST.

## 2020-05-20 NOTE — PROGRESS NOTES
TRANSFER - IN REPORT:    Verbal report received from [facility] RN(name) on Chavo Naas  being received from [facility](unit) for routine progression of care      Report consisted of patients Situation, Background, Assessment and   Recommendations(SBAR). Information from the following report(s) Procedure Summary was reviewed with the receiving nurse. Opportunity for questions and clarification was provided. Assessment completed upon patients arrival to unit and care assumed.

## 2020-05-20 NOTE — PROGRESS NOTES
Problem: Falls - Risk of  Goal: *Absence of Falls  Description: Document Starleen Freeze Fall Risk and appropriate interventions in the flowsheet. Outcome: Progressing Towards Goal  Note: Fall Risk Interventions:  Mobility Interventions: Communicate number of staff needed for ambulation/transfer, Patient to call before getting OOB         Medication Interventions: Patient to call before getting OOB, Teach patient to arise slowly    Elimination Interventions: Call light in reach, Patient to call for help with toileting needs              Problem: Diabetes Self-Management  Goal: *Disease process and treatment process  Description: Define diabetes and identify own type of diabetes; list 3 options for treating diabetes.   Outcome: Progressing Towards Goal

## 2020-05-20 NOTE — PROGRESS NOTES
Date of Outreach Update:  Tiffanie Leung was seen and assessed. MEWS Score: 2 (05/20/20 0012)  Vitals:    05/19/20 1314 05/19/20 1742 05/19/20 2126 05/20/20 0012   BP: 113/68 104/61 100/59 101/54   Pulse: 95 (!) 104 (!) 104 (!) 108   Resp: 20 20 20 20   Temp: 98.3 °F (36.8 °C) 98.5 °F (36.9 °C) 100.4 °F (38 °C) 99.5 °F (37.5 °C)   SpO2: 93% 90% 92% 93%   Weight:       Height:             Pain Assessment  Pain Intensity 1: 0 (05/20/20 0436)  Pain Location 1: Chest     Patient Stated Pain Goal: 0    Previous Outreach assessment has been reviewed. There have been no significant clinical changes since the completion of the last dated Outreach assessment. Will continue to follow up per outreach protocol.     Signed By:   Noy Tomlin    May 20, 2020 4:41 AM

## 2020-05-20 NOTE — PROGRESS NOTES
[facility] CRITICAL CARE OUTREACH NURSE PROGRESS REPORT      SUBJECTIVE: Assessed per ICU outreach RN program.      MEWS Score: 2 (05/19/20 1742)  Vitals:    05/19/20 1242 05/19/20 1314 05/19/20 1742 05/19/20 2126   BP: (!) 101/35 113/68 104/61 100/59   Pulse: 72 95 (!) 104 (!) 104   Resp: 20 20 20 20   Temp: 98 °F (36.7 °C) 98.3 °F (36.8 °C) 98.5 °F (36.9 °C) 100.4 °F (38 °C)   SpO2: 90% 93% 90% 92%   Weight:       Height:          EKG: normal EKG, normal sinus rhythm, unchanged from previous tracings. LAB DATA:    Recent Labs     05/19/20 0346 05/18/20  0308 05/17/20  0050    136 132*   K 3.3* 4.6 4.2    104 100   CO2 26 27 28   AGAP 7 5* 4*   * 163* 132*   BUN 16 12 16   CREA 0.72* 0.70* 0.69*   GFRAA >60 >60 >60   GFRNA >60 >60 >60   CA 7.2* 7.2* 7.7*   MG 2.1 2.2 2.0   ALB 1.6* 1.7*  --    TP 5.5* 5.9*  --    GLOB 3.9* 4.2*  --    AGRAT 0.4* 0.4*  --    ALT 50 45  --         Recent Labs     05/19/20 0346 05/18/20  0308 05/17/20  0050   WBC 9.0 10.2 12.2*   HGB 9.1* 9.1* 9.4*   HCT 29.8* 29.0* 30.1*   * 138* 139*          OBJECTIVE: On arrival to room, I found patient to be resting in bed eating a sanwhich tray. Pain Assessment  Pain Intensity 1: 0 (05/19/20 1950)  Pain Location 1: Chest     Patient Stated Pain Goal: 0                                 ASSESSMENT:  No complaints of chest pain, shortness of breath, etc. On room air and sats in the mid 90's, chest expansion symmetrical. S1S2 heart sounds, SR. No complaints at this time. NS infusing, heparin gtt stopped.         PLAN:  Will continue monitoring per ICU outreach program.

## 2020-05-20 NOTE — PROGRESS NOTES
Transesophageal Echo Note  - pt underwent successful YARON today in cath lab  - start 1042  - stop 1100  - sedation: 3 mg IV Midazolam, 25 mcg Fentanyl IV given by Aaliyah Arboleda RN under my direct supervision. Direct monitoring of vital signs and respiratory status throughout the procedure. - No complications, pt in stable condition  - YARON Brief Findings: LVEF mild/moderately reduced, bioprosthetic aortic valve with small mobile mass on LVOT side of the prosthesis as well as thickening / restriction of 2/3 leaflets, no other valvular masses/vegetations identified.

## 2020-05-20 NOTE — PROGRESS NOTES
am  5/20/2020 7:02 AM    Admit Date: 5/16/2020    Admit Diagnosis: NSTEMI (non-ST elevated myocardial infarction) ([facility] [I21.4]; Suspected COVID-19 virus infection [Z20.828]      Subjective:    Patient with presumed bioprosthetic AVR SBE and occluded OM possibly mycotic embolization.  Await YARON today but transthoracic echo looked convincing for SBE    Objective:      Visit Vitals  /61   Pulse 99   Temp 99.8 °F (37.7 °C)   Resp 20   Ht 6' 1\" (1.854 m)   Wt 71.2 kg (157 lb)   SpO2 91%   BMI 20.71 kg/m²       ROS:  General ROS: negative for - chills  Hematological and Lymphatic ROS: negative for - blood clots or jaundice  Respiratory ROS: positive for - shortness of breath  Cardiovascular ROS: positive for - dyspnea on exertion  Gastrointestinal ROS: no abdominal pain, change in bowel habits, or black or bloody stools  Neurological ROS: no TIA or stroke symptoms    Physical Exam:    Physical Examination: General appearance - alert, well appearing, and in no distress  Mental status - alert, oriented to person, place, and time  Eyes - pupils equal and reactive, extraocular eye movements intact  Neck/lymph - supple, no significant adenopathy  Chest/CV - clear to auscultation, no wheezes, rales or rhonchi, symmetric air entry  Heart - normal rate, regular rhythm, normal S1, S2, no murmurs, rubs, clicks or gallops  Abdomen/GI - soft, nontender, nondistended, no masses or organomegaly  Musculoskeletal - no joint tenderness, deformity or swelling  Extremities - peripheral pulses normal, no pedal edema, no clubbing or cyanosis  Skin - normal coloration and turgor, no rashes, no suspicious skin lesions noted    Current Facility-Administered Medications   Medication Dose Route Frequency    ferric gluconate (FERRLECIT) 250 mg in 0.9% sodium chloride 250 mL IVPB  250 mg IntraVENous DAILY    lidocaine (PF) (XYLOCAINE) 10 mg/mL (1 %) injection 2-20 mL  2-20 mL IntraDERMal Multiple    midazolam (VERSED) injection 0.5-2 mg 0.5-2 mg IntraVENous Multiple    metoprolol succinate (TOPROL-XL) XL tablet 25 mg  25 mg Oral BID    insulin lispro (HUMALOG) injection   SubCUTAneous AC&HS    escitalopram oxalate (LEXAPRO) tablet 10 mg  10 mg Oral DAILY    atorvastatin (LIPITOR) tablet 20 mg  20 mg Oral QHS    aspirin chewable tablet 81 mg  81 mg Oral DAILY    sodium chloride (NS) flush 5-40 mL  5-40 mL IntraVENous Q8H    sodium chloride (NS) flush 5-40 mL  5-40 mL IntraVENous PRN    acetaminophen (TYLENOL) tablet 650 mg  650 mg Oral Q6H PRN    0.9% sodium chloride infusion  100 mL/hr IntraVENous CONTINUOUS    morphine 10 mg/ml injection 2 mg  2 mg IntraVENous Q3H PRN    nitroglycerin (NITROSTAT) tablet 0.4 mg  0.4 mg SubLINGual Q5MIN PRN    ondansetron (ZOFRAN) injection 4 mg  4 mg IntraVENous Q4H PRN       Data Review:   @LABRCNT(Na,K,BUN,CREA,WBC,HGB,HCT,PLT,INR,TRP,TCHOL*,Triglyceride*,LDL*,LDLCPOC HDL*,HDL])@    TELEMETRY: nsr    Assessment/Plan:     Principal Problem:    NSTEMI (non-ST elevated myocardial infarction) (HCC) (5/16/2020)    Active Problems:    Diastolic CHF, chronic (HCC) (5/7/2018)      S/P aortic valve replacement (4/1/2018)      CAD (coronary artery disease) ()      Overview: Nonobstructive      DIEGO on CPAP ()      Dyslipidemia ()      Essential hypertension (9/21/2018)      Suspected COVID-19 virus infection (5/16/2020)      Petechiae (5/16/2020)      Anemia (5/16/2020)      SBE appears to have AVR endocarditis. Plane teodoro today.  Needs ID involved    Medical management for occluded OM    CV surgery contacted    IV iron started but would transfuse him up to hgb of 10    Carly Lopez MD

## 2020-05-20 NOTE — ROUTINE PROCESS
Verbal bedside report given to harriet Wilcox RN. Patient's situation, background, assessment and recommendations provided. Opportunity for questions provided. Oncoming RN assumed care of patient.

## 2020-05-20 NOTE — PROGRESS NOTES
TRANSFER - OUT REPORT:    Verbal report given to Curt Garner RN(name) on Anabell Gary  being transferred to [facility](unit) for routine progression of care       Report consisted of patients Situation, Background, Assessment and   Recommendations(SBAR). Information from the following report(s) SBAR and Procedure Summary was reviewed with the receiving nurse. Opportunity for questions and clarification was provided. Procedure: YARON   Finding Summary: Diagnostic    Pre Procedure Meds:(if any)    ASA: 81 mg  When Received:  0821    Intra Procedure Meds:    Versed: 3 mg  Fentanyl: 25 mcg               Peripheral IV 05/16/20 Right Antecubital (Active)   Site Assessment Clean, dry, & intact 5/20/2020  8:21 AM   Phlebitis Assessment 0 5/20/2020  8:21 AM   Infiltration Assessment 0 5/20/2020  8:21 AM   Dressing Status Clean, dry, & intact 5/20/2020  8:21 AM   Dressing Type Tape;Transparent 5/20/2020  8:21 AM   Hub Color/Line Status Patent 5/20/2020  8:21 AM   Action Taken Blood drawn 5/18/2020 11:00 AM   Alcohol Cap Used No 5/20/2020  8:21 AM       Peripheral IV Left Forearm (Active)   Site Assessment Clean, dry, & intact 5/20/2020  8:21 AM   Phlebitis Assessment 0 5/20/2020  8:21 AM   Infiltration Assessment 0 5/20/2020  8:21 AM   Dressing Status Clean, dry, & intact 5/20/2020  8:21 AM   Dressing Type Tape;Transparent 5/20/2020  8:21 AM   Hub Color/Line Status Patent 5/20/2020  8:21 AM   Alcohol Cap Used No 5/20/2020  8:21 AM        Post-Procedure Site Assessment (1)  Wound Type: Catheter entry/exit  Location: Radial  Orientation : Right  Hemostasis : (tegaderm and gauze)  Site Assessment: No bleeding, No hematoma, Dry/intact                       has No Known Allergies.     Past Medical History:   Diagnosis Date    CAD (coronary artery disease)     Nonobstructive multivessel disease    Drug addiction in remission ([facility] [address]     none since 2001    Dyslipidemia     DIEGO on CPAP     Recurrent depression (HCC)     S/P aortic valve replacement 04/2018    Seizures ([facility] [address] 2001    x 1--- ETOH  WITHDRAWAL    Sigmoid diverticulosis      Visit Vitals  /68   Pulse (!) 102   Temp 97.5 °F (36.4 °C)   Resp 19   Ht 6' 1\" (1.854 m)   Wt 71.2 kg (157 lb)   SpO2 93%   BMI 20.71 kg/m²

## 2020-05-20 NOTE — ROUTINE PROCESS
Bedside and Verbal report given to self by Luann Shah RN. Report included SBAR, Kardex, ED Summary, Procedure Summary, Intake and Output and Cardiac Rhythm SR. R-radial site assessed and dressing noted to be C/D/I.

## 2020-05-20 NOTE — PROGRESS NOTES
Date of Outreach Update:  Donya Lawrence was seen and assessed. Previous Outreach assessment has been reviewed. There have been no significant clinical changes since the completion of the last dated Outreach assessment. Patient alert, no visible signs of distress. Breathing even and unlabored, now on 2L NC post procedure. Chart and labs reviewed. Will continue to follow up per outreach protocol.     Signed By:   Evaristo Ibarra    May 20, 2020 3:26 PM

## 2020-05-20 NOTE — PROGRESS NOTES
[facility] CRITICAL CARE OUTREACH NURSE PROGRESS REPORT      SUBJECTIVE: Called to assess patient secondary to transfer from ICU. MEWS Score: 2 (05/20/20 8441)  Vitals:    05/20/20 0012 05/20/20 0533 05/20/20 0821 05/20/20 0903   BP: 101/54 113/61 121/68    Pulse: (!) 108 99 (!) 102    Resp: 20 20 19    Temp: 99.5 °F (37.5 °C) 99.8 °F (37.7 °C) 97.5 °F (36.4 °C)    SpO2: 93% 91% 93% 93%   Weight:  71.2 kg (157 lb)     Height:               LAB DATA:    Recent Labs     05/20/20 0416 05/19/20 0346 05/18/20  0308    139 136   K 3.3* 3.3* 4.6    106 104   CO2 23 26 27   AGAP 9 7 5*   * 118* 163*   BUN 19 16 12   CREA 0.68* 0.72* 0.70*   GFRAA >60 >60 >60   GFRNA >60 >60 >60   CA 7.4* 7.2* 7.2*   MG 1.9 2.1 2.2   ALB  --  1.6* 1.7*   TP  --  5.5* 5.9*   GLOB  --  3.9* 4.2*   AGRAT  --  0.4* 0.4*   ALT  --  50 45        Recent Labs     05/20/20 0416 05/19/20 0346 05/18/20  0308   WBC 9.4 9.0 10.2   HGB 8.1* 9.1* 9.1*   HCT 25.4* 29.8* 29.0*    121* 138*          OBJECTIVE: On arrival to room, I found patient to be sitting up in bed, resting. Pain Assessment  Pain Intensity 1: 0 (05/20/20 0821)  Pain Location 1: Chest     Patient Stated Pain Goal: 0    ASSESSMENT:  Patient alert with no visible signs of distress. Breathing even and unlabored on room air. HR 99. Patient denies any chest pain, R radial site CDI with no evidence of bleeding, pulse palpable. Chart and labs reviewed. PLAN:   Will continue to monitor per outreach protocol.

## 2020-05-20 NOTE — ROUTINE PROCESS
Verbal bedside report received from Nik Palacio, [facility]. Assumed care of patient.

## 2020-05-20 NOTE — PROGRESS NOTES
TRANSFER - OUT REPORT:    Verbal report given to Niur Enriquez RN(name) on Mecca Richards  being transferred to [facility](unit) for routine progression of care       Report consisted of patients Situation, Background, Assessment and   Recommendations(SBAR). Information from the following report(s) Procedure Summary was reviewed with the receiving nurse. Lines:   Peripheral IV 05/16/20 Right Antecubital (Active)   Site Assessment Clean, dry, & intact 5/20/2020  8:21 AM   Phlebitis Assessment 0 5/20/2020  8:21 AM   Infiltration Assessment 0 5/20/2020  8:21 AM   Dressing Status Clean, dry, & intact 5/20/2020  8:21 AM   Dressing Type Tape;Transparent 5/20/2020  8:21 AM   Hub Color/Line Status Patent 5/20/2020  8:21 AM   Action Taken Blood drawn 5/18/2020 11:00 AM   Alcohol Cap Used No 5/20/2020  8:21 AM       Peripheral IV Left Forearm (Active)   Site Assessment Clean, dry, & intact 5/20/2020  8:21 AM   Phlebitis Assessment 0 5/20/2020  8:21 AM   Infiltration Assessment 0 5/20/2020  8:21 AM   Dressing Status Clean, dry, & intact 5/20/2020  8:21 AM   Dressing Type Tape;Transparent 5/20/2020  8:21 AM   Hub Color/Line Status Patent 5/20/2020  8:21 AM   Alcohol Cap Used No 5/20/2020  8:21 AM        Opportunity for questions and clarification was provided.       Patient transported with:   [facility]

## 2020-05-20 NOTE — PROGRESS NOTES
Pharmacokinetic Consult to Pharmacist    Mariana Ennisfarhan is a 62 y.o. male being treated with vancomycin. Height: 6' 1\" (185.4 cm)  Weight: 71.2 kg (157 lb)  Lab Results   Component Value Date/Time    BUN 19 05/20/2020 04:16 AM    Creatinine 0.68 (L) 05/20/2020 04:16 AM    WBC 9.4 05/20/2020 04:16 AM    Lactic acid 1.8 05/18/2020 07:38 PM    Lactic Acid (POC) 0.6 06/13/2018 03:27 PM      Estimated Creatinine Clearance: 111.6 mL/min (A) (by C-G formula based on SCr of 0.68 mg/dL (L)). CULTURES:  pending    Day 1 of vancomycin. Goal trough is 15-20. Vancomycin dose initiated at 1750 mg x 1, then 1000 mg q 12 hours. Will continue to follow patient and order levels when clinically indicated.     Thank you,  Diane James, PharmD  Clinical Pharmacist  [phone]

## 2020-05-21 LAB
ANION GAP SERPL CALC-SCNC: 9 MMOL/L (ref 7–16)
APTT 1H NP PPP: 29 SEC (ref 22.9–30.2)
APTT IMM NP PPP: 31.3 SEC (ref 22.9–30.2)
APTT PPP 1:1 SALINE: ABNORMAL
APTT PPP: 37.2 SEC (ref 22.9–30.2)
BUN SERPL-MCNC: 22 MG/DL (ref 8–23)
CALCIUM SERPL-MCNC: 7.6 MG/DL (ref 8.3–10.4)
CHLORIDE SERPL-SCNC: 111 MMOL/L (ref 98–107)
CO2 SERPL-SCNC: 21 MMOL/L (ref 21–32)
CREAT SERPL-MCNC: 0.7 MG/DL (ref 0.8–1.5)
ERYTHROCYTE [DISTWIDTH] IN BLOOD BY AUTOMATED COUNT: 14.6 % (ref 11.9–14.6)
FACT VIII ACT/NOR PPP: 149 % (ref 56–140)
GLUCOSE BLD STRIP.AUTO-MCNC: 123 MG/DL (ref 65–100)
GLUCOSE BLD STRIP.AUTO-MCNC: 134 MG/DL (ref 65–100)
GLUCOSE BLD STRIP.AUTO-MCNC: 141 MG/DL (ref 65–100)
GLUCOSE BLD STRIP.AUTO-MCNC: 141 MG/DL (ref 65–100)
GLUCOSE SERPL-MCNC: 123 MG/DL (ref 65–100)
HCT VFR BLD AUTO: 25.4 % (ref 41.1–50.3)
HEMOCCULT STL QL: NEGATIVE
HGB BLD-MCNC: 7.8 G/DL (ref 13.6–17.2)
MAGNESIUM SERPL-MCNC: 1.9 MG/DL (ref 1.8–2.4)
MCH RBC QN AUTO: 26.4 PG (ref 26.1–32.9)
MCHC RBC AUTO-ENTMCNC: 30.7 G/DL (ref 31.4–35)
MCV RBC AUTO: 85.8 FL (ref 79.6–97.8)
NRBC # BLD: 0 K/UL (ref 0–0.2)
PLATELET # BLD AUTO: 143 K/UL (ref 150–450)
PMV BLD AUTO: 9.3 FL (ref 9.4–12.3)
POTASSIUM SERPL-SCNC: 3.9 MMOL/L (ref 3.5–5.1)
RBC # BLD AUTO: 2.96 M/UL (ref 4.23–5.6)
SODIUM SERPL-SCNC: 141 MMOL/L (ref 136–145)
WBC # BLD AUTO: 8.2 K/UL (ref 4.3–11.1)

## 2020-05-21 PROCEDURE — 82272 OCCULT BLD FECES 1-3 TESTS: CPT

## 2020-05-21 PROCEDURE — 80048 BASIC METABOLIC PNL TOTAL CA: CPT

## 2020-05-21 PROCEDURE — 85027 COMPLETE CBC AUTOMATED: CPT

## 2020-05-21 PROCEDURE — 94760 N-INVAS EAR/PLS OXIMETRY 1: CPT

## 2020-05-21 PROCEDURE — 74011250637 HC RX REV CODE- 250/637: Performed by: INTERNAL MEDICINE

## 2020-05-21 PROCEDURE — 65660000000 HC RM CCU STEPDOWN

## 2020-05-21 PROCEDURE — 77010033678 HC OXYGEN DAILY

## 2020-05-21 PROCEDURE — 82962 GLUCOSE BLOOD TEST: CPT

## 2020-05-21 PROCEDURE — 83735 ASSAY OF MAGNESIUM: CPT

## 2020-05-21 PROCEDURE — 36415 COLL VENOUS BLD VENIPUNCTURE: CPT

## 2020-05-21 PROCEDURE — 74011250636 HC RX REV CODE- 250/636: Performed by: INTERNAL MEDICINE

## 2020-05-21 PROCEDURE — 74011250636 HC RX REV CODE- 250/636: Performed by: FAMILY MEDICINE

## 2020-05-21 PROCEDURE — 74011250637 HC RX REV CODE- 250/637: Performed by: HOSPITALIST

## 2020-05-21 RX ADMIN — Medication 10 ML: at 06:01

## 2020-05-21 RX ADMIN — METOPROLOL SUCCINATE 25 MG: 50 TABLET, EXTENDED RELEASE ORAL at 17:12

## 2020-05-21 RX ADMIN — ACETAMINOPHEN 650 MG: 325 TABLET, FILM COATED ORAL at 19:18

## 2020-05-21 RX ADMIN — SODIUM CHLORIDE 250 MG: 900 INJECTION, SOLUTION INTRAVENOUS at 09:00

## 2020-05-21 RX ADMIN — ASPIRIN 81 MG 81 MG: 81 TABLET ORAL at 08:26

## 2020-05-21 RX ADMIN — VANCOMYCIN HYDROCHLORIDE 1000 MG: 1 INJECTION, POWDER, LYOPHILIZED, FOR SOLUTION INTRAVENOUS at 14:19

## 2020-05-21 RX ADMIN — Medication 10 ML: at 14:19

## 2020-05-21 RX ADMIN — ESCITALOPRAM OXALATE 10 MG: 10 TABLET ORAL at 08:26

## 2020-05-21 RX ADMIN — Medication 10 ML: at 21:28

## 2020-05-21 RX ADMIN — ATORVASTATIN CALCIUM 20 MG: 20 TABLET, FILM COATED ORAL at 21:27

## 2020-05-21 RX ADMIN — VANCOMYCIN HYDROCHLORIDE 1000 MG: 1 INJECTION, POWDER, LYOPHILIZED, FOR SOLUTION INTRAVENOUS at 01:51

## 2020-05-21 RX ADMIN — METOPROLOL SUCCINATE 25 MG: 50 TABLET, EXTENDED RELEASE ORAL at 08:25

## 2020-05-21 NOTE — PROGRESS NOTES
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 [facility] 20 with strep spp, pending  · Generalized Phong LE rash of unknown etiology, ?sepsis related  · Diarrhea PTA, resolved. Hx diverticulosis   · Dental caries   · Anemia, of acute disease: follow  · Hx AVR 2018  · DM, A1c 7    Plan:   · Continue Vancomycin, follow pending BC  · Will need re-do AVR once BC cleared per CT Sx    Anti-infectives:   · Vancomycin -    Subjective:   Resting in bed, reports POOLE. No fever, chills, mild sweats    No Known Allergies     Review of Systems:  A comprehensive review of systems was negative except for that written in the History of Present Illness.     Objective:     Visit Vitals  /73 (BP 1 Location: Right arm, BP Patient Position: At rest)   Pulse 80   Temp 98.8 °F (37.1 °C)   Resp 20   Ht 6' 1\" (1.854 m)   Wt 71.7 kg (158 lb)   SpO2 96%   BMI 20.85 kg/m²     Temp (24hrs), Av.8 °F (37.1 °C), Min:97.9 °F (36.6 °C), Max:99.4 °F (37.4 °C)       General:  Alert, cooperative, lethargic   Head:  Normocephalic, atraumatic    Eyes:  Anicteric, no drainage, not injected, EOMI   Throat: Mucus membranes moist OP clear   Neck: Supple, symmetrical, trachea midline, no JVD   Lungs:   Scattered crackles, 2LNC, shallow resp   Heart:  Regular rate and rhythm, systolic murmur   Abdomen:   Soft, non-tender, no guarding, no distention, bowel sounds active   Extremities: Extremities normal, atraumatic, no cyanosis or edema   Pulses: 2+ and symmetric   Skin: Skin color, texture, turgor normal, generalized rash noted to phong LE, macular, see image     Lines/Devices: PIVs                   Data Review:     CBC:  Recent Labs     20  0333 20  0416 20  0346   WBC 8.2 9.4 9.0   GRANS  --   --  85*   MONOS  --   --  6   EOS  --   --  0*   ANEU  --   --  7.6   ABL  --   --  0.8   HGB 7.8* 8.1* 9.1*   HCT 25.4* 25.4* 29.8*   * 158 121*       BMP:  Recent Labs     05/21/20  0333 05/20/20  0416 05/19/20  0346   CREA 0.70* 0.68* 0.72*   BUN 22 19 16    139 139   K 3.9 3.3* 3.3*   * 107 106   CO2 21 23 26   AGAP 9 9 7   * 107* 118*       LFTS:  Recent Labs     05/19/20  0346   TBILI 0.5   ALT 50   SGOT 70*   AP 89   TP 5.5*   ALB 1.6*       Microbiology:     All Micro Results     Procedure Component Value Units Date/Time    CULTURE, BLOOD [[id number]] Collected:  05/18/20 1944    Order Status:  Completed Specimen:  Blood Updated:  05/21/20 0743     Special Requests: --        RIGHT  HAND       GRAM STAIN GRAM POSITIVE COCCI         PEDIATRIC BOTTLE               CRITICAL RESULT NOT CALLED DUE TO PREVIOUS NOTIFICATION OF CRITICAL RESULT WITHIN THE LAST 24 HOURS. Culture result:       CULTURE IN PROGRESS,FURTHER UPDATES TO FOLLOW          CULTURE, BLOOD [[id number]] Collected:  05/20/20 1424    Order Status:  Completed Specimen:  Blood Updated:  05/21/20 0651     Special Requests: --        RIGHT  FOREARM       Culture result: NO GROWTH AFTER 15 HOURS       CULTURE, BLOOD [[id number]] Collected:  05/20/20 1434    Order Status:  Completed Specimen:  Blood Updated:  05/21/20 0651     Special Requests: --        LEFT  Antecubital       Culture result: NO GROWTH AFTER 15 HOURS       CULTURE, BLOOD [[id number]]     Order Status:  Canceled Specimen:  Blood     CULTURE, BLOOD [[id number]]     Order Status:  Canceled Specimen:  Blood     BLOOD CULTURE ID PANEL [[id number]]  (Abnormal) Collected:  05/18/20 1938    Order Status:  Completed Specimen:  Blood Updated:  05/20/20 1117     Acc. no. from Micro Order [id number]     Streptococcus Detected        Comment: RESULTS VERIFIED, PHONED TO AND READ BACK BY  Vicente Hutton RN @ 1120 ON 5/20/20 BY AMM          INTERPRETATION       Gram positive cocci.  Identified by realtime PCR as Streptococcus species (not agalactiae, pyogenes, or pneumoniae). Comment: Consider discontinuation of IV vancomycin and using an anti-streptococcal beta-lactam (ceftriaxone/cefotaxime (peds))       CULTURE, BLOOD [[id number]] Collected:  05/18/20 1938    Order Status:  Completed Specimen:  Blood Updated:  05/20/20 1116     Special Requests: --        RIGHT  ARM       GRAM STAIN GRAM POS COCCI IN CHAINS         AEROBIC BOTTLE POSITIVE               RESULTS VERIFIED, PHONED TO AND READ BACK BY Vicente Hutton RN @ 1120 ON 5/20/20 BY AMM           Culture result:       CULTURE IN PROGRESS,FURTHER UPDATES TO FOLLOW            REFER TO [address] PANEL ACCESSION [id number]    EMERGENT DISEASE PANEL [[id number]] Collected:  05/16/20 1946    Order Status:  Completed Specimen:  Not Specified Updated:  05/18/20 1614     Emergent disease panel Not detected        Comment: Performed at [address]               Imaging:   YARON 5/20 brief report  Transesophageal Echo Note  - pt underwent successful YARON today in cath lab  - start 1042  - stop 1100  - sedation: 3 mg IV Midazolam, 25 mcg Fentanyl IV given by Lewis Ramirez RN under my direct supervision. Direct monitoring of vital signs and respiratory status throughout the procedure.    - No complications, pt in stable condition  - YARON Brief Findings: LVEF mild/moderately reduced, bioprosthetic aortic valve with small mobile mass on LVOT side of the prosthesis as well as thickening / restriction of 2/3 leaflets, no other valvular masses/vegetations identified.        Signed By: Cristhian Sellers NP     May 21, 2020

## 2020-05-21 NOTE — PROGRESS NOTES
Problem: Falls - Risk of  Goal: *Absence of Falls  Description: Document Caleb Madrid Fall Risk and appropriate interventions in the flowsheet. Outcome: Progressing Towards Goal  Note: Fall Risk Interventions:  Mobility Interventions: Communicate number of staff needed for ambulation/transfer, Patient to call before getting OOB         Medication Interventions: Patient to call before getting OOB, Teach patient to arise slowly    Elimination Interventions: Call light in reach, Patient to call for help with toileting needs, Urinal in reach           Problem: Diabetes Self-Management  Goal: *Monitoring blood glucose, interpreting and using results  Description: Identify recommended blood glucose targets  and personal targets.   Outcome: Progressing Towards Goal

## 2020-05-21 NOTE — ROUTINE PROCESS
Bedside and Verbal shift change report given to be given to Fe Higgins RN (oncoming nurse) by self Desha Monika nurse). Report included the following information SBAR, Kardex and MAR.

## 2020-05-21 NOTE — PROGRESS NOTES
[facility] CARDIOLOGY PROGRESS NOTE    5/21/2020 7:48 AM    Admit Date: 5/16/2020        Subjective:   Stable overnight without angina, CHF, or palpitations. Vitals stable and controlled. No other complaints overnight. Tolerating meds well. Objective:      Vitals:    05/20/20 1701 05/20/20 2052 05/21/20 0034 05/21/20 0504   BP: 113/72 111/74 116/72 108/65   Pulse: 100 (!) 103 97 96   Resp: 19 20 20 20   Temp: 97.9 °F (36.6 °C) 99.2 °F (37.3 °C) 99.4 °F (37.4 °C) 98.3 °F (36.8 °C)   SpO2: 96% 96% 95% 94%   Weight:    71.7 kg (158 lb)   Height:           Physical Exam:  Neck- supple, no JVD  CV- regular rate and rhythm no MRG  Lung- clear bilaterally, decreased bibasilar  Abd- soft, nontender, nondistended  Ext- no edema  Skin- warm and dry    Data Review:   Recent Labs     05/21/20  0333 05/20/20  0416    139   K 3.9 3.3*   MG 1.9 1.9   BUN 22 19   CREA 0.70* 0.68*   * 107*   WBC 8.2 9.4   HGB 7.8* 8.1*   HCT 25.4* 25.4*   * 158       Assessment and Plan:     Principal Problem:    NSTEMI (non-ST elevated myocardial infarction) ([address] (5/16/2020)- occluded OM branch.    The OM was not intervened on secondary to the fact that this likely infarcted at least four days prior to admit, and is outside the window for therapy. It is also unknown whether this is a ruptured plaque or possibly an embolic event and stenting would be potentially contraindicated for the development of mycotic aneurysm. Active Problems:    Diastolic CHF, chronic (HCC) - stable, lying flat comfortably, continue meds      S/P aortic valve replacement- endocarditis- ABX per ID-- YARON Brief Findings: LVEF mild/moderately reduced, bioprosthetic aortic valve with small mobile mass on LVOT side of the prosthesis as well as thickening / restriction of 2/3 leaflets, no other valvular masses/vegetations identified. continue Vanc per ID recs.        CAD (coronary artery disease) ()- stable, continue meds      Overview: Nonobstructive      DIEGO on CPAP ()- stable, continue CPAP as tolerated      Dyslipidemia ()- stable, continue meds      Essential hypertension - stable, continue meds      Suspected COVID-19 virus infection (5/16/2020)- ruled out, negative    Continue supportive care, CBC/BMP/Mg in AM      A.  Tejas Hawkins MD  [address] Cardiology  Pager [phone]

## 2020-05-21 NOTE — ROUTINE PROCESS
Bedside and Verbal shift change report given to be given to self (oncoming nurse) by Allison Fishman RN (offgoing nurse). Report included the following information SBAR, Kardex and MAR.

## 2020-05-21 NOTE — PROGRESS NOTES
Date of Outreach Update:  Paul Abbott was seen and assessed. MEWS Score: 2 (05/20/20 2052)  Vitals:    05/20/20 1215 05/20/20 1439 05/20/20 1701 05/20/20 2052   BP: 106/75 117/84 113/72 111/74   Pulse: 93 97 100 (!) 103   Resp:  20 19 20   Temp:  98.9 °F (37.2 °C) 97.9 °F (36.6 °C) 99.2 °F (37.3 °C)   SpO2: 97% 97% 96% 96%   Weight:       Height:             Pain Assessment  Pain Intensity 1: 0 (05/20/20 2020)  Pain Location 1: Chest     Patient Stated Pain Goal: 0      Previous Outreach assessment has been reviewed. There have been no significant clinical changes since the completion of the last dated Outreach assessment. Final visit per ICU outreach protocol.     Signed By:   Val Christy RN

## 2020-05-21 NOTE — PROGRESS NOTES
Progress Note    Patient: Tiffanie Leung MRN: [id number]  SSN: [id number]    YOB: 1960  Age: 62 y.o. Sex: male      Admit Date: 5/16/2020    LOS: 5 days     Subjective:   F/U NSTEMI, endocarditis    \"55 yo male with PMH of CAD (non obstructive  Per [facility] 2018) , DIEGO on CPAP, AS s/p AVR, HTN admitted with acute chest pain and elevated troponin, NSTEMI. He was seen by cardiology April, 2020 s/p low risk NM stress testing and ECHO 50% EF. He has been tested for COVID 19 due to constellation of petechial LE rash, elevated ddimer, mild thrombocytopenia. CTA chest negative. \" Troponin 2.08 -->24.9-->36. 10. Repeat ECHO showed EF 40%, moderate diffuse hypokinesis. RV pressure 45-50mmHg, aortic valve also had a moderate sized mobile mass suspicious for endocarditis. Dilatation of the ascending aorta seen 41mm. AYRON performed on 5/21 confirmed vegetation on aortic valve. ID consulted and agree with Vancomycin. Cardiothoracic surgery recommended AVR after bacteremia has been treated. Blood cultures from 5/18 growing gram + cocci in chains. Repeat blood cultures on 5/20/20 have no growth to date. Found to have normocytic anemia. Hg baseline 9.3. Denies ever having colonoscopy or EGD. Iron 16, TIBC 173, transferrin 9, ferritin 910, and haptoglobin 131. IV iron infusions given. No obvious bleeding. A1C 7.1. No chest pain or SOB. NO concerns.    Current Facility-Administered Medications   Medication Dose Route Frequency    [START ON 5/22/2020] VANCOMYCIN TROUGH REMINDER   Other ONCE    ergocalciferol capsule 50,000 Units  50,000 Units Oral Q7D    vancomycin (VANCOCIN) 1,000 mg in 0.9% sodium chloride (MBP/ADV) 250 mL  1,000 mg IntraVENous Q12H    ferric gluconate (FERRLECIT) 250 mg in 0.9% sodium chloride 250 mL IVPB  250 mg IntraVENous DAILY    metoprolol succinate (TOPROL-XL) XL tablet 25 mg  25 mg Oral BID    insulin lispro (HUMALOG) injection   SubCUTAneous AC&HS    escitalopram oxalate (LEXAPRO) tablet 10 mg  10 mg Oral DAILY    atorvastatin (LIPITOR) tablet 20 mg  20 mg Oral QHS    aspirin chewable tablet 81 mg  81 mg Oral DAILY    sodium chloride (NS) flush 5-40 mL  5-40 mL IntraVENous Q8H    sodium chloride (NS) flush 5-40 mL  5-40 mL IntraVENous PRN    acetaminophen (TYLENOL) tablet 650 mg  650 mg Oral Q6H PRN    morphine 10 mg/ml injection 2 mg  2 mg IntraVENous Q3H PRN    nitroglycerin (NITROSTAT) tablet 0.4 mg  0.4 mg SubLINGual Q5MIN PRN    ondansetron (ZOFRAN) injection 4 mg  4 mg IntraVENous Q4H PRN       Objective:     Vitals:    05/21/20 0504 05/21/20 0808 05/21/20 0911 05/21/20 1351   BP: 108/65 119/73  108/71   Pulse: 96 80  93   Resp: 20 20 20   Temp: 98.3 °F (36.8 °C) 98.8 °F (37.1 °C)  99.5 °F (37.5 °C)   SpO2: 94% 96% 96% 96%   Weight: 71.7 kg (158 lb)      Height:             Intake and Output:  Current Shift: 05/21 0701 - 05/21 1900  In: -   Out: 201 [Urine:200]  Last three shifts: 05/19 1901 - 05/21 0700  In: 9258 [P.O.:340; I.V.:600]  Out: 400 [Urine:400]    Physical Exam:   General:  Alert, cooperative, no distress. Frail appearing   Eyes:  Conjunctivae/corneas clear. Ears:  Normal TMs and external ear canals both ears. Nose: Nares normal. Septum midline. Mouth/Throat: Lips, mucosa, and tongue normal.    Neck: no JVD. Back:   deferred. Lungs:   Clear to auscultation bilaterally. Heart:  Regular rate and rhythm, S1, S2 normal. Faint blowing systolic murmur best heard at right 2nd intercostal space   Abdomen:   Soft, non-tender. Bowel sounds normal.    Extremities: Petechiae to LE bilaterally, no janeway lesions. Pulses: 2+ and symmetric all extremities. Skin: As above   Lymph nodes: Cervical, supraclavicular, and axillary nodes normal.   Neurologic: CNII-XII intact. Good ROM in bed.         Lab/Data Review:    Recent Results (from the past 24 hour(s))   CULTURE, BLOOD    Collection Time: 05/20/20  2:34 PM   Result Value Ref Range    Special Requests: LEFT  Antecubital        Culture result: NO GROWTH AFTER 15 HOURS     GLUCOSE, POC    Collection Time: 05/20/20  3:48 PM   Result Value Ref Range    Glucose (POC) 158 (H) 65 - 100 mg/dL   GLUCOSE, POC    Collection Time: 05/20/20  9:11 PM   Result Value Ref Range    Glucose (POC) 124 (H) 65 - 100 mg/dL   MAGNESIUM    Collection Time: 05/21/20  3:33 AM   Result Value Ref Range    Magnesium 1.9 1.8 - 2.4 mg/dL   CBC W/O DIFF    Collection Time: 05/21/20  3:33 AM   Result Value Ref Range    WBC 8.2 4.3 - 11.1 K/uL    RBC 2.96 (L) 4.23 - 5.6 M/uL    HGB 7.8 (L) 13.6 - 17.2 g/dL    HCT 25.4 (L) 41.1 - 50.3 %    MCV 85.8 79.6 - 97.8 FL    MCH 26.4 26.1 - 32.9 PG    MCHC 30.7 (L) 31.4 - 35.0 g/dL    RDW 14.6 11.9 - 14.6 %    PLATELET 326 (L) 433 - 450 K/uL    MPV 9.3 (L) 9.4 - 12.3 FL    ABSOLUTE NRBC 0.00 0.0 - 0.2 K/uL   METABOLIC PANEL, BASIC    Collection Time: 05/21/20  3:33 AM   Result Value Ref Range    Sodium 141 136 - 145 mmol/L    Potassium 3.9 3.5 - 5.1 mmol/L    Chloride 111 (H) 98 - 107 mmol/L    CO2 21 21 - 32 mmol/L    Anion gap 9 7 - 16 mmol/L    Glucose 123 (H) 65 - 100 mg/dL    BUN 22 8 - 23 MG/DL    Creatinine 0.70 (L) 0.8 - 1.5 MG/DL    GFR est AA >60 >60 ml/min/1.73m2    GFR est non-AA >60 >60 ml/min/1.73m2    Calcium 7.6 (L) 8.3 - 10.4 MG/DL   GLUCOSE, POC    Collection Time: 05/21/20  6:10 AM   Result Value Ref Range    Glucose (POC) 123 (H) 65 - 100 mg/dL   GLUCOSE, POC    Collection Time: 05/21/20 10:57 AM   Result Value Ref Range    Glucose (POC) 141 (H) 65 - 100 mg/dL   OCCULT BLOOD, STOOL    Collection Time: 05/21/20 11:29 AM   Result Value Ref Range    Occult blood, stool Negative NEG         Assessment/ Plan:     Principal Problem:    NSTEMI (non-ST elevated myocardial infarction) ([facility]) (5/16/2020)    Active Problems:    Diastolic CHF, chronic ([facility]) (5/7/2018)      S/P aortic valve replacement (4/1/2018)      CAD (coronary artery disease) ()      Overview: Nonobstructive      DIEGO on CPAP ()      Dyslipidemia ()      Essential hypertension (9/21/2018)      Suspected COVID-19 virus infection (5/16/2020)      Petechiae (5/16/2020)      Anemia (5/16/2020)      Diabetes mellitus, type II ([facility] [address] (5/20/2020)      Vitamin D deficiency (5/20/2020)    CAD with NSTEMI and endocarditis-S/p cardiac cath on 5/19 with no intervention. ASA, statin, BB. Will need AVR once bacteremia has been treated. Endocarditis with gram + cocci bacteremnia - Consulted ID on recommendations of abx treatment. Vancomycin. Repeat blood cultures no growth to date. Petechia - Drug reaction? Possibly from thrombocytopenia? COVID negative. Will need outpatient dermatology appointment. Normocytic anemia with thrombocytopenia - follow CBC. Iron infusions. Will start oral iron after infusions complete. Will need to establish care with GI for colon cancer screening at discharge. Hyponatremia - Resolved. Hypokalemia - Resolved. Vitamin D deficiency - Supplement. DM type II - A1C 7.1. Changed diet to ADA. SS. Will need outpatient diabetic education    Wife's number is . I have attempted this number and no answer.      DVT prophylaxis - SCDs  Signed By: Ese More,      May 21, 2020

## 2020-05-21 NOTE — ROUTINE PROCESS
Verbal bedside report given to harriet Diehl RN. Patient's situation, background, assessment and recommendations provided. Opportunity for questions provided. Oncoming RN assumed care of patient.

## 2020-05-21 NOTE — CONSULTS
Kelley Chester. MD Virginia Ames. MD Vanessa Bell         5/16/2020 1960    REFERRING PHYSICIAN:  Dr. Radha Veliz:  The patient is a 62 y.o. male who presented to the ER with chest pain that woke him from sleep. He had noted a rash on his legs and diarrhea over the past 2 weeks. Troponin was elevated on arrival to the ER. Chest CT was negative for PE for acute pulmonary disease. Troponin peaked at 36.10. Echo showed probable vegetation on AV and reduced LV EF. LHC showed an occluded OM with CATA O flow. It was unclear if this was plaque rupture or embolic event. YARON was performed on 5/20 that showed a mobile mass on LVOT side of AV prosthesis. There was mild to moderate MR. Blood cultures positive for GPC. ID was consulted on 5/20. He currently denies any dyspnea or orthopnea. He appears tachypneic on exam.     He denies any recent dental or surgical procedures. Hx of drug addition in remission, pt denies any recent recreational drug use. He is s/p AVR in 4/2018.      Past Medical History:   Diagnosis Date    CAD (coronary artery disease)     Nonobstructive multivessel disease    Drug addiction in remission ([facility]     none since 2001    Dyslipidemia     DIEGO on CPAP     Recurrent depression ([facility]     S/P aortic valve replacement 04/2018    Seizures ([facility] [facility] 2001    x 1--- ETOH  WITHDRAWAL    Sigmoid diverticulosis        Past Surgical History:   Procedure Laterality Date    CARDIAC SURG PROCEDURE UNLIST  04/12/2018    AVR    HX HEART CATHETERIZATION  02/23/2018    non obstructive disease, severe AS    HX HERNIA REPAIR Left 06/14/2018    inguinal        Family History   Problem Relation Age of Onset    Cancer Mother     Coronary Artery Disease Mother     Heart Disease Brother 64        MI X 3    Coronary Artery Disease Brother     Cancer Sister         breast       Social History     Socioeconomic History    Marital status:      Spouse name: Not on file    Number of children: Not on file    Years of education: Not on file    Highest education level: Not on file   Occupational History    Occupation: Works at [address] resource strain: Not on file    Food insecurity     Worry: Not on file     Inability: Not on file   GuestDriven needs     Medical: Not on file     Non-medical: Not on file   Tobacco Use    Smoking status: Former Smoker     Packs/day: 0.50     Years: 40.00     Pack years: 20.00     Types: Cigarettes     Last attempt to quit: 2018     Years since quittin.1    Smokeless tobacco: Never Used    Tobacco comment: quit    Substance and Sexual Activity    Alcohol use: Yes     Comment: 6 drinks a week    Drug use: No    Sexual activity: Not on file   Lifestyle    Physical activity     Days per week: Not on file     Minutes per session: Not on file    Stress: Not on file   Relationships    Social connections     Talks on phone: Not on file     Gets together: Not on file     Attends Christianity service: Not on file     Active member of club or organization: Not on file     Attends meetings of clubs or organizations: Not on file     Relationship status: Not on file    Intimate partner violence     Fear of current or ex partner: Not on file     Emotionally abused: Not on file     Physically abused: Not on file     Forced sexual activity: Not on file   Other Topics Concern    Not on file   Social History Narrative    Not on file       No Known Allergies    No current facility-administered medications on file prior to encounter. Current Outpatient Medications on File Prior to Encounter   Medication Sig Dispense Refill    escitalopram oxalate (LEXAPRO) 10 mg tablet Take 1 Tab by mouth daily. 30 Tab 2    metoprolol succinate (TOPROL-XL) 25 mg XL tablet Take 1 Tab by mouth daily.  30 Tab 11    atorvastatin (LIPITOR) 20 mg tablet Take 1 Tab by mouth nightly. 90 Tab 0    aspirin 81 mg chewable tablet Take 81 mg by mouth daily. REVIEW OF SYSTEMS:  Review of Systems   Constitution: Negative for chills, fever, malaise/fatigue, weight gain and weight loss. HENT: Negative for ear pain, hearing loss, nosebleeds, sore throat and tinnitus. Eyes: Negative for blurred vision, vision loss in left eye and vision loss in right eye. Cardiovascular: Negative for chest pain, dyspnea on exertion, leg swelling, near-syncope, orthopnea, palpitations, paroxysmal nocturnal dyspnea and syncope. Respiratory: Negative for cough, hemoptysis, shortness of breath, sputum production and wheezing. Endocrine: Negative for cold intolerance, heat intolerance and polydipsia. Hematologic/Lymphatic: Does not bruise/bleed easily. Skin: Positive for rash. Negative for color change. Musculoskeletal: Negative for back pain, joint pain, joint swelling and myalgias. Gastrointestinal: Positive for diarrhea. Negative for abdominal pain, constipation, dysphagia, heartburn, hematemesis, melena, nausea and vomiting. Genitourinary: Negative for dysuria, frequency, hematuria and urgency. Neurological: Negative for difficulty with concentration, dizziness, headaches, light-headedness, numbness, paresthesias, seizures, vertigo and weakness. Psychiatric/Behavioral: Negative for altered mental status and depression.        Physical Exam  Vitals:    05/20/20 2052 05/21/20 0034 05/21/20 0504 05/21/20 0808   BP: 111/74 116/72 108/65 119/73   Pulse: (!) 103 97 96 80   Resp: 20 20 20 20   Temp: 99.2 °F (37.3 °C) 99.4 °F (37.4 °C) 98.3 °F (36.8 °C) 98.8 °F (37.1 °C)   SpO2: 96% 95% 94% 96%   Weight:   158 lb (71.7 kg)    Height:           Physical Exam:  General: Well Developed, Well Nourished, No Acute Distress  HEENT: Normocephalic, pupils equal and round, no scleral icterus  Neck: supple, no JVD  Chest wall: No deformity  Heart: S1S2 with tachycardic rate, regular rhythm Lungs: Clear throughout auscultation bilaterally without adventitious sounds  Vascular: Pulses are full and equal. There is no venous stasis disease. Abd: soft, nontender, nondistended, with good bowel sounds, no pulsatile masses  Ext: warm, no edema, calves supple/nontender, pulses 2+ bilaterally  Skin: warm and dry, no rashes, cyanosis, jaundice, ecchymoses or evidence of skin breakdown  Psychiatric: Normal mood and affect  Neurologic: Alert and oriented X 3, no focal deficit noted    Labs:   Recent Labs     05/21/20  0333 05/20/20  0416    139   K 3.9 3.3*   MG 1.9 1.9   BUN 22 19   CREA 0.70* 0.68*   * 107*   WBC 8.2 9.4   HGB 7.8* 8.1*   HCT 25.4* 25.4*   * 158       Lab Results   Component Value Date/Time    Cholesterol, total <50 05/18/2020 03:08 AM    HDL Cholesterol 14 (L) 05/18/2020 03:08 AM    LDL, calculated Cannot be calculated 05/18/2020 03:08 AM    VLDL, calculated 16 05/18/2020 03:08 AM    Triglyceride 80 05/18/2020 03:08 AM    CHOL/HDL Ratio Cannot be calculated 05/18/2020 03:08 AM       Assessment:     Principal Problem:    NSTEMI (non-ST elevated myocardial infarction) ([facility] [facility] (5/16/2020)  Active Problems:    Diastolic CHF, chronic (HCC) (5/7/2018)    S/P aortic valve replacement (4/1/2018)    CAD (coronary artery disease) ()    DIEGO on CPAP ()    Dyslipidemia ()    Essential hypertension (9/21/2018)    Suspected COVID-19 virus infection (5/16/2020)    Petechiae (5/16/2020)    Anemia (5/16/2020)      Diabetes mellitus, type II ([facility] [address] (5/20/2020)--Pt denies any ever being told he was diabetic and denies any prior medications for this    Vitamin D deficiency (5/20/2020)       Plan:      ID following, blood cultures pos for GPC, awaiting species result. Pt tachpneic/tachycardic on exam but denies any dyspnea. Noted to have worsening anemia, needs daily CBC. Will need redo sternotomy/AVR after blood cultures cleared. Will follow.        Gianluca Pichadro PA-C

## 2020-05-22 LAB
ANION GAP SERPL CALC-SCNC: 7 MMOL/L (ref 7–16)
BUN SERPL-MCNC: 24 MG/DL (ref 8–23)
CALCIUM SERPL-MCNC: 7.8 MG/DL (ref 8.3–10.4)
CHLORIDE SERPL-SCNC: 111 MMOL/L (ref 98–107)
CO2 SERPL-SCNC: 23 MMOL/L (ref 21–32)
CREAT SERPL-MCNC: 0.78 MG/DL (ref 0.8–1.5)
ERYTHROCYTE [DISTWIDTH] IN BLOOD BY AUTOMATED COUNT: 14.9 % (ref 11.9–14.6)
GLUCOSE BLD STRIP.AUTO-MCNC: 113 MG/DL (ref 65–100)
GLUCOSE BLD STRIP.AUTO-MCNC: 115 MG/DL (ref 65–100)
GLUCOSE BLD STRIP.AUTO-MCNC: 117 MG/DL (ref 65–100)
GLUCOSE BLD STRIP.AUTO-MCNC: 126 MG/DL (ref 65–100)
GLUCOSE SERPL-MCNC: 124 MG/DL (ref 65–100)
HCT VFR BLD AUTO: 25.7 % (ref 41.1–50.3)
HGB BLD-MCNC: 8 G/DL (ref 13.6–17.2)
MAGNESIUM SERPL-MCNC: 2 MG/DL (ref 1.8–2.4)
MCH RBC QN AUTO: 26.9 PG (ref 26.1–32.9)
MCHC RBC AUTO-ENTMCNC: 31.1 G/DL (ref 31.4–35)
MCV RBC AUTO: 86.5 FL (ref 79.6–97.8)
NRBC # BLD: 0 K/UL (ref 0–0.2)
PLATELET # BLD AUTO: 171 K/UL (ref 150–450)
PMV BLD AUTO: 9.2 FL (ref 9.4–12.3)
POTASSIUM SERPL-SCNC: 3.7 MMOL/L (ref 3.5–5.1)
RBC # BLD AUTO: 2.97 M/UL (ref 4.23–5.6)
SODIUM SERPL-SCNC: 141 MMOL/L (ref 136–145)
VANCOMYCIN TROUGH SERPL-MCNC: 11.8 UG/ML (ref 5–20)
WBC # BLD AUTO: 7.1 K/UL (ref 4.3–11.1)

## 2020-05-22 PROCEDURE — 85027 COMPLETE CBC AUTOMATED: CPT

## 2020-05-22 PROCEDURE — 74011250636 HC RX REV CODE- 250/636: Performed by: INTERNAL MEDICINE

## 2020-05-22 PROCEDURE — 65660000000 HC RM CCU STEPDOWN

## 2020-05-22 PROCEDURE — 74011250637 HC RX REV CODE- 250/637: Performed by: INTERNAL MEDICINE

## 2020-05-22 PROCEDURE — 83735 ASSAY OF MAGNESIUM: CPT

## 2020-05-22 PROCEDURE — 74011000258 HC RX REV CODE- 258: Performed by: INTERNAL MEDICINE

## 2020-05-22 PROCEDURE — 82962 GLUCOSE BLOOD TEST: CPT

## 2020-05-22 PROCEDURE — 74011250636 HC RX REV CODE- 250/636: Performed by: FAMILY MEDICINE

## 2020-05-22 PROCEDURE — 36415 COLL VENOUS BLD VENIPUNCTURE: CPT

## 2020-05-22 PROCEDURE — 80048 BASIC METABOLIC PNL TOTAL CA: CPT

## 2020-05-22 PROCEDURE — 80202 ASSAY OF VANCOMYCIN: CPT

## 2020-05-22 PROCEDURE — 74011250637 HC RX REV CODE- 250/637: Performed by: HOSPITALIST

## 2020-05-22 PROCEDURE — 74011250637 HC RX REV CODE- 250/637: Performed by: FAMILY MEDICINE

## 2020-05-22 RX ORDER — LANOLIN ALCOHOL/MO/W.PET/CERES
1 CREAM (GRAM) TOPICAL 2 TIMES DAILY WITH MEALS
Status: DISCONTINUED | OUTPATIENT
Start: 2020-05-22 | End: 2020-06-07

## 2020-05-22 RX ADMIN — ESCITALOPRAM OXALATE 10 MG: 10 TABLET ORAL at 08:12

## 2020-05-22 RX ADMIN — ATORVASTATIN CALCIUM 20 MG: 20 TABLET, FILM COATED ORAL at 22:14

## 2020-05-22 RX ADMIN — Medication 10 ML: at 22:15

## 2020-05-22 RX ADMIN — Medication 10 ML: at 06:38

## 2020-05-22 RX ADMIN — FERROUS SULFATE TAB 325 MG (65 MG ELEMENTAL FE) 325 MG: 325 (65 FE) TAB at 17:32

## 2020-05-22 RX ADMIN — Medication 10 ML: at 15:18

## 2020-05-22 RX ADMIN — METOPROLOL SUCCINATE 25 MG: 50 TABLET, EXTENDED RELEASE ORAL at 17:32

## 2020-05-22 RX ADMIN — VANCOMYCIN HYDROCHLORIDE 1000 MG: 1 INJECTION, POWDER, LYOPHILIZED, FOR SOLUTION INTRAVENOUS at 01:48

## 2020-05-22 RX ADMIN — ASPIRIN 81 MG 81 MG: 81 TABLET ORAL at 08:12

## 2020-05-22 RX ADMIN — SODIUM CHLORIDE 250 MG: 900 INJECTION, SOLUTION INTRAVENOUS at 08:15

## 2020-05-22 RX ADMIN — CEFTRIAXONE SODIUM 2 G: 2 INJECTION, POWDER, FOR SOLUTION INTRAMUSCULAR; INTRAVENOUS at 15:17

## 2020-05-22 RX ADMIN — METOPROLOL SUCCINATE 25 MG: 50 TABLET, EXTENDED RELEASE ORAL at 08:12

## 2020-05-22 NOTE — PROGRESS NOTES
[facility] CARDIOLOGY PROGRESS NOTE    5/22/2020 7:41 AM    Admit Date: 5/16/2020        Subjective:   Stable overnight without angina, CHF, or palpitations. Vitals stable and controlled. No other complaints overnight. Tolerating meds well. Objective:      Vitals:    05/21/20 2050 05/22/20 0016 05/22/20 0446 05/22/20 0450   BP: 120/71 120/57  111/64   Pulse: 93 84  91   Resp: 18 20 16   Temp: 97.9 °F (36.6 °C) 97.8 °F (36.6 °C)  98 °F (36.7 °C)   SpO2: 96% 96%  97%   Weight:   77.4 kg (170 lb 9.6 oz)    Height:           Physical Exam:  Neck- supple, no JVD  CV- regular rate and rhythm, 2/6 maria t rusb  Lung- clear bilaterally  Abd- soft, nontender, nondistended  Ext- no edema  Skin- warm and dry    Data Review:   Recent Labs     05/22/20  0058 05/21/20  0333    141   K 3.7 3.9   MG 2.0 1.9   BUN 24* 22   CREA 0.78* 0.70*   * 123*   WBC 7.1 8.2   HGB 8.0* 7.8*   HCT 25.7* 25.4*    143*       Assessment and Plan:     Principal Problem:    NSTEMI (non-ST elevated myocardial infarction) ([facility] [address] (5/16/2020)- occluded OM branch.    The OM was not intervened on secondary to the fact that this likely infarcted at least four days prior to admit, and is outside the window for therapy.  It is also unknown whether this is a ruptured plaque or possibly an embolic event and stenting would be potentially contraindicated for the development of mycotic aneurysm.      Active Problems:    Diastolic CHF, chronic (HCC) - stable, lying flat comfortably, continue meds       S/P aortic valve replacement- endocarditis- ABX per ID-- YARON Brief Findings: LVEF mild/moderately reduced, bioprosthetic aortic valve with small mobile mass on LVOT side of the prosthesis as well as thickening / restriction of 2/3 leaflets, no other valvular masses/vegetations identified.  Continue Vanc per ID recs.  Will need AVR soon once blood cultures cleared       CAD (coronary artery disease) ()- stable, continue meds      Overview: Nonobstructive       DIEGO on CPAP ()- stable, continue CPAP as tolerated       Dyslipidemia ()- stable, continue meds       Essential hypertension - stable, continue meds       Suspected COVID-19 virus infection (5/16/2020)- ruled out, negative     Continue supportive care, CBC/BMP/Mg in AM       ANN MARIE.  Mauricio Estrada MD  [facility] Cardiology  Pager [phone]

## 2020-05-22 NOTE — PROGRESS NOTES
Nutrition  Reason for assessment: LOS Day 5  Assessment:   Diet: DIET DIABETIC CONSISTENT CARB Regular    Food/Nutrition Patient History:  Patient is 61yo male who presented with chest pain. NSTEMI. PMH includes HTN, CHF, anemia, DM2. Patient had cardiac cath on 5/19, VICTOR HUGO on 5/20 and plans for AVR. Patient reports that he is eating great here, likely more than he eats normally at home. Patient reports during admission no nausea, vomiting or pain with eating. Patient did ask some questions about diabetic diet and to just clarify some items such as bananas and peanut butter. All questions and concerns answered. Edema: BLE 1+ pitting; BUE 1+ non-pitting    Glucose POC-134, 113, 126  SSI    Anthropometrics:  Height: 6' 1\" (185.4 cm), Weight: 77.4 kg (170 lb 9.6 oz), Weight Source: Standing scale (comment), Body mass index is 22.51 kg/m². BMI class of Normal weight. Macronutrient needs: (using CBW (Current body weight) standing scale 77.4 kg)  EER: 4573-0181 kcal/day (20-25 kcals/kg )  EPR: 77-97 g/day (20%)     Intake/Comparative Standards: Per documentation, patient consuming average 100% of 3 recorded meals in 3 days. This meets % of calorie and protein needs  Patient Vitals for the past 100 hrs:   % Diet Eaten   05/20/20 0945 100 %   05/19/20 1742 100 %   05/18/20 1242 90 %     Nutrition Diagnosis:  Food and nutrition related knowledge deficit related to food and nutrition related knowledge deficit as evidenced by unsure of certain foods and how they relate to diabetes. .       Nutrition Intervention:  Meals and Snacks: Continue with current diet. Nutrition education: Reviewed diabetic diet education  Discharge Nutrition Plan: None at this time.      Jimmy Heath [address], [address] [place] [address],    [phone]

## 2020-05-22 NOTE — PROGRESS NOTES
Progress Note    Patient: Anabell Camilo MRN: [id number]  SSN: [id number]    YOB: 1960  Age: 62 y.o. Sex: male      Admit Date: 5/16/2020    LOS: 6 days     Subjective:   F/U NSTEMI, endocarditis    \"53 yo male with PMH of CAD (non obstructive  Per [facility] 2018) , DIEGO on CPAP, AS s/p AVR, HTN admitted with acute chest pain and elevated troponin, NSTEMI. He was seen by cardiology April, 2020 s/p low risk NM stress testing and ECHO 50% EF. He has been tested for COVID 19 due to constellation of petechial LE rash, elevated ddimer, mild thrombocytopenia. CTA chest negative. \" Troponin 2.08 -->24.9-->36. 10. Repeat ECHO showed EF 40%, moderate diffuse hypokinesis. RV pressure 45-50mmHg, aortic valve also had a moderate sized mobile mass suspicious for endocarditis. Dilatation of the ascending aorta seen 41mm. YARON performed on 5/21 confirmed vegetation on aortic valve. ID consulted and agree with Vancomycin. Cardiothoracic surgery recommended AVR after bacteremia has been treated. Blood cultures from 5/18 growing alpha hemolytic strep. Repeat blood cultures on 5/20/20 have no growth to date. Found to have normocytic anemia. Hg baseline 9.3. Denies ever having colonoscopy or EGD. Iron 16, TIBC 173, transferrin 9, ferritin 910, and haptoglobin 131. IV iron infusions given. No obvious bleeding. Von Willebrand factor activity 319. No family hx or personal history of thrombotic disease. A1C 7.1. No chest pain or SOB. NO concerns.    Current Facility-Administered Medications   Medication Dose Route Frequency    cefTRIAXone (ROCEPHIN) 2 g in 0.9% sodium chloride (MBP/ADV) 50 mL  2 g IntraVENous Q24H    ergocalciferol capsule 50,000 Units  50,000 Units Oral Q7D    metoprolol succinate (TOPROL-XL) XL tablet 25 mg  25 mg Oral BID    insulin lispro (HUMALOG) injection   SubCUTAneous AC&HS    escitalopram oxalate (LEXAPRO) tablet 10 mg  10 mg Oral DAILY    atorvastatin (LIPITOR) tablet 20 mg  20 mg Oral QHS    aspirin chewable tablet 81 mg  81 mg Oral DAILY    sodium chloride (NS) flush 5-40 mL  5-40 mL IntraVENous Q8H    sodium chloride (NS) flush 5-40 mL  5-40 mL IntraVENous PRN    acetaminophen (TYLENOL) tablet 650 mg  650 mg Oral Q6H PRN    morphine 10 mg/ml injection 2 mg  2 mg IntraVENous Q3H PRN    nitroglycerin (NITROSTAT) tablet 0.4 mg  0.4 mg SubLINGual Q5MIN PRN    ondansetron (ZOFRAN) injection 4 mg  4 mg IntraVENous Q4H PRN       Objective:     Vitals:    05/22/20 0016 05/22/20 0446 05/22/20 0450 05/22/20 0827   BP: 120/57  111/64 116/72   Pulse: 84  91 96   Resp: 20 16 18   Temp: 97.8 °F (36.6 °C)  98 °F (36.7 °C) 98 °F (36.7 °C)   SpO2: 96%  97% 97%   Weight:  77.4 kg (170 lb 9.6 oz)     Height:             Intake and Output:  Current Shift: No intake/output data recorded. Last three shifts: 05/20 1901 - 05/22 0700  In: 950 [P.O.:100; I.V.:600]  Out: 801 [Urine:800]    Physical Exam:   General:  Alert, cooperative, no distress. Frail appearing   Eyes:  Conjunctivae/corneas clear. Ears:  Normal TMs and external ear canals both ears. Nose: Nares normal. Septum midline. Mouth/Throat: Lips, mucosa, and tongue normal.    Neck: no JVD. Back:   deferred. Lungs:   Clear to auscultation bilaterally. Heart:  Regular rate and rhythm, S1, S2 normal. Faint blowing systolic murmur best heard at right 2nd intercostal space   Abdomen:   Soft, non-tender. Bowel sounds normal.    Extremities: Petechiae to LE bilaterally that are improving, no janeway lesions. 1+ edema to LE bilaterally     Pulses: 2+ and symmetric all extremities. Skin: As above   Lymph nodes: Cervical, supraclavicular, and axillary nodes normal.   Neurologic: CNII-XII intact. Good ROM in bed.         Lab/Data Review:    Recent Results (from the past 24 hour(s))   GLUCOSE, POC    Collection Time: 05/21/20  3:34 PM   Result Value Ref Range    Glucose (POC) 141 (H) 65 - 100 mg/dL   GLUCOSE, POC    Collection Time: 05/21/20  8:51 PM   Result Value Ref Range    Glucose (POC) 134 (H) 65 - 100 mg/dL   MAGNESIUM    Collection Time: 05/22/20 12:58 AM   Result Value Ref Range    Magnesium 2.0 1.8 - 2.4 mg/dL   METABOLIC PANEL, BASIC    Collection Time: 05/22/20 12:58 AM   Result Value Ref Range    Sodium 141 136 - 145 mmol/L    Potassium 3.7 3.5 - 5.1 mmol/L    Chloride 111 (H) 98 - 107 mmol/L    CO2 23 21 - 32 mmol/L    Anion gap 7 7 - 16 mmol/L    Glucose 124 (H) 65 - 100 mg/dL    BUN 24 (H) 8 - 23 MG/DL    Creatinine 0.78 (L) 0.8 - 1.5 MG/DL    GFR est AA >60 >60 ml/min/1.73m2    GFR est non-AA >60 >60 ml/min/1.73m2    Calcium 7.8 (L) 8.3 - 10.4 MG/DL   CBC W/O DIFF    Collection Time: 05/22/20 12:58 AM   Result Value Ref Range    WBC 7.1 4.3 - 11.1 K/uL    RBC 2.97 (L) 4.23 - 5.6 M/uL    HGB 8.0 (L) 13.6 - 17.2 g/dL    HCT 25.7 (L) 41.1 - 50.3 %    MCV 86.5 79.6 - 97.8 FL    MCH 26.9 26.1 - 32.9 PG    MCHC 31.1 (L) 31.4 - 35.0 g/dL    RDW 14.9 (H) 11.9 - 14.6 %    PLATELET 454 656 - 302 K/uL    MPV 9.2 (L) 9.4 - 12.3 FL    ABSOLUTE NRBC 0.00 0.0 - 0.2 K/uL   VANCOMYCIN, TROUGH    Collection Time: 05/22/20 12:58 AM   Result Value Ref Range    Vancomycin,trough 11.8 5 - 20 ug/mL   GLUCOSE, POC    Collection Time: 05/22/20  5:57 AM   Result Value Ref Range    Glucose (POC) 113 (H) 65 - 100 mg/dL   GLUCOSE, POC    Collection Time: 05/22/20 11:48 AM   Result Value Ref Range    Glucose (POC) 126 (H) 65 - 100 mg/dL       Assessment/ Plan:     Principal Problem:    NSTEMI (non-ST elevated myocardial infarction) ([facility] (5/16/2020)    Active Problems:    Diastolic CHF, chronic (HCC) (5/7/2018)      S/P aortic valve replacement (4/1/2018)      CAD (coronary artery disease) ()      Overview: Nonobstructive      DIEGO on CPAP ()      Dyslipidemia ()      Essential hypertension (9/21/2018)      Suspected COVID-19 virus infection (5/16/2020)      Petechiae (5/16/2020)      Anemia (5/16/2020)      Diabetes mellitus, type II ([facility] (5/20/2020) Vitamin D deficiency (5/20/2020)    CAD with NSTEMI and endocarditis-S/p cardiac cath on 5/19 with no intervention. ASA, statin, BB. Will need AVR once bacteremia has been treated. Endocarditis with alpha hemolytic strep - Consulted ID on recommendations of abx treatment. Vancomycin that has been changed to Ceftriaxone. Repeat blood cultures no growth to date. Elevated von willebrand factor activity - Bedside consult with oncology. NP to speak with on call oncologist on if this slight rise in activity needs to be taken seriously since no family or personal hx of DVT/von willebrand    Petechia - Drug reaction? Possibly from thrombocytopenia? COVID negative. Will need outpatient dermatology appointment. Improving during stay. Normocytic anemia with thrombocytopenia - follow CBC. Iron infusions. Will start oral iron after infusions complete. Will need to establish care with GI for colon cancer screening at discharge. Hyponatremia - Resolved. Hypokalemia - Resolved. Vitamin D deficiency - Supplement. DM type II - A1C 7.1. Changed diet to ADA. SS. Will need outpatient diabetic education    Wife's number is . I have attempted this number and no answer. Patient states we can also update his sister. I have updated the sister via phone.       DVT prophylaxis - SCDs  Signed By: Becca Salinas DO     May 22, 2020

## 2020-05-22 NOTE — ROUTINE PROCESS
Verbal bedside report given to [facility], oncoming RN. Patient's situation, background, assessment and recommendations provided. Opportunity for questions provided. Oncoming RN assumed care of patient.

## 2020-05-22 NOTE — PROGRESS NOTES
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 [facility] SYSTEM 20 with strep spp, pending  · Generalized Phong LE rash of unknown etiology, ?sepsis related-appears better  · Diarrhea PTA, resolved. Hx diverticulosis   · Dental caries   · Anemia, of acute disease: follow  · Hx AVR   · DM, A1c 7    Plan:   · Continue Vancomycin, follow pending BC and adjust therapy   · Will need re-do AVR once BC cleared per CT Sx  · Rash to LE is fading    Will check chart and adjust therapy as needed, but plan to see on Monday unless called to see sooner  Anti-infectives:   · Vancomycin -    Subjective:   Resting in bed, overall feels stable. C/o leg swelling and SOB. No Known Allergies     Review of Systems:  A comprehensive review of systems was negative except for that written in the History of Present Illness.     Objective:     Visit Vitals  /72 (BP Patient Position: At rest)   Pulse 96   Temp 98 °F (36.7 °C)   Resp 18   Ht 6' 1\" (1.854 m)   Wt 77.4 kg (170 lb 9.6 oz)   SpO2 97%   BMI 22.51 kg/m²     Temp (24hrs), Av.4 °F (36.9 °C), Min:97.8 °F (36.6 °C), Max:99.5 °F (37.5 °C)       General:  Alert, cooperative, lethargic   Head:  Normocephalic, atraumatic    Eyes:  Anicteric, no drainage, not injected, EOMI   Throat: Mucus membranes moist OP clear   Neck: Supple, symmetrical, trachea midline, no JVD   Lungs:   Scattered crackles, 2LNC, shallow resp   Heart:  Regular rate and rhythm, systolic murmur   Abdomen:   Soft, non-tender, no guarding, no distention, bowel sounds active   Extremities: Extremities normal, atraumatic, no cyanosis or edema   Pulses: 2+ and symmetric   Skin: Skin color, texture, turgor normal, generalized rash noted to phong LE, macular, see image: fading     Lines/Devices: PIVs                   Data Review:     CBC:  Recent Labs     20  0058 20  9240 05/20/20  0416   WBC 7.1 8.2 9.4   HGB 8.0* 7.8* 8.1*   HCT 25.7* 25.4* 25.4*    143* 158       BMP:  Recent Labs     05/22/20  0058 05/21/20  0333 05/20/20 0416   CREA 0.78* 0.70* 0.68*   BUN 24* 22 19    141 139   K 3.7 3.9 3.3*   * 111* 107   CO2 23 21 23   AGAP 7 9 9   * 123* 107*       LFTS:  No results for input(s): TBILI, ALT, SGOT, AP, TP, ALB in the last 72 hours. Microbiology:     All Micro Results     Procedure Component Value Units Date/Time    CULTURE, BLOOD [[id number]] Collected:  05/20/20 1424    Order Status:  Completed Specimen:  Blood Updated:  05/22/20 0748     Special Requests: --        RIGHT  FOREARM       Culture result: NO GROWTH 2 DAYS       CULTURE, BLOOD [[id number]] Collected:  05/20/20 1434    Order Status:  Completed Specimen:  Blood Updated:  05/22/20 0748     Special Requests: --        LEFT  Antecubital       Culture result: NO GROWTH 2 DAYS       CULTURE, BLOOD [[id number]]  (Abnormal) Collected:  05/18/20 1944    Order Status:  Completed Specimen:  Blood Updated:  05/22/20 0709     Special Requests: --        RIGHT  HAND       GRAM STAIN GRAM POSITIVE COCCI         PEDIATRIC BOTTLE               CRITICAL RESULT NOT CALLED DUE TO PREVIOUS NOTIFICATION OF CRITICAL RESULT WITHIN THE LAST 24 HOURS.            Culture result:       STREPTOCOCCI, ALPHA HEMOLYTIC                  CULTURE IN PROGRESS,FURTHER UPDATES TO FOLLOW          CULTURE, BLOOD [[id number]]  (Abnormal) Collected:  05/18/20 1938    Order Status:  Completed Specimen:  Blood Updated:  05/22/20 0708     Special Requests: --        RIGHT  ARM       GRAM STAIN GRAM POS COCCI IN CHAINS         AEROBIC BOTTLE POSITIVE               RESULTS VERIFIED, PHONED TO AND READ BACK BY Peter Garcia RN @ 1120 ON 5/20/20 BY AMM           Culture result:       STREPTOCOCCI, ALPHA HEMOLYTIC IDENTIFICATION AND SUSCEPTIBILITY TO FOLLOW            REFER TO [employer] PANEL ACCESSION [id number]    CULTURE, BLOOD [[id number]]     Order Status:  Canceled Specimen:  Blood     CULTURE, BLOOD [[id number]]     Order Status:  Canceled Specimen:  Blood     BLOOD CULTURE ID PANEL [[id number]]  (Abnormal) Collected:  05/18/20 1938    Order Status:  Completed Specimen:  Blood Updated:  05/20/20 1117     Acc. no. from Micro Order [id number]     Streptococcus Detected        Comment: RESULTS VERIFIED, PHONED TO AND READ BACK BY  Krista Troy RN @ 1120 ON 5/20/20 BY AMM          INTERPRETATION       Gram positive cocci. Identified by realtime PCR as Streptococcus species (not agalactiae, pyogenes, or pneumoniae). Comment: Consider discontinuation of IV vancomycin and using an anti-streptococcal beta-lactam (ceftriaxone/cefotaxime (peds))       EMERGENT DISEASE PANEL [[id number]] Collected:  05/16/20 1946    Order Status:  Completed Specimen:  Not Specified Updated:  05/18/20 1614     Emergent disease panel Not detected        Comment: Performed at [place] [address] [address] [facility]               Imaging:   YARON 5/20 brief report  Transesophageal Echo Note  - pt underwent successful YARON today in cath lab  - start 1042  - stop 1100  - sedation: 3 mg IV Midazolam, 25 mcg Fentanyl IV given by Angel Moncada RN under my direct supervision. Direct monitoring of vital signs and respiratory status throughout the procedure.    - No complications, pt in stable condition  - YARON Brief Findings: LVEF mild/moderately reduced, bioprosthetic aortic valve with small mobile mass on LVOT side of the prosthesis as well as thickening / restriction of 2/3 leaflets, no other valvular masses/vegetations identified.        Signed By: Tatiana Romero NP     May 22, 2020

## 2020-05-22 NOTE — ROUTINE PROCESS
Bedside shift change report given to Self (oncoming nurse) by Francia Hernandez (offgoing nurse). Report included the following information SBAR, Intake/Output, MAR, Med Rec Status and Cardiac Rhythm SR/ST.

## 2020-05-22 NOTE — PROGRESS NOTES
Care Management Interventions  PCP Verified by CM: Yes  Last Visit to PCP: 05/04/20  Mode of Transport at Discharge: Other (see comment)(Camelia Dawkins Spouse [phone] )  Transition of Care Consult (CM Consult): Discharge Planning  Discharge Durable Medical Equipment: No  Physical Therapy Consult: No  Occupational Therapy Consult: No  Current Support Network: Family Lives [place], Lives Alone  Confirm Follow Up Transport: Family   Resource Information Provided?: (Deann Romero)  Discharge Location  Discharge Placement: Home    Pt awaiting redo AVR. ID following. CM to continue to follow for post op needs.

## 2020-05-23 LAB
ALBUMIN SERPL-MCNC: 2.1 G/DL (ref 3.2–4.6)
ALBUMIN/GLOB SERPL: 0.6 {RATIO} (ref 1.2–3.5)
ALP SERPL-CCNC: 102 U/L (ref 50–136)
ALT SERPL-CCNC: 52 U/L (ref 12–65)
ANION GAP SERPL CALC-SCNC: 7 MMOL/L (ref 7–16)
AST SERPL-CCNC: 35 U/L (ref 15–37)
BACTERIA SPEC CULT: ABNORMAL
BILIRUB SERPL-MCNC: 0.3 MG/DL (ref 0.2–1.1)
BUN SERPL-MCNC: 26 MG/DL (ref 8–23)
CALCIUM SERPL-MCNC: 7.8 MG/DL (ref 8.3–10.4)
CHLORIDE SERPL-SCNC: 111 MMOL/L (ref 98–107)
CO2 SERPL-SCNC: 25 MMOL/L (ref 21–32)
CREAT SERPL-MCNC: 0.86 MG/DL (ref 0.8–1.5)
GLOBULIN SER CALC-MCNC: 3.7 G/DL (ref 2.3–3.5)
GLUCOSE BLD STRIP.AUTO-MCNC: 108 MG/DL (ref 65–100)
GLUCOSE BLD STRIP.AUTO-MCNC: 115 MG/DL (ref 65–100)
GLUCOSE BLD STRIP.AUTO-MCNC: 132 MG/DL (ref 65–100)
GLUCOSE BLD STRIP.AUTO-MCNC: 194 MG/DL (ref 65–100)
GLUCOSE SERPL-MCNC: 119 MG/DL (ref 65–100)
GRAM STN SPEC: ABNORMAL
MAGNESIUM SERPL-MCNC: 1.9 MG/DL (ref 1.8–2.4)
PHOSPHATE SERPL-MCNC: 4.6 MG/DL (ref 2.3–3.7)
POTASSIUM SERPL-SCNC: 3.9 MMOL/L (ref 3.5–5.1)
PROT SERPL-MCNC: 5.8 G/DL (ref 6.3–8.2)
SERVICE CMNT-IMP: ABNORMAL
SERVICE CMNT-IMP: ABNORMAL
SODIUM SERPL-SCNC: 143 MMOL/L (ref 136–145)

## 2020-05-23 PROCEDURE — 74011000258 HC RX REV CODE- 258: Performed by: INTERNAL MEDICINE

## 2020-05-23 PROCEDURE — 80053 COMPREHEN METABOLIC PANEL: CPT

## 2020-05-23 PROCEDURE — 36415 COLL VENOUS BLD VENIPUNCTURE: CPT

## 2020-05-23 PROCEDURE — 65660000000 HC RM CCU STEPDOWN

## 2020-05-23 PROCEDURE — 83735 ASSAY OF MAGNESIUM: CPT

## 2020-05-23 PROCEDURE — 84100 ASSAY OF PHOSPHORUS: CPT

## 2020-05-23 PROCEDURE — 74011636637 HC RX REV CODE- 636/637: Performed by: HOSPITALIST

## 2020-05-23 PROCEDURE — 74011250637 HC RX REV CODE- 250/637: Performed by: HOSPITALIST

## 2020-05-23 PROCEDURE — 74011250637 HC RX REV CODE- 250/637: Performed by: INTERNAL MEDICINE

## 2020-05-23 PROCEDURE — 82962 GLUCOSE BLOOD TEST: CPT

## 2020-05-23 PROCEDURE — 74011250636 HC RX REV CODE- 250/636: Performed by: INTERNAL MEDICINE

## 2020-05-23 PROCEDURE — 74011250637 HC RX REV CODE- 250/637: Performed by: FAMILY MEDICINE

## 2020-05-23 RX ADMIN — METOPROLOL SUCCINATE 25 MG: 50 TABLET, EXTENDED RELEASE ORAL at 08:30

## 2020-05-23 RX ADMIN — METOPROLOL SUCCINATE 25 MG: 50 TABLET, EXTENDED RELEASE ORAL at 17:21

## 2020-05-23 RX ADMIN — Medication 10 ML: at 13:10

## 2020-05-23 RX ADMIN — ESCITALOPRAM OXALATE 10 MG: 10 TABLET ORAL at 08:30

## 2020-05-23 RX ADMIN — ASPIRIN 81 MG 81 MG: 81 TABLET ORAL at 08:30

## 2020-05-23 RX ADMIN — FERROUS SULFATE TAB 325 MG (65 MG ELEMENTAL FE) 325 MG: 325 (65 FE) TAB at 17:21

## 2020-05-23 RX ADMIN — ATORVASTATIN CALCIUM 20 MG: 20 TABLET, FILM COATED ORAL at 22:15

## 2020-05-23 RX ADMIN — FERROUS SULFATE TAB 325 MG (65 MG ELEMENTAL FE) 325 MG: 325 (65 FE) TAB at 08:30

## 2020-05-23 RX ADMIN — Medication 10 ML: at 22:16

## 2020-05-23 RX ADMIN — CEFTRIAXONE SODIUM 2 G: 2 INJECTION, POWDER, FOR SOLUTION INTRAMUSCULAR; INTRAVENOUS at 12:58

## 2020-05-23 RX ADMIN — INSULIN LISPRO 2 UNITS: 100 INJECTION, SOLUTION INTRAVENOUS; SUBCUTANEOUS at 22:15

## 2020-05-23 RX ADMIN — Medication 10 ML: at 05:30

## 2020-05-23 NOTE — PROGRESS NOTES
Per her request, Dr. Julio Mosqueda notified of phosphorus level of 4.6. Ordered to have am labs include TSH and T4. Will continue to monitor.

## 2020-05-23 NOTE — PROGRESS NOTES
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 [facility] 20 with strep mutans  · Generalized Phong LE rash of unknown etiology, ?sepsis related-appears better  · Diarrhea PTA, resolved. Hx diverticulosis   · Dental caries   · Anemia, of acute disease: follow  · Hx AVR   · DM, A1c 7    Plan:   · Discontinue Vancomycin  · Start IV Rocephin 2 gm Q 24 hrs; started last night per Dr. Carolina Valentino   · Will need re-do AVR once [facility] SYSTEM cleared per CTS  · Rash to LE is fading  · ID will continue to follow    Anti-infectives:   · Vancomycin -  · IV Rocephin (-    Subjective:   No Known Allergies     Review of Systems:  A comprehensive review of systems was negative except for that written in the History of Present Illness. Objective:     Visit Vitals  /65 (BP 1 Location: Left arm, BP Patient Position: At rest)   Pulse 96   Temp 98.5 °F (36.9 °C)   Resp 16   Ht 6' 1\" (1.854 m)   Wt 77.8 kg (171 lb 9.6 oz)   SpO2 92%   BMI 22.64 kg/m²     Temp (24hrs), Av.2 °F (36.8 °C), Min:97.9 °F (36.6 °C), Max:98.5 °F (36.9 °C)                 Chart review only. No PE today. Data Review:     CBC:  Recent Labs     20  0058 20  0333   WBC 7.1 8.2   HGB 8.0* 7.8*   HCT 25.7* 25.4*    143*       BMP:  Recent Labs     20  0058 20  0333   CREA 0.78* 0.70*   BUN 24* 22    141   K 3.7 3.9   * 111*   CO2 23 21   AGAP 7 9   * 123*       LFTS:  No results for input(s): TBILI, ALT, SGOT, AP, TP, ALB in the last 72 hours.     Microbiology:     All Micro Results     Procedure Component Value Units Date/Time    CULTURE, BLOOD [[id number]]  (Abnormal) Collected:  20    Order Status:  Completed Specimen:  Blood Updated:  20     Special Requests: --        RIGHT  HAND       GRAM STAIN GRAM POSITIVE COCCI         PEDIATRIC BOTTLE CRITICAL RESULT NOT CALLED DUE TO PREVIOUS NOTIFICATION OF CRITICAL RESULT WITHIN THE LAST 24 HOURS. Culture result:       ALPHA STREPTOCOCCUS, NOT S. PNEUMONIAE            FOR ID AND SUSCEPTIBILITY REFER TO CULTURE NO ACC [id number]    CULTURE, BLOOD [[id number]]  (Abnormal)  (Susceptibility) Collected:  05/18/20 1938    Order Status:  Completed Specimen:  Blood Updated:  05/23/20 0703     Special Requests: --        RIGHT  ARM       GRAM STAIN GRAM POS COCCI IN CHAINS         AEROBIC BOTTLE POSITIVE               RESULTS VERIFIED, PHONED TO AND READ BACK BY King Rich RN @ 1120 ON 5/20/20 BY AMM           Culture result: STREPTOCOCCUS MUTANS         REFER TO [employer] PANEL ACCESSION [id number]    CULTURE, BLOOD [[id number]] Collected:  05/20/20 1424    Order Status:  Completed Specimen:  Blood Updated:  05/23/20 0700     Special Requests: --        RIGHT  FOREARM       Culture result: NO GROWTH 3 DAYS       CULTURE, BLOOD [[id number]] Collected:  05/20/20 1434    Order Status:  Completed Specimen:  Blood Updated:  05/23/20 0700     Special Requests: --        LEFT  Antecubital       Culture result: NO GROWTH 3 DAYS       CULTURE, BLOOD [[id number]]     Order Status:  Canceled Specimen:  Blood     CULTURE, BLOOD [[id number]]     Order Status:  Canceled Specimen:  Blood     BLOOD CULTURE ID PANEL [[id number]]  (Abnormal) Collected:  05/18/20 1938    Order Status:  Completed Specimen:  Blood Updated:  05/20/20 1117     Acc. no. from Micro Order [id number]     Streptococcus Detected        Comment: RESULTS VERIFIED, PHONED TO AND READ BACK BY  King Rich RN @ 1120 ON 5/20/20 BY [facility]          INTERPRETATION       Gram positive cocci. Identified by realtime PCR as Streptococcus species (not agalactiae, pyogenes, or pneumoniae).            Comment: Consider discontinuation of IV vancomycin and using an anti-streptococcal beta-lactam (ceftriaxone/cefotaxime (peds))       EMERGENT DISEASE PANEL [[id number]] Collected:  05/16/20 1946    Order Status:  Completed Specimen:  Not Specified Updated:  05/18/20 1614     Emergent disease panel Not detected        Comment: Performed at [facility] [address] [facility]               Imaging:   YARON 5/20 brief report  Transesophageal Echo Note  - pt underwent successful YARON today in cath lab  - start 1042  - stop 1100  - sedation: 3 mg IV Midazolam, 25 mcg Fentanyl IV given by Nathanael Rogers RN under my direct supervision. Direct monitoring of vital signs and respiratory status throughout the procedure.    - No complications, pt in stable condition  - YARON Brief Findings: LVEF mild/moderately reduced, bioprosthetic aortic valve with small mobile mass on LVOT side of the prosthesis as well as thickening / restriction of 2/3 leaflets, no other valvular masses/vegetations identified.        Signed By: Maria Victoria Crenshaw NP     May 23, 2020

## 2020-05-23 NOTE — PROGRESS NOTES
Progress Note    Patient: Darline Espitia MRN: [id number]  SSN: [id number]    YOB: 1960  Age: 62 y.o. Sex: male      Admit Date: 5/16/2020    LOS: 7 days     Subjective:   F/U NSTEMI, endocarditis    \"55 yo male with PMH of CAD (non obstructive  Per [facility] 2018) , DIEGO on CPAP, AS s/p AVR, HTN admitted with acute chest pain and elevated troponin, NSTEMI. He was seen by cardiology April, 2020 s/p low risk NM stress testing and ECHO 50% EF. He has been tested for COVID 19 due to constellation of petechial LE rash, elevated ddimer, mild thrombocytopenia. CTA chest negative. \" Troponin 2.08 -->24.9-->36. 10. Repeat ECHO showed EF 40%, moderate diffuse hypokinesis. RV pressure 45-50mmHg, aortic valve also had a moderate sized mobile mass suspicious for endocarditis. Dilatation of the ascending aorta seen 41mm. YARON performed on 5/21 confirmed vegetation on aortic valve. ID consulted and have changed to Ceftriaxone on 5/22. Cardiothoracic surgery recommended AVR after bacteremia has been treated. Blood cultures from 5/18 growing strep mutans sensitive to Ceftriaxone. Repeat blood cultures on 5/20/20 have no growth X 3 days. Found to have normocytic anemia. Hg baseline 9.3. Denies ever having colonoscopy or EGD. Iron 16, TIBC 173, transferrin 9, ferritin 910, and haptoglobin 131. IV iron infusions given. No obvious bleeding. Von Willebrand factor activity 319. No family hx or personal history of thrombotic disease. Bedside consult with hematology who agree at this level and no family hx or personal history that would not work this up further or provide any treatment. A1C 7.1. No chest pain or SOB. NO concerns. Wanting to know when he will have valve replacement surgery.    Current Facility-Administered Medications   Medication Dose Route Frequency    cefTRIAXone (ROCEPHIN) 2 g in 0.9% sodium chloride (MBP/ADV) 50 mL  2 g IntraVENous Q24H    ferrous sulfate tablet 325 mg  1 Tab Oral BID WITH MEALS    ergocalciferol capsule 50,000 Units  50,000 Units Oral Q7D    metoprolol succinate (TOPROL-XL) XL tablet 25 mg  25 mg Oral BID    insulin lispro (HUMALOG) injection   SubCUTAneous AC&HS    escitalopram oxalate (LEXAPRO) tablet 10 mg  10 mg Oral DAILY    atorvastatin (LIPITOR) tablet 20 mg  20 mg Oral QHS    aspirin chewable tablet 81 mg  81 mg Oral DAILY    sodium chloride (NS) flush 5-40 mL  5-40 mL IntraVENous Q8H    sodium chloride (NS) flush 5-40 mL  5-40 mL IntraVENous PRN    acetaminophen (TYLENOL) tablet 650 mg  650 mg Oral Q6H PRN    morphine 10 mg/ml injection 2 mg  2 mg IntraVENous Q3H PRN    nitroglycerin (NITROSTAT) tablet 0.4 mg  0.4 mg SubLINGual Q5MIN PRN    ondansetron (ZOFRAN) injection 4 mg  4 mg IntraVENous Q4H PRN       Objective:     Vitals:    05/23/20 0440 05/23/20 0442 05/23/20 0830 05/23/20 0909   BP: 113/66  118/69 110/70   Pulse: 93  (!) 102 93   Resp: 16   16   Temp: 98 °F (36.7 °C)   98 °F (36.7 °C)   SpO2: 95%   94%   Weight:  77.8 kg (171 lb 9.6 oz)     Height:             Intake and Output:  Current Shift: No intake/output data recorded. Last three shifts: 05/21 1901 - 05/23 0700  In: -   Out: 600 [Urine:600]    Physical Exam:   General:  Alert, cooperative, no distress. Frail appearing   Eyes:  Conjunctivae/corneas clear. Ears:  Normal TMs and external ear canals both ears. Nose: Nares normal. Septum midline. Mouth/Throat: Lips, mucosa, and tongue normal.    Neck: no JVD. Back:   deferred. Lungs:   Clear to auscultation bilaterally. Heart:  Regular rate and rhythm. Faint blowing systolic murmur best heard at right 2nd intercostal space   Abdomen:   Soft, non-tender. Bowel sounds normal.    Extremities: Petechiae to LE bilaterally that are improving, no janeway lesions. 1+ edema to LE bilaterally     Pulses: 2+ and symmetric all extremities.    Skin: As above   Lymph nodes: Cervical, supraclavicular, and axillary nodes normal.   Neurologic: CNII-XII intact. Good ROM in bed. Lab/Data Review:    Recent Results (from the past 24 hour(s))   GLUCOSE, POC    Collection Time: 05/22/20 11:48 AM   Result Value Ref Range    Glucose (POC) 126 (H) 65 - 100 mg/dL   GLUCOSE, POC    Collection Time: 05/22/20  4:21 PM   Result Value Ref Range    Glucose (POC) 117 (H) 65 - 100 mg/dL   GLUCOSE, POC    Collection Time: 05/22/20  9:34 PM   Result Value Ref Range    Glucose (POC) 115 (H) 65 - 100 mg/dL   GLUCOSE, POC    Collection Time: 05/23/20  6:07 AM   Result Value Ref Range    Glucose (POC) 108 (H) 65 - 100 mg/dL       Assessment/ Plan:     Principal Problem:    NSTEMI (non-ST elevated myocardial infarction) ([facility] [address] (5/16/2020)    Active Problems:    Diastolic CHF, chronic (HCC) (5/7/2018)      S/P aortic valve replacement (4/1/2018)      CAD (coronary artery disease) ()      Overview: Nonobstructive      DIEGO on CPAP ()      Dyslipidemia ()      Essential hypertension (9/21/2018)      Suspected COVID-19 virus infection (5/16/2020)      Petechiae (5/16/2020)      Anemia (5/16/2020)      Diabetes mellitus, type II ([facility] [address] (5/20/2020)      Vitamin D deficiency (5/20/2020)    CAD with NSTEMI and endocarditis-S/p cardiac cath on 5/19 with no intervention. ASA, statin, BB. Will need AVR once bacteremia has been treated. Dr. Sade Nix following to which I appreciate. He will re-evaluate Tuesday. Endocarditis with strep mutans - Consulted ID on recommendations of abx treatment. Ceftriaxone since 5/22. Repeat blood cultures no growth X3 days. Elevated von willebrand factor activity - Bedside consult with oncology who state they do not think this is a true elevation since no family or personal hx of DVT/von willebrand. No intervention. Petechia - Drug reaction? Possibly from thrombocytopenia? COVID negative. Will need outpatient dermatology appointment. Improving during stay. Normocytic anemia with thrombocytopenia - follow CBC. S/p Iron infusions.  Oral iron after infusions complete. Will need to establish care with GI for colon cancer screening at discharge. Hyponatremia - Resolved. Hypokalemia - Resolved. Vitamin D deficiency - Supplement. DM type II - A1C 7.1. Changed diet to ADA. SS. Will need outpatient diabetic education    Wife's number is . I have attempted this number and no answer. Patient states we can also update his sister.        DVT prophylaxis - SCDs  Signed By: Mita Faria DO     May 23, 2020

## 2020-05-23 NOTE — ROUTINE PROCESS
Bedside shift change report given to Ryan (oncoming nurse) by Self (offgoing nurse).  Report included the following information SBAR, Intake/Output, MAR, Med Rec Status and Cardiac Rhythm SR.

## 2020-05-23 NOTE — PROGRESS NOTES
[facility] CARDIOLOGY PROGRESS NOTE    5/23/2020 8:12 AM    Admit Date: 5/16/2020        Subjective:   Stable overnight without angina, CHF, or palpitations. Vitals stable and controlled. No other complaints overnight. Tolerating meds well. Blood cultures negative x 3 days, afebrile with normal WBC.      Objective:      Vitals:    05/22/20 2131 05/23/20 0048 05/23/20 0440 05/23/20 0442   BP: 122/79 125/77 113/66    Pulse: 94 96 93    Resp: 16 19 16    Temp: 97.9 °F (36.6 °C) 98.5 °F (36.9 °C) 98 °F (36.7 °C)    SpO2: 96% 94% 95%    Weight:    77.8 kg (171 lb 9.6 oz)   Height:           Physical Exam:  Neck- supple, no JVD at 60 deg  CV- regular rate and rhythm, soft NEERAJ RUSB  Lung- clear bilaterally, decreased bibasilar but no wheeze or crackles  Abd- soft, nontender, nondistended  Ext- no edema  Skin- warm and dry    Data Review:   Recent Labs     05/22/20  0058 05/21/20  0333    141   K 3.7 3.9   MG 2.0 1.9   BUN 24* 22   CREA 0.78* 0.70*   * 123*   WBC 7.1 8.2   HGB 8.0* 7.8*   HCT 25.7* 25.4*    143*       Assessment and Plan:     Principal Problem:    NSTEMI (non-ST elevated myocardial infarction) ([address] (5/16/2020)- occluded OM branch.    The OM was not intervened on secondary to the fact that this likely infarcted at least four days prior to admit, and is outside the window for therapy. Jean Arce is also unknown whether this is a ruptured plaque or possibly an embolic event and stenting would be potentially contraindicated for the development of mycotic aneurysm.      Active Problems:    Diastolic CHF, chronic (HCC) - stable, lying flat comfortably, continue meds       S/P aortic valve replacement- endocarditis- ABX per ID-- YARON Brief Findings: LVEF mild/moderately reduced, bioprosthetic aortic valve with small mobile mass on LVOT side of the prosthesis as well as thickening / restriction of 2/3 leaflets, no other valvular masses/vegetations identified.  Continue Vanc per ID recs. Will need AVR soon once blood cultures cleared (no growth x 3 days now).      CAD (coronary artery disease) ()- stable, continue meds      Overview: Nonobstructive       DIEGO on CPAP ()- stable, continue CPAP as tolerated       Dyslipidemia ()- stable, continue meds       Essential hypertension - stable, continue meds       Suspected COVID-19 virus infection (5/16/2020)- ruled out, negative     Continue supportive care, CBC/BMP/Mg in AM    A.  Aleksandar Del Valle MD  [facility] Cardiology  Pager [phone]

## 2020-05-23 NOTE — PROGRESS NOTES
Dr. Jhon Mejia notified of pt's rhythm strip that appeared to be a 27 beat run of V tach. Ordered to get stat CMP, Mag, and Phosphorus labs. Will continue to monitor and notify her of the results.

## 2020-05-23 NOTE — ROUTINE PROCESS
Bedside and Verbal report given to self and Carlo Han RN by Regi Whaley RN. Report included SBAR, Kardex, ED Summary, Procedure Summary, Intake and Output and Cardiac Rhythm SR/ST.

## 2020-05-23 NOTE — ROUTINE PROCESS
Bedside and Verbal shift change report given to self (oncoming nurse) by Dereck Fan RN (offgoing nurse). Report included the following information SBAR, Kardex, ED Summary, Procedure Summary, Intake/Output, MAR, Recent Results

## 2020-05-23 NOTE — PROGRESS NOTES
Reviewed notes for spiritual concerns prior to visit  Visit with patient to build rapport with . Calm  Encouraged with presence and words of hope    Patient demonstrates confidence in the care team.  Appears to be coping with illness.     No physical contact with patient per guidelines  Assurance of ongoing prayers    Patient said he will have heart valve surgery soon    Maggie Diaz,  Staff   C: [phone]  /  [email or web address]

## 2020-05-23 NOTE — ROUTINE PROCESS
Bedside and Verbal report given to [facility], RN by self and Sandy grimes RN.  Report included SBAR, Kardex, ED summary, procedure summary, recent results and cardiac rhythm SR.

## 2020-05-24 LAB
ACC. NO. FROM MICRO ORDER, ACCP: ABNORMAL
ANION GAP SERPL CALC-SCNC: 7 MMOL/L (ref 7–16)
BUN SERPL-MCNC: 26 MG/DL (ref 8–23)
CALCIUM SERPL-MCNC: 7.8 MG/DL (ref 8.3–10.4)
CHLORIDE SERPL-SCNC: 113 MMOL/L (ref 98–107)
CO2 SERPL-SCNC: 22 MMOL/L (ref 21–32)
CREAT SERPL-MCNC: 0.75 MG/DL (ref 0.8–1.5)
ERYTHROCYTE [DISTWIDTH] IN BLOOD BY AUTOMATED COUNT: 16.9 % (ref 11.9–14.6)
GLUCOSE BLD STRIP.AUTO-MCNC: 105 MG/DL (ref 65–100)
GLUCOSE BLD STRIP.AUTO-MCNC: 110 MG/DL (ref 65–100)
GLUCOSE BLD STRIP.AUTO-MCNC: 123 MG/DL (ref 65–100)
GLUCOSE BLD STRIP.AUTO-MCNC: 145 MG/DL (ref 65–100)
GLUCOSE SERPL-MCNC: 106 MG/DL (ref 65–100)
HCT VFR BLD AUTO: 28.1 % (ref 41.1–50.3)
HGB BLD-MCNC: 8.7 G/DL (ref 13.6–17.2)
INTERPRETATION: ABNORMAL
MCH RBC QN AUTO: 27.1 PG (ref 26.1–32.9)
MCHC RBC AUTO-ENTMCNC: 31 G/DL (ref 31.4–35)
MCV RBC AUTO: 87.5 FL (ref 79.6–97.8)
NRBC # BLD: 0 K/UL (ref 0–0.2)
PLATELET # BLD AUTO: 214 K/UL (ref 150–450)
PMV BLD AUTO: 8.9 FL (ref 9.4–12.3)
POTASSIUM SERPL-SCNC: 3.9 MMOL/L (ref 3.5–5.1)
RBC # BLD AUTO: 3.21 M/UL (ref 4.23–5.6)
SODIUM SERPL-SCNC: 142 MMOL/L (ref 136–145)
STREPTOCOCCUS , STPSP: DETECTED
T4 FREE SERPL-MCNC: 1 NG/DL (ref 0.78–1.46)
TSH SERPL DL<=0.005 MIU/L-ACNC: 3 UIU/ML (ref 0.36–3.74)
WBC # BLD AUTO: 10.2 K/UL (ref 4.3–11.1)

## 2020-05-24 PROCEDURE — 82962 GLUCOSE BLOOD TEST: CPT

## 2020-05-24 PROCEDURE — 84439 ASSAY OF FREE THYROXINE: CPT

## 2020-05-24 PROCEDURE — 85027 COMPLETE CBC AUTOMATED: CPT

## 2020-05-24 PROCEDURE — 65660000000 HC RM CCU STEPDOWN

## 2020-05-24 PROCEDURE — 87040 BLOOD CULTURE FOR BACTERIA: CPT

## 2020-05-24 PROCEDURE — 87150 DNA/RNA AMPLIFIED PROBE: CPT

## 2020-05-24 PROCEDURE — 74011250637 HC RX REV CODE- 250/637: Performed by: HOSPITALIST

## 2020-05-24 PROCEDURE — 74011250637 HC RX REV CODE- 250/637: Performed by: FAMILY MEDICINE

## 2020-05-24 PROCEDURE — 74011250637 HC RX REV CODE- 250/637: Performed by: INTERNAL MEDICINE

## 2020-05-24 PROCEDURE — 74011000258 HC RX REV CODE- 258: Performed by: INTERNAL MEDICINE

## 2020-05-24 PROCEDURE — 80048 BASIC METABOLIC PNL TOTAL CA: CPT

## 2020-05-24 PROCEDURE — 84443 ASSAY THYROID STIM HORMONE: CPT

## 2020-05-24 PROCEDURE — 36415 COLL VENOUS BLD VENIPUNCTURE: CPT

## 2020-05-24 PROCEDURE — 74011250636 HC RX REV CODE- 250/636: Performed by: INTERNAL MEDICINE

## 2020-05-24 PROCEDURE — 99232 SBSQ HOSP IP/OBS MODERATE 35: CPT | Performed by: INTERNAL MEDICINE

## 2020-05-24 RX ORDER — FUROSEMIDE 10 MG/ML
40 INJECTION INTRAMUSCULAR; INTRAVENOUS 2 TIMES DAILY
Status: DISCONTINUED | OUTPATIENT
Start: 2020-05-24 | End: 2020-06-06

## 2020-05-24 RX ORDER — FUROSEMIDE 10 MG/ML
20 INJECTION INTRAMUSCULAR; INTRAVENOUS 2 TIMES DAILY
Status: DISCONTINUED | OUTPATIENT
Start: 2020-05-24 | End: 2020-05-24

## 2020-05-24 RX ADMIN — ATORVASTATIN CALCIUM 20 MG: 20 TABLET, FILM COATED ORAL at 22:18

## 2020-05-24 RX ADMIN — METOPROLOL SUCCINATE 25 MG: 50 TABLET, EXTENDED RELEASE ORAL at 08:31

## 2020-05-24 RX ADMIN — ESCITALOPRAM OXALATE 10 MG: 10 TABLET ORAL at 08:31

## 2020-05-24 RX ADMIN — Medication 10 ML: at 14:15

## 2020-05-24 RX ADMIN — FUROSEMIDE 40 MG: 10 INJECTION, SOLUTION INTRAMUSCULAR; INTRAVENOUS at 10:07

## 2020-05-24 RX ADMIN — FERROUS SULFATE TAB 325 MG (65 MG ELEMENTAL FE) 325 MG: 325 (65 FE) TAB at 08:31

## 2020-05-24 RX ADMIN — FERROUS SULFATE TAB 325 MG (65 MG ELEMENTAL FE) 325 MG: 325 (65 FE) TAB at 17:25

## 2020-05-24 RX ADMIN — FUROSEMIDE 40 MG: 10 INJECTION, SOLUTION INTRAMUSCULAR; INTRAVENOUS at 17:25

## 2020-05-24 RX ADMIN — Medication 10 ML: at 06:17

## 2020-05-24 RX ADMIN — Medication 10 ML: at 22:18

## 2020-05-24 RX ADMIN — CEFTRIAXONE SODIUM 2 G: 2 INJECTION, POWDER, FOR SOLUTION INTRAMUSCULAR; INTRAVENOUS at 12:46

## 2020-05-24 RX ADMIN — METOPROLOL SUCCINATE 25 MG: 50 TABLET, EXTENDED RELEASE ORAL at 17:25

## 2020-05-24 RX ADMIN — ASPIRIN 81 MG 81 MG: 81 TABLET ORAL at 08:30

## 2020-05-24 NOTE — ROUTINE PROCESS
Bedside and Verbal report given to self and Jessica Winn RN by Selma Haney RN.  Report included SBAR, Kardex, ED Summary, Procedure Summary, Intake and Output and Cardiac Rhythm SR.

## 2020-05-24 NOTE — PROGRESS NOTES
Progress Note    Patient: Amisha Romero MRN: [id number]  SSN: [id number]    YOB: 1960  Age: 62 y.o. Sex: male      Admit Date: 5/16/2020    LOS: 8 days     Subjective:   F/U NSTEMI, endocarditis    \"53 yo male with PMH of CAD (non obstructive  Per [facility] 2018) , DIEGO on CPAP, AS s/p AVR, HTN admitted with acute chest pain and elevated troponin, NSTEMI. He was seen by cardiology April, 2020 s/p low risk NM stress testing and ECHO 50% EF. He has been tested for COVID 19 due to constellation of petechial LE rash, elevated ddimer, mild thrombocytopenia. CTA chest negative. \" Troponin 2.08 -->24.9-->36. 10. Repeat ECHO showed EF 40%, moderate diffuse hypokinesis. RV pressure 45-50mmHg, aortic valve also had a moderate sized mobile mass suspicious for endocarditis. Dilatation of the ascending aorta seen 41mm. YARON performed on 5/21 confirmed vegetation on aortic valve. ID consulted and have changed to Ceftriaxone on 5/22. Cardiothoracic surgery recommended AVR after bacteremia has been treated. Blood cultures from 5/18 growing strep mutans sensitive to Ceftriaxone. Repeat blood cultures on 5/20/20 have 1 vial growing gram + cocci. Found to have normocytic anemia. Hg baseline 9.3. Denies ever having colonoscopy or EGD. Iron 16, TIBC 173, transferrin 9, ferritin 910, and haptoglobin 131. IV iron infusions given. No obvious bleeding. Von Willebrand factor activity 319. No family hx or personal history of thrombotic disease. Bedside consult with hematology who agree at this level and no family hx or personal history that would not work this up further or provide any treatment. A1C 7.1. No chest pain or SOB. NO concerns. Wanting to know when he will have valve replacement surgery.    Current Facility-Administered Medications   Medication Dose Route Frequency    furosemide (LASIX) injection 40 mg  40 mg IntraVENous BID    cefTRIAXone (ROCEPHIN) 2 g in 0.9% sodium chloride (MBP/ADV) 50 mL  2 g IntraVENous Q24H    ferrous sulfate tablet 325 mg  1 Tab Oral BID WITH MEALS    ergocalciferol capsule 50,000 Units  50,000 Units Oral Q7D    metoprolol succinate (TOPROL-XL) XL tablet 25 mg  25 mg Oral BID    insulin lispro (HUMALOG) injection   SubCUTAneous AC&HS    escitalopram oxalate (LEXAPRO) tablet 10 mg  10 mg Oral DAILY    atorvastatin (LIPITOR) tablet 20 mg  20 mg Oral QHS    aspirin chewable tablet 81 mg  81 mg Oral DAILY    sodium chloride (NS) flush 5-40 mL  5-40 mL IntraVENous Q8H    sodium chloride (NS) flush 5-40 mL  5-40 mL IntraVENous PRN    acetaminophen (TYLENOL) tablet 650 mg  650 mg Oral Q6H PRN    morphine 10 mg/ml injection 2 mg  2 mg IntraVENous Q3H PRN    nitroglycerin (NITROSTAT) tablet 0.4 mg  0.4 mg SubLINGual Q5MIN PRN    ondansetron (ZOFRAN) injection 4 mg  4 mg IntraVENous Q4H PRN       Objective:     Vitals:    05/24/20 0047 05/24/20 0436 05/24/20 0753 05/24/20 1007   BP: 121/76 127/76 129/83 123/67   Pulse: 95 91 79    Resp: 18 18 18    Temp: 98.2 °F (36.8 °C) 98.2 °F (36.8 °C) 98 °F (36.7 °C)    SpO2: 91% 92% 94%    Weight:  78.3 kg (172 lb 9.6 oz)     Height:             Intake and Output:  Current Shift: No intake/output data recorded. Last three shifts: 05/22 1901 - 05/24 0700  In: -   Out: 400 [Urine:400]    Physical Exam:   General:  Alert, cooperative, no distress. Walking around the room   Eyes:  Conjunctivae/corneas clear. Ears:  Normal TMs and external ear canals both ears. Nose: Nares normal. Septum midline. Mouth/Throat: Lips, mucosa, and tongue normal.    Neck: no JVD. Back:   deferred. Lungs:   Clear to auscultation bilaterally. Heart:  Regular rate and rhythm. Abdomen:   Soft, non-tender. Bowel sounds normal.    Extremities: Petechiae to LE bilaterally that are improving, no janeway lesions. 2+ edema to LE bilaterally     Pulses: 2+ and symmetric all extremities.    Skin: As above   Lymph nodes: Cervical, supraclavicular, and axillary nodes normal.   Neurologic: CNII-XII intact. Good ROM in bed. Lab/Data Review:    Recent Results (from the past 24 hour(s))   GLUCOSE, POC    Collection Time: 05/23/20  4:23 PM   Result Value Ref Range    Glucose (POC) 115 (H) 65 - 651 mg/dL   METABOLIC PANEL, COMPREHENSIVE    Collection Time: 05/23/20  4:24 PM   Result Value Ref Range    Sodium 143 136 - 145 mmol/L    Potassium 3.9 3.5 - 5.1 mmol/L    Chloride 111 (H) 98 - 107 mmol/L    CO2 25 21 - 32 mmol/L    Anion gap 7 7 - 16 mmol/L    Glucose 119 (H) 65 - 100 mg/dL    BUN 26 (H) 8 - 23 MG/DL    Creatinine 0.86 0.8 - 1.5 MG/DL    GFR est AA >60 >60 ml/min/1.73m2    GFR est non-AA >60 >60 ml/min/1.73m2    Calcium 7.8 (L) 8.3 - 10.4 MG/DL    Bilirubin, total 0.3 0.2 - 1.1 MG/DL    ALT (SGPT) 52 12 - 65 U/L    AST (SGOT) 35 15 - 37 U/L    Alk. phosphatase 102 50 - 136 U/L    Protein, total 5.8 (L) 6.3 - 8.2 g/dL    Albumin 2.1 (L) 3.2 - 4.6 g/dL    Globulin 3.7 (H) 2.3 - 3.5 g/dL    A-G Ratio 0.6 (L) 1.2 - 3.5     MAGNESIUM    Collection Time: 05/23/20  4:24 PM   Result Value Ref Range    Magnesium 1.9 1.8 - 2.4 mg/dL   PHOSPHORUS    Collection Time: 05/23/20  4:24 PM   Result Value Ref Range    Phosphorus 4.6 (H) 2.3 - 3.7 MG/DL   GLUCOSE, POC    Collection Time: 05/23/20  9:07 PM   Result Value Ref Range    Glucose (POC) 194 (H) 65 - 100 mg/dL   BLOOD CULTURE ID PANEL    Collection Time: 05/24/20  1:05 AM   Result Value Ref Range    Acc. no. from Micro Order [id number]     Streptococcus Detected (A) NOTDET      INTERPRETATION        Gram positive cocci. Identified by realtime PCR as Streptococcus species (not agalactiae, pyogenes, or pneumoniae).    METABOLIC PANEL, BASIC    Collection Time: 05/24/20  4:02 AM   Result Value Ref Range    Sodium 142 136 - 145 mmol/L    Potassium 3.9 3.5 - 5.1 mmol/L    Chloride 113 (H) 98 - 107 mmol/L    CO2 22 21 - 32 mmol/L    Anion gap 7 7 - 16 mmol/L    Glucose 106 (H) 65 - 100 mg/dL    BUN 26 (H) 8 - 23 MG/DL Creatinine 0.75 (L) 0.8 - 1.5 MG/DL    GFR est AA >60 >60 ml/min/1.73m2    GFR est non-AA >60 >60 ml/min/1.73m2    Calcium 7.8 (L) 8.3 - 10.4 MG/DL   T4, FREE    Collection Time: 05/24/20  4:02 AM   Result Value Ref Range    T4, Free 1.0 0.78 - 1.46 NG/DL   TSH 3RD GENERATION    Collection Time: 05/24/20  4:02 AM   Result Value Ref Range    TSH 3.000 0.358 - 3.740 uIU/mL   GLUCOSE, POC    Collection Time: 05/24/20  6:09 AM   Result Value Ref Range    Glucose (POC) 105 (H) 65 - 100 mg/dL   CBC W/O DIFF    Collection Time: 05/24/20  8:43 AM   Result Value Ref Range    WBC 10.2 4.3 - 11.1 K/uL    RBC 3.21 (L) 4.23 - 5.6 M/uL    HGB 8.7 (L) 13.6 - 17.2 g/dL    HCT 28.1 (L) 41.1 - 50.3 %    MCV 87.5 79.6 - 97.8 FL    MCH 27.1 26.1 - 32.9 PG    MCHC 31.0 (L) 31.4 - 35.0 g/dL    RDW 16.9 (H) 11.9 - 14.6 %    PLATELET 196 092 - 360 K/uL    MPV 8.9 (L) 9.4 - 12.3 FL    ABSOLUTE NRBC 0.00 0.0 - 0.2 K/uL   GLUCOSE, POC    Collection Time: 05/24/20 11:05 AM   Result Value Ref Range    Glucose (POC) 110 (H) 65 - 100 mg/dL       Assessment/ Plan:     Principal Problem:    NSTEMI (non-ST elevated myocardial infarction) ([facility] (5/16/2020)    Active Problems:    Diastolic CHF, chronic (HCC) (5/7/2018)      S/P aortic valve replacement (4/1/2018)      CAD (coronary artery disease) ()      Overview: Nonobstructive      DIEGO on CPAP ()      Dyslipidemia ()      Essential hypertension (9/21/2018)      Suspected COVID-19 virus infection (5/16/2020)      Petechiae (5/16/2020)      Anemia (5/16/2020)      Diabetes mellitus, type II ([facility] (5/20/2020)      Vitamin D deficiency (5/20/2020)    CAD with NSTEMI and endocarditis-S/p cardiac cath on 5/19 with no intervention. ASA, statin, BB. Will need AVR once bacteremia has been treated. Dr. Cisco Momin following to which I appreciate. He will re-evaluate Tuesday. Endocarditis with strep mutans - Consulted ID on recommendations of abx treatment. Ceftriaxone since 5/22.  Repeat blood cultures with one vial growing gram + cocci. Repeat blood culture today. Elevated von willebrand factor activity - Bedside consult with oncology who state they do not think this is a true elevation since no family or personal hx of DVT/von willebrand. No intervention. Petechia - Drug reaction? Possibly from thrombocytopenia? COVID negative. Will need outpatient dermatology appointment. Improving during stay. LE edema bilaterally - Lasix 40mg BID started. Normocytic anemia with thrombocytopenia - follow CBC. S/p Iron infusions. Oral iron. Will need to establish care with GI for colon cancer screening at discharge. Hyponatremia - Resolved. Hypokalemia - Resolved. Vitamin D deficiency - Supplement. DM type II - A1C 7.1. ADA. SS. Will need outpatient diabetic education    Wife's number is . Patient states we can also update his sister.        DVT prophylaxis - SCDs  Signed By: Cecily Justin DO     May 24, 2020

## 2020-05-24 NOTE — ROUTINE PROCESS
Bedside and Verbal shift change report given to Brandyn Snow RN and Yeni Ewing RN (oncoming nurse) by self (offgoing nurse). Report included the following information SBAR, Kardex, Intake/Output, MAR, Recent Results

## 2020-05-24 NOTE — ROUTINE PROCESS
Bedside and Verbal report given to Manjinder Tierney RN by self and Nathan Martin RN. Report included SBAR, Kardex, ED summary, procedure summary, recent results and cardiac rhythm SR/ST.

## 2020-05-24 NOTE — ROUTINE PROCESS
Bedside and Verbal shift change report given to self (oncoming nurse) by Curly Ball RN and [facility] PAR[facility] WRIGHT (offgoing nurse). Report included the following information SBAR, Kardex, ED Summary, Procedure Summary, Intake/Output, MAR, Recent Results

## 2020-05-24 NOTE — PROGRESS NOTES
[facility] CARDIOLOGY PROGRESS NOTE    5/24/2020 8:46 AM    Admit Date: 5/16/2020        Subjective:   Stable overnight without angina, CHF, or palpitations. Vitals stable and controlled. No other complaints overnight. Tolerating meds well.   Afebrile and WBC normal.     Objective:      Vitals:    05/23/20 2027 05/24/20 0047 05/24/20 0436 05/24/20 0753   BP: 133/84 121/76 127/76 129/83   Pulse: 97 95 91 79   Resp: 18 18 18 18   Temp: 98 °F (36.7 °C) 98.2 °F (36.8 °C) 98.2 °F (36.8 °C) 98 °F (36.7 °C)   SpO2: 94% 91% 92% 94%   Weight:   78.3 kg (172 lb 9.6 oz)    Height:           Physical Exam:  Neck- supple, no JVD  CV- regular rate and rhythm, 2/6 NEERAJ RUSB  Lung- clear bilaterally, decreased bibasilar  Abd- soft, nontender, nondistended  Ext- 2+ anterior tibial pitting edema  Skin- warm and dry    Data Review:   Recent Labs     05/24/20  0402 05/23/20  1624 05/22/20  0058    143 141   K 3.9 3.9 3.7   MG  --  1.9 2.0   BUN 26* 26* 24*   CREA 0.75* 0.86 0.78*   * 119* 124*   WBC  --   --  7.1   HGB  --   --  8.0*   HCT  --   --  25.7*   PLT  --   --  171       Assessment and Plan:     Principal Problem:    NSTEMI (non-ST elevated myocardial infarction) (HCC) (5/16/2020)- occluded OM branch.    The OM was not intervened on secondary to the fact that this likely infarcted at least four days prior to admit, and is outside the window for therapy. Radha Tiwari is also unknown whether this is a ruptured plaque or possibly an embolic event and stenting would be potentially contraindicated for the development of mycotic aneurysm.      Active Problems:    Diastolic and systolic combined CHF, chronic (HCC) - stable, lying flat comfortably, continue meds but add IV lasix and diurese for a few days, recheck in AM       S/P aortic valve replacement- endocarditis- ABX per ID-- YARON Brief Findings: LVEF mild/moderately reduced, bioprosthetic aortic valve with small mobile mass on LVOT side of the prosthesis as well as thickening / restriction of 2/3 leaflets, no other valvular masses/vegetations identified.  Continue Vanc per ID recs. Will need AVR soon once blood cultures cleared (no growth x 3 days now). BLOOD CULTURES FROM 5/20 STILL POSITIVE FOR GPC. ..       CAD (coronary artery disease) ()- stable, continue meds      Overview: Nonobstructive       DIEGO on CPAP ()- stable, continue CPAP as tolerated       Dyslipidemia ()- stable, continue meds       Essential hypertension - stable, continue meds       Suspected [facility] virus infection (5/16/2020)- ruled out, negative     Continue supportive care, CBC/BMP/Mg in AM  Lasix 40mg IV BID.         Servando Saldaña MD  [facility] Cardiology  Pager [phone]

## 2020-05-25 LAB
ANION GAP SERPL CALC-SCNC: 7 MMOL/L (ref 7–16)
BACTERIA SPEC CULT: NORMAL
BUN SERPL-MCNC: 24 MG/DL (ref 8–23)
CALCIUM SERPL-MCNC: 8.2 MG/DL (ref 8.3–10.4)
CHLORIDE SERPL-SCNC: 110 MMOL/L (ref 98–107)
CO2 SERPL-SCNC: 27 MMOL/L (ref 21–32)
CREAT SERPL-MCNC: 0.93 MG/DL (ref 0.8–1.5)
ERYTHROCYTE [DISTWIDTH] IN BLOOD BY AUTOMATED COUNT: 17.2 % (ref 11.9–14.6)
GLUCOSE BLD STRIP.AUTO-MCNC: 126 MG/DL (ref 65–100)
GLUCOSE BLD STRIP.AUTO-MCNC: 127 MG/DL (ref 65–100)
GLUCOSE BLD STRIP.AUTO-MCNC: 133 MG/DL (ref 65–100)
GLUCOSE BLD STRIP.AUTO-MCNC: 141 MG/DL (ref 65–100)
GLUCOSE SERPL-MCNC: 116 MG/DL (ref 65–100)
HCT VFR BLD AUTO: 26.7 % (ref 41.1–50.3)
HGB BLD-MCNC: 8.1 G/DL (ref 13.6–17.2)
MAGNESIUM SERPL-MCNC: 1.7 MG/DL (ref 1.8–2.4)
MCH RBC QN AUTO: 26.6 PG (ref 26.1–32.9)
MCHC RBC AUTO-ENTMCNC: 30.3 G/DL (ref 31.4–35)
MCV RBC AUTO: 87.5 FL (ref 79.6–97.8)
NRBC # BLD: 0 K/UL (ref 0–0.2)
PLATELET # BLD AUTO: 207 K/UL (ref 150–450)
PMV BLD AUTO: 8.7 FL (ref 9.4–12.3)
POTASSIUM SERPL-SCNC: 3.4 MMOL/L (ref 3.5–5.1)
RBC # BLD AUTO: 3.05 M/UL (ref 4.23–5.6)
SERVICE CMNT-IMP: NORMAL
SODIUM SERPL-SCNC: 144 MMOL/L (ref 136–145)
WBC # BLD AUTO: 10.9 K/UL (ref 4.3–11.1)

## 2020-05-25 PROCEDURE — 83735 ASSAY OF MAGNESIUM: CPT

## 2020-05-25 PROCEDURE — 99232 SBSQ HOSP IP/OBS MODERATE 35: CPT | Performed by: INTERNAL MEDICINE

## 2020-05-25 PROCEDURE — 74011250637 HC RX REV CODE- 250/637: Performed by: HOSPITALIST

## 2020-05-25 PROCEDURE — 74011250636 HC RX REV CODE- 250/636: Performed by: INTERNAL MEDICINE

## 2020-05-25 PROCEDURE — 36415 COLL VENOUS BLD VENIPUNCTURE: CPT

## 2020-05-25 PROCEDURE — 85027 COMPLETE CBC AUTOMATED: CPT

## 2020-05-25 PROCEDURE — 74011250637 HC RX REV CODE- 250/637: Performed by: INTERNAL MEDICINE

## 2020-05-25 PROCEDURE — 82962 GLUCOSE BLOOD TEST: CPT

## 2020-05-25 PROCEDURE — 65660000000 HC RM CCU STEPDOWN

## 2020-05-25 PROCEDURE — 74011000258 HC RX REV CODE- 258: Performed by: INTERNAL MEDICINE

## 2020-05-25 PROCEDURE — 74011250637 HC RX REV CODE- 250/637: Performed by: FAMILY MEDICINE

## 2020-05-25 PROCEDURE — 80048 BASIC METABOLIC PNL TOTAL CA: CPT

## 2020-05-25 RX ORDER — POTASSIUM CHLORIDE 20 MEQ/1
40 TABLET, EXTENDED RELEASE ORAL
Status: COMPLETED | OUTPATIENT
Start: 2020-05-25 | End: 2020-05-25

## 2020-05-25 RX ORDER — MAGNESIUM SULFATE HEPTAHYDRATE 40 MG/ML
2 INJECTION, SOLUTION INTRAVENOUS ONCE
Status: COMPLETED | OUTPATIENT
Start: 2020-05-25 | End: 2020-05-25

## 2020-05-25 RX ADMIN — CEFTRIAXONE SODIUM 2 G: 2 INJECTION, POWDER, FOR SOLUTION INTRAMUSCULAR; INTRAVENOUS at 13:59

## 2020-05-25 RX ADMIN — MAGNESIUM SULFATE HEPTAHYDRATE 2 G: 40 INJECTION, SOLUTION INTRAVENOUS at 09:44

## 2020-05-25 RX ADMIN — Medication 10 ML: at 06:17

## 2020-05-25 RX ADMIN — METOPROLOL SUCCINATE 25 MG: 50 TABLET, EXTENDED RELEASE ORAL at 17:24

## 2020-05-25 RX ADMIN — FUROSEMIDE 40 MG: 10 INJECTION, SOLUTION INTRAMUSCULAR; INTRAVENOUS at 17:25

## 2020-05-25 RX ADMIN — FERROUS SULFATE TAB 325 MG (65 MG ELEMENTAL FE) 325 MG: 325 (65 FE) TAB at 09:53

## 2020-05-25 RX ADMIN — FERROUS SULFATE TAB 325 MG (65 MG ELEMENTAL FE) 325 MG: 325 (65 FE) TAB at 17:24

## 2020-05-25 RX ADMIN — ASPIRIN 81 MG 81 MG: 81 TABLET ORAL at 09:54

## 2020-05-25 RX ADMIN — POTASSIUM CHLORIDE 40 MEQ: 20 TABLET, EXTENDED RELEASE ORAL at 09:53

## 2020-05-25 RX ADMIN — Medication 10 ML: at 14:00

## 2020-05-25 RX ADMIN — FUROSEMIDE 40 MG: 10 INJECTION, SOLUTION INTRAMUSCULAR; INTRAVENOUS at 09:54

## 2020-05-25 RX ADMIN — ESCITALOPRAM OXALATE 10 MG: 10 TABLET ORAL at 09:54

## 2020-05-25 RX ADMIN — Medication 5 ML: at 22:53

## 2020-05-25 RX ADMIN — ATORVASTATIN CALCIUM 20 MG: 20 TABLET, FILM COATED ORAL at 22:51

## 2020-05-25 RX ADMIN — METOPROLOL SUCCINATE 25 MG: 50 TABLET, EXTENDED RELEASE ORAL at 09:54

## 2020-05-25 NOTE — ROUTINE PROCESS
Bedside and Verbal shift change report given to Marielena Cervantes RN (oncoming nurse) by self Mandy ricketts). Report included the following information SBAR, Kardex, Intake/Output, MAR, Recent Results

## 2020-05-25 NOTE — PROGRESS NOTES
[facility] CARDIOLOGY PROGRESS NOTE    5/25/2020 8:43 AM    Admit Date: 5/16/2020        Subjective:   Stable overnight without angina, CHF, or palpitations. Vitals stable and controlled. No other complaints overnight. Tolerating meds well.  Edema improved, good UOP on IV lasix yesterday, renal function and BP normal.     Objective:      Vitals:    05/24/20 1302 05/24/20 2110 05/25/20 0041 05/25/20 0452   BP: 116/71 121/71 110/67 121/74   Pulse: 94 96 95 95   Resp: 18 18 18 18   Temp: 97.8 °F (36.6 °C) 98.7 °F (37.1 °C) 99.1 °F (37.3 °C) 98.4 °F (36.9 °C)   SpO2: 93% 90% 91% 91%   Weight:    166 lb 12.8 oz (75.7 kg)   Height:           Physical Exam:  Neck- supple, no JVD  CV- regular rate and rhythm, 1/6 NEERAJ RUSB   Lung- clear bilaterally  Abd- soft, nontender, nondistended  Ext-1+ anterior tibial pitting edema  Skin- warm and dry    Data Review:   Recent Labs     05/25/20  0320 05/24/20  0843 05/24/20  0402 05/23/20  1624     --  142 143   K 3.4*  --  3.9 3.9   MG 1.7*  --   --  1.9   BUN 24*  --  26* 26*   CREA 0.93  --  0.75* 0.86   *  --  106* 119*   WBC 10.9 10.2  --   --    HGB 8.1* 8.7*  --   --    HCT 26.7* 28.1*  --   --     214  --   --        Assessment and Plan:     Principal Problem:    NSTEMI (non-ST elevated myocardial infarction) ([facility] (5/16/2020)- occluded OM branch.    The OM was not intervened on secondary to the fact that this likely infarcted at least four days prior to admit, and is outside the window for therapy. Micah La is also unknown whether this is a ruptured plaque or possibly an embolic event and stenting would be potentially contraindicated for the development of mycotic aneurysm.      Active Problems:    Diastolic and systolic combined CHF, chronic (HCC) - stable, lying flat comfortably, continue meds but add IV lasix and diurese for a few days, recheck in AM       S/P aortic valve replacement- endocarditis- ABX per ID-- YARON Brief Findings: LVEF mild/moderately reduced, bioprosthetic aortic valve with small mobile mass on LVOT side of the prosthesis as well as thickening / restriction of 2/3 leaflets, no other valvular masses/vegetations identified.  Continue Vanc per ID recs. Will need AVR soon once blood cultures cleared. ..   BLOOD CULTURES FROM 5/20 STILL POSITIVE FOR GPC. ..       CAD (coronary artery disease) ()- stable, continue meds      Overview: Nonobstructive       DIEGO on CPAP ()- stable, continue CPAP as tolerated       Dyslipidemia ()- stable, continue meds       Essential hypertension - stable, continue meds       Suspected [facility]-22 virus infection (5/16/2020)- ruled out, negative     Continue supportive care, CBC/BMP/Mg in AM  Lasix 40mg IV BID  Replete K and Mg today, recheck in AM           A.  Laureano Santos MD  [facility] Cardiology  Pager [phone]

## 2020-05-25 NOTE — PROGRESS NOTES
Progress Note    Patient: Mecca Richards MRN: [id number]  SSN: [id number]    YOB: 1960  Age: 62 y.o. Sex: male      Admit Date: 5/16/2020    LOS: 9 days     Subjective:   F/U NSTEMI, endocarditis    \"53 yo male with PMH of CAD (non obstructive  Per [facility] 2018) , DIEGO on CPAP, AS s/p AVR, HTN admitted with acute chest pain and elevated troponin, NSTEMI. He was seen by cardiology April, 2020 s/p low risk NM stress testing and ECHO 50% EF. He has been tested for COVID 19 due to constellation of petechial LE rash, elevated ddimer, mild thrombocytopenia. CTA chest negative. \" Troponin 2.08 -->24.9-->36. 10. Repeat ECHO showed EF 40%, moderate diffuse hypokinesis. RV pressure 45-50mmHg, aortic valve also had a moderate sized mobile mass suspicious for endocarditis. Dilatation of the ascending aorta seen 41mm. YARON performed on 5/21 confirmed vegetation on aortic valve. ID consulted and have changed to Ceftriaxone on 5/22. Cardiothoracic surgery recommended AVR after bacteremia has been treated. Blood cultures from 5/18 growing strep mutans sensitive to Ceftriaxone. Repeat blood cultures on 5/20/20 have 1 vial growing gram + cocci. Repeat blood culture on 5/24 no growth to date. Found to have normocytic anemia. Hg baseline 9.3. Denies ever having colonoscopy or EGD. Iron 16, TIBC 173, transferrin 9, ferritin 910, and haptoglobin 131. IV iron infusions given. No obvious bleeding. Von Willebrand factor activity 319. No family hx or personal history of thrombotic disease. Bedside consult with hematology who agree at this level and no family hx or personal history that would not work this up further or provide any treatment. A1C 7.1. No chest pain or SOB. NO concerns. Wanting to know when he will have valve replacement surgery. K and Mg low.    Current Facility-Administered Medications   Medication Dose Route Frequency    furosemide (LASIX) injection 40 mg  40 mg IntraVENous BID    cefTRIAXone (ROCEPHIN) 2 g in 0.9% sodium chloride (MBP/ADV) 50 mL  2 g IntraVENous Q24H    ferrous sulfate tablet 325 mg  1 Tab Oral BID WITH MEALS    ergocalciferol capsule 50,000 Units  50,000 Units Oral Q7D    metoprolol succinate (TOPROL-XL) XL tablet 25 mg  25 mg Oral BID    insulin lispro (HUMALOG) injection   SubCUTAneous AC&HS    escitalopram oxalate (LEXAPRO) tablet 10 mg  10 mg Oral DAILY    atorvastatin (LIPITOR) tablet 20 mg  20 mg Oral QHS    aspirin chewable tablet 81 mg  81 mg Oral DAILY    sodium chloride (NS) flush 5-40 mL  5-40 mL IntraVENous Q8H    sodium chloride (NS) flush 5-40 mL  5-40 mL IntraVENous PRN    acetaminophen (TYLENOL) tablet 650 mg  650 mg Oral Q6H PRN    morphine 10 mg/ml injection 2 mg  2 mg IntraVENous Q3H PRN    nitroglycerin (NITROSTAT) tablet 0.4 mg  0.4 mg SubLINGual Q5MIN PRN    ondansetron (ZOFRAN) injection 4 mg  4 mg IntraVENous Q4H PRN       Objective:     Vitals:    05/24/20 2110 05/25/20 0041 05/25/20 0452 05/25/20 0926   BP: 121/71 110/67 121/74 124/75   Pulse: 96 95 95 (!) 104   Resp: 18 18 18 20   Temp: 98.7 °F (37.1 °C) 99.1 °F (37.3 °C) 98.4 °F (36.9 °C) 98.3 °F (36.8 °C)   SpO2: 90% 91% 91% 93%   Weight:   75.7 kg (166 lb 12.8 oz)    Height:             Intake and Output:  Current Shift: 05/25 0701 - 05/25 1900  In: 240 [P.O.:240]  Out: 600 [Urine:600]  Last three shifts: 05/23 1901 - 05/25 0700  In: -   Out: 2000 [Urine:2000]    Physical Exam:   General:  Alert, cooperative, no distress. Eyes:  Conjunctivae/corneas clear. Ears:  Normal TMs and external ear canals both ears. Nose: Nares normal. Septum midline. Mouth/Throat: Lips, mucosa, and tongue normal.    Neck: no JVD. Back:   deferred. Lungs:   Clear to auscultation bilaterally. Heart:  Regular rate and rhythm. Abdomen:   Soft, non-tender. Bowel sounds normal.    Extremities: Petechiae to LE bilaterally that are improving, no janeway lesions.  2+ edema to LE bilaterally Pulses: 2+ and symmetric all extremities.    Skin: As above   Lymph nodes: Cervical, supraclavicular, and axillary nodes normal.   Neurologic: CNII-XII intact        Lab/Data Review:    Recent Results (from the past 24 hour(s))   CULTURE, BLOOD    Collection Time: 05/24/20  1:21 PM   Result Value Ref Range    Special Requests: LEFT  Antecubital        Culture result: NO GROWTH AFTER 16 HOURS     GLUCOSE, POC    Collection Time: 05/24/20  5:22 PM   Result Value Ref Range    Glucose (POC) 145 (H) 65 - 100 mg/dL   GLUCOSE, POC    Collection Time: 05/24/20  9:16 PM   Result Value Ref Range    Glucose (POC) 123 (H) 65 - 536 mg/dL   METABOLIC PANEL, BASIC    Collection Time: 05/25/20  3:20 AM   Result Value Ref Range    Sodium 144 136 - 145 mmol/L    Potassium 3.4 (L) 3.5 - 5.1 mmol/L    Chloride 110 (H) 98 - 107 mmol/L    CO2 27 21 - 32 mmol/L    Anion gap 7 7 - 16 mmol/L    Glucose 116 (H) 65 - 100 mg/dL    BUN 24 (H) 8 - 23 MG/DL    Creatinine 0.93 0.8 - 1.5 MG/DL    GFR est AA >60 >60 ml/min/1.73m2    GFR est non-AA >60 >60 ml/min/1.73m2    Calcium 8.2 (L) 8.3 - 10.4 MG/DL   CBC W/O DIFF    Collection Time: 05/25/20  3:20 AM   Result Value Ref Range    WBC 10.9 4.3 - 11.1 K/uL    RBC 3.05 (L) 4.23 - 5.6 M/uL    HGB 8.1 (L) 13.6 - 17.2 g/dL    HCT 26.7 (L) 41.1 - 50.3 %    MCV 87.5 79.6 - 97.8 FL    MCH 26.6 26.1 - 32.9 PG    MCHC 30.3 (L) 31.4 - 35.0 g/dL    RDW 17.2 (H) 11.9 - 14.6 %    PLATELET 476 611 - 248 K/uL    MPV 8.7 (L) 9.4 - 12.3 FL    ABSOLUTE NRBC 0.00 0.0 - 0.2 K/uL   MAGNESIUM    Collection Time: 05/25/20  3:20 AM   Result Value Ref Range    Magnesium 1.7 (L) 1.8 - 2.4 mg/dL   GLUCOSE, POC    Collection Time: 05/25/20  6:17 AM   Result Value Ref Range    Glucose (POC) 126 (H) 65 - 100 mg/dL   GLUCOSE, POC    Collection Time: 05/25/20 11:01 AM   Result Value Ref Range    Glucose (POC) 141 (H) 65 - 100 mg/dL       Assessment/ Plan:     Principal Problem:    NSTEMI (non-ST elevated myocardial infarction) ([facility] (5/16/2020)    Active Problems:    Diastolic CHF, chronic ([facility] (5/7/2018)      S/P aortic valve replacement (4/1/2018)      CAD (coronary artery disease) ()      Overview: Nonobstructive      DIEGO on CPAP ()      Dyslipidemia ()      Essential hypertension (9/21/2018)      Suspected COVID-19 virus infection (5/16/2020)      Petechiae (5/16/2020)      Anemia (5/16/2020)      Diabetes mellitus, type II ([facility] (5/20/2020)      Vitamin D deficiency (5/20/2020)    CAD with NSTEMI and endocarditis-S/p cardiac cath on 5/19 with no intervention. ASA, statin, BB. Will need AVR once bacteremia has been treated. Dr. Heidy Minaya following to which I appreciate. He will re-evaluate Tuesday. Endocarditis with strep mutans - Consulted ID on recommendations of abx treatment. Ceftriaxone since 5/22. Repeat blood cultures with one vial growing gram + cocci. Repeat blood culture on 5/24 no growth to date. Elevated von willebrand factor activity - Bedside consult with oncology who state they do not think this is a true elevation since no family or personal hx of DVT/von willebrand. No intervention. Petechia - Drug reaction? Possibly from thrombocytopenia? COVID negative. Will need outpatient dermatology appointment. Improving during stay. LE edema bilaterally - Lasix 40mg BID started. Normocytic anemia with thrombocytopenia - follow CBC. S/p Iron infusions. Oral iron. Will need to establish care with GI for colon cancer screening at discharge. Hyponatremia - Resolved. Hypokalemia - supplement    Vitamin D deficiency - Supplement. DM type II - A1C 7.1. ADA. SS. Will need outpatient diabetic education    Wife's number is . Every time I have attempted to call the wife I get no answer. Patient states we can also update his sister. I have updated the sister via phone.      DVT prophylaxis - SCDs  Signed By: Ermelinda Law DO     May 25, 2020

## 2020-05-25 NOTE — PROGRESS NOTES
Verbal bedside report given to harriet Chiang RN. Patient's situation, background, assessment and recommendations provided. Kardex, Mar, and recent results also reviewed. Opportunity for questions provided. Oncoming RN assumed care of patient.

## 2020-05-25 NOTE — PROGRESS NOTES
Verbal bedside report received from Laura [address]. Patient's situation, background, assessment and recommendations were provided. Kardex, Mar, and recent results also reviewed. Opportunity for questions provided. Assumed care of patient.

## 2020-05-25 NOTE — PROGRESS NOTES
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 [facility] 5/18/20 with strep mutans. 5/20 blood cx also positive with a strep: full id pending. · Generalized Phong LE rash of unknown etiology  · Diarrhea PTA, resolved. Hx diverticulosis   · Dental caries : will need dental eval as outpatient and definitive treatment before re-do AVR: preferably while on IV ABX. · Anemia, of acute disease: follow  · Hx AVR 2018  · DM, A1c 7     Plan:   · Continue Rocephin 2gm daily. · Follow id on 5/20 cx  · Follow 5/24 blood cx: if come up positive will need repeat blood cx. · Will need re-do AVR once BC cleared per CTS     Anti-infectives:   · Vancomycin 5/20-5/22  · IV Rocephin (5/23-  Chart reviewed, patient not seen. Afebrile.   WBC and CRT normal.

## 2020-05-26 LAB
ANION GAP SERPL CALC-SCNC: 10 MMOL/L (ref 7–16)
BACTERIA SPEC CULT: ABNORMAL
BACTERIA SPEC CULT: ABNORMAL
BUN SERPL-MCNC: 24 MG/DL (ref 8–23)
CALCIUM SERPL-MCNC: 8.2 MG/DL (ref 8.3–10.4)
CHLORIDE SERPL-SCNC: 108 MMOL/L (ref 98–107)
CO2 SERPL-SCNC: 25 MMOL/L (ref 21–32)
CREAT SERPL-MCNC: 0.95 MG/DL (ref 0.8–1.5)
GLUCOSE BLD STRIP.AUTO-MCNC: 119 MG/DL (ref 65–100)
GLUCOSE BLD STRIP.AUTO-MCNC: 127 MG/DL (ref 65–100)
GLUCOSE BLD STRIP.AUTO-MCNC: 130 MG/DL (ref 65–100)
GLUCOSE BLD STRIP.AUTO-MCNC: 145 MG/DL (ref 65–100)
GLUCOSE SERPL-MCNC: 117 MG/DL (ref 65–100)
GRAM STN SPEC: ABNORMAL
MAGNESIUM SERPL-MCNC: 2 MG/DL (ref 1.8–2.4)
POTASSIUM SERPL-SCNC: 3.4 MMOL/L (ref 3.5–5.1)
SERVICE CMNT-IMP: ABNORMAL
SODIUM SERPL-SCNC: 143 MMOL/L (ref 136–145)

## 2020-05-26 PROCEDURE — 83735 ASSAY OF MAGNESIUM: CPT

## 2020-05-26 PROCEDURE — 65660000000 HC RM CCU STEPDOWN

## 2020-05-26 PROCEDURE — 36415 COLL VENOUS BLD VENIPUNCTURE: CPT

## 2020-05-26 PROCEDURE — 74011250637 HC RX REV CODE- 250/637: Performed by: HOSPITALIST

## 2020-05-26 PROCEDURE — 74011250637 HC RX REV CODE- 250/637: Performed by: FAMILY MEDICINE

## 2020-05-26 PROCEDURE — 74011250636 HC RX REV CODE- 250/636: Performed by: INTERNAL MEDICINE

## 2020-05-26 PROCEDURE — 80048 BASIC METABOLIC PNL TOTAL CA: CPT

## 2020-05-26 PROCEDURE — 82962 GLUCOSE BLOOD TEST: CPT

## 2020-05-26 PROCEDURE — 74011000258 HC RX REV CODE- 258: Performed by: INTERNAL MEDICINE

## 2020-05-26 PROCEDURE — 99232 SBSQ HOSP IP/OBS MODERATE 35: CPT | Performed by: INTERNAL MEDICINE

## 2020-05-26 PROCEDURE — 74011250637 HC RX REV CODE- 250/637: Performed by: INTERNAL MEDICINE

## 2020-05-26 RX ORDER — POTASSIUM CHLORIDE 20 MEQ/1
20 TABLET, EXTENDED RELEASE ORAL
Status: COMPLETED | OUTPATIENT
Start: 2020-05-26 | End: 2020-05-26

## 2020-05-26 RX ADMIN — FUROSEMIDE 40 MG: 10 INJECTION, SOLUTION INTRAMUSCULAR; INTRAVENOUS at 08:25

## 2020-05-26 RX ADMIN — Medication 5 ML: at 06:21

## 2020-05-26 RX ADMIN — Medication 10 ML: at 21:32

## 2020-05-26 RX ADMIN — ESCITALOPRAM OXALATE 10 MG: 10 TABLET ORAL at 08:25

## 2020-05-26 RX ADMIN — FUROSEMIDE 40 MG: 10 INJECTION, SOLUTION INTRAMUSCULAR; INTRAVENOUS at 17:53

## 2020-05-26 RX ADMIN — Medication 10 ML: at 14:10

## 2020-05-26 RX ADMIN — ATORVASTATIN CALCIUM 20 MG: 20 TABLET, FILM COATED ORAL at 21:30

## 2020-05-26 RX ADMIN — FERROUS SULFATE TAB 325 MG (65 MG ELEMENTAL FE) 325 MG: 325 (65 FE) TAB at 17:53

## 2020-05-26 RX ADMIN — ASPIRIN 81 MG 81 MG: 81 TABLET ORAL at 08:25

## 2020-05-26 RX ADMIN — CEFTRIAXONE SODIUM 2 G: 2 INJECTION, POWDER, FOR SOLUTION INTRAMUSCULAR; INTRAVENOUS at 14:10

## 2020-05-26 RX ADMIN — FERROUS SULFATE TAB 325 MG (65 MG ELEMENTAL FE) 325 MG: 325 (65 FE) TAB at 08:25

## 2020-05-26 RX ADMIN — METOPROLOL SUCCINATE 25 MG: 50 TABLET, EXTENDED RELEASE ORAL at 17:53

## 2020-05-26 RX ADMIN — METOPROLOL SUCCINATE 25 MG: 50 TABLET, EXTENDED RELEASE ORAL at 08:25

## 2020-05-26 RX ADMIN — POTASSIUM CHLORIDE 20 MEQ: 20 TABLET, EXTENDED RELEASE ORAL at 11:10

## 2020-05-26 NOTE — PROGRESS NOTES
Progress Note    Patient: Chavo Birch MRN: [id number]  SSN: [id number]    YOB: 1960  Age: 62 y.o. Sex: male      Admit Date: 5/16/2020    LOS: 10 days     Subjective:   F/U NSTEMI, endocarditis    \"55 yo male with PMH of CAD (non obstructive  Per [facility] 2018) , DIEGO on CPAP, AS s/p AVR, HTN admitted with  NSTEMI. ECHO showed EF 40%, moderate diffuse hypokinesis. RV pressure 45-50mmHg, aortic valve also had a moderate sized mobile mass suspicious for endocarditis. Dilatation of the ascending aorta seen 41mm. YARON performed on 5/21 confirmed vegetation on aortic valve. BCx 5/18 and 5/20 with strep mutans. ID following, on Ceftriaxone. CT surgery pending blood clearance. LHC on 5/19 with occluded OM branch, outside the time window for intervention. Ruptured plaque vs embolic event. 5/26:  Cards, ID, CT surgery following. Repeat blood culture 5/24 NGTD. Feeling well. No cp, no sob. Still with swelling in legs. Not much improved. Good uop on lasix.       Current Facility-Administered Medications   Medication Dose Route Frequency    furosemide (LASIX) injection 40 mg  40 mg IntraVENous BID    cefTRIAXone (ROCEPHIN) 2 g in 0.9% sodium chloride (MBP/ADV) 50 mL  2 g IntraVENous Q24H    ferrous sulfate tablet 325 mg  1 Tab Oral BID WITH MEALS    ergocalciferol capsule 50,000 Units  50,000 Units Oral Q7D    metoprolol succinate (TOPROL-XL) XL tablet 25 mg  25 mg Oral BID    insulin lispro (HUMALOG) injection   SubCUTAneous AC&HS    escitalopram oxalate (LEXAPRO) tablet 10 mg  10 mg Oral DAILY    atorvastatin (LIPITOR) tablet 20 mg  20 mg Oral QHS    aspirin chewable tablet 81 mg  81 mg Oral DAILY    sodium chloride (NS) flush 5-40 mL  5-40 mL IntraVENous Q8H    sodium chloride (NS) flush 5-40 mL  5-40 mL IntraVENous PRN    acetaminophen (TYLENOL) tablet 650 mg  650 mg Oral Q6H PRN    morphine 10 mg/ml injection 2 mg  2 mg IntraVENous Q3H PRN    nitroglycerin (NITROSTAT) tablet 0.4 mg  0.4 mg SubLINGual Q5MIN PRN    ondansetron (ZOFRAN) injection 4 mg  4 mg IntraVENous Q4H PRN       Objective:     Vitals:    05/25/20 2122 05/26/20 0044 05/26/20 0434 05/26/20 0911   BP: 101/62 125/79 129/84 126/76   Pulse: 97 98 (!) 101 (!) 104   Resp: 20 18 18 20   Temp: 98.7 °F (37.1 °C) 98.9 °F (37.2 °C) 98.3 °F (36.8 °C) 98.2 °F (36.8 °C)   SpO2: 93% 93% 92% 95%   Weight:   73.3 kg (161 lb 9.6 oz)    Height:             Intake and Output:  Current Shift: No intake/output data recorded. Last three shifts: 05/24 1901 - 05/26 0700  In: 960 [P.O.:960]  Out: 2925 [Urine:2925]    Physical Exam:   General:  Alert, cooperative, no distress. Lungs:   Clear to auscultation bilaterally. Heart:  Regular rate and rhythm. Abdomen:   Soft, non-tender. Bowel sounds normal.    Extremities: Petechiae to LE bilaterally that are improving, no janeway lesions. 2+ edema to LE bilaterally     Pulses: 2+ and symmetric all extremities.    Skin: As above   Lymph nodes: Cervical, supraclavicular, and axillary nodes normal.   Neurologic: CNII-XII intact        Lab/Data Review:    Recent Results (from the past 24 hour(s))   GLUCOSE, POC    Collection Time: 05/25/20 11:01 AM   Result Value Ref Range    Glucose (POC) 141 (H) 65 - 100 mg/dL   GLUCOSE, POC    Collection Time: 05/25/20  4:51 PM   Result Value Ref Range    Glucose (POC) 133 (H) 65 - 100 mg/dL   GLUCOSE, POC    Collection Time: 05/25/20  9:44 PM   Result Value Ref Range    Glucose (POC) 127 (H) 65 - 890 mg/dL   METABOLIC PANEL, BASIC    Collection Time: 05/26/20  4:00 AM   Result Value Ref Range    Sodium 143 136 - 145 mmol/L    Potassium 3.4 (L) 3.5 - 5.1 mmol/L    Chloride 108 (H) 98 - 107 mmol/L    CO2 25 21 - 32 mmol/L    Anion gap 10 7 - 16 mmol/L    Glucose 117 (H) 65 - 100 mg/dL    BUN 24 (H) 8 - 23 MG/DL    Creatinine 0.95 0.8 - 1.5 MG/DL    GFR est AA >60 >60 ml/min/1.73m2    GFR est non-AA >60 >60 ml/min/1.73m2    Calcium 8.2 (L) 8.3 - 10.4 MG/DL MAGNESIUM    Collection Time: 05/26/20  4:00 AM   Result Value Ref Range    Magnesium 2.0 1.8 - 2.4 mg/dL   GLUCOSE, POC    Collection Time: 05/26/20  6:37 AM   Result Value Ref Range    Glucose (POC) 130 (H) 65 - 100 mg/dL       Assessment/ Plan:     Principal Problem:    NSTEMI (non-ST elevated myocardial infarction) ([facility] [address] (5/16/2020)    Active Problems:    Diastolic CHF, chronic (HCC) (5/7/2018)      S/P aortic valve replacement (4/1/2018)      CAD (coronary artery disease) ()      Overview: Nonobstructive      DIEGO on CPAP ()      Dyslipidemia ()      Essential hypertension (9/21/2018)      Suspected COVID-19 virus infection (5/16/2020)      Petechiae (5/16/2020)      Anemia (5/16/2020)      Diabetes mellitus, type II ([facility] [address] (5/20/2020)      Vitamin D deficiency (5/20/2020)      CAD with NSTEMI and endocarditis-S/p cardiac cath on 5/19 with no intervention. ASA, statin, BB. Will need AVR once bacteremia has been cleared. CT surgery following. Endocarditis with strep mutans - Consulted ID on recommendations of abx treatment. Ceftriaxone since 5/22. Cx 5/28 and 5/20 strep mutans. Repeat blood culture on 5/24 no growth to date. Elevated von willebrand factor activity - Bedside consult with oncology who state they do not think this is a true elevation since no family or personal hx of DVT/von willebrand. No intervention. Petechia - Drug reaction? Possibly from thrombocytopenia? COVID negative. consider outpatient dermatology appointment. Improving during stay. LE edema bilaterally - continue Lasix IV 40mg BID    Normocytic anemia with thrombocytopenia - follow CBC. S/p Iron infusions. Oral iron. Will need to establish care with GI for colon cancer screening at discharge. Hyponatremia - Resolved. Hypokalemia - supplement    Vitamin D deficiency - Supplement. DM type II - A1C 7.1. ADA.  SS. Will need outpatient diabetic education    DVT prophylaxis - SCDs  Signed By: Santiago Prieto MD     May 26, 2020

## 2020-05-26 NOTE — ROUTINE PROCESS
Verbal bedside report given to [facility], oncoming RN. Patient's situation, background, assessment and recommendations provided. Opportunity for questions provided. Oncoming RN assumed care of patient.

## 2020-05-26 NOTE — ADT AUTH CERT NOTES
Infective Endocarditis - Care Day 1 (5/21/2020) by Negra Crowe RN  
 
   
Review Status Review Entered Completed 5/26/2020 11:24  
   
Criteria Review Care Day: 1 Care Date: 5/21/2020 Level of Care: ICU Guideline Day 1 Level Of Care (X) ICU [B] or telemetry [C]   
(X) Discharge planning [D] Clinical Status   
(X) * Clinical Indications met [E]   
(X) Fever 5/26/2020 11:24:21 EDT by Oscar Sumner   
  99.5, 122/75, HR 99, RR 20, O2 sat 96, NC 2L   
(X) Possible heart murmur, anemia   
(X) Possible tachypnea, edema, and dyspnea Activity   
(X) Bed rest or chair Routes   
(X) Oral hydration, medications 5/26/2020 11:24:21 EDT by Oscar Sumner   
  Meds: tylenol 650mg po, aspirin 81mg po, Lipitor 20mg po, Lexapro 10mg po, Toprol po 25mg bid, iron iv,  ml.hr,   
(X) Diet as tolerated Interventions   
(X) Blood cultures, urine culture, WBC, CXR, ECG   
5/26/2020 11:24:21 EDT by Oscar Sumner   
  Labs: RBC 2.96, HGb 7.8, HCT 25.4, MCHC 30.7, platelet 709, MPV 9.3, chloride 111, glucose 123, Creat 0.70, calcium 7.6,   
(X) Echocardiogram   
(X) Possible cardiac monitoring (X) Possible oxygen 5/26/2020 11:24:21 EDT by Al Miller NC 3L Medications (X) IV antibiotics 5/26/2020 11:24:21 EDT by Oscar Sumner   
  vancomycin iv q12h * Milestone Additional Notes Cardio:  
NSTEMI (non-ST elevated myocardial infarction) ([facility] [address] (5/16/2020)- occluded OM branch Diastolic CHF, chronic (HCC) - stable, lying flat comfortably, continue meds S/P aortic valve replacement- endocarditis- ABX per ID Cardio surg:  
   
 ID following, blood cultures pos for GPC, awaiting species result. Pt tachpneic/tachycardic on exam but denies any dyspnea. Noted to have worsening anemia, needs daily CBC.    
   
Will need redo sternotomy/AVR after blood cultures cleared. Will follow. Hospitalist:  
YARON performed on 5/21 confirmed vegetation on aortic valve.  ID consulted and agree with Vancomycin. Cardiothoracic surgery recommended AVR after bacteremia has been treated. Blood cultures from 5/18 growing gram + cocci in chains. Repeat blood cultures on 5/20/20 have no growth to date. CAD with NSTEMI and endocarditis-S/p cardiac cath on 5/19 with no intervention. ASA, statin, BB. Will need AVR once bacteremia has been treated.   
   
Endocarditis with gram + cocci bacteremnia - Consulted ID on recommendations of abx treatment. Vancomycin. Repeat blood cultures no growth to date.

## 2020-05-26 NOTE — ROUTINE PROCESS
Verbal bedside report received from Wilian REYES, [facility]. Assumed care of patient.

## 2020-05-26 NOTE — PROGRESS NOTES
[facility] CARDIOLOGY PROGRESS NOTE    5/26/2020 10:42 AM    Admit Date: 5/16/2020        Subjective:   Stable overnight without angina, CHF, or palpitations. Vitals stable and controlled. No other complaints overnight. Tolerating meds well. Tolerating IV ABX well thus far.      Objective:      Vitals:    05/25/20 2122 05/26/20 0044 05/26/20 0434 05/26/20 0911   BP: 101/62 125/79 129/84 126/76   Pulse: 97 98 (!) 101 (!) 104   Resp: 20 18 18 20   Temp: 98.7 °F (37.1 °C) 98.9 °F (37.2 °C) 98.3 °F (36.8 °C) 98.2 °F (36.8 °C)   SpO2: 93% 93% 92% 95%   Weight:   73.3 kg (161 lb 9.6 oz)    Height:           Physical Exam:  Neck- supple, no JVD  CV- regular rate and rhythm, soft NEERAJ RUSB  Lung- clear bilaterally  Abd- soft, nontender, nondistended  Ext- no edema  Skin- warm and dry    Data Review:   Recent Labs     05/26/20  0400 05/25/20  0320 05/24/20  0843    144  --    K 3.4* 3.4*  --    MG 2.0 1.7*  --    BUN 24* 24*  --    CREA 0.95 0.93  --    * 116*  --    WBC  --  10.9 10.2   HGB  --  8.1* 8.7*   HCT  --  26.7* 28.1*   PLT  --  207 214       Assessment and Plan:        Principal Problem:    NSTEMI (non-ST elevated myocardial infarction) ([facility] [address] (5/16/2020)- occluded OM branch.    The OM was not intervened on secondary to the fact that this likely infarcted at least four days prior to admit, and is outside the window for therapy. Acey Craw is also unknown whether this is a ruptured plaque or possibly an embolic event and stenting would be potentially contraindicated for the development of mycotic aneurysm.      Active Problems:    Diastolic and systolic combined CHF, chronic (HCC) - stable, lying flat comfortably, continue meds but add IV lasix and diurese for a few days, recheck in AM       S/P aortic valve replacement- endocarditis- ABX per ID-- YARON Brief Findings: LVEF mild/moderately reduced, bioprosthetic aortic valve with small mobile mass on LVOT side of the prosthesis as well as thickening / restriction of 2/3 leaflets, no other valvular masses/vegetations identified.  Continue Vanc per ID recs. Will need AVR soon once blood cultures cleared. ..   BLOOD CULTURE  FROM 5/20 STILL POSITIVE FOR GPC. ..per ID       CAD (coronary artery disease) ()- stable, continue meds      Overview: Nonobstructive       DIEGO on CPAP ()- stable, continue CPAP as tolerated       Dyslipidemia ()- stable, continue meds       Essential hypertension - stable, continue meds       Suspected COVID-19 virus infection (5/16/2020)- ruled out, negative     Continue supportive care, CBC/BMP/Mg in AM  Continue Lasix 40mg IV BID today  Replete K and Mg as needed, recheck in AM  Dental Caries, ? Dental consult             RADHA Jaimes MD  [facility] Cardiology  Pager [phone]

## 2020-05-26 NOTE — PROGRESS NOTES
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 [facility] 20 with strep mutans.  blood cx also positive with a strep: full id pending. · Generalized Phong LE rash of unknown etiology-resolving   · Diarrhea PTA, resolved. Hx diverticulosis   · Dental caries : will need dental eval as outpatient and definitive treatment before re-do AVR: preferably while on IV ABX. · Anemia, of acute disease: follow  · Hx AVR   · DM, A1c 7    Plan:   · Continue CTX 2g IV q24h. Duration 6 weeks (date depends on surgery)  · Follow repeat BC for clearance  · Redo AVR planned but timing TBD  · Obvious dental erosion with loose teeth    Anti-infectives:   CTX -    Subjective:     Sitting up in chair. No complaints. States rash is \"pretty much gone. \"   Review of Systems:  A comprehensive review of systems was negative except for that written in the History of Present Illness. Objective:     Visit Vitals  /84 (BP 1 Location: Left arm, BP Patient Position: At rest)   Pulse (!) 101   Temp 98.3 °F (36.8 °C)   Resp 18   Ht 6' 1\" (1.854 m)   Wt 73.3 kg (161 lb 9.6 oz)   SpO2 92%   BMI 21.32 kg/m²     Temp (24hrs), Av.4 °F (36.9 °C), Min:97.8 °F (36.6 °C), Max:98.9 °F (37.2 °C)      Lines:  Peripheral IV:       Physical Exam:   General:  Alert, cooperative, well noursished, well developed, appears stated age   Eyes:  Sclera anicteric. Pupils equally round and reactive to light. Mouth/Throat: Mucous membranes normal, oral pharynx clear   Neck: Supple   Lungs:   Clear to auscultation bilaterally, good effort   CV:  Regular rate and rhythm, 3/6 SM   Abdomen:   Soft, non-tender.  bowel sounds normal. non-distended   Extremities: No cyanosis or edema   Skin: Skin color, texture, turgor normal. no acute rash or lesions   Lymph nodes: Cervical and supraclavicular normal   Musculoskeletal: No swelling or deformity   Lines/Devices:  Intact, no erythema, drainage or tenderness   Psych: Alert and oriented, normal mood affect given the setting       Data Review:     CBC:   Recent Labs     05/25/20  0320 05/24/20  0843   WBC 10.9 10.2   RBC 3.05* 3.21*   HGB 8.1* 8.7*   HCT 26.7* 28.1*    214     CMP:   Recent Labs     05/26/20  0400 05/25/20  0320 05/24/20  0402 05/23/20  1624   * 116* 106* 119*    144 142 143   K 3.4* 3.4* 3.9 3.9   * 110* 113* 111*   CO2 25 27 22 25   BUN 24* 24* 26* 26*   CREA 0.95 0.93 0.75* 0.86   CA 8.2* 8.2* 7.8* 7.8*   AGAP 10 7 7 7   AP  --   --   --  102   TP  --   --   --  5.8*   ALB  --   --   --  2.1*   GLOB  --   --   --  3.7*   AGRAT  --   --   --  0.6*     Liver Enzymes:   Recent Labs     05/23/20  1624   TP 5.8*   ALB 2.1*      SGOT 35     Antibiotic Monitoring:   Lab Results   Component Value Date/Time    Vancomycin,trough 11.8 05/22/2020 12:58 AM        Microbiology:     All Micro Results     Procedure Component Value Units Date/Time    CULTURE, BLOOD [[id number]]  (Abnormal) Collected:  05/20/20 1424    Order Status:  Completed Specimen:  Blood Updated:  05/25/20 0728     Special Requests: --        RIGHT  FOREARM       GRAM STAIN GRAM POSITIVE COCCI         PEDIATRIC BOTTLE               RESULTS VERIFIED, PHONED TO AND READ BACK BY Berto Soni AT 0304 ON 5.24.20  JL           Culture result:       ALPHA STREPTOCOCCUS IDENTIFICATION AND SUSCEPTIBILITY TO FOLLOW          CULTURE, BLOOD [[id number]] Collected:  05/24/20 1321    Order Status:  Completed Specimen:  Blood Updated:  05/25/20 0634     Special Requests: --        LEFT  Antecubital       Culture result: NO GROWTH AFTER 16 HOURS       CULTURE, BLOOD [[id number]] Collected:  05/20/20 1434    Order Status:  Completed Specimen:  Blood Updated:  05/25/20 0633     Special Requests: --        LEFT  Antecubital       Culture result: NO GROWTH 5 DAYS       BLOOD CULTURE ID PANEL [[id number]] (Abnormal) Collected:  05/24/20 0105    Order Status:  Completed Specimen:  Blood Updated:  05/24/20 0647     Acc. no. from Micro Order [id number]     Streptococcus Detected        Comment: RESULTS VERIFIED, PHONED TO AND READ BACK BY  Marissa Carrera RN @5270 ON 5/24/20 AK. INTERPRETATION       Gram positive cocci. Identified by realtime PCR as Streptococcus species (not agalactiae, pyogenes, or pneumoniae). Comment: Recommend discontinuing IV vancomycin starting cefazolin or nafcillin if patient not on beta-lactam therapy. Infectious Diseases Consult recommended in adult patients. THIS TEST DOES NOT REPLACE SENSITIVITY TESTING. CULTURE, BLOOD [[id number]]  (Abnormal) Collected:  05/18/20 1944    Order Status:  Completed Specimen:  Blood Updated:  05/23/20 0720     Special Requests: --        RIGHT  HAND       GRAM STAIN GRAM POSITIVE COCCI         PEDIATRIC BOTTLE               CRITICAL RESULT NOT CALLED DUE TO PREVIOUS NOTIFICATION OF CRITICAL RESULT WITHIN THE LAST 24 HOURS. Culture result:       ALPHA STREPTOCOCCUS, NOT S.  PNEUMONIAE            FOR ID AND SUSCEPTIBILITY REFER TO CULTURE NO ACC [id number]    CULTURE, BLOOD [[id number]]  (Abnormal)  (Susceptibility) Collected:  05/18/20 1938    Order Status:  Completed Specimen:  Blood Updated:  05/23/20 0703     Special Requests: --        RIGHT  ARM       GRAM STAIN GRAM POS COCCI IN CHAINS         AEROBIC BOTTLE POSITIVE               RESULTS VERIFIED, PHONED TO AND READ BACK BY Lázaro No RN @ 5962 ON 5/20/20 BY AMM           Culture result: STREPTOCOCCUS MUTANS         REFER TO BIOFIRE PANEL ACCESSION [id number]    CULTURE, BLOOD [[id number]]     Order Status:  Canceled Specimen:  Blood     CULTURE, BLOOD [[id number]]     Order Status:  Canceled Specimen:  Blood     BLOOD CULTURE ID PANEL [[id number]]  (Abnormal) Collected:  05/18/20 1938    Order Status:  Completed Specimen:  Blood Updated:  05/20/20 1117     Acc. no. from Micro Order [id number]     Streptococcus Detected        Comment: RESULTS VERIFIED, PHONED TO AND READ BACK BY  Mortimer Diamond, RN @ 1120 ON 5/20/20 BY AMM          INTERPRETATION       Gram positive cocci. Identified by realtime PCR as Streptococcus species (not agalactiae, pyogenes, or pneumoniae).            Comment: Consider discontinuation of IV vancomycin and using an anti-streptococcal beta-lactam (ceftriaxone/cefotaxime (peds))       EMERGENT DISEASE PANEL [[id number]] Collected:  05/16/20 1946    Order Status:  Completed Specimen:  Not Specified Updated:  05/18/20 1614     Emergent disease panel Not detected        Comment: Performed at [place] [id number]               Imaging:     See [facility]/EPIC     Signed By: Suly Bustamante NP     May 26, 2020

## 2020-05-26 NOTE — PROGRESS NOTES
Care Management Interventions  PCP Verified by CM: Yes  Last Visit to PCP: 05/04/20  Mode of Transport at Discharge: Other (see comment)(Camelia Dawkins Spouse [phone] )  Transition of Care Consult (CM Consult): Discharge Planning  Discharge Durable Medical Equipment: No  Physical Therapy Consult: No  Occupational Therapy Consult: No  Current Support Network: Family Lives Nearby, Lives Alone  Confirm Follow Up Transport: Family   Resource Information Provided?: (Karoline Shows)  Discharge Location  Discharge Placement: Home    CM attempted to review DCP with pt. Pt out of the room at time of my visit. Following BC negative 5/24. CM to continue to follow.

## 2020-05-26 NOTE — PROGRESS NOTES
Bedside and Verbal shift change report given to [address] [facility] (oncoming nurse) by Liana Tsang (offgoing nurse). Report included the following information SBAR, Kardex, ED Summary, Procedure Summary, Intake/Output, MAR, Recent Results, Cardiac Rhythm NSR and Alarm Parameters . Pt is alert in bed, oriented x4. PERRLA @3mm. Follows commands. R radial cath side CDI. No bleeding/hematoma. Lung sounds are diminished and coarse, on room air. Abd intact and semi-soft, BS active. Last BM 5/26/20. Pt up ad prakash, voiding in urinal. Pt has 1+ edema present in BLE. Skin is warm and dry. Sacrum intact. Pulses are present and palpable. Pt denies pain at this time. Will continue to monitor.      Temp 99.7   NSR  /76

## 2020-05-27 ENCOUNTER — APPOINTMENT (OUTPATIENT)
Dept: CT IMAGING | Age: 60
DRG: 280 | End: 2020-05-27
Attending: INTERNAL MEDICINE
Payer: COMMERCIAL

## 2020-05-27 LAB
ANION GAP SERPL CALC-SCNC: 7 MMOL/L (ref 7–16)
BUN SERPL-MCNC: 28 MG/DL (ref 8–23)
CALCIUM SERPL-MCNC: 8 MG/DL (ref 8.3–10.4)
CHLORIDE SERPL-SCNC: 107 MMOL/L (ref 98–107)
CO2 SERPL-SCNC: 29 MMOL/L (ref 21–32)
CREAT SERPL-MCNC: 1.06 MG/DL (ref 0.8–1.5)
GENTAMICIN PEAK SERPL-MCNC: <0.2 UG/ML (ref 4–8)
GENTAMICIN TROUGH SERPL-MCNC: 1.4 UG/ML (ref 1–2)
GLUCOSE BLD STRIP.AUTO-MCNC: 112 MG/DL (ref 65–100)
GLUCOSE BLD STRIP.AUTO-MCNC: 118 MG/DL (ref 65–100)
GLUCOSE BLD STRIP.AUTO-MCNC: 125 MG/DL (ref 65–100)
GLUCOSE BLD STRIP.AUTO-MCNC: 168 MG/DL (ref 65–100)
GLUCOSE SERPL-MCNC: 114 MG/DL (ref 65–100)
MAGNESIUM SERPL-MCNC: 2 MG/DL (ref 1.8–2.4)
POTASSIUM SERPL-SCNC: 3.4 MMOL/L (ref 3.5–5.1)
SODIUM SERPL-SCNC: 143 MMOL/L (ref 136–145)

## 2020-05-27 PROCEDURE — 99232 SBSQ HOSP IP/OBS MODERATE 35: CPT | Performed by: INTERNAL MEDICINE

## 2020-05-27 PROCEDURE — 74011250636 HC RX REV CODE- 250/636: Performed by: INTERNAL MEDICINE

## 2020-05-27 PROCEDURE — 74011000258 HC RX REV CODE- 258: Performed by: INTERNAL MEDICINE

## 2020-05-27 PROCEDURE — 74011000258 HC RX REV CODE- 258: Performed by: FAMILY MEDICINE

## 2020-05-27 PROCEDURE — 83735 ASSAY OF MAGNESIUM: CPT

## 2020-05-27 PROCEDURE — 74011636320 HC RX REV CODE- 636/320: Performed by: FAMILY MEDICINE

## 2020-05-27 PROCEDURE — 82962 GLUCOSE BLOOD TEST: CPT

## 2020-05-27 PROCEDURE — 71260 CT THORAX DX C+: CPT

## 2020-05-27 PROCEDURE — 80170 ASSAY OF GENTAMICIN: CPT

## 2020-05-27 PROCEDURE — 74011250637 HC RX REV CODE- 250/637: Performed by: HOSPITALIST

## 2020-05-27 PROCEDURE — 74011250637 HC RX REV CODE- 250/637: Performed by: INTERNAL MEDICINE

## 2020-05-27 PROCEDURE — 80048 BASIC METABOLIC PNL TOTAL CA: CPT

## 2020-05-27 PROCEDURE — 74011636637 HC RX REV CODE- 636/637: Performed by: HOSPITALIST

## 2020-05-27 PROCEDURE — 36415 COLL VENOUS BLD VENIPUNCTURE: CPT

## 2020-05-27 PROCEDURE — 65660000000 HC RM CCU STEPDOWN

## 2020-05-27 PROCEDURE — 94760 N-INVAS EAR/PLS OXIMETRY 1: CPT

## 2020-05-27 PROCEDURE — 74011250637 HC RX REV CODE- 250/637: Performed by: FAMILY MEDICINE

## 2020-05-27 RX ORDER — SODIUM CHLORIDE 0.9 % (FLUSH) 0.9 %
10 SYRINGE (ML) INJECTION
Status: COMPLETED | OUTPATIENT
Start: 2020-05-27 | End: 2020-05-27

## 2020-05-27 RX ORDER — POTASSIUM CHLORIDE 20 MEQ/1
40 TABLET, EXTENDED RELEASE ORAL 2 TIMES DAILY
Status: COMPLETED | OUTPATIENT
Start: 2020-05-27 | End: 2020-05-27

## 2020-05-27 RX ORDER — GENTAMICIN SULFATE 80 MG/100ML
80 INJECTION, SOLUTION INTRAVENOUS EVERY 8 HOURS
Status: DISCONTINUED | OUTPATIENT
Start: 2020-05-27 | End: 2020-05-28

## 2020-05-27 RX ADMIN — ESCITALOPRAM OXALATE 10 MG: 10 TABLET ORAL at 08:39

## 2020-05-27 RX ADMIN — Medication 10 ML: at 12:16

## 2020-05-27 RX ADMIN — FERROUS SULFATE TAB 325 MG (65 MG ELEMENTAL FE) 325 MG: 325 (65 FE) TAB at 17:57

## 2020-05-27 RX ADMIN — INSULIN LISPRO 2 UNITS: 100 INJECTION, SOLUTION INTRAVENOUS; SUBCUTANEOUS at 13:35

## 2020-05-27 RX ADMIN — ATORVASTATIN CALCIUM 20 MG: 20 TABLET, FILM COATED ORAL at 21:36

## 2020-05-27 RX ADMIN — POTASSIUM CHLORIDE 40 MEQ: 20 TABLET, EXTENDED RELEASE ORAL at 08:39

## 2020-05-27 RX ADMIN — Medication 5 ML: at 22:00

## 2020-05-27 RX ADMIN — FERROUS SULFATE TAB 325 MG (65 MG ELEMENTAL FE) 325 MG: 325 (65 FE) TAB at 08:39

## 2020-05-27 RX ADMIN — FUROSEMIDE 40 MG: 10 INJECTION, SOLUTION INTRAMUSCULAR; INTRAVENOUS at 08:39

## 2020-05-27 RX ADMIN — GENTAMICIN SULFATE 80 MG: 80 INJECTION, SOLUTION INTRAVENOUS at 11:23

## 2020-05-27 RX ADMIN — SODIUM CHLORIDE 100 ML: 900 INJECTION, SOLUTION INTRAVENOUS at 12:16

## 2020-05-27 RX ADMIN — METOPROLOL SUCCINATE 25 MG: 50 TABLET, EXTENDED RELEASE ORAL at 08:39

## 2020-05-27 RX ADMIN — FUROSEMIDE 40 MG: 10 INJECTION, SOLUTION INTRAMUSCULAR; INTRAVENOUS at 17:57

## 2020-05-27 RX ADMIN — METOPROLOL SUCCINATE 25 MG: 50 TABLET, EXTENDED RELEASE ORAL at 17:57

## 2020-05-27 RX ADMIN — GENTAMICIN SULFATE 80 MG: 80 INJECTION, SOLUTION INTRAVENOUS at 17:57

## 2020-05-27 RX ADMIN — IOPAMIDOL 80 ML: 755 INJECTION, SOLUTION INTRAVENOUS at 12:16

## 2020-05-27 RX ADMIN — ERGOCALCIFEROL 50000 UNITS: 1.25 CAPSULE ORAL at 11:23

## 2020-05-27 RX ADMIN — CEFTRIAXONE SODIUM 2 G: 2 INJECTION, POWDER, FOR SOLUTION INTRAMUSCULAR; INTRAVENOUS at 13:35

## 2020-05-27 RX ADMIN — POTASSIUM CHLORIDE 40 MEQ: 20 TABLET, EXTENDED RELEASE ORAL at 17:57

## 2020-05-27 RX ADMIN — ASPIRIN 81 MG 81 MG: 81 TABLET ORAL at 08:39

## 2020-05-27 RX ADMIN — Medication 10 ML: at 13:36

## 2020-05-27 RX ADMIN — Medication 10 ML: at 05:27

## 2020-05-27 NOTE — PROGRESS NOTES
[facility] CARDIOLOGY PROGRESS NOTE    5/27/2020 8:24 AM    Admit Date: 5/16/2020        Subjective:   Stable overnight without angina, CHF, or palpitations. Vitals stable and controlled. No other complaints overnight. Tolerating meds well. Objective:      Vitals:    05/26/20 2030 05/27/20 0052 05/27/20 0457 05/27/20 0742   BP:  108/64 119/67 123/74   Pulse:  95 93 94   Resp:  20 18 17   Temp:  98.7 °F (37.1 °C) 98.4 °F (36.9 °C) 97.6 °F (36.4 °C)   SpO2: 92% 90% 92% 90%   Weight:   158 lb 14.4 oz (72.1 kg)    Height:           Physical Exam:  Neck- supple, no JVD  CV- regular rate and rhythm, soft NEERAJ RUSB  Lung- clear bilaterally   Abd- soft, nontender, nondistended  Ext- trace-1+ anterior tibial pitting edema  Skin- warm and dry    Data Review:   Recent Labs     05/27/20  0340 05/26/20  0400 05/25/20  0320  05/24/20  0843    143 144   < >  --    K 3.4* 3.4* 3.4*   < >  --    MG 2.0 2.0 1.7*   < >  --    BUN 28* 24* 24*   < >  --    CREA 1.06 0.95 0.93   < >  --    * 117* 116*   < >  --    WBC  --   --  10.9  --  10.2   HGB  --   --  8.1*  --  8.7*   HCT  --   --  26.7*  --  28.1*   PLT  --   --  207  --  214    < > = values in this interval not displayed.        Assessment and Plan:     Principal Problem:    NSTEMI (non-ST elevated myocardial infarction) ([facility] [address] (5/16/2020)- occluded OM branch.    The OM was not intervened on secondary to the fact that this likely infarcted at least four days prior to admit, and is outside the window for therapy. Ton Sean is also unknown whether this is a ruptured plaque or possibly an embolic event and stenting would be potentially contraindicated for the development of mycotic aneurysm.      Active Problems:    Diastolic and systolic combined CHF, chronic (HCC) - stable, lying flat comfortably, continue meds but added IV lasix and diurese for one more day IV, recheck in AM, replete K BID today.        S/P aortic valve replacement- endocarditis- ABX per ID-- YARON Brief Findings: LVEF mild/moderately reduced, bioprosthetic aortic valve with small mobile mass on LVOT side of the prosthesis as well as thickening / restriction of 2/3 leaflets, no other valvular masses/vegetations identified.  Continue Vanc per ID recs. Will need AVR soon once blood cultures cleared. ..   BLOOD CULTURE  FROM 5/24 NEGATIVE X 3 DAYS. PER CT SURGERY       CAD (coronary artery disease) ()- stable, continue meds      Overview: Nonobstructive       DIEGO on CPAP ()- stable, continue CPAP as tolerated       Dyslipidemia ()- stable, continue meds       Essential hypertension - stable, continue meds       Suspected COVID-19 virus infection (5/16/2020)- ruled out, negative     Continue supportive care, CBC/BMP/Mg in AM  Continue Lasix 40mg IV BID today then consider converting to once daily oral dosing  Replete K and Mg as needed, recheck in AM  Dental Caries, ? Dental consult       RADHA Camarillo MD  [facility] Cardiology  Pager [phone]

## 2020-05-27 NOTE — ROUTINE PROCESS
Bedside and Verbal report given to self and Rut Freedman RN by Whitley Salomon RN.  Report included SBAR, Kardex, ED Summary, Procedure Summary, Intake and Output and Cardiac Rhythm SR.

## 2020-05-27 NOTE — PROGRESS NOTES
CTS-pt denies any dyspnea or chest discomfort. 1/2 blood cultures from 5/20 remain positive. 1/1 culture from 5/24 NTD. Oral surgery consult requested on 5/26 for evaluation. ID planning CT soft tissue neck.      Edel Munoz PA-C

## 2020-05-27 NOTE — PROGRESS NOTES
Pharmacokinetic Consult to Pharmacist    Anabellrosa elena Camilo is a 62 y.o. male being treated for endocarditis with gentamicin. Height: 6' 1\" (185.4 cm)  Weight: 72.1 kg (158 lb 14.4 oz)  Lab Results   Component Value Date/Time    BUN 28 (H) 05/27/2020 03:40 AM    Creatinine 1.06 05/27/2020 03:40 AM    WBC 10.9 05/25/2020 03:20 AM    Lactic acid 1.8 05/18/2020 07:38 PM    Lactic Acid (POC) 0.6 06/13/2018 03:27 PM      Estimated Creatinine Clearance: 74.6 mL/min (based on SCr of 1.06 mg/dL). No results found for: GENR, GENT, GENP      Day 1 of gentamicin. Goal trough is <1. Will initiate gentamicin 80 mg q 8 hours. Will continue to follow patient.       Thank you,  Tiffanie Burgess, PharmD  Clinical Pharmacy PGY1 Resident   [phone]

## 2020-05-27 NOTE — ROUTINE PROCESS
Bedside and Verbal shift change report given to Chelsea LASSITER RN (oncoming nurse) by self (offgoing nurse).  Report included the following information SBAR, Kardex, Intake/Output, MAR, Recent Results and Cardiac Rhythm Sr.

## 2020-05-27 NOTE — PROGRESS NOTES
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 [facility] 20 and  with strep mutans.  still positive with strep by PCR. Anticipating AVR once blood clears. · Generalized Phong LE rash of unknown etiology-resolving   · Diarrhea PTA, resolved. Hx diverticulosis   · Dental caries : given persistent bactermia and loose teeth, check CT soft tissue neck: if abscess recommend Oral Surgery consult for extraction before has AVR  · Anemia, of acute disease: follow  · Hx AVR   · DM, A1c 7    Plan:   · Continue CTX 2g IV q24h. Duration 6 weeks (date depends on surgery)  · Adding synergy dosed gent given persistent bacteremia and preserved renal function. Unclear that adding this late will be helpful but original use was felt to clear bactermia earlier. No lines in. · Repeat blood cx tomorrow. · Redo AVR planned but timing TBD: see below. IF has clear abscess/es then favor dental extractions BEFORE valve replacement. · Obvious dental erosion with loose teeth: given persistent bacteremia, check CT soft tissue neck. Can we consult Oral Surgeon if shows signs abscess; possible this is why blood not clearing. Anti-infectives:   CTX -    Subjective:     Sitting up in chair. No complaints. Teeth not hurting him; have been loose for months but unable to get to dentist due [place] pandemic. Review of Systems:  A comprehensive review of systems was negative except for that written in the History of Present Illness.     Objective:     Visit Vitals  /74 (BP 1 Location: Left arm, BP Patient Position: At rest)   Pulse 94   Temp 97.6 °F (36.4 °C)   Resp 17   Ht 6' 1\" (1.854 m)   Wt 72.1 kg (158 lb 14.4 oz)   SpO2 90%   BMI 20.96 kg/m²     Temp (24hrs), Av.3 °F (36.8 °C), Min:97.4 °F (36.3 °C), Max:99.7 °F (37.6 °C)      Lines:  Peripheral IV:       Physical Exam: General:  Alert, cooperative, well noursished, well developed, appears stated age   Eyes:  Sclera anicteric. Pupils equally round and reactive to light. Mouth/Throat: Mucous membranes normal, oral pharynx clear: left upper two back molars are rotten; no odor. Two front lower teeth on right are clearly loose; gums not swollen;    Neck: Supple   Lungs:   Clear to auscultation bilaterally, good effort   CV:  Regular rate and rhythm, 3/6 SM   Abdomen:   Soft, non-tender. bowel sounds normal. non-distended   Extremities: No cyanosis or edema   Skin: Skin color, texture, turgor normal. no acute rash or lesions   Lymph nodes: Cervical, submandibular and supraclavicular normal   Musculoskeletal: No swelling or deformity   Lines/Devices:  Intact, no erythema, drainage or tenderness   Psych: Alert and oriented, normal mood affect given the setting       Data Review:     CBC:   Recent Labs     05/25/20  0320 05/24/20  0843   WBC 10.9 10.2   RBC 3.05* 3.21*   HGB 8.1* 8.7*   HCT 26.7* 28.1*    214     CMP:   Recent Labs     05/27/20  0340 05/26/20  0400 05/25/20  0320   * 117* 116*    143 144   K 3.4* 3.4* 3.4*    108* 110*   CO2 29 25 27   BUN 28* 24* 24*   CREA 1.06 0.95 0.93   CA 8.0* 8.2* 8.2*   AGAP 7 10 7     Liver Enzymes:   No results for input(s): TP, ALB, TBIL, AP in the last 72 hours.     No lab exists for component: SGOT, GPT, DBIL  Antibiotic Monitoring:   Lab Results   Component Value Date/Time    Vancomycin,trough 11.8 05/22/2020 12:58 AM        Microbiology:     All Micro Results     Procedure Component Value Units Date/Time    CULTURE, BLOOD [[id number]] Collected:  05/24/20 1321    Order Status:  Completed Specimen:  Blood Updated:  05/27/20 7102     Special Requests: --        LEFT  Antecubital       Culture result: NO GROWTH 3 DAYS       CULTURE, BLOOD [[id number]]  (Abnormal)  (Susceptibility) Collected:  05/20/20 7149    Order Status:  Completed Specimen:  Blood Updated: 05/26/20 0834     Special Requests: --        RIGHT  FOREARM       GRAM STAIN GRAM POSITIVE COCCI         PEDIATRIC BOTTLE               RESULTS VERIFIED, PHONED TO AND READ BACK BY Timothy Nix AT 0304 ON 5.24.20  JL           Culture result: STREPTOCOCCUS MUTANS         REFER TO BIOFIRE PANEL [address] [id number]    CULTURE, BLOOD [[id number]] Collected:  05/20/20 1434    Order Status:  Completed Specimen:  Blood Updated:  05/25/20 0633     Special Requests: --        LEFT  Antecubital       Culture result: NO GROWTH 5 DAYS       BLOOD CULTURE ID PANEL [[id number]]  (Abnormal) Collected:  05/24/20 0105    Order Status:  Completed Specimen:  Blood Updated:  05/24/20 0647     Acc. no. from Micro Order [id number]     Streptococcus Detected        Comment: RESULTS VERIFIED, PHONED TO AND READ BACK BY  Timothy Nix RN @1632 ON 5/24/20 AK. INTERPRETATION       Gram positive cocci. Identified by realtime PCR as Streptococcus species (not agalactiae, pyogenes, or pneumoniae). Comment: Recommend discontinuing IV vancomycin starting cefazolin or nafcillin if patient not on beta-lactam therapy. Infectious Diseases Consult recommended in adult patients. THIS TEST DOES NOT REPLACE SENSITIVITY TESTING. CULTURE, BLOOD [[id number]]  (Abnormal) Collected:  05/18/20 1944    Order Status:  Completed Specimen:  Blood Updated:  05/23/20 0720     Special Requests: --        RIGHT  HAND       GRAM STAIN GRAM POSITIVE COCCI         PEDIATRIC BOTTLE               CRITICAL RESULT NOT CALLED DUE TO PREVIOUS NOTIFICATION OF CRITICAL RESULT WITHIN THE LAST 24 HOURS. Culture result:       ALPHA STREPTOCOCCUS, NOT S.  PNEUMONIAE            FOR ID AND SUSCEPTIBILITY REFER TO CULTURE NO ACC [id number]    CULTURE, BLOOD [[id number]]  (Abnormal)  (Susceptibility) Collected:  05/18/20 1938    Order Status:  Completed Specimen:  Blood Updated:  05/23/20 0703     Special Requests: --        RIGHT  ARM       GRAM STAIN GRAM POS COCCI IN CHAINS         AEROBIC BOTTLE POSITIVE               RESULTS VERIFIED, PHONED TO AND READ BACK BY Mortimer Diamond, RN @ 1120 ON 5/20/20 BY AMM           Culture result: STREPTOCOCCUS MUTANS         REFER TO [employer] PANEL ACCESSION [id number]    CULTURE, BLOOD [[id number]]     Order Status:  Canceled Specimen:  Blood     CULTURE, BLOOD [[id number]]     Order Status:  Canceled Specimen:  Blood     BLOOD CULTURE ID PANEL [[id number]]  (Abnormal) Collected:  05/18/20 1938    Order Status:  Completed Specimen:  Blood Updated:  05/20/20 1117     Acc. no. from Micro Order [id number]     Streptococcus Detected        Comment: RESULTS VERIFIED, PHONED TO AND READ BACK BY  Mortimer Diamond, RN @ 1120 ON 5/20/20 BY AM          INTERPRETATION       Gram positive cocci. Identified by realtime PCR as Streptococcus species (not agalactiae, pyogenes, or pneumoniae).            Comment: Consider discontinuation of IV vancomycin and using an anti-streptococcal beta-lactam (ceftriaxone/cefotaxime (peds))       EMERGENT DISEASE PANEL [[id number]] Collected:  05/16/20 1946    Order Status:  Completed Specimen:  Not Specified Updated:  05/18/20 1614     Emergent disease panel Not detected        Comment: Performed at [address]               Imaging:     See [facility]/EPIC     Signed By: Margaret Ventura MD     May 27, 2020

## 2020-05-27 NOTE — PROGRESS NOTES
Progress Note    Patient: Mikayla Courtney MRN: [id number]  SSN: [id number]    YOB: 1960  Age: 62 y.o. Sex: male      Admit Date: 5/16/2020    LOS: 11 days     Subjective:   F/U NSTEMI, endocarditis    \"53 yo male with PMH of CAD (non obstructive  Per [facility] 2018) , DIEGO on CPAP, AS s/p AVR, HTN admitted with  NSTEMI. ECHO showed EF 40%, moderate diffuse hypokinesis. RV pressure 45-50mmHg, aortic valve also had a moderate sized mobile mass suspicious for endocarditis. Dilatation of the ascending aorta seen 41mm. YARON performed on 5/21 confirmed vegetation on aortic valve. BCx 5/18 and 5/20 with strep mutans. ID following, on Ceftriaxone. CT surgery pending blood clearance. LHC on 5/19 with occluded OM branch, outside the time window for intervention. Ruptured plaque vs embolic event. 5/27:  Cards, ID, CT surgery following. Repeat blood culture 5/24 positive. Feeling well. No fevers/chills. No cp, no sob. Still with swelling in legs. CT soft tissue neck today and oral surgeon consult for possible oral source persistent bacteremia.        Current Facility-Administered Medications   Medication Dose Route Frequency    potassium chloride (K-DUR, KLOR-CON) SR tablet 40 mEq  40 mEq Oral BID    gentamicin in Saline (Iso-osm) (GARAMYCIN) 80 mg/100 mL IVPB premix 80 mg  80 mg IntraVENous Q8H    sodium chloride 0.9 % bolus infusion 100 mL  100 mL IntraVENous RAD ONCE    saline peripheral flush soln 10 mL  10 mL InterCATHeter RAD ONCE    iopamidoL (ISOVUE-370) 76 % injection 80 mL  80 mL IntraVENous RAD ONCE    furosemide (LASIX) injection 40 mg  40 mg IntraVENous BID    cefTRIAXone (ROCEPHIN) 2 g in 0.9% sodium chloride (MBP/ADV) 50 mL  2 g IntraVENous Q24H    ferrous sulfate tablet 325 mg  1 Tab Oral BID WITH MEALS    ergocalciferol capsule 50,000 Units  50,000 Units Oral Q7D    metoprolol succinate (TOPROL-XL) XL tablet 25 mg  25 mg Oral BID    insulin lispro (HUMALOG) injection SubCUTAneous AC&HS    escitalopram oxalate (LEXAPRO) tablet 10 mg  10 mg Oral DAILY    atorvastatin (LIPITOR) tablet 20 mg  20 mg Oral QHS    aspirin chewable tablet 81 mg  81 mg Oral DAILY    sodium chloride (NS) flush 5-40 mL  5-40 mL IntraVENous Q8H    sodium chloride (NS) flush 5-40 mL  5-40 mL IntraVENous PRN    acetaminophen (TYLENOL) tablet 650 mg  650 mg Oral Q6H PRN    morphine 10 mg/ml injection 2 mg  2 mg IntraVENous Q3H PRN    nitroglycerin (NITROSTAT) tablet 0.4 mg  0.4 mg SubLINGual Q5MIN PRN    ondansetron (ZOFRAN) injection 4 mg  4 mg IntraVENous Q4H PRN       Objective:     Vitals:    05/26/20 2030 05/27/20 0052 05/27/20 0457 05/27/20 0742   BP:  108/64 119/67 123/74   Pulse:  95 93 94   Resp:  20 18 17   Temp:  98.7 °F (37.1 °C) 98.4 °F (36.9 °C) 97.6 °F (36.4 °C)   SpO2: 92% 90% 92% 90%   Weight:   72.1 kg (158 lb 14.4 oz)    Height:             Intake and Output:  Current Shift: No intake/output data recorded. Last three shifts: 05/25 1901 - 05/27 0700  In: 720 [P.O.:720]  Out: 1900 [Urine:1900]    Physical Exam:   General:  Alert, cooperative, no distress. Lungs:   Clear to auscultation bilaterally. Heart:  Regular rate and rhythm. Abdomen:   Soft, non-tender. Bowel sounds normal.    Extremities: Petechiae to LE bilaterally that are improving, no janeway lesions. 2+ edema to LE bilaterally     Pulses: 2+ and symmetric all extremities.    Skin: As above   Lymph nodes: Cervical, supraclavicular, and axillary nodes normal.   Neurologic: CNII-XII intact        Lab/Data Review:    Recent Results (from the past 24 hour(s))   GLUCOSE, POC    Collection Time: 05/26/20 11:05 AM   Result Value Ref Range    Glucose (POC) 145 (H) 65 - 100 mg/dL   GLUCOSE, POC    Collection Time: 05/26/20  4:33 PM   Result Value Ref Range    Glucose (POC) 119 (H) 65 - 100 mg/dL   GLUCOSE, POC    Collection Time: 05/26/20  8:54 PM   Result Value Ref Range    Glucose (POC) 127 (H) 65 - 124 mg/dL   METABOLIC PANEL, BASIC    Collection Time: 05/27/20  3:40 AM   Result Value Ref Range    Sodium 143 136 - 145 mmol/L    Potassium 3.4 (L) 3.5 - 5.1 mmol/L    Chloride 107 98 - 107 mmol/L    CO2 29 21 - 32 mmol/L    Anion gap 7 7 - 16 mmol/L    Glucose 114 (H) 65 - 100 mg/dL    BUN 28 (H) 8 - 23 MG/DL    Creatinine 1.06 0.8 - 1.5 MG/DL    GFR est AA >60 >60 ml/min/1.73m2    GFR est non-AA >60 >60 ml/min/1.73m2    Calcium 8.0 (L) 8.3 - 10.4 MG/DL   MAGNESIUM    Collection Time: 05/27/20  3:40 AM   Result Value Ref Range    Magnesium 2.0 1.8 - 2.4 mg/dL   GLUCOSE, POC    Collection Time: 05/27/20  6:15 AM   Result Value Ref Range    Glucose (POC) 118 (H) 65 - 100 mg/dL       Assessment/ Plan:     Principal Problem:    NSTEM[facility] (non-ST elevated [facility]) [facility] (5/16/2020)    Active Problems:    Diastolic CHF, chronic (HCC) (5/7/2018)      S/P aortic valve replacement (4/1/2018)      CAD (coronary artery disease) ()      Overview: Nonobstructive      DIEGO on CPAP ()      Dyslipidemia ()      Essential hypertension (9/21/2018)      Suspected COVID-19 virus infection (5/16/2020)      Petechiae (5/16/2020)      Anemia (5/16/2020)      Diabetes mellitus, type II ([facility] (5/20/2020)      Vitamin D deficiency (5/20/2020)      CAD with NSTEMI and endocarditis-S/p cardiac cath on 5/19 with no intervention. ASA, statin, BB. Will need AVR once bacteremia has been cleared. CT surgery following. Possible oral source of persistent bacteremia - CT and oral surgery consult pending. 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 Ceftriaxone since 5/22. Cx 5/28 and 5/20 strep mutans. Repeat blood culture on 5/24 also positive now. LE edema bilaterally - continue Lasix    Normocytic anemia with thrombocytopenia - follow CBC. S/p Iron infusions. Oral iron. Will need to establish care with GI for colon cancer screening at discharge. Hyponatremia - Resolved. Hypokalemia - supplement    Vitamin D deficiency - Supplement. DM type II - A1C 7.1. ADA.  SS. Will need outpatient diabetic education    DVT prophylaxis - SCDs  Signed By: Steve Flanagan MD     May 27, 2020

## 2020-05-28 LAB
ANION GAP SERPL CALC-SCNC: 7 MMOL/L (ref 7–16)
BNP SERPL-MCNC: ABNORMAL PG/ML (ref 5–125)
BUN SERPL-MCNC: 28 MG/DL (ref 8–23)
CALCIUM SERPL-MCNC: 8.1 MG/DL (ref 8.3–10.4)
CHLORIDE SERPL-SCNC: 107 MMOL/L (ref 98–107)
CO2 SERPL-SCNC: 29 MMOL/L (ref 21–32)
CREAT SERPL-MCNC: 1.05 MG/DL (ref 0.8–1.5)
ERYTHROCYTE [DISTWIDTH] IN BLOOD BY AUTOMATED COUNT: 18.6 % (ref 11.9–14.6)
GENTAMICIN PEAK SERPL-MCNC: 2.1 UG/ML (ref 4–8)
GENTAMICIN SERPL-MCNC: 1.4 UG/ML
GENTAMICIN TROUGH SERPL-MCNC: 2.5 UG/ML (ref 1–2)
GLUCOSE BLD STRIP.AUTO-MCNC: 127 MG/DL (ref 65–100)
GLUCOSE BLD STRIP.AUTO-MCNC: 131 MG/DL (ref 65–100)
GLUCOSE BLD STRIP.AUTO-MCNC: 131 MG/DL (ref 65–100)
GLUCOSE BLD STRIP.AUTO-MCNC: 132 MG/DL (ref 65–100)
GLUCOSE SERPL-MCNC: 117 MG/DL (ref 65–100)
HCT VFR BLD AUTO: 28 % (ref 41.1–50.3)
HGB BLD-MCNC: 8.5 G/DL (ref 13.6–17.2)
MAGNESIUM SERPL-MCNC: 2 MG/DL (ref 1.8–2.4)
MCH RBC QN AUTO: 26.7 PG (ref 26.1–32.9)
MCHC RBC AUTO-ENTMCNC: 30.4 G/DL (ref 31.4–35)
MCV RBC AUTO: 88.1 FL (ref 79.6–97.8)
NRBC # BLD: 0 K/UL (ref 0–0.2)
PLATELET # BLD AUTO: 212 K/UL (ref 150–450)
PMV BLD AUTO: 9.1 FL (ref 9.4–12.3)
POTASSIUM SERPL-SCNC: 3.8 MMOL/L (ref 3.5–5.1)
RBC # BLD AUTO: 3.18 M/UL (ref 4.23–5.6)
SODIUM SERPL-SCNC: 143 MMOL/L (ref 136–145)
WBC # BLD AUTO: 11.9 K/UL (ref 4.3–11.1)

## 2020-05-28 PROCEDURE — 93308 TTE F-UP OR LMTD: CPT

## 2020-05-28 PROCEDURE — 80170 ASSAY OF GENTAMICIN: CPT

## 2020-05-28 PROCEDURE — 82962 GLUCOSE BLOOD TEST: CPT

## 2020-05-28 PROCEDURE — 85027 COMPLETE CBC AUTOMATED: CPT

## 2020-05-28 PROCEDURE — 74011250637 HC RX REV CODE- 250/637: Performed by: HOSPITALIST

## 2020-05-28 PROCEDURE — 74011250637 HC RX REV CODE- 250/637: Performed by: FAMILY MEDICINE

## 2020-05-28 PROCEDURE — C8924 2D TTE W OR W/O FOL W/CON,FU: HCPCS

## 2020-05-28 PROCEDURE — 74011000258 HC RX REV CODE- 258: Performed by: INTERNAL MEDICINE

## 2020-05-28 PROCEDURE — 74011250637 HC RX REV CODE- 250/637: Performed by: INTERNAL MEDICINE

## 2020-05-28 PROCEDURE — 83735 ASSAY OF MAGNESIUM: CPT

## 2020-05-28 PROCEDURE — 74011250636 HC RX REV CODE- 250/636: Performed by: INTERNAL MEDICINE

## 2020-05-28 PROCEDURE — 83880 ASSAY OF NATRIURETIC PEPTIDE: CPT

## 2020-05-28 PROCEDURE — 80048 BASIC METABOLIC PNL TOTAL CA: CPT

## 2020-05-28 PROCEDURE — 74011250636 HC RX REV CODE- 250/636: Performed by: FAMILY MEDICINE

## 2020-05-28 PROCEDURE — 74011000250 HC RX REV CODE- 250: Performed by: FAMILY MEDICINE

## 2020-05-28 PROCEDURE — 65660000000 HC RM CCU STEPDOWN

## 2020-05-28 PROCEDURE — 36415 COLL VENOUS BLD VENIPUNCTURE: CPT

## 2020-05-28 RX ORDER — GENTAMICIN SULFATE 80 MG/100ML
80 INJECTION, SOLUTION INTRAVENOUS EVERY 12 HOURS
Status: DISCONTINUED | OUTPATIENT
Start: 2020-05-28 | End: 2020-05-29

## 2020-05-28 RX ORDER — ENOXAPARIN SODIUM 100 MG/ML
40 INJECTION SUBCUTANEOUS EVERY 24 HOURS
Status: DISCONTINUED | OUTPATIENT
Start: 2020-05-28 | End: 2020-06-02

## 2020-05-28 RX ORDER — SPIRONOLACTONE 25 MG/1
25 TABLET ORAL DAILY
Status: DISCONTINUED | OUTPATIENT
Start: 2020-05-29 | End: 2020-06-09 | Stop reason: HOSPADM

## 2020-05-28 RX ORDER — CARVEDILOL 6.25 MG/1
6.25 TABLET ORAL 2 TIMES DAILY WITH MEALS
Status: DISCONTINUED | OUTPATIENT
Start: 2020-05-28 | End: 2020-06-05

## 2020-05-28 RX ORDER — LISINOPRIL 5 MG/1
2.5 TABLET ORAL 2 TIMES DAILY
Status: DISCONTINUED | OUTPATIENT
Start: 2020-05-28 | End: 2020-06-09 | Stop reason: HOSPADM

## 2020-05-28 RX ADMIN — Medication 10 ML: at 05:30

## 2020-05-28 RX ADMIN — ATORVASTATIN CALCIUM 20 MG: 20 TABLET, FILM COATED ORAL at 21:35

## 2020-05-28 RX ADMIN — METOPROLOL SUCCINATE 25 MG: 50 TABLET, EXTENDED RELEASE ORAL at 08:21

## 2020-05-28 RX ADMIN — ASPIRIN 81 MG 81 MG: 81 TABLET ORAL at 08:21

## 2020-05-28 RX ADMIN — FERROUS SULFATE TAB 325 MG (65 MG ELEMENTAL FE) 325 MG: 325 (65 FE) TAB at 08:21

## 2020-05-28 RX ADMIN — GENTAMICIN SULFATE 80 MG: 80 INJECTION, SOLUTION INTRAVENOUS at 21:36

## 2020-05-28 RX ADMIN — FUROSEMIDE 40 MG: 10 INJECTION, SOLUTION INTRAMUSCULAR; INTRAVENOUS at 08:21

## 2020-05-28 RX ADMIN — CARVEDILOL 6.25 MG: 6.25 TABLET, FILM COATED ORAL at 17:14

## 2020-05-28 RX ADMIN — GENTAMICIN SULFATE 80 MG: 80 INJECTION, SOLUTION INTRAVENOUS at 01:01

## 2020-05-28 RX ADMIN — Medication 10 ML: at 21:35

## 2020-05-28 RX ADMIN — ENOXAPARIN SODIUM 40 MG: 40 INJECTION SUBCUTANEOUS at 12:45

## 2020-05-28 RX ADMIN — CEFTRIAXONE SODIUM 2 G: 2 INJECTION, POWDER, FOR SOLUTION INTRAMUSCULAR; INTRAVENOUS at 12:06

## 2020-05-28 RX ADMIN — FUROSEMIDE 40 MG: 10 INJECTION, SOLUTION INTRAMUSCULAR; INTRAVENOUS at 17:14

## 2020-05-28 RX ADMIN — PERFLUTREN 1 ML: 6.52 INJECTION, SUSPENSION INTRAVENOUS at 14:00

## 2020-05-28 RX ADMIN — FERROUS SULFATE TAB 325 MG (65 MG ELEMENTAL FE) 325 MG: 325 (65 FE) TAB at 17:14

## 2020-05-28 RX ADMIN — LISINOPRIL 2.5 MG: 5 TABLET ORAL at 17:14

## 2020-05-28 RX ADMIN — ESCITALOPRAM OXALATE 10 MG: 10 TABLET ORAL at 08:21

## 2020-05-28 NOTE — PROGRESS NOTES
Pharmacokinetic Consult to Pharmacist    Bismarckisiah Davis is a 62 y.o. male being treated for endocarditis with gentamicin and ceftriaxone. Height: 6' 1\" (185.4 cm)  Weight: 71.4 kg (157 lb 4.8 oz)  Lab Results   Component Value Date/Time    BUN 28 (H) 05/28/2020 03:34 AM    Creatinine 1.05 05/28/2020 03:34 AM    WBC 11.9 (H) 05/28/2020 03:34 AM    Lactic acid 1.8 05/18/2020 07:38 PM    Lactic Acid (POC) 0.6 06/13/2018 03:27 PM      Estimated Creatinine Clearance: 74.6 mL/min (based on SCr of 1.05 mg/dL). Lab Results   Component Value Date/Time    Gentamicin,random 1.4 05/28/2020 03:21 PM    Gentamicin, trough 2.5 (HH) 05/28/2020 09:11 AM    Gentamicin, peak 2.1 (L) 05/28/2020 10:44 AM         Day 2 of gentamicin. Goal trough is <1. Goal peak 3 to 5 for Synergy dosing. Gentamicin 80 mg Q8H started. Initial peak and trough levels drawn at the wrong times. Based on the reordered levels today, will change to 80 mg q 12 hours. Will continue to follow patient.       Thank you,  Kirstin Cobb, PharmD  Clinical Pharmacy PGY1 Resident   [phone]

## 2020-05-28 NOTE — PROGRESS NOTES
AxOx4. No c/o pain or dyspnea--on RA. Resting comfortably in bed. IV abx infusing as ordered. Problem: Falls - Risk of  Goal: *Absence of Falls  Description: Document Mabel Blood Fall Risk and appropriate interventions in the flowsheet.   Outcome: Progressing Towards Goal  Note: Fall Risk Interventions:  Mobility Interventions: Assess mobility with egress test, Bed/chair exit alarm, Communicate number of staff needed for ambulation/transfer, Mechanical lift, OT consult for ADLs, Patient to call before getting OOB, Strengthening exercises (ROM-active/passive)         Medication Interventions: Teach patient to arise slowly, Evaluate medications/consider consulting pharmacy    Elimination Interventions: Bed/chair exit alarm, Elevated toilet seat, Call light in reach, Patient to call for help with toileting needs, Stay With Me (per policy), Toilet paper/wipes in reach, Toileting schedule/hourly rounds    History of Falls Interventions: Bed/chair exit alarm, Door open when patient unattended, Evaluate medications/consider consulting pharmacy, Investigate reason for fall, Room close to nurse's station, Consult care management for discharge planning, Vital signs minimum Q4HRs X 24 hrs (comment for end date)

## 2020-05-28 NOTE — PROGRESS NOTES
Pharmacokinetic Consult to Pharmacist    Lenor Mortimer is a 62 y.o. male being treated for endocarditis with gentamicin and ceftriaxone. Height: 6' 1\" (185.4 cm)  Weight: 72.1 kg (158 lb 14.4 oz)  Lab Results   Component Value Date/Time    BUN 28 (H) 05/27/2020 03:40 AM    Creatinine 1.06 05/27/2020 03:40 AM    WBC 10.9 05/25/2020 03:20 AM    Lactic acid 1.8 05/18/2020 07:38 PM    Lactic Acid (POC) 0.6 06/13/2018 03:27 PM      Estimated Creatinine Clearance: 74.6 mL/min (based on SCr of 1.06 mg/dL). Lab Results   Component Value Date/Time    Gentamicin, trough 1.4 05/27/2020 05:35 PM    Gentamicin, peak <0.2 (L) 05/27/2020 10:52 AM         Day 1 of gentamicin. Goal trough is <1. Goal peak 3 to 5 for Synergy dosing. Gentamicin 80 mg Q8H started. Peak level and trough levels drawn today at incorrect times, so will repeat both tomorrow around the 1000 dose. Will continue to follow patient.       Thank you,  Gato Aburto, PharmD, [id number]  Clinical Pharmacist  [phone]

## 2020-05-28 NOTE — PROGRESS NOTES
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 [facility] 20 and  with strep mutans. Anticipating AVR once blood clears. · Diarrhea PTA, resolved. Hx diverticulosis   · Dental caries : given persistent bactermia and loose teeth, check CT soft tissue neck: if abscess recommend Oral Surgery consult for extraction before has AVR  · Anemia, of acute disease: follow  · Hx AVR   · DM, A1c 7    Plan:   · Continue CTX 2g IV q24h/gentamicin. Duration 6 weeks (date depends on surgery)  · Redo AVR planned but timing TBD. IF has clear abscess/es then favor dental extractions BEFORE valve replacement-oral surgery consulted. Anti-infectives:   CTX -  Gentamicin -    Subjective:   Afebrile, feeling ok. Denies nausea, vomiting, diarrhea, fever, chills, or sweats. Updated on ID plans     Review of Systems:  A comprehensive review of systems was negative except for that written in the History of Present Illness. Objective:     Visit Vitals  BP 98/58 (BP 1 Location: Left arm, BP Patient Position: At rest)   Pulse 91   Temp 98.2 °F (36.8 °C)   Resp 16   Ht 6' 1\" (1.854 m)   Wt 71.4 kg (157 lb 4.8 oz)   SpO2 92%   BMI 20.75 kg/m²     Temp (24hrs), Av °F (36.7 °C), Min:97.6 °F (36.4 °C), Max:98.4 °F (36.9 °C)  No changes from my previous exam       Lines:  Peripheral IV:       Physical Exam:   General:  Alert, cooperative, well noursished, well developed, appears stated age   Eyes:  Sclera anicteric. Pupils equally round and reactive to light. Mouth/Throat: Mucous membranes normal, oral pharynx clear: left upper two back molars are rotten; no odor. Two front lower teeth on right are clearly loose; gums not swollen;    Neck: Supple   Lungs:   Clear to auscultation bilaterally, good effort   CV:  Regular rate and rhythm, 3/6 SM   Abdomen:   Soft, non-tender.  bowel sounds normal. non-distended   Extremities: No cyanosis or edema   Skin: Skin color, texture, turgor normal. no acute rash or lesions   Lymph nodes: Cervical, submandibular and supraclavicular normal   Musculoskeletal: No swelling or deformity   Lines/Devices:  Intact, no erythema, drainage or tenderness   Psych: Alert and oriented, normal mood affect given the setting       Data Review:     CBC:   Recent Labs     05/28/20  0334   WBC 11.9*   RBC 3.18*   HGB 8.5*   HCT 28.0*        CMP:   Recent Labs     05/28/20  0334 05/27/20  0340 05/26/20  0400   * 114* 117*    143 143   K 3.8 3.4* 3.4*    107 108*   CO2 29 29 25   BUN 28* 28* 24*   CREA 1.05 1.06 0.95   CA 8.1* 8.0* 8.2*   AGAP 7 7 10     Liver Enzymes:   No results for input(s): TP, ALB, TBIL, AP in the last 72 hours.     No lab exists for component: SGOT, GPT, DBIL  Antibiotic Monitoring:   Lab Results   Component Value Date/Time    Vancomycin,trough 11.8 05/22/2020 12:58 AM        Microbiology:     All Micro Results     Procedure Component Value Units Date/Time    CULTURE, BLOOD [[id number]] Collected:  05/24/20 1321    Order Status:  Completed Specimen:  Blood Updated:  05/27/20 0753     Special Requests: --        LEFT  Antecubital       Culture result: NO GROWTH 3 DAYS       CULTURE, BLOOD [[id number]]  (Abnormal)  (Susceptibility) Collected:  05/20/20 1424    Order Status:  Completed Specimen:  Blood Updated:  05/26/20 0834     Special Requests: --        RIGHT  FOREARM       GRAM STAIN GRAM POSITIVE COCCI         PEDIATRIC BOTTLE               RESULTS VERIFIED, PHONED TO AND READ BACK BY Isela Otto AT 0304 ON 5.24.20             Culture result: STREPTOCOCCUS MUTANS         REFER TO Patsy GORDON [facility] [id number]    CULTURE, BLOOD [[id number]] Collected:  05/20/20 1434    Order Status:  Completed Specimen:  Blood Updated:  05/25/20 9304     Special Requests: --        LEFT  Antecubital       Culture result: NO GROWTH 5 DAYS BLOOD CULTURE ID PANEL [[id number]]  (Abnormal) Collected:  05/24/20 0105    Order Status:  Completed Specimen:  Blood Updated:  05/24/20 0647     Acc. no. from Micro Order [id number]     Streptococcus Detected        Comment: RESULTS VERIFIED, PHONED TO AND READ BACK BY  Ryan Valencia RN @7293 ON 5/24/20 AK. INTERPRETATION       Gram positive cocci. Identified by realtime PCR as Streptococcus species (not agalactiae, pyogenes, or pneumoniae). Comment: Recommend discontinuing IV vancomycin starting cefazolin or nafcillin if patient not on beta-lactam therapy. Infectious Diseases Consult recommended in adult patients. THIS TEST DOES NOT REPLACE SENSITIVITY TESTING. CULTURE, BLOOD [[id number]]  (Abnormal) Collected:  05/18/20 1944    Order Status:  Completed Specimen:  Blood Updated:  05/23/20 0720     Special Requests: --        RIGHT  HAND       GRAM STAIN GRAM POSITIVE COCCI         PEDIATRIC BOTTLE               CRITICAL RESULT NOT CALLED DUE TO PREVIOUS NOTIFICATION OF CRITICAL RESULT WITHIN THE LAST 24 HOURS. Culture result:       ALPHA STREPTOCOCCUS, NOT S.  PNEUMONIAE            FOR ID AND SUSCEPTIBILITY REFER TO CULTURE NO ACC [id number]    CULTURE, BLOOD [[id number]]  (Abnormal)  (Susceptibility) Collected:  05/18/20 1938    Order Status:  Completed Specimen:  Blood Updated:  05/23/20 0703     Special Requests: --        RIGHT  ARM       GRAM STAIN GRAM POS COCCI IN CHAINS         AEROBIC BOTTLE POSITIVE               RESULTS VERIFIED, PHONED TO AND READ BACK BY Peter Garcia RN @ 6195 ON 5/20/20 BY AMM           Culture result: STREPTOCOCCUS MUTANS         REFER TO BIOFIRE PANEL ACCESSION [id number]    CULTURE, BLOOD [[id number]]     Order Status:  Canceled Specimen:  Blood     CULTURE, BLOOD [[id number]]     Order Status:  Canceled Specimen:  Blood     BLOOD CULTURE ID PANEL [[id number]]  (Abnormal) Collected:  05/18/20 1938    Order Status:  Completed Specimen:  Blood Updated:  05/20/20 1117     Acc. no. from Micro Order [id number]     Streptococcus Detected        Comment: RESULTS VERIFIED, PHONED TO AND READ BACK BY  Savannah Melendrez RN @ 1120 ON 5/20/20 BY AMM          INTERPRETATION       Gram positive cocci. Identified by realtime PCR as Streptococcus species (not agalactiae, pyogenes, or pneumoniae).            Comment: Consider discontinuation of IV vancomycin and using an anti-streptococcal beta-lactam (ceftriaxone/cefotaxime (peds))       EMERGENT DISEASE PANEL [[id number]] Collected:  05/16/20 1946    Order Status:  Completed Specimen:  Not Specified Updated:  05/18/20 1614     Emergent disease panel Not detected        Comment: Performed at [facility]               Imaging:     See [facility]/EPIC     Signed By: Galen Araya NP     May 28, 2020

## 2020-05-28 NOTE — ROUTINE PROCESS
Bedside and Verbal shift change report given to Maged Carbajal RN (oncoming nurse) by self Johan ricketts).  Report included the following information SBAR, Kardex, Intake/Output, MAR, Recent Results and Cardiac Rhythm SR.

## 2020-05-28 NOTE — PROGRESS NOTES
Progress Note    Patient: Abilio Acuña MRN: [id number]  SSN: [id number]    YOB: 1960  Age: 62 y.o. Sex: male      Admit Date: 5/16/2020    LOS: 12 days     Subjective:   F/U NSTEMI, endocarditis    \"55 yo male with PMH of CAD (non obstructive  Per [facility] 2018) , DIEGO on CPAP, AS s/p AVR, HTN admitted with  NSTEMI. ECHO showed EF 40%, moderate diffuse hypokinesis. RV pressure 45-50mmHg, aortic valve also had a moderate sized mobile mass suspicious for endocarditis. Dilatation of the ascending aorta seen 41mm. YARON performed on 5/21 confirmed vegetation on aortic valve. BCx 5/18 and 5/20 with strep mutans. ID following, on Ceftriaxone. CT surgery pending blood clearance. LHC on 5/19 with occluded OM branch, outside the time window for intervention. Ruptured plaque vs embolic event. 5/28:  Cards, ID, CT surgery following. Repeat blood culture 5/24 no growth x3d. Feeling well. No fevers/chills. No cp, no sob. Still with swelling in legs.    CT yesterday without obvious head/neck abscess      Current Facility-Administered Medications   Medication Dose Route Frequency    gentamicin in Saline (Iso-osm) (GARAMYCIN) 80 mg/100 mL IVPB premix 80 mg  80 mg IntraVENous Q8H    Gentamicin Trough Reminder   Other ONCE    Gentamicin Peak Reminder   Other ONCE    furosemide (LASIX) injection 40 mg  40 mg IntraVENous BID    cefTRIAXone (ROCEPHIN) 2 g in 0.9% sodium chloride (MBP/ADV) 50 mL  2 g IntraVENous Q24H    ferrous sulfate tablet 325 mg  1 Tab Oral BID WITH MEALS    ergocalciferol capsule 50,000 Units  50,000 Units Oral Q7D    metoprolol succinate (TOPROL-XL) XL tablet 25 mg  25 mg Oral BID    insulin lispro (HUMALOG) injection   SubCUTAneous AC&HS    escitalopram oxalate (LEXAPRO) tablet 10 mg  10 mg Oral DAILY    atorvastatin (LIPITOR) tablet 20 mg  20 mg Oral QHS    aspirin chewable tablet 81 mg  81 mg Oral DAILY    sodium chloride (NS) flush 5-40 mL  5-40 mL IntraVENous Q8H  sodium chloride (NS) flush 5-40 mL  5-40 mL IntraVENous PRN    acetaminophen (TYLENOL) tablet 650 mg  650 mg Oral Q6H PRN    morphine 10 mg/ml injection 2 mg  2 mg IntraVENous Q3H PRN    nitroglycerin (NITROSTAT) tablet 0.4 mg  0.4 mg SubLINGual Q5MIN PRN    ondansetron (ZOFRAN) injection 4 mg  4 mg IntraVENous Q4H PRN       Objective:     Vitals:    05/28/20 0125 05/28/20 0514 05/28/20 0821 05/28/20 0843   BP: 110/70 98/58 107/69 117/76   Pulse: 94 91 (!) 101 (!) 106   Resp: 17 16  17   Temp: 97.9 °F (36.6 °C) 98.2 °F (36.8 °C)  97.6 °F (36.4 °C)   SpO2: 94% 92%  91%   Weight:  71.4 kg (157 lb 4.8 oz)     Height:             Intake and Output:  Current Shift: No intake/output data recorded. Last three shifts: 05/26 1901 - 05/28 0700  In: -   Out: 2075 [Urine:2075]    Physical Exam:   General:  Alert, cooperative, no distress. Lungs:   Clear to auscultation bilaterally. Heart:  Regular rate and rhythm. Abdomen:   Soft, non-tender. Bowel sounds normal.    Extremities: Petechiae to LE bilaterally that are improving, no janeway lesions. 2+ edema to LE bilaterally     Pulses: 2+ and symmetric all extremities.    Skin: As above   Lymph nodes: Cervical, supraclavicular, and axillary nodes normal.   Neurologic: CNII-XII intact        Lab/Data Review:    Recent Results (from the past 24 hour(s))   GENTAMICIN, PEAK    Collection Time: 05/27/20 10:52 AM   Result Value Ref Range    Gentamicin, peak <0.2 (L) 4.0 - 8.0 ug/ml   GLUCOSE, POC    Collection Time: 05/27/20  1:01 PM   Result Value Ref Range    Glucose (POC) 168 (H) 65 - 100 mg/dL   GLUCOSE, POC    Collection Time: 05/27/20  4:25 PM   Result Value Ref Range    Glucose (POC) 112 (H) 65 - 100 mg/dL   GENTAMICIN, TROUGH    Collection Time: 05/27/20  5:35 PM   Result Value Ref Range    Gentamicin, trough 1.4 1.0 - 2.0 ug/ml   GLUCOSE, POC    Collection Time: 05/27/20  9:34 PM   Result Value Ref Range    Glucose (POC) 125 (H) 65 - 161 mg/dL   METABOLIC PANEL, BASIC    Collection Time: 05/28/20  3:34 AM   Result Value Ref Range    Sodium 143 136 - 145 mmol/L    Potassium 3.8 3.5 - 5.1 mmol/L    Chloride 107 98 - 107 mmol/L    CO2 29 21 - 32 mmol/L    Anion gap 7 7 - 16 mmol/L    Glucose 117 (H) 65 - 100 mg/dL    BUN 28 (H) 8 - 23 MG/DL    Creatinine 1.05 0.8 - 1.5 MG/DL    GFR est AA >60 >60 ml/min/1.73m2    GFR est non-AA >60 >60 ml/min/1.73m2    Calcium 8.1 (L) 8.3 - 10.4 MG/DL   MAGNESIUM    Collection Time: 05/28/20  3:34 AM   Result Value Ref Range    Magnesium 2.0 1.8 - 2.4 mg/dL   CBC W/O DIFF    Collection Time: 05/28/20  3:34 AM   Result Value Ref Range    WBC 11.9 (H) 4.3 - 11.1 K/uL    RBC 3.18 (L) 4.23 - 5.6 M/uL    HGB 8.5 (L) 13.6 - 17.2 g/dL    HCT 28.0 (L) 41.1 - 50.3 %    MCV 88.1 79.6 - 97.8 FL    MCH 26.7 26.1 - 32.9 PG    MCHC 30.4 (L) 31.4 - 35.0 g/dL    RDW 18.6 (H) 11.9 - 14.6 %    PLATELET 184 914 - 614 K/uL    MPV 9.1 (L) 9.4 - 12.3 FL    ABSOLUTE NRBC 0.00 0.0 - 0.2 K/uL   GLUCOSE, POC    Collection Time: 05/28/20  6:14 AM   Result Value Ref Range    Glucose (POC) 131 (H) 65 - 100 mg/dL       Assessment/ Plan:     Principal Problem:    NSTEMI (non-ST elevated myocardial infarction) ([facility]) (5/16/2020)    Active Problems:    Diastolic CHF, chronic ([facility]) (5/7/2018)      S/P aortic valve replacement (4/1/2018)      CAD (coronary artery disease) ()      Overview: Nonobstructive      DIEGO on CPAP ()      Dyslipidemia ()      Essential hypertension (9/21/2018)      Suspected COVID-19 virus infection (5/16/2020)      Petechiae (5/16/2020)      Anemia (5/16/2020)      Diabetes mellitus, type II ([facility] (5/20/2020)      Vitamin D deficiency (5/20/2020)      CAD with NSTEMI and endocarditis-S/p cardiac cath on 5/19 with no intervention. ASA, statin, BB. Will need AVR once bacteremia has been cleared. CT surgery following. Possible oral source of persistent bacteremia - CT negative for abscess, oral surgery consult pending.      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 Ceftriaxone and gentamycin per ID. Cx 5/18 and 5/20 strep mutans. Repeat blood culture on 5/24 no growth x3d - prior cx took 4 days to return positive    LE edema bilaterally - continue Lasix    Normocytic anemia with thrombocytopenia - follow CBC. S/p Iron infusions. Oral iron. Will need to establish care with GI for colon cancer screening at discharge. Hyponatremia - Resolved. Hypokalemia - supplement    Vitamin D deficiency - Supplement. DM type II - A1C 7.1. ADA.  SS. Will need outpatient diabetic education    DVT prophylaxis - SCDs  Signed By: Vamsi Varghese MD     May 28, 2020

## 2020-05-28 NOTE — PROGRESS NOTES
CTS-pt denies any dyspnea or chest discomfort. 1/2 blood cultures from 5/20 positive. 1/1 culture from 5/24 NTD. Awaiting oral surgery consult. CT soft tissue neck negative for any obvious dental abscess or abscess within the neck.      Jewel Harada, PA-C

## 2020-05-28 NOTE — PROGRESS NOTES
[facility] CARDIOLOGY PROGRESS NOTE           5/28/2020 8:55 AM    Admit Date: 5/16/2020      Subjective:   No cp, fever or inc sob      Objective:      Vitals:    05/28/20 0125 05/28/20 0514 05/28/20 0821 05/28/20 0843   BP: 110/70 98/58 107/69 117/76   Pulse: 94 91 (!) 101 (!) 106   Resp: 17 16 17   Temp: 97.9 °F (36.6 °C) 98.2 °F (36.8 °C)  97.6 °F (36.4 °C)   SpO2: 94% 92%  91%   Weight:  71.4 kg (157 lb 4.8 oz)     Height:           Physical Exam:  General-No Acute Distress  Neck- supple, no JVD  CV- regular rate and rhythm, + gallop  Lung- clear bilaterally  Abd- soft, nontender, nondistended  Ext- no edema bilaterally. Skin- warm and dry    Data Review:   Recent Labs     05/28/20  0334 05/27/20  0340    143   K 3.8 3.4*   MG 2.0 2.0   BUN 28* 28*   CREA 1.05 1.06   * 114*   WBC 11.9*  --    HGB 8.5*  --    HCT 28.0*  --      --        Assessment/Plan:     Nstemi, cath 5/19, OM occluded, ? embolic  Step. mutans bAVR endocarditis  Diabetic  Cardiomyopathy, ef 40% 5/17  Poor dentition    ////    Add spironolactone 25  Add lisinopril 2.5 bid  Change bb to carvedilol  Add Lovenox 40  Check fu echo  Check NT pro BNP  Oral surgery pending?         Eleonore Hamman, MD  5/28/2020 8:55 AM

## 2020-05-28 NOTE — ROUTINE PROCESS
Bedside and Verbal shift change report given to self (oncoming nurse) by Damaso Lyn RN (offgoing nurse).  Report included the following information SBAR, Kardex, ED Summary, Procedure Summary, Intake/Output, MAR, Recent Results and Cardiac Rhythm SR.

## 2020-05-29 LAB
ANION GAP SERPL CALC-SCNC: 8 MMOL/L (ref 7–16)
BACTERIA SPEC CULT: NORMAL
BUN SERPL-MCNC: 30 MG/DL (ref 8–23)
CALCIUM SERPL-MCNC: 8 MG/DL (ref 8.3–10.4)
CHLORIDE SERPL-SCNC: 104 MMOL/L (ref 98–107)
CO2 SERPL-SCNC: 30 MMOL/L (ref 21–32)
CREAT SERPL-MCNC: 1.15 MG/DL (ref 0.8–1.5)
ERYTHROCYTE [DISTWIDTH] IN BLOOD BY AUTOMATED COUNT: 18.6 % (ref 11.9–14.6)
GLUCOSE BLD STRIP.AUTO-MCNC: 129 MG/DL (ref 65–100)
GLUCOSE BLD STRIP.AUTO-MCNC: 135 MG/DL (ref 65–100)
GLUCOSE BLD STRIP.AUTO-MCNC: 137 MG/DL (ref 65–100)
GLUCOSE BLD STRIP.AUTO-MCNC: 140 MG/DL (ref 65–100)
GLUCOSE SERPL-MCNC: 115 MG/DL (ref 65–100)
HCT VFR BLD AUTO: 29 % (ref 41.1–50.3)
HGB BLD-MCNC: 8.6 G/DL (ref 13.6–17.2)
MCH RBC QN AUTO: 26.3 PG (ref 26.1–32.9)
MCHC RBC AUTO-ENTMCNC: 29.7 G/DL (ref 31.4–35)
MCV RBC AUTO: 88.7 FL (ref 79.6–97.8)
NRBC # BLD: 0 K/UL (ref 0–0.2)
PLATELET # BLD AUTO: 265 K/UL (ref 150–450)
PMV BLD AUTO: 9.3 FL (ref 9.4–12.3)
POTASSIUM SERPL-SCNC: 3.6 MMOL/L (ref 3.5–5.1)
RBC # BLD AUTO: 3.27 M/UL (ref 4.23–5.6)
SERVICE CMNT-IMP: NORMAL
SODIUM SERPL-SCNC: 142 MMOL/L (ref 136–145)
WBC # BLD AUTO: 12.5 K/UL (ref 4.3–11.1)

## 2020-05-29 PROCEDURE — 74011250636 HC RX REV CODE- 250/636: Performed by: INTERNAL MEDICINE

## 2020-05-29 PROCEDURE — 85027 COMPLETE CBC AUTOMATED: CPT

## 2020-05-29 PROCEDURE — 74011250637 HC RX REV CODE- 250/637: Performed by: INTERNAL MEDICINE

## 2020-05-29 PROCEDURE — 82962 GLUCOSE BLOOD TEST: CPT

## 2020-05-29 PROCEDURE — 80048 BASIC METABOLIC PNL TOTAL CA: CPT

## 2020-05-29 PROCEDURE — 74011000258 HC RX REV CODE- 258: Performed by: INTERNAL MEDICINE

## 2020-05-29 PROCEDURE — 87040 BLOOD CULTURE FOR BACTERIA: CPT

## 2020-05-29 PROCEDURE — 74011250637 HC RX REV CODE- 250/637: Performed by: FAMILY MEDICINE

## 2020-05-29 PROCEDURE — 65660000000 HC RM CCU STEPDOWN

## 2020-05-29 PROCEDURE — 74011250637 HC RX REV CODE- 250/637: Performed by: HOSPITALIST

## 2020-05-29 PROCEDURE — 36415 COLL VENOUS BLD VENIPUNCTURE: CPT

## 2020-05-29 RX ADMIN — ASPIRIN 81 MG 81 MG: 81 TABLET ORAL at 08:58

## 2020-05-29 RX ADMIN — ENOXAPARIN SODIUM 40 MG: 40 INJECTION SUBCUTANEOUS at 13:15

## 2020-05-29 RX ADMIN — Medication 10 ML: at 05:43

## 2020-05-29 RX ADMIN — SPIRONOLACTONE 25 MG: 25 TABLET ORAL at 08:58

## 2020-05-29 RX ADMIN — LISINOPRIL 2.5 MG: 5 TABLET ORAL at 08:58

## 2020-05-29 RX ADMIN — FUROSEMIDE 40 MG: 10 INJECTION, SOLUTION INTRAMUSCULAR; INTRAVENOUS at 08:58

## 2020-05-29 RX ADMIN — LISINOPRIL 2.5 MG: 5 TABLET ORAL at 17:19

## 2020-05-29 RX ADMIN — CARVEDILOL 6.25 MG: 6.25 TABLET, FILM COATED ORAL at 17:19

## 2020-05-29 RX ADMIN — Medication 10 ML: at 22:34

## 2020-05-29 RX ADMIN — CARVEDILOL 6.25 MG: 6.25 TABLET, FILM COATED ORAL at 08:58

## 2020-05-29 RX ADMIN — FERROUS SULFATE TAB 325 MG (65 MG ELEMENTAL FE) 325 MG: 325 (65 FE) TAB at 17:19

## 2020-05-29 RX ADMIN — FUROSEMIDE 40 MG: 10 INJECTION, SOLUTION INTRAMUSCULAR; INTRAVENOUS at 17:18

## 2020-05-29 RX ADMIN — CEFTRIAXONE SODIUM 2 G: 2 INJECTION, POWDER, FOR SOLUTION INTRAMUSCULAR; INTRAVENOUS at 13:15

## 2020-05-29 RX ADMIN — FERROUS SULFATE TAB 325 MG (65 MG ELEMENTAL FE) 325 MG: 325 (65 FE) TAB at 08:58

## 2020-05-29 RX ADMIN — ESCITALOPRAM OXALATE 10 MG: 10 TABLET ORAL at 08:58

## 2020-05-29 RX ADMIN — Medication 5 ML: at 13:16

## 2020-05-29 RX ADMIN — ATORVASTATIN CALCIUM 20 MG: 20 TABLET, FILM COATED ORAL at 22:34

## 2020-05-29 RX ADMIN — GENTAMICIN SULFATE 80 MG: 80 INJECTION, SOLUTION INTRAVENOUS at 09:00

## 2020-05-29 NOTE — PROGRESS NOTES
Progress Note    Patient: Leonor Servin MRN: [id number]  SSN: [id number]    YOB: 1960  Age: 62 y.o. Sex: male      Admit Date: 5/16/2020    LOS: 13 days     Subjective:   F/U NSTEMI, endocarditis    \"53 yo male with PMH of CAD (non obstructive  Per [facility] 2018) , DIEGO on CPAP, AS s/p AVR, HTN admitted with  NSTEMI. ECHO showed EF 40%, moderate diffuse hypokinesis. RV pressure 45-50mmHg, aortic valve also had a moderate sized mobile mass suspicious for endocarditis. Dilatation of the ascending aorta seen 41mm. YARON performed on 5/21 confirmed vegetation on aortic valve. BCx 5/18 and 5/20 with strep mutans. ID following, on Ceftriaxone. CT surgery pending blood clearance. LHC on 5/19 with occluded OM branch, outside the time window for intervention. Ruptured plaque vs embolic event. 5/29:  Cards, ID, CT surgery following. Repeat blood culture 5/24 no growth x5d. Blood cultures drawn again today. Await oral surgery eval prior to surgery, no obvious abscess on CT  WBC up slightly, afebrile  Feels well. No acute events.     Current Facility-Administered Medications   Medication Dose Route Frequency    lisinopriL (PRINIVIL, ZESTRIL) tablet 2.5 mg  2.5 mg Oral BID    spironolactone (ALDACTONE) tablet 25 mg  25 mg Oral DAILY    carvediloL (COREG) tablet 6.25 mg  6.25 mg Oral BID WITH MEALS    enoxaparin (LOVENOX) injection 40 mg  40 mg SubCUTAneous Q24H    gentamicin in Saline (Iso-osm) (GARAMYCIN) 80 mg/100 mL IVPB premix 80 mg  80 mg IntraVENous Q12H    furosemide (LASIX) injection 40 mg  40 mg IntraVENous BID    cefTRIAXone (ROCEPHIN) 2 g in 0.9% sodium chloride (MBP/ADV) 50 mL  2 g IntraVENous Q24H    ferrous sulfate tablet 325 mg  1 Tab Oral BID WITH MEALS    ergocalciferol capsule 50,000 Units  50,000 Units Oral Q7D    insulin lispro (HUMALOG) injection   SubCUTAneous AC&HS    escitalopram oxalate (LEXAPRO) tablet 10 mg  10 mg Oral DAILY    atorvastatin (LIPITOR) tablet 20 mg  20 mg Oral QHS    aspirin chewable tablet 81 mg  81 mg Oral DAILY    sodium chloride (NS) flush 5-40 mL  5-40 mL IntraVENous Q8H    sodium chloride (NS) flush 5-40 mL  5-40 mL IntraVENous PRN    acetaminophen (TYLENOL) tablet 650 mg  650 mg Oral Q6H PRN    morphine 10 mg/ml injection 2 mg  2 mg IntraVENous Q3H PRN    nitroglycerin (NITROSTAT) tablet 0.4 mg  0.4 mg SubLINGual Q5MIN PRN    ondansetron (ZOFRAN) injection 4 mg  4 mg IntraVENous Q4H PRN       Objective:     Vitals:    05/28/20 1724 05/28/20 2058 05/29/20 0116 05/29/20 0512   BP: 101/66 102/68 97/59 98/68   Pulse: 93 90 84 85   Resp: 19 18 18 18   Temp: 98.8 °F (37.1 °C) 98.4 °F (36.9 °C) 98.1 °F (36.7 °C) 98.2 °F (36.8 °C)   SpO2: 92% 94% 90% 90%   Weight:    69.7 kg (153 lb 11.2 oz)   Height:             Intake and Output:  Current Shift: No intake/output data recorded. Last three shifts: 05/27 1901 - 05/29 0700  In: 0   Out: 2550 [Urine:2550]    Physical Exam:   General:  Alert, cooperative, no distress. Lungs:   Clear to auscultation bilaterally. Heart:  Regular rate and rhythm. Abdomen:   Soft, non-tender. Bowel sounds normal.    Extremities: 2+ edema to LE bilaterally     Pulses: 2+ and symmetric all extremities.    Neurologic: CNII-XII intact      Lab/Data Review:    Recent Results (from the past 24 hour(s))   GENTAMICIN, TROUGH    Collection Time: 05/28/20  9:11 AM   Result Value Ref Range    Gentamicin, trough 2.5 (HH) 1.0 - 2.0 ug/ml   GENTAMICIN, PEAK    Collection Time: 05/28/20 10:44 AM   Result Value Ref Range    Gentamicin, peak 2.1 (L) 4.0 - 8.0 ug/ml   NT-PRO BNP    Collection Time: 05/28/20 10:44 AM   Result Value Ref Range    NT pro-BNP 37,773 (H) 5 - 125 PG/ML   GLUCOSE, POC    Collection Time: 05/28/20 12:01 PM   Result Value Ref Range    Glucose (POC) 132 (H) 65 - 100 mg/dL   GENTAMICIN, RANDOM    Collection Time: 05/28/20  3:21 PM   Result Value Ref Range    Gentamicin,random 1.4 ug/ml   GLUCOSE, POC    Collection Time: 05/28/20  4:05 PM   Result Value Ref Range    Glucose (POC) 127 (H) 65 - 100 mg/dL   GLUCOSE, POC    Collection Time: 05/28/20  9:03 PM   Result Value Ref Range    Glucose (POC) 131 (H) 65 - 100 mg/dL   CBC W/O DIFF    Collection Time: 05/29/20  4:05 AM   Result Value Ref Range    WBC 12.5 (H) 4.3 - 11.1 K/uL    RBC 3.27 (L) 4.23 - 5.6 M/uL    HGB 8.6 (L) 13.6 - 17.2 g/dL    HCT 29.0 (L) 41.1 - 50.3 %    MCV 88.7 79.6 - 97.8 FL    MCH 26.3 26.1 - 32.9 PG    MCHC 29.7 (L) 31.4 - 35.0 g/dL    RDW 18.6 (H) 11.9 - 14.6 %    PLATELET 902 493 - 976 K/uL    MPV 9.3 (L) 9.4 - 12.3 FL    ABSOLUTE NRBC 0.00 0.0 - 0.2 K/uL   METABOLIC PANEL, BASIC    Collection Time: 05/29/20  4:05 AM   Result Value Ref Range    Sodium 142 136 - 145 mmol/L    Potassium 3.6 3.5 - 5.1 mmol/L    Chloride 104 98 - 107 mmol/L    CO2 30 21 - 32 mmol/L    Anion gap 8 7 - 16 mmol/L    Glucose 115 (H) 65 - 100 mg/dL    BUN 30 (H) 8 - 23 MG/DL    Creatinine 1.15 0.8 - 1.5 MG/DL    GFR est AA >60 >60 ml/min/1.73m2    GFR est non-AA >60 >60 ml/min/1.73m2    Calcium 8.0 (L) 8.3 - 10.4 MG/DL   GLUCOSE, POC    Collection Time: 05/29/20  6:23 AM   Result Value Ref Range    Glucose (POC) 137 (H) 65 - 100 mg/dL       Assessment/ Plan:     Principal Problem:    NSTEMI (non-ST elevated [facility] infarction) [facility] (5/16/2020)    Active Problems:    Diastolic CHF, chronic ([facility]) (5/7/2018)      S/P aortic valve replacement (4/1/2018)      CAD (coronary artery disease) ()      Overview: Nonobstructive      DIEGO on CPAP ()      Dyslipidemia ()      Essential hypertension (9/21/2018)      Suspected COVID-19 virus infection (5/16/2020)      Petechiae (5/16/2020)      Anemia (5/16/2020)      Diabetes mellitus, type II ([facility] [address] (5/20/2020)      Vitamin D deficiency (5/20/2020)      CAD/CHF with NSTEMI and endocarditis-S/p cardiac cath on 5/19 with no intervention. ASA, statin, BB. Needs AVR as below. Cardiology adjusting meds.   Repeat Echo with [id number]    Endocarditis with strep mutans - ID following. Ceftriaxone and gentamycin per ID. Cx 5/18 and 5/20 strep mutans. Repeat blood culture on 5/24 no growth x5d. Positive PCR refers to culture from 5/20. CT surgery following - want oral surgery eval prior to surgery. Consult placed. LE edema bilaterally - continue Lasix    Normocytic anemia with thrombocytopenia - follow CBC. S/p Iron infusions. Oral iron. Will need to establish care with GI for colon cancer screening at discharge. Hyponatremia - Resolved. Hypokalemia - supplement    Vitamin D deficiency - Supplement. DM type II - A1C 7.1. ADA.  SS. Will need outpatient diabetic education    DVT prophylaxis - SCDs  Signed By: Madeleine Fuentes MD     May 29, 2020

## 2020-05-29 NOTE — ROUTINE PROCESS
Bedside and Verbal shift change report given to  (oncoming nurse) by Samuel Cervantes RN (offgoing nurse). Report included the following information SBAR, Kardex, Intake/Output, MAR and Recent Results.

## 2020-05-29 NOTE — PROGRESS NOTES
Problem: Falls - Risk of  Goal: *Absence of Falls  Description: Document Damon Reason Fall Risk and appropriate interventions in the flowsheet. Outcome: Progressing Towards Goal  Note: Fall Risk Interventions:  Mobility Interventions: PT Consult for mobility concerns, Patient to call before getting OOB, OT consult for ADLs         Medication Interventions: Patient to call before getting OOB, Teach patient to arise slowly    Elimination Interventions: Call light in reach    History of Falls Interventions: Bed/chair exit alarm, Door open when patient unattended, Evaluate medications/consider consulting pharmacy, Investigate reason for fall, Room close to nurse's station, Consult care management for discharge planning, Vital signs minimum Q4HRs X 24 hrs (comment for end date)         Problem: Patient Education: Go to Patient Education Activity  Goal: Patient/Family Education  Outcome: Progressing Towards Goal     Problem: Diabetes Self-Management  Goal: *Disease process and treatment process  Description: Define diabetes and identify own type of diabetes; list 3 options for treating diabetes. Outcome: Progressing Towards Goal  Goal: *Incorporating nutritional management into lifestyle  Description: Describe effect of type, amount and timing of food on blood glucose; list 3 methods for planning meals. Outcome: Progressing Towards Goal  Goal: *Incorporating physical activity into lifestyle  Description: State effect of exercise on blood glucose levels. Outcome: Progressing Towards Goal  Goal: *Developing strategies to promote health/change behavior  Description: Define the ABC's of diabetes; identify appropriate screenings, schedule and personal plan for screenings. Outcome: Progressing Towards Goal  Goal: *Using medications safely  Description: State effect of diabetes medications on diabetes; name diabetes medication taking, action and side effects.   Outcome: Progressing Towards Goal  Goal: *Monitoring blood glucose, interpreting and using results  Description: Identify recommended blood glucose targets  and personal targets. Outcome: Progressing Towards Goal  Goal: *Prevention, detection, treatment of acute complications  Description: List symptoms of hyper- and hypoglycemia; describe how to treat low blood sugar and actions for lowering  high blood glucose level. Outcome: Progressing Towards Goal  Goal: *Prevention, detection and treatment of chronic complications  Description: Define the natural course of diabetes and describe the relationship of blood glucose levels to long term complications of diabetes.   Outcome: Progressing Towards Goal  Goal: *Developing strategies to address psychosocial issues  Description: Describe feelings about living with diabetes; identify support needed and support network  Outcome: Progressing Towards Goal  Goal: *Insulin pump training  Outcome: Progressing Towards Goal  Goal: *Sick day guidelines  Outcome: Progressing Towards Goal  Goal: *Patient Specific Goal (EDIT GOAL, INSERT TEXT)  Outcome: Progressing Towards Goal     Problem: Patient Education: Go to Patient Education Activity  Goal: Patient/Family Education  Outcome: Progressing Towards Goal     Problem: Nutrition Deficit  Goal: *Optimize nutritional status  Outcome: Progressing Towards Goal     Problem: Patient Education: Go to Patient Education Activity  Goal: Patient/Family Education  Outcome: Progressing Towards Goal

## 2020-05-29 NOTE — PROGRESS NOTES
CTS- await dental consult for caries, no other obvious source of endocarditis and will need extraction prior to AVR, another confounding issue is a fresh lateral wall MI ( occluded OM ), ECHO yesterday shows depressed LV function with EF 35%, timing of surgery will be difficult

## 2020-05-29 NOTE — PROGRESS NOTES
Spiritual Care Visit, initial visit. Visited with patient at bedside. Patient said he had a heart attack. Expects to have heart valve surgery soon. Prayed for patient's healing and health. Visit by Ezequiel Camara, Staff .  Julieta, Cindy.B., B.A.

## 2020-05-29 NOTE — ROUTINE PROCESS
Called the office of Dr. Naomi Arce for the patient's oral surgery consult, unable to get in touch but left a voicemail and the floor number. Updated Dr. Tiffany Agudelo and NILTON Ledesma on situation.

## 2020-05-29 NOTE — ROUTINE PROCESS
Bedside and Verbal shift change report given to  (oncoming nurse) by [facility] (offgoing nurse). Report included the following information SBAR, Kardex, Intake/Output, MAR and Recent Results.

## 2020-05-29 NOTE — PROGRESS NOTES
Care Management Interventions  PCP Verified by CM: Yes  Last Visit to PCP: 05/04/20  Mode of Transport at Discharge: Other (see comment)(Camelia Dawkins Spouse [phone] )  Transition of Care Consult (CM Consult): Discharge Planning, Home Health  Discharge Durable Medical Equipment: No  Physical Therapy Consult: No  Occupational Therapy Consult: No  Current Support Network: Family Lives Nearby, Lives Alone  Confirm Follow Up Transport: Family  The Plan for Transition of Care is Related to the Following Treatment Goals : [address] Provided?: (Alon Pratt)  Discharge Location  Discharge Placement: Home with home health    CM following pt's progress. Awaiting oral surgeon consult and blood cultures final result prior to AVR. DCP home with [place] after surgery.

## 2020-05-29 NOTE — PROGRESS NOTES
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 [facility] 20 and  with strep mutans. Blood  PCR positive for strep but has not grown so far. Anticipating AVR once blood clears. · Diarrhea PTA, resolved. Hx diverticulosis   · Dental caries : given persistent bactermia and loose teeth, check CT soft tissue neck: if abscess recommend Oral Surgery consult for extraction before has AVR  · Anemia, of acute disease: follow  · Hx AVR   · DM, A1c 7    Plan:   · Continue CTX 2g IV q24h/gentamicin. Duration 6 weeks (date depends on surgery)  · Follow  blood cx. PCR labeled as  is apparently from  blood cx so  single blood cx is NGTD. · Stop [place] Lim[place] as trough's have been a little elevated and CRT climbing. Follow CRT. · I would see if we can get an oral Surgeon to see him while here as he will still have same issue with being unable to get into a dentist when leaves here due to COVID-19 pandemic. · Redo AVR planned but timing TBD. IF has clear abscess/es then favor dental extractions BEFORE valve replacement-oral surgery consulted. Anti-infectives:   CTX -  Gentamicin -    Subjective:   Afebrile, feeling ok. Does report pain in upper left molars with cold liquids. WBC 12.5. Review of Systems:  A comprehensive review of systems was negative except for that written in the History of Present Illness.     Objective:     Visit Vitals  BP 98/68 (BP 1 Location: Left arm, BP Patient Position: At rest)   Pulse 85   Temp 98.2 °F (36.8 °C)   Resp 18   Ht 6' 1\" (1.854 m)   Wt 69.7 kg (153 lb 11.2 oz)   SpO2 90%   BMI 20.28 kg/m²     Temp (24hrs), Av.5 °F (36.9 °C), Min:98.1 °F (36.7 °C), Max:98.9 °F (37.2 °C)  No changes from my previous exam       Lines:  Peripheral IV:       Physical Exam:   General:  Alert, cooperative, well noursished, well developed, appears stated age   Eyes:  Sclera anicteric. Pupils equally round and reactive to light. Mouth/Throat: Mucous membranes normal, oral pharynx clear: left upper two back molars are rotten; no odor. Two front lower teeth on right are clearly loose; gums not swollen;    Neck: Supple   Lungs:   Clear to auscultation bilaterally, good effort   CV:  Regular rate and rhythm, 3/6 SM   Abdomen:   Soft, non-tender. bowel sounds normal. non-distended   Extremities: No cyanosis or edema   Skin: Skin color, texture, turgor normal. no acute rash or lesions   Lymph nodes: Cervical, submandibular and supraclavicular normal   Musculoskeletal: No swelling or deformity   Lines/Devices:  Intact, no erythema, drainage or tenderness   Psych: Alert and oriented, normal mood affect given the setting       Data Review:     CBC:   Recent Labs     05/29/20 0405 05/28/20  0334   WBC 12.5* 11.9*   RBC 3.27* 3.18*   HGB 8.6* 8.5*   HCT 29.0* 28.0*    212     CMP:   Recent Labs     05/29/20 0405 05/28/20  0334 05/27/20  0340   * 117* 114*    143 143   K 3.6 3.8 3.4*    107 107   CO2 30 29 29   BUN 30* 28* 28*   CREA 1.15 1.05 1.06   CA 8.0* 8.1* 8.0*   AGAP 8 7 7     Liver Enzymes:   No results for input(s): TP, ALB, TBIL, AP in the last 72 hours.     No lab exists for component: SGOT, GPT, DBIL  Antibiotic Monitoring:   Lab Results   Component Value Date/Time    Vancomycin,trough 11.8 05/22/2020 12:58 AM        Microbiology:     All Micro Results     Procedure Component Value Units Date/Time    CULTURE, BLOOD [[id number]] Collected:  05/24/20 1321    Order Status:  Completed Specimen:  Blood Updated:  05/29/20 0723     Special Requests: --        LEFT  Antecubital       Culture result: NO GROWTH 5 DAYS       CULTURE, BLOOD [[id number]] Collected:  05/29/20 0405    Order Status:  Completed Specimen:  Blood Updated:  05/29/20 0445    CULTURE, BLOOD [[id number]] Collected:  05/29/20 0412    Order Status: Completed Specimen:  Blood Updated:  05/29/20 0445    CULTURE, BLOOD [[id number]]  (Abnormal)  (Susceptibility) Collected:  05/20/20 1424    Order Status:  Completed Specimen:  Blood Updated:  05/26/20 0834     Special Requests: --        RIGHT  FOREARM       GRAM STAIN GRAM POSITIVE COCCI         PEDIATRIC BOTTLE               RESULTS VERIFIED, PHONED TO AND READ BACK BY Elie Stanford AT 0304 ON 5.24.20  JL           Culture result: STREPTOCOCCUS MUTANS         REFER TO [facility]IOFIRE PANEL [address] [id number]    CULTURE, BLOOD [[id number]] Collected:  05/20/20 1434    Order Status:  Completed Specimen:  Blood Updated:  05/25/20 0633     Special Requests: --        LEFT  Antecubital       Culture result: NO GROWTH 5 DAYS       BLOOD CULTURE ID PANEL [[id number]]  (Abnormal) Collected:  05/24/20 0105    Order Status:  Completed Specimen:  Blood Updated:  05/24/20 0647     Acc. no. from Micro Order [id number]     Streptococcus Detected        Comment: RESULTS VERIFIED, PHONED TO AND READ BACK BY  Elie Stanford RN @4609 ON 5/24/20 AK. INTERPRETATION       Gram positive cocci. Identified by realtime PCR as Streptococcus species (not agalactiae, pyogenes, or pneumoniae). Comment: Recommend discontinuing IV vancomycin starting cefazolin or nafcillin if patient not on beta-lactam therapy. Infectious Diseases Consult recommended in adult patients. THIS TEST DOES NOT REPLACE SENSITIVITY TESTING. CULTURE, BLOOD [[id number]]  (Abnormal) Collected:  05/18/20 1944    Order Status:  Completed Specimen:  Blood Updated:  05/23/20 0720     Special Requests: --        RIGHT  HAND       GRAM STAIN GRAM POSITIVE COCCI         PEDIATRIC BOTTLE               CRITICAL RESULT NOT CALLED DUE TO PREVIOUS NOTIFICATION OF CRITICAL RESULT WITHIN THE LAST 24 HOURS. Culture result:       ALPHA STREPTOCOCCUS, NOT S.  PNEUMONIAE            FOR ID AND SUSCEPTIBILITY REFER TO CULTURE NO ACC [id number]    CULTURE, BLOOD [[id number]] (Abnormal)  (Susceptibility) Collected:  05/18/20 1938    Order Status:  Completed Specimen:  Blood Updated:  05/23/20 0703     Special Requests: --        RIGHT  ARM       GRAM STAIN GRAM POS COCCI IN CHAINS         AEROBIC BOTTLE POSITIVE               RESULTS VERIFIED, PHONED TO AND READ BACK BY Mateo Graff RN @ 1120 ON 5/20/20 BY AMM           Culture result: STREPTOCOCCUS MUTANS         REFER TO [employer] PANEL ACCESSION [id number]    CULTURE, BLOOD [[id number]]     Order Status:  Canceled Specimen:  Blood     CULTURE, BLOOD [[id number]]     Order Status:  Canceled Specimen:  Blood     BLOOD CULTURE ID PANEL [[id number]]  (Abnormal) Collected:  05/18/20 1938    Order Status:  Completed Specimen:  Blood Updated:  05/20/20 1117     Acc. no. from Micro Order [id number]     Streptococcus Detected        Comment: RESULTS VERIFIED, PHONED TO AND READ BACK BY  Mateo Graff RN @ 1120 ON 5/20/20 BY AMM          INTERPRETATION       Gram positive cocci. Identified by realtime PCR as Streptococcus species (not agalactiae, pyogenes, or pneumoniae).            Comment: Consider discontinuation of IV vancomycin and using an anti-streptococcal beta-lactam (ceftriaxone/cefotaxime (peds))       EMERGENT DISEASE PANEL [[id number]] Collected:  05/16/20 1946    Order Status:  Completed Specimen:  Not Specified Updated:  05/18/20 1614     Emergent disease panel Not detected        Comment: Performed at [facility] [address]               Imaging:     See HPI/EPIC     Signed By: Milad Hdez MD     May 29, 2020

## 2020-05-29 NOTE — PROGRESS NOTES
[facility] CARDIOLOGY PROGRESS NOTE           5/29/2020 8:31 AM    Admit Date: 5/16/2020      Subjective:   No cp or inc sob      Objective:      Vitals:    05/28/20 1724 05/28/20 2058 05/29/20 0116 05/29/20 0512   BP: 101/66 102/68 97/59 98/68   Pulse: 93 90 84 85   Resp: 19 18 18 18   Temp: 98.8 °F (37.1 °C) 98.4 °F (36.9 °C) 98.1 °F (36.7 °C) 98.2 °F (36.8 °C)   SpO2: 92% 94% 90% 90%   Weight:    69.7 kg (153 lb 11.2 oz)   Height:           Physical Exam:  General-No Acute Distress  Neck- supple, no JVD  CV- regular rate and rhythm, loud S3 gallop  Lung- clear bilaterally  Abd- soft, nontender, nondistended  Ext- no edema bilaterally. Skin- warm and dry    Data Review:   Recent Labs     05/29/20  0405 05/28/20  0334 05/27/20  0340    143 143   K 3.6 3.8 3.4*   MG  --  2.0 2.0   BUN 30* 28* 28*   CREA 1.15 1.05 1.06   * 117* 114*   WBC 12.5* 11.9*  --    HGB 8.6* 8.5*  --    HCT 29.0* 28.0*  --     212  --      Fu Echo: EF has declined, No AI    Assessment/Plan:     Nstemi, cath 5/19, OM occluded, ? embolic  Step. mutans bAVR endocarditis  Diabetic  Cardiomyopathy, ef 40% 5/17  Poor dentition    ////    I cannot account for his decline in LV fxn unless its due to a combination of recent MI and infected, stenotic bAVR. We need to get his teeth fixed so the CV surgeon will operate.  Where are with that??    Geraldine Hinojosa MD  5/29/2020 8:31 AM

## 2020-05-29 NOTE — PROGRESS NOTES
Nutrition  Reason for assessment: Follow Up    This assessment was completed remotely from [facility]. Patient consent was obtained for remote assessment. Assessment:   Diet: DIET DIABETIC CONSISTENT CARB Regular    Food/Nutrition Patient History:  Patient is 59yo male who presented with chest pain. NSTEMI. PMH includes HTN, CHF, anemia, DM2. Patient had cardiac cath on 5/19, YARON on 5/20 and plans for AVR. Patient now has loose teeth but no mouth abscess per CT, awaiting oral surgery consult before moving forward with AVR. Patient reports he is still eating well but the portions here are very large, so he cant finish a whole tray. He also reports no pain or difficulty eating with his loose teeth. Per EMR patient has had some weight loss, possible error in scale as patient still eating decent over last week also loss of fluid from reduced edema possible explanation. Unable to visually verify any physical signs of weight loss or change in edema, but per EMR no edema update today. Encourage patient to continue eating as much as he can and if he notices any mouth pain when eating to notify his nurse. Anthropometrics:  Height: 6' 1\" (185.4 cm), Weight: 69.7 kg (153 lb 11.2 oz), Weight Source: Standing scale (comment), Body mass index is 20.28 kg/m². BMI class of Normal weight. Last 3 Recorded Weights in this Encounter    05/27/20 0457 05/28/20 0514 05/29/20 0512   Weight: 72.1 kg (158 lb 14.4 oz) 71.4 kg (157 lb 4.8 oz) 69.7 kg (153 lb 11.2 oz)   Patient has lost 17 lbs in 1 week, possible errors with standing scale but if not possible patient has lost 10% of weight in week. Macronutrient needs: (using CBW (Current body weight) standing scale 77.4 kg)  EER: 0500-5384 kcal/day (20-25 kcals/kg )  EPR: 77-97 g/day (20%)     Intake/Comparative Standards: Per documentation, patient consuming average 70% of 8 recorded meals in 3 days.  This meets 70-80% of kcal and protein needs  Patient Vitals for the past 100 hrs:   % Diet Eaten   05/29/20 0904 50 %   05/29/20 0415 0 %   05/29/20 0027 0 %   05/26/20 1753 60 %   05/26/20 1410 60 %   05/26/20 0911 60 %   05/25/20 1803 100 %   05/25/20 1437 60 %   05/25/20 1400 70 %   05/25/20 0926 100 %     Nutrition Diagnosis:  Unintended weight loss related to inadequate protein-energy intake  as evidenced by weight loss greater than or equal to 2% in 1 week and poor dentition. Nutrition Intervention:  Meals and Snacks: Continue with current diet. If patients dentition becomes an issue can considered mechanical soft diet for easier texture. Commercial Beverages and Supplements: Patient declined at this time.    Discharge Nutrition Plan: Too soon to determine    Jimmy Davis [address] [place] [address]

## 2020-05-30 LAB
ANION GAP SERPL CALC-SCNC: 7 MMOL/L (ref 7–16)
BASOPHILS # BLD: 0.1 K/UL (ref 0–0.2)
BASOPHILS NFR BLD: 1 % (ref 0–2)
BUN SERPL-MCNC: 30 MG/DL (ref 8–23)
CALCIUM SERPL-MCNC: 8 MG/DL (ref 8.3–10.4)
CHLORIDE SERPL-SCNC: 103 MMOL/L (ref 98–107)
CO2 SERPL-SCNC: 31 MMOL/L (ref 21–32)
CREAT SERPL-MCNC: 1.12 MG/DL (ref 0.8–1.5)
DIFFERENTIAL METHOD BLD: ABNORMAL
EOSINOPHIL # BLD: 0 K/UL (ref 0–0.8)
EOSINOPHIL NFR BLD: 0 % (ref 0.5–7.8)
ERYTHROCYTE [DISTWIDTH] IN BLOOD BY AUTOMATED COUNT: 18.8 % (ref 11.9–14.6)
GLUCOSE BLD STRIP.AUTO-MCNC: 121 MG/DL (ref 65–100)
GLUCOSE BLD STRIP.AUTO-MCNC: 123 MG/DL (ref 65–100)
GLUCOSE BLD STRIP.AUTO-MCNC: 139 MG/DL (ref 65–100)
GLUCOSE BLD STRIP.AUTO-MCNC: 145 MG/DL (ref 65–100)
GLUCOSE SERPL-MCNC: 100 MG/DL (ref 65–100)
HCT VFR BLD AUTO: 29 % (ref 41.1–50.3)
HGB BLD-MCNC: 8.5 G/DL (ref 13.6–17.2)
IMM GRANULOCYTES # BLD AUTO: 0.1 K/UL (ref 0–0.5)
IMM GRANULOCYTES NFR BLD AUTO: 1 % (ref 0–5)
LYMPHOCYTES # BLD: 1.3 K/UL (ref 0.5–4.6)
LYMPHOCYTES NFR BLD: 11 % (ref 13–44)
MCH RBC QN AUTO: 26.3 PG (ref 26.1–32.9)
MCHC RBC AUTO-ENTMCNC: 29.3 G/DL (ref 31.4–35)
MCV RBC AUTO: 89.8 FL (ref 79.6–97.8)
MONOCYTES # BLD: 0.7 K/UL (ref 0.1–1.3)
MONOCYTES NFR BLD: 6 % (ref 4–12)
NEUTS SEG # BLD: 9.3 K/UL (ref 1.7–8.2)
NEUTS SEG NFR BLD: 81 % (ref 43–78)
NRBC # BLD: 0 K/UL (ref 0–0.2)
PLATELET # BLD AUTO: 238 K/UL (ref 150–450)
PMV BLD AUTO: 9.3 FL (ref 9.4–12.3)
POTASSIUM SERPL-SCNC: 3.4 MMOL/L (ref 3.5–5.1)
RBC # BLD AUTO: 3.23 M/UL (ref 4.23–5.6)
SODIUM SERPL-SCNC: 141 MMOL/L (ref 136–145)
WBC # BLD AUTO: 11.5 K/UL (ref 4.3–11.1)

## 2020-05-30 PROCEDURE — 65660000000 HC RM CCU STEPDOWN

## 2020-05-30 PROCEDURE — 82962 GLUCOSE BLOOD TEST: CPT

## 2020-05-30 PROCEDURE — 74011250636 HC RX REV CODE- 250/636: Performed by: INTERNAL MEDICINE

## 2020-05-30 PROCEDURE — 74011250637 HC RX REV CODE- 250/637: Performed by: PHYSICIAN ASSISTANT

## 2020-05-30 PROCEDURE — 80048 BASIC METABOLIC PNL TOTAL CA: CPT

## 2020-05-30 PROCEDURE — 74011250637 HC RX REV CODE- 250/637: Performed by: INTERNAL MEDICINE

## 2020-05-30 PROCEDURE — 74011250637 HC RX REV CODE- 250/637: Performed by: FAMILY MEDICINE

## 2020-05-30 PROCEDURE — 36415 COLL VENOUS BLD VENIPUNCTURE: CPT

## 2020-05-30 PROCEDURE — 74011000258 HC RX REV CODE- 258: Performed by: INTERNAL MEDICINE

## 2020-05-30 PROCEDURE — 74011250637 HC RX REV CODE- 250/637: Performed by: HOSPITALIST

## 2020-05-30 PROCEDURE — 85025 COMPLETE CBC W/AUTO DIFF WBC: CPT

## 2020-05-30 RX ORDER — POTASSIUM CHLORIDE 20 MEQ/1
40 TABLET, EXTENDED RELEASE ORAL
Status: COMPLETED | OUTPATIENT
Start: 2020-05-30 | End: 2020-05-30

## 2020-05-30 RX ORDER — POTASSIUM CHLORIDE 20 MEQ/1
20 TABLET, EXTENDED RELEASE ORAL 2 TIMES DAILY
Status: DISCONTINUED | OUTPATIENT
Start: 2020-05-30 | End: 2020-06-06

## 2020-05-30 RX ADMIN — ENOXAPARIN SODIUM 40 MG: 40 INJECTION SUBCUTANEOUS at 14:33

## 2020-05-30 RX ADMIN — SPIRONOLACTONE 25 MG: 25 TABLET ORAL at 08:50

## 2020-05-30 RX ADMIN — ASPIRIN 81 MG 81 MG: 81 TABLET ORAL at 08:50

## 2020-05-30 RX ADMIN — ATORVASTATIN CALCIUM 20 MG: 20 TABLET, FILM COATED ORAL at 22:26

## 2020-05-30 RX ADMIN — POTASSIUM CHLORIDE 20 MEQ: 20 TABLET, EXTENDED RELEASE ORAL at 18:37

## 2020-05-30 RX ADMIN — LISINOPRIL 2.5 MG: 5 TABLET ORAL at 08:50

## 2020-05-30 RX ADMIN — CARVEDILOL 6.25 MG: 6.25 TABLET, FILM COATED ORAL at 08:50

## 2020-05-30 RX ADMIN — LISINOPRIL 2.5 MG: 5 TABLET ORAL at 18:37

## 2020-05-30 RX ADMIN — FERROUS SULFATE TAB 325 MG (65 MG ELEMENTAL FE) 325 MG: 325 (65 FE) TAB at 08:50

## 2020-05-30 RX ADMIN — Medication 10 ML: at 22:26

## 2020-05-30 RX ADMIN — POTASSIUM CHLORIDE 40 MEQ: 20 TABLET, EXTENDED RELEASE ORAL at 08:54

## 2020-05-30 RX ADMIN — CEFTRIAXONE SODIUM 2 G: 2 INJECTION, POWDER, FOR SOLUTION INTRAMUSCULAR; INTRAVENOUS at 14:33

## 2020-05-30 RX ADMIN — CARVEDILOL 6.25 MG: 6.25 TABLET, FILM COATED ORAL at 18:37

## 2020-05-30 RX ADMIN — Medication 10 ML: at 05:21

## 2020-05-30 RX ADMIN — POTASSIUM CHLORIDE 20 MEQ: 20 TABLET, EXTENDED RELEASE ORAL at 08:54

## 2020-05-30 RX ADMIN — Medication 10 ML: at 14:33

## 2020-05-30 RX ADMIN — FUROSEMIDE 40 MG: 10 INJECTION, SOLUTION INTRAMUSCULAR; INTRAVENOUS at 08:50

## 2020-05-30 RX ADMIN — ESCITALOPRAM OXALATE 10 MG: 10 TABLET ORAL at 08:50

## 2020-05-30 RX ADMIN — FERROUS SULFATE TAB 325 MG (65 MG ELEMENTAL FE) 325 MG: 325 (65 FE) TAB at 18:37

## 2020-05-30 RX ADMIN — FUROSEMIDE 40 MG: 10 INJECTION, SOLUTION INTRAMUSCULAR; INTRAVENOUS at 18:37

## 2020-05-30 NOTE — ROUTINE PROCESS
Bedside and Verbal shift change report given to Edmond Crawford (oncoming nurse) by  Renetta georges. Report included the following information SBAR, ED Summary, Intake/Output, MAR and Recent Results.

## 2020-05-30 NOTE — PROGRESS NOTES
[facility] CARDIOLOGY PROGRESS NOTE    5/30/2020 8:24 AM    Admit Date: 5/16/2020        Subjective:   Stable overnight without angina, CHF, or palpitations. Vitals stable and controlled. No other complaints overnight. Tolerating meds well.      Objective:      Vitals:    05/29/20 1638 05/29/20 2105 05/30/20 0113 05/30/20 0505   BP: 104/68 108/60 106/66 110/70   Pulse: 79 84 98 93   Resp: 18 16 16 18   Temp: 98.1 °F (36.7 °C) 98.3 °F (36.8 °C) 98.4 °F (36.9 °C) 98 °F (36.7 °C)   SpO2: 93% 94% 95% 93%   Weight:    69.6 kg (153 lb 6.4 oz)   Height:           Physical Exam:  Neck- supple, no JVD  CV- regular rate and rhythm, soft NEERAJ RUSB  Lung- clear bilaterally   Abd- soft, nontender, nondistended  Ext- trace-1+ anterior tibial pitting edema  Skin- warm and dry    Data Review:   Recent Labs     05/30/20  0433 05/29/20  0405 05/28/20  0334    142 143   K 3.4* 3.6 3.8   MG  --   --  2.0   BUN 30* 30* 28*   CREA 1.12 1.15 1.05    115* 117*   WBC 11.5* 12.5* 11.9*   HGB 8.5* 8.6* 8.5*   HCT 29.0* 29.0* 28.0*    265 212       Assessment and Plan:     Principal Problem:    NSTEMI (non-ST elevated myocardial infarction) ([facility] [address] (5/16/2020)- occluded OM branch.    The OM was not intervened on secondary to the fact that this likely infarcted at least four days prior to admit, and is outside the window for therapy. Flaca Mays is also unknown whether this is a ruptured plaque or possibly an embolic event and stenting would be potentially contraindicated for the development of mycotic aneurysm.      Active Problems:    Diastolic and systolic combined CHF, chronic (HCC) - stable, lying flat comfortably, continue meds but added IV lasix and diurese for one more day IV, recheck in AM, replete K BID today.        S/P aortic valve replacement- endocarditis- ABX per ID-- YARON Brief Findings: LVEF mild/moderately reduced, bioprosthetic aortic valve with small mobile mass on LVOT side of the prosthesis as well as thickening / restriction of 2/3 leaflets, no other valvular masses/vegetations identified.  Continue Vanc per ID recs. Will need AVR soon once blood cultures cleared. ..   BLOOD CULTURE  FROM 5/24 NEGATIVE X 3 DAYS.   PER CT SURGERY       CAD (coronary artery disease) ()- stable, continue meds      Overview: Nonobstructive       DIEGO on CPAP ()- stable, continue CPAP as tolerated       Dyslipidemia ()- stable, continue meds       Essential hypertension - stable, continue meds       Suspected COVID-19 virus infection (5/16/2020)- ruled out, negative     Continue current care trying to maximize his clinical situation to optimize surgical results for SBE of bAVR

## 2020-05-30 NOTE — PROGRESS NOTES
Progress Note    Patient: Igor Romo MRN: [id number]  SSN: [id number]    YOB: 1960  Age: 62 y.o. Sex: male      Admit Date: 5/16/2020    LOS: 14 days     Subjective:   F/U NSTEMI, endocarditis    \"55 yo male with PMH of CAD (non obstructive  Per [facility] 2018) , DIEGO on CPAP, AS s/p AVR, HTN admitted with  NSTEMI. ECHO showed EF 40%, moderate diffuse hypokinesis. RV pressure 45-50mmHg, aortic valve also had a moderate sized mobile mass suspicious for endocarditis. Dilatation of the ascending aorta seen 41mm. YARON performed on 5/21 confirmed vegetation on aortic valve. BCx 5/18 and 5/20 with strep mutans. ID following, on Ceftriaxone. CT surgery pending blood clearance. LHC on 5/19 with occluded OM branch, outside the time window for intervention. Ruptured plaque vs embolic event. 5/30:  Cards, ID, CT surgery following. Repeat blood culture 5/24 no growth x5d. BCx 5/28 also negative   Await oral surgery eval prior to surgery, no obvious abscess on CT. Nursing followed up with office yesterday but call not returned. Afebrile, stable mild leukocytosis  Feels well. No acute events.     Current Facility-Administered Medications   Medication Dose Route Frequency    lisinopriL (PRINIVIL, ZESTRIL) tablet 2.5 mg  2.5 mg Oral BID    spironolactone (ALDACTONE) tablet 25 mg  25 mg Oral DAILY    carvediloL (COREG) tablet 6.25 mg  6.25 mg Oral BID WITH MEALS    enoxaparin (LOVENOX) injection 40 mg  40 mg SubCUTAneous Q24H    furosemide (LASIX) injection 40 mg  40 mg IntraVENous BID    cefTRIAXone (ROCEPHIN) 2 g in 0.9% sodium chloride (MBP/ADV) 50 mL  2 g IntraVENous Q24H    ferrous sulfate tablet 325 mg  1 Tab Oral BID WITH MEALS    ergocalciferol capsule 50,000 Units  50,000 Units Oral Q7D    insulin lispro (HUMALOG) injection   SubCUTAneous AC&HS    escitalopram oxalate (LEXAPRO) tablet 10 mg  10 mg Oral DAILY    atorvastatin (LIPITOR) tablet 20 mg  20 mg Oral QHS    aspirin chewable tablet 81 mg  81 mg Oral DAILY    sodium chloride (NS) flush 5-40 mL  5-40 mL IntraVENous Q8H    sodium chloride (NS) flush 5-40 mL  5-40 mL IntraVENous PRN    acetaminophen (TYLENOL) tablet 650 mg  650 mg Oral Q6H PRN    morphine 10 mg/ml injection 2 mg  2 mg IntraVENous Q3H PRN    nitroglycerin (NITROSTAT) tablet 0.4 mg  0.4 mg SubLINGual Q5MIN PRN    ondansetron (ZOFRAN) injection 4 mg  4 mg IntraVENous Q4H PRN       Objective:     Vitals:    05/29/20 1638 05/29/20 2105 05/30/20 0113 05/30/20 0505   BP: 104/68 108/60 106/66 110/70   Pulse: 79 84 98 93   Resp: 18 16 16 18   Temp: 98.1 °F (36.7 °C) 98.3 °F (36.8 °C) 98.4 °F (36.9 °C) 98 °F (36.7 °C)   SpO2: 93% 94% 95% 93%   Weight:    69.6 kg (153 lb 6.4 oz)   Height:             Intake and Output:  Current Shift: No intake/output data recorded. Last three shifts: 05/28 1901 - 05/30 0700  In: 920 [P.O.:920]  Out: 6206 [Urine:3525]    Physical Exam:   General:  Alert, cooperative, no distress. Lungs:   Clear to auscultation bilaterally. Heart:  Regular rate and rhythm. Abdomen:   Soft, non-tender. Bowel sounds normal.    Extremities: 2+ edema to LE bilaterally     Pulses: 2+ and symmetric all extremities.    Neurologic: CNII-XII intact      Lab/Data Review:    Recent Results (from the past 24 hour(s))   GLUCOSE, POC    Collection Time: 05/29/20 10:58 AM   Result Value Ref Range    Glucose (POC) 135 (H) 65 - 100 mg/dL   GLUCOSE, POC    Collection Time: 05/29/20  4:20 PM   Result Value Ref Range    Glucose (POC) 129 (H) 65 - 100 mg/dL   GLUCOSE, POC    Collection Time: 05/29/20 10:07 PM   Result Value Ref Range    Glucose (POC) 140 (H) 65 - 361 mg/dL   METABOLIC PANEL, BASIC    Collection Time: 05/30/20  4:33 AM   Result Value Ref Range    Sodium 141 136 - 145 mmol/L    Potassium 3.4 (L) 3.5 - 5.1 mmol/L    Chloride 103 98 - 107 mmol/L    CO2 31 21 - 32 mmol/L    Anion gap 7 7 - 16 mmol/L    Glucose 100 65 - 100 mg/dL    BUN 30 (H) 8 - 23 MG/DL Creatinine 1.12 0.8 - 1.5 MG/DL    GFR est AA >60 >60 ml/min/1.73m2    GFR est non-AA >60 >60 ml/min/1.73m2    Calcium 8.0 (L) 8.3 - 10.4 MG/DL   CBC WITH AUTOMATED DIFF    Collection Time: 05/30/20  4:33 AM   Result Value Ref Range    WBC 11.5 (H) 4.3 - 11.1 K/uL    RBC 3.23 (L) 4.23 - 5.6 M/uL    HGB 8.5 (L) 13.6 - 17.2 g/dL    HCT 29.0 (L) 41.1 - 50.3 %    MCV 89.8 79.6 - 97.8 FL    MCH 26.3 26.1 - 32.9 PG    MCHC 29.3 (L) 31.4 - 35.0 g/dL    RDW 18.8 (H) 11.9 - 14.6 %    PLATELET 634 085 - 278 K/uL    MPV 9.3 (L) 9.4 - 12.3 FL    ABSOLUTE NRBC 0.00 0.0 - 0.2 K/uL    DF AUTOMATED      NEUTROPHILS 81 (H) 43 - 78 %    LYMPHOCYTES 11 (L) 13 - 44 %    MONOCYTES 6 4.0 - 12.0 %    EOSINOPHILS 0 (L) 0.5 - 7.8 %    BASOPHILS 1 0.0 - 2.0 %    IMMATURE GRANULOCYTES 1 0.0 - 5.0 %    ABS. NEUTROPHILS 9.3 (H) 1.7 - 8.2 K/UL    ABS. LYMPHOCYTES 1.3 0.5 - 4.6 K/UL    ABS. MONOCYTES 0.7 0.1 - 1.3 K/UL    ABS. EOSINOPHILS 0.0 0.0 - 0.8 K/UL    ABS. BASOPHILS 0.1 0.0 - 0.2 K/UL    ABS. IMM. GRANS. 0.1 0.0 - 0.5 K/UL   GLUCOSE, POC    Collection Time: 05/30/20  6:12 AM   Result Value Ref Range    Glucose (POC) 123 (H) 65 - 100 mg/dL       Assessment/ Plan:     Principal Problem:    NSTEMI (non-ST elevated myocardial infarction) ([facility] [address] (5/16/2020)    Active Problems:    Diastolic CHF, chronic (HCC) (5/7/2018)      S/P aortic valve replacement (4/1/2018)      CAD (coronary artery disease) ()      Overview: Nonobstructive      DIEGO on CPAP ()      Dyslipidemia ()      Essential hypertension (9/21/2018)      Suspected COVID-19 virus infection (5/16/2020)      Petechiae (5/16/2020)      Anemia (5/16/2020)      Diabetes mellitus, type II ([address] (5/20/2020)      Vitamin D deficiency (5/20/2020)      CAD/CHF with NSTEMI and endocarditis-S/p cardiac cath on 5/19 with no treatable lesions, occluded OM branch. ASA, statin, BB. Needs AVR as below. Cardiology adjusting meds.   Repeat Echo with [id number]    Endocarditis with strep mutans - ID following. Ceftriaxone and gentamycin per ID. Cx 5/18 and 5/20 strep mutans. Repeat blood culture on 5/24 no growth x5d. Positive PCR refers to culture from 5/20. CT surgery following - want oral surgery eval prior to surgery. Consult still pending. LE edema bilaterally - continue Lasix    Normocytic anemia with thrombocytopenia - follow CBC. S/p Iron infusions. Oral iron. Will need to establish care with GI for colon cancer screening at discharge. Hyponatremia - Resolved. Hypokalemia - supplement    Vitamin D deficiency - Supplement. DM type II - A1C 7.1. ADA.  SS. Will need outpatient diabetic education    DVT prophylaxis - SCDs  Signed By: Ever Solorzano MD     May 30, 2020

## 2020-05-30 NOTE — ROUTINE PROCESS
Bedside and Verbal shift change report given to  (oncoming nurse) by Sofya Lux RN (offgoing nurse). Report included the following information SBAR, Kardex, Intake/Output, MAR and Recent Results.

## 2020-05-31 LAB
ANION GAP SERPL CALC-SCNC: 9 MMOL/L (ref 7–16)
BUN SERPL-MCNC: 31 MG/DL (ref 8–23)
CALCIUM SERPL-MCNC: 8.3 MG/DL (ref 8.3–10.4)
CHLORIDE SERPL-SCNC: 103 MMOL/L (ref 98–107)
CO2 SERPL-SCNC: 28 MMOL/L (ref 21–32)
CREAT SERPL-MCNC: 1.32 MG/DL (ref 0.8–1.5)
ERYTHROCYTE [DISTWIDTH] IN BLOOD BY AUTOMATED COUNT: 19.5 % (ref 11.9–14.6)
GLUCOSE BLD STRIP.AUTO-MCNC: 118 MG/DL (ref 65–100)
GLUCOSE BLD STRIP.AUTO-MCNC: 123 MG/DL (ref 65–100)
GLUCOSE BLD STRIP.AUTO-MCNC: 129 MG/DL (ref 65–100)
GLUCOSE BLD STRIP.AUTO-MCNC: 155 MG/DL (ref 65–100)
GLUCOSE SERPL-MCNC: 115 MG/DL (ref 65–100)
HCT VFR BLD AUTO: 31.3 % (ref 41.1–50.3)
HGB BLD-MCNC: 9.3 G/DL (ref 13.6–17.2)
MCH RBC QN AUTO: 26.8 PG (ref 26.1–32.9)
MCHC RBC AUTO-ENTMCNC: 29.7 G/DL (ref 31.4–35)
MCV RBC AUTO: 90.2 FL (ref 79.6–97.8)
NRBC # BLD: 0 K/UL (ref 0–0.2)
PLATELET # BLD AUTO: 240 K/UL (ref 150–450)
PMV BLD AUTO: 9.4 FL (ref 9.4–12.3)
POTASSIUM SERPL-SCNC: 4.3 MMOL/L (ref 3.5–5.1)
RBC # BLD AUTO: 3.47 M/UL (ref 4.23–5.6)
SODIUM SERPL-SCNC: 140 MMOL/L (ref 136–145)
WBC # BLD AUTO: 13.5 K/UL (ref 4.3–11.1)

## 2020-05-31 PROCEDURE — 74011250637 HC RX REV CODE- 250/637: Performed by: HOSPITALIST

## 2020-05-31 PROCEDURE — 85027 COMPLETE CBC AUTOMATED: CPT

## 2020-05-31 PROCEDURE — 74011636637 HC RX REV CODE- 636/637: Performed by: HOSPITALIST

## 2020-05-31 PROCEDURE — 80048 BASIC METABOLIC PNL TOTAL CA: CPT

## 2020-05-31 PROCEDURE — 74011250636 HC RX REV CODE- 250/636: Performed by: INTERNAL MEDICINE

## 2020-05-31 PROCEDURE — 36415 COLL VENOUS BLD VENIPUNCTURE: CPT

## 2020-05-31 PROCEDURE — 74011250637 HC RX REV CODE- 250/637: Performed by: INTERNAL MEDICINE

## 2020-05-31 PROCEDURE — 74011000258 HC RX REV CODE- 258: Performed by: INTERNAL MEDICINE

## 2020-05-31 PROCEDURE — 82962 GLUCOSE BLOOD TEST: CPT

## 2020-05-31 PROCEDURE — 74011250637 HC RX REV CODE- 250/637: Performed by: PHYSICIAN ASSISTANT

## 2020-05-31 PROCEDURE — 74011250637 HC RX REV CODE- 250/637: Performed by: FAMILY MEDICINE

## 2020-05-31 PROCEDURE — 65660000000 HC RM CCU STEPDOWN

## 2020-05-31 RX ADMIN — Medication 10 ML: at 23:02

## 2020-05-31 RX ADMIN — ENOXAPARIN SODIUM 40 MG: 40 INJECTION SUBCUTANEOUS at 12:13

## 2020-05-31 RX ADMIN — ASPIRIN 81 MG 81 MG: 81 TABLET ORAL at 08:43

## 2020-05-31 RX ADMIN — CARVEDILOL 6.25 MG: 6.25 TABLET, FILM COATED ORAL at 17:59

## 2020-05-31 RX ADMIN — ESCITALOPRAM OXALATE 10 MG: 10 TABLET ORAL at 08:43

## 2020-05-31 RX ADMIN — LISINOPRIL 2.5 MG: 5 TABLET ORAL at 17:59

## 2020-05-31 RX ADMIN — CEFTRIAXONE SODIUM 2 G: 2 INJECTION, POWDER, FOR SOLUTION INTRAMUSCULAR; INTRAVENOUS at 12:13

## 2020-05-31 RX ADMIN — FERROUS SULFATE TAB 325 MG (65 MG ELEMENTAL FE) 325 MG: 325 (65 FE) TAB at 17:59

## 2020-05-31 RX ADMIN — CARVEDILOL 6.25 MG: 6.25 TABLET, FILM COATED ORAL at 08:43

## 2020-05-31 RX ADMIN — POTASSIUM CHLORIDE 20 MEQ: 20 TABLET, EXTENDED RELEASE ORAL at 17:59

## 2020-05-31 RX ADMIN — Medication 10 ML: at 14:23

## 2020-05-31 RX ADMIN — Medication 10 ML: at 05:22

## 2020-05-31 RX ADMIN — FERROUS SULFATE TAB 325 MG (65 MG ELEMENTAL FE) 325 MG: 325 (65 FE) TAB at 08:43

## 2020-05-31 RX ADMIN — INSULIN LISPRO 2 UNITS: 100 INJECTION, SOLUTION INTRAVENOUS; SUBCUTANEOUS at 17:59

## 2020-05-31 RX ADMIN — LISINOPRIL 2.5 MG: 5 TABLET ORAL at 08:43

## 2020-05-31 RX ADMIN — FUROSEMIDE 40 MG: 10 INJECTION, SOLUTION INTRAMUSCULAR; INTRAVENOUS at 18:04

## 2020-05-31 RX ADMIN — POTASSIUM CHLORIDE 20 MEQ: 20 TABLET, EXTENDED RELEASE ORAL at 08:43

## 2020-05-31 RX ADMIN — ATORVASTATIN CALCIUM 20 MG: 20 TABLET, FILM COATED ORAL at 23:02

## 2020-05-31 RX ADMIN — FUROSEMIDE 40 MG: 10 INJECTION, SOLUTION INTRAMUSCULAR; INTRAVENOUS at 08:44

## 2020-05-31 RX ADMIN — SPIRONOLACTONE 25 MG: 25 TABLET ORAL at 08:43

## 2020-05-31 NOTE — ROUTINE PROCESS
Bedside and Verbal shift change report given to (oncoming nurse) by Patricia Motley RN (offgoing nurse). Report included the following information SBAR, Kardex, Intake/Output, MAR and Recent Results.

## 2020-05-31 NOTE — PROGRESS NOTES
[facility] CARDIOLOGY PROGRESS NOTE    5/31/2020 8:24 AM    Admit Date: 5/16/2020        Subjective:   Stable overnight without angina, CHF, or palpitations. Vitals stable and controlled. No other complaints overnight. Tolerating meds well.      Objective:      Vitals:    05/30/20 1745 05/30/20 2109 05/31/20 0057 05/31/20 0428   BP: 103/76 106/74 99/53 112/69   Pulse: 74 78 81 92   Resp: 20 18 18 16   Temp: 98.1 °F (36.7 °C) 98.3 °F (36.8 °C) 98.9 °F (37.2 °C) 99.4 °F (37.4 °C)   SpO2: 95% 94% 94% 95%   Weight:    67.4 kg (148 lb 8 oz)   Height:           Physical Exam:  Neck- supple, no JVD  CV- regular rate and rhythm, soft NEERAJ RUSB  Lung- clear bilaterally   Abd- soft, nontender, nondistended  Ext- trace-1+ anterior tibial pitting edema  Skin- warm and dry    Data Review:   Recent Labs     05/31/20  0447 05/30/20  0433    141   K 4.3 3.4*   BUN 31* 30*   CREA 1.32 1.12   * 100   WBC 13.5* 11.5*   HGB 9.3* 8.5*   HCT 31.3* 29.0*    238       Assessment and Plan:     Principal Problem:    NSTEMI (non-ST elevated myocardial infarction) (HCC) (5/16/2020)- occluded OM branch.    The OM was not intervened on secondary to the fact that this likely infarcted at least four days prior to admit, and is outside the window for therapy. Yee Held is also unknown whether this is a ruptured plaque or possibly an embolic event and stenting would be potentially contraindicated for the development of mycotic aneurysm.      Active Problems:    Diastolic and systolic combined CHF, chronic (HCC) - stable, lying flat comfortably, continue meds but added IV lasix and diurese for one more day IV, recheck in AM, replete K BID today.        S/P aortic valve replacement- endocarditis- ABX per ID-- YARNO Brief Findings: LVEF mild/moderately reduced, bioprosthetic aortic valve with small mobile mass on LVOT side of the prosthesis as well as thickening / restriction of 2/3 leaflets, no other valvular masses/vegetations identified.  Continue Vanc per ID recs. Will need AVR soon once blood cultures cleared. ..   BLOOD CULTURE  FROM 5/24 NEGATIVE X 3 DAYS.   PER CT SURGERY       CAD (coronary artery disease) ()- stable, continue meds      Overview: Nonobstructive       DIEGO on CPAP ()- stable, continue CPAP as tolerated       Dyslipidemia ()- stable, continue meds       Essential hypertension - stable, continue meds       Suspected COVID-19 virus infection (5/16/2020)- ruled out, negative     Continue current care trying to maximize his clinical situation to optimize surgical results for SBE of bAVR

## 2020-05-31 NOTE — PROGRESS NOTES
Progress Note    Patient: Anabell Camilo MRN: [id number]  SSN: [id number]    YOB: 1960  Age: 62 y.o. Sex: male      Admit Date: 5/16/2020    LOS: 15 days     Subjective:   F/U NSTEMI, endocarditis    \"55 yo male with PMH of CAD (non obstructive  Per [facility] 2018) , DIEGO on CPAP, AS s/p AVR, HTN admitted with  NSTEMI. ECHO showed EF 40%, moderate diffuse hypokinesis. RV pressure 45-50mmHg, aortic valve also had a moderate sized mobile mass suspicious for endocarditis. Dilatation of the ascending aorta seen 41mm. YARON performed on 5/21 confirmed vegetation on aortic valve. BCx 5/18 and 5/20 with strep mutans. ID following, on Ceftriaxone. CT surgery pending blood clearance. LHC on 5/19 with occluded OM branch, outside the time window for intervention. Ruptured plaque vs embolic event. 5/31:  Cards, ID, CT surgery following. Repeat blood culture 5/24 no growth x5d. BCx 5/28 also NGTD  Afebrile, stable mild leukocytosis  Feels well. No acute events.   Continues to await oral sgy eval prior to AVR    Current Facility-Administered Medications   Medication Dose Route Frequency    potassium chloride (K-DUR, KLOR-CON) SR tablet 20 mEq  20 mEq Oral BID    lisinopriL (PRINIVIL, ZESTRIL) tablet 2.5 mg  2.5 mg Oral BID    spironolactone (ALDACTONE) tablet 25 mg  25 mg Oral DAILY    carvediloL (COREG) tablet 6.25 mg  6.25 mg Oral BID WITH MEALS    enoxaparin (LOVENOX) injection 40 mg  40 mg SubCUTAneous Q24H    furosemide (LASIX) injection 40 mg  40 mg IntraVENous BID    cefTRIAXone (ROCEPHIN) 2 g in 0.9% sodium chloride (MBP/ADV) 50 mL  2 g IntraVENous Q24H    ferrous sulfate tablet 325 mg  1 Tab Oral BID WITH MEALS    ergocalciferol capsule 50,000 Units  50,000 Units Oral Q7D    insulin lispro (HUMALOG) injection   SubCUTAneous AC&HS    escitalopram oxalate (LEXAPRO) tablet 10 mg  10 mg Oral DAILY    atorvastatin (LIPITOR) tablet 20 mg  20 mg Oral QHS    aspirin chewable tablet 81 mg 81 mg Oral DAILY    sodium chloride (NS) flush 5-40 mL  5-40 mL IntraVENous Q8H    sodium chloride (NS) flush 5-40 mL  5-40 mL IntraVENous PRN    acetaminophen (TYLENOL) tablet 650 mg  650 mg Oral Q6H PRN    morphine 10 mg/ml injection 2 mg  2 mg IntraVENous Q3H PRN    nitroglycerin (NITROSTAT) tablet 0.4 mg  0.4 mg SubLINGual Q5MIN PRN    ondansetron (ZOFRAN) injection 4 mg  4 mg IntraVENous Q4H PRN       Objective:     Vitals:    05/30/20 2109 05/31/20 0057 05/31/20 0428 05/31/20 0826   BP: 106/74 99/53 112/69 112/64   Pulse: 78 81 92 93   Resp: 18 18 16 18   Temp: 98.3 °F (36.8 °C) 98.9 °F (37.2 °C) 99.4 °F (37.4 °C) 98.7 °F (37.1 °C)   SpO2: 94% 94% 95% 93%   Weight:   67.4 kg (148 lb 8 oz)    Height:             Intake and Output:  Current Shift: 05/31 0701 - 05/31 1900  In: 240 [P.O.:240]  Out: -   Last three shifts: 05/29 1901 - 05/31 0700  In: 990 [P.O.:990]  Out: 3025 [YWQDP:0065]    Physical Exam:   General:  Alert, cooperative, no distress. Lungs:   Clear to auscultation bilaterally. Heart:  Regular rate and rhythm. Abdomen:   Soft, non-tender. Bowel sounds normal.    Extremities: 2+ edema to LE bilaterally     Pulses: 2+ and symmetric all extremities.    Neurologic: CNII-XII intact      Lab/Data Review:    Recent Results (from the past 24 hour(s))   GLUCOSE, POC    Collection Time: 05/30/20 11:07 AM   Result Value Ref Range    Glucose (POC) 121 (H) 65 - 100 mg/dL   GLUCOSE, POC    Collection Time: 05/30/20  3:25 PM   Result Value Ref Range    Glucose (POC) 145 (H) 65 - 100 mg/dL   GLUCOSE, POC    Collection Time: 05/30/20  9:36 PM   Result Value Ref Range    Glucose (POC) 139 (H) 65 - 267 mg/dL   METABOLIC PANEL, BASIC    Collection Time: 05/31/20  4:47 AM   Result Value Ref Range    Sodium 140 136 - 145 mmol/L    Potassium 4.3 3.5 - 5.1 mmol/L    Chloride 103 98 - 107 mmol/L    CO2 28 21 - 32 mmol/L    Anion gap 9 7 - 16 mmol/L    Glucose 115 (H) 65 - 100 mg/dL    BUN 31 (H) 8 - 23 MG/DL Creatinine 1.32 0.8 - 1.5 MG/DL    GFR est AA >60 >60 ml/min/1.73m2    GFR est non-AA 59 (L) >60 ml/min/1.73m2    Calcium 8.3 8.3 - 10.4 MG/DL   CBC W/O DIFF    Collection Time: 05/31/20  4:47 AM   Result Value Ref Range    WBC 13.5 (H) 4.3 - 11.1 K/uL    RBC 3.47 (L) 4.23 - 5.6 M/uL    HGB 9.3 (L) 13.6 - 17.2 g/dL    HCT 31.3 (L) 41.1 - 50.3 %    MCV 90.2 79.6 - 97.8 FL    MCH 26.8 26.1 - 32.9 PG    MCHC 29.7 (L) 31.4 - 35.0 g/dL    RDW 19.5 (H) 11.9 - 14.6 %    PLATELET 206 431 - 234 K/uL    MPV 9.4 9.4 - 12.3 FL    ABSOLUTE NRBC 0.00 0.0 - 0.2 K/uL   GLUCOSE, POC    Collection Time: 05/31/20  6:14 AM   Result Value Ref Range    Glucose (POC) 129 (H) 65 - 100 mg/dL       Assessment/ Plan:     Principal Problem:    NSTEMI (non-ST elevated myocardial infarction) ([facility] [address] (5/16/2020)    Active Problems:    Diastolic CHF, chronic (HCC) (5/7/2018)      S/P aortic valve replacement (4/1/2018)      CAD (coronary artery disease) ()      Overview: Nonobstructive      DIEGO on CPAP ()      Dyslipidemia ()      Essential hypertension (9/21/2018)      Suspected COVID-19 virus infection (5/16/2020)      Petechiae (5/16/2020)      Anemia (5/16/2020)      Diabetes mellitus, type II ([facility] [address] (5/20/2020)      Vitamin D deficiency (5/20/2020)      CAD/CHF with NSTEMI and endocarditis-S/p cardiac cath on 5/19 with no treatable lesions, occluded OM branch. ASA, statin, BB. Needs AVR as below. Cardiology adjusting meds. Repeat Echo with [id number]    Endocarditis with strep mutans - ID following. Ceftriaxone per ID; gent stopped. Cx 5/18 and 5/20 strep mutans. Repeat blood cultures on 5/24 and 6/28 NGTD  CT surgery following - want oral surgery eval prior to AVR. Awaiting evaluation - call again. LE edema bilaterally - continue Lasix. Improving. Normocytic anemia with thrombocytopenia - S/p Iron infusions. Oral iron. Will need to establish care with GI for colon cancer screening at discharge.    improving    Hyponatremia/Hypokalemia - resolved    Vitamin D deficiency - Supplement. DM type II - A1C 7.1. ADA.  SS. Will need outpatient diabetic education    DVT prophylaxis - SCDs  Signed By: Grover Duque MD     May 31, 2020

## 2020-05-31 NOTE — PROGRESS NOTES
Problem: Falls - Risk of  Goal: *Absence of Falls  Description: Document Pop Ginger Fall Risk and appropriate interventions in the flowsheet. Outcome: Progressing Towards Goal  Note: Fall Risk Interventions:  Mobility Interventions: PT Consult for mobility concerns, Patient to call before getting OOB, OT consult for ADLs         Medication Interventions: Patient to call before getting OOB, Teach patient to arise slowly    Elimination Interventions:  Toilet paper/wipes in reach, Stay With Me (per policy), Call light in reach    History of Falls Interventions: Door open when patient unattended, Evaluate medications/consider consulting pharmacy         Problem: Patient Education: Go to Patient Education Activity  Goal: Patient/Family Education  Outcome: Progressing Towards Goal

## 2020-05-31 NOTE — ROUTINE PROCESS
Bedside and Verbal shift change report to be given to [facility] (oncoming nurse) by  (offgoing nurse). Report included the following information SBAR, Kardex, Intake/Output, MAR and Recent Results. RN to assume care of patient.

## 2020-06-01 LAB
ANION GAP SERPL CALC-SCNC: 5 MMOL/L (ref 7–16)
BUN SERPL-MCNC: 35 MG/DL (ref 8–23)
CALCIUM SERPL-MCNC: 8.2 MG/DL (ref 8.3–10.4)
CHLORIDE SERPL-SCNC: 104 MMOL/L (ref 98–107)
CO2 SERPL-SCNC: 32 MMOL/L (ref 21–32)
CREAT SERPL-MCNC: 1.26 MG/DL (ref 0.8–1.5)
GLUCOSE BLD STRIP.AUTO-MCNC: 121 MG/DL (ref 65–100)
GLUCOSE BLD STRIP.AUTO-MCNC: 123 MG/DL (ref 65–100)
GLUCOSE BLD STRIP.AUTO-MCNC: 140 MG/DL (ref 65–100)
GLUCOSE BLD STRIP.AUTO-MCNC: 162 MG/DL (ref 65–100)
GLUCOSE SERPL-MCNC: 106 MG/DL (ref 65–100)
POTASSIUM SERPL-SCNC: 3.8 MMOL/L (ref 3.5–5.1)
SODIUM SERPL-SCNC: 141 MMOL/L (ref 136–145)

## 2020-06-01 PROCEDURE — 74011250637 HC RX REV CODE- 250/637: Performed by: PHYSICIAN ASSISTANT

## 2020-06-01 PROCEDURE — 74011250636 HC RX REV CODE- 250/636: Performed by: INTERNAL MEDICINE

## 2020-06-01 PROCEDURE — 65660000000 HC RM CCU STEPDOWN

## 2020-06-01 PROCEDURE — 74011000258 HC RX REV CODE- 258: Performed by: INTERNAL MEDICINE

## 2020-06-01 PROCEDURE — 74011250637 HC RX REV CODE- 250/637: Performed by: HOSPITALIST

## 2020-06-01 PROCEDURE — 74011250637 HC RX REV CODE- 250/637: Performed by: FAMILY MEDICINE

## 2020-06-01 PROCEDURE — 36415 COLL VENOUS BLD VENIPUNCTURE: CPT

## 2020-06-01 PROCEDURE — 80048 BASIC METABOLIC PNL TOTAL CA: CPT

## 2020-06-01 PROCEDURE — 82962 GLUCOSE BLOOD TEST: CPT

## 2020-06-01 PROCEDURE — 74011250637 HC RX REV CODE- 250/637: Performed by: INTERNAL MEDICINE

## 2020-06-01 PROCEDURE — 74011636637 HC RX REV CODE- 636/637: Performed by: HOSPITALIST

## 2020-06-01 RX ADMIN — Medication 5 ML: at 12:29

## 2020-06-01 RX ADMIN — CARVEDILOL 6.25 MG: 6.25 TABLET, FILM COATED ORAL at 08:31

## 2020-06-01 RX ADMIN — ENOXAPARIN SODIUM 40 MG: 40 INJECTION SUBCUTANEOUS at 12:28

## 2020-06-01 RX ADMIN — LISINOPRIL 2.5 MG: 5 TABLET ORAL at 08:31

## 2020-06-01 RX ADMIN — SPIRONOLACTONE 25 MG: 25 TABLET ORAL at 08:31

## 2020-06-01 RX ADMIN — POTASSIUM CHLORIDE 20 MEQ: 20 TABLET, EXTENDED RELEASE ORAL at 08:31

## 2020-06-01 RX ADMIN — FERROUS SULFATE TAB 325 MG (65 MG ELEMENTAL FE) 325 MG: 325 (65 FE) TAB at 17:56

## 2020-06-01 RX ADMIN — Medication 10 ML: at 22:01

## 2020-06-01 RX ADMIN — FERROUS SULFATE TAB 325 MG (65 MG ELEMENTAL FE) 325 MG: 325 (65 FE) TAB at 08:31

## 2020-06-01 RX ADMIN — FUROSEMIDE 40 MG: 10 INJECTION, SOLUTION INTRAMUSCULAR; INTRAVENOUS at 08:31

## 2020-06-01 RX ADMIN — CEFTRIAXONE SODIUM 2 G: 2 INJECTION, POWDER, FOR SOLUTION INTRAMUSCULAR; INTRAVENOUS at 12:28

## 2020-06-01 RX ADMIN — CARVEDILOL 6.25 MG: 6.25 TABLET, FILM COATED ORAL at 17:56

## 2020-06-01 RX ADMIN — POTASSIUM CHLORIDE 20 MEQ: 20 TABLET, EXTENDED RELEASE ORAL at 17:56

## 2020-06-01 RX ADMIN — FUROSEMIDE 40 MG: 10 INJECTION, SOLUTION INTRAMUSCULAR; INTRAVENOUS at 17:57

## 2020-06-01 RX ADMIN — ESCITALOPRAM OXALATE 10 MG: 10 TABLET ORAL at 08:31

## 2020-06-01 RX ADMIN — ASPIRIN 81 MG 81 MG: 81 TABLET ORAL at 08:31

## 2020-06-01 RX ADMIN — LISINOPRIL 2.5 MG: 5 TABLET ORAL at 17:56

## 2020-06-01 RX ADMIN — Medication 10 ML: at 05:33

## 2020-06-01 RX ADMIN — INSULIN LISPRO 2 UNITS: 100 INJECTION, SOLUTION INTRAVENOUS; SUBCUTANEOUS at 17:56

## 2020-06-01 RX ADMIN — ATORVASTATIN CALCIUM 20 MG: 20 TABLET, FILM COATED ORAL at 22:00

## 2020-06-01 NOTE — ROUTINE PROCESS
Bedside and verbal shift change report given to [facility], [facility], RN (oncoming nurse) by Patricia georges. Report included the following information SBAR, Kardex, Intake/Output, MAR and Recent Results.

## 2020-06-01 NOTE — ROUTINE PROCESS
Bedside and Verbal shift change report given to self (oncoming nurse) by [facility], [facility] RN (offgoing nurse). Report included the following information SBAR, Kardex, Intake/Output and MAR.

## 2020-06-01 NOTE — CONSULTS
OMFS Consult Note    I was called last week regarding Mr Ayala Lam, but I was told to \"disregard. \"  I was called again yesterday to consult Mr Ayala Lam for dental clearance prior to a re-do of his AVR. I found him sitting up in a chair, just finished his supper, watching TV, and in NAD at the time I entered the room. He was very pleasant and said he was not hurting. PMHx:  CHF   S/P AVR - 4/2018   Known CAD s/p NSTEMI   NIDDM   HTN   DIEGO   Hyperlipidemia    PSHx: AVR - 4/2018   Hernia repair - 6/2018   PTCA - 2/2018    Meds: See medication list for full list   Lovenox   Humolog    All: NKDA    FHx: CA, HTN, CHF    SHx: 1 PPD for 40 years - discontinued in 2018   EtOH - 2-3 drinks per day      HPI: No real complaints about his face or mouth. He did mention that he has some cold sensitivity in the ULQ. He points in the area of #13 and 14. Otherwise, no real issues according to the patient. Speaking normally. PE: Thin, frail, pale looking gentlemen of equal proportion    HEENT: NC/AT - no obvious swelling or deformity about the face. Intra-orally, periodontal disease throughout with recession more pronounced in the maxilla    Mobile lower incisiors - 25, 26 with Class I mobility    No clinical crown on #21    FOM supple, not elevated    Oropharynx is well visualized and without swelling      A/P: Mr Ayala Lam is a 62year old WM with known CAD, CHF, and a failing valve replacement scheduled for repair of his AV   Dental Caries   Gross Periodontal Disease    I do not think Mr Ayala Lam really needs much done from a surgical standpoint. However, given his situation, pending AVR, and the fact that he does not have routine care, we can plan for removal of several teeth. We can plan for the operating room this week to evaluate and remove those teeth under GA. I will review his CT to help with the clinical exam.  We will let you know when we can get this scheduled.   We would need his Lovenox, Humolog held, NPO, and Ampicillan 2.0 grams pre-operatively. Please call [phone] with any questions or problems. We will advise.               DD

## 2020-06-01 NOTE — PROGRESS NOTES
[facility] CARDIOLOGY PROGRESS NOTE           6/1/2020 3:33 PM    Admit Date: 5/16/2020      Subjective:   No cp or inc sob        Objective:      Vitals:    06/01/20 0500 06/01/20 0714 06/01/20 0803 06/01/20 1422   BP: 102/58  112/75 91/62   Pulse: 78  80 67   Resp: 19 20 20   Temp: 97.6 °F (36.4 °C)  98.5 °F (36.9 °C) 96.8 °F (36 °C)   SpO2: 96%  91% 95%   Weight:  67.6 kg (149 lb)     Height:           Physical Exam:  General-No Acute Distress  Neck- supple, no JVD  CV- regular rate and rhythm no MRG, S3 not as prominent  Lung- clear bilaterally  Abd- soft, nontender, nondistended  Ext- no edema bilaterally. Skin- warm and dry    Data Review:   Recent Labs     06/01/20  0349 05/31/20  0447 05/30/20  0433    140 141   K 3.8 4.3 3.4*   BUN 35* 31* 30*   CREA 1.26 1.32 1.12   * 115* 100   WBC  --  13.5* 11.5*   HGB  --  9.3* 8.5*   HCT  --  31.3* 29.0*   PLT  --  240 238       Assessment/Plan:     Nstemi, cath 5/19, OM occluded, ? embolic  Step. mutans bAVR endocarditis  Diabetic  Cardiomyopathy, ef 40% 5/17  Poor dentition    ///    Oral surgery is to see pt today. Cont.  Rx.          Olya Joya MD  6/1/2020 3:33 PM

## 2020-06-01 NOTE — PROGRESS NOTES
Progress Note    Patient: Sharmila Montaño MRN: [id number]  SSN: [id number]    YOB: 1960  Age: 62 y.o. Sex: male      Admit Date: 5/16/2020    LOS: 16 days     Subjective:   F/U NSTEMI, endocarditis    \"53 yo male with PMH of CAD (non obstructive  Per [facility] 2018) , DIEGO on CPAP, AS s/p AVR, HTN admitted with  NSTEMI. ECHO showed EF 40%, moderate diffuse hypokinesis. RV pressure 45-50mmHg, aortic valve also had a moderate sized mobile mass suspicious for endocarditis. Dilatation of the ascending aorta seen 41mm. YARON performed on 5/21 confirmed vegetation on aortic valve. BCx 5/18 and 5/20 with strep mutans. ID following, on Ceftriaxone. CT surgery pending blood clearance. LHC on 5/19 with occluded OM branch, outside the time window for intervention. Ruptured plaque vs embolic event. 6/1:  Cards, ID, CT surgery following. Repeat blood culture 5/24 no growth x5d. BCx 5/28 also NGTD  Afebrile  Feels well. No acute events. Walking halls. Patient reports oral surgeon coming today, ID recommends extractions prior to new valve.     Current Facility-Administered Medications   Medication Dose Route Frequency    potassium chloride (K-DUR, KLOR-CON) SR tablet 20 mEq  20 mEq Oral BID    lisinopriL (PRINIVIL, ZESTRIL) tablet 2.5 mg  2.5 mg Oral BID    spironolactone (ALDACTONE) tablet 25 mg  25 mg Oral DAILY    carvediloL (COREG) tablet 6.25 mg  6.25 mg Oral BID WITH MEALS    enoxaparin (LOVENOX) injection 40 mg  40 mg SubCUTAneous Q24H    furosemide (LASIX) injection 40 mg  40 mg IntraVENous BID    cefTRIAXone (ROCEPHIN) 2 g in 0.9% sodium chloride (MBP/ADV) 50 mL  2 g IntraVENous Q24H    ferrous sulfate tablet 325 mg  1 Tab Oral BID WITH MEALS    ergocalciferol capsule 50,000 Units  50,000 Units Oral Q7D    insulin lispro (HUMALOG) injection   SubCUTAneous AC&HS    escitalopram oxalate (LEXAPRO) tablet 10 mg  10 mg Oral DAILY    atorvastatin (LIPITOR) tablet 20 mg  20 mg Oral QHS    aspirin chewable tablet 81 mg  81 mg Oral DAILY    sodium chloride (NS) flush 5-40 mL  5-40 mL IntraVENous Q8H    sodium chloride (NS) flush 5-40 mL  5-40 mL IntraVENous PRN    acetaminophen (TYLENOL) tablet 650 mg  650 mg Oral Q6H PRN    morphine 10 mg/ml injection 2 mg  2 mg IntraVENous Q3H PRN    nitroglycerin (NITROSTAT) tablet 0.4 mg  0.4 mg SubLINGual Q5MIN PRN    ondansetron (ZOFRAN) injection 4 mg  4 mg IntraVENous Q4H PRN       Objective:     Vitals:    06/01/20 0041 06/01/20 0500 06/01/20 0714 06/01/20 0803   BP: 95/54 102/58  112/75   Pulse: 76 78  80   Resp: 18 19  20   Temp: 97.7 °F (36.5 °C) 97.6 °F (36.4 °C)  98.5 °F (36.9 °C)   SpO2: 94% 96%  91%   Weight:   67.6 kg (149 lb)    Height:             Intake and Output:  Current Shift: No intake/output data recorded. Last three shifts: 05/30 1901 - 06/01 0700  In: 730 [P.O.:730]  Out: 3075 [Urine:3075]    Physical Exam:   General:  Alert, cooperative, no distress. Lungs:   Clear to auscultation bilaterally. Heart:  Regular rate and rhythm. Abdomen:   Soft, non-tender. Bowel sounds normal.    Extremities: 2+ edema to LE bilaterally     Pulses: 2+ and symmetric all extremities.    Neurologic: CNII-XII intact      Lab/Data Review:    Recent Results (from the past 24 hour(s))   GLUCOSE, POC    Collection Time: 05/31/20  3:11 PM   Result Value Ref Range    Glucose (POC) 155 (H) 65 - 100 mg/dL   GLUCOSE, POC    Collection Time: 05/31/20  9:40 PM   Result Value Ref Range    Glucose (POC) 118 (H) 65 - 663 mg/dL   METABOLIC PANEL, BASIC    Collection Time: 06/01/20  3:49 AM   Result Value Ref Range    Sodium 141 136 - 145 mmol/L    Potassium 3.8 3.5 - 5.1 mmol/L    Chloride 104 98 - 107 mmol/L    CO2 32 21 - 32 mmol/L    Anion gap 5 (L) 7 - 16 mmol/L    Glucose 106 (H) 65 - 100 mg/dL    BUN 35 (H) 8 - 23 MG/DL    Creatinine 1.26 0.8 - 1.5 MG/DL    GFR est AA >60 >60 ml/min/1.73m2    GFR est non-AA >60 >60 ml/min/1.73m2    Calcium 8.2 (L) 8.3 - 10.4 MG/DL   GLUCOSE, POC    Collection Time: 06/01/20  6:26 AM   Result Value Ref Range    Glucose (POC) 121 (H) 65 - 100 mg/dL   GLUCOSE, POC    Collection Time: 06/01/20 10:55 AM   Result Value Ref Range    Glucose (POC) 140 (H) 65 - 100 mg/dL       Assessment/ Plan:     Principal Problem:    NSTEMI (non-ST elevated myocardial infarction) ([facility] [address] (5/16/2020)    Active Problems:    Diastolic CHF, chronic (HCC) (5/7/2018)      S/P aortic valve replacement (4/1/2018)      CAD (coronary artery disease) ()      Overview: Nonobstructive      DIEGO on CPAP ()      Dyslipidemia ()      Essential hypertension (9/21/2018)      Suspected COVID-19 virus infection (5/16/2020)      Petechiae (5/16/2020)      Anemia (5/16/2020)      Diabetes mellitus, type II ([facility] [address] (5/20/2020)      Vitamin D deficiency (5/20/2020)      CAD/CHF with NSTEMI and endocarditis-S/p cardiac cath on 5/19 with no treatable lesions, occluded OM branch. ASA, statin, BB. Needs AVR as below. Cardiology adjusting meds. Repeat Echo with [id number]    Endocarditis with strep mutans - ID following. Ceftriaxone per ID; gent stopped. Cx 5/18 and 5/20 strep mutans. Repeat blood cultures on 5/24 and 6/28 NGTD  CT surgery following. Oral surgery eval for extractions prior to valve replacement. LE edema bilaterally - continue Lasix. Improving. Normocytic anemia with thrombocytopenia - S/p Iron infusions. Oral iron. Will need to establish care with GI for colon cancer screening at discharge. improving    Hyponatremia/Hypokalemia - resolved    Vitamin D deficiency - Supplement. DM type II - A1C 7.1. ADA.  SS. Will need outpatient diabetic education    DVT prophylaxis - SCDs  Signed By: Joan Billy MD     June 1, 2020

## 2020-06-01 NOTE — PROGRESS NOTES
Care Management Interventions  PCP Verified by CM: Yes  Last Visit to PCP: 05/04/20  Mode of Transport at Discharge: Other (see comment)(Camelia Dawkins Spouse [phone] )  Transition of Care Consult (CM Consult): Discharge Planning, Home Health  Discharge Durable Medical Equipment: No  Physical Therapy Consult: No  Occupational Therapy Consult: No  Current Support Network: Family Lives Nearby, Lives Alone  Confirm Follow Up Transport: Family  The Plan for Transition of Care is Related to the Following Treatment Goals : [address] Provided?: (Deann Romero)  Discharge Location  Discharge Placement: Home with home health    CM spoke with pt this AM regarding condition. Pt still waiting on oral surgeon consult prior to consideration for AVR replacement valve. CM encouraged pt to increase his activity/ walk in the [place]  for continued strength and health prior to OR. He verbalizes understanding. CM to continue to follow.

## 2020-06-01 NOTE — PROGRESS NOTES
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 [facility] 20 and  with strep mutans. · Dental caries : given persistent bactermia and loose teeth, check CT soft tissue neck: if abscess recommend Oral Surgery consult for extraction before has AVR  · Anemia, of acute disease: follow  · Hx AVR   · DM, A1c 7    Plan:   · Continue CTX 2g IV q24h. Duration 6 weeks (date depends on surgery)   · Redo AVR planned but timing TBD. Oral surgery contacted for evaluation--this is still pending. Highly recommend extraction prior to surgery. Anti-infectives:   CTX -  Gentamicin -    Subjective:   Afebrile, doing well. No complaints. Has not seen oral surgeon yet     Review of Systems:  A comprehensive review of systems was negative except for that written in the History of Present Illness. Objective:     Visit Vitals  /75   Pulse 80   Temp 98.5 °F (36.9 °C)   Resp 20   Ht 6' 1\" (1.854 m)   Wt 67.6 kg (149 lb)   SpO2 91%   BMI 19.66 kg/m²     Temp (24hrs), Av °F (36.7 °C), Min:97.2 °F (36.2 °C), Max:98.9 °F (37.2 °C)  No changes from my previous exam       Lines:  Peripheral IV:       Physical Exam:   General:  Alert, cooperative, well noursished, well developed, appears stated age   Eyes:  Sclera anicteric. Pupils equally round and reactive to light. Mouth/Throat: Mucous membranes normal, oral pharynx clear: left upper two back molars are rotten; no odor. Two front lower teeth on right are clearly loose; gums not swollen;    Neck: Supple   Lungs:   Clear to auscultation bilaterally, good effort   CV:  Regular rate and rhythm, 3/6 SM   Abdomen:   Soft, non-tender.  bowel sounds normal. non-distended   Extremities: No cyanosis or edema   Skin: Skin color, texture, turgor normal. no acute rash or lesions   Lymph nodes: Cervical, submandibular and supraclavicular normal   Musculoskeletal: No swelling or deformity   Lines/Devices:  Intact, no erythema, drainage or tenderness   Psych: Alert and oriented, normal mood affect given the setting       Data Review:     CBC:   Recent Labs     05/31/20 0447 05/30/20 0433   WBC 13.5* 11.5*   RBC 3.47* 3.23*   HGB 9.3* 8.5*   HCT 31.3* 29.0*    238   GRANS  --  81*   LYMPH  --  11*   EOS  --  0*     CMP:   Recent Labs     06/01/20  0349 05/31/20 0447 05/30/20 0433   * 115* 100    140 141   K 3.8 4.3 3.4*    103 103   CO2 32 28 31   BUN 35* 31* 30*   CREA 1.26 1.32 1.12   CA 8.2* 8.3 8.0*   AGAP 5* 9 7     Liver Enzymes:   No results for input(s): TP, ALB, TBIL, AP in the last 72 hours.     No lab exists for component: SGOT, GPT, DBIL  Antibiotic Monitoring:   Lab Results   Component Value Date/Time    Vancomycin,trough 11.8 05/22/2020 12:58 AM        Microbiology:     All Micro Results     Procedure Component Value Units Date/Time    CULTURE, BLOOD [[id number]] Collected:  05/29/20 0405    Order Status:  Completed Specimen:  Blood Updated:  06/01/20 0653     Special Requests: --        LEFT  Antecubital       Culture result: NO GROWTH 3 DAYS       CULTURE, BLOOD [[id number]] Collected:  05/29/20 0412    Order Status:  Completed Specimen:  Blood Updated:  06/01/20 0653     Special Requests: --        RIGHT  Antecubital       Culture result: NO GROWTH 3 DAYS       CULTURE, BLOOD [[id number]] Collected:  05/24/20 1321    Order Status:  Completed Specimen:  Blood Updated:  05/29/20 0723     Special Requests: --        LEFT  Antecubital       Culture result: NO GROWTH 5 DAYS       CULTURE, BLOOD [[id number]]  (Abnormal)  (Susceptibility) Collected:  05/20/20 1424    Order Status:  Completed Specimen:  Blood Updated:  05/26/20 0834     Special Requests: --        RIGHT  FOREARM       GRAM STAIN GRAM POSITIVE COCCI         PEDIATRIC BOTTLE               RESULTS VERIFIED, PHONED TO AND READ BACK [facility] ON 5.24.20             Culture result: STREPTOCOCCUS MUTANS         REFER TO BIOFIRE PANEL ACC [id number]    CULTURE, BLOOD [[id number]] Collected:  05/20/20 1434    Order Status:  Completed Specimen:  Blood Updated:  05/25/20 0633     Special Requests: --        LEFT  Antecubital       Culture result: NO GROWTH 5 DAYS       BLOOD CULTURE ID PANEL [[id number]]  (Abnormal) Collected:  05/24/20 0105    Order Status:  Completed Specimen:  Blood Updated:  05/24/20 0647     Acc. no. from Micro Order [id number]     Streptococcus Detected        Comment: RESULTS VERIFIED, PHONED TO AND READ BACK BY  Cynthia Cervantes RN @4621 ON 5/24/20 AK. INTERPRETATION       Gram positive cocci. Identified by realtime PCR as Streptococcus species (not agalactiae, pyogenes, or pneumoniae). Comment: Recommend discontinuing IV vancomycin starting cefazolin or nafcillin if patient not on beta-lactam therapy. Infectious Diseases Consult recommended in adult patients. THIS TEST DOES NOT REPLACE SENSITIVITY TESTING. CULTURE, BLOOD [[id number]]  (Abnormal) Collected:  05/18/20 1944    Order Status:  Completed Specimen:  Blood Updated:  05/23/20 0720     Special Requests: --        RIGHT  HAND       GRAM STAIN GRAM POSITIVE COCCI         PEDIATRIC BOTTLE               CRITICAL RESULT NOT CALLED DUE TO PREVIOUS NOTIFICATION OF CRITICAL RESULT WITHIN THE LAST 24 HOURS. Culture result:       ALPHA STREPTOCOCCUS, NOT S.  PNEUMONIAE            FOR ID AND SUSCEPTIBILITY REFER TO CULTURE NO ACC [id number]    CULTURE, BLOOD [[id number]]  (Abnormal)  (Susceptibility) Collected:  05/18/20 1938    Order Status:  Completed Specimen:  Blood Updated:  05/23/20 0703     Special Requests: --        RIGHT  ARM       GRAM STAIN GRAM POS COCCI IN CHAINS         AEROBIC BOTTLE POSITIVE               RESULTS VERIFIED, PHONED TO AND READ BACK BY Frances Bautista RN @ 8570 ON 5/20/20 BY AMM           Culture result: STREPTOCOCCUS MUTANS         REFER TO [employer] works PANEL ACCESSION [id number]    CULTURE, BLOOD [[id number]]     Order Status:  Canceled Specimen:  Blood     CULTURE, BLOOD [[id number]]     Order Status:  Canceled Specimen:  Blood     BLOOD CULTURE ID PANEL [[id number]]  (Abnormal) Collected:  05/18/20 1938    Order Status:  Completed Specimen:  Blood Updated:  05/20/20 1117     Acc. no. from Micro Order [id number]     Streptococcus Detected        Comment: RESULTS VERIFIED, PHONED TO AND READ BACK BY  Eric Collazo RN @ 1120 ON 5/20/20 BY AMM          INTERPRETATION       Gram positive cocci. Identified by realtime PCR as Streptococcus species (not agalactiae, pyogenes, or pneumoniae).            Comment: Consider discontinuation of IV vancomycin and using an anti-streptococcal beta-lactam (ceftriaxone/cefotaxime (peds))       EMERGENT DISEASE PANEL [[id number]] Collected:  05/16/20 1946    Order Status:  Completed Specimen:  Not Specified Updated:  05/18/20 1614     Emergent disease panel Not detected        Comment: Performed at [place] [address]               Imaging:     See [facility]/EPIC     Signed By: Ann Ricketts NP     June 1, 2020

## 2020-06-02 PROBLEM — I38 ENDOCARDITIS OF PROSTHETIC VALVE (HCC): Status: ACTIVE | Noted: 2020-06-02

## 2020-06-02 PROBLEM — T82.6XXA ENDOCARDITIS OF PROSTHETIC VALVE (HCC): Status: ACTIVE | Noted: 2020-06-02

## 2020-06-02 LAB
ANION GAP SERPL CALC-SCNC: 10 MMOL/L (ref 7–16)
BUN SERPL-MCNC: 34 MG/DL (ref 8–23)
CALCIUM SERPL-MCNC: 8.1 MG/DL (ref 8.3–10.4)
CHLORIDE SERPL-SCNC: 102 MMOL/L (ref 98–107)
CO2 SERPL-SCNC: 28 MMOL/L (ref 21–32)
CREAT SERPL-MCNC: 1.19 MG/DL (ref 0.8–1.5)
ERYTHROCYTE [DISTWIDTH] IN BLOOD BY AUTOMATED COUNT: 19.4 % (ref 11.9–14.6)
GLUCOSE BLD STRIP.AUTO-MCNC: 117 MG/DL (ref 65–100)
GLUCOSE BLD STRIP.AUTO-MCNC: 120 MG/DL (ref 65–100)
GLUCOSE BLD STRIP.AUTO-MCNC: 120 MG/DL (ref 65–100)
GLUCOSE BLD STRIP.AUTO-MCNC: 146 MG/DL (ref 65–100)
GLUCOSE SERPL-MCNC: 108 MG/DL (ref 65–100)
HCT VFR BLD AUTO: 29.3 % (ref 41.1–50.3)
HGB BLD-MCNC: 9.1 G/DL (ref 13.6–17.2)
MCH RBC QN AUTO: 28 PG (ref 26.1–32.9)
MCHC RBC AUTO-ENTMCNC: 31.1 G/DL (ref 31.4–35)
MCV RBC AUTO: 90.2 FL (ref 79.6–97.8)
NRBC # BLD: 0 K/UL (ref 0–0.2)
PLATELET # BLD AUTO: 228 K/UL (ref 150–450)
PMV BLD AUTO: 10 FL (ref 9.4–12.3)
POTASSIUM SERPL-SCNC: 3.5 MMOL/L (ref 3.5–5.1)
RBC # BLD AUTO: 3.25 M/UL (ref 4.23–5.6)
SODIUM SERPL-SCNC: 140 MMOL/L (ref 136–145)
WBC # BLD AUTO: 12 K/UL (ref 4.3–11.1)

## 2020-06-02 PROCEDURE — 36573 INSJ PICC RS&I 5 YR+: CPT | Performed by: INTERNAL MEDICINE

## 2020-06-02 PROCEDURE — 36415 COLL VENOUS BLD VENIPUNCTURE: CPT

## 2020-06-02 PROCEDURE — 74011250637 HC RX REV CODE- 250/637: Performed by: FAMILY MEDICINE

## 2020-06-02 PROCEDURE — 74011250637 HC RX REV CODE- 250/637: Performed by: INTERNAL MEDICINE

## 2020-06-02 PROCEDURE — 65660000000 HC RM CCU STEPDOWN

## 2020-06-02 PROCEDURE — 74011250636 HC RX REV CODE- 250/636: Performed by: INTERNAL MEDICINE

## 2020-06-02 PROCEDURE — 80048 BASIC METABOLIC PNL TOTAL CA: CPT

## 2020-06-02 PROCEDURE — 82962 GLUCOSE BLOOD TEST: CPT

## 2020-06-02 PROCEDURE — 74011000258 HC RX REV CODE- 258: Performed by: INTERNAL MEDICINE

## 2020-06-02 PROCEDURE — 74011250637 HC RX REV CODE- 250/637: Performed by: PHYSICIAN ASSISTANT

## 2020-06-02 PROCEDURE — C1751 CATH, INF, PER/CENT/MIDLINE: HCPCS

## 2020-06-02 PROCEDURE — 85027 COMPLETE CBC AUTOMATED: CPT

## 2020-06-02 PROCEDURE — 74011250637 HC RX REV CODE- 250/637: Performed by: HOSPITALIST

## 2020-06-02 RX ORDER — SODIUM CHLORIDE 0.9 % (FLUSH) 0.9 %
10 SYRINGE (ML) INJECTION EVERY 8 HOURS
Status: DISCONTINUED | OUTPATIENT
Start: 2020-06-02 | End: 2020-06-09 | Stop reason: HOSPADM

## 2020-06-02 RX ORDER — SODIUM CHLORIDE 0.9 % (FLUSH) 0.9 %
10 SYRINGE (ML) INJECTION AS NEEDED
Status: DISCONTINUED | OUTPATIENT
Start: 2020-06-02 | End: 2020-06-09 | Stop reason: HOSPADM

## 2020-06-02 RX ADMIN — CEFTRIAXONE SODIUM 2 G: 2 INJECTION, POWDER, FOR SOLUTION INTRAMUSCULAR; INTRAVENOUS at 12:55

## 2020-06-02 RX ADMIN — FUROSEMIDE 40 MG: 10 INJECTION, SOLUTION INTRAMUSCULAR; INTRAVENOUS at 17:27

## 2020-06-02 RX ADMIN — ESCITALOPRAM OXALATE 10 MG: 10 TABLET ORAL at 09:45

## 2020-06-02 RX ADMIN — CARVEDILOL 6.25 MG: 6.25 TABLET, FILM COATED ORAL at 17:27

## 2020-06-02 RX ADMIN — Medication 10 ML: at 06:00

## 2020-06-02 RX ADMIN — POTASSIUM CHLORIDE 20 MEQ: 20 TABLET, EXTENDED RELEASE ORAL at 17:27

## 2020-06-02 RX ADMIN — ASPIRIN 81 MG 81 MG: 81 TABLET ORAL at 09:45

## 2020-06-02 RX ADMIN — CARVEDILOL 6.25 MG: 6.25 TABLET, FILM COATED ORAL at 09:45

## 2020-06-02 RX ADMIN — Medication 10 ML: at 22:37

## 2020-06-02 RX ADMIN — FERROUS SULFATE TAB 325 MG (65 MG ELEMENTAL FE) 325 MG: 325 (65 FE) TAB at 17:27

## 2020-06-02 RX ADMIN — FUROSEMIDE 40 MG: 10 INJECTION, SOLUTION INTRAMUSCULAR; INTRAVENOUS at 09:46

## 2020-06-02 RX ADMIN — ATORVASTATIN CALCIUM 20 MG: 20 TABLET, FILM COATED ORAL at 22:36

## 2020-06-02 RX ADMIN — FERROUS SULFATE TAB 325 MG (65 MG ELEMENTAL FE) 325 MG: 325 (65 FE) TAB at 09:44

## 2020-06-02 RX ADMIN — LISINOPRIL 2.5 MG: 5 TABLET ORAL at 09:45

## 2020-06-02 RX ADMIN — LISINOPRIL 2.5 MG: 5 TABLET ORAL at 17:27

## 2020-06-02 RX ADMIN — SPIRONOLACTONE 25 MG: 25 TABLET ORAL at 09:45

## 2020-06-02 RX ADMIN — POTASSIUM CHLORIDE 20 MEQ: 20 TABLET, EXTENDED RELEASE ORAL at 09:45

## 2020-06-02 NOTE — PROGRESS NOTES
Problem: Falls - Risk of  Goal: *Absence of Falls  Description: Document Starleen Freeze Fall Risk and appropriate interventions in the flowsheet.   Outcome: Progressing Towards Goal  Note: Fall Risk Interventions:  Mobility Interventions: Strengthening exercises (ROM-active/passive)         Medication Interventions: Assess postural VS orthostatic hypotension, Teach patient to arise slowly    Elimination Interventions: Call light in reach, Urinal in reach    History of Falls Interventions: Door open when patient unattended         Problem: Patient Education: Go to Patient Education Activity  Goal: Patient/Family Education  Outcome: Progressing Towards Goal

## 2020-06-02 NOTE — ROUTINE PROCESS
Verbal bedside report given to harriet Tran RN. Patient's situation, background, assessment and recommendations provided. Opportunity for questions provided. Oncoming RN assumed care of patient.

## 2020-06-02 NOTE — PROGRESS NOTES
Hospitalist Progress Note    Patient: Anabell Camilo MRN: [id number]  SSN: [id number]    YOB: 1960  Age: 62 y.o. 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 He is going to have an AVR replacement after dental extraction. 6/2 - He feels good. Denies CP/SOB. Denies F/C/N/V. Review of systems negative except stated above. Objective:     Visit Vitals  /66   Pulse 75   Temp 98.1 °F (36.7 °C)   Resp 17   Ht 6' 1\" (1.854 m)   Wt 65.2 kg (143 lb 12.8 oz)   SpO2 97%   BMI 18.97 kg/m²      Oxygen Therapy  O2 Sat (%): 97 % (06/02/20 1716)  Pulse via Oximetry: 96 beats per minute (05/27/20 1915)  O2 Device: Room air (06/02/20 1716)  O2 Flow Rate (L/min): 2 l/min (05/26/20 0015)  FIO2 (%): 21 % (05/27/20 1915)      Intake and Output:     Intake/Output Summary (Last 24 hours) at 6/2/2020 1840  Last data filed at 6/2/2020 1203  Gross per 24 hour   Intake 360 ml   Output 825 ml   Net -465 ml         Physical Exam:   GENERAL: alert, cooperative, no distress, appears stated age  EYE: conjunctivae/corneas clear. PERRL. THROAT & NECK: normal and no erythema or exudates noted. LUNG: clear to auscultation bilaterally  HEART: regular rate and rhythm, S1S2, no murmur, no JVD  ABDOMEN: soft, non-tender, non-distended. Bowel sounds normal.   EXTREMITIES:  No edema, 2+ pedal/radial pulses bilaterally  SKIN: no rash or abnormalities  NEUROLOGIC: A&Ox3. Cranial nerves 2-12 grossly intact.     Lab/Data Review:  Recent Results (from the past 24 hour(s))   GLUCOSE, POC    Collection Time: 06/01/20 10:18 PM   Result Value Ref Range    Glucose (POC) 123 (H) 65 - 100 mg/dL   CBC W/O DIFF    Collection Time: 06/02/20  6:10 AM   Result Value Ref Range    WBC 12.0 (H) 4.3 - 11.1 K/uL    RBC 3.25 (L) 4.23 - 5.6 M/uL    HGB 9.1 (L) 13.6 - 17.2 g/dL    HCT 29.3 (L) 41.1 - 50.3 %    MCV 90.2 79.6 - 97.8 FL    MCH 28.0 26.1 - 32.9 PG    MCHC 31.1 (L) 31.4 - 35.0 g/dL    RDW 19.4 (H) 11.9 - 14.6 %    PLATELET 143 601 - 253 K/uL    MPV 10.0 9.4 - 12.3 FL    ABSOLUTE NRBC 0.00 0.0 - 0.2 K/uL   METABOLIC PANEL, BASIC    Collection Time: 06/02/20  6:10 AM   Result Value Ref Range    Sodium 140 136 - 145 mmol/L    Potassium 3.5 3.5 - 5.1 mmol/L    Chloride 102 98 - 107 mmol/L    CO2 28 21 - 32 mmol/L    Anion gap 10 7 - 16 mmol/L    Glucose 108 (H) 65 - 100 mg/dL    BUN 34 (H) 8 - 23 MG/DL    Creatinine 1.19 0.8 - 1.5 MG/DL    GFR est AA >60 >60 ml/min/1.73m2    GFR est non-AA >60 >60 ml/min/1.73m2    Calcium 8.1 (L) 8.3 - 10.4 MG/DL   GLUCOSE, POC    Collection Time: 06/02/20  6:43 AM   Result Value Ref Range    Glucose (POC) 117 (H) 65 - 100 mg/dL   GLUCOSE, POC    Collection Time: 06/02/20 11:15 AM   Result Value Ref Range    Glucose (POC) 120 (H) 65 - 100 mg/dL   GLUCOSE, POC    Collection Time: 06/02/20  3:54 PM   Result Value Ref Range    Glucose (POC) 120 (H) 65 - 100 mg/dL       Imaging:  Ct Chest W Cont    Result Date: 5/16/2020  CT Chest with contrast INDICATION: Chest pain, fever and elevated d-dimer. Elevated troponin. Question PE. Covid Rule out COMPARISON: Chest x-ray earlier today TECHNIQUE: Contiguous axial images were obtained from the neck base through the upper abdomen with intravenous contrast, 100 mL Isovue 370. Radiation dose reduction techniques were used for this study:  Our CT scanners use one or all of the following: Automated exposure control, adjustment of the mA and/or kVp according to patient's size, iterative reconstruction. FINDINGS: There is no pulmonary embolus. The heart is mildly enlarged. There is no pericardial effusion. There are changes of prior sternotomy. Prosthetic aortic valve is present. There is mild coronary artery calcification. There is slight ectasia of the ascending thoracic aorta.  Negative for thoracic aortic dissection. There is no evidence of lymphadenopathy. There is no endobronchial lesion. There is minimal dependent subsegmental atelectasis. There is no focal pulmonary consolidation, pleural effusion or pneumothorax. No pulmonary edema. Included upper abdomen demonstrates micronodular contour of the liver, suggestive of cirrhosis. Spleen is enlarged. Surrounding bones are intact. IMPRESSION: 1. Negative for pulmonary embolism. 2. No acute pulmonary disease. Negative for pneumonia, pulmonary edema or pleural effusion. 3. Mild coronary artery calcification    Xr Chest Port    Result Date: 5/16/2020  Portable chest xray  COMPARISON: May 2, 2018 CLINICAL HISTORY: Chest pain, fever. FINDINGS: Heart appears mildly enlarged. Mediastinal contour is within normal limits. Stable postsurgical changes of sternotomy. There is no focal pulmonary consolidation, pneumothorax or pulmonary edema. No large pleural effusion. Minimal subsegmental atelectasis at the right lung base. Surrounding bones are stable. IMPRESSION: 1. No pulmonary consolidation or pulmonary edema. Ct Chest Soft Tissue Neck W Cont    Result Date: 5/27/2020  EXAM: CT NECK AND CHEST WITH CONTRAST INDICATION: S. mutans endocarditis with persistent bacteremia; evaluation for an abscess which might explain bacteremia. . COMPARISON: Chest CT 5/16/2020 TECHNIQUE:  CT imaging was performed of the neck and chest after intravenous injection of 100 mL Isovue 370. Intravenous contrast was used for better evaluation of solid organs and vascular structures. Coronal reformatted imaging provided. Radiation dose reduction techniques were used for this study. Our CT scanners use one or all of the following: Automated exposure control, adjustment of the mA and/or kV according to patient size, iterative reconstruction. FINDINGS: NECK: The bilateral bony orbits and intraorbital structures are unremarkable.  The nasopharynx, oropharynx, epiglottis, laryngeal vestibule, hypopharynx, and larynx are within normal limits. The deep spaces of the neck are unremarkable. No fluid collection or phlegmon is clearly identified. The patient is partially edentulous. Periapical lucencies associated with many of the abdomen remaining teeth. No definite odontogenic abscess is present. No cervical adenopathy. The carotid arteries and jugular veins are within normal limits. Visualized intracranial structures appear normal. CHEST: Mediastinum and visualized thyroid: Normal. Heart: Aortic valve prosthesis noted. There is no pericardial effusion. There is mild global clinically. Multivessel coronary atherosclerotic calcifications. Large Vessels: Aneurysmal dilatation of the ascending aorta measuring 4.1 cm AP. Pleura: Moderate right and small left pleural effusions, new compared to prior. Lungs: No consolidation or cavitary lesion evident. Airways: Normal. Lymph nodes: Mediastinal lymph nodes are mildly prominent in number but not pathologically enlarged. Bones/Soft tissues: No aggressive osseous lesion. Visualized abdomen: Normal.     IMPRESSION: 1. Partially edentulous patient with periapical lucencies associated with many of the remaining teeth. No definite odontogenic abscess present. No abscess within the deep spaces of the neck. 2.  Interval development of a moderate right and small left pleural effusion. No consolidation or cavitary lesion of the lungs. 3.  Changes of prior aortic valve replacement. Presumed post stenotic aneurysmal dilatation of the ascending aorta.     Results for orders placed or performed during the hospital encounter of 20   2D ECHO COMPLETE ADULT (TTE) W OR 1400 [address], [address] [place] [address]  [phone]    Transthoracic Echocardiogram  2D, M-mode, Doppler, and Color Doppler    Patient: Marianne Johnson  MR #: [id number]  : 1960  Age: 62 years  Gender: Male  Study date: 19-May-2020  Account #: [de-identified]  Height: 73 in  Weight: 144.8 lb  BSA: 1.88 mï¾²  Status:Routine  Location: 310  BP: 113/ 68    Allergies: NO KNOWN ALLERGENS    Sonographer:  Heraclio Infante RDCS  Group:  [facility]  Referring Physician:  Santiago Gonzalez MD  Reading Physician:  DR. THONY TALLEY DO    INDICATIONS: Cardiomyopathy; Chest pain, chronic, high probability of CAD. PROCEDURE: This was a routine study. A transthoracic echocardiogram was  performed. The study included complete 2D imaging, M-mode, complete spectral  Doppler, and color Doppler. Intravenous contrast (Definity) was administered. Image quality was adequate. LEFT VENTRICLE: The ventricle was mildly dilated. Systolic function was  moderately reduced. Ejection fraction was estimated in the range of 40 % to   45  %. There was moderate diffuse hypokinesis. There was severe hypokinesis of   the  basal-mid anterolateral wall(s). There was hypokinesis of the basal-mid  inferolateral wall(s). Wall thickness was normal. The study was not   technically  sufficient to allow evaluation of LV diastolic function. RIGHT VENTRICLE: The ventricle was mildly dilated. Systolic function was   mildly  reduced. Estimated peak pressure was in the range of 45-50 mmHg. LEFT ATRIUM: The atrium was markedly dilated. RIGHT ATRIUM: The atrium was moderately to markedly dilated. SYSTEMIC VEINS: IVC: The inferior vena cava was mildly dilated. The  respirophasic change in diameter was less than 50%. AORTIC VALVE: A 25mm Magna Ease (4/18) in place. There is a moderate sized  mobile mass on the LVOT side of the aortic valve prosthesis. Appearance could  be consistent with prosthetic valve vegetation. The peak velocity was 3.79   m/s. The mean pressure gradient was 33 mmHg. The peak pressure gradient was 58   mmHg. DI:(0.17), SVi: (20.7), AT: (90ms). There was moderate stenosis.  There   appeared  to be a vegetation or echogenic structure noted on the non coronary cusp of   the  AVR. Color and CW doppler was assessed, although no evidence of insufficiency  was noted. MITRAL VALVE: Diastolic mitral regurgitation noted. May be consequence of   first  degree AV block. There was mild annular calcification. There was no evidence  for vegetation. Transmitral velocity was minimally increased. There was no  evidence for stenosis. There was mild to moderate regurgitation. TRICUSPID VALVE: The valve structure was normal. There was no evidence for  vegetation. There was no evidence for stenosis. There was mild to moderate  regurgitation. PULMONIC VALVE: The valve structure was normal. There was no evidence for  stenosis. There was no insufficiency. PERICARDIUM: There was no pericardial effusion. AORTA: The root exhibited normal size. There was dilatation of the ascending  aorta (41 mm). SUMMARY:    -  Left ventricle: The ventricle was mildly dilated. Systolic function was  moderately reduced. Ejection fraction was estimated in the range of 40 % to   45  %. There was moderate diffuse hypokinesis. There was severe hypokinesis of   the  basal-mid anterolateral wall(s). There was hypokinesis of the basal-mid  inferolateral wall(s). -  Right ventricle: The ventricle was mildly dilated. Systolic function was  mildly reduced. -  Left atrium: The atrium was markedly dilated. -  Aortic valve: A 25mm Magna Ease (4/18) in place. There is a moderate sized  mobile mass on the LVOT side of the aortic valve prosthesis. Appearance could  be consistent with prosthetic valve vegetation. There was moderate stenosis. The mean pressure gradient was 33 mmHg. -  Mitral valve: Diastolic mitral regurgitation noted. May be consequence of  first degree AV block. There was mild annular calcification. There was mild   to  moderate regurgitation. There was no evidence for vegetation.    -  Tricuspid valve:  There was mild to moderate regurgitation.    -  Aorta, systemic arteries: There was dilatation of the ascending aorta (41  mm). -  Additional impressions: Findings discussed and images reviewed with   GUILHERME [facility] at time of dictation. SYSTEM MEASUREMENT TABLES    2D mode  Left Atrium Systolic Volume Index; Method of Disks, Biplane; 2D mode;: 56.4  ml/m2  IVS/LVPW (2D): 1  IVSd (2D): 0.9 cm  LVIDd (2D): 5.5 cm  LVIDs (2D): 4.4 cm  LVOT Area (2D): 3.5 cm2  LVPWd (2D): 0.9 cm    Unspecified Scan Mode  Peak Grad; Mean; Antegrade Flow: 49 mm[Hg]  Vmax; Antegrade Flow: 379 cm/s  LVOT Diam: 2.1 cm  Peak Grad; Mean; Antegrade Flow: 11 mm[Hg]  Vmax; Antegrade Flow: 165 cm/s    Prepared and signed by    DR. Valente Glass, DO  Signed 19-May-2020 10:44:17     2D ECHO LIMTED ADULT W OR WO CONTR    Narrative    [address] [place], [address] [facility]  [phone]    Transthoracic Echocardiogram  2D and Doppler    Patient: Theodora Quevedo  MR #: [id number]  : [id number]  Age: 62 years  Gender: Male  Study date: 28-May-2020  Account #: [de-identified]  Height: 72 in  Weight: 156.6 lb  BSA: 1.92 mï¾²  Status:Routine  Location: 310  BP: 117/ 76    Allergies: NO KNOWN ALLERGENS    Sonographer:  Chaz Serrano RD  Group:  [id number] Cardiology  Referring Physician:  Lincoln Gurrola MD [facility]  Reading Physician:  Lincoln Gurrola MD [facility]    INDICATIONS: FU bAVR endocarditis. PROCEDURE: This was a routine study. A transthoracic echocardiogram was  performed. The study included limited 2D imaging and limited spectral   Doppler. Image quality was adequate. LEFT VENTRICLE: Since last study, LV had dilated more and the function has  declined. The ventricle was moderately to markedly dilated. Systolic function  was markedly reduced. Ejection fraction was estimated in the range of 30 % to  35 %.     AORTIC VALVE: A 25mm Magna Ease bioprosthetic AVR with mobile mass on LVOT   side  of the prosthesis as well as thickening / restriction of the leaflets. Findings  consistent with valvular vegetation. SUMMARY:    -  Left ventricle: Since last study, LV had dilated more and the function has  declined. The ventricle was moderately to markedly dilated. Systolic function  was markedly reduced. Ejection fraction was estimated in the range of 30 % to  35 %. -  Aortic valve: A 25mm Magna Ease bioprosthetic AVR with mobile mass on LVOT  side of the prosthesis as well as thickening / restriction of the leaflets. Findings consistent with valvular vegetation. Prepared and signed by    Margaret Elmore MD [facility] [place]  Signed [id number] 16:50:28         Cultures:   All Micro Results     Procedure Component Value Units Date/Time    CULTURE, BLOOD [[id number]] Collected:  05/29/20 0405    Order Status:  Completed Specimen:  Blood Updated:  06/02/20 1143     Special Requests: --        LEFT  Antecubital       Culture result: NO GROWTH 4 DAYS       CULTURE, BLOOD [[id number]] Collected:  05/29/20 0412    Order Status:  Completed Specimen:  Blood Updated:  06/02/20 1143     Special Requests: --        RIGHT  Antecubital       Culture result: NO GROWTH 4 DAYS       CULTURE, BLOOD [[id number]] Collected:  05/24/20 1321    Order Status:  Completed Specimen:  Blood Updated:  05/29/20 0723     Special Requests: --        LEFT  Antecubital       Culture result: NO GROWTH 5 DAYS       CULTURE, BLOOD [[id number]]  (Abnormal)  (Susceptibility) Collected:  05/20/20 1424    Order Status:  Completed Specimen:  Blood Updated:  05/26/20 0834     Special Requests: --        RIGHT  FOREARM       GRAM STAIN GRAM POSITIVE COCCI         PEDIATRIC BOTTLE               RESULTS VERIFIED, PHONED TO AND READ BACK BY Migdalia Winters AT 0304 ON 5.24.20             Culture result: STREPTOCOCCUS MUTANS         REFER TO Patsy GORDON [address] [id number]    CULTURE, BLOOD [[id number]] Collected:  05/20/20 1434    Order Status:  Completed Specimen:  Blood Updated:  05/25/20 4884     Special Requests: -- LEFT  Antecubital       Culture result: NO GROWTH 5 DAYS       BLOOD CULTURE ID PANEL [[id number]]  (Abnormal) Collected:  05/24/20 0105    Order Status:  Completed Specimen:  Blood Updated:  05/24/20 0647     Acc. no. from Micro Order [id number]     Streptococcus Detected        Comment: RESULTS VERIFIED, PHONED TO AND READ BACK BY  Qing Almonte RN @5455 ON 5/24/20 AK. INTERPRETATION       Gram positive cocci. Identified by realtime PCR as Streptococcus species (not agalactiae, pyogenes, or pneumoniae). Comment: Recommend discontinuing IV vancomycin starting cefazolin or nafcillin if patient not on beta-lactam therapy. Infectious Diseases Consult recommended in adult patients. THIS TEST DOES NOT REPLACE SENSITIVITY TESTING. CULTURE, BLOOD [[id number]]  (Abnormal) Collected:  05/18/20 1944    Order Status:  Completed Specimen:  Blood Updated:  05/23/20 0720     Special Requests: --        RIGHT  HAND       GRAM STAIN GRAM POSITIVE COCCI         PEDIATRIC BOTTLE               CRITICAL RESULT NOT CALLED DUE TO PREVIOUS NOTIFICATION OF CRITICAL RESULT WITHIN THE LAST 24 HOURS. Culture result:       ALPHA STREPTOCOCCUS, NOT S.  PNEUMONIAE            FOR ID AND SUSCEPTIBILITY REFER TO CULTURE NO ACC [id number]    CULTURE, BLOOD [[id number]]  (Abnormal)  (Susceptibility) Collected:  05/18/20 1938    Order Status:  Completed Specimen:  Blood Updated:  05/23/20 0703     Special Requests: --        RIGHT  ARM       GRAM STAIN GRAM POS COCCI IN CHAINS         AEROBIC BOTTLE POSITIVE               RESULTS VERIFIED, PHONED TO AND READ BACK BY Alena Epstein RN @ 5852 ON 5/20/20 BY AMM           Culture result: STREPTOCOCCUS MUTANS         REFER TO BIOFIRE PANEL ACCESSION [id number]    CULTURE, BLOOD [[id number]]     Order Status:  Canceled Specimen:  Blood     CULTURE, BLOOD [[id number]]     Order Status:  Canceled Specimen:  Blood     BLOOD CULTURE ID PANEL [[id number]]  (Abnormal) Collected: 05/18/20 1938    Order Status:  Completed Specimen:  Blood Updated:  05/20/20 1117     Acc. no. from Micro Order [id number]     Streptococcus Detected        Comment: RESULTS VERIFIED, PHONED TO AND READ BACK BY  Josseline Rodriguez RN @ 1120 ON 5/20/20 BY AMM          INTERPRETATION       Gram positive cocci. Identified by realtime PCR as Streptococcus species (not agalactiae, pyogenes, or pneumoniae).            Comment: Consider discontinuation of IV vancomycin and using an anti-streptococcal beta-lactam (ceftriaxone/cefotaxime (peds))       EMERGENT DISEASE PANEL [[id number]] Collected:  05/16/20 1946    Order Status:  Completed Specimen:  Not Specified Updated:  05/18/20 1614     Emergent disease panel Not detected        Comment: Performed at [address]               Assessment/Plan:     Principal Problem:    [facility] (6/2/2020)  - Mobile mass seen on ECHO  - Blood cultures positive for Strep Mutans  - To have dental extraction soon  - Will need to go for AVR replacement afterwards  - Continue Ceftriaxone  - Appreciate all specialists    Active Problems:    NSTEMI (non-ST elevated myocardial infarction) ([facility] (5/16/2020)  - Resolved  - No acute CP  - Continue ASA  - Continue Lipitor  - Continue Lisinopril      Petechiae (5/16/2020)  - Due to #1      S/P aortic valve replacement (4/1/2018)  - Will need replacement again      CAD (coronary artery disease) ()  - No acute CP  - Continue ASA  - Continue Lisinopril  - Continue Lipitor      Essential hypertension (9/21/2018)  - Stable  - Continue current medications      Diabetes mellitus, type II (HCC) (5/20/2020)  - A1C 7  - Continue Humalog SSI      Vitamin D deficiency (9/98/2081)      Diastolic CHF, chronic (HCC) (5/7/2018)  - No acute issues  - Continue Lasix IV  - Continue Lisinopril  - Continue Coreg      Anemia (5/16/2020)  - Stable      DIEGO on CPAP ()      Dyslipidemia ()  - Continue Lipitor COVID-19 ruled out (5/16/2020)    Today's Plan: Continue Ceftriaxone. Await dental extraction.     DIET DIABETIC CONSISTENT CARB Regular    DVT Prophylaxis: SCDs    Discharge Plan: TBD      Signed By: Rashmi Eugene DO     June 2, 2020

## 2020-06-02 NOTE — PROGRESS NOTES
PICC Placement Note    PRE-PROCEDURE VERIFICATION  Correct Procedure: yes. Time out completed with assistant Israel Donis RN and all persons present in agreement with time out. Correct Site:  yes  Temperature: Temp: 97.9 °F (36.6 °C), Temperature Source: Temp Source: Oral  Recent Labs     06/02/20  0610   BUN 34*   CREA 1.19      WBC 12.0*     Allergies: Patient has no known allergies. Education materials for [facility] given to patient or family. PROCEDURE DETAIL  A single lumen PICC line was started for antibiotic therapy. The following documentation is in addition to the PICC properties in the lines/airways flowsheet :  Lot #: [id number]  xylocaine used: yes  Mid-Arm Circumference: 25 (cm)  Internal Catheter Length: 40 (cm)  Internal Catheter Total Length: 40 (cm)  Vein Selection for PICC:right basilic  Central Line Bundle followed yes  Complication Related to Insertion: none  Both the insertion guidewire and ECG guidewire were removed intact all ports have positive blood return and were flush well with normal saline. The location of the tip of the PICC is verified using ECG technology. The tip is in the SVC per ECG reading. See image below.              Line is okay to use: yes

## 2020-06-02 NOTE — PROGRESS NOTES
6/2/2020 10:07 AM    Admit Date: 5/16/2020    Admit Diagnosis: NSTEMI (non-ST elevated myocardial infarction) ([facility] [I21.4]; Suspected COVID-19 virus infection [Z20.828]      Subjective:   No cp or sob      Objective:      Visit Vitals  BP 96/60   Pulse 76   Temp 97.6 °F (36.4 °C)   Resp 18   Ht 6' 1\" (1.854 m)   Wt 65.2 kg (143 lb 12.8 oz)   SpO2 95%   BMI 18.97 kg/m²       Physical Exam:  Nadara Breed, Well Nourished, No Acute Distress, Alert & Oriented x 3, appropriate mood. Neck- supple, no JVD  CV- regular rate and rhythm no MRG  Lung- clear bilaterally  Abd- soft, nontender, nondistended  Ext- no edema bilaterally. Skin- warm and dry        Data Review:   Recent Labs     06/02/20  0610  05/31/20  0447      < > 140   K 3.5   < > 4.3   BUN 34*   < > 31*   CREA 1.19   < > 1.32   WBC  --   --  13.5*   HGB  --   --  9.3*   HCT  --   --  31.3*   PLT  --   --  240    < > = values in this interval not displayed. Assessment/Plan:     Principal Problem:    NSTEMI (non-ST elevated myocardial infarction) ([facility] (5/16/2020)Stable. Continue current medical therapy.   Small om- no pci    Active Problems:    Diastolic CHF, chronic (HCC) (5/7/2018)      S/P aortic valve replacement (4/1/2018)- endocarditis- AVR- soon- teeth extraction planned this week  No signs of chf at this time    CAD (coronary artery disease) ()      Overview: Nonobstructive      DIEGO on CPAP ()      Dyslipidemia ()      Essential hypertension (9/21/2018)      Suspected COVID-19 virus infection (5/16/2020)      Petechiae (5/16/2020)      Anemia (5/16/2020)      Diabetes mellitus, type II ([facility] (5/20/2020)      Vitamin D deficiency (5/20/2020)

## 2020-06-02 NOTE — PROGRESS NOTES
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 [facility] SYSTEM 20 and  with strep mutans. · Dental caries : appreciate oral surgery - plans for extraction this week  · Anemia, of acute disease: follow  · Hx AVR   · DM, A1c 7    Plan:   · Continue CTX 2g IV q24h. Duration 6 weeks (date depends on surgery)   · Redo AVR planned but timing TBD. Oral surgery to extract teeth this week  Pt having a problem getting labs - will ask for picc to be placed, labs can be done twice weekly from picc instead of daily draws. I dced lovenox in preparation of dental abstraction    Anti-infectives:   CTX -  Gentamicin -    Subjective:   Afebrile, doing well. Saw oral surgery yesterday. Is in touch with his two sisters via phone - both are long distance    Review of Systems:  A comprehensive review of systems was negative except for that written in the History of Present Illness. Objective:     Visit Vitals  /62   Pulse 78   Temp 98.8 °F (37.1 °C)   Resp 18   Ht 6' 1\" (1.854 m)   Wt 65.2 kg (143 lb 12.8 oz)   SpO2 93%   BMI 18.97 kg/m²     Temp (24hrs), Av °F (36.7 °C), Min:96.8 °F (36 °C), Max:98.8 °F (37.1 °C)  No changes from my previous exam       Lines:  Peripheral IV:       Physical Exam:   General:  Alert, cooperative, well noursished, well developed, appears stated age   Eyes:  Sclera anicteric. Pupils equally round and reactive to light. Mouth/Throat: Mucous membranes normal, oral pharynx poor dentition       Lungs:   Clear to auscultation bilaterally, good effort   CV:  Regular rate and rhythm, 3/6 SM   Abdomen:   Soft, non-tender.  bowel sounds normal. non-distended   Extremities: No cyanosis or edema   Skin: Skin color, texture, turgor normal. no acute rash or lesions       Musculoskeletal: No swelling or deformity   Lines/Devices:  Intact, no erythema, drainage or tenderness   Psych: Alert and oriented, normal mood affect given the setting       Data Review:     CBC:   Recent Labs     05/31/20  0447   WBC 13.5*   RBC 3.47*   HGB 9.3*   HCT 31.3*        CMP:   Recent Labs     06/02/20  0610 06/01/20  0349 05/31/20  0447   * 106* 115*    141 140   K 3.5 3.8 4.3    104 103   CO2 28 32 28   BUN 34* 35* 31*   CREA 1.19 1.26 1.32   CA 8.1* 8.2* 8.3   AGAP 10 5* 9     Liver Enzymes:   No results for input(s): TP, ALB, TBIL, AP in the last 72 hours.     No lab exists for component: SGOT, GPT, DBIL  Antibiotic Monitoring:   Lab Results   Component Value Date/Time    Vancomycin,trough 11.8 05/22/2020 12:58 AM        Microbiology:     All Micro Results     Procedure Component Value Units Date/Time    CULTURE, BLOOD [[id number]] Collected:  05/29/20 0405    Order Status:  Completed Specimen:  Blood Updated:  06/01/20 0653     Special Requests: --        LEFT  Antecubital       Culture result: NO GROWTH 3 DAYS       CULTURE, BLOOD [[id number]] Collected:  05/29/20 0412    Order Status:  Completed Specimen:  Blood Updated:  06/01/20 0653     Special Requests: --        RIGHT  Antecubital       Culture result: NO GROWTH 3 DAYS       CULTURE, BLOOD [[id number]] Collected:  05/24/20 1321    Order Status:  Completed Specimen:  Blood Updated:  05/29/20 0723     Special Requests: --        LEFT  Antecubital       Culture result: NO GROWTH 5 DAYS       CULTURE, BLOOD [[id number]]  (Abnormal)  (Susceptibility) Collected:  05/20/20 1424    Order Status:  Completed Specimen:  Blood Updated:  05/26/20 0834     Special Requests: --        RIGHT  FOREARM       GRAM STAIN GRAM POSITIVE COCCI         PEDIATRIC BOTTLE               RESULTS VERIFIED, PHONED TO AND READ BACK BY Shirlene Lutz AT 0304 ON 5.24.20             Culture result: STREPTOCOCCUS MUTANS         REFER TO Patsy GORDON [address] [id number]    CULTURE, BLOOD [[id number]] Collected:  05/20/20 1434    Order Status: Completed Specimen:  Blood Updated:  05/25/20 0633     Special Requests: --        LEFT  Antecubital       Culture result: NO GROWTH 5 DAYS       BLOOD CULTURE ID PANEL [[id number]]  (Abnormal) Collected:  05/24/20 0105    Order Status:  Completed Specimen:  Blood Updated:  05/24/20 0647     Acc. no. from Micro Order [id number]     Streptococcus Detected        Comment: RESULTS VERIFIED, PHONED TO AND READ BACK BY  Enedina Roche RN @5239 ON 5/24/20 AK. INTERPRETATION       Gram positive cocci. Identified by realtime PCR as Streptococcus species (not agalactiae, pyogenes, or pneumoniae). Comment: Recommend discontinuing IV vancomycin starting cefazolin or nafcillin if patient not on beta-lactam therapy. Infectious Diseases Consult recommended in adult patients. THIS TEST DOES NOT REPLACE SENSITIVITY TESTING. CULTURE, BLOOD [[id number]]  (Abnormal) Collected:  05/18/20 1944    Order Status:  Completed Specimen:  Blood Updated:  05/23/20 0720     Special Requests: --        RIGHT  HAND       GRAM STAIN GRAM POSITIVE COCCI         PEDIATRIC BOTTLE               CRITICAL RESULT NOT CALLED DUE TO PREVIOUS NOTIFICATION OF CRITICAL RESULT WITHIN THE LAST 24 HOURS. Culture result:       ALPHA STREPTOCOCCUS, NOT S.  PNEUMONIAE            FOR ID AND SUSCEPTIBILITY REFER TO CULTURE NO ACC [id number]    CULTURE, BLOOD [[id number]]  (Abnormal)  (Susceptibility) Collected:  05/18/20 1938    Order Status:  Completed Specimen:  Blood Updated:  05/23/20 0703     Special Requests: --        RIGHT  ARM       GRAM STAIN GRAM POS COCCI IN CHAINS         AEROBIC BOTTLE POSITIVE               RESULTS VERIFIED, PHONED TO AND READ BACK BY Monica Wells RN @ 2736 ON 5/20/20 BY AMM           Culture result: STREPTOCOCCUS MUTANS         REFER TO BIOFIRE PANEL ACCESSION [id number]    CULTURE, BLOOD [[id number]]     Order Status:  Canceled Specimen:  Blood     CULTURE, BLOOD [[id number]]     Order Status:  Canceled Specimen:  Blood     BLOOD CULTURE ID PANEL [[id number]]  (Abnormal) Collected:  05/18/20 1938    Order Status:  Completed Specimen:  Blood Updated:  05/20/20 1117     Acc. no. from Micro Order [id number]     Streptococcus Detected        Comment: RESULTS VERIFIED, PHONED TO AND READ BACK BY  Vicente Hutton RN @ 9940 ON 5/20/20 BY AMM          INTERPRETATION       Gram positive cocci. Identified by realtime PCR as Streptococcus species (not agalactiae, pyogenes, or pneumoniae). Comment: Consider discontinuation of IV vancomycin and using an anti-streptococcal beta-lactam (ceftriaxone/cefotaxime (peds))       EMERGENT DISEASE PANEL [[id number]] Collected:  05/16/20 1946    Order Status:  Completed Specimen:  Not Specified Updated:  05/18/20 1614     Emergent disease panel Not detected        Comment: Performed at [facility] [address] [facility]               Imaging:     TTE 5/28  SUMMARY:     -  Left ventricle: Since last study, LV had dilated more and the function has  declined. The ventricle was moderately to markedly dilated. Systolic function  was markedly reduced. Ejection fraction was estimated in the range of 30 % to  35 %.    -  Aortic valve: A 25mm Magna Ease bioprosthetic AVR with mobile mass on LVOT  side of the prosthesis as well as thickening / restriction of the leaflets.   Findings consistent with valvular vegetation.       Signed By: Valerie Emanuel MD     June 2, 2020

## 2020-06-02 NOTE — ROUTINE PROCESS
Bedside and Verbal shift change report given to Fernando 31 (oncoming nurse) by self (offgoing nurse). Report included the following information SBAR, Kardex, MAR and Recent Results.

## 2020-06-03 ENCOUNTER — ANESTHESIA EVENT (OUTPATIENT)
Dept: SURGERY | Age: 60
DRG: 280 | End: 2020-06-03
Payer: COMMERCIAL

## 2020-06-03 LAB
BACTERIA SPEC CULT: NORMAL
BACTERIA SPEC CULT: NORMAL
GLUCOSE BLD STRIP.AUTO-MCNC: 106 MG/DL (ref 65–100)
GLUCOSE BLD STRIP.AUTO-MCNC: 106 MG/DL (ref 65–100)
GLUCOSE BLD STRIP.AUTO-MCNC: 113 MG/DL (ref 65–100)
GLUCOSE BLD STRIP.AUTO-MCNC: 118 MG/DL (ref 65–100)
SERVICE CMNT-IMP: NORMAL
SERVICE CMNT-IMP: NORMAL

## 2020-06-03 PROCEDURE — 74011250637 HC RX REV CODE- 250/637: Performed by: FAMILY MEDICINE

## 2020-06-03 PROCEDURE — 74011250636 HC RX REV CODE- 250/636: Performed by: INTERNAL MEDICINE

## 2020-06-03 PROCEDURE — 74011250637 HC RX REV CODE- 250/637: Performed by: PHYSICIAN ASSISTANT

## 2020-06-03 PROCEDURE — 65660000000 HC RM CCU STEPDOWN

## 2020-06-03 PROCEDURE — 74011250637 HC RX REV CODE- 250/637: Performed by: HOSPITALIST

## 2020-06-03 PROCEDURE — 82962 GLUCOSE BLOOD TEST: CPT

## 2020-06-03 PROCEDURE — 74011000258 HC RX REV CODE- 258: Performed by: INTERNAL MEDICINE

## 2020-06-03 PROCEDURE — 74011250637 HC RX REV CODE- 250/637: Performed by: INTERNAL MEDICINE

## 2020-06-03 RX ADMIN — ESCITALOPRAM OXALATE 10 MG: 10 TABLET ORAL at 08:13

## 2020-06-03 RX ADMIN — FERROUS SULFATE TAB 325 MG (65 MG ELEMENTAL FE) 325 MG: 325 (65 FE) TAB at 17:32

## 2020-06-03 RX ADMIN — CEFTRIAXONE SODIUM 2 G: 2 INJECTION, POWDER, FOR SOLUTION INTRAMUSCULAR; INTRAVENOUS at 12:51

## 2020-06-03 RX ADMIN — ASPIRIN 81 MG 81 MG: 81 TABLET ORAL at 08:13

## 2020-06-03 RX ADMIN — FUROSEMIDE 40 MG: 10 INJECTION, SOLUTION INTRAMUSCULAR; INTRAVENOUS at 08:13

## 2020-06-03 RX ADMIN — CARVEDILOL 6.25 MG: 6.25 TABLET, FILM COATED ORAL at 17:32

## 2020-06-03 RX ADMIN — ATORVASTATIN CALCIUM 20 MG: 20 TABLET, FILM COATED ORAL at 21:28

## 2020-06-03 RX ADMIN — Medication 10 ML: at 12:58

## 2020-06-03 RX ADMIN — ERGOCALCIFEROL 50000 UNITS: 1.25 CAPSULE ORAL at 12:51

## 2020-06-03 RX ADMIN — POTASSIUM CHLORIDE 20 MEQ: 20 TABLET, EXTENDED RELEASE ORAL at 08:13

## 2020-06-03 RX ADMIN — LISINOPRIL 2.5 MG: 5 TABLET ORAL at 08:13

## 2020-06-03 RX ADMIN — SPIRONOLACTONE 25 MG: 25 TABLET ORAL at 08:13

## 2020-06-03 RX ADMIN — Medication 10 ML: at 21:59

## 2020-06-03 RX ADMIN — Medication 10 ML: at 05:00

## 2020-06-03 RX ADMIN — LISINOPRIL 2.5 MG: 5 TABLET ORAL at 17:32

## 2020-06-03 RX ADMIN — POTASSIUM CHLORIDE 20 MEQ: 20 TABLET, EXTENDED RELEASE ORAL at 17:32

## 2020-06-03 RX ADMIN — FUROSEMIDE 40 MG: 10 INJECTION, SOLUTION INTRAMUSCULAR; INTRAVENOUS at 17:32

## 2020-06-03 RX ADMIN — FERROUS SULFATE TAB 325 MG (65 MG ELEMENTAL FE) 325 MG: 325 (65 FE) TAB at 08:13

## 2020-06-03 RX ADMIN — CARVEDILOL 6.25 MG: 6.25 TABLET, FILM COATED ORAL at 08:13

## 2020-06-03 NOTE — ROUTINE PROCESS
Spoke to Castillo Winn in [facility] this afternoon. Pt will go down for tooth extraction at 1200 on 6/4. Instructions were given for the patient to not have any solids after midnight and clear liquids are okay until 0800 on 6/4. Pt needs to receive 2 Hibiclens baths (one on 6/3 PM and one on 6/4 AM). All meds are to be held on 6/4 except Coreg and Lexapro.

## 2020-06-03 NOTE — PROGRESS NOTES
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 [facility] SYSTEM 20 and  with strep mutans. · Dental caries : appreciate oral surgery - plans for extraction this week  · Anemia, of acute disease: follow  · Hx AVR   · DM, A1c 7    Plan:   Continue CTX 2g IV q24h. Duration 6 weeks   Dental extraction planned for tomorrow  AVR redo planned after that sometime    Anti-infectives:   CTX -  Gentamicin -    Subjective:   No complaints. No fevers. Feels ok    Review of Systems:  A comprehensive review of systems was negative except for that written in the History of Present Illness. Objective:     Visit Vitals  /57   Pulse 79   Temp 97.8 °F (36.6 °C)   Resp 20   Ht 6' 1\" (1.854 m)   Wt 64 kg (141 lb 3.2 oz)   SpO2 93%   BMI 18.63 kg/m²     Temp (24hrs), Av.1 °F (36.7 °C), Min:97.7 °F (36.5 °C), Max:98.6 °F (37 °C)  No changes from my previous exam       Lines:  Peripheral IV:       Physical Exam:   General:  Alert, cooperative, well noursished, well developed, appears stated age   Eyes:  Sclera anicteric. Pupils equally round and reactive to light. Mouth/Throat: Mucous membranes normal, oral pharynx poor dentition       Lungs:   Clear to auscultation bilaterally, good effort   CV:  Regular rate and rhythm, 3/6 SM   Abdomen:   Soft, non-tender.  bowel sounds normal. non-distended   Extremities: No cyanosis or edema   Skin: Skin color, texture, turgor normal. no acute rash or lesions       Musculoskeletal: No swelling or deformity   Lines/Devices:  Intact, no erythema, drainage or tenderness   Psych: Alert and oriented, normal mood affect given the setting       Data Review:     CBC:   Recent Labs     20  0610   WBC 12.0*   RBC 3.25*   HGB 9.1*   HCT 29.3*        CMP:   Recent Labs     20  0610 20  0349   * 106*    141   K 3.5 3.8    104   CO2 28 32   BUN 34* 35*   CREA 1.19 1.26   CA 8.1* 8.2*   AGAP 10 5*     Liver Enzymes:   No results for input(s): TP, ALB, TBIL, AP in the last 72 hours.     No lab exists for component: SGOT, GPT, DBIL  Antibiotic Monitoring:   Lab Results   Component Value Date/Time    Vancomycin,trough 11.8 05/22/2020 12:58 AM        Microbiology:     All Micro Results     Procedure Component Value Units Date/Time    CULTURE, BLOOD [[id number]] Collected:  05/29/20 0412    Order Status:  Completed Specimen:  Blood Updated:  06/03/20 0938     Special Requests: --        RIGHT  Antecubital       Culture result: NO GROWTH 5 DAYS       CULTURE, BLOOD [[id number]] Collected:  05/29/20 0405    Order Status:  Completed Specimen:  Blood Updated:  06/03/20 0938     Special Requests: --        LEFT  Antecubital       Culture result: NO GROWTH 5 DAYS       CULTURE, BLOOD [[id number]] Collected:  05/24/20 1321    Order Status:  Completed Specimen:  Blood Updated:  05/29/20 0723     Special Requests: --        LEFT  Antecubital       Culture result: NO GROWTH 5 DAYS       CULTURE, BLOOD [[id number]]  (Abnormal)  (Susceptibility) Collected:  05/20/20 1424    Order Status:  Completed Specimen:  Blood Updated:  05/26/20 0834     Special Requests: --        RIGHT  FOREARM       GRAM STAIN GRAM POSITIVE COCCI         PEDIATRIC BOTTLE               RESULTS VERIFIED, PHONED TO AND READ BACK BY Yadi Longorai AT [facility] ON 5.24.20             Culture result: STREPTOCOCCUS MUTANS         REFER TO [address]ats[address] PANEL [address] [id number]    CULTURE, BLOOD [[id number]] Collected:  05/20/20 1434    Order Status:  Completed Specimen:  Blood Updated:  05/25/20 0633     Special Requests: --        LEFT  Antecubital       Culture result: NO GROWTH 5 DAYS       BLOOD CULTURE ID PANEL [[id number]]  (Abnormal) Collected:  05/24/20 0105    Order Status:  Completed Specimen:  Blood Updated:  05/24/20 0647     Acc. no. from Micro Order [id number]     Streptococcus Detected        Comment: RESULTS VERIFIED, PHONED TO AND READ BACK BY  Kaushal Moreno RN @3232 ON 5/24/20 AK. INTERPRETATION       Gram positive cocci. Identified by realtime PCR as Streptococcus species (not agalactiae, pyogenes, or pneumoniae). Comment: Recommend discontinuing IV vancomycin starting cefazolin or nafcillin if patient not on beta-lactam therapy. Infectious Diseases Consult recommended in adult patients. THIS TEST DOES NOT REPLACE SENSITIVITY TESTING. CULTURE, BLOOD [[id number]]  (Abnormal) Collected:  05/18/20 1944    Order Status:  Completed Specimen:  Blood Updated:  05/23/20 0720     Special Requests: --        RIGHT  HAND       GRAM STAIN GRAM POSITIVE COCCI         PEDIATRIC BOTTLE               CRITICAL RESULT NOT CALLED DUE TO PREVIOUS NOTIFICATION OF CRITICAL RESULT WITHIN THE LAST 24 HOURS. Culture result:       ALPHA STREPTOCOCCUS, NOT S.  PNEUMONIAE            FOR ID AND SUSCEPTIBILITY REFER TO CULTURE NO ACC [id number]    CULTURE, BLOOD [[id number]]  (Abnormal)  (Susceptibility) Collected:  05/18/20 1938    Order Status:  Completed Specimen:  Blood Updated:  05/23/20 0703     Special Requests: --        RIGHT  ARM       GRAM STAIN GRAM POS COCCI IN CHAINS         AEROBIC BOTTLE POSITIVE               RESULTS VERIFIED, PHONED TO AND READ BACK BY Altagracia Rai RN @ 112 ON 5/20/20 BY AMM           Culture result: STREPTOCOCCUS MUTANS         REFER TO BIOFIRE PANEL ACCESSION [id number]    CULTURE, BLOOD [[id number]]     Order Status:  Canceled Specimen:  Blood     CULTURE, BLOOD [[id number]]     Order Status:  Canceled Specimen:  Blood     BLOOD CULTURE ID PANEL [[id number]]  (Abnormal) Collected:  05/18/20 1938    Order Status:  Completed Specimen:  Blood Updated:  05/20/20 1117     Acc. no. from Micro Order [id number]     Streptococcus Detected        Comment: RESULTS VERIFIED, PHONED TO AND READ BACK BY  Altagracia Rai RN @ 1120 ON 5/20/20 BY AMM INTERPRETATION       Gram positive cocci. Identified by realtime PCR as Streptococcus species (not agalactiae, pyogenes, or pneumoniae). Comment: Consider discontinuation of IV vancomycin and using an anti-streptococcal beta-lactam (ceftriaxone/cefotaxime (peds))       EMERGENT DISEASE PANEL [[id number]] Collected:  05/16/20 1946    Order Status:  Completed Specimen:  Not Specified Updated:  05/18/20 1614     Emergent disease panel Not detected        Comment: Performed at [place] [address]               Imaging:     TTE 5/28  SUMMARY:     -  Left ventricle: Since last study, LV had dilated more and the function has  declined. The ventricle was moderately to markedly dilated. Systolic function  was markedly reduced. Ejection fraction was estimated in the range of 30 % to  35 %.    -  Aortic valve: A 25mm Magna Ease bioprosthetic AVR with mobile mass on LVOT  side of the prosthesis as well as thickening / restriction of the leaflets.   Findings consistent with valvular vegetation.       Signed By: Reuben Epps MD     Sunitha 3, 2020

## 2020-06-03 NOTE — PROGRESS NOTES
AxOx4. No c/o pain or dyspnea--on RA. Resting comfortably in bed. R PICC c/d/I. Problem: Falls - Risk of  Goal: *Absence of Falls  Description: Document Katey Zavala Fall Risk and appropriate interventions in the flowsheet.   Outcome: Progressing Towards Goal  Note: Fall Risk Interventions:  Mobility Interventions: Communicate number of staff needed for ambulation/transfer, Patient to call before getting OOB         Medication Interventions: Evaluate medications/consider consulting pharmacy, Teach patient to arise slowly    Elimination Interventions: Call light in reach, Patient to call for help with toileting needs    History of Falls Interventions: Evaluate medications/consider consulting pharmacy

## 2020-06-03 NOTE — ADT AUTH CERT NOTES
Utilization Reviews  
 
  Infective Endocarditis - Care Day 11 (5/31/2020) by Dillistin, Birdia Severance, RN  
 
   
Review Status Review Entered Completed 6/2/2020 16:24  
   
Criteria Review Care Day: 11 Care Date: 5/31/2020 Level of Care: Telemetry Guideline Day 6 Clinical Status   
(X) * Afebrile   
(X) * Hemodynamic stability   
(X) * No evidence of heart failure, emboli, arrhythmia, abscess, or renal failure ( ) * No surgery needed   
(X) * Microbiologic studies completed   
(X) * Repeat blood cultures negative ( ) * Discharge plans and education understood Activity ( ) * Ambulatory Routes   
(X) * Oral hydration, medications, [H] and diet Interventions ( ) * Access for outpatient antibiotic administration placed or not necessary Medications (X) IV antibiotics * Milestone Additional Notes 5/31  
  
97.9 86 18 % 104/62 r/a  
  
5/31/2020 06:14 5/31/2020 11:07 5/31/2020 15:11 5/31/2020 21:40  
GLUCOSE,FAST -  (H) 123 (H) 155 (H) 118 (H)  
  
5/31/2020 04:47 WBC 13.5 (H) RBC 3.47 (L) HGB 9.3 (L) HCT 31.3 (L) MCHC 29.7 (L) RDW 19.5 (H)  
  
  
5/31/2020 04:47 Glucose 115 (H) BUN 31 (H) Creatinine 1.32 Calcium 8.3 GFR est non-AA 59 (L) Blood cx negative Orders  Tele IP, Full Code, VS , Cardiac Monitoring, Droplet plus, DROPLET PLUS PRECAUTIONS, STRICT I&O, CARDIO CONSULT,CM CONSULT, AC & HS BS, Hypoglycemic Protocol, DM DIET, SCDS, infectious disease consult  Pharmacy to dose Shea Comer,  Consult to oral surgery, Meds NS @100, ASA 81 mg po x1, Lipitor 20 mg po x1, Coreg 6.25 mg po x2,  Rocephin 2 gm IV x1, Lovenox 40 mg sc x1, Ferrous sulfate 325 mg pox1, Lexapro 10 mg po x1, Lasix 40 mg IV x2, Toprol 25 mg po x2, Lisinopril 2.5 mg po x2, Aldactone 25 mg p ox1 KCL 20 meq po x2, Cardiac note Stable overnight without angina, CHF, or palpitations.  Vitals stable and controlled. No other complaints overnight. Tolerating meds well. Physical Exam:  
Neck- supple, no JVD  
CV- regular rate and rhythm, soft NEERAJ RUSB Lung- clear bilaterally Abd- soft, nontender, nondistended Ext- trace-1+ anterior tibial pitting edema Skin- warm and dry  
   
IM NOTE  
   
5/31:  
Cards, ID, CT surgery following. Repeat blood culture 5/24 no growth x5d. BCx 5/28 also NGTD Afebrile, stable mild leukocytosis Feels well. No acute events. Continues to await oral sgy eval prior to AVR Physical Exam:   
General: Alert, cooperative, no distress. Lungs:   Clear to auscultation bilaterally. Heart: Regular rate and rhythm. Abdomen:   Soft, non-tender. Bowel sounds normal.   
Extremities: 2+ edema to LE bilaterally    
Pulses: 2+ and symmetric all extremities. Neurologic: CNII-XII intact   
   
Principal Problem:  
  NSTEMI (non-ST elevated myocardial infarction) ([facility] [address] (5/16/2020)  
  Active Problems:  
  Diastolic CHF, chronic ([address] (5/7/2018)    
  S/P aortic valve replacement (4/1/2018)    
  CAD (coronary artery disease) ()  
    Overview: Nonobstructive  
   
  DIEGO on CPAP ()  
   
  Dyslipidemia ()  
   
  Essential hypertension (9/21/2018)    
  Suspected COVID-19 virus infection (5/16/2020)    
  Petechiae (5/16/2020)    
  Anemia (5/16/2020)    
  Diabetes mellitus, type II ([address] (5/20/2020)    
  Vitamin D deficiency (5/20/2020)    
   
   
CAD/CHF with NSTEMI and endocarditis-S/p cardiac cath on 5/19 with no treatable lesions, occluded OM branch. ASA, statin, BB. Needs AVR as below. Cardiology adjusting meds. Repeat Echo with [id number]  
   
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 Ceftriaxone per ID; gent stopped. Cx 5/18 and 5/20 strep mutans. Repeat blood cultures on 5/24 and 6/28 NGTD  
CT surgery following - want oral surgery eval prior to AVR. Awaiting evaluation - call again.  
   
LE edema bilaterally - continue Lasix. Improving.    
Normocytic anemia with thrombocytopenia - S/p Iron infusions. Oral iron. Will need to establish care with GI for colon cancer screening at discharge. improving  
   
Hyponatremia/Hypokalemia - resolved  
   
Vitamin D deficiency - Supplement.   
   
DM type II - A1C 7.1. ADA. SS. Will need outpatient diabetic education  
   
DVT prophylaxis - SCDs  
  
  Infective Endocarditis - Care Day 10 (5/30/2020) by Dillistin, Johnice Meigs, RN  
 
   
Review Status Review Entered Completed 6/2/2020 11:45  
   
Criteria Review Care Day: 10 Care Date: 5/30/2020 Level of Care: Telemetry Guideline Day 6 Clinical Status   
(X) * Afebrile 6/2/2020 11:45:45 EDT by Carlton Del Real   
  98.3 78 18 94% 106/74 r/a (X) * Hemodynamic stability 6/2/2020 11:45:45 EDT by Carlton Del Real   
  98.3 78 18 94% 106/74 r/a (X) * No evidence of heart failure, emboli, arrhythmia, abscess, or renal failure ( ) * No surgery needed   
(X) * Microbiologic studies completed   
(X) * Repeat blood cultures negative 6/2/2020 11:45:45 EDT by Samm Esteban blood cx x2 neg 5/29   
( ) * Discharge plans and education understood Activity ( ) * Ambulatory Routes   
(X) * Oral hydration, medications, [H] and diet 6/2/2020 11:45:45 EDT by Carlton Del Real   
  NS @100, ASA 81 mg po x1, Lipitor 20 mg po x1, Coreg 6.25 mg po x2,  Rocephin 2 gm IV x1, Lovenox 40 mg sc x1, Ferrous sulfate 325 mg pox1, Lexapro 10 mg po x1, Lasix 40 mg IV x2, Toprol 25 mg Interventions ( ) * Access for outpatient antibiotic administration placed or not necessary Medications (X) IV antibiotics 6/2/2020 11:45:45 EDT by Carlton Del Real   
  Rocephin 2 gm IV x1,   
* Milestone Additional Notes 5/30  
  
98.3 78 18 94% 106/74 r/a  
  
5/30/2020 06:12 5/30/2020 11:07 5/30/2020 15:25 5/30/2020 21:36  
GLUCOSE,FAST -  (H) 121 (H) 145 (H) 139 (H)  
  
  
5/30/2020 04:33 WBC 11.5 (H) RBC 3.23 (L) HGB 8.5 (L) HCT 29.0 (L) MCHC 29.3 (L) RDW 18.8 (H) PLATELET 929 MPV 9.3 (L) NEUTROPHILS 81 (H) LYMPHOCYTES 11 (L) EOSINOPHILS 0 (L)  
ABS. NEUTROPHILS 9.3 (H)  
  
5/30/2020 04:33 Potassium 3.4 (L) Chloride 103 CO2 31 Anion gap 7 Glucose 100 BUN 30 (H) Creatinine 1.12 Calcium 8.0 (L) Blood cx negative Orders  Tele IP, Full Code, VS , Cardiac Monitoring, Droplet plus, DROPLET PLUS PRECAUTIONS, STRICT I&O, CARDIO CONSULT,CM CONSULT, AC & HS BS, Hypoglycemic Protocol, DM DIET, SCDS, infectious disease consult  Pharmacy to dose Nat Lessmariann,  Consult to oral surgery, Meds NS @100, ASA 81 mg po x1, Lipitor 20 mg po x1, Coreg 6.25 mg po x2,  Rocephin 2 gm IV x1, Lovenox 40 mg sc x1, Ferrous sulfate 325 mg pox1, Lexapro 10 mg po x1, Lasix 40 mg IV x2, Toprol 25 mg po x2, Lisinopril 2.5 mg po x1, Aldactone 25 mg p ox1 KCL 20 meq po x2, KCL 40 meq po x1, Cardiac note Stable overnight without angina, CHF, or palpitations. Vitals stable and controlled. No other complaints overnight. Tolerating meds well. Physical Exam:  
Neck- supple, no JVD  
CV- regular rate and rhythm, soft NEERAJ RUSB Lung- clear bilaterally Abd- soft, nontender, nondistended Ext- trace-1+ anterior tibial pitting edema Skin- warm and dry  
   
Principal Problem:  
  NSTEMI (non-ST elevated myocardial infarction) ([facility] [address] (5/16/2020)- occluded OM branch.    
The OM was not intervened on secondary to the fact that this likely infarcted at least four days prior to admit, and is outside the window for therapy. Liseth Benton is also unknown whether this is a ruptured plaque or possibly an embolic event and stenting would be potentially contraindicated for the development of mycotic aneurysm.   
   
Active Problems:  
  Diastolic and systolic combined CHF, chronic (HCC) - stable, lying flat comfortably, continue meds but added IV lasix and diurese for one more day IV, recheck in AM, replete K BID today.   
   
   S/P aortic valve replacement- endocarditis- ABX per ID-- YARON Brief Findings: LVEF mild/moderately reduced, bioprosthetic aortic valve with small mobile mass on LVOT side of the prosthesis as well as thickening / restriction of 2/3 leaflets, no other valvular masses/vegetations identified.  Continue Vanc per ID recs. Will need AVR soon once blood cultures cleared. ..   BLOOD CULTURE  FROM 5/24 NEGATIVE X 3 DAYS.  PER CT SURGERY  
   
  CAD (coronary artery disease) ()- stable, continue meds  
    Overview: Nonobstructive  
   
  DIEGO on CPAP ()- stable, continue CPAP as tolerated  
   
  Dyslipidemia ()- stable, continue meds  
   
  Essential hypertension - stable, continue meds  
   
  Suspected COVID-19 virus infection (5/16/2020)- ruled out, negative  
   
Continue current care trying to maximize his clinical situation to optimize surgical results for SBE of bAVR IM NOTE  
5/30:  
Cards, ID, CT surgery following. Repeat blood culture 5/24 no growth x5d. BCx 5/28 also negative Await oral surgery eval prior to surgery, no obvious abscess on CT. Nursing followed up with office yesterday but call not returned. Afebrile, stable mild leukocytosis Feels well. No acute events. Physical Exam:   
General: Alert, cooperative, no distress. Lungs:   Clear to auscultation bilaterally. Heart: Regular rate and rhythm. Abdomen:   Soft, non-tender. Bowel sounds normal.   
Extremities: 2+ edema to LE bilaterally    
Pulses: 2+ and symmetric all extremities. Neurologic: CNII-XII intact   
   
Principal Problem:  
  NSTEMI (non-ST elevated myocardial infarction) ([facility] [address] (5/16/2020)  
  Active Problems:  
  Diastolic CHF, chronic ([address] (5/7/2018)    
  S/P aortic valve replacement (4/1/2018)    
  CAD (coronary artery disease) ()  
    Overview: Nonobstructive  
   
  DIEGO on CPAP ()  
   
  Dyslipidemia ()  
   
  Essential hypertension (9/21/2018)    
  Suspected COVID-19 virus infection (5/16/2020)  
   
   Petechiae (5/16/2020)    
  Anemia (5/16/2020)    
  Diabetes mellitus, type II ([facility] [address] (5/20/2020)    
  Vitamin D deficiency (5/20/2020)    
   
CAD/CHF with NSTEMI and endocarditis-S/p cardiac cath on 5/19 with no treatable lesions, occluded OM branch. ASA, statin, BB. Needs AVR as below. Cardiology adjusting meds. Repeat Echo with [id number]  
   
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 Ceftriaxone and gentamycin per ID. Cx 5/18 and 5/20 strep mutans. Repeat blood culture on 5/24 no growth x5d. Positive PCR refers to culture from 5/20. CT surgery following - want oral surgery eval prior to surgery. Consult still pending.  
   
LE edema bilaterally - continue Lasix  
   
Normocytic anemia with thrombocytopenia - follow CBC. S/p Iron infusions. Oral iron. Will need to establish care with GI for colon cancer screening at discharge.   
   
Hyponatremia - Resolved.   
   
Hypokalemia - supplement  
   
Vitamin D deficiency - Supplement.   
   
DM type II - A1C 7.1. ADA. SS. Will need outpatient diabetic education  
   
DVT prophylaxis - SCDs

## 2020-06-03 NOTE — ANESTHESIA PREPROCEDURE EVALUATION
Relevant Problems   No relevant active problems       Anesthetic History   No history of anesthetic complications            Review of Systems / Medical History  Patient summary reviewed and pertinent labs reviewed    Pulmonary        Sleep apnea: CPAP           Neuro/Psych     seizures (remote x 1 - Etoh withdrawal)    Psychiatric history     Cardiovascular    Hypertension: well controlled  Valvular problems/murmurs: aortic stenosis    CHF    CAD and hyperlipidemia    Exercise tolerance: <4 METS  Comments: Admitted 5/16 with NSTEMI, found to have strep endocarditis of AV prosthesis. Abx x 2 weeks, now with negative blood cultures. Needs dental extraction prior to redo AVR. Cath - occluded OM1, no intervention due to prolonged time of occlusion and concern of mycotic emboli. Otherwise, non-obstructive dz    Echo 5/28 - EF 30-35%, diffuse hypokinesis, AV prosthesis with mobile mass and restricted leaflets/moderate stenosis. Mild-mod MR, mild-mod TR.    GI/Hepatic/Renal                Endo/Other    Diabetes (no meds at home)         Other Findings   Comments: Covid negative           Physical Exam    Airway  Mallampati: II  TM Distance: 4 - 6 cm  Neck ROM: normal range of motion   Mouth opening: Normal     Cardiovascular    Rhythm: regular  Rate: normal    Murmur: Grade 3, Aortic area     Dental    Dentition: Poor dentition     Pulmonary  Breath sounds clear to auscultation               Abdominal         Other Findings            Anesthetic Plan    ASA: 4  Anesthesia type: general    Monitoring Plan: Arterial line        Anesthetic plan and risks discussed with: Patient      Poss arterial line and nasal intubation (both nostrils patent) discussed. High risk nature of surgery and anesthesia discussed.

## 2020-06-03 NOTE — PROGRESS NOTES
[facility] CARDIOLOGY PROGRESS NOTE           6/3/2020 9:16 AM    Admit Date: 5/16/2020      Subjective:   No cp or sob. Has walked in the [place]. Objective:      Vitals:    06/02/20 2100 06/03/20 0036 06/03/20 0538 06/03/20 0758   BP: 102/61 97/64 111/69 109/71   Pulse: 79 70 83 77   Resp: 17 18 18 20   Temp: 98.6 °F (37 °C) 97.8 °F (36.6 °C) 97.7 °F (36.5 °C) 98.6 °F (37 °C)   SpO2: 94% 96% 98% 97%   Weight:   64 kg (141 lb 3.2 oz)    Height:           Physical Exam:  General-No Acute Distress  Neck- supple, no JVD  CV- regular rate and rhythm, + MRG  Lung- clear bilaterally  Abd- soft, nontender, nondistended  Ext- no edema bilaterally. Skin- warm and dry    Data Review:   Recent Labs     06/02/20  0610 06/01/20  0349    141   K 3.5 3.8   BUN 34* 35*   CREA 1.19 1.26   * 106*   WBC 12.0*  --    HGB 9.1*  --    HCT 29.3*  --      --        Assessment/Plan:     Nstemi, cath 5/19, OM occluded, ? embolic  Step. mutans bAVR endocarditis  Diabetic  Cardiomyopathy, ef 40% 5/17  Poor dentition    ////    He is for dental surgery tomorrow. Meds, labs reviewed.           Elvin Stoner MD  6/3/2020 9:16 AM

## 2020-06-03 NOTE — PROGRESS NOTES
Hospitalist Progress Note    Patient: Mecca Richards MRN: [id number]  SSN: [id number]    YOB: 1960  Age: 62 y.o. 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 He is going to have an AVR replacement after dental extraction. 6/3 - He feels good. Has been walking hallways. Denies CP/SOB. Denies F/C/N/V. Review of systems negative except stated above. Objective:     Visit Vitals  /71   Pulse 77   Temp 98.6 °F (37 °C)   Resp 20   Ht 6' 1\" (1.854 m)   Wt 64 kg (141 lb 3.2 oz)   SpO2 97%   BMI 18.63 kg/m²      Oxygen Therapy  O2 Sat (%): 97 % (06/03/20 0758)  Pulse via Oximetry: 96 beats per minute (05/27/20 1915)  O2 Device: Room air (06/03/20 0818)  O2 Flow Rate (L/min): 2 l/min (05/26/20 0015)  FIO2 (%): 21 % (05/27/20 1915)      Intake and Output:     Intake/Output Summary (Last 24 hours) at 6/3/2020 0920  Last data filed at 6/2/2020 2230  Gross per 24 hour   Intake 360 ml   Output 1700 ml   Net -1340 ml         Physical Exam:   GENERAL: alert, cooperative, no distress, appears stated age  EYE: conjunctivae/corneas clear. PERRL. THROAT & NECK: normal and no erythema or exudates noted. LUNG: clear to auscultation bilaterally  HEART: regular rate and rhythm, S1S2, no murmur, no JVD  ABDOMEN: soft, non-tender, non-distended. Bowel sounds normal.   EXTREMITIES:  No edema, 2+ pedal/radial pulses bilaterally  SKIN: no rash or abnormalities  NEUROLOGIC: A&Ox3. Cranial nerves 2-12 grossly intact.     Lab/Data Review:  Recent Results (from the past 24 hour(s))   GLUCOSE, POC    Collection Time: 06/02/20 11:15 AM   Result Value Ref Range    Glucose (POC) 120 (H) 65 - 100 mg/dL   GLUCOSE, POC    Collection Time: 06/02/20  3:54 PM   Result Value Ref Range    Glucose (POC) 120 (H) 65 - 100 mg/dL   GLUCOSE, POC Collection Time: 06/02/20 10:35 PM   Result Value Ref Range    Glucose (POC) 146 (H) 65 - 100 mg/dL   GLUCOSE, POC    Collection Time: 06/03/20  6:20 AM   Result Value Ref Range    Glucose (POC) 106 (H) 65 - 100 mg/dL       Imaging:  Ct Chest W Cont    Result Date: 5/16/2020  CT Chest with contrast INDICATION: Chest pain, fever and elevated d-dimer. Elevated troponin. Question PE. Covid Rule out COMPARISON: Chest x-ray earlier today TECHNIQUE: Contiguous axial images were obtained from the neck base through the upper abdomen with intravenous contrast, 100 mL Isovue 370. Radiation dose reduction techniques were used for this study:  Our CT scanners use one or all of the following: Automated exposure control, adjustment of the mA and/or kVp according to patient's size, iterative reconstruction. FINDINGS: There is no pulmonary embolus. The heart is mildly enlarged. There is no pericardial effusion. There are changes of prior sternotomy. Prosthetic aortic valve is present. There is mild coronary artery calcification. There is slight ectasia of the ascending thoracic aorta. Negative for thoracic aortic dissection. There is no evidence of lymphadenopathy. There is no endobronchial lesion. There is minimal dependent subsegmental atelectasis. There is no focal pulmonary consolidation, pleural effusion or pneumothorax. No pulmonary edema. Included upper abdomen demonstrates micronodular contour of the liver, suggestive of cirrhosis. Spleen is enlarged. Surrounding bones are intact. IMPRESSION: 1. Negative for pulmonary embolism. 2. No acute pulmonary disease. Negative for pneumonia, pulmonary edema or pleural effusion. 3. Mild coronary artery calcification    Xr Chest Port    Result Date: 5/16/2020  Portable chest xray  COMPARISON: May 2, 2018 CLINICAL HISTORY: Chest pain, fever. FINDINGS: Heart appears mildly enlarged. Mediastinal contour is within normal limits. Stable postsurgical changes of sternotomy.  There is no focal pulmonary consolidation, pneumothorax or pulmonary edema. No large pleural effusion. Minimal subsegmental atelectasis at the right lung base. Surrounding bones are stable. IMPRESSION: 1. No pulmonary consolidation or pulmonary edema. Ct Chest Soft Tissue Neck W Cont    Result Date: 5/27/2020  EXAM: CT NECK AND CHEST WITH CONTRAST INDICATION: S. mutans endocarditis with persistent bacteremia; evaluation for an abscess which might explain bacteremia. . COMPARISON: Chest CT 5/16/2020 TECHNIQUE:  CT imaging was performed of the neck and chest after intravenous injection of 100 mL Isovue 370. Intravenous contrast was used for better evaluation of solid organs and vascular structures. Coronal reformatted imaging provided. Radiation dose reduction techniques were used for this study. Our CT scanners use one or all of the following: Automated exposure control, adjustment of the mA and/or kV according to patient size, iterative reconstruction. FINDINGS: NECK: The bilateral bony orbits and intraorbital structures are unremarkable. The nasopharynx, oropharynx, epiglottis, laryngeal vestibule, hypopharynx, and larynx are within normal limits. The deep spaces of the neck are unremarkable. No fluid collection or phlegmon is clearly identified. The patient is partially edentulous. Periapical lucencies associated with many of the abdomen remaining teeth. No definite odontogenic abscess is present. No cervical adenopathy. The carotid arteries and jugular veins are within normal limits. Visualized intracranial structures appear normal. CHEST: Mediastinum and visualized thyroid: Normal. Heart: Aortic valve prosthesis noted. There is no pericardial effusion. There is mild global clinically. Multivessel coronary atherosclerotic calcifications. Large Vessels: Aneurysmal dilatation of the ascending aorta measuring 4.1 cm AP. Pleura: Moderate right and small left pleural effusions, new compared to prior.  Lungs: No consolidation or cavitary lesion evident. Airways: Normal. Lymph nodes: Mediastinal lymph nodes are mildly prominent in number but not pathologically enlarged. Bones/Soft tissues: No aggressive osseous lesion. Visualized abdomen: Normal.     IMPRESSION: 1. Partially edentulous patient with periapical lucencies associated with many of the remaining teeth. No definite odontogenic abscess present. No abscess within the deep spaces of the neck. 2.  Interval development of a moderate right and small left pleural effusion. No consolidation or cavitary lesion of the lungs. 3.  Changes of prior aortic valve replacement. Presumed post stenotic aneurysmal dilatation of the ascending aorta. Results for orders placed or performed during the hospital encounter of 20   2D ECHO COMPLETE ADULT (TTE) W OR [address] [facility] [address] [facility], [address] [facility]  [phone]    Transthoracic Echocardiogram  2D, M-mode, Doppler, and Color Doppler    Patient: Daniel Segura  MR #: [id number]  : [id number]  Age: 62 years  Gender: Male  Study date: 19-May-2020  Account #: [de-identified]  Height: 73 in  Weight: 144.8 lb  BSA: 1.88 mï¾²  Status:Routine  Location: 310  BP: 113/ 68    Allergies: NO KNOWN ALLERGENS    Sonographer:  Yo Moffett [facility]  Group:  [facility]  Referring Physician:  Gina Warner MD  Reading Physician:  DR. THONY TALLEY DO    INDICATIONS: Cardiomyopathy; Chest pain, chronic, high probability of CAD. PROCEDURE: This was a routine study. A transthoracic echocardiogram was  performed. The study included complete 2D imaging, M-mode, complete spectral  Doppler, and color Doppler. Intravenous contrast (Definity) was administered. Image quality was adequate. LEFT VENTRICLE: The ventricle was mildly dilated. Systolic function was  moderately reduced. Ejection fraction was estimated in the range of 40 % to   45  %.  There was moderate diffuse hypokinesis. There was severe hypokinesis of   the  basal-mid anterolateral wall(s). There was hypokinesis of the basal-mid  inferolateral wall(s). Wall thickness was normal. The study was not   technically  sufficient to allow evaluation of LV diastolic function. RIGHT VENTRICLE: The ventricle was mildly dilated. Systolic function was   mildly  reduced. Estimated peak pressure was in the range of 45-50 mmHg. LEFT ATRIUM: The atrium was markedly dilated. RIGHT ATRIUM: The atrium was moderately to markedly dilated. SYSTEMIC VEINS: IVC: The inferior vena cava was mildly dilated. The  respirophasic change in diameter was less than 50%. AORTIC VALVE: A 25mm Magna Ease (4/18) in place. There is a moderate sized  mobile mass on the LVOT side of the aortic valve prosthesis. Appearance could  be consistent with prosthetic valve vegetation. The peak velocity was 3.79   m/s. The mean pressure gradient was 33 mmHg. The peak pressure gradient was 58   mmHg. DI:(0.17), SVi: (20.7), AT: (90ms). There was moderate stenosis. There   appeared  to be a vegetation or echogenic structure noted on the non coronary cusp of   the  AVR. Color and CW doppler was assessed, although no evidence of insufficiency  was noted. MITRAL VALVE: Diastolic mitral regurgitation noted. May be consequence of   first  degree AV block. There was mild annular calcification. There was no evidence  for vegetation. Transmitral velocity was minimally increased. There was no  evidence for stenosis. There was mild to moderate regurgitation. TRICUSPID VALVE: The valve structure was normal. There was no evidence for  vegetation. There was no evidence for stenosis. There was mild to moderate  regurgitation. PULMONIC VALVE: The valve structure was normal. There was no evidence for  stenosis. There was no insufficiency. PERICARDIUM: There was no pericardial effusion. AORTA: The root exhibited normal size.  There was dilatation of the ascending  aorta (41 mm). SUMMARY:    -  Left ventricle: The ventricle was mildly dilated. Systolic function was  moderately reduced. Ejection fraction was estimated in the range of 40 % to   45  %. There was moderate diffuse hypokinesis. There was severe hypokinesis of   the  basal-mid anterolateral wall(s). There was hypokinesis of the basal-mid  inferolateral wall(s). -  Right ventricle: The ventricle was mildly dilated. Systolic function was  mildly reduced. -  Left atrium: The atrium was markedly dilated. -  Aortic valve: A 25mm Magna Ease (4/18) in place. There is a moderate sized  mobile mass on the LVOT side of the aortic valve prosthesis. Appearance could  be consistent with prosthetic valve vegetation. There was moderate stenosis. The mean pressure gradient was 33 mmHg. -  Mitral valve: Diastolic mitral regurgitation noted. May be consequence of  first degree AV block. There was mild annular calcification. There was mild   to  moderate regurgitation. There was no evidence for vegetation.    -  Tricuspid valve: There was mild to moderate regurgitation.    -  Aorta, systemic arteries: There was dilatation of the ascending aorta (41  mm). -  Additional impressions: Findings discussed and images reviewed with Dr Tonia Damon at time of dictation. SYSTEM MEASUREMENT TABLES    2D mode  Left Atrium Systolic Volume Index; Method of Disks, Biplane; 2D mode;: 56.4  ml/m2  IVS/LVPW (2D): 1  IVSd (2D): 0.9 cm  LVIDd (2D): 5.5 cm  LVIDs (2D): 4.4 cm  LVOT Area (2D): 3.5 cm2  LVPWd (2D): 0.9 cm    Unspecified Scan Mode  Peak Grad; Mean; Antegrade Flow: 49 mm[Hg]  Vmax; Antegrade Flow: 379 cm/s  LVOT Diam: 2.1 cm  Peak Grad; Mean; Antegrade Flow: 11 mm[Hg]  Vmax; Antegrade Flow: 165 cm/s    Prepared and signed by    DR. Sherrill Tadeo DO  Signed 19-May-2020 10:44:17     2D ECHO LIMTED ADULT W OR WO CONTR    Narrative    Alexleia  Noemy [address] [place] [id number]  [phone]    Transthoracic Echocardiogram  2D and Doppler    Patient: Xavier Mendosa  MR #: [id number]  : [id number]  Age: 62 years  Gender: Male  Study date: 28-May-2020  Account #: [de-identified]  Height: 72 in  Weight: 156.6 lb  BSA: 1.92 mï¾²  Status:Routine  Location: [facility]  BP: 117/ 76    Allergies: NO KNOWN ALLERGENS    Sonographer:  Roldan Be [facility]  Group:  [facility]  Referring Physician:  April Anderson MD [facility]  Reading Physician:  April Anderson MD [facility]    INDICATIONS: FU bAVR endocarditis. PROCEDURE: This was a routine study. A transthoracic echocardiogram was  performed. The study included limited 2D imaging and limited spectral   Doppler. Image quality was adequate. LEFT VENTRICLE: Since last study, LV had dilated more and the function has  declined. The ventricle was moderately to markedly dilated. Systolic function  was markedly reduced. Ejection fraction was estimated in the range of 30 % to  35 %. AORTIC VALVE: A 25mm Magna Ease bioprosthetic AVR with mobile mass on LVOT   side  of the prosthesis as well as thickening / restriction of the leaflets. Findings  consistent with valvular vegetation. SUMMARY:    -  Left ventricle: Since last study, LV had dilated more and the function has  declined. The ventricle was moderately to markedly dilated. Systolic function  was markedly reduced. Ejection fraction was estimated in the range of 30 % to  35 %. -  Aortic valve: A 25mm Magna Ease bioprosthetic AVR with mobile mass on LVOT  side of the prosthesis as well as thickening / restriction of the leaflets. Findings consistent with valvular vegetation. Prepared and signed by    April Anderson MD [facility]  Signed [id number] 16:50:28         Cultures:   All Micro Results     Procedure Component Value Units Date/Time    CULTURE, BLOOD [[id number]] Collected:  20 0405    Order Status:  Completed Specimen:  Blood Updated:  20 1143     Special Requests: -- LEFT  Antecubital       Culture result: NO GROWTH 4 DAYS       CULTURE, BLOOD [[id number]] Collected:  05/29/20 0412    Order Status:  Completed Specimen:  Blood Updated:  06/02/20 1143     Special Requests: --        RIGHT  Antecubital       Culture result: NO GROWTH 4 DAYS       CULTURE, BLOOD [[id number]] Collected:  05/24/20 1321    Order Status:  Completed Specimen:  Blood Updated:  05/29/20 0723     Special Requests: --        LEFT  Antecubital       Culture result: NO GROWTH 5 DAYS       CULTURE, BLOOD [[id number]]  (Abnormal)  (Susceptibility) Collected:  05/20/20 1424    Order Status:  Completed Specimen:  Blood Updated:  05/26/20 0834     Special Requests: --        RIGHT  FOREARM       GRAM STAIN GRAM POSITIVE COCCI         PEDIATRIC BOTTLE               RESULTS VERIFIED, PHONED TO AND READ BACK BY Isela Otto AT [facility] ON 5.24.20             Culture result: STREPTOCOCCUS MUTANS         REFER TO BIOFIRE PANEL [facility] [id number]    CULTURE, BLOOD [[id number]] Collected:  05/20/20 1434    Order Status:  Completed Specimen:  Blood Updated:  05/25/20 0633     Special Requests: --        LEFT  Antecubital       Culture result: NO GROWTH 5 DAYS       BLOOD CULTURE ID PANEL [[id number]]  (Abnormal) Collected:  05/24/20 0105    Order Status:  Completed Specimen:  Blood Updated:  05/24/20 0647     Acc. no. from Micro Order [id number]     Streptococcus Detected        Comment: RESULTS VERIFIED, PHONED TO AND READ BACK BY  Isela Otto RN @7472 ON 5/24/20 AK. INTERPRETATION       Gram positive cocci. Identified by realtime PCR as Streptococcus species (not agalactiae, pyogenes, or pneumoniae). Comment: Recommend discontinuing IV vancomycin starting cefazolin or nafcillin if patient not on beta-lactam therapy. Infectious Diseases Consult recommended in adult patients. THIS TEST DOES NOT REPLACE SENSITIVITY TESTING.        CULTURE, BLOOD [[id number]]  (Abnormal) Collected:  05/18/20 1944    Order Status: Completed Specimen:  Blood Updated:  05/23/20 0720     Special Requests: --        RIGHT  HAND       GRAM STAIN GRAM POSITIVE COCCI         PEDIATRIC BOTTLE               CRITICAL RESULT NOT CALLED DUE TO PREVIOUS NOTIFICATION OF CRITICAL RESULT WITHIN THE LAST 24 HOURS. Culture result:       ALPHA STREPTOCOCCUS, NOT S. PNEUMONIAE            FOR ID AND SUSCEPTIBILITY REFER TO CULTURE NO ACC VV.[id number]    CULTURE, BLOOD [[id number]]  (Abnormal)  (Susceptibility) Collected:  05/18/20 1938    Order Status:  Completed Specimen:  Blood Updated:  05/23/20 0703     Special Requests: --        RIGHT  ARM       GRAM STAIN GRAM POS COCCI IN CHAINS         AEROBIC BOTTLE POSITIVE               RESULTS VERIFIED, PHONED TO AND READ BACK BY Chaitanya Ahuja RN @ 1120 ON 5/20/20 BY [facility]           Culture result: STREPTOCOCCUS MUTANS         REFER TO [facility] PANEL ACCESSION [id number]    CULTURE, BLOOD [[id number]]     Order Status:  Canceled Specimen:  Blood     CULTURE, BLOOD [[id number]]     Order Status:  Canceled Specimen:  Blood     BLOOD CULTURE ID PANEL [[id number]]  (Abnormal) Collected:  05/18/20 1938    Order Status:  Completed Specimen:  Blood Updated:  05/20/20 1117     Acc. no. from Micro Order [id number]     Streptococcus Detected        Comment: RESULTS VERIFIED, PHONED TO AND READ BACK BY  Chaitanya Ahuja RN @ 1120 ON 5/20/20 BY [facility]          INTERPRETATION       Gram positive cocci. Identified by realtime PCR as Streptococcus species (not agalactiae, pyogenes, or pneumoniae).            Comment: Consider discontinuation of IV vancomycin and using an anti-streptococcal beta-lactam (ceftriaxone/cefotaxime (peds))       EMERGENT DISEASE PANEL [[id number]] Collected:  05/16/20 1946    Order Status:  Completed Specimen:  Not Specified Updated:  05/18/20 1614     Emergent disease panel Not detected        Comment: Performed at [address]               Assessment/Plan:     Principal Problem:    Endocarditis of prosthetic valve (HCC) (6/2/2020)  - Mobile mass seen on ECHO  - Blood cultures positive for Strep Mutans  - Blood cultures 5/29 NGTD  - To have dental extraction soon  - Will need to go for AVR replacement afterwards  - Continue Ceftriaxone  - Appreciate all specialists    Active Problems:    NSTEMI (non-ST elevated myocardial infarction) ([facility] [address] (5/16/2020)  - Resolved  - No acute CP  - Continue ASA  - Continue Lipitor  - Continue Lisinopril      Petechiae (5/16/2020)  - Due to #1      S/P aortic valve replacement (4/1/2018)  - Will need replacement again      CAD (coronary artery disease) ()  - No acute CP  - Continue ASA  - Continue Lisinopril  - Continue Lipitor      Essential hypertension (9/21/2018)  - Stable  - Continue current medications      Diabetes mellitus, type II ([facility]) (5/20/2020)  - A1C 7  - Continue Humalog SSI      Vitamin D deficiency (5/97/8121)      Diastolic CHF, chronic ([facility]) (5/7/2018)  - No acute issues  - Continue Lasix IV  - Continue Lisinopril  - Continue Coreg      Anemia (5/16/2020)  - Stable      DIEGO on CPAP ()      Dyslipidemia ()  - Continue Lipitor      COVID-19 ruled out (5/16/2020)    Today's Plan: Continue Ceftriaxone.  Await dental extraction on 6/4    DIET DIABETIC CONSISTENT CARB Regular    DVT Prophylaxis: SCDs    Discharge Plan: TBD      Signed By: Katrina Kawasaki,      Sunitha 3, 2020

## 2020-06-03 NOTE — ROUTINE PROCESS
Bedside and verbal shift change report given to PARTH Tran (oncoming nurse) by self (offgoing nurse). Report included the following information SBAR, Kardex, Intake/Output, MAR, Recent Results, and Cardiac Rhythm NSR.

## 2020-06-04 ENCOUNTER — ANESTHESIA (OUTPATIENT)
Dept: SURGERY | Age: 60
DRG: 280 | End: 2020-06-04
Payer: COMMERCIAL

## 2020-06-04 LAB
ALBUMIN SERPL-MCNC: 2.1 G/DL (ref 3.2–4.6)
ALBUMIN/GLOB SERPL: 0.5 {RATIO} (ref 1.2–3.5)
ALP SERPL-CCNC: 85 U/L (ref 50–136)
ALT SERPL-CCNC: 30 U/L (ref 12–65)
ANION GAP SERPL CALC-SCNC: 7 MMOL/L (ref 7–16)
AST SERPL-CCNC: 21 U/L (ref 15–37)
BASOPHILS # BLD: 0.1 K/UL (ref 0–0.2)
BASOPHILS NFR BLD: 1 % (ref 0–2)
BILIRUB SERPL-MCNC: 0.5 MG/DL (ref 0.2–1.1)
BUN SERPL-MCNC: 41 MG/DL (ref 8–23)
CALCIUM SERPL-MCNC: 7.9 MG/DL (ref 8.3–10.4)
CHLORIDE SERPL-SCNC: 105 MMOL/L (ref 98–107)
CO2 SERPL-SCNC: 28 MMOL/L (ref 21–32)
CREAT SERPL-MCNC: 1.16 MG/DL (ref 0.8–1.5)
DIFFERENTIAL METHOD BLD: ABNORMAL
EOSINOPHIL # BLD: 0.1 K/UL (ref 0–0.8)
EOSINOPHIL NFR BLD: 1 % (ref 0.5–7.8)
ERYTHROCYTE [DISTWIDTH] IN BLOOD BY AUTOMATED COUNT: 19.2 % (ref 11.9–14.6)
GLOBULIN SER CALC-MCNC: 4.4 G/DL (ref 2.3–3.5)
GLUCOSE BLD STRIP.AUTO-MCNC: 109 MG/DL (ref 65–100)
GLUCOSE BLD STRIP.AUTO-MCNC: 110 MG/DL (ref 65–100)
GLUCOSE BLD STRIP.AUTO-MCNC: 187 MG/DL (ref 65–100)
GLUCOSE BLD STRIP.AUTO-MCNC: 91 MG/DL (ref 65–100)
GLUCOSE SERPL-MCNC: 107 MG/DL (ref 65–100)
HCT VFR BLD AUTO: 28.7 % (ref 41.1–50.3)
HGB BLD-MCNC: 9 G/DL (ref 13.6–17.2)
IMM GRANULOCYTES # BLD AUTO: 0.1 K/UL (ref 0–0.5)
IMM GRANULOCYTES NFR BLD AUTO: 1 % (ref 0–5)
LYMPHOCYTES # BLD: 1.2 K/UL (ref 0.5–4.6)
LYMPHOCYTES NFR BLD: 11 % (ref 13–44)
MCH RBC QN AUTO: 27.8 PG (ref 26.1–32.9)
MCHC RBC AUTO-ENTMCNC: 31.4 G/DL (ref 31.4–35)
MCV RBC AUTO: 88.6 FL (ref 79.6–97.8)
MONOCYTES # BLD: 0.7 K/UL (ref 0.1–1.3)
MONOCYTES NFR BLD: 7 % (ref 4–12)
NEUTS SEG # BLD: 9 K/UL (ref 1.7–8.2)
NEUTS SEG NFR BLD: 81 % (ref 43–78)
NRBC # BLD: 0 K/UL (ref 0–0.2)
PLATELET # BLD AUTO: 190 K/UL (ref 150–450)
PMV BLD AUTO: 9.4 FL (ref 9.4–12.3)
POTASSIUM SERPL-SCNC: 3.9 MMOL/L (ref 3.5–5.1)
PROT SERPL-MCNC: 6.5 G/DL (ref 6.3–8.2)
RBC # BLD AUTO: 3.24 M/UL (ref 4.23–5.6)
SODIUM SERPL-SCNC: 140 MMOL/L (ref 136–145)
WBC # BLD AUTO: 11.1 K/UL (ref 4.3–11.1)

## 2020-06-04 PROCEDURE — 74011250636 HC RX REV CODE- 250/636: Performed by: DENTIST

## 2020-06-04 PROCEDURE — 74011250637 HC RX REV CODE- 250/637: Performed by: DENTIST

## 2020-06-04 PROCEDURE — 74011250636 HC RX REV CODE- 250/636: Performed by: ANESTHESIOLOGY

## 2020-06-04 PROCEDURE — 74011250637 HC RX REV CODE- 250/637: Performed by: INTERNAL MEDICINE

## 2020-06-04 PROCEDURE — 0CDWXZ1 EXTRACTION OF UPPER TOOTH, MULTIPLE, EXTERNAL APPROACH: ICD-10-PCS | Performed by: DENTIST

## 2020-06-04 PROCEDURE — 77030019908 HC STETH ESOPH SIMS -A: Performed by: ANESTHESIOLOGY

## 2020-06-04 PROCEDURE — 74011000258 HC RX REV CODE- 258: Performed by: INTERNAL MEDICINE

## 2020-06-04 PROCEDURE — 77030014007 HC SPNG HEMSTAT J&J -B: Performed by: DENTIST

## 2020-06-04 PROCEDURE — 85025 COMPLETE CBC W/AUTO DIFF WBC: CPT

## 2020-06-04 PROCEDURE — 0CDXXZ1 EXTRACTION OF LOWER TOOTH, MULTIPLE, EXTERNAL APPROACH: ICD-10-PCS | Performed by: DENTIST

## 2020-06-04 PROCEDURE — 82962 GLUCOSE BLOOD TEST: CPT

## 2020-06-04 PROCEDURE — 74011000250 HC RX REV CODE- 250: Performed by: DENTIST

## 2020-06-04 PROCEDURE — 76210000006 HC OR PH I REC 0.5 TO 1 HR: Performed by: DENTIST

## 2020-06-04 PROCEDURE — 77030037088 HC TUBE ENDOTRACH ORAL NSL COVD-A: Performed by: ANESTHESIOLOGY

## 2020-06-04 PROCEDURE — 76060000032 HC ANESTHESIA 0.5 TO 1 HR: Performed by: DENTIST

## 2020-06-04 PROCEDURE — 74011250636 HC RX REV CODE- 250/636: Performed by: INTERNAL MEDICINE

## 2020-06-04 PROCEDURE — 74011250636 HC RX REV CODE- 250/636: Performed by: NURSE ANESTHETIST, CERTIFIED REGISTERED

## 2020-06-04 PROCEDURE — 74011250637 HC RX REV CODE- 250/637: Performed by: HOSPITALIST

## 2020-06-04 PROCEDURE — 76010000138 HC OR TIME 0.5 TO 1 HR: Performed by: DENTIST

## 2020-06-04 PROCEDURE — 74011000250 HC RX REV CODE- 250: Performed by: NURSE ANESTHETIST, CERTIFIED REGISTERED

## 2020-06-04 PROCEDURE — 74011636637 HC RX REV CODE- 636/637: Performed by: DENTIST

## 2020-06-04 PROCEDURE — 80053 COMPREHEN METABOLIC PANEL: CPT

## 2020-06-04 PROCEDURE — 74011250637 HC RX REV CODE- 250/637: Performed by: FAMILY MEDICINE

## 2020-06-04 PROCEDURE — 77030039425 HC BLD LARYNG TRULITE DISP TELE -A: Performed by: ANESTHESIOLOGY

## 2020-06-04 PROCEDURE — 94760 N-INVAS EAR/PLS OXIMETRY 1: CPT

## 2020-06-04 PROCEDURE — 77030018836 HC SOL IRR NACL ICUM -A: Performed by: DENTIST

## 2020-06-04 PROCEDURE — 65660000000 HC RM CCU STEPDOWN

## 2020-06-04 PROCEDURE — 77030040361 HC SLV COMPR DVT MDII -B: Performed by: DENTIST

## 2020-06-04 PROCEDURE — 77030002888 HC SUT CHRMC J&J -A: Performed by: DENTIST

## 2020-06-04 RX ORDER — ETOMIDATE 2 MG/ML
INJECTION INTRAVENOUS AS NEEDED
Status: DISCONTINUED | OUTPATIENT
Start: 2020-06-04 | End: 2020-06-04 | Stop reason: HOSPADM

## 2020-06-04 RX ORDER — SODIUM CHLORIDE, SODIUM LACTATE, POTASSIUM CHLORIDE, CALCIUM CHLORIDE 600; 310; 30; 20 MG/100ML; MG/100ML; MG/100ML; MG/100ML
100 INJECTION, SOLUTION INTRAVENOUS CONTINUOUS
Status: DISPENSED | OUTPATIENT
Start: 2020-06-04 | End: 2020-06-05

## 2020-06-04 RX ORDER — OXYCODONE HYDROCHLORIDE 5 MG/1
5 TABLET ORAL
Status: DISCONTINUED | OUTPATIENT
Start: 2020-06-04 | End: 2020-06-04

## 2020-06-04 RX ORDER — SODIUM CHLORIDE, SODIUM LACTATE, POTASSIUM CHLORIDE, CALCIUM CHLORIDE 600; 310; 30; 20 MG/100ML; MG/100ML; MG/100ML; MG/100ML
100 INJECTION, SOLUTION INTRAVENOUS CONTINUOUS
Status: DISCONTINUED | OUTPATIENT
Start: 2020-06-04 | End: 2020-06-04

## 2020-06-04 RX ORDER — SODIUM CHLORIDE 0.9 % (FLUSH) 0.9 %
5-40 SYRINGE (ML) INJECTION EVERY 8 HOURS
Status: DISCONTINUED | OUTPATIENT
Start: 2020-06-04 | End: 2020-06-04

## 2020-06-04 RX ORDER — BUPIVACAINE HYDROCHLORIDE AND EPINEPHRINE 5; 5 MG/ML; UG/ML
INJECTION, SOLUTION EPIDURAL; INTRACAUDAL; PERINEURAL AS NEEDED
Status: DISCONTINUED | OUTPATIENT
Start: 2020-06-04 | End: 2020-06-04 | Stop reason: HOSPADM

## 2020-06-04 RX ORDER — CLINDAMYCIN PHOSPHATE 900 MG/50ML
900 INJECTION INTRAVENOUS
Status: COMPLETED | OUTPATIENT
Start: 2020-06-04 | End: 2020-06-04

## 2020-06-04 RX ORDER — SODIUM CHLORIDE 0.9 % (FLUSH) 0.9 %
5-40 SYRINGE (ML) INJECTION AS NEEDED
Status: DISCONTINUED | OUTPATIENT
Start: 2020-06-04 | End: 2020-06-09 | Stop reason: HOSPADM

## 2020-06-04 RX ORDER — FENTANYL CITRATE 50 UG/ML
100 INJECTION, SOLUTION INTRAMUSCULAR; INTRAVENOUS ONCE
Status: DISCONTINUED | OUTPATIENT
Start: 2020-06-04 | End: 2020-06-04

## 2020-06-04 RX ORDER — NALOXONE HYDROCHLORIDE 0.4 MG/ML
0.04 INJECTION, SOLUTION INTRAMUSCULAR; INTRAVENOUS; SUBCUTANEOUS
Status: DISCONTINUED | OUTPATIENT
Start: 2020-06-04 | End: 2020-06-04

## 2020-06-04 RX ORDER — LIDOCAINE HYDROCHLORIDE 20 MG/ML
INJECTION, SOLUTION EPIDURAL; INFILTRATION; INTRACAUDAL; PERINEURAL AS NEEDED
Status: DISCONTINUED | OUTPATIENT
Start: 2020-06-04 | End: 2020-06-04 | Stop reason: HOSPADM

## 2020-06-04 RX ORDER — LIDOCAINE HYDROCHLORIDE 10 MG/ML
0.1 INJECTION INFILTRATION; PERINEURAL AS NEEDED
Status: DISCONTINUED | OUTPATIENT
Start: 2020-06-04 | End: 2020-06-09 | Stop reason: HOSPADM

## 2020-06-04 RX ORDER — SUCCINYLCHOLINE CHLORIDE 20 MG/ML
INJECTION INTRAMUSCULAR; INTRAVENOUS AS NEEDED
Status: DISCONTINUED | OUTPATIENT
Start: 2020-06-04 | End: 2020-06-04 | Stop reason: HOSPADM

## 2020-06-04 RX ORDER — HYDROCODONE BITARTRATE AND ACETAMINOPHEN 5; 325 MG/1; MG/1
1 TABLET ORAL
Status: DISCONTINUED | OUTPATIENT
Start: 2020-06-04 | End: 2020-06-09 | Stop reason: HOSPADM

## 2020-06-04 RX ORDER — HYDROMORPHONE HYDROCHLORIDE 2 MG/ML
0.5 INJECTION, SOLUTION INTRAMUSCULAR; INTRAVENOUS; SUBCUTANEOUS
Status: DISCONTINUED | OUTPATIENT
Start: 2020-06-04 | End: 2020-06-04

## 2020-06-04 RX ORDER — ROCURONIUM BROMIDE 10 MG/ML
INJECTION, SOLUTION INTRAVENOUS AS NEEDED
Status: DISCONTINUED | OUTPATIENT
Start: 2020-06-04 | End: 2020-06-04 | Stop reason: HOSPADM

## 2020-06-04 RX ORDER — MIDAZOLAM HYDROCHLORIDE 1 MG/ML
2 INJECTION, SOLUTION INTRAMUSCULAR; INTRAVENOUS
Status: ACTIVE | OUTPATIENT
Start: 2020-06-04 | End: 2020-06-05

## 2020-06-04 RX ORDER — MIDAZOLAM HYDROCHLORIDE 1 MG/ML
2 INJECTION, SOLUTION INTRAMUSCULAR; INTRAVENOUS ONCE
Status: DISCONTINUED | OUTPATIENT
Start: 2020-06-04 | End: 2020-06-04

## 2020-06-04 RX ADMIN — CARVEDILOL 6.25 MG: 6.25 TABLET, FILM COATED ORAL at 21:33

## 2020-06-04 RX ADMIN — FERROUS SULFATE TAB 325 MG (65 MG ELEMENTAL FE) 325 MG: 325 (65 FE) TAB at 21:34

## 2020-06-04 RX ADMIN — SODIUM CHLORIDE, SODIUM LACTATE, POTASSIUM CHLORIDE, AND CALCIUM CHLORIDE: 600; 310; 30; 20 INJECTION, SOLUTION INTRAVENOUS at 14:37

## 2020-06-04 RX ADMIN — Medication 10 ML: at 05:18

## 2020-06-04 RX ADMIN — LIDOCAINE HYDROCHLORIDE 60 MG: 20 INJECTION, SOLUTION EPIDURAL; INFILTRATION; INTRACAUDAL; PERINEURAL at 14:45

## 2020-06-04 RX ADMIN — POTASSIUM CHLORIDE 20 MEQ: 20 TABLET, EXTENDED RELEASE ORAL at 21:33

## 2020-06-04 RX ADMIN — INSULIN LISPRO 2 UNITS: 100 INJECTION, SOLUTION INTRAVENOUS; SUBCUTANEOUS at 17:55

## 2020-06-04 RX ADMIN — SODIUM CHLORIDE, SODIUM LACTATE, POTASSIUM CHLORIDE, AND CALCIUM CHLORIDE 100 ML/HR: 600; 310; 30; 20 INJECTION, SOLUTION INTRAVENOUS at 12:28

## 2020-06-04 RX ADMIN — PHENYLEPHRINE HYDROCHLORIDE 180 MCG: 10 INJECTION INTRAVENOUS at 15:00

## 2020-06-04 RX ADMIN — ROCURONIUM BROMIDE 5 MG: 10 INJECTION, SOLUTION INTRAVENOUS at 14:45

## 2020-06-04 RX ADMIN — CEFTRIAXONE SODIUM 2 G: 2 INJECTION, POWDER, FOR SOLUTION INTRAMUSCULAR; INTRAVENOUS at 14:50

## 2020-06-04 RX ADMIN — ATORVASTATIN CALCIUM 20 MG: 20 TABLET, FILM COATED ORAL at 21:33

## 2020-06-04 RX ADMIN — ETOMIDATE 20 MG: 2 INJECTION, SOLUTION INTRAVENOUS at 14:45

## 2020-06-04 RX ADMIN — SUCCINYLCHOLINE CHLORIDE 160 MG: 20 INJECTION, SOLUTION INTRAMUSCULAR; INTRAVENOUS at 14:45

## 2020-06-04 RX ADMIN — ASPIRIN 81 MG 81 MG: 81 TABLET ORAL at 08:36

## 2020-06-04 RX ADMIN — CEFTRIAXONE SODIUM 2 G: 2 INJECTION, POWDER, FOR SOLUTION INTRAMUSCULAR; INTRAVENOUS at 12:25

## 2020-06-04 RX ADMIN — FUROSEMIDE 40 MG: 10 INJECTION, SOLUTION INTRAMUSCULAR; INTRAVENOUS at 17:44

## 2020-06-04 RX ADMIN — MORPHINE SULFATE 2 MG: 10 INJECTION INTRAVENOUS at 18:50

## 2020-06-04 RX ADMIN — PHENYLEPHRINE HYDROCHLORIDE 240 MCG: 10 INJECTION INTRAVENOUS at 15:07

## 2020-06-04 RX ADMIN — ESCITALOPRAM OXALATE 10 MG: 10 TABLET ORAL at 08:36

## 2020-06-04 RX ADMIN — PHENYLEPHRINE HYDROCHLORIDE 120 MCG: 10 INJECTION INTRAVENOUS at 14:56

## 2020-06-04 RX ADMIN — CARVEDILOL 6.25 MG: 6.25 TABLET, FILM COATED ORAL at 08:36

## 2020-06-04 RX ADMIN — CLINDAMYCIN PHOSPHATE 900 MG: 900 INJECTION, SOLUTION INTRAVENOUS at 14:50

## 2020-06-04 RX ADMIN — HYDROCODONE BITARTRATE AND ACETAMINOPHEN 1 TABLET: 5; 325 TABLET ORAL at 21:34

## 2020-06-04 RX ADMIN — LISINOPRIL 2.5 MG: 5 TABLET ORAL at 21:34

## 2020-06-04 RX ADMIN — PHENYLEPHRINE HYDROCHLORIDE 180 MCG: 10 INJECTION INTRAVENOUS at 15:03

## 2020-06-04 RX ADMIN — PHENYLEPHRINE HYDROCHLORIDE 240 MCG: 10 INJECTION INTRAVENOUS at 15:21

## 2020-06-04 RX ADMIN — PHENYLEPHRINE HYDROCHLORIDE 240 MCG: 10 INJECTION INTRAVENOUS at 15:05

## 2020-06-04 RX ADMIN — Medication 10 ML: at 22:00

## 2020-06-04 NOTE — PROGRESS NOTES
Hospitalist Progress Note    Patient: Bessy Paez MRN: [id number]  SSN: [id number]    YOB: 1960  Age: 62 y.o. 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 He is going to have an AVR replacement after dental extraction. 6/4 - He feels good. Has been walking hallways. Ready for his dental extraction. Denies CP/SOB. Denies F/C/N/V. Review of systems negative except stated above. Objective:     Visit Vitals  /66   Pulse 72   Temp 97.9 °F (36.6 °C)   Resp 18   Ht 6' 1\" (1.854 m)   Wt 63.7 kg (140 lb 6.4 oz)   SpO2 97%   BMI 18.52 kg/m²      Oxygen Therapy  O2 Sat (%): 97 % (06/04/20 0836)  Pulse via Oximetry: 96 beats per minute (05/27/20 1915)  O2 Device: Room air (06/04/20 0836)  O2 Flow Rate (L/min): 2 l/min (05/26/20 0015)  FIO2 (%): 21 % (05/27/20 1915)      Intake and Output:     Intake/Output Summary (Last 24 hours) at 6/4/2020 0953  Last data filed at 6/3/2020 1337  Gross per 24 hour   Intake 240 ml   Output 450 ml   Net -210 ml         Physical Exam:   GENERAL: alert, cooperative, no distress, appears stated age  EYE: conjunctivae/corneas clear. PERRL. THROAT & NECK: normal and no erythema or exudates noted. LUNG: clear to auscultation bilaterally  HEART: regular rate and rhythm, S1S2, no murmur, no JVD  ABDOMEN: soft, non-tender, non-distended. Bowel sounds normal.   EXTREMITIES:  No edema, 2+ pedal/radial pulses bilaterally  SKIN: no rash or abnormalities  NEUROLOGIC: A&Ox3. Cranial nerves 2-12 grossly intact.     Lab/Data Review:  Recent Results (from the past 24 hour(s))   GLUCOSE, POC    Collection Time: 06/03/20 11:06 AM   Result Value Ref Range    Glucose (POC) 113 (H) 65 - 100 mg/dL   GLUCOSE, POC    Collection Time: 06/03/20  4:25 PM   Result Value Ref Range    Glucose (POC) 106 (H) 65 - 100 mg/dL   GLUCOSE, POC    Collection Time: 06/03/20  9:51 PM   Result Value Ref Range    Glucose (POC) 118 (H) 65 - 279 mg/dL   METABOLIC PANEL, COMPREHENSIVE    Collection Time: 06/04/20  4:22 AM   Result Value Ref Range    Sodium 140 136 - 145 mmol/L    Potassium 3.9 3.5 - 5.1 mmol/L    Chloride 105 98 - 107 mmol/L    CO2 28 21 - 32 mmol/L    Anion gap 7 7 - 16 mmol/L    Glucose 107 (H) 65 - 100 mg/dL    BUN 41 (H) 8 - 23 MG/DL    Creatinine 1.16 0.8 - 1.5 MG/DL    GFR est AA >60 >60 ml/min/1.73m2    GFR est non-AA >60 >60 ml/min/1.73m2    Calcium 7.9 (L) 8.3 - 10.4 MG/DL    Bilirubin, total 0.5 0.2 - 1.1 MG/DL    ALT (SGPT) 30 12 - 65 U/L    AST (SGOT) 21 15 - 37 U/L    Alk. phosphatase 85 50 - 136 U/L    Protein, total 6.5 6.3 - 8.2 g/dL    Albumin 2.1 (L) 3.2 - 4.6 g/dL    Globulin 4.4 (H) 2.3 - 3.5 g/dL    A-G Ratio 0.5 (L) 1.2 - 3.5     CBC WITH AUTOMATED DIFF    Collection Time: 06/04/20  4:22 AM   Result Value Ref Range    WBC 11.1 4.3 - 11.1 K/uL    RBC 3.24 (L) 4.23 - 5.6 M/uL    HGB 9.0 (L) 13.6 - 17.2 g/dL    HCT 28.7 (L) 41.1 - 50.3 %    MCV 88.6 79.6 - 97.8 FL    MCH 27.8 26.1 - 32.9 PG    MCHC 31.4 31.4 - 35.0 g/dL    RDW 19.2 (H) 11.9 - 14.6 %    PLATELET 477 808 - 670 K/uL    MPV 9.4 9.4 - 12.3 FL    ABSOLUTE NRBC 0.00 0.0 - 0.2 K/uL    DF AUTOMATED      NEUTROPHILS 81 (H) 43 - 78 %    LYMPHOCYTES 11 (L) 13 - 44 %    MONOCYTES 7 4.0 - 12.0 %    EOSINOPHILS 1 0.5 - 7.8 %    BASOPHILS 1 0.0 - 2.0 %    IMMATURE GRANULOCYTES 1 0.0 - 5.0 %    ABS. NEUTROPHILS 9.0 (H) 1.7 - 8.2 K/UL    ABS. LYMPHOCYTES 1.2 0.5 - 4.6 K/UL    ABS. MONOCYTES 0.7 0.1 - 1.3 K/UL    ABS. EOSINOPHILS 0.1 0.0 - 0.8 K/UL    ABS. BASOPHILS 0.1 0.0 - 0.2 K/UL    ABS. IMM.  GRANS. 0.1 0.0 - 0.5 K/UL   GLUCOSE, POC    Collection Time: 06/04/20  6:09 AM   Result Value Ref Range    Glucose (POC) 110 (H) 65 - 100 mg/dL       Imaging:  Ct Chest W Cont    Result Date: 5/16/2020  CT Chest with contrast INDICATION: Chest pain, fever and elevated d-dimer. Elevated troponin. Question PE. Covid Rule out COMPARISON: Chest x-ray earlier today TECHNIQUE: Contiguous axial images were obtained from the neck base through the upper abdomen with intravenous contrast, 100 mL Isovue 370. Radiation dose reduction techniques were used for this study:  Our CT scanners use one or all of the following: Automated exposure control, adjustment of the mA and/or kVp according to patient's size, iterative reconstruction. FINDINGS: There is no pulmonary embolus. The heart is mildly enlarged. There is no pericardial effusion. There are changes of prior sternotomy. Prosthetic aortic valve is present. There is mild coronary artery calcification. There is slight ectasia of the ascending thoracic aorta. Negative for thoracic aortic dissection. There is no evidence of lymphadenopathy. There is no endobronchial lesion. There is minimal dependent subsegmental atelectasis. There is no focal pulmonary consolidation, pleural effusion or pneumothorax. No pulmonary edema. Included upper abdomen demonstrates micronodular contour of the liver, suggestive of cirrhosis. Spleen is enlarged. Surrounding bones are intact. IMPRESSION: 1. Negative for pulmonary embolism. 2. No acute pulmonary disease. Negative for pneumonia, pulmonary edema or pleural effusion. 3. Mild coronary artery calcification    Xr Chest Port    Result Date: 5/16/2020  Portable chest xray  COMPARISON: May 2, 2018 CLINICAL HISTORY: Chest pain, fever. FINDINGS: Heart appears mildly enlarged. Mediastinal contour is within normal limits. Stable postsurgical changes of sternotomy. There is no focal pulmonary consolidation, pneumothorax or pulmonary edema. No large pleural effusion. Minimal subsegmental atelectasis at the right lung base. Surrounding bones are stable. IMPRESSION: 1. No pulmonary consolidation or pulmonary edema.     Ct Chest Soft Tissue Neck W Cont    Result Date: 5/27/2020  EXAM: CT NECK AND CHEST WITH CONTRAST INDICATION: S. mutans endocarditis with persistent bacteremia; evaluation for an abscess which might explain bacteremia. . COMPARISON: Chest CT 5/16/2020 TECHNIQUE:  CT imaging was performed of the neck and chest after intravenous injection of 100 mL Isovue 370. Intravenous contrast was used for better evaluation of solid organs and vascular structures. Coronal reformatted imaging provided. Radiation dose reduction techniques were used for this study. Our CT scanners use one or all of the following: Automated exposure control, adjustment of the mA and/or kV according to patient size, iterative reconstruction. FINDINGS: NECK: The bilateral bony orbits and intraorbital structures are unremarkable. The nasopharynx, oropharynx, epiglottis, laryngeal vestibule, hypopharynx, and larynx are within normal limits. The deep spaces of the neck are unremarkable. No fluid collection or phlegmon is clearly identified. The patient is partially edentulous. Periapical lucencies associated with many of the abdomen remaining teeth. No definite odontogenic abscess is present. No cervical adenopathy. The carotid arteries and jugular veins are within normal limits. Visualized intracranial structures appear normal. CHEST: Mediastinum and visualized thyroid: Normal. Heart: Aortic valve prosthesis noted. There is no pericardial effusion. There is mild global clinically. Multivessel coronary atherosclerotic calcifications. Large Vessels: Aneurysmal dilatation of the ascending aorta measuring 4.1 cm AP. Pleura: Moderate right and small left pleural effusions, new compared to prior. Lungs: No consolidation or cavitary lesion evident. Airways: Normal. Lymph nodes: Mediastinal lymph nodes are mildly prominent in number but not pathologically enlarged. Bones/Soft tissues: No aggressive osseous lesion. Visualized abdomen: Normal.     IMPRESSION: 1.   Partially edentulous patient with periapical lucencies associated with many of the remaining teeth. No definite odontogenic abscess present. No abscess within the deep spaces of the neck. 2.  Interval development of a moderate right and small left pleural effusion. No consolidation or cavitary lesion of the lungs. 3.  Changes of prior aortic valve replacement. Presumed post stenotic aneurysmal dilatation of the ascending aorta. Results for orders placed or performed during the hospital encounter of 20   2D ECHO COMPLETE ADULT (TTE) W OR [address] [facility]  [address], [address] [facility]  [phone]    Transthoracic Echocardiogram  2D, M-mode, Doppler, and Color Doppler    Patient: Carlos Garduno  MR #: [id number]  : [id number]  Age: 62 years  Gender: Male  Study date: 19-May-2020  Account #: [de-identified]  Height: 73 in  Weight: 144.8 lb  BSA: 1.88 mï¾²  Status:Routine  Location: 310  BP: 113/ 68    Allergies: NO KNOWN ALLERGENS    Sonographer:  Vinod Villanueva RD  Group:  [facility]  Referring Physician:  Wilder Vora MD  Reading Physician:  DR. THONY TALLEY DO    INDICATIONS: Cardiomyopathy; Chest pain, chronic, high probability of CAD. PROCEDURE: This was a routine study. A transthoracic echocardiogram was  performed. The study included complete 2D imaging, M-mode, complete spectral  Doppler, and color Doppler. Intravenous contrast (Definity) was administered. Image quality was adequate. LEFT VENTRICLE: The ventricle was mildly dilated. Systolic function was  moderately reduced. Ejection fraction was estimated in the range of 40 % to   45  %. There was moderate diffuse hypokinesis. There was severe hypokinesis of   the  basal-mid anterolateral wall(s). There was hypokinesis of the basal-mid  inferolateral wall(s). Wall thickness was normal. The study was not   technically  sufficient to allow evaluation of LV diastolic function. RIGHT VENTRICLE: The ventricle was mildly dilated.  Systolic function was   mildly  reduced. Estimated peak pressure was in the range of 45-50 mmHg. LEFT ATRIUM: The atrium was markedly dilated. RIGHT ATRIUM: The atrium was moderately to markedly dilated. SYSTEMIC VEINS: IVC: The inferior vena cava was mildly dilated. The  respirophasic change in diameter was less than 50%. AORTIC VALVE: A 25mm Magna Ease (4/18) in place. There is a moderate sized  mobile mass on the LVOT side of the aortic valve prosthesis. Appearance could  be consistent with prosthetic valve vegetation. The peak velocity was 3.79   m/s. The mean pressure gradient was 33 mmHg. The peak pressure gradient was 58   mmHg. DI:(0.17), SVi: (20.7), AT: (90ms). There was moderate stenosis. There   appeared  to be a vegetation or echogenic structure noted on the non coronary cusp of   the  AVR. Color and CW doppler was assessed, although no evidence of insufficiency  was noted. MITRAL VALVE: Diastolic mitral regurgitation noted. May be consequence of   first  degree AV block. There was mild annular calcification. There was no evidence  for vegetation. Transmitral velocity was minimally increased. There was no  evidence for stenosis. There was mild to moderate regurgitation. TRICUSPID VALVE: The valve structure was normal. There was no evidence for  vegetation. There was no evidence for stenosis. There was mild to moderate  regurgitation. PULMONIC VALVE: The valve structure was normal. There was no evidence for  stenosis. There was no insufficiency. PERICARDIUM: There was no pericardial effusion. AORTA: The root exhibited normal size. There was dilatation of the ascending  aorta (41 mm). SUMMARY:    -  Left ventricle: The ventricle was mildly dilated. Systolic function was  moderately reduced. Ejection fraction was estimated in the range of 40 % to   45  %. There was moderate diffuse hypokinesis. There was severe hypokinesis of   the  basal-mid anterolateral wall(s).  There was hypokinesis of the basal-mid  inferolateral wall(s). -  Right ventricle: The ventricle was mildly dilated. Systolic function was  mildly reduced. -  Left atrium: The atrium was markedly dilated. -  Aortic valve: A 25mm Magna Ease () in place. There is a moderate sized  mobile mass on the LVOT side of the aortic valve prosthesis. Appearance could  be consistent with prosthetic valve vegetation. There was moderate stenosis. The mean pressure gradient was 33 mmHg. -  Mitral valve: Diastolic mitral regurgitation noted. May be consequence of  first degree AV block. There was mild annular calcification. There was mild   to  moderate regurgitation. There was no evidence for vegetation.    -  Tricuspid valve: There was mild to moderate regurgitation.    -  Aorta, systemic arteries: There was dilatation of the ascending aorta (41  mm). -  Additional impressions: Findings discussed and images reviewed with Dr  [address] at time of dictation. SYSTEM MEASUREMENT TABLES    2D mode  Left Atrium Systolic Volume Index; Method of Disks, Biplane; 2D mode;: 56.4  ml/m2  IVS/LVPW (2D): 1  IVSd (2D): 0.9 cm  LVIDd (2D): 5.5 cm  LVIDs (2D): 4.4 cm  LVOT Area (2D): 3.5 cm2  LVPWd (2D): 0.9 cm    Unspecified Scan Mode  Peak Grad; Mean; Antegrade Flow: 49 mm[Hg]  Vmax; Antegrade Flow: 379 cm/s  LVOT Diam: 2.1 cm  Peak Grad; Mean; Antegrade Flow: 11 mm[Hg]  Vmax; Antegrade Flow: 165 cm/s    Prepared and signed by    DR. Wen March DO  Signed 19-May-2020 10:44:17     2D ECHO LIMTED ADULT W OR WO CONTR    Narrative    [place]  One [address] [place] [address] [address], [address] [facility]  [phone]    Transthoracic Echocardiogram  2D and Doppler    Patient: David Winter  MR #: [id number]  : 1960  Age: 62 years  Gender: Male  Study date: 28-May-2020  Account #: [de-identified]  Height: 72 in  Weight: 156.6 lb  BSA: 1.92 mï¾²  Status:Routine  Location: 310  BP: 117/ 76    Allergies: NO KNOWN ALLERGENS    Sonographer:  Esau Hunt [facility]  Group:  [id number] Cardiology  Referring Physician:  Margaret Elmore MD [facility]  Reading Physician:  Margaret Elmore MD [facility]    INDICATIONS: FU bAVR endocarditis. PROCEDURE: This was a routine study. A transthoracic echocardiogram was  performed. The study included limited 2D imaging and limited spectral   Doppler. Image quality was adequate. LEFT VENTRICLE: Since last study, LV had dilated more and the function has  declined. The ventricle was moderately to markedly dilated. Systolic function  was markedly reduced. Ejection fraction was estimated in the range of 30 % to  35 %. AORTIC VALVE: A 25mm Magna Ease bioprosthetic AVR with mobile mass on LVOT   side  of the prosthesis as well as thickening / restriction of the leaflets. Findings  consistent with valvular vegetation. SUMMARY:    -  Left ventricle: Since last study, LV had dilated more and the function has  declined. The ventricle was moderately to markedly dilated. Systolic function  was markedly reduced. Ejection fraction was estimated in the range of 30 % to  35 %. -  Aortic valve: A 25mm Magna Ease bioprosthetic AVR with mobile mass on LVOT  side of the prosthesis as well as thickening / restriction of the leaflets. Findings consistent with valvular vegetation. Prepared and signed by    Margaret Elmore MD [facility]  Signed [id number]-1421 16:50:28         Cultures:   All Micro Results     Procedure Component Value Units Date/Time    CULTURE, BLOOD [[id number]] Collected:  05/29/20 0412    Order Status:  Completed Specimen:  Blood Updated:  06/03/20 0938     Special Requests: --        RIGHT  Antecubital       Culture result: NO GROWTH 5 DAYS       CULTURE, BLOOD [[id number]] Collected:  05/29/20 0405    Order Status:  Completed Specimen:  Blood Updated:  06/03/20 0938     Special Requests: --        LEFT  Antecubital       Culture result: NO GROWTH 5 DAYS       CULTURE, BLOOD [[id number]] Collected:  05/24/20 1321 Order Status:  Completed Specimen:  Blood Updated:  05/29/20 0723     Special Requests: --        LEFT  Antecubital       Culture result: NO GROWTH 5 DAYS       CULTURE, BLOOD [[id number]]  (Abnormal)  (Susceptibility) Collected:  05/20/20 1424    Order Status:  Completed Specimen:  Blood Updated:  05/26/20 0834     Special Requests: --        RIGHT  FOREARM       GRAM STAIN GRAM POSITIVE COCCI         PEDIATRIC BOTTLE               RESULTS VERIFIED, PHONED TO AND READ BACK BY Henrietta Sahu AT 0304 ON 5.24.20             Culture result: STREPTOCOCCUS MUTANS         REFER TO BIOFIRE PANEL [address] [id number]    CULTURE, BLOOD [[id number]] Collected:  05/20/20 1434    Order Status:  Completed Specimen:  Blood Updated:  05/25/20 0633     Special Requests: --        LEFT  Antecubital       Culture result: NO GROWTH 5 DAYS       BLOOD CULTURE ID PANEL [[id number]]  (Abnormal) Collected:  05/24/20 0105    Order Status:  Completed Specimen:  Blood Updated:  05/24/20 0647     Acc. no. from Micro Order [id number]     Streptococcus Detected        Comment: RESULTS VERIFIED, PHONED TO AND READ BACK BY  Henrietta Sahu RN @4293 ON 5/24/20 AK. INTERPRETATION       Gram positive cocci. Identified by realtime PCR as Streptococcus species (not agalactiae, pyogenes, or pneumoniae). Comment: Recommend discontinuing IV vancomycin starting cefazolin or nafcillin if patient not on beta-lactam therapy. Infectious Diseases Consult recommended in adult patients. THIS TEST DOES NOT REPLACE SENSITIVITY TESTING. CULTURE, BLOOD [[id number]]  (Abnormal) Collected:  05/18/20 1944    Order Status:  Completed Specimen:  Blood Updated:  05/23/20 0720     Special Requests: --        RIGHT  HAND       GRAM STAIN GRAM POSITIVE COCCI         PEDIATRIC BOTTLE               CRITICAL RESULT NOT CALLED DUE TO PREVIOUS NOTIFICATION OF CRITICAL RESULT WITHIN THE LAST 24 HOURS.            Culture result:       ALPHA STREPTOCOCCUS, NOT S. PNEUMONIAE            FOR ID AND SUSCEPTIBILITY REFER TO CULTURE NO ACC [id number]    CULTURE, BLOOD [[id number]]  (Abnormal)  (Susceptibility) Collected:  05/18/20 1938    Order Status:  Completed Specimen:  Blood Updated:  05/23/20 0703     Special Requests: --        RIGHT  ARM       GRAM STAIN GRAM POS COCCI IN CHAINS         AEROBIC BOTTLE POSITIVE               RESULTS VERIFIED, PHONED TO AND READ BACK BY Alena Epstein RN @ 1120 ON 5/20/20 BY AMM           Culture result: STREPTOCOCCUS MUTANS         REFER TO [employer] PANEL ACCESSION [id number]    CULTURE, BLOOD [[id number]]     Order Status:  Canceled Specimen:  Blood     CULTURE, BLOOD [[id number]]     Order Status:  Canceled Specimen:  Blood     BLOOD CULTURE ID PANEL [[id number]]  (Abnormal) Collected:  05/18/20 1938    Order Status:  Completed Specimen:  Blood Updated:  05/20/20 1117     Acc. no. from Micro Order [id number]     Streptococcus Detected        Comment: RESULTS VERIFIED, PHONED TO AND READ BACK BY  Alena Epstein RN @ 1120 ON 5/20/20 BY AM          INTERPRETATION       Gram positive cocci. Identified by realtime PCR as Streptococcus species (not agalactiae, pyogenes, or pneumoniae).            Comment: Consider discontinuation of IV vancomycin and using an anti-streptococcal beta-lactam (ceftriaxone/cefotaxime (peds))       EMERGENT DISEASE PANEL [[id number]] Collected:  05/16/20 1946    Order Status:  Completed Specimen:  Not Specified Updated:  05/18/20 1614     Emergent disease panel Not detected        Comment: Performed at [place] [address] [address] [facility]               Assessment/Plan:     Principal Problem:    Endocarditis of prosthetic valve ([address] (6/2/2020)  - Mobile mass seen on ECHO  - Blood cultures positive for Strep Mutans  - Blood cultures 5/29 NGTD  - To have dental extraction today  - Will need to go for AVR replacement afterwards  - Continue Ceftriaxone  - Appreciate all specialists    Active Problems:    NSTEMI (non-ST elevated myocardial infarction) ([facility] [address] (5/16/2020)  - Resolved  - No acute CP  - Continue ASA  - Continue Lipitor  - Continue Lisinopril      Petechiae (5/16/2020)  - Due to #1      S/P aortic valve replacement (4/1/2018)  - Will need replacement again      CAD (coronary artery disease) ()  - No acute CP  - Continue ASA  - Continue Lisinopril  - Continue Lipitor      Essential hypertension (9/21/2018)  - Stable  - Continue current medications      Diabetes mellitus, type II (HCC) (5/20/2020)  - A1C 7  - Continue Humalog SSI      Vitamin D deficiency (5/60/6724)      Diastolic CHF, chronic (HCC) (5/7/2018)  - No acute issues  - Continue Lasix IV  - Continue Lisinopril  - Continue Coreg      Anemia (5/16/2020)  - Stable      DIEGO on CPAP ()      Dyslipidemia ()  - Continue Lipitor      COVID-19 ruled out (5/16/2020)    Today's Plan: Continue Ceftriaxone. Await dental extraction today.     DIET DIABETIC CONSISTENT CARB Regular    DVT Prophylaxis: SCDs    Discharge Plan: TBD      Signed By: Lyudmila Guidry,      June 4, 2020

## 2020-06-04 NOTE — ADT AUTH CERT NOTES
Utilization Reviews  
 
  Infective Endocarditis - Care Day 14 (6/3/2020) by Joo Byrne RN  
 
   
Review Status Review Entered Completed 6/4/2020 11:42  
   
Criteria Review Care Day: 14 Care Date: 6/3/2020 Level of Care: Telemetry Guideline Day 6 Clinical Status   
(X) * Afebrile   
(X) * Hemodynamic stability   
(X) * No evidence of heart failure, emboli, arrhythmia, abscess, or renal failure ( ) * No surgery needed   
(X) * Microbiologic studies completed   
(X) * Repeat blood cultures negative ( ) * Discharge plans and education understood Activity ( ) * Ambulatory Routes   
(X) * Oral hydration, medications, [H] and diet Interventions ( ) * Access for outpatient antibiotic administration placed or not necessary Medications (X) IV antibiotics * Milestone Additional Notes  
 6/3  
  
98.9 81 18 91% 95/56 r/a  
  
6/3/2020 06:20 6/3/2020 11:06 6/3/2020 16:25 6/3/2020 21:51 GLUCOSE,FAST -  (H) 113 (H) 106 (H) 118 (H) Blood cx negative Orders  Tele IP, Full Code, VS , Cardiac Monitoring, Droplet plus, DROPLET PLUS PRECAUTIONS, STRICT I&O, CARDIO CONSULT,CM CONSULT, AC & HS BS, PICC Consult, Hypoglycemic Protocol, DM DIET, SCDS, infectious disease consult  Pharmacy to dose Lien Listen,  Consult to oral surgery, Meds NS @100, ASA 81 mg po x1, Lipitor 20 mg po x1, Coreg 6.25 mg po x2,  Rocephin 2 gm IV x1, Lovenox 40 mg sc x1, Ferrous sulfate 325 mg pox1, Lexapro 10 mg po x1, Lasix 40 mg IV x2, Toprol 25 mg po x2, Lisinopril 2.5 mg po x2, Aldactone 25 mg p ox1 KCL 20 meq po x2, Infectious Disease Impression \" Prosthetic AV endocarditis, 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 [facility] 5/18/20 and 5/20 with strep mutans.     
\" Dental caries : appreciate oral surgery - plans for extraction this week \" Anemia, of acute disease: follow \" Hx AVR 2018 DM, A1c 7 Plan \" Continue CTX 2g IV q24h. Duration 6 weeks (date depends on surgery) \" Dental extraction planned for tomorrow \" AVR redo planned after that sometime I Anti Infectives CTX 5/23- Gentamicin 5/27-5/29 Cardio Note  
  
 Nstemi, cath 5/19, OM occluded, ? embolic Step. mutans bAVR endocarditis Diabetic Cardiomyopathy, ef 40% 5/17 Poor dentition  
   
////  
   
He is for dental surgery tomorrow. Meds, labs reviewed.  
  
   
 Cardio Thoracic Note CTS- no complaint, plan redo-AVR when recovered from dental extractions, cultures remain no growth IM NOTE  
  
6/3 - He feels good. Has been walking hallways. Denies CP/SOB. Denies F/C/N/V.  
   
Physical Exam:   
GENERAL: alert, cooperative, no distress, appears stated age EYE: conjunctivae/corneas clear. PERRL. THROAT & NECK: normal and no erythema or exudates noted. LUNG: clear to auscultation bilaterally HEART: regular rate and rhythm, S1S2, no murmur, no JVD ABDOMEN: soft, non-tender, non-distended. Bowel sounds normal.   
EXTREMITIES:  No edema, 2+ pedal/radial pulses bilaterally SKIN: no rash or abnormalities NEUROLOGIC: A&Ox3. Cranial nerves 2-12 grossly intact Principal Problem:  
  Endocarditis of prosthetic valve ([address] (6/2/2020) - Mobile mass seen on ECHO  
- Blood cultures positive for Strep Mutans - Blood cultures 5/29 NGTD  
- To have dental extraction soon - Will need to go for AVR replacement afterwards - Continue Ceftriaxone - Appreciate all specialists  
   
Active Problems:  
  NSTEMI (non-ST elevated myocardial infarction) ([address] (5/16/2020) - Resolved - No acute CP  
- Continue ASA - Continue Lipitor - Continue Lisinopril  
   
  Petechiae (5/16/2020) - Due to #1  
   
  S/P aortic valve replacement (4/1/2018) - Will need replacement again  
   
  CAD (coronary artery disease) ()  
- No acute CP  
- Continue ASA - Continue Lisinopril  
- Continue Lipitor  
   
   Essential hypertension (9/21/2018) - Stable - Continue current medications    
  Diabetes mellitus, type II ([facility] (5/20/2020) - A1C 7  
- Continue Humalog SSI  
   
  Vitamin D deficiency (5/20/2020)    
  Diastolic CHF, chronic ([facility] (5/7/2018) - No acute issues - Continue Lasix IV  
- Continue Lisinopril  
- Continue Coreg  
   
  Anemia (5/16/2020) - Stable  
   
  DIEGO on CPAP ()  
   
  Dyslipidemia ()  
- Continue Lipitor  
   
  COVID-19 ruled out (5/16/2020)  
  Today's Plan: Continue Ceftriaxone. Await dental extraction on 6/4  
   
DIET DIABETIC CONSISTENT CARB Regular  
   
DVT Prophylaxis: SCDs  
   
Discharge Plan: TBD Nurses notes Spoke to Natalya Kumar in [facility] this afternoon. Pt will go down for tooth extraction at 1200 on 6/4. Instructions were given for the patient to not have any solids after midnight and clear liquids are okay until 0800 on 6/4. Pt needs to receive 2 Hibiclens baths (one on 6/3 PM and one on 6/4 AM). All meds are to be held on 6/4 except Coreg and Lexapro  
  
  Infective Endocarditis - Care Day 13 (6/2/2020) by Veronica Byrne RN  
 
   
Review Status Review Entered Completed 6/4/2020 10:27  
   
Criteria Review Care Day: 13 Care Date: 6/2/2020 Level of Care: Telemetry Guideline Day 6 Clinical Status   
(X) * Afebrile   
(X) * Hemodynamic stability   
(X) * No evidence of heart failure, emboli, arrhythmia, abscess, or renal failure ( ) * No surgery needed   
(X) * Microbiologic studies completed   
(X) * Repeat blood cultures negative ( ) * Discharge plans and education understood Activity ( ) * Ambulatory Routes   
(X) * Oral hydration, medications, [H] and diet 6/4/2020 10:27:44 EDT by Misael Howell   
  NS @100, ASA 81 mg po x1, Lipitor 20 mg po x1, Coreg 6.25 mg po x2,  Rocephin 2 gm IV x1, Lovenox 40 mg sc x1, Ferrous sulfate 325 mg pox1, Lexapro 10 mg po x1, Lasix 40 mg IV x2, Toprol 25 mg po x2, Lisinopril 2.5 mg po x2, Aldactone 25 mg p ox1 KCL Interventions ( ) * Access for outpatient antibiotic administration placed or not necessary Medications (X) IV antibiotics 6/4/2020 10:27:44 EDT by Maura Javier   
  Rocephin 2 gm IV x1,   
* Milestone Additional Notes 6/2  
  
98.6 79 17 94% 102/61 r/a  
  
6/2/2020 06:43 6/2/2020 11:15 6/2/2020 15:54 6/2/2020 22:35 GLUCOSE,FAST -  (H) 120 (H) 120 (H) 146 (H)  
  
  
6/2/2020 06:10 WBC 12.0 (H) RBC 3.25 (L) HGB 9.1 (L) HCT 29.3 (L) MCV 90.2 MCH 28.0 MCHC 31.1 (L) RDW 19.4 (H) Blood cx negative Orders  Tele IP, Full Code, VS , Cardiac Monitoring, Droplet plus, DROPLET PLUS PRECAUTIONS, STRICT I&O, CARDIO CONSULT,CM CONSULT, AC & HS BS, PICC Consult, Hypoglycemic Protocol, DM DIET, SCDS, infectious disease consult  Pharmacy to dose Paul [place],  Consult to oral surgery, Meds NS @100, ASA 81 mg po x1, Lipitor 20 mg po x1, Coreg 6.25 mg po x2,  Rocephin 2 gm IV x1, Lovenox 40 mg sc x1, Ferrous sulfate 325 mg pox1, Lexapro 10 mg po x1, Lasix 40 mg IV x2, Toprol 25 mg po x2, Lisinopril 2.5 mg po x2, Aldactone 25 mg p ox1 KCL 20 meq po x2, Infectious Disease Impression \" Prosthetic AV endocarditis, 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 [facility] 5/18/20 and 5/20 with strep mutans.     
\" Dental caries : appreciate oral surgery - plans for extraction this week \" Anemia, of acute disease: follow \" Hx AVR 2018 DM, A1c 7 Plan  
\" Continue CTX 2g IV q24h. Duration 6 weeks (date depends on surgery) \" Redo AVR planned but timing TBD. Oral surgery to extract teeth this week Pt having a problem getting labs - will ask for picc to be placed, labs can be done twice weekly from picc instead of daily draws.    
I dced lovenox in preparation of dental abstraction Anti Infectives CTX 5/23- Gentamicin 5/27-5/29 Cardio Note Principal Problem:   NSTEMI (non-ST elevated myocardial infarction) ([facility] (5/16/2020)Stable. Continue current medical therapy. Small om- no pci  
   
Active Problems:  
  Diastolic CHF, chronic ([facility] (5/7/2018)    
  S/P aortic valve replacement (4/1/2018)- endocarditis- AVR- soon- teeth extraction planned this week No signs of chf at this time  
  CAD (coronary artery disease) ()  
    Overview: Nonobstructive  
   
  DIEGO on CPAP ()  
   
  Dyslipidemia ()  
   
  Essential hypertension (9/21/2018)    
  Suspected COVID-19 virus infection (5/16/2020)    
  Petechiae (5/16/2020)    
  Anemia (5/16/2020)    
  Diabetes mellitus, type II ([facility] (5/20/2020)    
  Vitamin D deficiency (5/20/2020)    
   
   
 Cardio Thoracic Note CTS-pt denies any dyspnea or chest discomfort. Appreciate oral surgery consult. Dental extractions planned for this week. Repeat blood cultures from 5/29 remain negative to date.   
   
PICC Placement Note  
   
PRE-PROCEDURE VERIFICATION Correct Procedure: yes. Time out completed with assistant Elizabeth Cross RN and all persons present in agreement with time out. Correct Site: [employer] Temperature: Temp: 97.9 °F (36.6 °C), Temperature Source: Temp Source: Oral  
  
Recent Labs  
  06/02/20  
8529 BUN 34* CREA 1.19  WBC 12.0*  
   
Allergies: Patient has no known allergies. Education materials for [facility] given to patient or family.  
   
PROCEDURE DETAIL A single lumen PICC line was started for antibiotic therapy. The following documentation is in addition to the PICC properties in the lines/airways flowsheet :  
Lot #: [id number]  
xylocaine used: yes Mid-Arm Circumference: 25 (cm) Internal Catheter Length: 40 (cm) Internal Catheter Total Length: 40 (cm) Vein Selection for PICC:right basilic Central Line Bundle followed yes Complication Related to Insertion: none Both the insertion guidewire and ECG guidewire were removed intact all ports have positive blood return and were flush well with normal saline.  
   
The location of the tip of the PICC is verified using ECG technology.  The tip is in the SVC per ECG reading.  See image below. IM NOTE  
6/2 - He feels good. Denies CP/SOB. Denies F/C/N/V. Physical Exam:   
GENERAL: alert, cooperative, no distress, appears stated age EYE: conjunctivae/corneas clear. PERRL. THROAT & NECK: normal and no erythema or exudates noted. LUNG: clear to auscultation bilaterally HEART: regular rate and rhythm, S1S2, no murmur, no JVD ABDOMEN: soft, non-tender, non-distended. Bowel sounds normal.   
EXTREMITIES:  No edema, 2+ pedal/radial pulses bilaterally SKIN: no rash or abnormalities NEUROLOGIC: A&Ox3. Cranial nerves 2-12 grossly intact Principal Problem:  
  Endocarditis of prosthetic valve ([facility] (6/2/2020) - Mobile mass seen on ECHO  
- Blood cultures positive for Strep Mutans - To have dental extraction soon - Will need to go for AVR replacement afterwards - Continue Ceftriaxone - Appreciate all specialists  
   
Active Problems:  
  NSTEMI (non-ST elevated myocardial infarction) ([facility] (5/16/2020) - Resolved - No acute CP  
- Continue ASA - Continue Lipitor - Continue Lisinopril  
   
  Petechiae (5/16/2020) - Due to #1  
   
  S/P aortic valve replacement (4/1/2018) - Will need replacement again  
   
  CAD (coronary artery disease) ()  
- No acute CP  
- Continue ASA - Continue Lisinopril  
- Continue Lipitor  
   
  Essential hypertension (9/21/2018) - Stable - Continue current medications    
  Diabetes mellitus, type II ([address] (5/20/2020) - A1C 7  
- Continue Humalog SSI  
   
  Vitamin D deficiency (5/20/2020)    
  Diastolic CHF, chronic ([address] (5/7/2018) - No acute issues - Continue Lasix IV  
- Continue Lisinopril  
- Continue Coreg  
   
  Anemia (5/16/2020) - Stable  
   
  DIEGO on CPAP ()  
   
  Dyslipidemia ()  
- Continue Lipitor     COVID-19 ruled out (5/16/2020)  
  Today's Plan: Continue Ceftriaxone. Await dental extraction.  
   
DIET DIABETIC CONSISTENT CARB Regular  
   
DVT Prophylaxis: SCDs  
   
Discharge Plan: TBD  
   
  
   
Infective Endocarditis - Care Day 12 (6/1/2020) by Lesley Byrne RN  
 
   
Review Status Review Entered Completed 6/3/2020 16:23  
   
Criteria Review Care Day: 12 Care Date: 6/1/2020 Level of Care: Telemetry Guideline Day 6 Clinical Status   
(X) * Afebrile   
(X) * Hemodynamic stability   
(X) * No evidence of heart failure, emboli, arrhythmia, abscess, or renal failure ( ) * No surgery needed   
(X) * Microbiologic studies completed   
(X) * Repeat blood cultures negative ( ) * Discharge plans and education understood Activity ( ) * Ambulatory Routes   
(X) * Oral hydration, medications, [H] and diet Interventions ( ) * Access for outpatient antibiotic administration placed or not necessary Medications (X) IV antibiotics * Milestone Additional Notes  
 6/1  
  
98.6 75 18 97% 91/54 r/a  
  
6/1/2020 06:26 6/1/2020 10:55 6/1/2020 17:11 6/1/2020 22:18 GLUCOSE,FAST -  (H) 140 (H) 162 (H) 123 (H)  
  
  
6/1/2020 03:49 Anion gap 5 (L) Glucose 106 (H) BUN 35 (H) Creatinine 1.26 Calcium 8.2 (L) Blood cx negative Orders  Tele IP, Full Code, VS , Cardiac Monitoring, Droplet plus, DROPLET PLUS PRECAUTIONS, STRICT I&O, CARDIO CONSULT,CM CONSULT, AC & HS BS, Hypoglycemic Protocol, DM DIET, SCDS, infectious disease consult  Pharmacy to [facility],  Consult to oral surgery, Meds NS @100, ASA 81 mg po x1, Lipitor 20 mg po x1, Coreg 6.25 mg po x2,  Rocephin 2 gm IV x1, Lovenox 40 mg sc x1, Ferrous sulfate 325 mg pox1, Lexapro 10 mg po x1, Lasix 40 mg IV x2, Toprol 25 mg po x2, Lisinopril 2.5 mg po x2, Aldactone 25 mg p ox1 KCL 20 meq po x2, Infectious Disease note Impression \" Prosthetic AV endocarditis, 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 [facility] 5/18/20 and 5/20 with strep mutans.     
\" Dental caries : given persistent bactermia and loose teeth, check CT soft tissue neck: if abscess recommend Oral Surgery consult for extraction before has AVR \" Anemia, of acute disease: follow \" Hx AVR 2018 \" DM, A1c 7  
   
Plan  
\" Continue CTX 2g IV q24h. Duration 6 weeks (date depends on surgery) Redo AVR planned but timing TBD. Oral surgery contacted for evaluation--this is still pending. Highly recommend extraction prior to surgery.    
  
Anti Infectives CTX 5/23- Gentamicin 5/27-5/29 Im NOTE   
6/1:  
Cards, ID, CT surgery following. Repeat blood culture 5/24 no growth x5d. BCx 5/28 also NGTD Afebrile Feels well. No acute events. Walking halls. Patient reports oral surgeon coming today, ID recommends extractions prior to new valve Physical Exam:   
General: Alert, cooperative, no distress. Lungs:   Clear to auscultation bilaterally. Heart: Regular rate and rhythm. Abdomen:   Soft, non-tender. Bowel sounds normal.   
Extremities: 2+ edema to LE bilaterally    
Pulses: 2+ and symmetric all extremities. Neurologic: CNII-XII intact Principal Problem:  
  NSTEMI (non-ST elevated myocardial infarction) ([address] (5/16/2020)  
  Active Problems:  
  Diastolic CHF, chronic ([address] (5/7/2018)    
  S/P aortic valve replacement (4/1/2018)    
  CAD (coronary artery disease) ()  
    Overview: Nonobstructive  
   
  DIEGO on CPAP ()  
   
  Dyslipidemia ()  
   
  Essential hypertension (9/21/2018)    
  Suspected COVID-19 virus infection (5/16/2020)    
  Petechiae (5/16/2020)    
  Anemia (5/16/2020)    
  Diabetes mellitus, type II ([address] (5/20/2020)    
  Vitamin D deficiency (5/20/2020)    
   
CAD/CHF with NSTEMI and endocarditis-S/p cardiac cath on 5/19 with no treatable lesions, occluded OM branch. ASA, statin, BB.  Needs AVR as below. Cardiology adjusting meds. Repeat Echo with [id number]  
   
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 Ceftriaxone per ID; gent stopped. Cx 5/18 and 5/20 strep mutans. Repeat blood cultures on 5/24 and 6/28 NGTD  
CT surgery following. Oral surgery eval for extractions prior to valve replacement.  
   
LE edema bilaterally - continue Lasix. Improving.   
   
Normocytic anemia with thrombocytopenia - S/p Iron infusions. Oral iron. Will need to establish care with GI for colon cancer screening at discharge. improving  
   
Hyponatremia/Hypokalemia - resolved  
   
Vitamin D deficiency - Supplement.   
   
DM type II - A1C 7.1. ADA. SS. Will need outpatient diabetic education  
   
DVT prophylaxis - SCDs Cm NOTE Care Management Interventions PCP Verified by CM: Yes Last Visit to PCP: 05/04/20 Mode of Transport at Discharge: Other (see comment)(Camelia Dawkins Spouse [phone] ) Transition of Care Consult (CM Consult): Discharge Planning, Home Health Discharge Durable Medical Equipment: No  
Physical Therapy Consult: No  
Occupational Therapy Consult: No  
Current Support Network: Family Lives [place], Lives Alone Confirm Follow Up Transport: Family The Plan for Transition of Care is Related to the Following Treatment Goals : Home health [employer] Information Provided?: [place]) Discharge Location Discharge Placement: Home with home health  
   
CM spoke with pt this AM regarding condition. Pt still waiting on oral surgeon consult prior to consideration for AVR replacement valve. CM encouraged pt to increase his activity/ walk in the [place]  for continued strength and health prior to OR. He verbalizes understanding. CM to continue to follow. Cardio Note Nstemi, cath 5/19, OM occluded, ? embolic Step. mutans bAVR endocarditis Diabetic Cardiomyopathy, ef 40% 5/17 Poor dentition  
   
///  
   
Oral surgery is to see pt today. Cont.  Rx.  
   
 Cardio thoracic Note CTS- most recent cultures now no growth, await dental extraction prior to redo-AVR Oral Surgery Note A/P:     Mr Praveen Melton is a 62year old WM with known CAD, CHF, and a failing valve replacement scheduled for repair of his AV  
            Dental Caries             Gross Periodontal Disease  
   
I do not think Mr Praveen Melton really needs much done from a surgical standpoint. Kale Camilo, given his situation, pending AVR, and the fact that he does not have routine care, we can plan for removal of several teeth.  We can plan for the operating room this week to evaluate and remove those teeth under GA. I will review his CT to help with the clinical exam.  
We will let you know when we can get this scheduled. We would need his Lovenox, Humolog held, NPO, and Ampicillan 2.0 grams pre-operatively.  
   
Please call [phone] with any questions or problems.  We will advise.

## 2020-06-04 NOTE — PERIOP NOTES
TRANSFER - OUT REPORT:    Verbal report given to ines serra on Mecca Richards  being transferred to [facility] for routine post - op       Report consisted of patients Situation, Background, Assessment and   Recommendations(SBAR). Information from the following report(s) OR Summary, Procedure Summary, Intake/Output and MAR was reviewed with the receiving nurse. Lines:   PICC Single Lumen 86/46/77 Right;Basilic (Active)   Central Line Being Utilized No 6/4/2020  3:33 PM   Criteria for Appropriate Use Long term IV/antibiotic administration 6/4/2020 11:50 AM   Site Assessment Clean, dry, & intact 6/4/2020 11:50 AM   Phlebitis Assessment 0 6/4/2020 11:50 AM   Infiltration Assessment 0 6/4/2020 11:50 AM   Arm Circumference (cm) 25 cm 6/2/2020  4:18 PM   Date of Last Dressing Change 06/02/20 6/4/2020 11:50 AM   Dressing Status Clean, dry, & intact 6/4/2020 11:50 AM   External Catheter Length (cm) 0 centimeters 6/2/2020  4:18 PM   Dressing Type Tape;Transparent 6/4/2020  3:33 PM   Hub Color/Line Status Patent 6/4/2020 11:50 AM   Action Taken Other (comment) 6/3/2020 10:35 AM   Positive Blood Return (Site #1) Yes 6/4/2020 11:50 AM   Alcohol Cap Used No 6/4/2020 11:50 AM        Opportunity for questions and clarification was provided. Patient transported with:   O2 @ 3 liters    VTE prophylaxis orders have been written for Mecca Richards. Patient and family given floor number and nurses name. Family updated re: pt status after security code verified.

## 2020-06-04 NOTE — ROUTINE PROCESS
TRANSFER - IN REPORT: 
 
[facility] on Anup Malhotra being received from [facility] routine progression of care. Report consisted of patients Situation, Background, Assessment and Recommendations(SBAR). Information from the following report(s) Procedure Summary was reviewed. Opportunity for questions and clarification was provided. Assessment completed upon patients arrival to unit and care assumed. Patient received to room 310. Patient connected to monitor and assessment completed. Plan of care reviewed. Patient oriented to room and call light. Patient aware to use call light to communicate any chest pain or needs. Suction and extra guaze placed at bedside if needed.

## 2020-06-04 NOTE — PROGRESS NOTES
6/4/2020 4:44 PM    Admit Date: 5/16/2020    Admit Diagnosis: NSTEMI (non-ST elevated myocardial infarction) [facility] [I21.4]; Suspected COVID-19 virus infection [Z20.828]      Subjective:   No cp or sob      Objective:      Visit Vitals  BP 90/58 (BP 1 Location: Right arm, BP Patient Position: At rest)   Pulse 73   Temp 97.4 °F (36.3 °C)   Resp 16   Ht 6' 1\" (1.854 m)   Wt 63.7 kg (140 lb 6.4 oz)   SpO2 98%   BMI 18.52 kg/m²       Physical Exam:  [employer], Well Nourished, No Acute Distress, Alert & Oriented x 3, appropriate mood. Neck- supple, no JVD  CV- regular rate and rhythm no MRG  Lung- clear bilaterally  Abd- soft, nontender, nondistended  Ext- no edema bilaterally. Skin- warm and dry        Data Review:   Recent Labs     06/04/20  0422      K 3.9   BUN 41*   CREA 1.16   WBC 11.1   HGB 9.0*   HCT 28.7*          Assessment/Plan:     Principal Problem:    Endocarditis of prosthetic valve ([facility] (6/2/2020)- stable- tooth extraction today- will discuss timing of surgery with cts    Active Problems:    Diastolic CHF, chronic ([facility]) (5/7/2018)Stable. Continue current medical therapy.       S/P aortic valve replacement (4/1/2018)      CAD (coronary artery disease) ()      Overview: Nonobstructive      DIEGO on CPAP ()      Dyslipidemia ()      Essential hypertension (9/21/2018)      NSTEMI (non-ST elevated myocardial infarction) ([facility] (5/16/2020)      COVID-19 ruled out (5/16/2020)      Petechiae (5/16/2020)      Anemia (5/16/2020)      Diabetes mellitus, type II ([facility] (5/20/2020)      Vitamin D deficiency (5/20/2020)

## 2020-06-04 NOTE — ANESTHESIA POSTPROCEDURE EVALUATION
Procedure(s):  TEETH EXTRACTION MULTIPLE 12, 14,15, 21, 25, 26 & 28.    general    Anesthesia Post Evaluation        Patient location during evaluation: PACU  Patient participation: complete - patient participated  Level of consciousness: awake  Pain management: satisfactory to patient  Airway patency: patent  Anesthetic complications: no  Cardiovascular status: hemodynamically stable  Respiratory status: spontaneous ventilation  Hydration status: euvolemic  Post anesthesia nausea and vomiting:  none    BP near baseline. INITIAL Post-op Vital signs:   Vitals Value Taken Time   BP 90/58 6/4/2020  4:00 PM   Temp 36.3 °C (97.4 °F) 6/4/2020  3:59 PM   Pulse 0 6/4/2020  4:05 PM   Resp 16 6/4/2020  3:59 PM   SpO2 99 % 6/4/2020  4:04 PM   Vitals shown include unvalidated device data.

## 2020-06-04 NOTE — ROUTINE PROCESS
Bedside and Verbal shift change report received from PARTH Tran (offgoing nurse) to self (oncoming nurse). Report included the following information SBAR, Kardex, Intake/Output, MAR, Recent Results, and Cardiac Rhythm NSR.

## 2020-06-04 NOTE — BRIEF OP NOTE
Brief Postoperative Note    Patient: Melanie Luong  YOB: 1960  MRN: [id number]    Date of Procedure: 6/4/2020     Pre-Op Diagnosis:  Dental caries [K02.9]     Chronic Periodontal Disease     Acute Oral Infection    Post-Op Diagnosis: Same as preoperative diagnosis.       Procedure(s):  Pre-Cardiac procedure extractions -  12, 14,15, 21, 25, 26 & 28    Surgeon(s):  Tatiana Peña MD    Surgical Assistant: Rosa Jett, JOANNA Allen CST    Anesthesia: General     Estimated Blood Loss (mL): 25 mL   Fluids:  500 mL crystalloid    Complications: None     Specimens: * No specimens in log *     Implants: * No implants in log *    Drains: * No LDAs found *    Findings: Very poor dentition - consistent with diagnosis    Electronically Signed by Karon Rodríguez MD on 6/4/2020 at 3:14 PM

## 2020-06-04 NOTE — ROUTINE PROCESS
TRANSFER - OUT REPORT: 
 
Verbal report given to Yeimi Harmon RN on [facility] being transferred to [facility] for ordered procedure Report consisted of patients Situation, Background, Assessment and Recommendations (SBAR). Information from the following report(s) SBAR, Kardex, MAR, Recent Results and Cardiac Rhythm NSR was reviewed with the receiving nurse. Opportunity for questions and clarification was provided.

## 2020-06-04 NOTE — PERIOP NOTES
TRANSFER - IN REPORT:    Verbal report received from Edmond Crawford on Chavo Naas  being received from [phone] for routine progression of care      Report consisted of patients Situation, Background, Assessment and   Recommendations(SBAR). Information from the following report(s) SBAR was reviewed with the receiving nurse. Opportunity for questions and clarification was provided. Assessment to be completed upon patients arrival to unit and care to be assumed.

## 2020-06-04 NOTE — PROGRESS NOTES
Problem: Falls - Risk of  Goal: *Absence of Falls  Description: Document Hussein Cease Fall Risk and appropriate interventions in the flowsheet. Outcome: Progressing Towards Goal  Note: Fall Risk Interventions:  Mobility Interventions: Patient to call before getting OOB, PT Consult for mobility concerns, OT consult for ADLs         Medication Interventions: Patient to call before getting OOB, Teach patient to arise slowly    Elimination Interventions: Call light in reach, Stay With Me (per policy), Patient to call for help with toileting needs    History of Falls Interventions: Investigate reason for fall         Problem: Patient Education: Go to Patient Education Activity  Goal: Patient/Family Education  Outcome: Progressing Towards Goal     Problem: Diabetes Self-Management  Goal: *Disease process and treatment process  Description: Define diabetes and identify own type of diabetes; list 3 options for treating diabetes. Outcome: Progressing Towards Goal  Goal: *Incorporating nutritional management into lifestyle  Description: Describe effect of type, amount and timing of food on blood glucose; list 3 methods for planning meals. Outcome: Progressing Towards Goal  Goal: *Incorporating physical activity into lifestyle  Description: State effect of exercise on blood glucose levels. Outcome: Progressing Towards Goal  Goal: *Developing strategies to promote health/change behavior  Description: Define the ABC's of diabetes; identify appropriate screenings, schedule and personal plan for screenings. Outcome: Progressing Towards Goal  Goal: *Using medications safely  Description: State effect of diabetes medications on diabetes; name diabetes medication taking, action and side effects. Outcome: Progressing Towards Goal  Goal: *Monitoring blood glucose, interpreting and using results  Description: Identify recommended blood glucose targets  and personal targets.   Outcome: Progressing Towards Goal  Goal: *Prevention, detection, treatment of acute complications  Description: List symptoms of hyper- and hypoglycemia; describe how to treat low blood sugar and actions for lowering  high blood glucose level. Outcome: Progressing Towards Goal  Goal: *Prevention, detection and treatment of chronic complications  Description: Define the natural course of diabetes and describe the relationship of blood glucose levels to long term complications of diabetes.   Outcome: Progressing Towards Goal  Goal: *Developing strategies to address psychosocial issues  Description: Describe feelings about living with diabetes; identify support needed and support network  Outcome: Progressing Towards Goal  Goal: *Insulin pump training  Outcome: Progressing Towards Goal  Goal: *Sick day guidelines  Outcome: Progressing Towards Goal  Goal: *Patient Specific Goal (EDIT GOAL, INSERT TEXT)  Outcome: Progressing Towards Goal     Problem: Patient Education: Go to Patient Education Activity  Goal: Patient/Family Education  Outcome: Progressing Towards Goal     Problem: Nutrition Deficit  Goal: *Optimize nutritional status  Outcome: Progressing Towards Goal     Problem: Patient Education: Go to Patient Education Activity  Goal: Patient/Family Education  Outcome: Progressing Towards Goal

## 2020-06-04 NOTE — PROGRESS NOTES
AxOx4. No c/o pain or dyspnea--on RA. NSR on tele. Pt taken preop hibiclens bath this AM, sheets and gown changed. PICC c/d/I. Problem: Falls - Risk of  Goal: *Absence of Falls  Description: Document Ute Butler Fall Risk and appropriate interventions in the flowsheet.   Outcome: Progressing Towards Goal  Note: Fall Risk Interventions:  Mobility Interventions: Bed/chair exit alarm         Medication Interventions: Patient to call before getting OOB, Evaluate medications/consider consulting pharmacy, Bed/chair exit alarm    Elimination Interventions: Urinal in reach, Patient to call for help with toileting needs, Call light in reach    History of Falls Interventions: Evaluate medications/consider consulting pharmacy

## 2020-06-05 LAB
GLUCOSE BLD STRIP.AUTO-MCNC: 118 MG/DL (ref 65–100)
GLUCOSE BLD STRIP.AUTO-MCNC: 119 MG/DL (ref 65–100)
GLUCOSE BLD STRIP.AUTO-MCNC: 88 MG/DL (ref 65–100)
GLUCOSE BLD STRIP.AUTO-MCNC: 93 MG/DL (ref 65–100)

## 2020-06-05 PROCEDURE — 65660000000 HC RM CCU STEPDOWN

## 2020-06-05 PROCEDURE — 74011250637 HC RX REV CODE- 250/637: Performed by: FAMILY MEDICINE

## 2020-06-05 PROCEDURE — 74011250637 HC RX REV CODE- 250/637: Performed by: DENTIST

## 2020-06-05 PROCEDURE — 74011250636 HC RX REV CODE- 250/636: Performed by: DENTIST

## 2020-06-05 PROCEDURE — 74011250637 HC RX REV CODE- 250/637: Performed by: INTERNAL MEDICINE

## 2020-06-05 PROCEDURE — 82962 GLUCOSE BLOOD TEST: CPT

## 2020-06-05 PROCEDURE — 74011000258 HC RX REV CODE- 258: Performed by: DENTIST

## 2020-06-05 RX ORDER — SODIUM CHLORIDE, SODIUM LACTATE, POTASSIUM CHLORIDE, CALCIUM CHLORIDE 600; 310; 30; 20 MG/100ML; MG/100ML; MG/100ML; MG/100ML
100 INJECTION, SOLUTION INTRAVENOUS CONTINUOUS
Status: DISCONTINUED | OUTPATIENT
Start: 2020-06-05 | End: 2020-06-05

## 2020-06-05 RX ORDER — CARVEDILOL 3.12 MG/1
3.12 TABLET ORAL 2 TIMES DAILY WITH MEALS
Status: DISCONTINUED | OUTPATIENT
Start: 2020-06-05 | End: 2020-06-09 | Stop reason: HOSPADM

## 2020-06-05 RX ADMIN — ATORVASTATIN CALCIUM 20 MG: 20 TABLET, FILM COATED ORAL at 21:59

## 2020-06-05 RX ADMIN — FERROUS SULFATE TAB 325 MG (65 MG ELEMENTAL FE) 325 MG: 325 (65 FE) TAB at 17:40

## 2020-06-05 RX ADMIN — ASPIRIN 81 MG 81 MG: 81 TABLET ORAL at 09:24

## 2020-06-05 RX ADMIN — Medication 10 ML: at 13:28

## 2020-06-05 RX ADMIN — CEFTRIAXONE SODIUM 2 G: 2 INJECTION, POWDER, FOR SOLUTION INTRAMUSCULAR; INTRAVENOUS at 13:27

## 2020-06-05 RX ADMIN — CARVEDILOL 6.25 MG: 6.25 TABLET, FILM COATED ORAL at 09:23

## 2020-06-05 RX ADMIN — Medication 10 ML: at 22:00

## 2020-06-05 RX ADMIN — LISINOPRIL 2.5 MG: 5 TABLET ORAL at 09:23

## 2020-06-05 RX ADMIN — FERROUS SULFATE TAB 325 MG (65 MG ELEMENTAL FE) 325 MG: 325 (65 FE) TAB at 09:23

## 2020-06-05 RX ADMIN — Medication 20 ML: at 09:21

## 2020-06-05 RX ADMIN — ESCITALOPRAM OXALATE 10 MG: 10 TABLET ORAL at 09:24

## 2020-06-05 RX ADMIN — POTASSIUM CHLORIDE 20 MEQ: 20 TABLET, EXTENDED RELEASE ORAL at 17:39

## 2020-06-05 RX ADMIN — SPIRONOLACTONE 25 MG: 25 TABLET ORAL at 09:24

## 2020-06-05 RX ADMIN — Medication 10 ML: at 05:00

## 2020-06-05 RX ADMIN — HYDROCODONE BITARTRATE AND ACETAMINOPHEN 1 TABLET: 5; 325 TABLET ORAL at 03:22

## 2020-06-05 RX ADMIN — POTASSIUM CHLORIDE 20 MEQ: 20 TABLET, EXTENDED RELEASE ORAL at 09:23

## 2020-06-05 RX ADMIN — FUROSEMIDE 40 MG: 10 INJECTION, SOLUTION INTRAMUSCULAR; INTRAVENOUS at 09:20

## 2020-06-05 RX ADMIN — CARVEDILOL 3.12 MG: 3.12 TABLET, FILM COATED ORAL at 17:40

## 2020-06-05 RX ADMIN — LISINOPRIL 2.5 MG: 5 TABLET ORAL at 17:39

## 2020-06-05 NOTE — PROGRESS NOTES
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 [facility] 20 and  with strep mutans. · Dental caries s/p extraction   · Anemia, of acute disease: follow  · Hx AVR   · DM, A1c 7    Plan:   Continue CTX 2g IV q24h. Duration 6 weeks  AVR redo planned ? Early next week    Anti-infectives:   CTX -  Gentamicin -    Subjective:   Mouth is sore but feeling ok. Wants to get full liquids at least. Denies nausea, vomiting, diarrhea, fever, chills, or sweats    Review of Systems:  A comprehensive review of systems was negative except for that written in the History of Present Illness. Objective:     Visit Vitals  BP 93/60   Pulse 68   Temp 98.4 °F (36.9 °C)   Resp 18   Ht 6' 1\" (1.854 m)   Wt 63.8 kg (140 lb 11.2 oz)   SpO2 96%   BMI 18.56 kg/m²     Temp (24hrs), Av.9 °F (36.6 °C), Min:97.2 °F (36.2 °C), Max:98.4 °F (36.9 °C)  Lines:  PIV     Physical Exam:    General:  Alert, cooperative, well noursished, well developed, appears stated age   Eyes:  Sclera anicteric. Pupils equally round and reactive to light. Mouth/Throat: Mucous membranes normal, oral pharynx clear, extracts to back molars    Neck: Supple   Lungs:   Clear to auscultation bilaterally, good effort   CV:  Regular rate and rhythm. 3/6 SM    Abdomen:   Soft, non-tender.  bowel sounds normal. non-distended   Extremities: No cyanosis or edema   Skin: Skin color, texture, turgor normal. no acute rash or lesions   Lymph nodes: Cervical and supraclavicular normal   Musculoskeletal: No swelling or deformity   Lines/Devices:  Intact, no erythema, drainage or tenderness   Psych: Alert and oriented, normal mood affect given the setting           Data Review:     CBC:   Recent Labs     20  0422   WBC 11.1   RBC 3.24*   HGB 9.0*   HCT 28.7*      GRANS 81*   LYMPH 11*   EOS 1     CMP:   Recent Labs     06/04/20 0422   *      K 3.9      CO2 28   BUN 41*   CREA 1.16   CA 7.9*   AGAP 7   AP 85   TP 6.5   ALB 2.1*   GLOB 4.4*   AGRAT 0.5*     Liver Enzymes:   Recent Labs     06/04/20 0422   TP 6.5   ALB 2.1*   AP 85     Antibiotic Monitoring:   Lab Results   Component Value Date/Time    Vancomycin,trough 11.8 05/22/2020 12:58 AM        Microbiology:     All Micro Results     Procedure Component Value Units Date/Time    CULTURE, BLOOD [[id number]] Collected:  05/29/20 0412    Order Status:  Completed Specimen:  Blood Updated:  06/03/20 0938     Special Requests: --        RIGHT  Antecubital       Culture result: NO GROWTH 5 DAYS       CULTURE, BLOOD [[id number]] Collected:  05/29/20 0405    Order Status:  Completed Specimen:  Blood Updated:  06/03/20 0938     Special Requests: --        LEFT  Antecubital       Culture result: NO GROWTH 5 DAYS       CULTURE, BLOOD [[id number]] Collected:  05/24/20 1321    Order Status:  Completed Specimen:  Blood Updated:  05/29/20 0723     Special Requests: --        LEFT  Antecubital       Culture result: NO GROWTH 5 DAYS       CULTURE, BLOOD [[id number]]  (Abnormal)  (Susceptibility) Collected:  05/20/20 1424    Order Status:  Completed Specimen:  Blood Updated:  05/26/20 0834     Special Requests: --        RIGHT  FOREARM       GRAM STAIN GRAM POSITIVE COCCI         PEDIATRIC BOTTLE               RESULTS VERIFIED, PHONED TO AND READ BACK BY Yadi Longoria AT [facility] ON 5.24.20             Culture result: STREPTOCOCCUS MUTANS         REFER TO [address] PANEL [address] [id number]    CULTURE, BLOOD [[id number]] Collected:  05/20/20 1434    Order Status:  Completed Specimen:  Blood Updated:  05/25/20 0633     Special Requests: --        LEFT  Antecubital       Culture result: NO GROWTH 5 DAYS       BLOOD CULTURE ID PANEL [[id number]]  (Abnormal) Collected:  05/24/20 0105    Order Status:  Completed Specimen:  Blood Updated:  05/24/20 0647     Acc. no. from Micro Order [id number]     Streptococcus Detected        Comment: RESULTS VERIFIED, PHONED TO AND READ BACK BY  Meme Hutson RN @9680 ON 5/24/20 AK. INTERPRETATION       Gram positive cocci. Identified by realtime PCR as Streptococcus species (not agalactiae, pyogenes, or pneumoniae). Comment: Recommend discontinuing IV vancomycin starting cefazolin or nafcillin if patient not on beta-lactam therapy. Infectious Diseases Consult recommended in adult patients. THIS TEST DOES NOT REPLACE SENSITIVITY TESTING. CULTURE, BLOOD [[id number]]  (Abnormal) Collected:  05/18/20 1944    Order Status:  Completed Specimen:  Blood Updated:  05/23/20 0720     Special Requests: --        RIGHT  HAND       GRAM STAIN GRAM POSITIVE COCCI         PEDIATRIC BOTTLE               CRITICAL RESULT NOT CALLED DUE TO PREVIOUS NOTIFICATION OF CRITICAL RESULT WITHIN THE LAST 24 HOURS. Culture result:       ALPHA STREPTOCOCCUS, NOT S.  PNEUMONIAE            FOR ID AND SUSCEPTIBILITY REFER TO CULTURE NO ACC [id number]    CULTURE, BLOOD [[id number]]  (Abnormal)  (Susceptibility) Collected:  05/18/20 1938    Order Status:  Completed Specimen:  Blood Updated:  05/23/20 0703     Special Requests: --        RIGHT  ARM       GRAM STAIN GRAM POS COCCI IN CHAINS         AEROBIC BOTTLE POSITIVE               RESULTS VERIFIED, PHONED TO AND READ BACK BY Josseline Rodriguez RN @ 1120 ON 5/20/20 BY AMM           Culture result: STREPTOCOCCUS MUTANS         REFER TO [employer] PANEL ACCESSION [id number]    CULTURE, BLOOD [[id number]]     Order Status:  Canceled Specimen:  Blood     CULTURE, BLOOD [[id number]]     Order Status:  Canceled Specimen:  Blood     BLOOD CULTURE ID PANEL [[id number]]  (Abnormal) Collected:  05/18/20 1938    Order Status:  Completed Specimen:  Blood Updated:  05/20/20 1117     Acc. no. from Micro Order [id number]     Streptococcus Detected        Comment: RESULTS VERIFIED, PHONED TO AND READ BACK BY  Josseline Rodriguez RN @ 1120 ON 5/20/20 BY AMM          INTERPRETATION       Gram positive cocci. Identified by realtime PCR as Streptococcus species (not agalactiae, pyogenes, or pneumoniae). Comment: Consider discontinuation of IV vancomycin and using an anti-streptococcal beta-lactam (ceftriaxone/cefotaxime (peds))       EMERGENT DISEASE PANEL [[id number]] Collected:  05/16/20 1946    Order Status:  Completed Specimen:  Not Specified Updated:  05/18/20 1614     Emergent disease panel Not detected        Comment: Performed at [address]               Imaging:     TTE 5/28  SUMMARY:     -  Left ventricle: Since last study, LV had dilated more and the function has  declined. The ventricle was moderately to markedly dilated. Systolic function  was markedly reduced. Ejection fraction was estimated in the range of 30 % to  35 %.    -  Aortic valve: A 25mm Magna Ease bioprosthetic AVR with mobile mass on LVOT  side of the prosthesis as well as thickening / restriction of the leaflets.   Findings consistent with valvular vegetation.       Signed By: Mariangel Jasso NP     June 5, 2020

## 2020-06-05 NOTE — PROGRESS NOTES
6/5/2020 3:32 PM    Admit Date: 5/16/2020    Admit Diagnosis: NSTEMI (non-ST elevated myocardial infarction) ([facility] [I21.4]; Suspected COVID-19 virus infection [Z20.828]      Subjective:   No cp or sob      Objective:      Visit Vitals  BP (!) 88/54 (BP 1 Location: Left arm, BP Patient Position: At rest)   Pulse 64   Temp 98.1 °F (36.7 °C)   Resp 18   Ht 6' 1\" (1.854 m)   Wt 63.8 kg (140 lb 11.2 oz)   SpO2 95%   BMI 18.56 kg/m²       Physical Exam:  Mac Essex, Well Nourished, No Acute Distress, Alert & Oriented x 3, appropriate mood. Neck- supple, no JVD  CV- regular rate and rhythm no MRG  Lung- clear bilaterally  Abd- soft, nontender, nondistended  Ext- no edema bilaterally. Skin- warm and dry        Data Review:   Recent Labs     06/04/20  0422      K 3.9   BUN 41*   CREA 1.16   WBC 11.1   HGB 9.0*   HCT 28.7*          Assessment/Plan:     Principal Problem:    Endocarditis of prosthetic valve ([facility] (6/2/2020)- teeth removed- surgery per cts    Active Problems:    Diastolic CHF, chronic (HCC) (5/7/2018)Stable. Continue current medical therapy. S/P aortic valve replacement (4/1/2018)      CAD (coronary artery disease) ()Stable. Continue current medical therapy.         Overview: Nonobstructive      DIEGO on CPAP ()      Dyslipidemia ()      Essential hypertension (9/21/2018- bp too low- decrease coreg      NSTEMI (non-ST elevated myocardial infarction) ([facility] (5/16/2020)      COVID-19 ruled out (5/16/2020)      Petechiae (5/16/2020)      Anemia (5/16/2020)      Diabetes mellitus, type II ([facility] (5/20/2020)      Vitamin D deficiency (5/20/2020)

## 2020-06-05 NOTE — PROGRESS NOTES
Spoke with patient in regards to his positive pressure device at home. Patient stated his device is not working. I assured him I would look into supplying him with a replacement device.

## 2020-06-05 NOTE — PROGRESS NOTES
AxOx4. Medicated for s/p teeth extraction pain. No c/o dyspnea--on RA. Tolerated clear liquid diet. Gauze in mouth changed prn when dsg is drenched. Resting comfortably in bed. Pt reports passing gas this shift. Problem: Falls - Risk of  Goal: *Absence of Falls  Description: Document Vern White Fall Risk and appropriate interventions in the flowsheet.   Outcome: Progressing Towards Goal  Note: Fall Risk Interventions:  Mobility Interventions: Patient to call before getting OOB, Communicate number of staff needed for ambulation/transfer         Medication Interventions: Evaluate medications/consider consulting pharmacy, Patient to call before getting OOB, Teach patient to arise slowly    Elimination Interventions: Call light in reach, Urinal in reach    History of Falls Interventions: Evaluate medications/consider consulting pharmacy

## 2020-06-05 NOTE — PROGRESS NOTES
Reviewed notes for spiritual concerns prior to visit  Visit with patient to build rapport with . Calm  Encouraged with presence and words of hope    Patient demonstrates confidence in the care team.  Appears to be coping with illness.     No physical contact with patient per guidelines  Assurance of ongoing prayers    Heart surgery Monday per patient      Staff Jett   C: [phone]  /  [email or web address]

## 2020-06-05 NOTE — PROGRESS NOTES
Care Management Interventions  PCP Verified by CM: Yes  Last Visit to PCP: 05/04/20  Mode of Transport at Discharge: Other (see comment)(Camelia Dawkins Spouse [phone] )  Transition of Care Consult (CM Consult): Discharge Planning, Home Health  Discharge Durable Medical Equipment: No  Physical Therapy Consult: No  Occupational Therapy Consult: No  Current Support Network: Family Lives Nearby, Lives Alone  Confirm Follow Up Transport: Family  The Plan for Transition of Care is Related to the Following Treatment Goals : [address] Provided?: (Autumn [address]orno 150)  Discharge Location  Discharge Placement: Home with home health    Pt awaiting AVR redo. No further CM needs at this time.

## 2020-06-05 NOTE — PROGRESS NOTES
Nutrition Assessment for:   Follow up    Assessment: Patient presented with chest pain. NSTEMI. PMH includes HTN, CHF, anemia, DM2. Patient had cardiac cath on 5/19, YARON on 5/20 showed endocarditis and plans for AVR. Dental caries s/o extraction of multiple teeth 6/4. Met with patient at bedside today. He states that his mouth is a little sore, but tolerating liquids okay. He is asking if he could get something more substantial like creamed potatoes. He states that prior to oral surgery he feels like appetite was good. He was not eating 100%, but states he would eat until he was full. NPFE: Patient is very thin appearing, but cannot note any obvious wasting. DIET CLEAR LIQUID    Clear liquid diet does not meet EEN or EPN. Anthropometrics:  Height: 6' 1\" (185.4 cm), Weight: 63.8 kg (140 lb 11.2 oz), Weight Source: Bed, Body mass index is 18.56 kg/m². BMI class of Normal weight, low normal - close to underweight. Weight and BMI 6/5/2020 6/4/2020 6/3/2020 6/2/2020 6/1/2020   Weight 63.821 kg 63.685 kg 64. 048 kg 65.227 kg 67.586 kg   BMI (calculated) 18.6 kg/m2 18.5 kg/m2 18.6 kg/m2 19 kg/m2 19.7 kg/m2   All weight prior to 6/5 are standing weights  Macronutrient needs: (using CBW (Current body weight) bed scale 63.8 kg)  EER: 8676-2144 kcal/day (30-35 kcal/kg)  EPR: 77-83 g/day (1.2-1.3 g/kg)    Nutrition Intervention:  Meals and snacks: Will upgrade to Full Liquids per Perfect Bobo with Dr. Dina Bowie supplement therapy: Add Glucerna with trays until diet able to be advanced further and pending PO trends  Coordination of nutrition care by a Nutrition Professional: Discussed with Thierno Garzon RN and Perfect Serve to Dr. Tony Cazares  Discharge Nutrition Plan: Too soon to determine.     [address] [facility] [address] [place] [address], [address], [address]

## 2020-06-05 NOTE — PROGRESS NOTES
Hospitalist Progress Note    Patient: Anup Malhotra MRN: [id number]  SSN: [id number]    YOB: 1960  Age: 62 y.o. 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 He had dental extraction on 6/4/20. He is going to have an AVR replacement when it can be scheduled. 6/5 - He feels good. Mouth is sore from teeth extraction. Has been walking hallways. Denies CP/SOB. Denies F/C/N/V. Review of systems negative except stated above. Objective:     Visit Vitals  BP 93/60   Pulse 68   Temp 98.4 °F (36.9 °C)   Resp 18   Ht 6' 1\" (1.854 m)   Wt 63.8 kg (140 lb 11.2 oz)   SpO2 96%   BMI 18.56 kg/m²      Oxygen Therapy  O2 Sat (%): 96 % (06/05/20 0831)  Pulse via Oximetry: 74 beats per minute (06/04/20 2034)  O2 Device: Room air (06/05/20 1154)  O2 Flow Rate (L/min): 2 l/min (06/04/20 1640)  FIO2 (%): 21 % (06/04/20 2034)      Intake and Output:     Intake/Output Summary (Last 24 hours) at 6/5/2020 1255  Last data filed at 6/5/2020 1044  Gross per 24 hour   Intake 1630 ml   Output 700 ml   Net 930 ml         Physical Exam:   GENERAL: alert, cooperative, no distress, appears stated age  EYE: conjunctivae/corneas clear. PERRL. THROAT & NECK: normal and no erythema or exudates noted. LUNG: clear to auscultation bilaterally  HEART: regular rate and rhythm, S1S2, no murmur, no JVD  ABDOMEN: soft, non-tender, non-distended. Bowel sounds normal.   EXTREMITIES:  No edema, 2+ pedal/radial pulses bilaterally  SKIN: no rash or abnormalities  NEUROLOGIC: A&Ox3. Cranial nerves 2-12 grossly intact.     Lab/Data Review:  Recent Results (from the past 24 hour(s))   GLUCOSE, POC    Collection Time: 06/04/20  5:52 PM   Result Value Ref Range    Glucose (POC) 187 (H) 65 - 100 mg/dL   GLUCOSE, POC    Collection Time: 06/04/20  9:01 PM   Result Value Ref Range    Glucose (POC) 91 65 - 100 mg/dL   GLUCOSE, POC    Collection Time: 06/05/20  6:17 AM   Result Value Ref Range    Glucose (POC) 88 65 - 100 mg/dL   GLUCOSE, POC    Collection Time: 06/05/20 11:32 AM   Result Value Ref Range    Glucose (POC) 93 65 - 100 mg/dL       Imaging:  Ct Chest W Cont    Result Date: 5/16/2020  CT Chest with contrast INDICATION: Chest pain, fever and elevated d-dimer. Elevated troponin. Question PE. Covid Rule out COMPARISON: Chest x-ray earlier today TECHNIQUE: Contiguous axial images were obtained from the neck base through the upper abdomen with intravenous contrast, 100 mL Isovue 370. Radiation dose reduction techniques were used for this study:  Our CT scanners use one or all of the following: Automated exposure control, adjustment of the mA and/or kVp according to patient's size, iterative reconstruction. FINDINGS: There is no pulmonary embolus. The heart is mildly enlarged. There is no pericardial effusion. There are changes of prior sternotomy. Prosthetic aortic valve is present. There is mild coronary artery calcification. There is slight ectasia of the ascending thoracic aorta. Negative for thoracic aortic dissection. There is no evidence of lymphadenopathy. There is no endobronchial lesion. There is minimal dependent subsegmental atelectasis. There is no focal pulmonary consolidation, pleural effusion or pneumothorax. No pulmonary edema. Included upper abdomen demonstrates micronodular contour of the liver, suggestive of cirrhosis. Spleen is enlarged. Surrounding bones are intact. IMPRESSION: 1. Negative for pulmonary embolism. 2. No acute pulmonary disease. Negative for pneumonia, pulmonary edema or pleural effusion. 3. Mild coronary artery calcification    Xr Chest Port    Result Date: 5/16/2020  Portable chest xray  COMPARISON: May 2, 2018 CLINICAL HISTORY: Chest pain, fever. FINDINGS: Heart appears mildly enlarged.  Mediastinal contour is within normal limits. Stable postsurgical changes of sternotomy. There is no focal pulmonary consolidation, pneumothorax or pulmonary edema. No large pleural effusion. Minimal subsegmental atelectasis at the right lung base. Surrounding bones are stable. IMPRESSION: 1. No pulmonary consolidation or pulmonary edema. Ct Chest Soft Tissue Neck W Cont    Result Date: 5/27/2020  EXAM: CT NECK AND CHEST WITH CONTRAST INDICATION: S. mutans endocarditis with persistent bacteremia; evaluation for an abscess which might explain bacteremia. . COMPARISON: Chest CT 5/16/2020 TECHNIQUE:  CT imaging was performed of the neck and chest after intravenous injection of 100 mL Isovue 370. Intravenous contrast was used for better evaluation of solid organs and vascular structures. Coronal reformatted imaging provided. Radiation dose reduction techniques were used for this study. Our CT scanners use one or all of the following: Automated exposure control, adjustment of the mA and/or kV according to patient size, iterative reconstruction. FINDINGS: NECK: The bilateral bony orbits and intraorbital structures are unremarkable. The nasopharynx, oropharynx, epiglottis, laryngeal vestibule, hypopharynx, and larynx are within normal limits. The deep spaces of the neck are unremarkable. No fluid collection or phlegmon is clearly identified. The patient is partially edentulous. Periapical lucencies associated with many of the abdomen remaining teeth. No definite odontogenic abscess is present. No cervical adenopathy. The carotid arteries and jugular veins are within normal limits. Visualized intracranial structures appear normal. CHEST: Mediastinum and visualized thyroid: Normal. Heart: Aortic valve prosthesis noted. There is no pericardial effusion. There is mild global clinically. Multivessel coronary atherosclerotic calcifications. Large Vessels: Aneurysmal dilatation of the ascending aorta measuring 4.1 cm AP. Pleura:  Moderate right and small left pleural effusions, new compared to prior. Lungs: No consolidation or cavitary lesion evident. Airways: Normal. Lymph nodes: Mediastinal lymph nodes are mildly prominent in number but not pathologically enlarged. Bones/Soft tissues: No aggressive osseous lesion. Visualized abdomen: Normal.     IMPRESSION: 1. Partially edentulous patient with periapical lucencies associated with many of the remaining teeth. No definite odontogenic abscess present. No abscess within the deep spaces of the neck. 2.  Interval development of a moderate right and small left pleural effusion. No consolidation or cavitary lesion of the lungs. 3.  Changes of prior aortic valve replacement. Presumed post stenotic aneurysmal dilatation of the ascending aorta. Results for orders placed or performed during the hospital encounter of 20   2D ECHO COMPLETE ADULT (TTE) W OR [address] [facility] [address] [facility], [facility] [address] [facility]  [phone]    Transthoracic Echocardiogram  2D, M-mode, Doppler, and Color Doppler    Patient: Anila Larry  MR #: [id number]  : [id number]  Age: 62 years  Gender: Male  Study date: 19-May-2020  Account #: [de-identified]  Height: 73 in  Weight: 144.8 lb  BSA: 1.88 mï¾²  Status:Routine  Location: 310  BP: 113/ 68    Allergies: NO KNOWN ALLERGENS    Sonographer:  Wilfrid King RDCS  Group:  [facility]  Referring Physician:  Kenji Beal MD  Reading Physician:  DR. THONY TALLEY DO    INDICATIONS: Cardiomyopathy; Chest pain, chronic, high probability of CAD. PROCEDURE: This was a routine study. A transthoracic echocardiogram was  performed. The study included complete 2D imaging, M-mode, complete spectral  Doppler, and color Doppler. Intravenous contrast (Definity) was administered. Image quality was adequate. LEFT VENTRICLE: The ventricle was mildly dilated. Systolic function was  moderately reduced.  Ejection fraction was estimated in the range of 40 % to   45  %. There was moderate diffuse hypokinesis. There was severe hypokinesis of   the  basal-mid anterolateral wall(s). There was hypokinesis of the basal-mid  inferolateral wall(s). Wall thickness was normal. The study was not   technically  sufficient to allow evaluation of LV diastolic function. RIGHT VENTRICLE: The ventricle was mildly dilated. Systolic function was   mildly  reduced. Estimated peak pressure was in the range of 45-50 mmHg. LEFT ATRIUM: The atrium was markedly dilated. RIGHT ATRIUM: The atrium was moderately to markedly dilated. SYSTEMIC VEINS: IVC: The inferior vena cava was mildly dilated. The  respirophasic change in diameter was less than 50%. AORTIC VALVE: A 25mm Magna Ease (4/18) in place. There is a moderate sized  mobile mass on the LVOT side of the aortic valve prosthesis. Appearance could  be consistent with prosthetic valve vegetation. The peak velocity was 3.79   m/s. The mean pressure gradient was 33 mmHg. The peak pressure gradient was 58   mmHg. DI:(0.17), SVi: (20.7), AT: (90ms). There was moderate stenosis. There   appeared  to be a vegetation or echogenic structure noted on the non coronary cusp of   the  AVR. Color and CW doppler was assessed, although no evidence of insufficiency  was noted. MITRAL VALVE: Diastolic mitral regurgitation noted. May be consequence of   first  degree AV block. There was mild annular calcification. There was no evidence  for vegetation. Transmitral velocity was minimally increased. There was no  evidence for stenosis. There was mild to moderate regurgitation. TRICUSPID VALVE: The valve structure was normal. There was no evidence for  vegetation. There was no evidence for stenosis. There was mild to moderate  regurgitation. PULMONIC VALVE: The valve structure was normal. There was no evidence for  stenosis. There was no insufficiency. PERICARDIUM: There was no pericardial effusion.     AORTA: The root exhibited normal size. There was dilatation of the ascending  aorta (41 mm). SUMMARY:    -  Left ventricle: The ventricle was mildly dilated. Systolic function was  moderately reduced. Ejection fraction was estimated in the range of 40 % to   45  %. There was moderate diffuse hypokinesis. There was severe hypokinesis of   the  basal-mid anterolateral wall(s). There was hypokinesis of the basal-mid  inferolateral wall(s). -  Right ventricle: The ventricle was mildly dilated. Systolic function was  mildly reduced. -  Left atrium: The atrium was markedly dilated. -  Aortic valve: A 25mm Magna Ease (4/18) in place. There is a moderate sized  mobile mass on the LVOT side of the aortic valve prosthesis. Appearance could  be consistent with prosthetic valve vegetation. There was moderate stenosis. The mean pressure gradient was 33 mmHg. -  Mitral valve: Diastolic mitral regurgitation noted. May be consequence of  first degree AV block. There was mild annular calcification. There was mild   to  moderate regurgitation. There was no evidence for vegetation.    -  Tricuspid valve: There was mild to moderate regurgitation.    -  Aorta, systemic arteries: There was dilatation of the ascending aorta (41  mm). -  Additional impressions: Findings discussed and images reviewed with Dr Amee Dejesus at time of dictation. SYSTEM MEASUREMENT TABLES    2D mode  Left Atrium Systolic Volume Index; Method of Disks, Biplane; 2D mode;: 56.4  ml/m2  IVS/LVPW (2D): 1  IVSd (2D): 0.9 cm  LVIDd (2D): 5.5 cm  LVIDs (2D): 4.4 cm  LVOT Area (2D): 3.5 cm2  LVPWd (2D): 0.9 cm    Unspecified Scan Mode  Peak Grad; Mean; Antegrade Flow: 49 mm[Hg]  Vmax; Antegrade Flow: 379 cm/s  LVOT Diam: 2.1 cm  Peak Grad; Mean; Antegrade Flow: 11 mm[Hg]  Vmax; Antegrade Flow: 165 cm/s    Prepared and signed by    DR. Alesia Mac DO  Signed 19-May-2020 10:44:17     2D ECHO LIMTED ADULT W OR WO CONTR    Narrative    [address] [facility], [address] [facility]  [phone]    Transthoracic Echocardiogram  2D and Doppler    Patient: Willie Lopez  MR #: [id number]  : [id number]  Age: 62 years  Gender: Male  Study date: 28-May-2020  Account #: [de-identified]  Height: 72 in  Weight: 156.6 lb  BSA: 1.92 mï¾²  Status:Routine  Location: 310  BP: 117/ 76    Allergies: NO KNOWN ALLERGENS    Sonographer:  Kalyani Das RDCS  Group:  [facility] Cardiology  Referring Physician:  Amy Cheung MD [facility]  Reading Physician:  Amy Cheung MD [facility]    INDICATIONS: FU bAVR endocarditis. PROCEDURE: This was a routine study. A transthoracic echocardiogram was  performed. The study included limited 2D imaging and limited spectral   Doppler. Image quality was adequate. LEFT VENTRICLE: Since last study, LV had dilated more and the function has  declined. The ventricle was moderately to markedly dilated. Systolic function  was markedly reduced. Ejection fraction was estimated in the range of 30 % to  35 %. AORTIC VALVE: A 25mm Magna Ease bioprosthetic AVR with mobile mass on LVOT   side  of the prosthesis as well as thickening / restriction of the leaflets. Findings  consistent with valvular vegetation. SUMMARY:    -  Left ventricle: Since last study, LV had dilated more and the function has  declined. The ventricle was moderately to markedly dilated. Systolic function  was markedly reduced. Ejection fraction was estimated in the range of 30 % to  35 %. -  Aortic valve: A 25mm Magna Ease bioprosthetic AVR with mobile mass on LVOT  side of the prosthesis as well as thickening / restriction of the leaflets. Findings consistent with valvular vegetation. Prepared and signed by    Amy Cheung MD [facility]  Signed [id number] 16:50:28         Cultures:   All Micro Results     Procedure Component Value Units Date/Time    CULTURE, BLOOD [[id number]] Collected:  20 0412    Order Status:  Completed Specimen:  Blood Updated:  06/03/20 0938     Special Requests: --        RIGHT  Antecubital       Culture result: NO GROWTH 5 DAYS       CULTURE, BLOOD [[id number]] Collected:  05/29/20 0405    Order Status:  Completed Specimen:  Blood Updated:  06/03/20 0938     Special Requests: --        LEFT  Antecubital       Culture result: NO GROWTH 5 DAYS       CULTURE, BLOOD [[id number]] Collected:  05/24/20 1321    Order Status:  Completed Specimen:  Blood Updated:  05/29/20 0723     Special Requests: --        LEFT  Antecubital       Culture result: NO GROWTH 5 DAYS       CULTURE, BLOOD [[id number]]  (Abnormal)  (Susceptibility) Collected:  05/20/20 1424    Order Status:  Completed Specimen:  Blood Updated:  05/26/20 0834     Special Requests: --        RIGHT  FOREARM       GRAM STAIN GRAM POSITIVE COCCI         PEDIATRIC BOTTLE               RESULTS VERIFIED, PHONED TO AND READ BACK BY Ariella Schuster AT [facility] ON 5.24.20             Culture result: STREPTOCOCCUS MUTANS         REFER TO BIOFIRE PANEL [address] [id number]    CULTURE, BLOOD [[id number]] Collected:  05/20/20 1434    Order Status:  Completed Specimen:  Blood Updated:  05/25/20 0633     Special Requests: --        LEFT  Antecubital       Culture result: NO GROWTH 5 DAYS       BLOOD CULTURE ID PANEL [[id number]]  (Abnormal) Collected:  05/24/20 0105    Order Status:  Completed Specimen:  Blood Updated:  05/24/20 0647     Acc. no. from Micro Order [id number]     Streptococcus Detected        Comment: RESULTS VERIFIED, PHONED TO AND READ BACK BY  Ariella Schuster RN @1288 ON 5/24/20 AK. INTERPRETATION       Gram positive cocci. Identified by realtime PCR as Streptococcus species (not agalactiae, pyogenes, or pneumoniae). Comment: Recommend discontinuing IV vancomycin starting cefazolin or nafcillin if patient not on beta-lactam therapy. Infectious Diseases Consult recommended in adult patients. THIS TEST DOES NOT REPLACE SENSITIVITY TESTING.        CULTURE, BLOOD [[id number]] (Abnormal) Collected:  05/18/20 1944    Order Status:  Completed Specimen:  Blood Updated:  05/23/20 0720     Special Requests: --        RIGHT  HAND       GRAM STAIN GRAM POSITIVE COCCI         PEDIATRIC BOTTLE               CRITICAL RESULT NOT CALLED DUE TO PREVIOUS NOTIFICATION OF CRITICAL RESULT WITHIN THE LAST 24 HOURS. Culture result:       ALPHA STREPTOCOCCUS, NOT S. PNEUMONIAE            FOR ID AND SUSCEPTIBILITY REFER TO CULTURE NO ACC [id number]    CULTURE, BLOOD [[id number]]  (Abnormal)  (Susceptibility) Collected:  05/18/20 1938    Order Status:  Completed Specimen:  Blood Updated:  05/23/20 0703     Special Requests: --        RIGHT  ARM       GRAM STAIN GRAM POS COCCI IN CHAINS         AEROBIC BOTTLE POSITIVE               RESULTS VERIFIED, PHONED TO AND READ BACK BY Jocy Warner RN @ 1120 ON 5/20/20 BY [facility]           Culture result: STREPTOCOCCUS MUTANS         REFER TO [employer] PANEL ACCESSION [id number]    CULTURE, BLOOD [[id number]]     Order Status:  Canceled Specimen:  Blood     CULTURE, BLOOD [[id number]]     Order Status:  Canceled Specimen:  Blood     BLOOD CULTURE ID PANEL [[id number]]  (Abnormal) Collected:  05/18/20 1938    Order Status:  Completed Specimen:  Blood Updated:  05/20/20 1117     Acc. no. from Micro Order [id number]     Streptococcus Detected        Comment: RESULTS VERIFIED, PHONED TO AND READ BACK BY  Jocy Warner RN @ 1120 ON 5/20/20 BY [facility]          INTERPRETATION       Gram positive cocci. Identified by realtime PCR as Streptococcus species (not agalactiae, pyogenes, or pneumoniae).            Comment: Consider discontinuation of IV vancomycin and using an anti-streptococcal beta-lactam (ceftriaxone/cefotaxime (peds))       EMERGENT DISEASE PANEL [[id number]] Collected:  05/16/20 1946    Order Status:  Completed Specimen:  Not Specified Updated:  05/18/20 1614     Emergent disease panel Not detected        Comment: Performed at [place] [address] [address] [facility] CARE               Assessment/Plan:     Principal Problem:    Endocarditis of prosthetic valve ([facility] [address] (6/2/2020)  - Mobile mass seen on ECHO  - Blood cultures positive for Strep Mutans  - Blood cultures 5/29 NGTD  - S/P dental extraction on 6/4/20  - Will need to go for AVR replacement when scheduled  - Continue Ceftriaxone  - Appreciate all specialists    Active Problems:    NSTEMI (non-ST elevated myocardial infarction) ([facility] [address] (5/16/2020)  - Resolved  - No acute CP  - Continue ASA  - Continue Lipitor  - Continue Lisinopril      Petechiae (5/16/2020)  - Due to #1      S/P aortic valve replacement (4/1/2018)  - Will need replacement again      CAD (coronary artery disease) ()  - No acute CP  - Continue ASA  - Continue Lisinopril  - Continue Lipitor      Essential hypertension (9/21/2018)  - Stable  - Continue current medications      Diabetes mellitus, type II (HCC) (5/20/2020)  - A1C 7  - Continue Humalog SSI      Vitamin D deficiency (6/29/1218)      Diastolic CHF, chronic (HCC) (5/7/2018)  - No acute issues  - Continue Lasix IV  - Continue Lisinopril  - Continue Coreg      Anemia (5/16/2020)  - Stable      DIEGO on CPAP ()      Dyslipidemia ()  - Continue Lipitor      COVID-19 ruled out (5/16/2020)    Today's Plan: Continue Ceftriaxone. Await AVR replacement.     DIET FULL LIQUID  DIET NUTRITIONAL SUPPLEMENTS All Meals; Rick Bragg ( )    DVT Prophylaxis: SCDs    Discharge Plan: TBD      Signed By: Lin Sandifer, DO     June 5, 2020

## 2020-06-05 NOTE — PROGRESS NOTES
CTS-s/p multiple dental extractions due to poor dentition. Pt denies any significant pain/soreness in his mouth. Blood cultures remain negative to date. Pt afebrile. Will discuss timing of redo-AVR with Dr. Heidy Minaya.      Janneth Levy PA-C

## 2020-06-05 NOTE — ROUTINE PROCESS
Bedside and Verbal shift change report given to self (oncoming nurse) by Candi Rowell RN (offgoing nurse). Report included the following information SBAR, Kardex, Procedure Summary and MAR.

## 2020-06-05 NOTE — OP NOTES
OMFS Op Note     Date:  05 June 20     Pre-Op Diagnosis:      1. CHF     2. NIDDM                                      2.  Dental Caries                                      4.  Acute Oral Infection                                      2.  Chronic Periodontal Disease     Post-Op Diagnosis:    1. CHF     2. NIDDM                                      2.  Dental Caries                                      8.  Acute Oral Infection                                      8.  Chronic Periodontal Disease              Procedure:      1. Evaluation under anesthesia    2. Pre-cardiac procedure extractions - Surgical removal of teeth - 12, 14, 15, 21, 25, 26, 28                                                      Russ Douglas, DMD     Assistants:       Duyen Snider, JOANNA Russo CST     Anesthesia:     GOETA     KWP:    85 mL                Fluids:  500 mL                Urine:  Not measured     Drains: None     Complications:  None     Findings:         Consistent with diagnosis     Pt was given Cleocin 600 mg IV pre-operatively     Plan was for extubation then PACU with return to the floor and await his valve procedure.     Description of the Procedure:     The patient was taken to the operating room and administered a general anesthetic by the Anesthesia Service. Katina Hernandez an adequate plane of anesthesia was obtained, the patient was successfully intubated orally.  The tube was secured by the Anesthesia Service.  The patient was prepped and draped in the usual sterile fashion.  An oropharyngeal throat pack was placed to secure the oral cavity proper from the oropharynx.  A 1% Xylocaine solution with Epinephrine 1:100,000 was used X 6 mL across the maxilla and mandible to achieve adequate local anesthesia.  At the conclusion of the case, a 0.5% Marcaine solution was used X 5 mL to achieve adequate long term anesthesia. Under anesthesia, the patient's dentition is very poor.   He will eventually need all of his remaining teeth removed. He told me, again, in the holding room that he did not want all of his teeth removed at this time. \"Just get the worst ones. \"     A full thickness mucoperiosteal flap was initiated from posterior to anterior using a 15 blade on a 3 Bard Cristian handle.  All teeth were elevated using a 301 and 34 S dental elevator.  All teeth were removed atraumatically to the buccal with the appropriate dental forcep.     Copious amounts of irrigation were used to adequate cleanse the wounds.  Gel foam was placed in each of the extraction sites.   A 3.0 Chromic Gut suture was used to re-approximate the soft tissue flap.  At the conclusion of this, all the wounds were deemed to be hemostatic.  The patient was packed with 4X4 gauze.  The oropharyngeal throat pack was removed.  The patient was then turned over to the Anesthesia Service where he was awoken, extubated,and transported to the PACU in stable condition.                 TRACY

## 2020-06-05 NOTE — PROGRESS NOTES
BP running 88/54. Dr Geoff Barron made aware. Received order to start fluids that were not administered yesterday and to hold lasix. Will continue to monitor.

## 2020-06-06 LAB
GLUCOSE BLD STRIP.AUTO-MCNC: 110 MG/DL (ref 65–100)
GLUCOSE BLD STRIP.AUTO-MCNC: 117 MG/DL (ref 65–100)
GLUCOSE BLD STRIP.AUTO-MCNC: 136 MG/DL (ref 65–100)
GLUCOSE BLD STRIP.AUTO-MCNC: 140 MG/DL (ref 65–100)

## 2020-06-06 PROCEDURE — 74011250637 HC RX REV CODE- 250/637: Performed by: DENTIST

## 2020-06-06 PROCEDURE — 74011250636 HC RX REV CODE- 250/636: Performed by: INTERNAL MEDICINE

## 2020-06-06 PROCEDURE — 65660000000 HC RM CCU STEPDOWN

## 2020-06-06 PROCEDURE — 82962 GLUCOSE BLOOD TEST: CPT

## 2020-06-06 PROCEDURE — 77030040393 HC DRSG OPTIFOAM GENT MDII -B

## 2020-06-06 PROCEDURE — 74011250637 HC RX REV CODE- 250/637: Performed by: INTERNAL MEDICINE

## 2020-06-06 PROCEDURE — 74011000258 HC RX REV CODE- 258: Performed by: DENTIST

## 2020-06-06 PROCEDURE — 74011250636 HC RX REV CODE- 250/636: Performed by: DENTIST

## 2020-06-06 RX ORDER — ENOXAPARIN SODIUM 100 MG/ML
40 INJECTION SUBCUTANEOUS EVERY 24 HOURS
Status: DISCONTINUED | OUTPATIENT
Start: 2020-06-06 | End: 2020-06-09 | Stop reason: HOSPADM

## 2020-06-06 RX ADMIN — ESCITALOPRAM OXALATE 10 MG: 10 TABLET ORAL at 08:52

## 2020-06-06 RX ADMIN — Medication 10 ML: at 13:43

## 2020-06-06 RX ADMIN — Medication 10 ML: at 21:05

## 2020-06-06 RX ADMIN — CARVEDILOL 3.12 MG: 3.12 TABLET, FILM COATED ORAL at 08:53

## 2020-06-06 RX ADMIN — CARVEDILOL 3.12 MG: 3.12 TABLET, FILM COATED ORAL at 17:31

## 2020-06-06 RX ADMIN — CEFTRIAXONE SODIUM 2 G: 2 INJECTION, POWDER, FOR SOLUTION INTRAMUSCULAR; INTRAVENOUS at 13:43

## 2020-06-06 RX ADMIN — LISINOPRIL 2.5 MG: 5 TABLET ORAL at 08:52

## 2020-06-06 RX ADMIN — FERROUS SULFATE TAB 325 MG (65 MG ELEMENTAL FE) 325 MG: 325 (65 FE) TAB at 08:52

## 2020-06-06 RX ADMIN — ATORVASTATIN CALCIUM 20 MG: 20 TABLET, FILM COATED ORAL at 21:04

## 2020-06-06 RX ADMIN — ENOXAPARIN SODIUM 40 MG: 40 INJECTION SUBCUTANEOUS at 08:53

## 2020-06-06 RX ADMIN — ASPIRIN 81 MG 81 MG: 81 TABLET ORAL at 08:53

## 2020-06-06 RX ADMIN — SPIRONOLACTONE 25 MG: 25 TABLET ORAL at 08:53

## 2020-06-06 RX ADMIN — LISINOPRIL 2.5 MG: 5 TABLET ORAL at 17:30

## 2020-06-06 RX ADMIN — FERROUS SULFATE TAB 325 MG (65 MG ELEMENTAL FE) 325 MG: 325 (65 FE) TAB at 17:30

## 2020-06-06 NOTE — PROGRESS NOTES
[facility] CARDIOLOGY PROGRESS NOTE           6/6/2020 7:13 AM    Admit Date: 5/16/2020      Subjective:   No cp or sob      Objective:      Vitals:    06/05/20 1655 06/05/20 2135 06/06/20 0052 06/06/20 0453   BP: 99/62 99/69 98/63 102/67   Pulse: 77 90 89 87   Resp: 18 18 18 16   Temp: 97.3 °F (36.3 °C) 98.4 °F (36.9 °C) 98.7 °F (37.1 °C) 99.3 °F (37.4 °C)   SpO2: 93% 95% 91% 92%   Weight:    63.7 kg (140 lb 8 oz)   Height:           Physical Exam:  General-No Acute Distress  Neck- supple, no JVD  CV- regular rate and rhythm, + MRG  Lung- clear bilaterally  Abd- soft, nontender, nondistended  Ext- no edema bilaterally. Skin- warm and dry    Data Review:   Recent Labs     06/04/20  0422      K 3.9   BUN 41*   CREA 1.16   *   WBC 11.1   HGB 9.0*   HCT 28.7*          Assessment/Plan:   Nstemi, cath 5/19, OM occluded, ? embolic  Step. mutans bAVR endocarditis  Diabetic  Cardiomyopathy, ef 40% 5/17  Poor dentition, s/p oral surgery 6/4  ///  Stable  Meds reviewed.   Off Lasix  Ad lovenox 40  Stop 2727 S MD Jeancarlos  6/6/2020 7:13 AM

## 2020-06-06 NOTE — ROUTINE PROCESS
Bedside and Verbal shift change report given to self (oncoming nurse) by Anatoly Chand RN (offgoing nurse). Report included the following information SBAR, Kardex, Procedure Summary, Intake/Output, MAR and Recent Results.

## 2020-06-06 NOTE — PROGRESS NOTES
Hospitalist Progress Note    Patient: Bessy Paez MRN: [id number]  SSN: [id number]    YOB: 1960  Age: 62 y.o. 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 He had dental extraction on 6/4/20. He is going to have an AVR replacement when it can be scheduled. 6/6 - He feels good. Slept well. Mouth feels better. Has been walking hallways. Denies CP/SOB. Denies F/C/N/V. Review of systems negative except stated above. Objective:     Visit Vitals  /67   Pulse 87   Temp 99.3 °F (37.4 °C)   Resp 16   Ht 6' 1\" (1.854 m)   Wt 63.7 kg (140 lb 8 oz)   SpO2 92%   BMI 18.54 kg/m²      Oxygen Therapy  O2 Sat (%): 92 % (06/06/20 0453)  Pulse via Oximetry: 74 beats per minute (06/04/20 2034)  O2 Device: Room air (06/06/20 0734)  O2 Flow Rate (L/min): 2 l/min (06/04/20 1640)  FIO2 (%): 21 % (06/04/20 2034)      Intake and Output:     Intake/Output Summary (Last 24 hours) at 6/6/2020 0809  Last data filed at 6/6/2020 0725  Gross per 24 hour   Intake 1794 ml   Output 1450 ml   Net 344 ml         Physical Exam:   GENERAL: alert, cooperative, no distress, appears stated age  EYE: conjunctivae/corneas clear. PERRL. THROAT & NECK: normal and no erythema or exudates noted. LUNG: clear to auscultation bilaterally  HEART: regular rate and rhythm, S1S2, no murmur, no JVD  ABDOMEN: soft, non-tender, non-distended. Bowel sounds normal.   EXTREMITIES:  No edema, 2+ pedal/radial pulses bilaterally  SKIN: no rash or abnormalities  NEUROLOGIC: A&Ox3. Cranial nerves 2-12 grossly intact.     Lab/Data Review:  Recent Results (from the past 24 hour(s))   GLUCOSE, POC    Collection Time: 06/05/20 11:32 AM   Result Value Ref Range    Glucose (POC) 93 65 - 100 mg/dL   GLUCOSE, POC    Collection Time: 06/05/20  5:52 PM   Result Value Ref Range    Glucose (POC) 118 (H) 65 - 100 mg/dL   GLUCOSE, POC    Collection Time: 06/05/20  9:40 PM   Result Value Ref Range    Glucose (POC) 119 (H) 65 - 100 mg/dL   GLUCOSE, POC    Collection Time: 06/06/20  6:12 AM   Result Value Ref Range    Glucose (POC) 110 (H) 65 - 100 mg/dL       Imaging:  Ct Chest W Cont    Result Date: 5/16/2020  CT Chest with contrast INDICATION: Chest pain, fever and elevated d-dimer. Elevated troponin. Question PE. Covid Rule out COMPARISON: Chest x-ray earlier today TECHNIQUE: Contiguous axial images were obtained from the neck base through the upper abdomen with intravenous contrast, 100 mL Isovue 370. Radiation dose reduction techniques were used for this study:  Our CT scanners use one or all of the following: Automated exposure control, adjustment of the mA and/or kVp according to patient's size, iterative reconstruction. FINDINGS: There is no pulmonary embolus. The heart is mildly enlarged. There is no pericardial effusion. There are changes of prior sternotomy. Prosthetic aortic valve is present. There is mild coronary artery calcification. There is slight ectasia of the ascending thoracic aorta. Negative for thoracic aortic dissection. There is no evidence of lymphadenopathy. There is no endobronchial lesion. There is minimal dependent subsegmental atelectasis. There is no focal pulmonary consolidation, pleural effusion or pneumothorax. No pulmonary edema. Included upper abdomen demonstrates micronodular contour of the liver, suggestive of cirrhosis. Spleen is enlarged. Surrounding bones are intact. IMPRESSION: 1. Negative for pulmonary embolism. 2. No acute pulmonary disease. Negative for pneumonia, pulmonary edema or pleural effusion. 3. Mild coronary artery calcification    Xr Chest Port    Result Date: 5/16/2020  Portable chest xray  COMPARISON: May 2, 2018 CLINICAL HISTORY: Chest pain, fever. FINDINGS: Heart appears mildly enlarged.  Mediastinal contour is within normal limits. Stable postsurgical changes of sternotomy. There is no focal pulmonary consolidation, pneumothorax or pulmonary edema. No large pleural effusion. Minimal subsegmental atelectasis at the right lung base. Surrounding bones are stable. IMPRESSION: 1. No pulmonary consolidation or pulmonary edema. Ct Chest Soft Tissue Neck W Cont    Result Date: 5/27/2020  EXAM: CT NECK AND CHEST WITH CONTRAST INDICATION: S. mutans endocarditis with persistent bacteremia; evaluation for an abscess which might explain bacteremia. . COMPARISON: Chest CT 5/16/2020 TECHNIQUE:  CT imaging was performed of the neck and chest after intravenous injection of 100 mL Isovue 370. Intravenous contrast was used for better evaluation of solid organs and vascular structures. Coronal reformatted imaging provided. Radiation dose reduction techniques were used for this study. Our CT scanners use one or all of the following: Automated exposure control, adjustment of the mA and/or kV according to patient size, iterative reconstruction. FINDINGS: NECK: The bilateral bony orbits and intraorbital structures are unremarkable. The nasopharynx, oropharynx, epiglottis, laryngeal vestibule, hypopharynx, and larynx are within normal limits. The deep spaces of the neck are unremarkable. No fluid collection or phlegmon is clearly identified. The patient is partially edentulous. Periapical lucencies associated with many of the abdomen remaining teeth. No definite odontogenic abscess is present. No cervical adenopathy. The carotid arteries and jugular veins are within normal limits. Visualized intracranial structures appear normal. CHEST: Mediastinum and visualized thyroid: Normal. Heart: Aortic valve prosthesis noted. There is no pericardial effusion. There is mild global clinically. Multivessel coronary atherosclerotic calcifications. Large Vessels: Aneurysmal dilatation of the ascending aorta measuring 4.1 cm AP. Pleura:  Moderate right and small left pleural effusions, new compared to prior. Lungs: No consolidation or cavitary lesion evident. Airways: Normal. Lymph nodes: Mediastinal lymph nodes are mildly prominent in number but not pathologically enlarged. Bones/Soft tissues: No aggressive osseous lesion. Visualized abdomen: Normal.     IMPRESSION: 1. Partially edentulous patient with periapical lucencies associated with many of the remaining teeth. No definite odontogenic abscess present. No abscess within the deep spaces of the neck. 2.  Interval development of a moderate right and small left pleural effusion. No consolidation or cavitary lesion of the lungs. 3.  Changes of prior aortic valve replacement. Presumed post stenotic aneurysmal dilatation of the ascending aorta. Results for orders placed or performed during the hospital encounter of 20   2D ECHO COMPLETE ADULT (TTE) W OR [address] [facility] [address] [facility], [facility] [address] [facility]  [phone]    Transthoracic Echocardiogram  2D, M-mode, Doppler, and Color Doppler    Patient: Rashmi Caputo  MR #: [id number]  : [id number]  Age: 62 years  Gender: Male  Study date: 19-May-2020  Account #: [de-identified]  Height: 73 in  Weight: 144.8 lb  BSA: 1.88 mï¾²  Status:Routine  Location: 310  BP: 113/ 68    Allergies: NO KNOWN ALLERGENS    Sonographer:  Severiano Arnt, RDCS  Group:  [facility]  Referring Physician:  Ravin Almaraz MD  Reading Physician:  DR. THONY TALLEY DO    INDICATIONS: Cardiomyopathy; Chest pain, chronic, high probability of CAD. PROCEDURE: This was a routine study. A transthoracic echocardiogram was  performed. The study included complete 2D imaging, M-mode, complete spectral  Doppler, and color Doppler. Intravenous contrast (Definity) was administered. Image quality was adequate. LEFT VENTRICLE: The ventricle was mildly dilated. Systolic function was  moderately reduced.  Ejection fraction was estimated in the range of 40 % to   45  %. There was moderate diffuse hypokinesis. There was severe hypokinesis of   the  basal-mid anterolateral wall(s). There was hypokinesis of the basal-mid  inferolateral wall(s). Wall thickness was normal. The study was not   technically  sufficient to allow evaluation of LV diastolic function. RIGHT VENTRICLE: The ventricle was mildly dilated. Systolic function was   mildly  reduced. Estimated peak pressure was in the range of 45-50 mmHg. LEFT ATRIUM: The atrium was markedly dilated. RIGHT ATRIUM: The atrium was moderately to markedly dilated. SYSTEMIC VEINS: IVC: The inferior vena cava was mildly dilated. The  respirophasic change in diameter was less than 50%. AORTIC VALVE: A 25mm Magna Ease (4/18) in place. There is a moderate sized  mobile mass on the LVOT side of the aortic valve prosthesis. Appearance could  be consistent with prosthetic valve vegetation. The peak velocity was 3.79   m/s. The mean pressure gradient was 33 mmHg. The peak pressure gradient was 58   mmHg. DI:(0.17), SVi: (20.7), AT: (90ms). There was moderate stenosis. There   appeared  to be a vegetation or echogenic structure noted on the non coronary cusp of   the  AVR. Color and CW doppler was assessed, although no evidence of insufficiency  was noted. MITRAL VALVE: Diastolic mitral regurgitation noted. May be consequence of   first  degree AV block. There was mild annular calcification. There was no evidence  for vegetation. Transmitral velocity was minimally increased. There was no  evidence for stenosis. There was mild to moderate regurgitation. TRICUSPID VALVE: The valve structure was normal. There was no evidence for  vegetation. There was no evidence for stenosis. There was mild to moderate  regurgitation. PULMONIC VALVE: The valve structure was normal. There was no evidence for  stenosis. There was no insufficiency. PERICARDIUM: There was no pericardial effusion.     AORTA: The root exhibited normal size. There was dilatation of the ascending  aorta (41 mm). SUMMARY:    -  Left ventricle: The ventricle was mildly dilated. Systolic function was  moderately reduced. Ejection fraction was estimated in the range of 40 % to   45  %. There was moderate diffuse hypokinesis. There was severe hypokinesis of   the  basal-mid anterolateral wall(s). There was hypokinesis of the basal-mid  inferolateral wall(s). -  Right ventricle: The ventricle was mildly dilated. Systolic function was  mildly reduced. -  Left atrium: The atrium was markedly dilated. -  Aortic valve: A 25mm Magna Ease (4/18) in place. There is a moderate sized  mobile mass on the LVOT side of the aortic valve prosthesis. Appearance could  be consistent with prosthetic valve vegetation. There was moderate stenosis. The mean pressure gradient was 33 mmHg. -  Mitral valve: Diastolic mitral regurgitation noted. May be consequence of  first degree AV block. There was mild annular calcification. There was mild   to  moderate regurgitation. There was no evidence for vegetation.    -  Tricuspid valve: There was mild to moderate regurgitation.    -  Aorta, systemic arteries: There was dilatation of the ascending aorta (41  mm). -  Additional impressions: Findings discussed and images reviewed with Dr Lucy Hylton at time of dictation. SYSTEM MEASUREMENT TABLES    2D mode  Left Atrium Systolic Volume Index; Method of Disks, Biplane; 2D mode;: 56.4  ml/m2  IVS/LVPW (2D): 1  IVSd (2D): 0.9 cm  LVIDd (2D): 5.5 cm  LVIDs (2D): 4.4 cm  LVOT Area (2D): 3.5 cm2  LVPWd (2D): 0.9 cm    Unspecified Scan Mode  Peak Grad; Mean; Antegrade Flow: 49 mm[Hg]  Vmax; Antegrade Flow: 379 cm/s  LVOT Diam: 2.1 cm  Peak Grad; Mean; Antegrade Flow: 11 mm[Hg]  Vmax; Antegrade Flow: 165 cm/s    Prepared and signed by    DR. Ann Christy DO  Signed 19-May-2020 10:44:17     2D ECHO LIMTED ADULT W OR WO CONTR    Narrative    [address] [facility], [address] [facility]  [phone]    Transthoracic Echocardiogram  2D and Doppler    Patient: Darron Danielson  MR #: [id number]  : [id number]  Age: 62 years  Gender: Male  Study date: 28-May-2020  Account #: [de-identified]  Height: 72 in  Weight: 156.6 lb  BSA: 1.92 mï¾²  Status:Routine  Location: 310  BP: 117/ 76    Allergies: NO KNOWN ALLERGENS    Sonographer:  Esau Hunt RD  Group:  [id number] Cardiology  Referring Physician:  Margaret Elmore MD [facility]  Reading Physician:  Margaret Elmore MD [facility]    INDICATIONS: FU bAVR endocarditis. PROCEDURE: This was a routine study. A transthoracic echocardiogram was  performed. The study included limited 2D imaging and limited spectral   Doppler. Image quality was adequate. LEFT VENTRICLE: Since last study, LV had dilated more and the function has  declined. The ventricle was moderately to markedly dilated. Systolic function  was markedly reduced. Ejection fraction was estimated in the range of 30 % to  35 %. AORTIC VALVE: A 25mm Magna Ease bioprosthetic AVR with mobile mass on LVOT   side  of the prosthesis as well as thickening / restriction of the leaflets. Findings  consistent with valvular vegetation. SUMMARY:    -  Left ventricle: Since last study, LV had dilated more and the function has  declined. The ventricle was moderately to markedly dilated. Systolic function  was markedly reduced. Ejection fraction was estimated in the range of 30 % to  35 %. -  Aortic valve: A 25mm Magna Ease bioprosthetic AVR with mobile mass on LVOT  side of the prosthesis as well as thickening / restriction of the leaflets. Findings consistent with valvular vegetation. Prepared and signed by    Margaret Elmore MD [facility]  Signed [id number] 16:50:28         Cultures:   All Micro Results     Procedure Component Value Units Date/Time    CULTURE, BLOOD [[id number]] Collected:  20 0412    Order Status:  Completed Specimen:  Blood Updated:  06/03/20 0938     Special Requests: --        RIGHT  Antecubital       Culture result: NO GROWTH 5 DAYS       CULTURE, BLOOD [[id number]] Collected:  05/29/20 0405    Order Status:  Completed Specimen:  Blood Updated:  06/03/20 0938     Special Requests: --        LEFT  Antecubital       Culture result: NO GROWTH 5 DAYS       CULTURE, BLOOD [[id number]] Collected:  05/24/20 1321    Order Status:  Completed Specimen:  Blood Updated:  05/29/20 0723     Special Requests: --        LEFT  Antecubital       Culture result: NO GROWTH 5 DAYS       CULTURE, BLOOD [[id number]]  (Abnormal)  (Susceptibility) Collected:  05/20/20 1424    Order Status:  Completed Specimen:  Blood Updated:  05/26/20 0834     Special Requests: --        RIGHT  FOREARM       GRAM STAIN GRAM POSITIVE COCCI         PEDIATRIC BOTTLE               RESULTS VERIFIED, PHONED TO AND READ BACK BY Golden iPtt AT [facility] ON 5.24.20             Culture result: STREPTOCOCCUS MUTANS         REFER TO BIOFIRE PANEL [facility] [id number]    CULTURE, BLOOD [[id number]] Collected:  05/20/20 1434    Order Status:  Completed Specimen:  Blood Updated:  05/25/20 0633     Special Requests: --        LEFT  Antecubital       Culture result: NO GROWTH 5 DAYS       BLOOD CULTURE ID PANEL [[id number]]  (Abnormal) Collected:  05/24/20 0105    Order Status:  Completed Specimen:  Blood Updated:  05/24/20 0647     Acc. no. from Micro Order [id number]     Streptococcus Detected        Comment: RESULTS VERIFIED, PHONED TO AND READ BACK BY  Golden Pitt RN @8483 ON 5/24/20 AK. INTERPRETATION       Gram positive cocci. Identified by realtime PCR as Streptococcus species (not agalactiae, pyogenes, or pneumoniae). Comment: Recommend discontinuing IV vancomycin starting cefazolin or nafcillin if patient not on beta-lactam therapy. Infectious Diseases Consult recommended in adult patients. THIS TEST DOES NOT REPLACE SENSITIVITY TESTING.        CULTURE, BLOOD [[id number]] (Abnormal) Collected:  05/18/20 1944    Order Status:  Completed Specimen:  Blood Updated:  05/23/20 0720     Special Requests: --        RIGHT  HAND       GRAM STAIN GRAM POSITIVE COCCI         PEDIATRIC BOTTLE               CRITICAL RESULT NOT CALLED DUE TO PREVIOUS NOTIFICATION OF CRITICAL RESULT WITHIN THE LAST 24 HOURS. Culture result:       ALPHA STREPTOCOCCUS, NOT S. PNEUMONIAE            FOR ID AND SUSCEPTIBILITY REFER TO CULTURE NO ACC [id number]    CULTURE, BLOOD [[id number]]  (Abnormal)  (Susceptibility) Collected:  05/18/20 1938    Order Status:  Completed Specimen:  Blood Updated:  05/23/20 0703     Special Requests: --        RIGHT  ARM       GRAM STAIN GRAM POS COCCI IN CHAINS         AEROBIC BOTTLE POSITIVE               RESULTS VERIFIED, PHONED TO AND READ BACK BY Svetlana Hewitt RN @ 1120 ON 5/20/20 BY [facility]           Culture result: STREPTOCOCCUS MUTANS         REFER TO [address] PANEL ACCESSION [id number]    CULTURE, BLOOD [[id number]]     Order Status:  Canceled Specimen:  Blood     CULTURE, BLOOD [[id number]]     Order Status:  Canceled Specimen:  Blood     BLOOD CULTURE ID PANEL [[id number]]  (Abnormal) Collected:  05/18/20 1938    Order Status:  Completed Specimen:  Blood Updated:  05/20/20 1117     Acc. no. from Micro Order [id number]     Streptococcus Detected        Comment: RESULTS VERIFIED, PHONED TO AND READ BACK BY  Svetlana Hewitt RN @ 1120 ON 5/20/20 BY [facility]          INTERPRETATION       Gram positive cocci. Identified by realtime PCR as Streptococcus species (not agalactiae, pyogenes, or pneumoniae).            Comment: Consider discontinuation of IV vancomycin and using an anti-streptococcal beta-lactam (ceftriaxone/cefotaxime (peds))       EMERGENT DISEASE PANEL [[id number]] Collected:  05/16/20 1946    Order Status:  Completed Specimen:  Not Specified Updated:  05/18/20 1614     Emergent disease panel Not detected        Comment: Performed at [place] [address] [facility] CARE               Assessment/Plan:     Principal Problem:    Endocarditis of prosthetic valve ([facility] [address] (6/2/2020)  - Mobile mass seen on ECHO  - Blood cultures positive for Strep Mutans  - Blood cultures 5/29 NGTD  - S/P dental extraction on 6/4/20  - Will need to go for AVR replacement when scheduled  - Continue Ceftriaxone  - Appreciate all specialists    Active Problems:    NSTEMI (non-ST elevated myocardial infarction) ([facility] [address] (5/16/2020)  - Resolved  - No acute CP  - Continue ASA  - Continue Lipitor  - Continue Lisinopril      Petechiae (5/16/2020)  - Due to #1      S/P aortic valve replacement (4/1/2018)  - Will need replacement again      CAD (coronary artery disease) ()  - No acute CP  - Continue ASA  - Continue Lisinopril  - Continue Lipitor      Essential hypertension (9/21/2018)  - Stable  - Continue current medications      Diabetes mellitus, type II (HCC) (5/20/2020)  - A1C 7  - Continue Humalog SSI      Vitamin D deficiency (0/68/5070)      Diastolic CHF, chronic (HCC) (5/7/2018)  - No acute issues  - Continue Lasix IV  - Continue Lisinopril  - Continue Coreg      Anemia (5/16/2020)  - Stable      DIEGO on CPAP ()      Dyslipidemia ()  - Continue Lipitor      COVID-19 ruled out (5/16/2020)    Today's Plan: Continue Ceftriaxone. Await AVR replacement.     DIET FULL LIQUID  DIET NUTRITIONAL SUPPLEMENTS All Meals; Rick Bragg ( )    DVT Prophylaxis: SCDs    Discharge Plan: TBD      Signed By: Mauricio Steve DO     June 6, 2020

## 2020-06-06 NOTE — ROUTINE PROCESS
Bedside and Verbal shift change report given to Michelle Isaac RN (oncoming nurse) by self Rollene Every nurse). Report included the following information SBAR, Kardex, Intake/Output and MAR.

## 2020-06-07 LAB
GLUCOSE BLD STRIP.AUTO-MCNC: 107 MG/DL (ref 65–100)
GLUCOSE BLD STRIP.AUTO-MCNC: 130 MG/DL (ref 65–100)
GLUCOSE BLD STRIP.AUTO-MCNC: 139 MG/DL (ref 65–100)
GLUCOSE BLD STRIP.AUTO-MCNC: 94 MG/DL (ref 65–100)

## 2020-06-07 PROCEDURE — 82962 GLUCOSE BLOOD TEST: CPT

## 2020-06-07 PROCEDURE — 74011000258 HC RX REV CODE- 258: Performed by: DENTIST

## 2020-06-07 PROCEDURE — 74011250636 HC RX REV CODE- 250/636: Performed by: DENTIST

## 2020-06-07 PROCEDURE — 74011250637 HC RX REV CODE- 250/637: Performed by: DENTIST

## 2020-06-07 PROCEDURE — 74011250637 HC RX REV CODE- 250/637: Performed by: INTERNAL MEDICINE

## 2020-06-07 PROCEDURE — 74011250636 HC RX REV CODE- 250/636: Performed by: INTERNAL MEDICINE

## 2020-06-07 PROCEDURE — 65660000000 HC RM CCU STEPDOWN

## 2020-06-07 RX ORDER — LANOLIN ALCOHOL/MO/W.PET/CERES
1 CREAM (GRAM) TOPICAL
Status: DISCONTINUED | OUTPATIENT
Start: 2020-06-07 | End: 2020-06-09 | Stop reason: HOSPADM

## 2020-06-07 RX ORDER — ASCORBIC ACID 500 MG
500 TABLET ORAL DAILY
Status: DISCONTINUED | OUTPATIENT
Start: 2020-06-07 | End: 2020-06-09 | Stop reason: HOSPADM

## 2020-06-07 RX ADMIN — CARVEDILOL 3.12 MG: 3.12 TABLET, FILM COATED ORAL at 09:07

## 2020-06-07 RX ADMIN — SPIRONOLACTONE 25 MG: 25 TABLET ORAL at 09:06

## 2020-06-07 RX ADMIN — LISINOPRIL 2.5 MG: 5 TABLET ORAL at 09:07

## 2020-06-07 RX ADMIN — LISINOPRIL 2.5 MG: 5 TABLET ORAL at 17:32

## 2020-06-07 RX ADMIN — ENOXAPARIN SODIUM 40 MG: 40 INJECTION SUBCUTANEOUS at 09:06

## 2020-06-07 RX ADMIN — Medication 10 ML: at 05:44

## 2020-06-07 RX ADMIN — CEFTRIAXONE SODIUM 2 G: 2 INJECTION, POWDER, FOR SOLUTION INTRAMUSCULAR; INTRAVENOUS at 13:20

## 2020-06-07 RX ADMIN — FERROUS SULFATE TAB 325 MG (65 MG ELEMENTAL FE) 325 MG: 325 (65 FE) TAB at 09:06

## 2020-06-07 RX ADMIN — CARVEDILOL 3.12 MG: 3.12 TABLET, FILM COATED ORAL at 17:32

## 2020-06-07 RX ADMIN — ASPIRIN 81 MG 81 MG: 81 TABLET ORAL at 09:07

## 2020-06-07 RX ADMIN — ESCITALOPRAM OXALATE 10 MG: 10 TABLET ORAL at 09:06

## 2020-06-07 RX ADMIN — Medication 10 ML: at 22:11

## 2020-06-07 RX ADMIN — ATORVASTATIN CALCIUM 20 MG: 20 TABLET, FILM COATED ORAL at 22:11

## 2020-06-07 RX ADMIN — Medication 10 ML: at 13:20

## 2020-06-07 RX ADMIN — OXYCODONE HYDROCHLORIDE AND ACETAMINOPHEN 500 MG: 500 TABLET ORAL at 09:06

## 2020-06-07 NOTE — ROUTINE PROCESS
Bedside and Verbal shift change report given to self (oncoming nurse) by Tamera Ng RN (offgoing nurse). Report included the following information SBAR, Kardex, Intake/Output, MAR and Recent Results. forearm pain/injury

## 2020-06-07 NOTE — PROGRESS NOTES
[facility] CARDIOLOGY PROGRESS NOTE           6/7/2020 7:43 AM    Admit Date: 5/16/2020      Subjective:   No cp or sob    ROS:  Cardiovascular:  As noted above    Objective:      Vitals:    06/06/20 1656 06/06/20 2105 06/07/20 0103 06/07/20 0437   BP: 114/72 116/69 104/63 95/58   Pulse: 91 83 75 74   Resp: 18 16 19 16   Temp: 98.2 °F (36.8 °C) 98.5 °F (36.9 °C) 97.9 °F (36.6 °C) 98.4 °F (36.9 °C)   SpO2: 96% 97% 97% 95%   Weight:    63.7 kg (140 lb 8 oz)   Height:           Physical Exam:  General-No Acute Distress  Neck- supple, no JVD  CV- regular rate and rhythm no gallop  Lung- clear bilaterally  Abd- soft, nontender, nondistended  Ext- no edema bilaterally. Skin- warm and dry    Data Review:   No results for input(s): NA, K, MG, BUN, CREA, GLU, WBC, HGB, HCT, PLT, INR, TROIQ, CHOL, LDLC, HDL, HGBEXT, HCTEXT, PLTEXT, INREXT in the last 72 hours. No lab exists for component: TROIP, TGL, HDLC, TRP    Assessment/Plan:     1) Strep. mutans bAVR endocarditis  2) S/p embolic obtuse marginal branch of the left circumflex occlusion due to #1  3) Cardiomyopathy EF 40% 5/17 due to #1 and #2  4) Diabetic  5) Poor dentition, s/p extraction 6/4    ////    Dec feso4 q d  Add vit c  Await cvs    Chica Elkins MD  6/7/2020 7:43 AM

## 2020-06-07 NOTE — PROGRESS NOTES
Patient's urine has turned to a green/brown color. Dr. Ric Clancy made aware. No new orders at this time.

## 2020-06-07 NOTE — ROUTINE PROCESS
Bedside and Verbal shift change report given to Sarika Flores RN (oncoming nurse) by self Jocy ricketts). Report included the following information SBAR, Kardex, Procedure Summary and MAR.

## 2020-06-08 LAB
ALBUMIN SERPL-MCNC: 2 G/DL (ref 3.2–4.6)
ALBUMIN/GLOB SERPL: 0.5 {RATIO} (ref 1.2–3.5)
ALP SERPL-CCNC: 95 U/L (ref 50–136)
ALT SERPL-CCNC: 38 U/L (ref 12–65)
ANION GAP SERPL CALC-SCNC: 4 MMOL/L (ref 7–16)
AST SERPL-CCNC: 29 U/L (ref 15–37)
BASOPHILS # BLD: 0 K/UL (ref 0–0.2)
BASOPHILS NFR BLD: 0 % (ref 0–2)
BILIRUB SERPL-MCNC: 0.6 MG/DL (ref 0.2–1.1)
BUN SERPL-MCNC: 16 MG/DL (ref 8–23)
CALCIUM SERPL-MCNC: 8 MG/DL (ref 8.3–10.4)
CHLORIDE SERPL-SCNC: 106 MMOL/L (ref 98–107)
CO2 SERPL-SCNC: 29 MMOL/L (ref 21–32)
CREAT SERPL-MCNC: 1.11 MG/DL (ref 0.8–1.5)
DIFFERENTIAL METHOD BLD: ABNORMAL
EOSINOPHIL # BLD: 0.1 K/UL (ref 0–0.8)
EOSINOPHIL NFR BLD: 1 % (ref 0.5–7.8)
ERYTHROCYTE [DISTWIDTH] IN BLOOD BY AUTOMATED COUNT: 18.6 % (ref 11.9–14.6)
GLOBULIN SER CALC-MCNC: 4.3 G/DL (ref 2.3–3.5)
GLUCOSE BLD STRIP.AUTO-MCNC: 106 MG/DL (ref 65–100)
GLUCOSE BLD STRIP.AUTO-MCNC: 125 MG/DL (ref 65–100)
GLUCOSE BLD STRIP.AUTO-MCNC: 129 MG/DL (ref 65–100)
GLUCOSE BLD STRIP.AUTO-MCNC: 134 MG/DL (ref 65–100)
GLUCOSE SERPL-MCNC: 100 MG/DL (ref 65–100)
HCT VFR BLD AUTO: 27.5 % (ref 41.1–50.3)
HGB BLD-MCNC: 8.3 G/DL (ref 13.6–17.2)
IMM GRANULOCYTES # BLD AUTO: 0.1 K/UL (ref 0–0.5)
IMM GRANULOCYTES NFR BLD AUTO: 1 % (ref 0–5)
LYMPHOCYTES # BLD: 1.2 K/UL (ref 0.5–4.6)
LYMPHOCYTES NFR BLD: 13 % (ref 13–44)
MCH RBC QN AUTO: 26.7 PG (ref 26.1–32.9)
MCHC RBC AUTO-ENTMCNC: 30.2 G/DL (ref 31.4–35)
MCV RBC AUTO: 88.4 FL (ref 79.6–97.8)
MONOCYTES # BLD: 1 K/UL (ref 0.1–1.3)
MONOCYTES NFR BLD: 10 % (ref 4–12)
NEUTS SEG # BLD: 7.2 K/UL (ref 1.7–8.2)
NEUTS SEG NFR BLD: 75 % (ref 43–78)
NRBC # BLD: 0 K/UL (ref 0–0.2)
PLATELET # BLD AUTO: 160 K/UL (ref 150–450)
PLATELET COMMENTS,PCOM: ADEQUATE
PMV BLD AUTO: 9.5 FL (ref 9.4–12.3)
POTASSIUM SERPL-SCNC: 4 MMOL/L (ref 3.5–5.1)
PROT SERPL-MCNC: 6.3 G/DL (ref 6.3–8.2)
RBC # BLD AUTO: 3.11 M/UL (ref 4.23–5.6)
RBC MORPH BLD: ABNORMAL
SODIUM SERPL-SCNC: 139 MMOL/L (ref 136–145)
WBC # BLD AUTO: 9.6 K/UL (ref 4.3–11.1)
WBC MORPH BLD: ABNORMAL

## 2020-06-08 PROCEDURE — 74011250637 HC RX REV CODE- 250/637: Performed by: DENTIST

## 2020-06-08 PROCEDURE — 74011250637 HC RX REV CODE- 250/637: Performed by: INTERNAL MEDICINE

## 2020-06-08 PROCEDURE — 65660000000 HC RM CCU STEPDOWN

## 2020-06-08 PROCEDURE — 80053 COMPREHEN METABOLIC PANEL: CPT

## 2020-06-08 PROCEDURE — C8929 TTE W OR WO FOL WCON,DOPPLER: HCPCS

## 2020-06-08 PROCEDURE — 82962 GLUCOSE BLOOD TEST: CPT

## 2020-06-08 PROCEDURE — 74011250636 HC RX REV CODE- 250/636: Performed by: DENTIST

## 2020-06-08 PROCEDURE — 74011000250 HC RX REV CODE- 250: Performed by: INTERNAL MEDICINE

## 2020-06-08 PROCEDURE — 74011250636 HC RX REV CODE- 250/636: Performed by: INTERNAL MEDICINE

## 2020-06-08 PROCEDURE — 74011000258 HC RX REV CODE- 258: Performed by: DENTIST

## 2020-06-08 PROCEDURE — 85025 COMPLETE CBC W/AUTO DIFF WBC: CPT

## 2020-06-08 RX ADMIN — FERROUS SULFATE TAB 325 MG (65 MG ELEMENTAL FE) 325 MG: 325 (65 FE) TAB at 08:00

## 2020-06-08 RX ADMIN — Medication 10 ML: at 21:14

## 2020-06-08 RX ADMIN — OXYCODONE HYDROCHLORIDE AND ACETAMINOPHEN 500 MG: 500 TABLET ORAL at 08:00

## 2020-06-08 RX ADMIN — PERFLUTREN 1 ML: 6.52 INJECTION, SUSPENSION INTRAVENOUS at 09:55

## 2020-06-08 RX ADMIN — LISINOPRIL 2.5 MG: 5 TABLET ORAL at 08:00

## 2020-06-08 RX ADMIN — ENOXAPARIN SODIUM 40 MG: 40 INJECTION SUBCUTANEOUS at 08:02

## 2020-06-08 RX ADMIN — CARVEDILOL 3.12 MG: 3.12 TABLET, FILM COATED ORAL at 08:00

## 2020-06-08 RX ADMIN — ESCITALOPRAM OXALATE 10 MG: 10 TABLET ORAL at 08:00

## 2020-06-08 RX ADMIN — ASPIRIN 81 MG 81 MG: 81 TABLET ORAL at 08:00

## 2020-06-08 RX ADMIN — Medication 10 ML: at 05:50

## 2020-06-08 RX ADMIN — CARVEDILOL 3.12 MG: 3.12 TABLET, FILM COATED ORAL at 18:13

## 2020-06-08 RX ADMIN — Medication 10 ML: at 08:02

## 2020-06-08 RX ADMIN — SPIRONOLACTONE 25 MG: 25 TABLET ORAL at 08:00

## 2020-06-08 RX ADMIN — LISINOPRIL 2.5 MG: 5 TABLET ORAL at 18:13

## 2020-06-08 RX ADMIN — CEFTRIAXONE SODIUM 2 G: 2 INJECTION, POWDER, FOR SOLUTION INTRAMUSCULAR; INTRAVENOUS at 13:12

## 2020-06-08 RX ADMIN — ATORVASTATIN CALCIUM 20 MG: 20 TABLET, FILM COATED ORAL at 21:14

## 2020-06-08 NOTE — PROGRESS NOTES
[facility] CARDIOLOGY PROGRESS NOTE           6/8/2020 7:30 AM    Admit Date: 5/16/2020      Subjective:   No sob or fever        Objective:      Vitals:    06/07/20 1717 06/07/20 2116 06/08/20 0058 06/08/20 0534   BP: 111/62 115/72 104/59 102/58   Pulse: 80 92 80 66   Resp: 18 18 18 18   Temp: 98.6 °F (37 °C) 98.4 °F (36.9 °C) 98.5 °F (36.9 °C) 97.9 °F (36.6 °C)   SpO2: 97% 97% 96% 96%   Weight:    64.2 kg (141 lb 8 oz)   Height:           Physical Exam:  General-No Acute Distress  Neck- supple, no JVD  CV- regular rate and rhythm, murmur w/o change  Lung- clear bilaterally  Abd- soft, nontender, nondistended  Ext- no edema bilaterally. Skin- warm and dry    Data Review:   Recent Labs     06/08/20  0427 06/08/20  0426     --    K 4.0  --    BUN 16  --    CREA 1.11  --      --    WBC  --  9.6   HGB  --  8.3*   HCT  --  27.5*   PLT  --  160       Assessment/Plan:     1) Strep. mutans bAVR endocarditis  2) S/p embolic obtuse marginal branch of the left circumflex occlusion due to #1  3) Cardiomyopathy EF 40% 5/17 due to #1 and #2  4) Diabetic  5) Poor dentition, s/p extraction 6/4    ////    Await cardiac surgery  Echo today    Ana María Melo MD  6/8/2020 7:30 AM

## 2020-06-08 NOTE — PROGRESS NOTES
Verbal bedside report given to Jacquie Rodriguez, oncoming RN. Patient's situation, background, assessment and recommendations provided. Kardex, Mar, and recent results also reviewed. Opportunity for questions provided. Oncoming RN assumed care of patient.

## 2020-06-08 NOTE — PROGRESS NOTES
Infectious Disease Progress Note    Today's Date: 2020   Admit Date: 2020    Impression:   · Strep mutans prosthetic AV endocarditis (, ), mobile mass on YARON restricting valve leaflets. BC clear , . · Dental caries s/p extraction   · Anemia of acute disease  · Hx AVR   · DM, A1c 7    Plan:   Continue ceftriaxone 2g IV Q 24 hrs, plan for six weeks from repeat surgery. CV Surgery has seen and plans Echo today and AVR later this week    Anti-infectives:   CTX -  Gentamicin -    History/Subjective: Interval History: Afebrile. WBCs 9.6k, hemoglobin 8.3, creatinine 1.11, Denies nausea, vomiting, diarrhea, fevers, chills, sweats. Reports understanding of the plan of care. Review of Systems:  Pertinent items are noted in the History of Present Illness.      Objective:     Visit Vitals  /62   Pulse 81   Temp 98 °F (36.7 °C)   Resp 20   Ht 6' 1\" (1.854 m)   Wt 64.2 kg (141 lb 8 oz)   SpO2 96%   BMI 18.67 kg/m²     Temp (24hrs), Av.3 °F (36.8 °C), Min:97.9 °F (36.6 °C), Max:98.6 °F (37 °C)      Physical Exam:    General: Alert, no acute distress, appears stated age, thin, appears older than stated age  Head:    Normocephalic, atraumatic  Eyes:   Anicteric sclerae, no drainage, not injected, EOMI  Mouth:  Moist mucosa, op clear, missing teeth  Neck:   Supple, symmetrical, trachea midline, no JVD  Lungs:   Clear without increased work of breathing or audible wheezes  CV:   Regular rate and rhythm with 3/6 murmur  Abdomen:  Soft, non tender, not distended, active bowel sounds  Extremities:  No cyanosis or edema  Musculoskeletal: Moves all extremities with equal strength, no deformity  Skin:   No acute rash or lesions, color and texture normal  Psych:   Alert, oriented and appropriate without evidence of thought disorder  Lines:    PIV: benign        Data Review:     CBC:   Recent Labs     20  0426   WBC 9.6   RBC 3.11*   HGB 8.3*   HCT 27.5*      GRANS 75   LYMPH 13   EOS 1     CMP:   Recent Labs     06/08/20 0427         K 4.0      CO2 29   BUN 16   CREA 1.11   CA 8.0*   AGAP 4*   AP 95   TP 6.3   ALB 2.0*   GLOB 4.3*   AGRAT 0.5*     Liver Enzymes:   Recent Labs     06/08/20 0427   TP 6.3   ALB 2.0*   AP 95     Antibiotic Monitoring:   Lab Results   Component Value Date/Time    Vancomycin,trough 11.8 05/22/2020 12:58 AM        Microbiology:     All Micro Results     Procedure Component Value Units Date/Time    CULTURE, BLOOD [[id number]] Collected:  05/29/20 0412    Order Status:  Completed Specimen:  Blood Updated:  06/03/20 0938     Special Requests: --        RIGHT  Antecubital       Culture result: NO GROWTH 5 DAYS       CULTURE, BLOOD [[id number]] Collected:  05/29/20 0405    Order Status:  Completed Specimen:  Blood Updated:  06/03/20 0938     Special Requests: --        LEFT  Antecubital       Culture result: NO GROWTH 5 DAYS       CULTURE, BLOOD [[id number]] Collected:  05/24/20 1321    Order Status:  Completed Specimen:  Blood Updated:  05/29/20 0723     Special Requests: --        LEFT  Antecubital       Culture result: NO GROWTH 5 DAYS       CULTURE, BLOOD [[id number]]  (Abnormal)  (Susceptibility) Collected:  05/20/20 1424    Order Status:  Completed Specimen:  Blood Updated:  05/26/20 0834     Special Requests: --        RIGHT  FOREARM       GRAM STAIN GRAM POSITIVE COCCI         PEDIATRIC BOTTLE               RESULTS VERIFIED, PHONED TO AND READ BACK BY Blanca Yu AT [facility] ON 5.24.20             Culture result: STREPTOCOCCUS MUTANS         REFER TO [address] PANEL [address] [id number]    CULTURE, BLOOD [[id number]] Collected:  05/20/20 1434    Order Status:  Completed Specimen:  Blood Updated:  05/25/20 0633     Special Requests: --        LEFT  Antecubital       Culture result: NO GROWTH 5 DAYS       BLOOD CULTURE ID PANEL [[id number]]  (Abnormal) Collected:  05/24/20 0105    Order Status:  Completed Specimen:  Blood Updated:  05/24/20 7303     Acc. no. from Micro Order [id number]     Streptococcus Detected        Comment: RESULTS VERIFIED, PHONED TO AND READ BACK BY  Mariama Jalloh RN @0490 ON 5/24/20 AK. INTERPRETATION       Gram positive cocci. Identified by realtime PCR as Streptococcus species (not agalactiae, pyogenes, or pneumoniae). Comment: Recommend discontinuing IV vancomycin starting cefazolin or nafcillin if patient not on beta-lactam therapy. Infectious Diseases Consult recommended in adult patients. THIS TEST DOES NOT REPLACE SENSITIVITY TESTING. CULTURE, BLOOD [[id number]]  (Abnormal) Collected:  05/18/20 1944    Order Status:  Completed Specimen:  Blood Updated:  05/23/20 0720     Special Requests: --        RIGHT  HAND       GRAM STAIN GRAM POSITIVE COCCI         PEDIATRIC BOTTLE               CRITICAL RESULT NOT CALLED DUE TO PREVIOUS NOTIFICATION OF CRITICAL RESULT WITHIN THE LAST 24 HOURS. Culture result:       ALPHA STREPTOCOCCUS, NOT S.  PNEUMONIAE            FOR ID AND SUSCEPTIBILITY REFER TO CULTURE NO ACC [id number]    CULTURE, BLOOD [[id number]]  (Abnormal)  (Susceptibility) Collected:  05/18/20 1938    Order Status:  Completed Specimen:  Blood Updated:  05/23/20 0703     Special Requests: --        RIGHT  ARM       GRAM STAIN GRAM POS COCCI IN CHAINS         AEROBIC BOTTLE POSITIVE               RESULTS VERIFIED, PHONED TO AND READ BACK BY Devyn Syed RN @ 9942 ON 5/20/20 BY AMM           Culture result: STREPTOCOCCUS MUTANS         REFER TO BIOFIRE PANEL ACCESSION [id number]    CULTURE, BLOOD [[id number]]     Order Status:  Canceled Specimen:  Blood     CULTURE, BLOOD [[id number]]     Order Status:  Canceled Specimen:  Blood     BLOOD CULTURE ID PANEL [[id number]]  (Abnormal) Collected:  05/18/20 1938    Order Status:  Completed Specimen:  Blood Updated:  05/20/20 1117     Acc. no. from Micro Order [id number]     Streptococcus Detected        Comment: RESULTS VERIFIED, PHONED TO AND READ BACK BY  Jocy Warner, RN @ 1120 ON 5/20/20 BY AMM          INTERPRETATION       Gram positive cocci. Identified by realtime PCR as Streptococcus species (not agalactiae, pyogenes, or pneumoniae). Comment: Consider discontinuation of IV vancomycin and using an anti-streptococcal beta-lactam (ceftriaxone/cefotaxime (peds))       EMERGENT DISEASE PANEL [[id number]] Collected:  05/16/20 1946    Order Status:  Completed Specimen:  Not Specified Updated:  05/18/20 1614     Emergent disease panel Not detected        Comment: Performed at [facility] [address] [facility]               Imaging:     TTE 5/28  SUMMARY:     -  Left ventricle: Since last study, LV had dilated more and the function has  declined. The ventricle was moderately to markedly dilated. Systolic function  was markedly reduced. Ejection fraction was estimated in the range of 30 % to  35 %.    -  Aortic valve: A 25mm Magna Ease bioprosthetic AVR with mobile mass on LVOT  side of the prosthesis as well as thickening / restriction of the leaflets. Findings consistent with valvular vegetation.     CT Chest soft tissue neck  IMPRESSION:  1. Partially edentulous patient with periapical lucencies associated with many  of the remaining teeth. No definite odontogenic abscess present. No abscess  within the deep spaces of the neck. 2.  Interval development of a moderate right and small left pleural effusion. No  consolidation or cavitary lesion of the lungs. 3.  Changes of prior aortic valve replacement. Presumed post stenotic aneurysmal  dilatation of the ascending aorta.     Signed By: Enedina Sutton NP     June 8, 2020

## 2020-06-08 NOTE — PROGRESS NOTES
Verbal bedside report received from Rayo Mauro, [address] [place] [address]. Patient's situation, background, assessment and recommendations were provided. Kardex, Mar, and recent results also reviewed. Opportunity for questions provided. Assumed care of patient.

## 2020-06-08 NOTE — PROGRESS NOTES
Hospitalist Progress Note    Patient: Tiffanie Leung MRN: [id number]  SSN: [id number]    YOB: 1960  Age: 62 y.o. 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 He had dental extraction on 6/4/20. He is going to have an AVR replacement when it can be scheduled. 6/8 - He feels good. About to get repeat ECHO. Has been walking hallways. Denies CP/SOB. Denies F/C/N/V. Review of systems negative except stated above. Objective:     Visit Vitals  /62   Pulse 81   Temp 98 °F (36.7 °C)   Resp 20   Ht 6' 1\" (1.854 m)   Wt 64.2 kg (141 lb 8 oz)   SpO2 96%   BMI 18.67 kg/m²      Oxygen Therapy  O2 Sat (%): 96 % (06/08/20 0826)  Pulse via Oximetry: 74 beats per minute (06/04/20 2034)  O2 Device: Room air (06/08/20 0826)  O2 Flow Rate (L/min): 2 l/min (06/04/20 1640)  FIO2 (%): 21 % (06/04/20 2034)      Intake and Output:     Intake/Output Summary (Last 24 hours) at 6/8/2020 0957  Last data filed at 6/8/2020 0058  Gross per 24 hour   Intake 1320 ml   Output 900 ml   Net 420 ml         Physical Exam:   GENERAL: alert, cooperative, no distress, appears stated age  EYE: conjunctivae/corneas clear. PERRL. THROAT & NECK: normal and no erythema or exudates noted. LUNG: clear to auscultation bilaterally  HEART: regular rate and rhythm, S1S2, no murmur, no JVD  ABDOMEN: soft, non-tender, non-distended. Bowel sounds normal.   EXTREMITIES:  No edema, 2+ pedal/radial pulses bilaterally  SKIN: no rash or abnormalities  NEUROLOGIC: A&Ox3. Cranial nerves 2-12 grossly intact.     Lab/Data Review:  Recent Results (from the past 24 hour(s))   GLUCOSE, POC    Collection Time: 06/07/20 11:34 AM   Result Value Ref Range    Glucose (POC) 139 (H) 65 - 100 mg/dL   GLUCOSE, POC    Collection Time: 06/07/20  4:33 PM   Result Value Ref Range Glucose (POC) 94 65 - 100 mg/dL   GLUCOSE, POC    Collection Time: 06/07/20  9:31 PM   Result Value Ref Range    Glucose (POC) 130 (H) 65 - 100 mg/dL   CBC WITH AUTOMATED DIFF    Collection Time: 06/08/20  4:26 AM   Result Value Ref Range    WBC 9.6 4.3 - 11.1 K/uL    RBC 3.11 (L) 4.23 - 5.6 M/uL    HGB 8.3 (L) 13.6 - 17.2 g/dL    HCT 27.5 (L) 41.1 - 50.3 %    MCV 88.4 79.6 - 97.8 FL    MCH 26.7 26.1 - 32.9 PG    MCHC 30.2 (L) 31.4 - 35.0 g/dL    RDW 18.6 (H) 11.9 - 14.6 %    PLATELET 513 004 - 149 K/uL    MPV 9.5 9.4 - 12.3 FL    ABSOLUTE NRBC 0.00 0.0 - 0.2 K/uL    NEUTROPHILS 75 43 - 78 %    LYMPHOCYTES 13 13 - 44 %    MONOCYTES 10 4.0 - 12.0 %    EOSINOPHILS 1 0.5 - 7.8 %    BASOPHILS 0 0.0 - 2.0 %    IMMATURE GRANULOCYTES 1 0.0 - 5.0 %    ABS. NEUTROPHILS 7.2 1.7 - 8.2 K/UL    ABS. LYMPHOCYTES 1.2 0.5 - 4.6 K/UL    ABS. MONOCYTES 1.0 0.1 - 1.3 K/UL    ABS. EOSINOPHILS 0.1 0.0 - 0.8 K/UL    ABS. BASOPHILS 0.0 0.0 - 0.2 K/UL    ABS. IMM. GRANS. 0.1 0.0 - 0.5 K/UL    RBC COMMENTS SLIGHT  ANISOCYTOSIS + POIKILOCYTOSIS        WBC COMMENTS Result Confirmed By Smear      PLATELET COMMENTS ADEQUATE      DF AUTOMATED     METABOLIC PANEL, COMPREHENSIVE    Collection Time: 06/08/20  4:27 AM   Result Value Ref Range    Sodium 139 136 - 145 mmol/L    Potassium 4.0 3.5 - 5.1 mmol/L    Chloride 106 98 - 107 mmol/L    CO2 29 21 - 32 mmol/L    Anion gap 4 (L) 7 - 16 mmol/L    Glucose 100 65 - 100 mg/dL    BUN 16 8 - 23 MG/DL    Creatinine 1.11 0.8 - 1.5 MG/DL    GFR est AA >60 >60 ml/min/1.73m2    GFR est non-AA >60 >60 ml/min/1.73m2    Calcium 8.0 (L) 8.3 - 10.4 MG/DL    Bilirubin, total 0.6 0.2 - 1.1 MG/DL    ALT (SGPT) 38 12 - 65 U/L    AST (SGOT) 29 15 - 37 U/L    Alk.  phosphatase 95 50 - 136 U/L    Protein, total 6.3 6.3 - 8.2 g/dL    Albumin 2.0 (L) 3.2 - 4.6 g/dL    Globulin 4.3 (H) 2.3 - 3.5 g/dL    A-G Ratio 0.5 (L) 1.2 - 3.5     GLUCOSE, POC    Collection Time: 06/08/20  6:21 AM   Result Value Ref Range    Glucose (POC) 106 (H) 65 - 100 mg/dL       Imaging:  Ct Chest W Cont    Result Date: 5/16/2020  CT Chest with contrast INDICATION: Chest pain, fever and elevated d-dimer. Elevated troponin. Question PE. Covid Rule out COMPARISON: Chest x-ray earlier today TECHNIQUE: Contiguous axial images were obtained from the neck base through the upper abdomen with intravenous contrast, 100 mL Isovue 370. Radiation dose reduction techniques were used for this study:  Our CT scanners use one or all of the following: Automated exposure control, adjustment of the mA and/or kVp according to patient's size, iterative reconstruction. FINDINGS: There is no pulmonary embolus. The heart is mildly enlarged. There is no pericardial effusion. There are changes of prior sternotomy. Prosthetic aortic valve is present. There is mild coronary artery calcification. There is slight ectasia of the ascending thoracic aorta. Negative for thoracic aortic dissection. There is no evidence of lymphadenopathy. There is no endobronchial lesion. There is minimal dependent subsegmental atelectasis. There is no focal pulmonary consolidation, pleural effusion or pneumothorax. No pulmonary edema. Included upper abdomen demonstrates micronodular contour of the liver, suggestive of cirrhosis. Spleen is enlarged. Surrounding bones are intact. IMPRESSION: 1. Negative for pulmonary embolism. 2. No acute pulmonary disease. Negative for pneumonia, pulmonary edema or pleural effusion. 3. Mild coronary artery calcification    Xr Chest Port    Result Date: 5/16/2020  Portable chest xray  COMPARISON: May 2, 2018 CLINICAL HISTORY: Chest pain, fever. FINDINGS: Heart appears mildly enlarged. Mediastinal contour is within normal limits. Stable postsurgical changes of sternotomy. There is no focal pulmonary consolidation, pneumothorax or pulmonary edema. No large pleural effusion. Minimal subsegmental atelectasis at the right lung base. Surrounding bones are stable. IMPRESSION: 1. No pulmonary consolidation or pulmonary edema. Ct Chest Soft Tissue Neck W Cont    Result Date: 5/27/2020  EXAM: CT NECK AND CHEST WITH CONTRAST INDICATION: S. mutans endocarditis with persistent bacteremia; evaluation for an abscess which might explain bacteremia. . COMPARISON: Chest CT 5/16/2020 TECHNIQUE:  CT imaging was performed of the neck and chest after intravenous injection of 100 mL Isovue 370. Intravenous contrast was used for better evaluation of solid organs and vascular structures. Coronal reformatted imaging provided. Radiation dose reduction techniques were used for this study. Our CT scanners use one or all of the following: Automated exposure control, adjustment of the mA and/or kV according to patient size, iterative reconstruction. FINDINGS: NECK: The bilateral bony orbits and intraorbital structures are unremarkable. The nasopharynx, oropharynx, epiglottis, laryngeal vestibule, hypopharynx, and larynx are within normal limits. The deep spaces of the neck are unremarkable. No fluid collection or phlegmon is clearly identified. The patient is partially edentulous. Periapical lucencies associated with many of the abdomen remaining teeth. No definite odontogenic abscess is present. No cervical adenopathy. The carotid arteries and jugular veins are within normal limits. Visualized intracranial structures appear normal. CHEST: Mediastinum and visualized thyroid: Normal. Heart: Aortic valve prosthesis noted. There is no pericardial effusion. There is mild global clinically. Multivessel coronary atherosclerotic calcifications. Large Vessels: Aneurysmal dilatation of the ascending aorta measuring 4.1 cm AP. Pleura: Moderate right and small left pleural effusions, new compared to prior. Lungs: No consolidation or cavitary lesion evident. Airways: Normal. Lymph nodes: Mediastinal lymph nodes are mildly prominent in number but not pathologically enlarged.  Bones/Soft tissues: No aggressive osseous lesion. Visualized abdomen: Normal.     IMPRESSION: 1. Partially edentulous patient with periapical lucencies associated with many of the remaining teeth. No definite odontogenic abscess present. No abscess within the deep spaces of the neck. 2.  Interval development of a moderate right and small left pleural effusion. No consolidation or cavitary lesion of the lungs. 3.  Changes of prior aortic valve replacement. Presumed post stenotic aneurysmal dilatation of the ascending aorta. Results for orders placed or performed during the hospital encounter of 20   2D ECHO COMPLETE ADULT (TTE) W OR [address] [facility]  One [address] [facility], [address] [facility]  [phone]    Transthoracic Echocardiogram  2D, M-mode, Doppler, and Color Doppler    Patient: Carlos Garduno  MR #: [id number]  : [id number]  Age: 62 years  Gender: Male  Study date: 19-May-2020  Account #: [de-identified]  Height: 73 in  Weight: 144.8 lb  BSA: 1.88 mï¾²  Status:Routine  Location: 310  BP: 113/ 68    Allergies: NO KNOWN ALLERGENS    Sonographer:  Vinod Villanueva RDCS  Group:  [facility]  Referring Physician:  Wilder Vora MD  Reading Physician:  DR. THONY TALLEY DO    INDICATIONS: Cardiomyopathy; Chest pain, chronic, high probability of CAD. PROCEDURE: This was a routine study. A transthoracic echocardiogram was  performed. The study included complete 2D imaging, M-mode, complete spectral  Doppler, and color Doppler. Intravenous contrast (Definity) was administered. Image quality was adequate. LEFT VENTRICLE: The ventricle was mildly dilated. Systolic function was  moderately reduced. Ejection fraction was estimated in the range of 40 % to   45  %. There was moderate diffuse hypokinesis. There was severe hypokinesis of   the  basal-mid anterolateral wall(s). There was hypokinesis of the basal-mid  inferolateral wall(s).  Wall thickness was normal. The study was not   technically  sufficient to allow evaluation of LV diastolic function. RIGHT VENTRICLE: The ventricle was mildly dilated. Systolic function was   mildly  reduced. Estimated peak pressure was in the range of 45-50 mmHg. LEFT ATRIUM: The atrium was markedly dilated. RIGHT ATRIUM: The atrium was moderately to markedly dilated. SYSTEMIC VEINS: IVC: The inferior vena cava was mildly dilated. The  respirophasic change in diameter was less than 50%. AORTIC VALVE: A 25mm Magna Ease (4/18) in place. There is a moderate sized  mobile mass on the LVOT side of the aortic valve prosthesis. Appearance could  be consistent with prosthetic valve vegetation. The peak velocity was 3.79   m/s. The mean pressure gradient was 33 mmHg. The peak pressure gradient was 58   mmHg. DI:(0.17), SVi: (20.7), AT: (90ms). There was moderate stenosis. There   appeared  to be a vegetation or echogenic structure noted on the non coronary cusp of   the  AVR. Color and CW doppler was assessed, although no evidence of insufficiency  was noted. MITRAL VALVE: Diastolic mitral regurgitation noted. May be consequence of   first  degree AV block. There was mild annular calcification. There was no evidence  for vegetation. Transmitral velocity was minimally increased. There was no  evidence for stenosis. There was mild to moderate regurgitation. TRICUSPID VALVE: The valve structure was normal. There was no evidence for  vegetation. There was no evidence for stenosis. There was mild to moderate  regurgitation. PULMONIC VALVE: The valve structure was normal. There was no evidence for  stenosis. There was no insufficiency. PERICARDIUM: There was no pericardial effusion. AORTA: The root exhibited normal size. There was dilatation of the ascending  aorta (41 mm). SUMMARY:    -  Left ventricle: The ventricle was mildly dilated. Systolic function was  moderately reduced.  Ejection fraction was estimated in the range of 40 % to   45  %. There was moderate diffuse hypokinesis. There was severe hypokinesis of   the  basal-mid anterolateral wall(s). There was hypokinesis of the basal-mid  inferolateral wall(s). -  Right ventricle: The ventricle was mildly dilated. Systolic function was  mildly reduced. -  Left atrium: The atrium was markedly dilated. -  Aortic valve: A 25mm Magna Ease () in place. There is a moderate sized  mobile mass on the LVOT side of the aortic valve prosthesis. Appearance could  be consistent with prosthetic valve vegetation. There was moderate stenosis. The mean pressure gradient was 33 mmHg. -  Mitral valve: Diastolic mitral regurgitation noted. May be consequence of  first degree AV block. There was mild annular calcification. There was mild   to  moderate regurgitation. There was no evidence for vegetation.    -  Tricuspid valve: There was mild to moderate regurgitation.    -  Aorta, systemic arteries: There was dilatation of the ascending aorta (41  mm). -  Additional impressions: Findings discussed and images reviewed with Dr Sweta Reyes at time of dictation. SYSTEM MEASUREMENT TABLES    2D mode  Left Atrium Systolic Volume Index; Method of Disks, Biplane; 2D mode;: 56.4  ml/m2  IVS/LVPW (2D): 1  IVSd (2D): 0.9 cm  LVIDd (2D): 5.5 cm  LVIDs (2D): 4.4 cm  LVOT Area (2D): 3.5 cm2  LVPWd (2D): 0.9 cm    Unspecified Scan Mode  Peak Grad; Mean; Antegrade Flow: 49 mm[Hg]  Vmax; Antegrade Flow: 379 cm/s  LVOT Diam: 2.1 cm  Peak Grad; Mean; Antegrade Flow: 11 mm[Hg]  Vmax; Antegrade Flow: 165 cm/s    Prepared and signed by    DR. Nancie Marr, DO  Signed 19-May-2020 10:44:17     2D ECHO LIMTED ADULT W OR WO CONTR    Narrative    [place]  One [address] [place] [address] Dr Cabral, [facility] [address] [facility]  [phone]    Transthoracic Echocardiogram  2D and Doppler    Patient: Bo Hines  MR #: [id number]  : 1960  Age: 62 years  Gender: Male  Study date: 28-May-2020  Account #: [de-identified]  Height: 72 in  Weight: 156.6 lb  BSA: 1.92 mï¾²  Status:Routine  Location: 310  BP: 117/ 76    Allergies: NO KNOWN ALLERGENS    Sonographer:  Zoya Delcid RDCS  Group:  [facility]  Referring Physician:  Hermann Costa MD [facility] [place], [place]  Reading Physician:  Hermann Costa MD [facility] [place], [place]    INDICATIONS: FU bAVR endocarditis. PROCEDURE: This was a routine study. A transthoracic echocardiogram was  performed. The study included limited 2D imaging and limited spectral   Doppler. Image quality was adequate. LEFT VENTRICLE: Since last study, LV had dilated more and the function has  declined. The ventricle was moderately to markedly dilated. Systolic function  was markedly reduced. Ejection fraction was estimated in the range of 30 % to  35 %. AORTIC VALVE: A 25mm Magna Ease bioprosthetic AVR with mobile mass on LVOT   side  of the prosthesis as well as thickening / restriction of the leaflets. Findings  consistent with valvular vegetation. SUMMARY:    -  Left ventricle: Since last study, LV had dilated more and the function has  declined. The ventricle was moderately to markedly dilated. Systolic function  was markedly reduced. Ejection fraction was estimated in the range of 30 % to  35 %. -  Aortic valve: A 25mm Magna Ease bioprosthetic AVR with mobile mass on LVOT  side of the prosthesis as well as thickening / restriction of the leaflets. Findings consistent with valvular vegetation. Prepared and signed by    Hermann Costa MD [facility] [place], [place]  Signed [id number] 16:50:28         Cultures:   All Micro Results     Procedure Component Value Units Date/Time    CULTURE, BLOOD [[id number]] Collected:  05/29/20 0412    Order Status:  Completed Specimen:  Blood Updated:  06/03/20 0938     Special Requests: --        RIGHT  Antecubital       Culture result: NO GROWTH 5 DAYS       CULTURE, BLOOD [[id number]] Collected:  05/29/20 0405    Order Status:  Completed Specimen:  Blood Updated:  06/03/20 2507     Special Requests: --        LEFT  Antecubital       Culture result: NO GROWTH 5 DAYS       CULTURE, BLOOD [[id number]] Collected:  05/24/20 1321    Order Status:  Completed Specimen:  Blood Updated:  05/29/20 0723     Special Requests: --        LEFT  Antecubital       Culture result: NO GROWTH 5 DAYS       CULTURE, BLOOD [[id number]]  (Abnormal)  (Susceptibility) Collected:  05/20/20 1424    Order Status:  Completed Specimen:  Blood Updated:  05/26/20 0834     Special Requests: --        RIGHT  FOREARM       GRAM STAIN GRAM POSITIVE COCCI         PEDIATRIC BOTTLE               RESULTS VERIFIED, PHONED TO AND READ BACK BY Ignacio Crabtree AT [facility] ON 5.24.20             Culture result: STREPTOCOCCUS MUTANS         REFER TO BIOFIRE PANEL [address] [id number]    CULTURE, BLOOD [[id number]] Collected:  05/20/20 1434    Order Status:  Completed Specimen:  Blood Updated:  05/25/20 0633     Special Requests: --        LEFT  Antecubital       Culture result: NO GROWTH 5 DAYS       BLOOD CULTURE ID PANEL [[id number]]  (Abnormal) Collected:  05/24/20 0105    Order Status:  Completed Specimen:  Blood Updated:  05/24/20 0647     Acc. no. from Micro Order [id number]     Streptococcus Detected        Comment: RESULTS VERIFIED, PHONED TO AND READ BACK BY  Ignacio Crabtree RN @1187 ON 5/24/20 AK. INTERPRETATION       Gram positive cocci. Identified by realtime PCR as Streptococcus species (not agalactiae, pyogenes, or pneumoniae). Comment: Recommend discontinuing IV vancomycin starting cefazolin or nafcillin if patient not on beta-lactam therapy. Infectious Diseases Consult recommended in adult patients. THIS TEST DOES NOT REPLACE SENSITIVITY TESTING.        CULTURE, BLOOD [[id number]]  (Abnormal) Collected:  05/18/20 1944    Order Status:  Completed Specimen:  Blood Updated:  05/23/20 0720     Special Requests: --        RIGHT  HAND       GRAM STAIN GRAM POSITIVE COCCI         PEDIATRIC BOTTLE               CRITICAL RESULT NOT CALLED DUE TO PREVIOUS NOTIFICATION OF CRITICAL RESULT WITHIN THE LAST 24 HOURS. Culture result:       ALPHA STREPTOCOCCUS, NOT S. PNEUMONIAE            FOR ID AND SUSCEPTIBILITY REFER TO CULTURE NO ACC [id number]    CULTURE, BLOOD [[id number]]  (Abnormal)  (Susceptibility) Collected:  05/18/20 1938    Order Status:  Completed Specimen:  Blood Updated:  05/23/20 0703     Special Requests: --        RIGHT  ARM       GRAM STAIN GRAM POS COCCI IN CHAINS         AEROBIC BOTTLE POSITIVE               RESULTS VERIFIED, PHONED TO AND READ BACK BY Mateo Graff RN @ 1120 ON 5/20/20 BY AMM           Culture result: STREPTOCOCCUS MUTANS         REFER TO [employer] PANEL ACCESSION [id number]    CULTURE, BLOOD [[id number]]     Order Status:  Canceled Specimen:  Blood     CULTURE, BLOOD [[id number]]     Order Status:  Canceled Specimen:  Blood     BLOOD CULTURE ID PANEL [[id number]]  (Abnormal) Collected:  05/18/20 1938    Order Status:  Completed Specimen:  Blood Updated:  05/20/20 1117     Acc. no. from Micro Order [id number]     Streptococcus Detected        Comment: RESULTS VERIFIED, PHONED TO AND READ BACK BY  Mateo Graff RN @ 1120 ON 5/20/20 BY [facility]          INTERPRETATION       Gram positive cocci. Identified by realtime PCR as Streptococcus species (not agalactiae, pyogenes, or pneumoniae).            Comment: Consider discontinuation of IV vancomycin and using an anti-streptococcal beta-lactam (ceftriaxone/cefotaxime (peds))       EMERGENT DISEASE PANEL [[id number]] Collected:  05/16/20 1946    Order Status:  Completed Specimen:  Not Specified Updated:  05/18/20 1614     Emergent disease panel Not detected        Comment: Performed at [place] [address] [address] [facility]               Assessment/Plan:     Principal Problem:    Endocarditis of prosthetic valve ([facility] [address] (6/2/2020)  - Mobile mass seen on ECHO  - Repeat ECHO on 6/8  - Blood cultures positive for Strep Mutans  - Blood cultures 5/29 NGTD  - S/P dental extraction on 6/4/20  - Will need to go for AVR replacement when scheduled  - Continue Ceftriaxone  - Appreciate all specialists    Active Problems:    NSTEMI (non-ST elevated myocardial infarction) ([facility] [address] (5/16/2020)  - Resolved  - No acute CP  - Continue ASA  - Continue Lipitor  - Continue Lisinopril      Petechiae (5/16/2020)  - Due to #1      S/P aortic valve replacement (4/1/2018)  - Will need replacement again      CAD (coronary artery disease) ()  - No acute CP  - Continue ASA  - Continue Lisinopril  - Continue Lipitor      Essential hypertension (9/21/2018)  - Stable  - Continue current medications      Diabetes mellitus, type II (HCC) (5/20/2020)  - A1C 7  - Continue Humalog SSI      Vitamin D deficiency (0/54/9157)      Diastolic CHF, chronic (HCC) (5/7/2018)  - No acute issues  - Continue Lasix IV  - Continue Lisinopril  - Continue Coreg      Anemia (5/16/2020)  - Stable      DIEGO on CPAP ()      Dyslipidemia ()  - Continue Lipitor      COVID-19 ruled out (5/16/2020)    Today's Plan: Continue Ceftriaxone. ECHO today. Await AVR replacement.     DIET FULL LIQUID  DIET NUTRITIONAL SUPPLEMENTS All Meals; Rick Bragg ( )    DVT Prophylaxis: SCDs    Discharge Plan: TBD      Signed By: Chao Carson, DO     June 8, 2020

## 2020-06-08 NOTE — ROUTINE PROCESS
Bedside and Verbal shift change report given to [address] Misha Garner (oncoming nurse) by self (offgoing nurse). Report included the following information SBAR, Kardex, Procedure Summary, MAR and Recent Results.

## 2020-06-09 ENCOUNTER — HOME HEALTH ADMISSION (OUTPATIENT)
Dept: HOME HEALTH SERVICES | Facility: HOME HEALTH | Age: 60
End: 2020-06-09

## 2020-06-09 VITALS
OXYGEN SATURATION: 97 % | HEART RATE: 75 BPM | SYSTOLIC BLOOD PRESSURE: 92 MMHG | DIASTOLIC BLOOD PRESSURE: 49 MMHG | TEMPERATURE: 96.8 F | BODY MASS INDEX: 18.54 KG/M2 | WEIGHT: 139.9 LBS | RESPIRATION RATE: 20 BRPM | HEIGHT: 73 IN

## 2020-06-09 LAB
GLUCOSE BLD STRIP.AUTO-MCNC: 114 MG/DL (ref 65–100)
GLUCOSE BLD STRIP.AUTO-MCNC: 120 MG/DL (ref 65–100)

## 2020-06-09 PROCEDURE — 82962 GLUCOSE BLOOD TEST: CPT

## 2020-06-09 PROCEDURE — 74011250637 HC RX REV CODE- 250/637: Performed by: INTERNAL MEDICINE

## 2020-06-09 PROCEDURE — 74011000258 HC RX REV CODE- 258: Performed by: DENTIST

## 2020-06-09 PROCEDURE — 74011250637 HC RX REV CODE- 250/637: Performed by: DENTIST

## 2020-06-09 PROCEDURE — 74011250636 HC RX REV CODE- 250/636: Performed by: DENTIST

## 2020-06-09 PROCEDURE — C1751 CATH, INF, PER/CENT/MIDLINE: HCPCS

## 2020-06-09 PROCEDURE — 74011250636 HC RX REV CODE- 250/636: Performed by: INTERNAL MEDICINE

## 2020-06-09 RX ORDER — SPIRONOLACTONE 25 MG/1
25 TABLET ORAL DAILY
Qty: 30 TAB | Refills: 0 | Status: SHIPPED | OUTPATIENT
Start: 2020-06-10 | End: 2020-07-02 | Stop reason: SDUPTHER

## 2020-06-09 RX ORDER — CARVEDILOL 3.12 MG/1
3.12 TABLET ORAL 2 TIMES DAILY WITH MEALS
Qty: 60 TAB | Refills: 0 | Status: SHIPPED | OUTPATIENT
Start: 2020-06-09 | End: 2020-07-02 | Stop reason: SDUPTHER

## 2020-06-09 RX ORDER — LISINOPRIL 2.5 MG/1
2.5 TABLET ORAL 2 TIMES DAILY
Qty: 60 TAB | Refills: 0 | Status: SHIPPED | OUTPATIENT
Start: 2020-06-09 | End: 2020-07-02 | Stop reason: SDUPTHER

## 2020-06-09 RX ORDER — LANOLIN ALCOHOL/MO/W.PET/CERES
325 CREAM (GRAM) TOPICAL
Qty: 30 TAB | Refills: 0 | Status: SHIPPED | OUTPATIENT
Start: 2020-06-10 | End: 2020-07-02 | Stop reason: SDUPTHER

## 2020-06-09 RX ADMIN — FERROUS SULFATE TAB 325 MG (65 MG ELEMENTAL FE) 325 MG: 325 (65 FE) TAB at 09:31

## 2020-06-09 RX ADMIN — CEFTRIAXONE SODIUM 2 G: 2 INJECTION, POWDER, FOR SOLUTION INTRAMUSCULAR; INTRAVENOUS at 12:22

## 2020-06-09 RX ADMIN — Medication 10 ML: at 05:11

## 2020-06-09 RX ADMIN — ESCITALOPRAM OXALATE 10 MG: 10 TABLET ORAL at 09:31

## 2020-06-09 RX ADMIN — OXYCODONE HYDROCHLORIDE AND ACETAMINOPHEN 500 MG: 500 TABLET ORAL at 09:31

## 2020-06-09 RX ADMIN — SPIRONOLACTONE 25 MG: 25 TABLET ORAL at 09:31

## 2020-06-09 RX ADMIN — Medication 10 ML: at 09:33

## 2020-06-09 RX ADMIN — LISINOPRIL 2.5 MG: 5 TABLET ORAL at 09:31

## 2020-06-09 RX ADMIN — ASPIRIN 81 MG 81 MG: 81 TABLET ORAL at 09:31

## 2020-06-09 RX ADMIN — ENOXAPARIN SODIUM 40 MG: 40 INJECTION SUBCUTANEOUS at 09:31

## 2020-06-09 RX ADMIN — CARVEDILOL 3.12 MG: 3.12 TABLET, FILM COATED ORAL at 09:31

## 2020-06-09 NOTE — DISCHARGE SUMMARY
Hospitalist Discharge Summary     Patient ID:  Grupo Escobar  [id number]  40 y.o.  1960  Admit date: 5/16/2020  5:52 PM  Discharge date and time: 6/9/2020  Attending: David Robles DO  PCP:  Rashid Coffey MD  Treatment Team: Attending Provider: David Robles DO; Consulting Provider: Sanna Cardenas DO; Consulting Provider: Mars Khoury MD; Consulting Provider: Sandip Burgos MD; Consulting Provider: Edgardo King MD; Care Manager: Amanda Estrella RN; Consulting Provider: Hu Rodriges MD    Principal Diagnosis Endocarditis of prosthetic valve [facility])   Principal Problem:    Endocarditis of prosthetic valve ([facility] [address] (6/2/2020)    Active Problems:    Diastolic CHF, chronic ([facility] [address] (5/7/2018)      S/P aortic valve replacement (4/1/2018)      CAD (coronary artery disease) ()      Overview: Nonobstructive      DIEGO on CPAP ()      Dyslipidemia ()      Essential hypertension (9/21/2018)      NSTEMI (non-ST elevated myocardial infarction) ([facility] [address] (5/16/2020)      COVID-19 ruled out (5/16/2020)      Petechiae (5/16/2020)      Anemia (5/16/2020)      Diabetes mellitus, type II ([facility] [address] (5/20/2020)      Vitamin D deficiency (5/20/2020)       62year old CM with a PMH of CAD, chronic dCHF, AVR, and DM admitted on 5/16/20 due to NSTEMI and was also found to have AVR Strep endocarditis. He had dental extraction on 6/4/20. He is going to have an AVR replacement when it can be scheduled. Interval History (6/9): patient examined at bedside. No acute events overnight. Feels well overall. No fevers/chills, chest pain, shortness of breath, joint pain, abdominal pain, nausea/vomiting or diarrhea. Hospital Course:  Please refer to the admission H&P for details of presentation. In summary, the patient is admitted for sepsis due to Strep mutans prosthetic valve endocarditis with repeat blood cultures from 5/29 with NGTD.  Source of infection likely from oral cavity, he did have tooth extraction on 2020. ID consulted and patient on ceftriaxone per ID recs. Cardiology consulted. Patient's hospitalization complicated by septic emboli-induced left lateral wall ischemia. CT surgery consulted with patient deemed too high risk for AVR until he finishes his 6-week course of antibiotics. TTE showing EF of 40%, cardiology uptitrated his cardiac meds. PICC line placed. Patient is currently hemodynamically stable for hospital discharge. He will follow-up with PCP, cardiology, CT surgery, and ID as scheduled. Details of hospitalization discussed with patient who understands and agrees with plan of care and discharge home. He will be set up with [facility] and skilled nursing for antibiotic administration with labs collected on Monday's and faxed to ID office per their notes. Return precautions provided. Significant Diagnostic Studies:     [facility] [address] [facility], [facility] [address] [facility]  [phone]     Transesophageal Echocardiogram  2D, Doppler, and Color Doppler     Patient: Wolfgang Salazar  MR #: [id number]  : 1960  Age: 62 years  Gender: Male  Study date: 20-May-2020  Account #: [de-identified]  Height: 73 in  Weight: 144 lb  BSA: 1.87 mï¾²  Status:Routine  Location: Cath lab  BP: 124/ 68     Allergies: NO KNOWN ALLERGENS     Reading Physician:   [address] [place] [address], DO  Sonographer:  Daljit Reed [facility]  Group:  [facility]  Referring Physician:   [address] [place] [address], DO     INDICATIONS: Endocarditis     HISTORY: PRIOR HISTORY: 25mm Magna Ease AVR (2018)     PROCEDURE: This was a routine study. The risks and alternatives of the  procedure were explained to the patient and informed consent was obtained. A  transesophageal echocardiogram was performed. The study included complete 2D  imaging, complete spectral Doppler, and color Doppler.  An adult multiplane  probe was inserted by the attending cardiologist. The transesophageal  transducer was easily advanced and appropriate images were obtained without  complications. Image quality was excellent. There were no complications   during  the procedure. MEDICATIONS: Benzocaine spray, topical to oropharynx, prior to  procedure. Viscous lidocaine, applied to oropharynx, prior to procedure. Midazolam, 3 mg. Fentanyl, 25 mcg.     LEFT VENTRICLE: The ventricle was dilated. Systolic function was mildly  reduced. Ejection fraction was estimated in the range of 35 % to 45 %. This  study was inadequate for the evaluation of regional wall motion. Wall   thickness  was normal.     RIGHT VENTRICLE: The size was normal. Systolic function was normal. Estimated  peak pressure was in the range of 45 mmHg to 55 mmHg.     LEFT ATRIUM: The atrium was markedly dilated. APPENDAGE: No thrombus was  identified.     ATRIAL SEPTUM: No defect or patent foramen ovale was identified.     RIGHT ATRIUM: The atrium was moderately to markedly dilated. No thrombus was  identified.     SYSTEMIC VEINS: SVC: The superior vena cava size was normal. IVC: The   inferior  vena cava was normal in size.     AORTIC VALVE: A 25mm Magna Ease bioprosthetic AVR with mobile mass on LVOT   side  of the prosthesis as well as thickening / restriction of 2/3 leaflets. Findings  consistent with valvular vegetation. There was no insufficiency.     MITRAL VALVE: Diastolic mitral regurgitation noted, may be consequence of   first  degree AV block. There was mild annular calcification. There was no evidence  for vegetation. There was mild to moderate regurgitation.     TRICUSPID VALVE: The valve structure was normal. There was no evidence for  vegetation. There was mild to moderate regurgitation.     PULMONIC VALVE: The valve structure was normal. There was no evidence for  vegetation. There was no insufficiency.     AORTA: The root exhibited normal size. The ascending aorta was normal in   size. The aortic arch was normal in size. The descending aorta was normal in size.   There was no evidence for dissection.     SUMMARY:     -  Left ventricle: The ventricle was dilated. Systolic function was mildly  reduced. Ejection fraction was estimated in the range of 35 % to 45 %. This  study was inadequate for the evaluation of regional wall motion.     -  Right ventricle: Systolic function was normal. Estimated peak pressure was  in the range of 45 mmHg to 55 mmHg.     -  Aortic valve: A 25mm Magna Ease bioprosthetic AVR with mobile mass on LVOT  side of the prosthesis as well as thickening / restriction of 2/3 leaflets. Findings consistent with valvular vegetation.     -  Mitral valve: There was mild to moderate regurgitation. There was no  evidence for vegetation.     -  Tricuspid valve: There was mild to moderate regurgitation. There was no  evidence for vegetation.     -  Pulmonic valve: There was no evidence for vegetation.     SYSTEM MEASUREMENT TABLES     CW  TR Vmax: 3.2 m/s  TR maxP.4 mmHg     Prepared and signed by      [address] [place] [address], DO  Signed 20-May-2020 13:46:39     [facility]  CARDIAC CATH     Name:  Luz Ricketts  MR#:  [id number]  :  1960  ACCOUNT #:  [de-identified]  DATE OF SERVICE:     PROCEDURES PERFORMED:  Left heart cath, selective coronary angiography, left ventriculogram, coronaries only.     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     PREPROCEDURE DIAGNOSIS:  nstemi.     POSTPROCEDURE DIAGNOSIS:  Subacutely occluded OM.     COMPLICATIONS:  None.     :  Francy Mello MD     ASSISTANT:  None.     ANESTHESIA/SEDATION:  2 mg of Versed given between 11:30 and 11:50 by nurse Corrinne Rickers, RN.     TIG-4, JR-5 were used for catheters.     Aortic pressure 86/55.     CONTRAST:  60 mL.     ESTIMATED BLOOD LOSS:  3 mL.     IMPLANTS:  None.     SPECIMENS REMOVED:  None.     FINDINGS:  Left main was engaged with a TIG-4 without difficulty. Imaging was obtained which showed that the left main had no significant disease. Bifurcates into LAD and circumflex.     LAD courses the apex and had a 40% proximal lesion. There was mild nonobstructive 20-30% disease in the mid distal LAD. The two diagonals had mild nonobstructive disease.     Circumflex artery in the AV groove is nondominant, supplies a proximal OM1 and an OM2. The circumflex proper has mild nonobstructive disease. The OM1 has what appears to be an occluded OM with CATA zero flow. This is probably subacute occlusion from several days ago. There is CATA zero flow. There are no collaterals in a left-to-left or right-to-left fashion. The OM2 and remainder of the circumflex have mild nonobstructive disease.     Right coronary artery arises off the right cusp, courses in the AV groove and has mild nonobstructive 20% irregularity throughout. Posterior descending and posterolateral are patent.     Left ventriculogram was not performed due to bioprosthetic aortic valve vegetation.     OM was not intervened on secondary to the fact that this likely infarcted at least four days ago and is outside the window for therapy. It is also unknown whether this is a ruptured plaque or possibly an embolic event and stenting would be potentially contraindicated for the development of mycotic aneurysm. The patient also has decreasing platelets and long-term anticoagulation could be problematic. YARON will now be performed in the next 24-48 hours to assess the severity of the aortic valve vegetation.        Bulmaro Gutierrez MD        [id number]  D:  05/19/2020 12:12  T:  05/19/2020 16:40  JOB #:  [id number]     EXAM: CT NECK AND CHEST WITH CONTRAST     INDICATION: S. mutans endocarditis with persistent bacteremia; evaluation for an  abscess which might explain bacteremia. .     COMPARISON: Chest CT 5/16/2020     TECHNIQUE:  CT imaging was performed of the neck and chest after intravenous  injection of 100 mL Isovue 370. Intravenous contrast was used for better  evaluation of solid organs and vascular structures. Coronal reformatted imaging  provided. Radiation dose reduction techniques were used for this study. Our CT  scanners use one or all of the following: Automated exposure control, adjustment  of the mA and/or kV according to patient size, iterative reconstruction.     FINDINGS:     NECK:  The bilateral bony orbits and intraorbital structures are unremarkable.     The nasopharynx, oropharynx, epiglottis, laryngeal vestibule, hypopharynx, and  larynx are within normal limits. The deep spaces of the neck are unremarkable. No fluid collection or phlegmon is clearly identified.     The patient is partially edentulous. Periapical lucencies associated with many  of the abdomen remaining teeth. No definite odontogenic abscess is present.     No cervical adenopathy.     The carotid arteries and jugular veins are within normal limits.     Visualized intracranial structures appear normal.     CHEST:     Mediastinum and visualized thyroid: Normal.     Heart: Aortic valve prosthesis noted. There is no pericardial effusion. There is  mild global clinically. Multivessel coronary atherosclerotic calcifications.     Large Vessels: Aneurysmal dilatation of the ascending aorta measuring 4.1 cm AP.     Pleura: Moderate right and small left pleural effusions, new compared to prior.     Lungs: No consolidation or cavitary lesion evident.     Airways: Normal.     Lymph nodes: Mediastinal lymph nodes are mildly prominent in number but not  pathologically enlarged.     Bones/Soft tissues: No aggressive osseous lesion.     Visualized abdomen: Normal.     IMPRESSION  IMPRESSION:  1. Partially edentulous patient with periapical lucencies associated with many  of the remaining teeth. No definite odontogenic abscess present. No abscess  within the deep spaces of the neck.   2.  Interval development of a moderate right and small left pleural effusion. No  consolidation or cavitary lesion of the lungs. 3.  Changes of prior aortic valve replacement. Presumed post stenotic aneurysmal  dilatation of the ascending aorta.       Labs: Results:       Chemistry Recent Labs     06/08/20 0427         K 4.0      CO2 29   BUN 16   CREA 1.11   CA 8.0*   AGAP 4*   AP 95   TP 6.3   ALB 2.0*   GLOB 4.3*   AGRAT 0.5*      CBC w/Diff Recent Labs     06/08/20 0426   WBC 9.6   RBC 3.11*   HGB 8.3*   HCT 27.5*      GRANS 75   LYMPH 13   EOS 1      Cardiac Enzymes No results for input(s): CPK, CKND1, GERALD in the last 72 hours. No lab exists for component: CKRMB, TROIP   Coagulation No results for input(s): PTP, INR, APTT, INREXT in the last 72 hours. Lipid Panel Lab Results   Component Value Date/Time    Cholesterol, total <50 05/18/2020 03:08 AM    HDL Cholesterol 14 (L) 05/18/2020 03:08 AM    LDL, calculated Cannot be calculated 05/18/2020 03:08 AM    VLDL, calculated 16 05/18/2020 03:08 AM    Triglyceride 80 05/18/2020 03:08 AM    CHOL/HDL Ratio Cannot be calculated 05/18/2020 03:08 AM      BNP No results for input(s): BNPP in the last 72 hours. Liver Enzymes Recent Labs     06/08/20 0427   TP 6.3   ALB 2.0*   AP 95      Thyroid Studies Lab Results   Component Value Date/Time    TSH 3.000 05/24/2020 04:02 AM            Discharge Exam:  Visit Vitals  BP 92/49   Pulse 75   Temp 96.8 °F (36 °C)   Resp 20   Ht 6' 1\" (1.854 m)   Wt 63.5 kg (139 lb 14.4 oz)   SpO2 97%   BMI 18.46 kg/m²     General appearance: alert, cooperative, no distress, appears stated age  Lungs: clear to auscultation bilaterally  Heart: regular rate and rhythm, S1, S2 normal, no murmur, click, rub or gallop  Abdomen: soft, non-tender.  Bowel sounds normal. No masses,  no organomegaly  Extremities: no cyanosis or edema  Neurologic: Grossly normal    Disposition: home  Discharge Condition: stable  Patient Instructions:   Current Discharge Medication List      START taking these medications    Details   carvediloL (COREG) 3.125 mg tablet Take 1 Tab by mouth two (2) times daily (with meals) for 30 days. Qty: 60 Tab, Refills: 0      cefTRIAXone 2 gram 2 g, ADDaptor 1 Device IVPB 2 g by IntraVENous route every twenty-four (24) hours for 31 days. Qty: 31 Dose, Refills: 0      ferrous sulfate 325 mg (65 mg iron) tablet Take 1 Tab by mouth daily (with breakfast) for 30 days. Qty: 30 Tab, Refills: 0      lisinopriL (PRINIVIL, ZESTRIL) 2.5 mg tablet Take 1 Tab by mouth two (2) times a day for 30 days. Qty: 60 Tab, Refills: 0      spironolactone (ALDACTONE) 25 mg tablet Take 1 Tab by mouth daily for 30 days. Qty: 30 Tab, Refills: 0         CONTINUE these medications which have NOT CHANGED    Details   escitalopram oxalate (LEXAPRO) 10 mg tablet Take 1 Tab by mouth daily. Qty: 30 Tab, Refills: 2      aspirin 81 mg chewable tablet Take 81 mg by mouth daily. atorvastatin (LIPITOR) 20 mg tablet Take 1 Tab by mouth nightly. Qty: 90 Tab, Refills: 0         STOP taking these medications       metoprolol succinate (TOPROL-XL) 25 mg XL tablet Comments:   Reason for Stopping:         sildenafiL, pulm. hypertension, (REVATIO) 20 mg tablet Comments:   Reason for Stopping:               Activity: Activity as tolerated  Diet: Cardiac Diet  Wound Care: As directed    Follow-up  ·   With PCP within 1 week. With ID as previously scheduled. With cardiology and CT surgery as previously scheduled or in 1-2 weeks. Time spent to discharge patient 35 minutes  Signed:   Savage March, DO 6/9/2020  1:49 PM

## 2020-06-09 NOTE — PROGRESS NOTES
Care Management Interventions  PCP Verified by CM: Yes  Last Visit to PCP: 05/04/20  Mode of Transport at Discharge: Other (see comment)  Transition of Care Consult (CM Consult): Discharge [address]: Yes  Discharge Durable Medical Equipment: No  Physical Therapy Consult: No  Occupational Therapy Consult: No  Current Support Network: Lives Alone  Confirm Follow Up Transport: Cab  The Plan for Transition of Care is Related to the Following Treatment Goals : Home health, infusion company  The Patient and/or Patient Representative was Provided with a Choice of Provider and Agrees with the Discharge Plan?: Yes  Name of the Patient Representative Who was Provided with a Choice of Provider and Agrees with the Discharge Plan: Pt  [address] Provided?: ([employer])  Discharge Location  Discharge Placement: Home    CM met with pt to discuss options for IV antibiotic out of pocket cost. Since he has access to his own transportation, pt elected to go to the infusion center for therapy daily. First infusion scheduled for 06/10/20 at 5 pm. Pt given appointment card. Pt asks to call back [facility] for SS benefit information. Discharge via cab. No further CM needs.

## 2020-06-09 NOTE — PROGRESS NOTES
Care Management Interventions  PCP Verified by CM: Yes  Last Visit to PCP: 05/04/20  Mode of Transport at Discharge: Other (see comment)  Transition of Care Consult (CM Consult): Discharge Planning, [address]: Yes  Discharge Durable Medical Equipment: No  Physical Therapy Consult: No  Occupational Therapy Consult: No  Current Support Network: Lives Alone  Confirm Follow Up Transport: Cab  The Plan for Transition of Care is Related to the Following Treatment Goals : Home health, infusion company  The Patient and/or Patient Representative was Provided with a Choice of Provider and Agrees with the Discharge Plan?: Yes  Name of the Patient Representative Who was Provided with a Choice of Provider and Agrees with the Discharge Plan: Pt  [address] Provided?: ([employer])  Discharge Location  Discharge Placement: Home with home health    Per Dr Tom Fountain note, redo AVR will be delayed for total 6 weeks (post MI) and pt discharged with antibiotic therapy with ID. Situation discussed with pt. He prefers to self administer antibiotics at home. At present, he has few support systems, he says his wife left him. CM encouraged pt to ask neighbor or friend to help with groceries/etc to minimize exposure prior to surgery. He says he'll try to find someone. [facility] order/referral sent for PICC . Intramed referral for IV antibiotic therapy. CM to continue to follow to CT.

## 2020-06-09 NOTE — PROGRESS NOTES
Discharge instructions reviewed with patient. Prescriptions given for coreg, lisionpril, adlactone, iron and med info sheets provided for all new medications. Opportunity for questions provided. Patient voiced understanding of all discharge instructions. D/C with picc line for abx therapy.

## 2020-06-09 NOTE — PROGRESS NOTES
CTS- ECHO yesterday shows severe lateral wall dysfunction after an occluded OM and completed infarct, redo AVR will be high risk because of recent infarct and ideally would do best waiting six weeks for MI to heal, cultures remain no growth, will follow and his clinical coarse may dictate sooner surgery

## 2020-06-09 NOTE — DISCHARGE INSTRUCTIONS
Patient Education        Heart Attack: Care Instructions  Your Care Instructions     A heart attack (myocardial infarction, or MI) occurs when one or more of the coronary arteries, which supply the heart with oxygen-rich blood, is blocked. A blockage usually occurs when plaque inside the artery breaks open and a blood clot forms in the artery. After a heart attack, you may be worried about your future. Over the next several weeks, your heart will start to heal. Though it can be hard to break old habits, you can prevent another heart attack by making some lifestyle changes and by taking medicines. You may use this information for ideas about what to do at home to speed your recovery. Follow-up care is a key part of your treatment and safety. Be sure to make and go to all appointments, and call your doctor if you are having problems. It's also a good idea to know your test results and keep a list of the medicines you take. How can you care for yourself at home? Activity  · Until your doctor says it is okay, do not do strenuous exercise. And do not lift, pull, or push anything heavy. Ask your doctor what types of activities are safe for you. · If your doctor has not set you up with a cardiac rehabilitation (rehab) program, talk to him or her about whether that is right for you. Cardiac rehab includes supervised exercise. It also includes help with diet and lifestyle changes and emotional support. It may reduce your risk of future heart problems. · Increase your activities slowly. Take short rest breaks when you get tired. · If your doctor recommends it, get more exercise. Walking is a good choice. Bit by bit, increase the amount you walk every day. Try for at least 30 minutes on most days of the week. You also may want to swim, bike, or do other activities. Talk with your doctor before you start an exercise program to make sure it is safe for you.   · Ask your doctor when you can drive, go back to work, and do other daily activities again. · You can have sex as soon as you feel ready for it. Often this means when you can easily walk around or climb stairs. Talk with your doctor if you have any concerns. If you are taking nitroglycerin, do not take erection-enhancing medicine such as sildenafil (Viagra), tadalafil (Cialis), or vardenafil (Levitra) . Lifestyle changes  · Do not smoke. Smoking increases your risk of another heart attack. If you need help quitting, talk to your doctor about stop-smoking programs and medicines. These can increase your chances of quitting for good. · Eat a heart-healthy diet that is low in saturated fat and salt, and is full of fruits, vegetables and whole-grains. You may get more details about how to eat healthy. But these tips can help you get started. · Stay at a healthy weight, or lose weight if you need to. Medicines  · Be safe with medicines. Take your medicines exactly as prescribed. Call your doctor if you think you are having a problem with your medicine. You will get more details on the specific medicines your doctor prescribes. Do not stop taking your medicine unless your doctor tells you to. Not taking your medicine might raise your risk of having another heart attack. · You may need several medicines to help lower your risk of another heart attack. These include:  ? Blood pressure medicines such as angiotensin-converting enzyme (ACE) inhibitors, ARBs (angiotensin II receptor blockers), and beta-blockers. ? Cholesterol medicine called statins. ? Aspirin and other blood thinners. These prevent blood clots that can cause a heart attack. · If your doctor has given you nitroglycerin, keep it with you at all times. If you have angina symptoms, such as chest pain or pressure, sit down and rest. Take the first dose of nitroglycerin as directed. If symptoms get worse or are not getting better within 5 minutes, call 911 right away. Stay on the phone.  The emergency  will tell you what to do. · Do not take any over-the-counter medicines, vitamins, or herbal products without talking to your doctor first.  Staying healthy  · Manage other health conditions such as high blood pressure and diabetes. · Avoid colds and flu. Get a pneumococcal vaccine shot. If you have had one before, ask your doctor whether you need another dose. Get the flu vaccine every year. If you must be around people with colds or flu, wash your hands often. · Be sure to tell your doctor about any angina symptoms you have had, even if they went away. Pay attention to your angina symptoms. Know what is typical for you and learn how to control it. Know when to call for help. · Talk to your family, friends, or a counselor about your feelings. It is normal to feel frightened, angry, hopeless, helpless, and even guilty. Talking openly about bad feelings can help you cope. If you have symptoms of depression, talk to your doctor. When should you call for help? [id number] anytime you think you may need emergency care. For example, call if:  · You have symptoms of a heart attack. These may include:  ? Chest pain or pressure, or a strange feeling in the chest.  ? Sweating. ? Shortness of breath. ? Nausea or vomiting. ? Pain, pressure, or a strange feeling in the back, neck, jaw, or upper belly or in one or both shoulders or arms. ? Lightheadedness or sudden weakness. ? A fast or irregular heartbeat. After you call 911, the  may tell you to chew 1 adult-strength or 2 to 4 low-dose aspirin. Wait for an ambulance. Do not try to drive yourself. · You have angina symptoms (such as chest pain or pressure) that do not go away with rest or are not getting better within 5 minutes after you take a dose of nitroglycerin. · You passed out (lost consciousness). · You feel like you are having another heart attack.   Call your doctor now or seek immediate medical care if:  · You are having angina symptoms, such as chest pain or pressure, more often than usual, or the symptoms are different or worse than usual.  · You have new or increased shortness of breath. · You are dizzy or lightheaded, or you feel like you may faint. Watch closely for changes in your health, and be sure to contact your doctor if you have any problems. Where can you learn more? Go to http://ruthy-marshal.info/  Enter H564 in the search box to learn more about \"Heart Attack: Care Instructions. \"  Current as of: December 16, 2019               Content Version: 12.5  © 9434-1122 [employer]. Care instructions adapted under license by [employer] (which disclaims liability or warranty for this information). If you have questions about a medical condition or this instruction, always ask your healthcare professional. [address] any warranty or liability for your use of this information. Patient Education        Learning About Endocarditis  What is endocarditis? Endocarditis (say \"xs-nig-uun-DY-tus\") is an infection of the heart's valves or inner lining of the heart. It is most often caused by bacteria. It also can be caused by fungi. The bacteria or fungi get into the bloodstream. They settle and grow on the inside of the heart, usually on the heart valves. Bacteria or fungi can enter the bloodstream in many ways, such as through some dental and medical procedures. This infection can damage your heart. You need to treat it as soon as possible. People who have a normal heart are not likely to get endocarditis. But some people are more likely to get it than others. This includes people who have a heart problem that affects normal blood flow, such as a heart valve problem, or people who inject illegal drugs. Endocarditis can be very serious. It may be more dangerous for people who:  · Have an artificial heart valve. · Have had this kind of infection before.   · Have had certain heart problems since birth. · Have heart valve problems after a heart transplant. What are the symptoms? At first you may feel like you have the flu. You might have a mild fever and feel tired. Other symptoms may include weight loss, night sweats, and painful joints. You may not think these symptoms are cause for worry. But if you are at risk for endocarditis or the symptoms do not go away, call your doctor. How can you prevent endocarditis? · Practice good oral hygiene by brushing and flossing your teeth daily. See a dentist twice each year. Tell your dentist that you are at risk for this infection. · You may need to take antibiotics before some dental and medical procedures if you:  ? Have had a heart valve replaced or repaired. ? Have had endocarditis before. ? Have had certain heart problems since birth. ? Have heart valve problems after a heart transplant. Ask your doctor or dentist if you need antibiotics to prevent this infection. Find out when you will need to take them. Your doctor may give you a card that says you may need preventive antibiotics before some procedures. You can keep it in your wallet. How is it treated? Treatment may include:  · Antibiotics or antifungal medicine given through a small tube placed in a vein (IV). You may need several weeks of treatment. You might also take antibiotic pills. · Surgery to repair or replace heart valves. You may have follow-up visits for months or years to check the health of your heart. Follow-up care is a key part of your treatment and safety. Be sure to make and go to all appointments, and call your doctor if you are having problems. It's also a good idea to know your test results and keep a list of the medicines you take. Where can you learn more? Go to http://ruthy-marshal.info/  Enter M307 in the search box to learn more about \"Learning About Endocarditis. \"  Current as of: December 16, 2019               Content Version: 12.5  © 2898-4251 [employer]. Care instructions adapted under license by [employer] (which disclaims liability or warranty for this information). If you have questions about a medical condition or this instruction, always ask your healthcare professional. [address] any warranty or liability for your use of this information. Patient Education        Bacterial Endocarditis: Care Instructions  Your Care Instructions     Bacterial endocarditis (say \"dk-ost-xmh-DY-tus\") is an infection of the heart valves or inner lining of the heart. It is caused by bacteria that enter the bloodstream and settle on one or more of the heart valves. This can damage the valves. In some cases, surgery is needed to replace a damaged valve. Antibiotics can cure bacterial endocarditis. You may take antibiotics for several weeks. Now that you have had the infection, you are at risk for getting it again. It is important that you let all your other doctors know that you have had bacterial endocarditis. Let your dentist know too. Follow-up care is a key part of your treatment and safety. Be sure to make and go to all appointments, and call your doctor if you are having problems. It's also a good idea to know your test results and keep a list of the medicines you take. How can you care for yourself at home? · If you are taking IV antibiotics at home with the help of a home health nurse, the nurse will teach you how to use the antibiotics and how to care for your IV catheter. Make sure you are comfortable using and caring for the IV. · If your doctor prescribed antibiotic pills, take them exactly as directed. Do not stop taking them just because you feel better. You need to take the full course of antibiotics. · In the future, you may have to take antibiotics before certain medical, dental, or surgical procedures. Ask your doctor or dentist about this.  Do not have any of these procedures without talking to your doctor or dentist first. Your doctor can give you a card to carry in your wallet which states that you need preventive antibiotics before certain procedures. · Practice good oral hygiene. Brush and floss your teeth daily. Visit a dentist twice each year. Make sure your dentist knows that you have had endocarditis. When should you call for help? [id number] anytime you think you may need emergency care. For example, call if:  · You have symptoms of sudden heart failure. These may include:  ? Severe trouble breathing. ? A fast or irregular heartbeat. ? Coughing up pink, foamy mucus. ? You passed out. · You have symptoms of a stroke. These may include:  ? Sudden numbness, tingling, weakness, or loss of movement in your face, arm, or leg, especially on only one side of your body. ? Sudden vision changes. ? Sudden trouble speaking. ? Sudden confusion or trouble understanding simple statements. ? Sudden problems with walking or balance. ? A sudden, severe headache that is different from past headaches. Call your doctor now or seek immediate medical care if:  · You have a new or higher fever. Watch closely for changes in your health, and be sure to contact your doctor if:  · You have skin or nail changes. · You do not get better as expected. Where can you learn more? Go to http://ruthy-marshal.info/  Enter A761 in the search box to learn more about \"Bacterial Endocarditis: Care Instructions. \"  Current as of: December 16, 2019               Content Version: 12.5  © 6765-3838 [employer]. Care instructions adapted under license by [employer] (which disclaims liability or warranty for this information). If you have questions about a medical condition or this instruction, always ask your healthcare professional. [place] [address] any warranty or liability for your use of this information.        Patient Education Peripherally Inserted Central Catheter (PICC): Care Instructions  Your Care Instructions     A peripherally inserted central catheter (PICC) is a soft, flexible tube that runs under your skin from a vein in your arm to a large vein near your heart. One end of the catheter stays outside your body. It is a type of central venous catheter, or central venous line. You may have it for weeks or months. A PICC is used to give you medicine, blood products, nutrients, or fluids. A PICC makes doing these things more comfortable for you because they are put directly into the catheter. So you will not be stuck with a needle every time. A PICC may be used to draw blood for tests only if another vein, such as in the hand or arm, can't be used. The end of the PICC sometimes has two or three openings so that you can get more than one type of fluid or medicine at a time. Your doctor may give you medicine to make you feel relaxed. You may feel a little pain when your doctor numbs your arm. Your doctor will then thread the catheter up a vein in your arm to a larger vein. You will not feel any pain. The doctor may use stitches or other devices to hold the catheter in place where it exits your arm. After the procedure, the site may be sore for a day or two. Follow-up care is a key part of your treatment and safety. Be sure to make and go to all appointments, and call your doctor if you are having problems. It's also a good idea to know your test results and keep a list of the medicines you take. How can you care for yourself at home? · Do not wear jewelry, such as necklaces, that can catch on the catheter. · If the catheter breaks, follow the instructions your doctor gave you. If you have no instructions, clamp or tie off the catheter. Then see a doctor as soon as possible. · To help prevent infection, take a shower instead of a bath. Do not go swimming with the catheter. · Try to keep the area dry.  When you shower, cover the area with waterproof material, such as plastic wrap. · Never touch the open end of the catheter if the cap is off. · Never use scissors, knives, pins, or other sharp objects near the catheter or other tubing. · If your catheter has a clamp, keep it clamped when you are not using it. · Fasten or tape the catheter to your body to prevent pulling or dangling. · Avoid clothing that rubs or pulls on your catheter. · Avoid bending or crimping your catheter. · Always wash your hands before you touch your catheter. · Wear loose clothing over the catheter for the first 10 to 14 days. When getting dressed, be careful not to pull on the catheter. How to change the dressing  Since the PICC is in one of your arms, you will not be able to change the dressing on your own. You will need someone to help you change the dressing using the same instructions that your doctor or nurse gave you. Your PICC dressing should be changed at least once a week. If the dressing becomes loose, wet, or dirty, it must be changed more often to prevent infection. Your doctor may also give you instructions for when to change the dressing. Be sure you have all your supplies ready. These include medical tape, a surgical mask, sterile gloves, and your dressing kit. The names and brands of the items will vary. Your doctor or nurse may give you specific instructions for changing the dressing. Here are basic tips for how to change the dressing. You will need help changing it. 1. Wash your hands with soap and water for 15 seconds. Dry your hands with paper towels. 2. Put on the surgical mask. 3. Loosen and remove your old dressing. Peel the dressing toward the PICC, not away from it. You may need to use an adhesive remover if your dressing does not come off easily. 4. Look at the site carefully for redness, swelling, drainage, tenderness, or warmth. If you notice any of these, call your doctor.   5. Wash your hands again, and open your dressing kit. Put on the sterile gloves. 6. Clean the site with the supplies in the dressing kit. 7. Use the dressing that your doctor gave you, and place it over the site. 8. Tape the PICC tubing to your skin so that it does not dangle or pull. When should you call for help? [id number] anytime you think you may need emergency care. For example, call if:  · You passed out (lost consciousness). · You have severe trouble breathing. · You have sudden chest pain and shortness of breath, or you cough up blood. · You have a fast or uneven pulse. Call your doctor now or seek immediate medical care if:  · You have signs of infection, such as:  ? Increased pain, swelling, warmth, or redness. ? Red streaks leading from the area. ? Pus or blood draining from the area. ? A fever. · You have swelling in your face, chest, neck, or arm on the side where the catheter is. · You have signs of a blood clot, such as bulging veins near the catheter. · Your catheter is leaking, cracked, or clogged. · You feel resistance when you inject medicine or fluids into your catheter. · Your catheter is out of place. This may happen after severe coughing or vomiting, or if you pull on the catheter. · You have chest pain or shortness of breath. Watch closely for changes in your health, and be sure to contact your doctor if:  · You have any concerns about your catheter. Where can you learn more? Go to http://ruthy-marshal.info/  Enter L935 in the search box to learn more about \"Peripherally Inserted Central Catheter (PICC): Care Instructions. \"  Current as of: June 26, 2019               Content Version: 12.5  © 8166-1903 Healthwise[employer] Inc[employer]. Care instructions adapted under license by [employer] (which disclaims liability or warranty for this information).  If you have questions about a medical condition or this instruction, always ask your healthcare professional. [place] any warranty or liability for your use of this information. Peripherally Inserted Central Catheter Discharge Instructions       1. Single Lumen Peripherally Inserted Central Catheter Line inserted in R arm. Length: 40cm       Catheter is inserted in the R arm [employer]. Arm circumference 25cm. 2.  May use Peripherally Inserted Central Catheter Line for IV infusions and blood sampling. 3.  Flush Peripherally Inserted Central Catheter Line with 10 ml Normal Saline every 8 hours while in the hospital and before and after every use. 4.  Use 10 ml or larger syringe to flush. 5.   May use TPA 0.5mg/2 ml PRN for clotted Peripherally Inserted Central Catheter Line. This can be obtained from the Pharmacy. 6.  Please notify Interventional Radiology with any questions or concerns ar extension [phone]. 7.  Change dressing per [employer] Protocol. 8.  Give outpatient a copy of discharge instructions. [facility]  Interventional Radiology      DISCHARGE SUMMARY from Nurse    PATIENT INSTRUCTIONS:    After general anesthesia or intravenous sedation, for 24 hours or while taking prescription Narcotics:  · Limit your activities  · Do not drive and operate hazardous machinery  · Do not make important personal or business decisions  · Do  not drink alcoholic beverages  · If you have not urinated within 8 hours after discharge, please contact your surgeon on call.     Report the following to your surgeon:  · Excessive pain, swelling, redness or odor of or around the surgical area  · Temperature over 100.5  · Nausea and vomiting lasting longer than 4 hours or if unable to take medications  · Any signs of decreased circulation or nerve impairment to extremity: change in color, persistent  numbness, tingling, coldness or increase pain  · Any questions    What to do at Home:  Recommended activity: Activity as tolerated,     If you experience any of the following symptoms CP, SOB, please follow up with your PCP, [facility], or [facility] surgery. *  Please give a list of your current medications to your Primary Care Provider. *  Please update this list whenever your medications are discontinued, doses are      changed, or new medications (including over-the-counter products) are added. *  Please carry medication information at all times in case of emergency situations. These are general instructions for a healthy lifestyle:    No smoking/ No tobacco products/ Avoid exposure to second hand smoke  Surgeon General's Warning:  Quitting smoking now greatly reduces serious risk to your health. Obesity, smoking, and sedentary lifestyle greatly increases your risk for illness    A healthy diet, regular physical exercise & weight monitoring are important for maintaining a healthy lifestyle    You may be retaining fluid if you have a history of heart failure or if you experience any of the following symptoms:  Weight gain of 3 pounds or more overnight or 5 pounds in a week, increased swelling in our hands or feet or shortness of breath while lying flat in bed. Please call your doctor as soon as you notice any of these symptoms; do not wait until your next office visit. The discharge information has been reviewed with the patient. The patient verbalized understanding. Discharge medications reviewed with the patient and appropriate educational materials and side effects teaching were provided. ___________________________________________________________________________________________________________________________________          Patient Education      Carvedilol (Coreg, Coreg CR, Hypertenevide-12.5) - (By mouth)   Why this medicine is used:   Treats high blood pressure and heart failure.   Contact a nurse or doctor right away if you have:  · Change in how much or how often you urinate  · Leg pain when you walk, legs and feet that feel cold or numb  · Lightheadedness, dizziness, fainting  · Rapid weight gain, swelling in your hands, ankles, or feet     Common side effects:  · Mild dizziness  · Tiredness  · Trouble having sex  · Diarrhea  © 2017 [facility] Information is for End User's use only and may not be sold, redistributed or otherwise used for commercial purposes. Patient Education   Ceftriaxone (By injection)   Ceftriaxone (atv-elnl-JV-one)  Treats infections. This medicine is a cephalosporin antibiotic. Brand Name(s): [employer] cefTRIAXone, PremierPro Rx cefTRIAXone, cefTRIAXone Novaplus   There may be other brand names for this medicine. When This Medicine Should Not Be Used: This medicine is not right for everyone. Do not use it if you had an allergic reaction to any other cephalosporin antibiotic. How to Use This Medicine:   Injectable  · Your doctor will prescribe your exact dose and tell you how often it should be given. This medicine is given as a shot into a muscle or through a needle placed into a vein. · A nurse or other health provider will give you this medicine. · Missed dose: You must use this medicine on a fixed schedule. Call your doctor or pharmacist if you miss a dose. Drugs and Foods to Avoid:      Ask your doctor or pharmacist before using any other medicine, including over-the-counter medicines, vitamins, and herbal products. Warnings While Using This Medicine:   · Tell your doctor if you are pregnant or breastfeeding, or if you have kidney disease, liver disease, anemia, gallbladder disease, pancreas problems, or a history of stomach or bowel disease, such as colitis. Tell your doctor if you are allergic to penicillin. · This medicine can cause diarrhea. Call your doctor if the diarrhea becomes severe, does not stop, or is bloody. Do not take any medicine to stop diarrhea until you have talked to your doctor. Diarrhea can occur 2 months or more after you stop taking this medicine.   · Your doctor will do lab tests at regular visits to check on the effects of this medicine. Keep all appointments. · Take all of the medicine in your prescription to clear up your infection, even if you feel better after the first few doses. · Call your doctor if your symptoms do not improve or if they get worse. Possible Side Effects While Using This Medicine:   Call your doctor right away if you notice any of these side effects:  · Allergic reaction: Itching or hives, swelling in your face or hands, swelling or tingling in your mouth or throat, chest tightness, trouble breathing  · Dark urine or pale stools, nausea, vomiting, loss of appetite, stomach pain, yellow skin or eyes  · Severe diarrhea, diarrhea that contains blood, or vomiting  · Shortness of breath, tiredness, uneven heartbeat  · Unusual bleeding, bruising, or weakness  If you notice these less serious side effects, talk with your doctor:   · Change or loss of taste  · Dizziness or headache  · Mild rash or itching skin  · Pain, redness, or swelling where the shot is given  · Vaginal itching or discharge  If you notice other side effects that you think are caused by this medicine, tell your doctor. Call your doctor for medical advice about side effects. You may report side effects to FDA at 8-889-FDA-9872  © 2017 [address] Information is for End User's use only and may not be sold, redistributed or otherwise used for commercial purposes. The above information is an  only. It is not intended as medical advice for individual conditions or treatments. Talk to your doctor, nurse or pharmacist before following any medical regimen to see if it is safe and effective for you. Patient Education      Lisinopril (Prinivil, Zestril) - (By mouth)   Why this medicine is used:   Treats high blood pressure and heart failure.   Contact a nurse or doctor right away if you have:  · Blistering, peeling, red skin rash  · Change in how much or how often you urinate  · Confusion, weakness, uneven heartbeat, trouble breathing  · Lightheadedness, dizziness, fainting  · Numbness or tingling in your hands, feet, or lips     Common side effects:  · Dry cough  © 2017 [address] [facility] Information is for End User's use only and may not be sold, redistributed or otherwise used for commercial purposes. Patient Education      Spironolactone (Aldactone, Carospir) - (By mouth)   Why this medicine is used:   Treats high blood pressure, edema (fluid retention), or high levels of aldosterone (a hormone). Contact a nurse or doctor right away if you have:  · Blistering, peeling, red skin rash  · Blood in your stools or dark stools, vomiting blood or material that looks like coffee grounds  · Confusion, weakness, uneven heartbeat, trouble breathing, numbness or tingling  · Dry mouth, increased thirst, muscle cramps, nausea, vomiting  · Increased hunger or thirst, change in how much or how often you urinate  · Lightheadedness, dizziness, drowsiness, fainting, muscle twitching  · Unusual bleeding, bruising, or weakness     Common side effects:  · Breast swelling, enlargement, pain, or tenderness  © 2017 [address] [facility] Information is for End User's use only and may not be sold, redistributed or otherwise used for commercial purposes. Patient Education      Iron Supplements (Duofer, Fe-20, [place], Andres-Iron) - (By mouth)   Why this medicine is used:   Treats low blood iron or anemia. Contact a nurse or doctor right away if you have:  · Bloody diarrhea     Common side effects:  · Constipation, diarrhea, nausea  · Dark-colored stools  © 2017 [address] is for End User's use only and may not be sold, redistributed or otherwise used for commercial purposes.

## 2020-06-09 NOTE — ADT AUTH CERT NOTES
Infective Endocarditis - Care Day 19 (6/8/2020) by Ezra Tracy RN  
 
   
Review Status Review Entered Completed 6/9/2020 14:11  
   
Criteria Review Care Day: 19 Care Date: 6/8/2020 Level of Care: Telemetry Guideline Day 6 Clinical Status   
(X) * Afebrile   
(X) * Hemodynamic stability   
(X) * No evidence of heart failure, emboli, arrhythmia, abscess, or renal failure ( ) * No surgery needed   
(X) * Microbiologic studies completed   
(X) * Repeat blood cultures negative ( ) * Discharge plans and education understood Activity ( ) * Ambulatory Routes   
(X) * Oral hydration, medications, [H] and diet Interventions ( ) * Access for outpatient antibiotic administration placed or not necessary Medications (X) IV antibiotics * Milestone Additional Notes 6/8  
  
98.3 85 18 97% 101/63 r/a  
  
6/8/2020 06:21 6/8/2020 11:34 6/8/2020 16:29 6/8/2020 21:43 GLUCOSE,FAST -  (H) 134 (H) 125 (H) 129 (H)  
  
6/8/2020 04:26 RBC 3.11 (L) HGB 8.3 (L) HCT 27.5 (L) MCHC 30.2 (L) RDW 18.6 (H)  
  
  
6/8/2020 04:27 Anion gap 4 (L) Glucose 100 BUN 16  
Creatinine 1.11 Calcium 8.0 (L) GFR est non-AA >60 Protein, total 6.3 Albumin 2.0 (L) Globulin 4.3 (H) A-G Ratio 0.5 (L) Principal Problem:  
  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  
- Repeat ECHO on 6/8 - Blood cultures positive for Strep Mutans - Blood cultures 5/29 NGTD  
- S/P dental extraction on 6/4/20  
- Will need to go for AVR replacement when scheduled - Continue Ceftriaxone - Appreciate all specialists  
   
Active Problems:  
  NSTEMI (non-ST elevated myocardial infarction) ([facility] (5/16/2020) - Resolved - No acute CP  
- Continue ASA - Continue Lipitor - Continue Lisinopril  
   
  Petechiae (5/16/2020) - Due to #1  
   
  S/P aortic valve replacement (4/1/2018) - Will need replacement again  
   
   CAD (coronary artery disease) ()  
- No acute CP  
- Continue ASA - Continue Lisinopril  
- Continue Lipitor  
   
  Essential hypertension (9/21/2018) - Stable - Continue current medications    
  Diabetes mellitus, type II ([facility] (5/20/2020) - A1C 7  
- Continue Humalog SSI  
   
  Vitamin D deficiency (5/20/2020)    
  Diastolic CHF, chronic ([facility] (5/7/2018) - No acute issues - Continue Lasix IV  
- Continue Lisinopril  
- Continue Coreg  
   
  Anemia (5/16/2020) - Stable  
   
  DIEGO on CPAP ()  
   
  Dyslipidemia ()  
- Continue Lipitor  
   
  COVID-19 ruled out (5/16/2020)  
  Today's Plan: Continue Ceftriaxone. ECHO today. Await AVR replacement.  
   
DIET FULL LIQUID  
DIET NUTRITIONAL SUPPLEMENTS All Meals; Glucerna Shake ( )  
   
DVT Prophylaxis: SCDs  
   
Discharge Plan: TBD Orders  Tele IP, Full Code, VS , Cardiac Monitoring, Droplet plus, DROPLET PLUS   
PRECAUTIONS, STRICT I&O, CARDIO CONSULT,CM CONSULT, AC & HS BS, PICC Consult, Hypoglycemic Protocol, FL DIET, Glucerna shakes w. all meals, SCDS, infectious disease consult  Pharmacy to dose Elenore Sender,  Consult to oral surgery, Echo pending Meds NS @100, VIt C 500 mg pox1, ASA 81 mg po x1, Lipitor 20 mg po x1, Rocephin 2 gm IV x1, Lovenox 40 mg sc x1, Ferrous sulfate 325 mg pox1, Lexapro 10 mg po x1, Lasix 40 mg IV x1, Toprol 25 mg po x2, Lisinopril 2.5 mg po x2, Aldactone 25 mg p ox1 KCL 20 meq po x2, Coreg 3.125 mg pox2, Norco 5/325 mg po x1, Cardio Note 1) Strep. mutans bAVR endocarditis 2) S/p embolic obtuse marginal branch of the left circumflex occlusion due to #1 3) Cardiomyopathy EF 40% 5/17 due to #1 and #2 4) Diabetic 5) Poor dentition, s/p extraction 6/4  
   
////  
   
Await cardiac surgery Echo today  
   
IM NOTE  
   
6/8 - He feels good. About to get repeat ECHO. Has been walking hallways. Denies CP/SOB. Denies F/C/N/V.  
   
Physical Exam: GENERAL: alert, cooperative, no distress, appears stated age EYE: conjunctivae/corneas clear. PERRL. THROAT & NECK: normal and no erythema or exudates noted. LUNG: clear to auscultation bilaterally HEART: regular rate and rhythm, S1S2, no murmur, no JVD ABDOMEN: soft, non-tender, non-distended. Bowel sounds normal.   
EXTREMITIES:  No edema, 2+ pedal/radial pulses bilaterally SKIN: no rash or abnormalities NEUROLOGIC: A&Ox3. Cranial nerves 2-12 grossly intact.  
   
  
Principal Problem:  
  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  
- Repeat ECHO on 6/8 - Blood cultures positive for Strep Mutans - Blood cultures 5/29 NGTD  
- S/P dental extraction on 6/4/20  
- Will need to go for AVR replacement when scheduled - Continue Ceftriaxone - Appreciate all specialists  
   
Active Problems:  
  NSTEMI (non-ST elevated myocardial infarction) ([facility] (5/16/2020) - Resolved - No acute CP  
- Continue ASA - Continue Lipitor - Continue Lisinopril  
   
  Petechiae (5/16/2020) - Due to #1  
   
  S/P aortic valve replacement (4/1/2018) - Will need replacement again  
   
  CAD (coronary artery disease) ()  
- No acute CP  
- Continue ASA - Continue Lisinopril  
- Continue Lipitor  
   
  Essential hypertension (9/21/2018) - Stable - Continue current medications    
  Diabetes mellitus, type II ([facility] (5/20/2020) - A1C 7  
- Continue Humalog SSI  
   
  Vitamin D deficiency (5/20/2020)    
  Diastolic CHF, chronic ([facility] (5/7/2018) - No acute issues - Continue Lasix IV  
- Continue Lisinopril  
- Continue Coreg  
   
  Anemia (5/16/2020) - Stable  
   
  DIEGO on CPAP ()  
   
  Dyslipidemia ()  
- Continue Lipitor  
   
  COVID-19 ruled out (5/16/2020)  
  Today's Plan: Continue Ceftriaxone.  Await AVR replacement.  
   
DIET FULL LIQUID  
DIET NUTRITIONAL SUPPLEMENTS All Meals; Glucerna Shake ( )  
   
DVT Prophylaxis: SCDs  
   
 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 BC clear 5/24, 5/29.      
\" Dental caries s/p extraction 6/4  
\" Anemia of acute disease \" Hx AVR 2018 DM, A1c 7 PLan  
Continue ceftriaxone 2g IV Q 24 hrs, plan for six weeks from repeat surgery. CV Surgery has seen and plans Echo today and AVR later this week Anti Infectives CTX 5/23- Gentamicin 5/27-5/29 Patient seen, examined, and discussed with Nurse Practitioner. Agree with exam/assessment/plan. Teeth have been extracted. Anticipating surgery later this week; repeating Echo today ECHO REPORT  
SUMMARY:  
   
-  Left ventricle: The ventricle was mildly to moderately dilated. Systolic  
function was moderately to markedly reduced. Ejection fraction was estimated   
in  
the range of 30 % to 40 %. There was severe hypokinesis of the basal-mid  
anterior, basal-mid inferior, basal-mid inferolateral, and basal-mid  
anterolateral wall(s).    
-  Right ventricle: The ventricle was dilated. Systolic function was mildly  
reduced. Estimated peak pressure was in the range of 40-45 mmHg.  
   
-  Aortic valve: A 25mm Magna Ease AVR bioprosthetic was present. DI: 0.24. Acceleration time: 63ms. There is evidence of a small mobile mass on the LVOT  
side of the aortic valve prosthesis, consistent with bioprosthetic valvular  
vegetation. Thickening of leaflets. There was moderate to severe stenosis. The  
mean pressure gradient was 20.57 mmHg.  
   
-  Mitral valve: There was mild to moderate regurgitation. Diastolic MR   
noted,  
may be consequence of first degree AV block. There was no evidence for  
vegetation.  
   
-  Tricuspid valve: There was moderate to severe regurgitation. Diastolic  
tricuspid reurgitation.  There was no evidence for vegetation.  
   
 Yana Doan, systemic arteries: There was mild dilatation of the ascending   
aorta,  
37 mm.  
  
   
Infective Endocarditis - Care Day 18 (6/7/2020) by Zoie Byrne RN  
 
   
Review Status Review Entered Completed 6/9/2020 14:02  
   
Criteria Review Care Day: 18 Care Date: 6/7/2020 Level of Care: Telemetry Guideline Day 6 Clinical Status   
(X) * Afebrile   
(X) * Hemodynamic stability   
(X) * No evidence of heart failure, emboli, arrhythmia, abscess, or renal failure ( ) * No surgery needed   
(X) * Microbiologic studies completed   
(X) * Repeat blood cultures negative ( ) * Discharge plans and education understood Activity ( ) * Ambulatory Routes   
(X) * Oral hydration, medications, [H] and diet Interventions ( ) * Access for outpatient antibiotic administration placed or not necessary Medications (X) IV antibiotics * Milestone Additional Notes 6/7  
  
98.4 92 18 97% 115/72 r/a  
  
6/7/2020 06:12 6/7/2020 11:34 6/7/2020 16:33 6/7/2020 21:31 GLUCOSE,FAST -  (H) 139 (H) 94 130 (H) Orders  Tele IP, Full Code, VS , Cardiac Monitoring, Droplet plus, DROPLET PLUS   
PRECAUTIONS, STRICT I&O, CARDIO CONSULT,CM CONSULT, AC & HS BS, PICC Consult, Hypoglycemic Protocol, FL DIET, SCDS, infectious disease consult  Pharmacy to dose Ben Marivel,  Consult to oral surgery, Meds NS @100, VIt C 500 mg pox1, ASA 81 mg po x1, Lipitor 20 mg po x1, Rocephin 2 gm IV x1, Lovenox 40 mg sc x1, Ferrous sulfate 325 mg pox1, Lexapro 10 mg po x1, Lasix 40 mg IV x1, Toprol 25 mg po x2, Lisinopril 2.5 mg po x2, Aldactone 25 mg p ox1 KCL 20 meq po x2, Coreg 3.125 mg pox2, Norco 5/325 mg po x1, Cardio Note 1) Strep. mutans bAVR endocarditis 2) S/p embolic obtuse marginal branch of the left circumflex occlusion due to #1 3) Cardiomyopathy EF 40% 5/17 due to #1 and #2 4) Diabetic 5) Poor dentition, s/p extraction 6/4  
   
////  
   
Dec feso4 q d Add vit c  
 Await [facility]  
   
IM NOTE  
  
6/7 - He feels good. Slept well. Mouth feels good today. Has been walking hallways. Denies CP/SOB. Denies F/C/N/V.  
   
Physical Exam:   
GENERAL: alert, cooperative, no distress, appears stated age EYE: conjunctivae/corneas clear. PERRL. THROAT & NECK: normal and no erythema or exudates noted. LUNG: clear to auscultation bilaterally HEART: regular rate and rhythm, S1S2, no murmur, no JVD ABDOMEN: soft, non-tender, non-distended. Bowel sounds normal.   
EXTREMITIES:  No edema, 2+ pedal/radial pulses bilaterally SKIN: no rash or abnormalities NEUROLOGIC: A&Ox3. Cranial nerves 2-12 grossly intact.  
   
  
Principal Problem:  
  Endocard[facility] valve ([facility] (6/2/2020) - Mobile mass seen on ECHO  
- Repeat ECHO on 6/8 - Blood cultures positive for Strep Mutans - Blood cultures 5/29 NGTD  
- S/P dental extraction on 6/4/20  
- Will need to go for AVR replacement when scheduled - Continue Ceftriaxone - Appreciate all specialists  
   
Active Problems:  
  NSTEMI (non-ST elevated myocardial infarction) ([facility] (5/16/2020) - Resolved - No acute CP  
- Continue ASA - Continue Lipitor - Continue Lisinopril  
   
  Petechiae (5/16/2020) - Due to #1  
   
  S/P aortic valve replacement (4/1/2018) - Will need replacement again  
   
  CAD (coronary artery disease) ()  
- No acute CP  
- Continue ASA - Continue Lisinopril  
- Continue Lipitor  
   
  Essential hypertension (9/21/2018) - Stable - Continue current medications    
  Diabetes mellitus, type II ([facility] (5/20/2020) - A1C 7  
- Continue Humalog SSI  
   
  Vitamin D deficiency (5/20/2020)    
  Diastolic CHF, chronic ([facility] (5/7/2018) - No acute issues - Continue Lasix IV  
- Continue Lisinopril  
- Continue Coreg  
   
  Anemia (5/16/2020) - Stable  
   
  DIEGO on CPAP ()  
   
  Dyslipidemia ()  
- Continue Lipitor  
   
  COVID-19 ruled out (5/16/2020)  
   
 Today's Plan: Continue Ceftriaxone. Await AVR replacement.  
   
DIET FULL LIQUID  
DIET NUTRITIONAL SUPPLEMENTS All Meals; Glucervalerie Bragg ( )  
   
DVT Prophylaxis: SCDs  
   
Discharge Plan: TBD Nurses Notes Patient's urine has turned to a green/brown color. Dr. Tony Cazares made aware. No new orders at this time.  
  
   
Infective Endocarditis - Care Day 17 (6/6/2020) by Micheline Byrne RN  
 
   
Review Status Review Entered Completed 6/9/2020 13:58  
   
Criteria Review Care Day: 17 Care Date: 6/6/2020 Level of Care: Telemetry Guideline Day 6 Clinical Status   
(X) * Afebrile   
(X) * Hemodynamic stability   
(X) * No evidence of heart failure, emboli, arrhythmia, abscess, or renal failure ( ) * No surgery needed   
(X) * Microbiologic studies completed   
(X) * Repeat blood cultures negative ( ) * Discharge plans and education understood Activity ( ) * Ambulatory Routes   
(X) * Oral hydration, medications, [H] and diet Interventions ( ) * Access for outpatient antibiotic administration placed or not necessary Medications (X) IV antibiotics * Milestone Additional Notes 6/6  
  
98.5 83 16 97%  116/69 r/a  
  
6/6/2020 06:12 6/6/2020 10:48 6/6/2020 15:50 6/6/2020 21:13 GLUCOSE,FAST -  (H) 140 (H) 136 (H) 117 (H) Orders  Tele IP, Full Code, VS , Cardiac Monitoring, Droplet plus, DROPLET PLUS   
PRECAUTIONS, STRICT I&O, CARDIO CONSULT,CM CONSULT, AC & HS BS, PICC Consult, Hypoglycemic Protocol, FL DIET, SCDS, infectious disease consult  Pharmacy to dose Kingsley Brown,  Consult to oral surgery, Meds NS @100, ASA 81 mg po x1, Lipitor 20 mg po x1, Rocephin 2 gm IV x1, Lovenox 40 mg sc x1, Ferrous sulfate 325 mg pox1, Lexapro 10 mg po x1, Lasix 40 mg IV x1, Toprol 25 mg po x2, Lisinopril 2.5 mg po x2, Aldactone 25 mg p ox1 KCL 20 meq po x2, Coreg 3.125 mg pox2, Norco 5/325 mg po x1, Cardio Note Nstemi, cath 5/19, OM occluded, ? embolic Step. mutans bAVR endocarditis Diabetic Cardiomyopathy, ef 40% 5/17 Poor dentition, s/p oral surgery 6/4  
/// Stable Meds reviewed. Off Lasix Ad lovenox 40 Stop kcl IM NOTE  
  
6/6 - He feels good. Slept well. Mouth feels better. Has been walking hallways. Denies CP/SOB. Denies F/C/N/V Physical Exam:   
  
GENERAL: alert, cooperative, no distress, appears stated age EYE: conjunctivae/corneas clear. PERRL. THROAT & NECK: normal and no erythema or exudates noted. LUNG: clear to auscultation bilaterally HEART: regular rate and rhythm, S1S2, no murmur, no JVD ABDOMEN: soft, non-tender, non-distended. Bowel sounds normal.   
EXTREMITIES:  No edema, 2+ pedal/radial pulses bilaterally SKIN: no rash or abnormalities NEUROLOGIC: A&Ox3. Cranial nerves 2-12 grossly intact.  
   
A/P Principal Problem:  
  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  
- Blood cultures positive for Strep Mutans - Blood cultures 5/29 NGTD  
- S/P dental extraction on 6/4/20  
- Will need to go for AVR replacement when scheduled - Continue Ceftriaxone - Appreciate all specialists  
   
Active Problems:  
  NSTEMI (non-ST elevated myocardial infarction) ([facility] (5/16/2020) - Resolved - No acute CP  
- Continue ASA - Continue Lipitor - Continue Lisinopril  
   
  Petechiae (5/16/2020) - Due to #1  
   
  S/P aortic valve replacement (4/1/2018) - Will need replacement again  
   
  CAD (coronary artery disease) ()  
- No acute CP  
- Continue ASA - Continue Lisinopril  
- Continue Lipitor  
   
  Essential hypertension (9/21/2018) - Stable - Continue current medications    
  Diabetes mellitus, type II ([facility] [address] (5/20/2020) - A1C 7  
- Continue Humalog SSI  
   
  Vitamin D deficiency (5/20/2020)    
  Diastolic CHF, chronic ([facility] (5/7/2018) - No acute issues - Continue Lasix IV  
- Continue Lisinopril  
- Continue Coreg  
   
   Anemia (5/16/2020) - Stable  
   
  DIEGO on CPAP ()  
   
  Dyslipidemia ()  
- Continue Lipitor  
   
  COVID-19 ruled out (5/16/2020)  
  Today's Plan: Continue Ceftriaxone. Await AVR replacement.  
   
DIET FULL LIQUID  
DIET NUTRITIONAL SUPPLEMENTS All Meals; Glucervalerie Bragg ( )  
   
DVT Prophylaxis: SCDs  
   
Discharge Plan: TBD Cardio thoracic Note CTS- dental extraction 6/4, recheck ECHO Monday, ? Redo-AVR next week

## 2020-06-09 NOTE — PROGRESS NOTES
Infectious Disease Progress Note    Today's Date: 2020   Admit Date: 2020    Impression:   · Strep mutans prosthetic AV endocarditis (, ), mobile mass on YARON restricting valve leaflets. BC clear , . · Dental caries s/p extraction   · Anemia of acute disease  · Hx AVR   · DM, A1c 7    Plan:   Continue ceftriaxone 2g IV with plan for six weeks of treatment. Based on CT surgery notes, will treat with antibiotics for the six weeks, and anticipate surgery after the endocarditis has been treated. ID Plan of Care:   Routine PICC care  Ceftriaxone 2g IV Q 24 hrs, EOT 7/10/2020  Q Monday labs: CBC with diff, serum creatinine, LFTs  Please fax labs to [phone], [facility]  Follow up appointments with ID will be scheduled. Anti-infectives:   CTX -  Gentamicin -    History/Subjective: Interval History: seen in his room prior to walk with RN. Denies nausea, vomiting, diarrhea, fevers, chills, sweats, headaches, dizziness, shortness of breath or dysuria. Feels steady. Repeat TTE showed small mobile mass on prosthetic valve, concern for first degree AV block and dilatation of ascending aorta. Per CT surgery, redo AVR will be high risk because of recent MI, and would do best waiting for six weeks until MI has healed and cultures negative. Review of Systems:  Pertinent items are noted in the History of Present Illness.      Objective:     Visit Vitals  BP 92/49   Pulse 75   Temp 96.8 °F (36 °C)   Resp 20   Ht 6' 1\" (1.854 m)   Wt 63.5 kg (139 lb 14.4 oz)   SpO2 97%   BMI 18.46 kg/m²     Temp (24hrs), Av °F (36.7 °C), Min:96.8 °F (36 °C), Max:98.6 °F (37 °C)    General: Alert, no acute distress, appears stated age, well nourished and well developed, thin  Head:    Normocephalic, atraumatic  Eyes:   Anicteric sclerae, no drainage, not injected, EOMI  Mouth:  Moist mucosa, op clear, multiple missing teeth  Neck:   Supple, symmetrical, trachea midline, no JVD  Lungs: Clear without increased work of breathing or audible wheezes  CV:   Irregular rate and rhythm, 3/6 murmur  Abdomen:  Soft, non tender, not distended, active bowel sounds  Extremities:  No cyanosis or edema  Musculoskeletal: Moves all extremities with equal strength, no deformity  Skin:   No acute rash or lesions  Psych:   Alert, oriented and appropriate without evidence of thought disorder  Lines:    benign      Data Review:     CBC:   Recent Labs     06/08/20 0426   WBC 9.6   RBC 3.11*   HGB 8.3*   HCT 27.5*      GRANS 75   LYMPH 13   EOS 1     CMP:   Recent Labs     06/08/20 0427         K 4.0      CO2 29   BUN 16   CREA 1.11   CA 8.0*   AGAP 4*   AP 95   TP 6.3   ALB 2.0*   GLOB 4.3*   AGRAT 0.5*     Liver Enzymes:   Recent Labs     06/08/20 0427   TP 6.3   ALB 2.0*   AP 95     Antibiotic Monitoring:   Lab Results   Component Value Date/Time    Vancomycin,trough 11.8 05/22/2020 12:58 AM        Microbiology:     All Micro Results     Procedure Component Value Units Date/Time    CULTURE, BLOOD [[id number]] Collected:  05/29/20 0412    Order Status:  Completed Specimen:  Blood Updated:  06/03/20 0938     Special Requests: --        RIGHT  Antecubital       Culture result: NO GROWTH 5 DAYS       CULTURE, BLOOD [[id number]] Collected:  05/29/20 0405    Order Status:  Completed Specimen:  Blood Updated:  06/03/20 0938     Special Requests: --        LEFT  Antecubital       Culture result: NO GROWTH 5 DAYS       CULTURE, BLOOD [[id number]] Collected:  05/24/20 1321    Order Status:  Completed Specimen:  Blood Updated:  05/29/20 0723     Special Requests: --        LEFT  Antecubital       Culture result: NO GROWTH 5 DAYS       CULTURE, BLOOD [[id number]]  (Abnormal)  (Susceptibility) Collected:  05/20/20 1424    Order Status:  Completed Specimen:  Blood Updated:  05/26/20 0834     Special Requests: --        RIGHT  FOREARM       GRAM STAIN GRAM POSITIVE COCCI         PEDIATRIC BOTTLE RESULTS VERIFIED, PHONED TO AND READ BACK BY Tosha Landers AT [facility] ON 5.24.20             Culture result: STREPTOCOCCUS MUTANS         REFER TO BIOFIRE PANEL ACC [id number]    CULTURE, BLOOD [[id number]] Collected:  05/20/20 1434    Order Status:  Completed Specimen:  Blood Updated:  05/25/20 0633     Special Requests: --        LEFT  Antecubital       Culture result: NO GROWTH 5 DAYS       BLOOD CULTURE ID PANEL [[id number]]  (Abnormal) Collected:  05/24/20 0105    Order Status:  Completed Specimen:  Blood Updated:  05/24/20 0647     Acc. no. from Micro Order [id number]     Streptococcus Detected        Comment: RESULTS VERIFIED, PHONED TO AND READ BACK BY  Tosha Landers RN @7902 ON 5/24/20 AK. INTERPRETATION       Gram positive cocci. Identified by realtime PCR as Streptococcus species (not agalactiae, pyogenes, or pneumoniae). Comment: Recommend discontinuing IV vancomycin starting cefazolin or nafcillin if patient not on beta-lactam therapy. Infectious Diseases Consult recommended in adult patients. THIS TEST DOES NOT REPLACE SENSITIVITY TESTING. CULTURE, BLOOD [[id number]]  (Abnormal) Collected:  05/18/20 1944    Order Status:  Completed Specimen:  Blood Updated:  05/23/20 0720     Special Requests: --        RIGHT  HAND       GRAM STAIN GRAM POSITIVE COCCI         PEDIATRIC BOTTLE               CRITICAL RESULT NOT CALLED DUE TO PREVIOUS NOTIFICATION OF CRITICAL RESULT WITHIN THE LAST 24 HOURS. Culture result:       ALPHA STREPTOCOCCUS, NOT S.  PNEUMONIAE            FOR ID AND SUSCEPTIBILITY REFER TO CULTURE NO ACC [id number]    CULTURE, BLOOD [[id number]]  (Abnormal)  (Susceptibility) Collected:  05/18/20 1938    Order Status:  Completed Specimen:  Blood Updated:  05/23/20 0703     Special Requests: --        RIGHT  ARM       GRAM STAIN GRAM POS COCCI IN CHAINS         AEROBIC BOTTLE POSITIVE               RESULTS VERIFIED, PHONED TO AND READ BACK BY Radha Donnelly, RN @ 1120 ON 5/20/20 BY [facility]           Culture result: STREPTOCOCCUS MUTANS         REFER TO [facility] PANEL ACCESSION [id number]    CULTURE, BLOOD [[id number]]     Order Status:  Canceled Specimen:  Blood     CULTURE, BLOOD [[id number]]     Order Status:  Canceled Specimen:  Blood     BLOOD CULTURE ID PANEL [[id number]]  (Abnormal) Collected:  05/18/20 1938    Order Status:  Completed Specimen:  Blood Updated:  05/20/20 1117     Acc. no. from Micro Order [id number]     Streptococcus Detected        Comment: RESULTS VERIFIED, PHONED TO AND READ BACK BY  Neha Estes RN @ 1120 ON 5/20/20 BY [facility]          INTERPRETATION       Gram positive cocci. Identified by realtime PCR as Streptococcus species (not agalactiae, pyogenes, or pneumoniae). Comment: Consider discontinuation of IV vancomycin and using an anti-streptococcal beta-lactam (ceftriaxone/cefotaxime (peds))       EMERGENT DISEASE PANEL [[id number]] Collected:  05/16/20 1946    Order Status:  Completed Specimen:  Not Specified Updated:  05/18/20 1614     Emergent disease panel Not detected        Comment: Performed at [place] [id number]               Imaging:     TTE 5/28  SUMMARY:     -  Left ventricle: Since last study, LV had dilated more and the function has  declined. The ventricle was moderately to markedly dilated. Systolic function  was markedly reduced. Ejection fraction was estimated in the range of 30 % to  35 %.    -  Aortic valve: A 25mm Magna Ease bioprosthetic AVR with mobile mass on LVOT  side of the prosthesis as well as thickening / restriction of the leaflets. Findings consistent with valvular vegetation.     CT Chest soft tissue neck  IMPRESSION:  1. Partially edentulous patient with periapical lucencies associated with many  of the remaining teeth. No definite odontogenic abscess present. No abscess  within the deep spaces of the neck. 2.  Interval development of a moderate right and small left pleural effusion. No  consolidation or cavitary lesion of the lungs. 3.  Changes of prior aortic valve replacement. Presumed post stenotic aneurysmal  dilatation of the ascending aorta.     Signed By: Michelle Gross NP     June 9, 2020

## 2020-06-10 ENCOUNTER — PATIENT OUTREACH (OUTPATIENT)
Dept: CASE MANAGEMENT | Age: 60
End: 2020-06-10

## 2020-06-10 NOTE — PROGRESS NOTES
Patient contacted regarding recent discharge and COVID-19 risk. Discussed COVID-19 related testing which was not done at this time. Test results were not done. Care Transition Nurse/ Ambulatory Care Manager contacted the patient by telephone to perform post discharge assessment. Verified name and  with patient as identifiers. Patient has following risk factors of: heart failure, diabetes and HTN. CTN/ACM reviewed discharge instructions, medical action plan and red flags related to discharge diagnosis. Reviewed and educated them on any new and changed medications related to discharge diagnosis. Advised obtaining a 90-day supply of all daily and as-needed medications. Education provided regarding infection prevention, and signs and symptoms of COVID-19 and when to seek medical attention with patient who verbalized understanding. Discussed exposure protocols and quarantine from [address] you at higher risk for severe illness  and given an opportunity for questions and concerns. The patient agrees to contact the COVID-19 hotline [phone] or PCP office for questions related to their healthcare. CTN/ACM provided contact information for future reference. From CDC: Are you at higher risk for severe illness?  Wash your hands often.  Avoid close contact (6 feet, which is about two arm lengths) with people who are sick.  Put distance between yourself and other people if COVID-19 is spreading in your community.  Clean and disinfect frequently touched surfaces.  Avoid all cruise travel and non-essential air travel.  Call your healthcare professional if you have concerns about COVID-19 and your underlying condition or if you are sick.     For more information on steps you can take to protect yourself, see CDC's How to Protect Yourself      Patient/family/caregiver given information for [facility] and agrees to enroll no  Patient's preferred e-mail:  NA  Patient's preferred phone number: NA  Based on Loop alert triggers, patient will be contacted by nurse care manager for worsening symptoms. Plan for follow-up call in 7-14 days based on severity of symptoms and risk factors. Confirmed emergency contact/ healthcare decision maker as spouse. Reviewed meds, new, continued and discontinued.

## 2020-06-11 ENCOUNTER — HOSPITAL ENCOUNTER (OUTPATIENT)
Dept: INFUSION THERAPY | Age: 60
Discharge: HOME | End: 2020-06-11
Payer: COMMERCIAL

## 2020-06-11 VITALS
RESPIRATION RATE: 16 BRPM | OXYGEN SATURATION: 99 % | TEMPERATURE: 97.6 F | DIASTOLIC BLOOD PRESSURE: 63 MMHG | SYSTOLIC BLOOD PRESSURE: 106 MMHG | HEART RATE: 80 BPM

## 2020-06-11 PROCEDURE — 74011000258 HC RX REV CODE- 258: Performed by: NURSE PRACTITIONER

## 2020-06-11 PROCEDURE — 74011250636 HC RX REV CODE- 250/636: Performed by: NURSE PRACTITIONER

## 2020-06-11 PROCEDURE — 96365 THER/PROPH/DIAG IV INF INIT: CPT

## 2020-06-11 RX ORDER — SODIUM CHLORIDE 0.9 % (FLUSH) 0.9 %
10 SYRINGE (ML) INJECTION EVERY 8 HOURS
Status: DISCONTINUED | OUTPATIENT
Start: 2020-06-11 | End: 2020-06-15 | Stop reason: HOSPADM

## 2020-06-11 RX ADMIN — CEFTRIAXONE 2 G: 2 INJECTION, POWDER, FOR SOLUTION INTRAMUSCULAR; INTRAVENOUS at 16:40

## 2020-06-11 RX ADMIN — Medication 10 ML: at 17:10

## 2020-06-11 RX ADMIN — Medication 10 ML: at 16:30

## 2020-06-12 ENCOUNTER — HOSPITAL ENCOUNTER (OUTPATIENT)
Dept: INFUSION THERAPY | Age: 60
Discharge: HOME | End: 2020-06-12
Payer: COMMERCIAL

## 2020-06-12 VITALS
WEIGHT: 142 LBS | OXYGEN SATURATION: 97 % | BODY MASS INDEX: 18.73 KG/M2 | HEART RATE: 88 BPM | RESPIRATION RATE: 18 BRPM | TEMPERATURE: 98.3 F | DIASTOLIC BLOOD PRESSURE: 37 MMHG | SYSTOLIC BLOOD PRESSURE: 109 MMHG

## 2020-06-12 PROCEDURE — 96365 THER/PROPH/DIAG IV INF INIT: CPT

## 2020-06-12 PROCEDURE — 74011250636 HC RX REV CODE- 250/636: Performed by: NURSE PRACTITIONER

## 2020-06-12 PROCEDURE — 74011000258 HC RX REV CODE- 258: Performed by: NURSE PRACTITIONER

## 2020-06-12 RX ORDER — SODIUM CHLORIDE 0.9 % (FLUSH) 0.9 %
10 SYRINGE (ML) INJECTION AS NEEDED
Status: DISCONTINUED | OUTPATIENT
Start: 2020-06-12 | End: 2020-06-16 | Stop reason: HOSPADM

## 2020-06-12 RX ADMIN — CEFTRIAXONE SODIUM 2 G: 2 INJECTION, POWDER, FOR SOLUTION INTRAMUSCULAR; INTRAVENOUS at 17:14

## 2020-06-12 RX ADMIN — Medication 10 ML: at 17:07

## 2020-06-12 RX ADMIN — Medication 10 ML: at 17:39

## 2020-06-12 NOTE — PROGRESS NOTES
Arrived to the [facility]. Kraig completed. Patient tolerated well. Any issues or concerns during appointment: none. Patient aware of next infusion appointment on: 6/13/2020 @ [phone]. Discharged ambulatory to private residence.

## 2020-06-13 ENCOUNTER — HOSPITAL ENCOUNTER (OUTPATIENT)
Dept: INFUSION THERAPY | Age: 60
Discharge: HOME | End: 2020-06-13
Payer: COMMERCIAL

## 2020-06-13 VITALS
DIASTOLIC BLOOD PRESSURE: 63 MMHG | SYSTOLIC BLOOD PRESSURE: 99 MMHG | OXYGEN SATURATION: 99 % | HEART RATE: 82 BPM | RESPIRATION RATE: 16 BRPM | TEMPERATURE: 98.1 F

## 2020-06-13 PROCEDURE — 96365 THER/PROPH/DIAG IV INF INIT: CPT

## 2020-06-13 PROCEDURE — 74011250636 HC RX REV CODE- 250/636: Performed by: NURSE PRACTITIONER

## 2020-06-13 PROCEDURE — 74011000258 HC RX REV CODE- 258: Performed by: NURSE PRACTITIONER

## 2020-06-13 RX ORDER — SODIUM CHLORIDE 0.9 % (FLUSH) 0.9 %
10 SYRINGE (ML) INJECTION AS NEEDED
Status: DISCONTINUED | OUTPATIENT
Start: 2020-06-13 | End: 2020-06-17 | Stop reason: HOSPADM

## 2020-06-13 RX ADMIN — Medication 10 ML: at 16:34

## 2020-06-13 RX ADMIN — Medication 10 ML: at 15:41

## 2020-06-13 RX ADMIN — CEFTRIAXONE 2 G: 2 INJECTION, POWDER, FOR SOLUTION INTRAMUSCULAR; INTRAVENOUS at 15:59

## 2020-06-13 NOTE — PROGRESS NOTES
Arrived to the [facility]. Rocephin completed. Patient tolerated without difficulty. Any issues or concerns during appointment: none. Patient aware of next infusion appointment on 6/14   Discharged to home.

## 2020-06-14 ENCOUNTER — HOSPITAL ENCOUNTER (OUTPATIENT)
Dept: INFUSION THERAPY | Age: 60
Discharge: HOME | End: 2020-06-14
Payer: COMMERCIAL

## 2020-06-14 VITALS
TEMPERATURE: 97.7 F | RESPIRATION RATE: 18 BRPM | OXYGEN SATURATION: 100 % | HEART RATE: 78 BPM | DIASTOLIC BLOOD PRESSURE: 65 MMHG | SYSTOLIC BLOOD PRESSURE: 96 MMHG

## 2020-06-14 PROCEDURE — 74011000258 HC RX REV CODE- 258: Performed by: NURSE PRACTITIONER

## 2020-06-14 PROCEDURE — 74011250636 HC RX REV CODE- 250/636: Performed by: NURSE PRACTITIONER

## 2020-06-14 PROCEDURE — 96365 THER/PROPH/DIAG IV INF INIT: CPT

## 2020-06-14 RX ADMIN — CEFTRIAXONE 2 G: 2 INJECTION, POWDER, FOR SOLUTION INTRAMUSCULAR; INTRAVENOUS at 09:26

## 2020-06-15 ENCOUNTER — HOSPITAL ENCOUNTER (OUTPATIENT)
Dept: INFUSION THERAPY | Age: 60
Discharge: HOME | End: 2020-06-15
Payer: COMMERCIAL

## 2020-06-15 VITALS
HEART RATE: 76 BPM | DIASTOLIC BLOOD PRESSURE: 56 MMHG | RESPIRATION RATE: 18 BRPM | SYSTOLIC BLOOD PRESSURE: 102 MMHG | TEMPERATURE: 97.9 F

## 2020-06-15 LAB
ALBUMIN SERPL-MCNC: 2.6 G/DL (ref 3.2–4.6)
ALBUMIN/GLOB SERPL: 0.5 {RATIO} (ref 1.2–3.5)
ALP SERPL-CCNC: 92 U/L (ref 50–136)
ALT SERPL-CCNC: 53 U/L (ref 12–65)
AST SERPL-CCNC: 39 U/L (ref 15–37)
BASOPHILS # BLD: 0.1 K/UL (ref 0–0.2)
BASOPHILS NFR BLD: 1 % (ref 0–2)
BILIRUB DIRECT SERPL-MCNC: 0.1 MG/DL
BILIRUB SERPL-MCNC: 0.3 MG/DL (ref 0.2–1.1)
CREAT SERPL-MCNC: 1.4 MG/DL (ref 0.8–1.5)
DIFFERENTIAL METHOD BLD: ABNORMAL
EOSINOPHIL # BLD: 0.2 K/UL (ref 0–0.8)
EOSINOPHIL NFR BLD: 3 % (ref 0.5–7.8)
ERYTHROCYTE [DISTWIDTH] IN BLOOD BY AUTOMATED COUNT: 17.6 % (ref 11.9–14.6)
GLOBULIN SER CALC-MCNC: 5 G/DL (ref 2.3–3.5)
HCT VFR BLD AUTO: 29.8 % (ref 41.1–50.3)
HGB BLD-MCNC: 9.1 G/DL (ref 13.6–17.2)
IMM GRANULOCYTES # BLD AUTO: 0.2 K/UL (ref 0–0.5)
IMM GRANULOCYTES NFR BLD AUTO: 2 % (ref 0–5)
LYMPHOCYTES # BLD: 1.3 K/UL (ref 0.5–4.6)
LYMPHOCYTES NFR BLD: 16 % (ref 13–44)
MCH RBC QN AUTO: 27 PG (ref 26.1–32.9)
MCHC RBC AUTO-ENTMCNC: 30.5 G/DL (ref 31.4–35)
MCV RBC AUTO: 88.4 FL (ref 79.6–97.8)
MONOCYTES # BLD: 0.9 K/UL (ref 0.1–1.3)
MONOCYTES NFR BLD: 11 % (ref 4–12)
NEUTS SEG # BLD: 5.5 K/UL (ref 1.7–8.2)
NEUTS SEG NFR BLD: 68 % (ref 43–78)
NRBC # BLD: 0 K/UL (ref 0–0.2)
PLATELET # BLD AUTO: 206 K/UL (ref 150–450)
PMV BLD AUTO: 8.6 FL (ref 9.4–12.3)
PROT SERPL-MCNC: 7.6 G/DL (ref 6.3–8.2)
RBC # BLD AUTO: 3.37 M/UL (ref 4.23–5.67)
WBC # BLD AUTO: 8.1 K/UL (ref 4.3–11.1)

## 2020-06-15 PROCEDURE — 74011250636 HC RX REV CODE- 250/636: Performed by: NURSE PRACTITIONER

## 2020-06-15 PROCEDURE — 74011000258 HC RX REV CODE- 258: Performed by: NURSE PRACTITIONER

## 2020-06-15 PROCEDURE — 85025 COMPLETE CBC W/AUTO DIFF WBC: CPT

## 2020-06-15 PROCEDURE — 96365 THER/PROPH/DIAG IV INF INIT: CPT

## 2020-06-15 PROCEDURE — 82565 ASSAY OF CREATININE: CPT

## 2020-06-15 PROCEDURE — 80076 HEPATIC FUNCTION PANEL: CPT

## 2020-06-15 RX ORDER — SODIUM CHLORIDE 0.9 % (FLUSH) 0.9 %
10-20 SYRINGE (ML) INJECTION AS NEEDED
Status: DISCONTINUED | OUTPATIENT
Start: 2020-06-15 | End: 2020-06-19 | Stop reason: HOSPADM

## 2020-06-15 RX ADMIN — CEFTRIAXONE 2 G: 2 INJECTION, POWDER, FOR SOLUTION INTRAMUSCULAR; INTRAVENOUS at 15:47

## 2020-06-15 RX ADMIN — Medication 20 ML: at 16:19

## 2020-06-15 NOTE — PROGRESS NOTES
Arrived to infusion. Rocephin completed and labs drawn via PICC. Patient tolerated well. Denies new issues or concerns. Next infusion appointment is scheduled for 6/16/20 at [address] [place] [address].   Discharged ambulatory with self.

## 2020-06-16 ENCOUNTER — HOSPITAL ENCOUNTER (OUTPATIENT)
Dept: INFUSION THERAPY | Age: 60
Discharge: HOME | End: 2020-06-16
Payer: COMMERCIAL

## 2020-06-16 VITALS
SYSTOLIC BLOOD PRESSURE: 91 MMHG | DIASTOLIC BLOOD PRESSURE: 46 MMHG | TEMPERATURE: 97.9 F | OXYGEN SATURATION: 98 % | HEART RATE: 68 BPM | RESPIRATION RATE: 18 BRPM

## 2020-06-16 PROCEDURE — 74011000258 HC RX REV CODE- 258: Performed by: NURSE PRACTITIONER

## 2020-06-16 PROCEDURE — 96365 THER/PROPH/DIAG IV INF INIT: CPT

## 2020-06-16 PROCEDURE — 74011250636 HC RX REV CODE- 250/636: Performed by: NURSE PRACTITIONER

## 2020-06-16 RX ORDER — SODIUM CHLORIDE 0.9 % (FLUSH) 0.9 %
10-30 SYRINGE (ML) INJECTION AS NEEDED
Status: DISCONTINUED | OUTPATIENT
Start: 2020-06-16 | End: 2020-06-20 | Stop reason: HOSPADM

## 2020-06-16 RX ADMIN — CEFTRIAXONE 2 G: 2 INJECTION, POWDER, FOR SOLUTION INTRAMUSCULAR; INTRAVENOUS at 09:10

## 2020-06-16 RX ADMIN — Medication 10 ML: at 09:45

## 2020-06-16 RX ADMIN — Medication 10 ML: at 09:10

## 2020-06-16 NOTE — PROGRESS NOTES
Arrived to the [facility]. Rocephin completed.    Provided education on Rocephin completed    Patient instructed to report any side affects to ordering provider-Rosemary Galvin NP  Patient tolerated well  Any issues or concerns during appointment: No  Patient aware of next infusion appointment on Wednesday,June 17th @ 1130  Discharged home ambulatory

## 2020-06-17 ENCOUNTER — HOSPITAL ENCOUNTER (OUTPATIENT)
Dept: INFUSION THERAPY | Age: 60
Discharge: HOME | End: 2020-06-17
Payer: COMMERCIAL

## 2020-06-17 VITALS
HEART RATE: 82 BPM | DIASTOLIC BLOOD PRESSURE: 56 MMHG | TEMPERATURE: 98.5 F | SYSTOLIC BLOOD PRESSURE: 85 MMHG | RESPIRATION RATE: 18 BRPM | OXYGEN SATURATION: 97 %

## 2020-06-17 PROCEDURE — 74011250636 HC RX REV CODE- 250/636: Performed by: NURSE PRACTITIONER

## 2020-06-17 PROCEDURE — 74011000258 HC RX REV CODE- 258: Performed by: NURSE PRACTITIONER

## 2020-06-17 PROCEDURE — 96365 THER/PROPH/DIAG IV INF INIT: CPT

## 2020-06-17 RX ORDER — SODIUM CHLORIDE 0.9 % (FLUSH) 0.9 %
10 SYRINGE (ML) INJECTION AS NEEDED
Status: DISCONTINUED | OUTPATIENT
Start: 2020-06-17 | End: 2020-06-21 | Stop reason: HOSPADM

## 2020-06-17 RX ADMIN — Medication 10 ML: at 14:58

## 2020-06-17 RX ADMIN — CEFTRIAXONE 2 G: 2 INJECTION, POWDER, FOR SOLUTION INTRAMUSCULAR; INTRAVENOUS at 14:28

## 2020-06-17 NOTE — PROGRESS NOTES
Arrived to the [facility]. Rocephin completed. Patient tolerated well. Any issues or concerns during appointment: none. Patient aware of next infusion appointment on 6/18/20 at 0930. Discharged ambulatory.       Vira Real RN

## 2020-06-18 ENCOUNTER — HOSPITAL ENCOUNTER (OUTPATIENT)
Dept: INFUSION THERAPY | Age: 60
Discharge: HOME | End: 2020-06-18
Payer: COMMERCIAL

## 2020-06-18 VITALS — SYSTOLIC BLOOD PRESSURE: 86 MMHG | HEART RATE: 78 BPM | DIASTOLIC BLOOD PRESSURE: 53 MMHG

## 2020-06-18 DIAGNOSIS — D64.9 ANEMIA, UNSPECIFIED TYPE: ICD-10-CM

## 2020-06-18 DIAGNOSIS — E55.9 VITAMIN D DEFICIENCY: ICD-10-CM

## 2020-06-18 DIAGNOSIS — R79.89 HIGH PLASMA VON WILLEBRAND FACTOR (VWF): ICD-10-CM

## 2020-06-18 DIAGNOSIS — D50.9 IRON DEFICIENCY ANEMIA, UNSPECIFIED IRON DEFICIENCY ANEMIA TYPE: ICD-10-CM

## 2020-06-18 PROBLEM — R79.1 ELEVATED FACTOR VIII LEVEL: Status: ACTIVE | Noted: 2020-06-18

## 2020-06-18 LAB
FERRITIN SERPL-MCNC: 986 NG/ML (ref 8–388)
IRON SATN MFR SERPL: 13 %
IRON SERPL-MCNC: 30 UG/DL (ref 35–150)
TIBC SERPL-MCNC: 238 UG/DL (ref 250–450)
TSH SERPL DL<=0.005 MIU/L-ACNC: 1.91 UIU/ML (ref 0.36–3.74)

## 2020-06-18 PROCEDURE — 74011000258 HC RX REV CODE- 258: Performed by: NURSE PRACTITIONER

## 2020-06-18 PROCEDURE — 86431 RHEUMATOID FACTOR QUANT: CPT

## 2020-06-18 PROCEDURE — 96365 THER/PROPH/DIAG IV INF INIT: CPT

## 2020-06-18 PROCEDURE — 83540 ASSAY OF IRON: CPT

## 2020-06-18 PROCEDURE — 84443 ASSAY THYROID STIM HORMONE: CPT

## 2020-06-18 PROCEDURE — 82306 VITAMIN D 25 HYDROXY: CPT

## 2020-06-18 PROCEDURE — 86430 RHEUMATOID FACTOR TEST QUAL: CPT

## 2020-06-18 PROCEDURE — 74011250636 HC RX REV CODE- 250/636: Performed by: NURSE PRACTITIONER

## 2020-06-18 PROCEDURE — 82180 ASSAY OF ASCORBIC ACID: CPT

## 2020-06-18 PROCEDURE — 85240 CLOT FACTOR VIII AHG 1 STAGE: CPT

## 2020-06-18 PROCEDURE — 82728 ASSAY OF FERRITIN: CPT

## 2020-06-18 RX ORDER — SODIUM CHLORIDE 0.9 % (FLUSH) 0.9 %
10-30 SYRINGE (ML) INJECTION AS NEEDED
Status: DISCONTINUED | OUTPATIENT
Start: 2020-06-18 | End: 2020-06-22 | Stop reason: HOSPADM

## 2020-06-18 RX ADMIN — Medication 10 ML: at 09:05

## 2020-06-18 RX ADMIN — Medication 10 ML: at 09:50

## 2020-06-18 RX ADMIN — CEFTRIAXONE SODIUM 2 G: 2 INJECTION, POWDER, FOR SOLUTION INTRAMUSCULAR; INTRAVENOUS at 09:15

## 2020-06-18 NOTE — PROGRESS NOTES
Arrived to the [facility]. Rocephin completed. Provided education on Rocephin   Patient instructed to report any side affects to ordering provider. Patient tolerated well   Any issues or concerns during appointment: Yes-patient was told @ 's office that he needed to have additional labs drawn today. Registration to link labs and patient to have drawn inport area downstairs  Patient aware of next infusion appointment on Friday,June 19th @ 0900  Discharged home ambulatory

## 2020-06-19 ENCOUNTER — HOSPITAL ENCOUNTER (OUTPATIENT)
Dept: INFUSION THERAPY | Age: 60
Discharge: HOME | End: 2020-06-19
Payer: COMMERCIAL

## 2020-06-19 ENCOUNTER — HOSPITAL ENCOUNTER (OUTPATIENT)
Dept: LAB | Age: 60
Discharge: HOME | End: 2020-06-19
Payer: COMMERCIAL

## 2020-06-19 VITALS
HEART RATE: 75 BPM | SYSTOLIC BLOOD PRESSURE: 90 MMHG | OXYGEN SATURATION: 99 % | RESPIRATION RATE: 18 BRPM | DIASTOLIC BLOOD PRESSURE: 55 MMHG | TEMPERATURE: 98 F

## 2020-06-19 LAB
25(OH)D3+25(OH)D2 SERPL-MCNC: 35.4 NG/ML (ref 30–100)
CREAT SERPL-MCNC: 1.5 MG/DL (ref 0.8–1.5)
FACT VIII ACT/NOR PPP: 202 % (ref 56–140)
INTERPRETATION, 910378, CSIR1: ABNORMAL
RHEUMATOID FACT SER QL LA: POSITIVE
RHEUMATOID FACT TITR SER LA: NORMAL {TITER}
VWF AG ACT/NOR PPP IA: 292 % (ref 50–200)
VWF:RCO ACT/NOR PPP PL AGG: 264 % (ref 50–200)

## 2020-06-19 PROCEDURE — 82565 ASSAY OF CREATININE: CPT

## 2020-06-19 PROCEDURE — 74011000258 HC RX REV CODE- 258: Performed by: NURSE PRACTITIONER

## 2020-06-19 PROCEDURE — 96365 THER/PROPH/DIAG IV INF INIT: CPT

## 2020-06-19 PROCEDURE — 74011250636 HC RX REV CODE- 250/636: Performed by: NURSE PRACTITIONER

## 2020-06-19 RX ORDER — SODIUM CHLORIDE 0.9 % (FLUSH) 0.9 %
10-30 SYRINGE (ML) INJECTION AS NEEDED
Status: DISCONTINUED | OUTPATIENT
Start: 2020-06-19 | End: 2020-06-23 | Stop reason: HOSPADM

## 2020-06-19 RX ADMIN — CEFTRIAXONE 2 G: 2 INJECTION, POWDER, FOR SOLUTION INTRAMUSCULAR; INTRAVENOUS at 09:10

## 2020-06-19 RX ADMIN — Medication 10 ML: at 09:45

## 2020-06-19 RX ADMIN — Medication 10 ML: at 09:10

## 2020-06-19 NOTE — PROGRESS NOTES
Arrived to the [facility]. Rocephin completed.    Provided education on Rocephin-patient has been receiving this medication  Patient instructed to report any side affects to ordering provider-Rosemary Galvin NP  Patient tolerated well  Any issues or concerns during appointment:No  Patient aware of next infusion appointment on Saturday,June 20th @ 0900  Discharged home ambulatory

## 2020-06-20 ENCOUNTER — HOSPITAL ENCOUNTER (OUTPATIENT)
Dept: INFUSION THERAPY | Age: 60
Discharge: HOME | End: 2020-06-20
Payer: COMMERCIAL

## 2020-06-20 VITALS
BODY MASS INDEX: 18.79 KG/M2 | TEMPERATURE: 98.1 F | RESPIRATION RATE: 18 BRPM | HEART RATE: 75 BPM | DIASTOLIC BLOOD PRESSURE: 55 MMHG | SYSTOLIC BLOOD PRESSURE: 92 MMHG | OXYGEN SATURATION: 100 % | WEIGHT: 142.4 LBS

## 2020-06-20 PROCEDURE — 96365 THER/PROPH/DIAG IV INF INIT: CPT

## 2020-06-20 PROCEDURE — 74011250636 HC RX REV CODE- 250/636: Performed by: NURSE PRACTITIONER

## 2020-06-20 PROCEDURE — 96361 HYDRATE IV INFUSION ADD-ON: CPT

## 2020-06-20 PROCEDURE — 74011000258 HC RX REV CODE- 258: Performed by: NURSE PRACTITIONER

## 2020-06-20 RX ORDER — SODIUM CHLORIDE 0.9 % (FLUSH) 0.9 %
10 SYRINGE (ML) INJECTION AS NEEDED
Status: DISCONTINUED | OUTPATIENT
Start: 2020-06-20 | End: 2020-06-24 | Stop reason: HOSPADM

## 2020-06-20 RX ADMIN — Medication 10 ML: at 09:50

## 2020-06-20 RX ADMIN — SODIUM CHLORIDE 1000 ML: 900 INJECTION, SOLUTION INTRAVENOUS at 10:23

## 2020-06-20 RX ADMIN — Medication 10 ML: at 09:05

## 2020-06-20 RX ADMIN — CEFTRIAXONE 2 G: 2 INJECTION, POWDER, FOR SOLUTION INTRAMUSCULAR; INTRAVENOUS at 09:18

## 2020-06-20 RX ADMIN — Medication 10 ML: at 11:25

## 2020-06-20 NOTE — PROGRESS NOTES
Arrived to the [facility]. Krystianephin completed. Patient tolerated well. Any issues or concerns during appointment: Patient's bp 86/54. Patient states this is his baseline in the mornings. Patient denies dizziness or lightheadedness and states his fluid intake at home is adequate. Following antibiotic infusion, patient's bp 79/48. Uziel Bautista NP notified. Per NP, patient was supposed to get fluids yesterday, but an order was not received. Order given for 1L normal saline bolus. Per NP, if blood pressure does not return to baseline (systolic ~ 90), patient should go the the ER for evaluation. After fluid bolus, patient's blood pressure improved to 92/55. Patient educated to go to the ER if he becomes symptomatic. Patient verbalizes understanding. Patient aware of next infusion appointment on 6/21/2020 (date) at 5 am (time). Discharged ambulatory with self.

## 2020-06-21 ENCOUNTER — HOSPITAL ENCOUNTER (OUTPATIENT)
Dept: INFUSION THERAPY | Age: 60
Discharge: HOME | End: 2020-06-21
Payer: COMMERCIAL

## 2020-06-21 VITALS
TEMPERATURE: 97.1 F | RESPIRATION RATE: 18 BRPM | OXYGEN SATURATION: 97 % | SYSTOLIC BLOOD PRESSURE: 131 MMHG | DIASTOLIC BLOOD PRESSURE: 49 MMHG | HEART RATE: 85 BPM

## 2020-06-21 PROCEDURE — 74011250636 HC RX REV CODE- 250/636: Performed by: NURSE PRACTITIONER

## 2020-06-21 PROCEDURE — 74011000258 HC RX REV CODE- 258: Performed by: NURSE PRACTITIONER

## 2020-06-21 PROCEDURE — 96365 THER/PROPH/DIAG IV INF INIT: CPT

## 2020-06-21 RX ORDER — SODIUM CHLORIDE 0.9 % (FLUSH) 0.9 %
10 SYRINGE (ML) INJECTION EVERY 8 HOURS
Status: DISCONTINUED | OUTPATIENT
Start: 2020-06-21 | End: 2020-06-25 | Stop reason: HOSPADM

## 2020-06-21 RX ADMIN — Medication 10 ML: at 09:58

## 2020-06-21 RX ADMIN — CEFTRIAXONE 2 G: 2 INJECTION, POWDER, FOR SOLUTION INTRAMUSCULAR; INTRAVENOUS at 09:26

## 2020-06-21 NOTE — PROGRESS NOTES
Arrived to the [facility] ambulatory. rocephin completed. Patient tolerated well. Any issues or concerns during appointment: no.  Patient aware of next infusion appointment on  6/22 at 0800. Discharged to home ambulatory.

## 2020-06-22 ENCOUNTER — HOSPITAL ENCOUNTER (OUTPATIENT)
Dept: INFUSION THERAPY | Age: 60
Discharge: HOME | End: 2020-06-22
Payer: COMMERCIAL

## 2020-06-22 VITALS
RESPIRATION RATE: 18 BRPM | DIASTOLIC BLOOD PRESSURE: 49 MMHG | OXYGEN SATURATION: 98 % | SYSTOLIC BLOOD PRESSURE: 84 MMHG | TEMPERATURE: 98.7 F | HEART RATE: 71 BPM

## 2020-06-22 LAB — VIT C SERPL-MCNC: 1.2 MG/DL (ref 0.4–2)

## 2020-06-22 PROCEDURE — 74011250636 HC RX REV CODE- 250/636: Performed by: NURSE PRACTITIONER

## 2020-06-22 PROCEDURE — 96365 THER/PROPH/DIAG IV INF INIT: CPT

## 2020-06-22 PROCEDURE — 74011000258 HC RX REV CODE- 258: Performed by: NURSE PRACTITIONER

## 2020-06-22 RX ORDER — SODIUM CHLORIDE 0.9 % (FLUSH) 0.9 %
5-10 SYRINGE (ML) INJECTION EVERY 8 HOURS
Status: DISCONTINUED | OUTPATIENT
Start: 2020-06-22 | End: 2020-06-26 | Stop reason: HOSPADM

## 2020-06-22 RX ADMIN — Medication 10 ML: at 08:33

## 2020-06-22 RX ADMIN — Medication 10 ML: at 09:06

## 2020-06-22 RX ADMIN — CEFTRIAXONE 2 G: 2 INJECTION, POWDER, FOR SOLUTION INTRAMUSCULAR; INTRAVENOUS at 08:35

## 2020-06-22 NOTE — PROGRESS NOTES
Pt. Discharged ambulatory. Tolerated infusion well. No distress noted. To call physician with any problems or concerns. Understanding voiced. To return to Infusions on 6/23/20.

## 2020-06-23 ENCOUNTER — HOSPITAL ENCOUNTER (OUTPATIENT)
Dept: INFUSION THERAPY | Age: 60
Discharge: HOME | End: 2020-06-23
Payer: COMMERCIAL

## 2020-06-23 VITALS
OXYGEN SATURATION: 100 % | SYSTOLIC BLOOD PRESSURE: 100 MMHG | DIASTOLIC BLOOD PRESSURE: 54 MMHG | TEMPERATURE: 96.8 F | HEART RATE: 63 BPM | RESPIRATION RATE: 18 BRPM

## 2020-06-23 LAB
ALBUMIN SERPL-MCNC: 2.6 G/DL (ref 3.2–4.6)
ALBUMIN/GLOB SERPL: 0.6 {RATIO} (ref 1.2–3.5)
ALP SERPL-CCNC: 69 U/L (ref 50–136)
ALT SERPL-CCNC: 62 U/L (ref 12–65)
AST SERPL-CCNC: 33 U/L (ref 15–37)
BASOPHILS # BLD: 0.1 K/UL (ref 0–0.2)
BASOPHILS NFR BLD: 2 % (ref 0–2)
BILIRUB DIRECT SERPL-MCNC: 0.1 MG/DL
BILIRUB SERPL-MCNC: 0.3 MG/DL (ref 0.2–1.1)
CREAT SERPL-MCNC: 1.4 MG/DL (ref 0.8–1.5)
DIFFERENTIAL METHOD BLD: ABNORMAL
EOSINOPHIL # BLD: 0.2 K/UL (ref 0–0.8)
EOSINOPHIL NFR BLD: 6 % (ref 0.5–7.8)
ERYTHROCYTE [DISTWIDTH] IN BLOOD BY AUTOMATED COUNT: 17.1 % (ref 11.9–14.6)
GLOBULIN SER CALC-MCNC: 4.5 G/DL (ref 2.3–3.5)
HCT VFR BLD AUTO: 28.6 % (ref 41.1–50.3)
HGB BLD-MCNC: 8.7 G/DL (ref 13.6–17.2)
IMM GRANULOCYTES # BLD AUTO: 0 K/UL (ref 0–0.5)
IMM GRANULOCYTES NFR BLD AUTO: 1 % (ref 0–5)
LYMPHOCYTES # BLD: 0.9 K/UL (ref 0.5–4.6)
LYMPHOCYTES NFR BLD: 28 % (ref 13–44)
MCH RBC QN AUTO: 26.7 PG (ref 26.1–32.9)
MCHC RBC AUTO-ENTMCNC: 30.4 G/DL (ref 31.4–35)
MCV RBC AUTO: 87.7 FL (ref 79.6–97.8)
MONOCYTES # BLD: 0.9 K/UL (ref 0.1–1.3)
MONOCYTES NFR BLD: 26 % (ref 4–12)
NEUTS SEG # BLD: 1.2 K/UL (ref 1.7–8.2)
NEUTS SEG NFR BLD: 37 % (ref 43–78)
NRBC # BLD: 0 K/UL (ref 0–0.2)
PLATELET # BLD AUTO: 218 K/UL (ref 150–450)
PLATELET COMMENTS,PCOM: ADEQUATE
PMV BLD AUTO: 8.7 FL (ref 9.4–12.3)
PROT SERPL-MCNC: 7.1 G/DL (ref 6.3–8.2)
RBC # BLD AUTO: 3.26 M/UL (ref 4.23–5.67)
RBC MORPH BLD: ABNORMAL
WBC # BLD AUTO: 3.3 K/UL (ref 4.3–11.1)
WBC MORPH BLD: ABNORMAL

## 2020-06-23 PROCEDURE — 80076 HEPATIC FUNCTION PANEL: CPT

## 2020-06-23 PROCEDURE — 74011250636 HC RX REV CODE- 250/636: Performed by: NURSE PRACTITIONER

## 2020-06-23 PROCEDURE — 85025 COMPLETE CBC W/AUTO DIFF WBC: CPT

## 2020-06-23 PROCEDURE — 74011000258 HC RX REV CODE- 258: Performed by: NURSE PRACTITIONER

## 2020-06-23 PROCEDURE — 82565 ASSAY OF CREATININE: CPT

## 2020-06-23 PROCEDURE — 96365 THER/PROPH/DIAG IV INF INIT: CPT

## 2020-06-23 RX ADMIN — CEFTRIAXONE 2 G: 2 INJECTION, POWDER, FOR SOLUTION INTRAMUSCULAR; INTRAVENOUS at 09:30

## 2020-06-23 NOTE — PROGRESS NOTES
Arrived to infusion. Rocephin completed and labs drawn via PICC. Patient tolerated well. PICC dressing also changed  Denies new issues or concerns. Next infusion appointment is scheduled for 6/24/20 at 930am.   Discharged ambulatory with self.

## 2020-06-24 ENCOUNTER — HOSPITAL ENCOUNTER (OUTPATIENT)
Dept: INFUSION THERAPY | Age: 60
Discharge: HOME | End: 2020-06-24
Payer: COMMERCIAL

## 2020-06-24 VITALS
HEART RATE: 74 BPM | DIASTOLIC BLOOD PRESSURE: 66 MMHG | TEMPERATURE: 98.5 F | SYSTOLIC BLOOD PRESSURE: 110 MMHG | RESPIRATION RATE: 18 BRPM | OXYGEN SATURATION: 100 %

## 2020-06-24 PROCEDURE — 96365 THER/PROPH/DIAG IV INF INIT: CPT

## 2020-06-24 PROCEDURE — 74011250636 HC RX REV CODE- 250/636: Performed by: NURSE PRACTITIONER

## 2020-06-24 PROCEDURE — 74011000258 HC RX REV CODE- 258: Performed by: NURSE PRACTITIONER

## 2020-06-24 RX ORDER — SODIUM CHLORIDE 0.9 % (FLUSH) 0.9 %
10 SYRINGE (ML) INJECTION AS NEEDED
Status: DISCONTINUED | OUTPATIENT
Start: 2020-06-24 | End: 2020-06-28 | Stop reason: HOSPADM

## 2020-06-24 RX ADMIN — Medication 10 ML: at 09:24

## 2020-06-24 RX ADMIN — CEFTRIAXONE 2 G: 2 INJECTION, POWDER, FOR SOLUTION INTRAMUSCULAR; INTRAVENOUS at 09:35

## 2020-06-24 RX ADMIN — Medication 10 ML: at 10:02

## 2020-06-24 NOTE — PROGRESS NOTES
Arrived to the [facility]. Rocephin Antibiotic  completed. Positive blood return from PICC line. Provided education on Rocephin; patient states he has had before. Patient instructed to report any side affects to ordering provider. Patient tolerated very well. Any issues or concerns during appointment: none. Patient aware of next infusion appointment on 6/25/2020 @ 1000. Discharged ambulatory to private residence.

## 2020-06-25 ENCOUNTER — HOSPITAL ENCOUNTER (OUTPATIENT)
Dept: INFUSION THERAPY | Age: 60
Discharge: HOME | End: 2020-06-25
Payer: COMMERCIAL

## 2020-06-25 ENCOUNTER — PATIENT OUTREACH (OUTPATIENT)
Dept: CASE MANAGEMENT | Age: 60
End: 2020-06-25

## 2020-06-25 VITALS
SYSTOLIC BLOOD PRESSURE: 111 MMHG | TEMPERATURE: 98.2 F | RESPIRATION RATE: 16 BRPM | OXYGEN SATURATION: 100 % | HEART RATE: 74 BPM | DIASTOLIC BLOOD PRESSURE: 68 MMHG

## 2020-06-25 PROCEDURE — 96365 THER/PROPH/DIAG IV INF INIT: CPT

## 2020-06-25 PROCEDURE — 74011000258 HC RX REV CODE- 258: Performed by: NURSE PRACTITIONER

## 2020-06-25 PROCEDURE — 74011250636 HC RX REV CODE- 250/636: Performed by: NURSE PRACTITIONER

## 2020-06-25 RX ORDER — SODIUM CHLORIDE 0.9 % (FLUSH) 0.9 %
10 SYRINGE (ML) INJECTION AS NEEDED
Status: DISCONTINUED | OUTPATIENT
Start: 2020-06-25 | End: 2020-06-29 | Stop reason: HOSPADM

## 2020-06-25 RX ADMIN — Medication 10 ML: at 10:50

## 2020-06-25 RX ADMIN — CEFTRIAXONE 2 G: 2 INJECTION, POWDER, FOR SOLUTION INTRAMUSCULAR; INTRAVENOUS at 10:18

## 2020-06-25 RX ADMIN — Medication 10 ML: at 10:17

## 2020-06-25 NOTE — PROGRESS NOTES
Patient resolved from Transition of Care episode on 6/25/20  Discussed COVID-19 related testing which was not done at this time. Test results were not done. Patient/family has been provided the following resources and education related to COVID-19:                         Signs, symptoms and red flags related to COVID-19            CDC exposure and quarantine guidelines            [facility] exposure contact - [phone]            Contact for their local Department of Health                 Patient currently reports that the following symptoms have improved:  no new symptoms. No further outreach scheduled with this CTN/ACM/LPN/HC/ MA. Episode of Care resolved. Patient has this CTN/ACM/LPN/HC/MA contact information if future needs arise.

## 2020-06-25 NOTE — PROGRESS NOTES
Arrived to the [facility]. Rocephin Antibiotic completed. Positive blood Return from PICC line  Provided education on: pt educated on Antibiotic. No questions nor concerns at this time. Patient instructed to report any side affects to ordering provider. Patient tolerated well. Any issues or concerns during appointment: none. Patient aware of next infusion appointment on: 6/26/2020 @ 0800. Discharged ambulatory to private residence.

## 2020-06-26 ENCOUNTER — HOSPITAL ENCOUNTER (OUTPATIENT)
Dept: INFUSION THERAPY | Age: 60
Discharge: HOME | End: 2020-06-26
Payer: COMMERCIAL

## 2020-06-26 VITALS
OXYGEN SATURATION: 100 % | SYSTOLIC BLOOD PRESSURE: 105 MMHG | DIASTOLIC BLOOD PRESSURE: 53 MMHG | HEART RATE: 66 BPM | TEMPERATURE: 97.9 F | RESPIRATION RATE: 16 BRPM

## 2020-06-26 PROCEDURE — 96365 THER/PROPH/DIAG IV INF INIT: CPT

## 2020-06-26 PROCEDURE — 74011000258 HC RX REV CODE- 258: Performed by: NURSE PRACTITIONER

## 2020-06-26 PROCEDURE — 74011250636 HC RX REV CODE- 250/636: Performed by: NURSE PRACTITIONER

## 2020-06-26 RX ORDER — SODIUM CHLORIDE 0.9 % (FLUSH) 0.9 %
10 SYRINGE (ML) INJECTION AS NEEDED
Status: DISCONTINUED | OUTPATIENT
Start: 2020-06-26 | End: 2020-06-30 | Stop reason: HOSPADM

## 2020-06-26 RX ADMIN — Medication 10 ML: at 08:17

## 2020-06-26 RX ADMIN — Medication 10 ML: at 08:52

## 2020-06-26 RX ADMIN — CEFTRIAXONE 2 G: 2 INJECTION, POWDER, FOR SOLUTION INTRAMUSCULAR; INTRAVENOUS at 08:17

## 2020-06-26 RX ADMIN — Medication 10 ML: at 08:51

## 2020-06-26 NOTE — PROGRESS NOTES
Arrived to the [facility]. Rocephin completed via R PICC. Patient tolerated well. Any issues or concerns during appointment: none. Patient aware of next infusion appointment on 6/27 at 9:30am.  Discharged ambulatory to home.

## 2020-06-27 ENCOUNTER — HOSPITAL ENCOUNTER (OUTPATIENT)
Dept: INFUSION THERAPY | Age: 60
Discharge: HOME | End: 2020-06-27
Payer: COMMERCIAL

## 2020-06-27 VITALS
OXYGEN SATURATION: 98 % | SYSTOLIC BLOOD PRESSURE: 104 MMHG | RESPIRATION RATE: 16 BRPM | DIASTOLIC BLOOD PRESSURE: 58 MMHG | HEART RATE: 68 BPM | TEMPERATURE: 97.8 F

## 2020-06-27 PROCEDURE — 74011000258 HC RX REV CODE- 258: Performed by: NURSE PRACTITIONER

## 2020-06-27 PROCEDURE — 96365 THER/PROPH/DIAG IV INF INIT: CPT

## 2020-06-27 PROCEDURE — 74011250636 HC RX REV CODE- 250/636: Performed by: NURSE PRACTITIONER

## 2020-06-27 RX ORDER — SODIUM CHLORIDE 0.9 % (FLUSH) 0.9 %
10-40 SYRINGE (ML) INJECTION AS NEEDED
Status: DISCONTINUED | OUTPATIENT
Start: 2020-06-27 | End: 2020-07-01 | Stop reason: HOSPADM

## 2020-06-27 RX ADMIN — Medication 10 ML: at 09:52

## 2020-06-27 RX ADMIN — CEFTRIAXONE 2 G: 2 INJECTION, POWDER, FOR SOLUTION INTRAMUSCULAR; INTRAVENOUS at 09:22

## 2020-06-27 RX ADMIN — Medication 10 ML: at 09:22

## 2020-06-27 NOTE — PROGRESS NOTES
Arrived to the [facility]. rocephin completed. Patient tolerated well. Any issues or concerns during appointment: none. Patient aware of next infusion appointment on 6/28/2020 at 9:30am.  Discharged ambulatory.

## 2020-06-28 ENCOUNTER — HOSPITAL ENCOUNTER (OUTPATIENT)
Dept: INFUSION THERAPY | Age: 60
Discharge: HOME | End: 2020-06-28
Payer: COMMERCIAL

## 2020-06-28 VITALS
RESPIRATION RATE: 18 BRPM | SYSTOLIC BLOOD PRESSURE: 93 MMHG | HEART RATE: 64 BPM | TEMPERATURE: 97.6 F | OXYGEN SATURATION: 100 % | DIASTOLIC BLOOD PRESSURE: 56 MMHG

## 2020-06-28 PROCEDURE — 96365 THER/PROPH/DIAG IV INF INIT: CPT

## 2020-06-28 PROCEDURE — 74011250636 HC RX REV CODE- 250/636: Performed by: NURSE PRACTITIONER

## 2020-06-28 PROCEDURE — 74011000258 HC RX REV CODE- 258: Performed by: NURSE PRACTITIONER

## 2020-06-28 RX ORDER — SODIUM CHLORIDE 0.9 % (FLUSH) 0.9 %
10 SYRINGE (ML) INJECTION EVERY 8 HOURS
Status: DISCONTINUED | OUTPATIENT
Start: 2020-06-28 | End: 2020-07-02 | Stop reason: HOSPADM

## 2020-06-28 RX ADMIN — CEFTRIAXONE 2 G: 2 INJECTION, POWDER, FOR SOLUTION INTRAMUSCULAR; INTRAVENOUS at 09:31

## 2020-06-28 RX ADMIN — Medication 10 ML: at 10:05

## 2020-06-28 NOTE — PROGRESS NOTES
Arrived to the [facility]. Assessment complete. Rocephin completed. Patient tolerated without problems. Any issues or concerns during appointment: None  Instructed to call provider with any side effects or concerns  Patient aware of next infusion appointment on 6/29/20 (date) at 5 PM (time).   Discharged ambulatory

## 2020-06-29 ENCOUNTER — HOSPITAL ENCOUNTER (OUTPATIENT)
Dept: INFUSION THERAPY | Age: 60
Discharge: HOME | End: 2020-06-29
Payer: COMMERCIAL

## 2020-06-29 VITALS
OXYGEN SATURATION: 99 % | TEMPERATURE: 97.5 F | RESPIRATION RATE: 18 BRPM | SYSTOLIC BLOOD PRESSURE: 110 MMHG | DIASTOLIC BLOOD PRESSURE: 41 MMHG | HEART RATE: 73 BPM

## 2020-06-29 LAB
ALBUMIN SERPL-MCNC: 2.9 G/DL (ref 3.2–4.6)
ALBUMIN/GLOB SERPL: 0.7 {RATIO} (ref 1.2–3.5)
ALP SERPL-CCNC: 83 U/L (ref 50–136)
ALT SERPL-CCNC: 37 U/L (ref 12–65)
AST SERPL-CCNC: 15 U/L (ref 15–37)
BASOPHILS # BLD: 0.1 K/UL (ref 0–0.2)
BASOPHILS NFR BLD: 1 % (ref 0–2)
BILIRUB DIRECT SERPL-MCNC: 0.1 MG/DL
BILIRUB SERPL-MCNC: 0.3 MG/DL (ref 0.2–1.1)
CREAT SERPL-MCNC: 1.4 MG/DL (ref 0.8–1.5)
DIFFERENTIAL METHOD BLD: ABNORMAL
EOSINOPHIL # BLD: 0.2 K/UL (ref 0–0.8)
EOSINOPHIL NFR BLD: 3 % (ref 0.5–7.8)
ERYTHROCYTE [DISTWIDTH] IN BLOOD BY AUTOMATED COUNT: 17.8 % (ref 11.9–14.6)
GLOBULIN SER CALC-MCNC: 4.4 G/DL (ref 2.3–3.5)
HCT VFR BLD AUTO: 28.8 % (ref 41.1–50.3)
HGB BLD-MCNC: 8.9 G/DL (ref 13.6–17.2)
IMM GRANULOCYTES # BLD AUTO: 0.2 K/UL (ref 0–0.5)
IMM GRANULOCYTES NFR BLD AUTO: 3 % (ref 0–5)
LYMPHOCYTES # BLD: 1.2 K/UL (ref 0.5–4.6)
LYMPHOCYTES NFR BLD: 20 % (ref 13–44)
MCH RBC QN AUTO: 27.1 PG (ref 26.1–32.9)
MCHC RBC AUTO-ENTMCNC: 30.9 G/DL (ref 31.4–35)
MCV RBC AUTO: 87.5 FL (ref 79.6–97.8)
MONOCYTES # BLD: 0.9 K/UL (ref 0.1–1.3)
MONOCYTES NFR BLD: 15 % (ref 4–12)
NEUTS SEG # BLD: 3.5 K/UL (ref 1.7–8.2)
NEUTS SEG NFR BLD: 58 % (ref 43–78)
NRBC # BLD: 0 K/UL (ref 0–0.2)
PLATELET # BLD AUTO: 215 K/UL (ref 150–450)
PMV BLD AUTO: 8.9 FL (ref 9.4–12.3)
PROT SERPL-MCNC: 7.3 G/DL (ref 6.3–8.2)
RBC # BLD AUTO: 3.29 M/UL (ref 4.23–5.67)
WBC # BLD AUTO: 6 K/UL (ref 4.3–11.1)

## 2020-06-29 PROCEDURE — 96365 THER/PROPH/DIAG IV INF INIT: CPT

## 2020-06-29 PROCEDURE — 74011250636 HC RX REV CODE- 250/636: Performed by: NURSE PRACTITIONER

## 2020-06-29 PROCEDURE — 82565 ASSAY OF CREATININE: CPT

## 2020-06-29 PROCEDURE — 80076 HEPATIC FUNCTION PANEL: CPT

## 2020-06-29 PROCEDURE — 85025 COMPLETE CBC W/AUTO DIFF WBC: CPT

## 2020-06-29 PROCEDURE — 74011000258 HC RX REV CODE- 258: Performed by: NURSE PRACTITIONER

## 2020-06-29 RX ORDER — SODIUM CHLORIDE 0.9 % (FLUSH) 0.9 %
10-40 SYRINGE (ML) INJECTION AS NEEDED
Status: DISCONTINUED | OUTPATIENT
Start: 2020-06-29 | End: 2020-07-03 | Stop reason: HOSPADM

## 2020-06-29 RX ADMIN — CEFTRIAXONE 2 G: 2 INJECTION, POWDER, FOR SOLUTION INTRAMUSCULAR; INTRAVENOUS at 17:01

## 2020-06-29 RX ADMIN — Medication 10 ML: at 17:01

## 2020-06-29 RX ADMIN — Medication 10 ML: at 17:31

## 2020-06-30 ENCOUNTER — HOSPITAL ENCOUNTER (OUTPATIENT)
Dept: INFUSION THERAPY | Age: 60
Discharge: HOME | End: 2020-06-30
Payer: COMMERCIAL

## 2020-06-30 VITALS
TEMPERATURE: 98.3 F | OXYGEN SATURATION: 99 % | SYSTOLIC BLOOD PRESSURE: 101 MMHG | RESPIRATION RATE: 18 BRPM | DIASTOLIC BLOOD PRESSURE: 54 MMHG | HEART RATE: 75 BPM

## 2020-06-30 PROCEDURE — 96365 THER/PROPH/DIAG IV INF INIT: CPT

## 2020-06-30 PROCEDURE — 74011000258 HC RX REV CODE- 258: Performed by: NURSE PRACTITIONER

## 2020-06-30 PROCEDURE — 74011250636 HC RX REV CODE- 250/636: Performed by: NURSE PRACTITIONER

## 2020-06-30 RX ORDER — SODIUM CHLORIDE 0.9 % (FLUSH) 0.9 %
10-30 SYRINGE (ML) INJECTION AS NEEDED
Status: DISCONTINUED | OUTPATIENT
Start: 2020-06-30 | End: 2020-07-04 | Stop reason: HOSPADM

## 2020-06-30 RX ADMIN — CEFTRIAXONE 2 G: 2 INJECTION, POWDER, FOR SOLUTION INTRAMUSCULAR; INTRAVENOUS at 14:55

## 2020-06-30 RX ADMIN — Medication 10 ML: at 15:30

## 2020-06-30 RX ADMIN — Medication 20 ML: at 14:55

## 2020-06-30 NOTE — PROGRESS NOTES
Arrived to the [facility]. Rocephin completed.    Provided education on Rocephin  Patient instructed to report any side affects to ordering provider-ID  Patient tolerated well  Any issues or concerns during appointment: No  Patient has no future appointments in OPI @ this time  Discharged home ambulatory

## 2020-07-09 ENCOUNTER — HOSPITAL ENCOUNTER (OUTPATIENT)
Dept: INFUSION THERAPY | Age: 60
Discharge: HOME | End: 2020-07-09
Payer: COMMERCIAL

## 2020-07-09 VITALS
RESPIRATION RATE: 18 BRPM | SYSTOLIC BLOOD PRESSURE: 107 MMHG | OXYGEN SATURATION: 97 % | TEMPERATURE: 96.9 F | HEART RATE: 64 BPM | WEIGHT: 143.2 LBS | BODY MASS INDEX: 18.89 KG/M2 | DIASTOLIC BLOOD PRESSURE: 66 MMHG

## 2020-07-09 LAB
ALBUMIN SERPL-MCNC: 3.2 G/DL (ref 3.2–4.6)
ALBUMIN/GLOB SERPL: 0.7 {RATIO} (ref 1.2–3.5)
ALP SERPL-CCNC: 80 U/L (ref 50–136)
ALT SERPL-CCNC: 33 U/L (ref 12–65)
AST SERPL-CCNC: 21 U/L (ref 15–37)
BASOPHILS # BLD: 0.1 K/UL (ref 0–0.2)
BASOPHILS NFR BLD: 1 % (ref 0–2)
BILIRUB DIRECT SERPL-MCNC: <0.1 MG/DL
BILIRUB SERPL-MCNC: 0.4 MG/DL (ref 0.2–1.1)
CREAT SERPL-MCNC: 1.4 MG/DL (ref 0.8–1.5)
DIFFERENTIAL METHOD BLD: ABNORMAL
EOSINOPHIL # BLD: 0.2 K/UL (ref 0–0.8)
EOSINOPHIL NFR BLD: 2 % (ref 0.5–7.8)
ERYTHROCYTE [DISTWIDTH] IN BLOOD BY AUTOMATED COUNT: 17.6 % (ref 11.9–14.6)
GLOBULIN SER CALC-MCNC: 4.3 G/DL (ref 2.3–3.5)
HCT VFR BLD AUTO: 30.7 % (ref 41.1–50.3)
HGB BLD-MCNC: 9.6 G/DL (ref 13.6–17.2)
IMM GRANULOCYTES # BLD AUTO: 0.1 K/UL (ref 0–0.5)
IMM GRANULOCYTES NFR BLD AUTO: 1 % (ref 0–5)
LYMPHOCYTES # BLD: 1.6 K/UL (ref 0.5–4.6)
LYMPHOCYTES NFR BLD: 16 % (ref 13–44)
MCH RBC QN AUTO: 27.5 PG (ref 26.1–32.9)
MCHC RBC AUTO-ENTMCNC: 31.3 G/DL (ref 31.4–35)
MCV RBC AUTO: 88 FL (ref 79.6–97.8)
MONOCYTES # BLD: 0.9 K/UL (ref 0.1–1.3)
MONOCYTES NFR BLD: 9 % (ref 4–12)
NEUTS SEG # BLD: 7.1 K/UL (ref 1.7–8.2)
NEUTS SEG NFR BLD: 72 % (ref 43–78)
NRBC # BLD: 0 K/UL (ref 0–0.2)
PLATELET # BLD AUTO: 158 K/UL (ref 150–450)
PMV BLD AUTO: 9.1 FL (ref 9.4–12.3)
PROT SERPL-MCNC: 7.5 G/DL (ref 6.3–8.2)
RBC # BLD AUTO: 3.49 M/UL (ref 4.23–5.67)
WBC # BLD AUTO: 9.9 K/UL (ref 4.3–11.1)

## 2020-07-09 PROCEDURE — 36592 COLLECT BLOOD FROM PICC: CPT

## 2020-07-09 PROCEDURE — 80076 HEPATIC FUNCTION PANEL: CPT

## 2020-07-09 PROCEDURE — 82565 ASSAY OF CREATININE: CPT

## 2020-07-09 PROCEDURE — 85025 COMPLETE CBC W/AUTO DIFF WBC: CPT

## 2020-07-09 RX ORDER — SODIUM CHLORIDE 0.9 % (FLUSH) 0.9 %
10-40 SYRINGE (ML) INJECTION AS NEEDED
Status: ACTIVE | OUTPATIENT
Start: 2020-07-09 | End: 2020-07-09

## 2020-07-09 RX ADMIN — Medication 10 ML: at 17:45

## 2020-07-09 NOTE — PROGRESS NOTES
Arrived to the [facility]. Blood drawn via PICC & PICC line dressing change completed. Patient tolerated well. Any issues or concerns during appointment: None. Patient aware no future Infusion appointments. Patient has follow up appointment with ID in AM and possibly get PICC removed. Patient informed if he is to keep PICC will need office to make more appointments for PICC care. Discharged ambulatory in stable condition.

## 2020-07-10 PROBLEM — I35.0 AORTIC STENOSIS: Status: ACTIVE | Noted: 2020-07-10

## 2020-07-11 ENCOUNTER — HOSPITAL ENCOUNTER (OUTPATIENT)
Dept: INFUSION THERAPY | Age: 60
Discharge: HOME | End: 2020-07-11
Payer: COMMERCIAL

## 2020-07-11 VITALS
SYSTOLIC BLOOD PRESSURE: 95 MMHG | HEART RATE: 73 BPM | RESPIRATION RATE: 18 BRPM | TEMPERATURE: 98.3 F | OXYGEN SATURATION: 99 % | DIASTOLIC BLOOD PRESSURE: 62 MMHG

## 2020-07-11 PROCEDURE — 74011000258 HC RX REV CODE- 258: Performed by: NURSE PRACTITIONER

## 2020-07-11 PROCEDURE — 74011250636 HC RX REV CODE- 250/636: Performed by: NURSE PRACTITIONER

## 2020-07-11 PROCEDURE — 96365 THER/PROPH/DIAG IV INF INIT: CPT

## 2020-07-11 RX ORDER — SODIUM CHLORIDE 0.9 % (FLUSH) 0.9 %
10 SYRINGE (ML) INJECTION AS NEEDED
Status: DISCONTINUED | OUTPATIENT
Start: 2020-07-11 | End: 2020-07-14 | Stop reason: HOSPADM

## 2020-07-11 RX ADMIN — Medication 10 ML: at 15:51

## 2020-07-11 RX ADMIN — CEFTRIAXONE SODIUM 2 G: 2 INJECTION, POWDER, FOR SOLUTION INTRAMUSCULAR; INTRAVENOUS at 15:51

## 2020-07-11 RX ADMIN — Medication 10 ML: at 16:24

## 2020-07-11 NOTE — PROGRESS NOTES
Arrived to the [facility]. Andren completed thru R upper arm picc. Patient tolerated well. Any issues or concerns during appointment: none. Patient aware of next infusion appointment on 7/12 at 11:00am.  Discharged ambulatory to home and patient given schedule of appts.

## 2020-07-11 NOTE — PROGRESS NOTES
Problem: Infection - Risk of, Central Venous Catheter-Associated Bloodstream Infection  Goal: *Absence of infection signs and symptoms  Outcome: Progressing Towards Goal     Problem: Knowledge Deficit  Goal: *Verbalizes understanding of procedures and medications  Outcome: Progressing Towards Goal     Problem: Patient Education:  Go to Education Activity  Goal: Patient/Family Education  Outcome: Progressing Towards Goal

## 2020-07-12 ENCOUNTER — HOSPITAL ENCOUNTER (OUTPATIENT)
Dept: INFUSION THERAPY | Age: 60
Discharge: HOME | End: 2020-07-12
Payer: COMMERCIAL

## 2020-07-12 VITALS
TEMPERATURE: 97.7 F | SYSTOLIC BLOOD PRESSURE: 98 MMHG | HEART RATE: 61 BPM | DIASTOLIC BLOOD PRESSURE: 53 MMHG | OXYGEN SATURATION: 99 %

## 2020-07-12 PROCEDURE — 74011250636 HC RX REV CODE- 250/636: Performed by: SURGERY

## 2020-07-12 PROCEDURE — 74011000258 HC RX REV CODE- 258: Performed by: SURGERY

## 2020-07-12 PROCEDURE — 96365 THER/PROPH/DIAG IV INF INIT: CPT

## 2020-07-12 RX ORDER — SODIUM CHLORIDE 0.9 % (FLUSH) 0.9 %
10 SYRINGE (ML) INJECTION AS NEEDED
Status: DISCONTINUED | OUTPATIENT
Start: 2020-07-12 | End: 2020-07-14 | Stop reason: HOSPADM

## 2020-07-12 RX ADMIN — CEFTRIAXONE 2 G: 2 INJECTION, POWDER, FOR SOLUTION INTRAMUSCULAR; INTRAVENOUS at 11:08

## 2020-07-12 RX ADMIN — Medication 10 ML: at 11:40

## 2020-07-12 NOTE — PROGRESS NOTES
Arrived to infusion center for Rocephin. Tolerated the infusion without signs of adverse reaction. No issues or concerns voiced during the visit. Aware of next appointment on 7/13 at 1700. Discharged in stable condition to home.

## 2020-07-13 ENCOUNTER — HOSPITAL ENCOUNTER (OUTPATIENT)
Dept: INFUSION THERAPY | Age: 60
Discharge: HOME | End: 2020-07-13
Payer: COMMERCIAL

## 2020-07-13 LAB
ALBUMIN SERPL-MCNC: 3.2 G/DL (ref 3.2–4.6)
ALBUMIN/GLOB SERPL: 0.8 {RATIO} (ref 1.2–3.5)
ALP SERPL-CCNC: 77 U/L (ref 50–136)
ALT SERPL-CCNC: 33 U/L (ref 12–65)
AST SERPL-CCNC: 22 U/L (ref 15–37)
BASOPHILS # BLD: 0.1 K/UL (ref 0–0.2)
BASOPHILS NFR BLD: 1 % (ref 0–2)
BILIRUB DIRECT SERPL-MCNC: <0.1 MG/DL
BILIRUB SERPL-MCNC: 0.3 MG/DL (ref 0.2–1.1)
CREAT SERPL-MCNC: 1.5 MG/DL (ref 0.8–1.5)
DIFFERENTIAL METHOD BLD: ABNORMAL
EOSINOPHIL # BLD: 0.3 K/UL (ref 0–0.8)
EOSINOPHIL NFR BLD: 3 % (ref 0.5–7.8)
ERYTHROCYTE [DISTWIDTH] IN BLOOD BY AUTOMATED COUNT: 17.4 % (ref 11.9–14.6)
GLOBULIN SER CALC-MCNC: 4.1 G/DL (ref 2.3–3.5)
HCT VFR BLD AUTO: 29.5 % (ref 41.1–50.3)
HGB BLD-MCNC: 9.4 G/DL (ref 13.6–17.2)
IMM GRANULOCYTES # BLD AUTO: 0.1 K/UL (ref 0–0.5)
IMM GRANULOCYTES NFR BLD AUTO: 1 % (ref 0–5)
LYMPHOCYTES # BLD: 1.2 K/UL (ref 0.5–4.6)
LYMPHOCYTES NFR BLD: 12 % (ref 13–44)
MCH RBC QN AUTO: 28 PG (ref 26.1–32.9)
MCHC RBC AUTO-ENTMCNC: 31.9 G/DL (ref 31.4–35)
MCV RBC AUTO: 87.8 FL (ref 79.6–97.8)
MONOCYTES # BLD: 0.8 K/UL (ref 0.1–1.3)
MONOCYTES NFR BLD: 8 % (ref 4–12)
NEUTS SEG # BLD: 7.4 K/UL (ref 1.7–8.2)
NEUTS SEG NFR BLD: 76 % (ref 43–78)
NRBC # BLD: 0 K/UL (ref 0–0.2)
PLATELET # BLD AUTO: 141 K/UL (ref 150–450)
PMV BLD AUTO: 9.1 FL (ref 9.4–12.3)
PROT SERPL-MCNC: 7.3 G/DL (ref 6.3–8.2)
RBC # BLD AUTO: 3.36 M/UL (ref 4.23–5.67)
WBC # BLD AUTO: 9.7 K/UL (ref 4.3–11.1)

## 2020-07-13 PROCEDURE — 82565 ASSAY OF CREATININE: CPT

## 2020-07-13 PROCEDURE — 74011000258 HC RX REV CODE- 258: Performed by: NURSE PRACTITIONER

## 2020-07-13 PROCEDURE — 80076 HEPATIC FUNCTION PANEL: CPT

## 2020-07-13 PROCEDURE — 96365 THER/PROPH/DIAG IV INF INIT: CPT

## 2020-07-13 PROCEDURE — 85025 COMPLETE CBC W/AUTO DIFF WBC: CPT

## 2020-07-13 PROCEDURE — 74011250636 HC RX REV CODE- 250/636: Performed by: NURSE PRACTITIONER

## 2020-07-13 RX ORDER — SODIUM CHLORIDE 0.9 % (FLUSH) 0.9 %
10 SYRINGE (ML) INJECTION AS NEEDED
Status: DISCONTINUED | OUTPATIENT
Start: 2020-07-13 | End: 2020-07-14 | Stop reason: HOSPADM

## 2020-07-13 RX ADMIN — Medication 10 ML: at 16:45

## 2020-07-13 RX ADMIN — Medication 10 ML: at 16:15

## 2020-07-13 RX ADMIN — CEFTRIAXONE 2 G: 2 INJECTION, POWDER, FOR SOLUTION INTRAMUSCULAR; INTRAVENOUS at 16:15

## 2020-07-13 RX ADMIN — Medication 10 ML: at 16:11

## 2020-07-13 NOTE — PROGRESS NOTES
Arrived to the [facility]. Rocephin infusion completed. Patient tolerated well. Any issues or concerns during appointment: NO.  Patient aware of next infusion appointment on 07/14/2020 (date) at 65 (time). Discharged ambulatory.

## 2020-07-14 ENCOUNTER — HOSPITAL ENCOUNTER (OUTPATIENT)
Dept: INFUSION THERAPY | Age: 60
Discharge: HOME | End: 2020-07-14
Payer: COMMERCIAL

## 2020-07-14 VITALS
HEART RATE: 69 BPM | BODY MASS INDEX: 18.89 KG/M2 | WEIGHT: 143.2 LBS | OXYGEN SATURATION: 98 % | DIASTOLIC BLOOD PRESSURE: 54 MMHG | TEMPERATURE: 97.7 F | RESPIRATION RATE: 16 BRPM | SYSTOLIC BLOOD PRESSURE: 103 MMHG

## 2020-07-14 PROCEDURE — 96365 THER/PROPH/DIAG IV INF INIT: CPT

## 2020-07-14 PROCEDURE — 74011250636 HC RX REV CODE- 250/636: Performed by: NURSE PRACTITIONER

## 2020-07-14 PROCEDURE — 74011000258 HC RX REV CODE- 258: Performed by: NURSE PRACTITIONER

## 2020-07-14 RX ORDER — SODIUM CHLORIDE 0.9 % (FLUSH) 0.9 %
10 SYRINGE (ML) INJECTION AS NEEDED
Status: DISCONTINUED | OUTPATIENT
Start: 2020-07-14 | End: 2020-07-16 | Stop reason: HOSPADM

## 2020-07-14 RX ADMIN — CEFTRIAXONE 2 G: 2 INJECTION, POWDER, FOR SOLUTION INTRAMUSCULAR; INTRAVENOUS at 15:55

## 2020-07-14 NOTE — PROGRESS NOTES
Pt arrived ambulatory to OPI, Rocephin completed, pt tolerated well, discharged home ambulatory, aware of appointment tomorrow at [phone]

## 2020-07-15 ENCOUNTER — HOSPITAL ENCOUNTER (OUTPATIENT)
Dept: INFUSION THERAPY | Age: 60
Discharge: HOME | End: 2020-07-15
Payer: COMMERCIAL

## 2020-07-15 VITALS
DIASTOLIC BLOOD PRESSURE: 46 MMHG | OXYGEN SATURATION: 98 % | TEMPERATURE: 97.5 F | HEART RATE: 68 BPM | RESPIRATION RATE: 16 BRPM | SYSTOLIC BLOOD PRESSURE: 95 MMHG

## 2020-07-15 PROCEDURE — 96365 THER/PROPH/DIAG IV INF INIT: CPT

## 2020-07-15 PROCEDURE — 74011250636 HC RX REV CODE- 250/636: Performed by: NURSE PRACTITIONER

## 2020-07-15 PROCEDURE — 74011000258 HC RX REV CODE- 258: Performed by: NURSE PRACTITIONER

## 2020-07-15 RX ORDER — SODIUM CHLORIDE 0.9 % (FLUSH) 0.9 %
10 SYRINGE (ML) INJECTION AS NEEDED
Status: DISCONTINUED | OUTPATIENT
Start: 2020-07-15 | End: 2020-07-17 | Stop reason: HOSPADM

## 2020-07-15 RX ADMIN — Medication 10 ML: at 16:06

## 2020-07-15 RX ADMIN — Medication 10 ML: at 16:44

## 2020-07-15 RX ADMIN — CEFTRIAXONE 2 G: 2 INJECTION, POWDER, FOR SOLUTION INTRAMUSCULAR; INTRAVENOUS at 16:13

## 2020-07-15 NOTE — PROGRESS NOTES
Arrived to the [facility]. IV Rocephin completed. Patient tolerated very well. Any issues or concerns during appointment: none. PICC Dressing changed; positive blood return, hub changed and new dressing placed. Dated and Timed. Biopatch left out, needs to be placed at next infusion appt (7/17/2020). Patient aware of next infusion appointment on 7/17/2020 @ 1630. Discharged ambulatory to private residence.

## 2020-07-16 ENCOUNTER — ANESTHESIA EVENT (OUTPATIENT)
Dept: SURGERY | Age: 60
DRG: 220 | End: 2020-07-16
Payer: COMMERCIAL

## 2020-07-16 ENCOUNTER — HOSPITAL ENCOUNTER (OUTPATIENT)
Dept: INFUSION THERAPY | Age: 60
Discharge: HOME | End: 2020-07-16
Payer: COMMERCIAL

## 2020-07-16 ENCOUNTER — HOSPITAL ENCOUNTER (OUTPATIENT)
Dept: SURGERY | Age: 60
Discharge: HOME | End: 2020-07-16
Payer: COMMERCIAL

## 2020-07-16 ENCOUNTER — HOSPITAL ENCOUNTER (OUTPATIENT)
Dept: GENERAL RADIOLOGY | Age: 60
Discharge: HOME | End: 2020-07-16
Attending: PHYSICIAN ASSISTANT
Payer: COMMERCIAL

## 2020-07-16 VITALS
HEART RATE: 69 BPM | OXYGEN SATURATION: 99 % | TEMPERATURE: 97.9 F | DIASTOLIC BLOOD PRESSURE: 60 MMHG | RESPIRATION RATE: 18 BRPM | SYSTOLIC BLOOD PRESSURE: 98 MMHG

## 2020-07-16 VITALS
TEMPERATURE: 97.8 F | DIASTOLIC BLOOD PRESSURE: 51 MMHG | WEIGHT: 145.2 LBS | HEART RATE: 57 BPM | SYSTOLIC BLOOD PRESSURE: 103 MMHG | RESPIRATION RATE: 20 BRPM | HEIGHT: 73 IN | OXYGEN SATURATION: 100 % | BODY MASS INDEX: 19.24 KG/M2

## 2020-07-16 LAB
ALBUMIN SERPL-MCNC: 3.5 G/DL (ref 3.2–4.6)
ALBUMIN/GLOB SERPL: 0.8 {RATIO} (ref 1.2–3.5)
ALP SERPL-CCNC: 87 U/L (ref 50–136)
ALT SERPL-CCNC: 58 U/L (ref 12–65)
ANION GAP SERPL CALC-SCNC: 6 MMOL/L (ref 7–16)
APPEARANCE UR: CLEAR
APTT PPP: 31.1 SEC (ref 24.3–35.4)
AST SERPL-CCNC: 41 U/L (ref 15–37)
BACTERIA SPEC CULT: NORMAL
BACTERIA URNS QL MICRO: 0 /HPF
BILIRUB SERPL-MCNC: 0.4 MG/DL (ref 0.2–1.1)
BILIRUB UR QL: NEGATIVE
BUN SERPL-MCNC: 34 MG/DL (ref 8–23)
CALCIUM SERPL-MCNC: 8.8 MG/DL (ref 8.3–10.4)
CASTS URNS QL MICRO: ABNORMAL /LPF
CHLORIDE SERPL-SCNC: 105 MMOL/L (ref 98–107)
CO2 SERPL-SCNC: 28 MMOL/L (ref 21–32)
COLOR UR: YELLOW
CREAT SERPL-MCNC: 1.22 MG/DL (ref 0.8–1.5)
EPI CELLS #/AREA URNS HPF: 0 /HPF
ERYTHROCYTE [DISTWIDTH] IN BLOOD BY AUTOMATED COUNT: 17.5 % (ref 11.9–14.6)
EST. AVERAGE GLUCOSE BLD GHB EST-MCNC: 114 MG/DL
GLOBULIN SER CALC-MCNC: 4.4 G/DL (ref 2.3–3.5)
GLUCOSE SERPL-MCNC: 92 MG/DL (ref 65–100)
GLUCOSE UR STRIP.AUTO-MCNC: NEGATIVE MG/DL
HBA1C MFR BLD: 5.6 % (ref 4.8–6)
HCT VFR BLD AUTO: 34.1 % (ref 41.1–50.3)
HGB BLD-MCNC: 10.7 G/DL (ref 13.6–17.2)
HGB UR QL STRIP: ABNORMAL
INR PPP: 1.1
KETONES UR QL STRIP.AUTO: NEGATIVE MG/DL
LEUKOCYTE ESTERASE UR QL STRIP.AUTO: NEGATIVE
MAGNESIUM SERPL-MCNC: 2.1 MG/DL (ref 1.8–2.4)
MCH RBC QN AUTO: 28.2 PG (ref 26.1–32.9)
MCHC RBC AUTO-ENTMCNC: 31.4 G/DL (ref 31.4–35)
MCV RBC AUTO: 90 FL (ref 79.6–97.8)
NITRITE UR QL STRIP.AUTO: NEGATIVE
NRBC # BLD: 0 K/UL (ref 0–0.2)
PH UR STRIP: 5 [PH] (ref 5–9)
PLATELET # BLD AUTO: 133 K/UL (ref 150–450)
PMV BLD AUTO: 9.7 FL (ref 9.4–12.3)
POTASSIUM SERPL-SCNC: 4.3 MMOL/L (ref 3.5–5.1)
PROT SERPL-MCNC: 7.9 G/DL (ref 6.3–8.2)
PROT UR STRIP-MCNC: NEGATIVE MG/DL
PROTHROMBIN TIME: 14.3 SEC (ref 12–14.7)
RBC # BLD AUTO: 3.79 M/UL (ref 4.23–5.6)
RBC #/AREA URNS HPF: ABNORMAL /HPF
SERVICE CMNT-IMP: NORMAL
SODIUM SERPL-SCNC: 139 MMOL/L (ref 136–145)
SP GR UR REFRACTOMETRY: 1.01 (ref 1–1.02)
UROBILINOGEN UR QL STRIP.AUTO: 0.2 EU/DL (ref 0.2–1)
WBC # BLD AUTO: 8.4 K/UL (ref 4.3–11.1)
WBC URNS QL MICRO: ABNORMAL /HPF

## 2020-07-16 PROCEDURE — 93005 ELECTROCARDIOGRAM TRACING: CPT | Performed by: THORACIC SURGERY (CARDIOTHORACIC VASCULAR SURGERY)

## 2020-07-16 PROCEDURE — 80053 COMPREHEN METABOLIC PANEL: CPT

## 2020-07-16 PROCEDURE — 85027 COMPLETE CBC AUTOMATED: CPT

## 2020-07-16 PROCEDURE — 83735 ASSAY OF MAGNESIUM: CPT

## 2020-07-16 PROCEDURE — 83036 HEMOGLOBIN GLYCOSYLATED A1C: CPT

## 2020-07-16 PROCEDURE — 96365 THER/PROPH/DIAG IV INF INIT: CPT

## 2020-07-16 PROCEDURE — 94010 BREATHING CAPACITY TEST: CPT

## 2020-07-16 PROCEDURE — 36415 COLL VENOUS BLD VENIPUNCTURE: CPT

## 2020-07-16 PROCEDURE — 87086 URINE CULTURE/COLONY COUNT: CPT

## 2020-07-16 PROCEDURE — 74011250636 HC RX REV CODE- 250/636: Performed by: NURSE PRACTITIONER

## 2020-07-16 PROCEDURE — 85610 PROTHROMBIN TIME: CPT

## 2020-07-16 PROCEDURE — 81001 URINALYSIS AUTO W/SCOPE: CPT

## 2020-07-16 PROCEDURE — 85730 THROMBOPLASTIN TIME PARTIAL: CPT

## 2020-07-16 PROCEDURE — 74011000258 HC RX REV CODE- 258: Performed by: NURSE PRACTITIONER

## 2020-07-16 PROCEDURE — 71046 X-RAY EXAM CHEST 2 VIEWS: CPT

## 2020-07-16 PROCEDURE — 87641 MR-STAPH DNA AMP PROBE: CPT

## 2020-07-16 PROCEDURE — 93005 ELECTROCARDIOGRAM TRACING: CPT | Performed by: PHYSICIAN ASSISTANT

## 2020-07-16 RX ORDER — SODIUM CHLORIDE 0.9 % (FLUSH) 0.9 %
10 SYRINGE (ML) INJECTION AS NEEDED
Status: DISCONTINUED | OUTPATIENT
Start: 2020-07-16 | End: 2020-07-18 | Stop reason: HOSPADM

## 2020-07-16 RX ADMIN — Medication 10 ML: at 16:21

## 2020-07-16 RX ADMIN — Medication 10 ML: at 16:22

## 2020-07-16 RX ADMIN — CEFTRIAXONE 2 G: 2 INJECTION, POWDER, FOR SOLUTION INTRAMUSCULAR; INTRAVENOUS at 15:49

## 2020-07-16 RX ADMIN — Medication 10 ML: at 15:48

## 2020-07-16 NOTE — PROGRESS NOTES
Arrived to the [facility]. Rocephin completed. Patient tolerated well. Any issues or concerns during appointment: none. Patient aware of next infusion appointment on 7/17 at 4:30pm.  Discharged ambulatory to home.

## 2020-07-16 NOTE — PERIOP NOTES
PLEASE CONTINUE TAKING ALL PRESCRIPTION MEDICATIONS UP TO THE DAY OF SURGERY UNLESS OTHERWISE DIRECTED BELOW. DISCONTINUE all vitamins and supplements 7 days prior to surgery. DISCONTINUE Non-Steriodal Anti-Inflammatory (NSAIDS) such as Advil and Aleve 5 days prior to surgery. Home Medications to take  the day of surgery    carvedilol, escitalopram           Home Medications   to Hold   none        Comments    amiondarone 600 mg -- take as one time dose after 4 pm evening prior to surg--- med called into [facility]      *Visitor policy of 1 visitor per patient discussed. Please do not bring home medications with you on the day of surgery unless otherwise directed by your nurse. If you are instructed to bring home medications, please give them to your nurse as they will be administered by the nursing staff. If you have any questions, please call [facility] [phone] or [facility] [phone]. A copy of this note was provided to the patient for reference.

## 2020-07-16 NOTE — PERIOP NOTES
TODAY LABS REVIEWED-- URINE WITH LARGE AMT BLOOD----URINE CULT PENDING-- CALLED OFFICE SPOKE TO ISMAEL --SHE IS TO MAKE SURE SOMEONE IS AWARE-- STILL AWAITING CXR RESULTS--- CHART SENT TO CHART  FOR F/U-- ALSO WILL NEED MDA TO REVIEWED TODAYS EKG WITH PRIOR-- BOTH SENT TO [facility] FOR F/U

## 2020-07-16 NOTE — PERIOP NOTES
Patient verified name and . Patient provided medical/health information and PTA medications to the best of their ability. TYPE  CASE: 3  Order for consent WAS found in EHR and matches case posting. Labs per surgeon: CBC WITH DIFF, HA1C, PT/PTT, CMP, MG++, URINE AND URINE CULT, NASAL SWAB, PFTS, CXR, --PT COMING 20 BETWEEN 10AM -12 NOON FOR TYPE AND CROSS, FFP, PLT-- COPY OF ORDERS GIVEN TO PT WITH INSTRUCTIONS-- ALSO FAXED COPE TO MAIN L[facility]--[phone], AND SPOKE TO CARMELITA IN MAIN LAB  Labs per anesthesia protocol: NO ADD'T AT THIS TIME  EKG:  TODAY, DR CANCHOLA IN TO SEE PT-- REVIEWED EKG 2020, CATH 2020, ECHO 2020    Patient provided with and instructed on educaitonal handouts including Heart Guide to Surgery, blood transfusions, pain management, central line infection prevention, being on a ventilator and hand hygiene for the family and community, and [facility] brochure. Instructed that family must be present in building at all times. Incentive spirometry completed with return demonstration. FEV1 completed as ordered. Patient viewed pre-operative DVD. Instructed on chest-xray procedure and instructed on type and cross match procedure and not to remove green blood bank bracelet. Instructed on medications- Amiodarone,  with Rx called into  [facility] SPOKE TO EDDIE    Surgical skin cleanser provided and instructions given per hospital policy. Original medication prescription bottles WAS NOT visualized during patient appointment. Patient teach back successful and patient demonstrates knowledge of instruction. PT STATES UNABLE TO AFFORD CALLED IN MED-- AMIODARONE-- INSTRUCTED PT TO SPEAK TO PHARM.  THAT HE MAY HAVE MET HIS DEDUCTIBLE SINCE RECENTLY IN HOSPITAL OR PHARM MAY HAVE A PROGRAM THAT HE COULD USE- LIKE [facility]-- IF NOTHING AVAIL-- PT INSTRUCTED TO CALL MD OFFICE TO SEE ABOUT SAMPLES

## 2020-07-17 ENCOUNTER — HOSPITAL ENCOUNTER (OUTPATIENT)
Dept: INFUSION THERAPY | Age: 60
Discharge: HOME | End: 2020-07-17
Payer: COMMERCIAL

## 2020-07-17 VITALS
TEMPERATURE: 98.3 F | OXYGEN SATURATION: 97 % | SYSTOLIC BLOOD PRESSURE: 96 MMHG | RESPIRATION RATE: 18 BRPM | HEART RATE: 73 BPM | DIASTOLIC BLOOD PRESSURE: 52 MMHG

## 2020-07-17 LAB
ATRIAL RATE: 51 BPM
CALCULATED P AXIS, ECG09: 29 DEGREES
CALCULATED R AXIS, ECG10: 13 DEGREES
CALCULATED T AXIS, ECG11: 109 DEGREES
DIAGNOSIS, 93000: NORMAL
P-R INTERVAL, ECG05: 276 MS
Q-T INTERVAL, ECG07: 472 MS
QRS DURATION, ECG06: 92 MS
QTC CALCULATION (BEZET), ECG08: 435 MS
VENTRICULAR RATE, ECG03: 51 BPM

## 2020-07-17 PROCEDURE — 74011250636 HC RX REV CODE- 250/636: Performed by: NURSE PRACTITIONER

## 2020-07-17 PROCEDURE — 96365 THER/PROPH/DIAG IV INF INIT: CPT

## 2020-07-17 PROCEDURE — 74011000258 HC RX REV CODE- 258: Performed by: NURSE PRACTITIONER

## 2020-07-17 RX ORDER — AMIODARONE HYDROCHLORIDE 200 MG/1
600 TABLET ORAL ONCE
COMMUNITY
Start: 2020-07-19 | End: 2020-07-19

## 2020-07-17 RX ORDER — SODIUM CHLORIDE 0.9 % (FLUSH) 0.9 %
10-40 SYRINGE (ML) INJECTION AS NEEDED
Status: DISCONTINUED | OUTPATIENT
Start: 2020-07-17 | End: 2020-07-19 | Stop reason: HOSPADM

## 2020-07-17 RX ADMIN — Medication 10 ML: at 17:10

## 2020-07-17 RX ADMIN — CEFTRIAXONE 2 G: 2 INJECTION, POWDER, FOR SOLUTION INTRAMUSCULAR; INTRAVENOUS at 16:41

## 2020-07-17 RX ADMIN — Medication 10 ML: at 16:40

## 2020-07-17 NOTE — PROGRESS NOTES
Arrived to the [facility]. Rocephin and PICC line dressing change completed. Patient tolerated well. Any issues or concerns during appointment: none  Patient aware of next infusion appointment on 7/18 at 1100. Discharged ambulatory to home.

## 2020-07-18 ENCOUNTER — HOSPITAL ENCOUNTER (OUTPATIENT)
Dept: INFUSION THERAPY | Age: 60
Discharge: HOME | End: 2020-07-18
Payer: COMMERCIAL

## 2020-07-18 VITALS
SYSTOLIC BLOOD PRESSURE: 102 MMHG | RESPIRATION RATE: 18 BRPM | HEART RATE: 72 BPM | TEMPERATURE: 98.2 F | OXYGEN SATURATION: 98 % | DIASTOLIC BLOOD PRESSURE: 56 MMHG

## 2020-07-18 LAB
BACTERIA SPEC CULT: NORMAL
SERVICE CMNT-IMP: NORMAL

## 2020-07-18 PROCEDURE — 74011250636 HC RX REV CODE- 250/636: Performed by: NURSE PRACTITIONER

## 2020-07-18 PROCEDURE — 74011000258 HC RX REV CODE- 258: Performed by: NURSE PRACTITIONER

## 2020-07-18 PROCEDURE — 96365 THER/PROPH/DIAG IV INF INIT: CPT

## 2020-07-18 RX ORDER — SODIUM CHLORIDE 0.9 % (FLUSH) 0.9 %
10 SYRINGE (ML) INJECTION AS NEEDED
Status: DISCONTINUED | OUTPATIENT
Start: 2020-07-18 | End: 2020-07-20 | Stop reason: HOSPADM

## 2020-07-18 RX ADMIN — Medication 10 ML: at 10:50

## 2020-07-18 RX ADMIN — CEFTRIAXONE 2 G: 2 INJECTION, POWDER, FOR SOLUTION INTRAMUSCULAR; INTRAVENOUS at 10:50

## 2020-07-18 RX ADMIN — Medication 10 ML: at 11:20

## 2020-07-18 NOTE — PROCEDURES
[facility]  PROCEDURE NOTE    Name:  Ute Steven  MR#:  [id number]  :  1960  ACCOUNT #:  [de-identified]  DATE OF SERVICE:  2020      SPIROMETRY    INTERPRETATION:  The flow volume loop is normal.    Spirometry suggests restriction.           Clearance MD FILI Clemente/[id number]  D:  2020 17:09  T:  2020 0:47  JOB #:  [id number]

## 2020-07-18 NOTE — PROGRESS NOTES
Arrived to the [facility]. Rocephin completed. Patient tolerated well. Any issues or concerns during appointment: none. Patient aware of next infusion appointment on 7/19/20 at 1030. Discharged ambulatory.     Byron Araujo RN

## 2020-07-19 ENCOUNTER — HOSPITAL ENCOUNTER (OUTPATIENT)
Dept: INFUSION THERAPY | Age: 60
Discharge: HOME | End: 2020-07-19
Payer: COMMERCIAL

## 2020-07-19 VITALS
TEMPERATURE: 98.3 F | SYSTOLIC BLOOD PRESSURE: 98 MMHG | DIASTOLIC BLOOD PRESSURE: 56 MMHG | OXYGEN SATURATION: 98 % | RESPIRATION RATE: 18 BRPM | HEART RATE: 69 BPM

## 2020-07-19 PROCEDURE — 74011000258 HC RX REV CODE- 258: Performed by: NURSE PRACTITIONER

## 2020-07-19 PROCEDURE — 74011250636 HC RX REV CODE- 250/636: Performed by: NURSE PRACTITIONER

## 2020-07-19 PROCEDURE — 96365 THER/PROPH/DIAG IV INF INIT: CPT

## 2020-07-19 RX ORDER — SODIUM CHLORIDE 0.9 % (FLUSH) 0.9 %
10 SYRINGE (ML) INJECTION AS NEEDED
Status: DISCONTINUED | OUTPATIENT
Start: 2020-07-19 | End: 2020-07-21 | Stop reason: HOSPADM

## 2020-07-19 RX ADMIN — Medication 10 ML: at 10:15

## 2020-07-19 RX ADMIN — CEFTRIAXONE 2 G: 2 INJECTION, POWDER, FOR SOLUTION INTRAMUSCULAR; INTRAVENOUS at 10:16

## 2020-07-19 RX ADMIN — Medication 10 ML: at 10:55

## 2020-07-19 RX ADMIN — Medication 10 ML: at 10:56

## 2020-07-19 NOTE — PROGRESS NOTES
Arrived to the [facility]. Rocephin completed. Patient tolerated well. Any issues or concerns during appointment: none. Patient aware of next infusion appointment on 7/20 at 3:30pm.  Discharged ambulatory to home.

## 2020-07-19 NOTE — PROGRESS NOTES
Problem: Pain  Goal: *Control of Pain  Outcome: Progressing Towards Goal     Problem: Infection - Risk of, Central Venous Catheter-Associated Bloodstream Infection  Goal: *Absence of infection signs and symptoms  Outcome: Progressing Towards Goal     Problem: Knowledge Deficit  Goal: *Verbalizes understanding of procedures and medications  Outcome: Progressing Towards Goal     Problem: Patient Education:  Go to Education Activity  Goal: Patient/Family Education  Outcome: Progressing Towards Goal

## 2020-07-20 ENCOUNTER — HOSPITAL ENCOUNTER (OUTPATIENT)
Dept: INFUSION THERAPY | Age: 60
Discharge: HOME | End: 2020-07-20
Payer: COMMERCIAL

## 2020-07-20 VITALS
RESPIRATION RATE: 18 BRPM | HEART RATE: 66 BPM | WEIGHT: 144.13 LBS | TEMPERATURE: 98 F | DIASTOLIC BLOOD PRESSURE: 61 MMHG | SYSTOLIC BLOOD PRESSURE: 111 MMHG | OXYGEN SATURATION: 98 % | BODY MASS INDEX: 19.02 KG/M2

## 2020-07-20 LAB
ALBUMIN SERPL-MCNC: 3.3 G/DL (ref 3.2–4.6)
ALBUMIN/GLOB SERPL: 0.8 {RATIO} (ref 1.2–3.5)
ALP SERPL-CCNC: 86 U/L (ref 50–136)
ALT SERPL-CCNC: 104 U/L (ref 12–65)
AST SERPL-CCNC: 65 U/L (ref 15–37)
BASOPHILS # BLD: 0.1 K/UL (ref 0–0.2)
BASOPHILS NFR BLD: 1 % (ref 0–2)
BILIRUB DIRECT SERPL-MCNC: <0.1 MG/DL
BILIRUB SERPL-MCNC: 0.3 MG/DL (ref 0.2–1.1)
CREAT SERPL-MCNC: 1.3 MG/DL (ref 0.8–1.5)
DIFFERENTIAL METHOD BLD: ABNORMAL
EOSINOPHIL # BLD: 0.3 K/UL (ref 0–0.8)
EOSINOPHIL NFR BLD: 4 % (ref 0.5–7.8)
ERYTHROCYTE [DISTWIDTH] IN BLOOD BY AUTOMATED COUNT: 17.3 % (ref 11.9–14.6)
GLOBULIN SER CALC-MCNC: 4.2 G/DL (ref 2.3–3.5)
HCT VFR BLD AUTO: 31.6 % (ref 41.1–50.3)
HGB BLD-MCNC: 10.1 G/DL (ref 13.6–17.2)
IMM GRANULOCYTES # BLD AUTO: 0.1 K/UL (ref 0–0.5)
IMM GRANULOCYTES NFR BLD AUTO: 1 % (ref 0–5)
LYMPHOCYTES # BLD: 1.5 K/UL (ref 0.5–4.6)
LYMPHOCYTES NFR BLD: 17 % (ref 13–44)
MCH RBC QN AUTO: 28.1 PG (ref 26.1–32.9)
MCHC RBC AUTO-ENTMCNC: 32 G/DL (ref 31.4–35)
MCV RBC AUTO: 88 FL (ref 79.6–97.8)
MONOCYTES # BLD: 0.9 K/UL (ref 0.1–1.3)
MONOCYTES NFR BLD: 11 % (ref 4–12)
NEUTS SEG # BLD: 5.8 K/UL (ref 1.7–8.2)
NEUTS SEG NFR BLD: 67 % (ref 43–78)
NRBC # BLD: 0 K/UL (ref 0–0.2)
PLATELET # BLD AUTO: 138 K/UL (ref 150–450)
PMV BLD AUTO: 9.7 FL (ref 9.4–12.3)
PROT SERPL-MCNC: 7.5 G/DL (ref 6.3–8.2)
RBC # BLD AUTO: 3.59 M/UL (ref 4.23–5.67)
WBC # BLD AUTO: 8.7 K/UL (ref 4.3–11.1)

## 2020-07-20 PROCEDURE — 80076 HEPATIC FUNCTION PANEL: CPT

## 2020-07-20 PROCEDURE — 74011250636 HC RX REV CODE- 250/636: Performed by: NURSE PRACTITIONER

## 2020-07-20 PROCEDURE — 74011000258 HC RX REV CODE- 258: Performed by: NURSE PRACTITIONER

## 2020-07-20 PROCEDURE — 85025 COMPLETE CBC W/AUTO DIFF WBC: CPT

## 2020-07-20 PROCEDURE — 82565 ASSAY OF CREATININE: CPT

## 2020-07-20 PROCEDURE — 96365 THER/PROPH/DIAG IV INF INIT: CPT

## 2020-07-20 RX ORDER — SODIUM CHLORIDE 0.9 % (FLUSH) 0.9 %
10 SYRINGE (ML) INJECTION EVERY 8 HOURS
Status: DISCONTINUED | OUTPATIENT
Start: 2020-07-20 | End: 2020-07-22 | Stop reason: HOSPADM

## 2020-07-20 RX ADMIN — Medication 10 ML: at 16:10

## 2020-07-20 RX ADMIN — CEFTRIAXONE 2 G: 2 INJECTION, POWDER, FOR SOLUTION INTRAMUSCULAR; INTRAVENOUS at 15:40

## 2020-07-20 NOTE — PROGRESS NOTES
Arrived to the [facility]. Assessment complete, labs drawn and  reviewed. Rocephin completed. Patient tolerated without problems.    Any issues or concerns during appointment: None  Instructed to call provider with any side effects or concerns No future appointment at this time   Discharged ambulatory

## 2020-07-22 ENCOUNTER — HOSPITAL ENCOUNTER (OUTPATIENT)
Dept: LAB | Age: 60
Discharge: HOME | DRG: 220 | End: 2020-07-22
Payer: COMMERCIAL

## 2020-07-22 ENCOUNTER — HOSPITAL ENCOUNTER (INPATIENT)
Age: 60
LOS: 6 days | Discharge: HOME | DRG: 220 | End: 2020-07-28
Attending: THORACIC SURGERY (CARDIOTHORACIC VASCULAR SURGERY) | Admitting: THORACIC SURGERY (CARDIOTHORACIC VASCULAR SURGERY)
Payer: COMMERCIAL

## 2020-07-22 DIAGNOSIS — E83.51 HYPOCALCEMIA: ICD-10-CM

## 2020-07-22 DIAGNOSIS — E87.0 HYPERNATREMIA: ICD-10-CM

## 2020-07-22 DIAGNOSIS — Z99.11 ENCOUNTER FOR WEANING FROM VENTILATOR (HCC): ICD-10-CM

## 2020-07-22 DIAGNOSIS — Z99.89 OSA ON CPAP: Chronic | ICD-10-CM

## 2020-07-22 DIAGNOSIS — I35.0 AORTIC VALVE STENOSIS, ETIOLOGY OF CARDIAC VALVE DISEASE UNSPECIFIED: ICD-10-CM

## 2020-07-22 DIAGNOSIS — I35.8 ENDOCARDITIS OF AORTIC VALVE: ICD-10-CM

## 2020-07-22 DIAGNOSIS — R79.1 ELEVATED FACTOR VIII LEVEL: ICD-10-CM

## 2020-07-22 DIAGNOSIS — G47.33 OSA ON CPAP: Chronic | ICD-10-CM

## 2020-07-22 DIAGNOSIS — D69.6 THROMBOCYTOPENIA (HCC): ICD-10-CM

## 2020-07-22 DIAGNOSIS — Z95.2 STATUS POST AORTIC VALVE REPLACEMENT: Chronic | ICD-10-CM

## 2020-07-22 DIAGNOSIS — G47.33 OSA (OBSTRUCTIVE SLEEP APNEA): ICD-10-CM

## 2020-07-22 PROCEDURE — 86923 COMPATIBILITY TEST ELECTRIC: CPT

## 2020-07-22 PROCEDURE — 86850 RBC ANTIBODY SCREEN: CPT

## 2020-07-22 PROCEDURE — 65270000029 HC RM PRIVATE

## 2020-07-22 PROCEDURE — 36415 COLL VENOUS BLD VENIPUNCTURE: CPT

## 2020-07-23 ENCOUNTER — ANESTHESIA (OUTPATIENT)
Dept: SURGERY | Age: 60
DRG: 220 | End: 2020-07-23
Payer: COMMERCIAL

## 2020-07-23 ENCOUNTER — APPOINTMENT (OUTPATIENT)
Dept: GENERAL RADIOLOGY | Age: 60
DRG: 220 | End: 2020-07-23
Attending: PHYSICIAN ASSISTANT
Payer: COMMERCIAL

## 2020-07-23 PROBLEM — E55.9 VITAMIN D DEFICIENCY: Chronic | Status: ACTIVE | Noted: 2020-05-20

## 2020-07-23 PROBLEM — D72.829 LEUKOCYTOSIS: Status: ACTIVE | Noted: 2020-07-23

## 2020-07-23 PROBLEM — E11.9 DIABETES MELLITUS, TYPE II (HCC): Chronic | Status: ACTIVE | Noted: 2020-05-20

## 2020-07-23 PROBLEM — I10 ESSENTIAL HYPERTENSION: Chronic | Status: ACTIVE | Noted: 2018-09-21

## 2020-07-23 PROBLEM — E87.0 HYPERNATREMIA: Status: ACTIVE | Noted: 2020-07-23

## 2020-07-23 PROBLEM — D69.6 THROMBOCYTOPENIA (HCC): Status: ACTIVE | Noted: 2020-07-23

## 2020-07-23 PROBLEM — D50.9 IRON DEFICIENCY ANEMIA: Chronic | Status: ACTIVE | Noted: 2020-06-18

## 2020-07-23 PROBLEM — I50.32 DIASTOLIC CHF, CHRONIC (HCC): Chronic | Status: ACTIVE | Noted: 2018-05-07

## 2020-07-23 PROBLEM — E83.51 HYPOCALCEMIA: Status: ACTIVE | Noted: 2020-07-23

## 2020-07-23 PROBLEM — Z20.822 COVID-19 RULED OUT: Status: RESOLVED | Noted: 2020-05-16 | Resolved: 2020-07-23

## 2020-07-23 PROBLEM — Z95.2 STATUS POST AORTIC VALVE REPLACEMENT: Chronic | Status: ACTIVE | Noted: 2018-04-12

## 2020-07-23 PROBLEM — I35.8 ENDOCARDITIS OF AORTIC VALVE: Status: ACTIVE | Noted: 2020-07-23

## 2020-07-23 PROBLEM — Z99.11 ENCOUNTER FOR WEANING FROM VENTILATOR (HCC): Status: ACTIVE | Noted: 2020-07-23

## 2020-07-23 LAB
ANION GAP SERPL CALC-SCNC: 8 MMOL/L (ref 7–16)
APTT PPP: 57.9 SEC (ref 24.3–35.4)
ARTERIAL PATENCY WRIST A: ABNORMAL
BASE DEFICIT BLD-SCNC: 1 MMOL/L
BASE DEFICIT BLD-SCNC: 2 MMOL/L
BASE DEFICIT BLD-SCNC: 7 MMOL/L
BASE DEFICIT BLD-SCNC: 7 MMOL/L
BASE EXCESS BLD CALC-SCNC: 0 MMOL/L
BASE EXCESS BLD CALC-SCNC: 1 MMOL/L
BASE EXCESS BLD CALC-SCNC: 1 MMOL/L
BASE EXCESS BLD CALC-SCNC: 4 MMOL/L
BDY SITE: ABNORMAL
BUN SERPL-MCNC: 24 MG/DL (ref 8–23)
CA-I BLD-MCNC: 1.04 MMOL/L (ref 1.12–1.32)
CA-I BLD-MCNC: 1.16 MMOL/L (ref 1.12–1.32)
CA-I BLD-MCNC: 1.18 MMOL/L (ref 1.12–1.32)
CA-I BLD-MCNC: 1.2 MMOL/L (ref 1.12–1.32)
CA-I BLD-MCNC: 1.24 MMOL/L (ref 1.12–1.32)
CA-I BLD-MCNC: 1.25 MMOL/L (ref 1.12–1.32)
CA-I BLD-MCNC: 1.31 MMOL/L (ref 1.12–1.32)
CALCIUM SERPL-MCNC: 6.9 MG/DL (ref 8.3–10.4)
CHLORIDE SERPL-SCNC: 120 MMOL/L (ref 98–107)
CITRATED FUNCTIONAL FIBRINOGEN MAXIMUM AMPLITUDE: 13.7 MM (ref 15–32)
CITRATED FUNCTIONAL FIBRINOGEN MAXIMUM AMPLITUDE: 15 MM (ref 15–32)
CITRATED FUNCTIONAL FIBRINOGEN MAXIMUM AMPLITUDE: 21.5 MM (ref 15–32)
CITRATED KAOLIN ANGLE: 64.8 DEG (ref 63–78)
CITRATED KAOLIN ANGLE: 68.9 DEG (ref 63–78)
CITRATED KAOLIN ANGLE: 75.9 DEG (ref 63–78)
CITRATED KAOLIN K-TIME: 1 MINS (ref 0.8–2.1)
CITRATED KAOLIN K-TIME: 1.8 MINS (ref 0.8–2.1)
CITRATED KAOLIN K-TIME: 1.9 MINS (ref 0.8–2.1)
CITRATED KAOLIN MAXIMUM AMPLITUDE: 52 MM (ref 52–69)
CITRATED KAOLIN MAXIMUM AMPLITUDE: 52.9 MM (ref 52–69)
CITRATED KAOLIN MAXIMUM AMPLITUDE: 59.9 MM (ref 52–69)
CITRATED KAOLIN R-TIME: 6.2 MINS (ref 4.6–9.1)
CITRATED KAOLIN R-TIME: 6.4 MINS (ref 4.6–9.1)
CITRATED KAOLIN R-TIME: 7.2 MINS (ref 4.6–9.1)
CITRATED KAOLIN/HEPARINASE R-TIME: 6.4 MINS (ref 4.3–8.3)
CITRATED KAOLIN/HEPARINASE R-TIME: 6.8 MINS (ref 4.3–8.3)
CITRATED KAOLIN/HEPARINASE R-TIME: 7.2 MINS (ref 4.3–8.3)
CITRATED RAPIDTEG MAXIMUM AMPLITUDE: 46.6 MM (ref 52–70)
CITRATED RAPIDTEG MAXIMUM AMPLITUDE: 50.1 MM (ref 52–70)
CITRATED RAPIDTEG MAXIMUM AMPLITUDE: 58.6 MM (ref 52–70)
CO2 BLD-SCNC: 20 MMOL/L
CO2 BLD-SCNC: 20 MMOL/L
CO2 BLD-SCNC: 26 MMOL/L
CO2 SERPL-SCNC: 19 MMOL/L (ref 21–32)
COLLECT TIME,HTIME: 1337
COLLECT TIME,HTIME: 2005
COLLECT TIME,HTIME: 2052
CREAT SERPL-MCNC: 1.1 MG/DL (ref 0.8–1.5)
ERYTHROCYTE [DISTWIDTH] IN BLOOD BY AUTOMATED COUNT: 16.6 % (ref 11.9–14.6)
EXHALED MINUTE VOLUME, VE: 7.15 L/MIN
EXHALED MINUTE VOLUME, VE: 7.2 L/MIN
EXHALED MINUTE VOLUME, VE: 9 L/MIN
FIBRINOGEN PPP-MCNC: 150 MG/DL (ref 190–501)
FUNCTIONAL FIBRINOGEN LEVEL: 250 MG/DL (ref 278–581)
FUNCTIONAL FIBRINOGEN LEVEL: 273.7 MG/DL (ref 278–581)
FUNCTIONAL FIBRINOGEN LEVEL: 392.3 MG/DL (ref 278–581)
GAS FLOW.O2 O2 DELIVERY SYS: ABNORMAL L/MIN
GAS FLOW.O2 SETTING OXYMISER: 18 BPM
GLUCOSE BLD STRIP.AUTO-MCNC: 100 MG/DL (ref 65–100)
GLUCOSE BLD STRIP.AUTO-MCNC: 107 MG/DL (ref 65–100)
GLUCOSE BLD STRIP.AUTO-MCNC: 121 MG/DL (ref 65–100)
GLUCOSE BLD STRIP.AUTO-MCNC: 127 MG/DL (ref 65–100)
GLUCOSE BLD STRIP.AUTO-MCNC: 128 MG/DL (ref 65–100)
GLUCOSE BLD STRIP.AUTO-MCNC: 131 MG/DL (ref 65–100)
GLUCOSE BLD STRIP.AUTO-MCNC: 133 MG/DL (ref 65–100)
GLUCOSE BLD STRIP.AUTO-MCNC: 134 MG/DL (ref 65–100)
GLUCOSE BLD STRIP.AUTO-MCNC: 134 MG/DL (ref 65–100)
GLUCOSE BLD STRIP.AUTO-MCNC: 136 MG/DL (ref 65–100)
GLUCOSE BLD STRIP.AUTO-MCNC: 139 MG/DL (ref 65–100)
GLUCOSE BLD STRIP.AUTO-MCNC: 141 MG/DL (ref 65–100)
GLUCOSE BLD STRIP.AUTO-MCNC: 142 MG/DL (ref 65–100)
GLUCOSE SERPL-MCNC: 129 MG/DL (ref 65–100)
HCO3 BLD-SCNC: 18.7 MMOL/L (ref 22–26)
HCO3 BLD-SCNC: 19 MMOL/L (ref 22–26)
HCO3 BLD-SCNC: 23 MMOL/L (ref 22–26)
HCO3 BLD-SCNC: 23.4 MMOL/L (ref 22–26)
HCO3 BLD-SCNC: 24.1 MMOL/L (ref 22–26)
HCO3 BLD-SCNC: 25.2 MMOL/L (ref 22–26)
HCO3 BLD-SCNC: 25.4 MMOL/L (ref 22–26)
HCO3 BLD-SCNC: 25.6 MMOL/L (ref 22–26)
HCO3 BLD-SCNC: 26 MMOL/L (ref 22–26)
HCO3 BLD-SCNC: 27.2 MMOL/L (ref 22–26)
HCT VFR BLD AUTO: 24.1 % (ref 41.1–50.3)
HCT VFR BLD AUTO: 26 % (ref 41.1–50.3)
HCT VFR BLD AUTO: 30.9 % (ref 41.1–50.3)
HGB BLD-MCNC: 10.1 G/DL (ref 13.6–17.2)
HGB BLD-MCNC: 7.6 G/DL (ref 13.6–17.2)
HGB BLD-MCNC: 8.5 G/DL (ref 13.6–17.2)
INR PPP: 2.1
MAGNESIUM SERPL-MCNC: 2.7 MG/DL (ref 1.8–2.4)
MAGNESIUM SERPL-MCNC: 2.9 MG/DL (ref 1.8–2.4)
MAGNESIUM SERPL-MCNC: 3.2 MG/DL (ref 1.8–2.4)
MCH RBC QN AUTO: 29.1 PG (ref 26.1–32.9)
MCHC RBC AUTO-ENTMCNC: 32.7 G/DL (ref 31.4–35)
MCV RBC AUTO: 89 FL (ref 79.6–97.8)
NRBC # BLD: 0 K/UL (ref 0–0.2)
O2/TOTAL GAS SETTING VFR VENT: 40 %
PCO2 BLD: 36.3 MMHG (ref 35–45)
PCO2 BLD: 36.4 MMHG (ref 35–45)
PCO2 BLD: 40.7 MMHG (ref 35–45)
PCO2 BLD: 41.2 MMHG (ref 35–45)
PCO2 BLD: 41.2 MMHG (ref 35–45)
PCO2 BLD: 41.4 MMHG (ref 35–45)
PCO2 BLD: 41.6 MMHG (ref 35–45)
PCO2 BLD: 42.1 MMHG (ref 35–45)
PCO2 BLD: 47.7 MMHG (ref 35–45)
PCO2 BLD: 48.5 MMHG (ref 35–45)
PEEP RESPIRATORY: 8 CMH2O
PH BLD: 7.27 [PH] (ref 7.35–7.45)
PH BLD: 7.31 [PH] (ref 7.35–7.45)
PH BLD: 7.32 [PH] (ref 7.35–7.45)
PH BLD: 7.33 [PH] (ref 7.35–7.45)
PH BLD: 7.35 [PH] (ref 7.35–7.45)
PH BLD: 7.36 [PH] (ref 7.35–7.45)
PH BLD: 7.39 [PH] (ref 7.35–7.45)
PH BLD: 7.39 [PH] (ref 7.35–7.45)
PH BLD: 7.41 [PH] (ref 7.35–7.45)
PH BLD: 7.48 [PH] (ref 7.35–7.45)
PLATELET # BLD AUTO: 76 K/UL (ref 150–450)
PMV BLD AUTO: 9.3 FL (ref 9.4–12.3)
PO2 BLD: 155 MMHG (ref 75–100)
PO2 BLD: 158 MMHG (ref 75–100)
PO2 BLD: 164 MMHG (ref 75–100)
PO2 BLD: 293 MMHG (ref 75–100)
PO2 BLD: 310 MMHG (ref 75–100)
PO2 BLD: 314 MMHG (ref 75–100)
PO2 BLD: 324 MMHG (ref 75–100)
PO2 BLD: 331 MMHG (ref 75–100)
PO2 BLD: 521 MMHG (ref 75–100)
PO2 BLD: 527 MMHG (ref 75–100)
POTASSIUM BLD-SCNC: 3.8 MMOL/L (ref 3.5–5.1)
POTASSIUM BLD-SCNC: 4.3 MMOL/L (ref 3.5–5.1)
POTASSIUM BLD-SCNC: 4.4 MMOL/L (ref 3.5–5.1)
POTASSIUM BLD-SCNC: 4.7 MMOL/L (ref 3.5–5.1)
POTASSIUM BLD-SCNC: 5 MMOL/L (ref 3.5–5.1)
POTASSIUM BLD-SCNC: 5.2 MMOL/L (ref 3.5–5.1)
POTASSIUM BLD-SCNC: 5.6 MMOL/L (ref 3.5–5.1)
POTASSIUM SERPL-SCNC: 4.2 MMOL/L (ref 3.5–5.1)
POTASSIUM SERPL-SCNC: 4.3 MMOL/L (ref 3.5–5.1)
POTASSIUM SERPL-SCNC: 4.4 MMOL/L (ref 3.5–5.1)
PROTHROMBIN TIME: 24.1 SEC (ref 12–14.7)
RBC # BLD AUTO: 3.47 M/UL (ref 4.23–5.6)
SAO2 % BLD: 100 % (ref 95–98)
SAO2 % BLD: 99 % (ref 95–98)
SERVICE CMNT-IMP: ABNORMAL
SODIUM BLD-SCNC: 138 MMOL/L (ref 136–145)
SODIUM BLD-SCNC: 140 MMOL/L (ref 136–145)
SODIUM BLD-SCNC: 140 MMOL/L (ref 136–145)
SODIUM BLD-SCNC: 141 MMOL/L (ref 136–145)
SODIUM BLD-SCNC: 142 MMOL/L (ref 136–145)
SODIUM SERPL-SCNC: 147 MMOL/L (ref 136–145)
SPECIMEN TYPE: ABNORMAL
TOTAL RESP. RATE, ITRR: 16
TOTAL RESP. RATE, ITRR: 16
VENTILATION MODE VENT: ABNORMAL
VT SETTING VENT: 500 ML
WBC # BLD AUTO: 21.1 K/UL (ref 4.3–11.1)

## 2020-07-23 PROCEDURE — C1751 CATH, INF, PER/CENT/MIDLINE: HCPCS | Performed by: NURSE ANESTHETIST, CERTIFIED REGISTERED

## 2020-07-23 PROCEDURE — 74011000250 HC RX REV CODE- 250: Performed by: PHYSICIAN ASSISTANT

## 2020-07-23 PROCEDURE — P9047 ALBUMIN (HUMAN), 25%, 50ML: HCPCS

## 2020-07-23 PROCEDURE — 74011000250 HC RX REV CODE- 250: Performed by: NURSE PRACTITIONER

## 2020-07-23 PROCEDURE — 82962 GLUCOSE BLOOD TEST: CPT

## 2020-07-23 PROCEDURE — 80048 BASIC METABOLIC PNL TOTAL CA: CPT

## 2020-07-23 PROCEDURE — 76060000041 HC ANESTHESIA 5 TO 5.5 HR: Performed by: THORACIC SURGERY (CARDIOTHORACIC VASCULAR SURGERY)

## 2020-07-23 PROCEDURE — 77030008771 HC TU NG SALEM SUMP -A: Performed by: NURSE ANESTHETIST, CERTIFIED REGISTERED

## 2020-07-23 PROCEDURE — 77030020751 HC FLTR TBNG TRNSFUS HAEM -A: Performed by: NURSE ANESTHETIST, CERTIFIED REGISTERED

## 2020-07-23 PROCEDURE — 74011000258 HC RX REV CODE- 258: Performed by: PHYSICIAN ASSISTANT

## 2020-07-23 PROCEDURE — 77030009355 HC CRDPLG DEL SET QUES -C: Performed by: THORACIC SURGERY (CARDIOTHORACIC VASCULAR SURGERY)

## 2020-07-23 PROCEDURE — 85384 FIBRINOGEN ACTIVITY: CPT

## 2020-07-23 PROCEDURE — 77030018729 HC ELECTRD DEFIB PAD CARD -B: Performed by: THORACIC SURGERY (CARDIOTHORACIC VASCULAR SURGERY)

## 2020-07-23 PROCEDURE — 87205 SMEAR GRAM STAIN: CPT

## 2020-07-23 PROCEDURE — 74011250636 HC RX REV CODE- 250/636: Performed by: NURSE PRACTITIONER

## 2020-07-23 PROCEDURE — 74011000258 HC RX REV CODE- 258

## 2020-07-23 PROCEDURE — 30233N1 TRANSFUSION OF NONAUTOLOGOUS RED BLOOD CELLS INTO PERIPHERAL VEIN, PERCUTANEOUS APPROACH: ICD-10-PCS | Performed by: THORACIC SURGERY (CARDIOTHORACIC VASCULAR SURGERY)

## 2020-07-23 PROCEDURE — 77030018792 HC NDL SUT TFSH -A: Performed by: THORACIC SURGERY (CARDIOTHORACIC VASCULAR SURGERY)

## 2020-07-23 PROCEDURE — 77030016687: Performed by: THORACIC SURGERY (CARDIOTHORACIC VASCULAR SURGERY)

## 2020-07-23 PROCEDURE — 74011000250 HC RX REV CODE- 250: Performed by: NURSE ANESTHETIST, CERTIFIED REGISTERED

## 2020-07-23 PROCEDURE — 94761 N-INVAS EAR/PLS OXIMETRY MLT: CPT

## 2020-07-23 PROCEDURE — 74011250636 HC RX REV CODE- 250/636: Performed by: PHYSICIAN ASSISTANT

## 2020-07-23 PROCEDURE — 85347 COAGULATION TIME ACTIVATED: CPT

## 2020-07-23 PROCEDURE — 77030002996 HC SUT SLK J&J -A: Performed by: THORACIC SURGERY (CARDIOTHORACIC VASCULAR SURGERY)

## 2020-07-23 PROCEDURE — 74011250636 HC RX REV CODE- 250/636: Performed by: THORACIC SURGERY (CARDIOTHORACIC VASCULAR SURGERY)

## 2020-07-23 PROCEDURE — 74011250637 HC RX REV CODE- 250/637: Performed by: PHYSICIAN ASSISTANT

## 2020-07-23 PROCEDURE — 65610000006 HC RM INTENSIVE CARE

## 2020-07-23 PROCEDURE — 3E080GC INTRODUCTION OF OTHER THERAPEUTIC SUBSTANCE INTO HEART, OPEN APPROACH: ICD-10-PCS | Performed by: THORACIC SURGERY (CARDIOTHORACIC VASCULAR SURGERY)

## 2020-07-23 PROCEDURE — 77030005537 HC CATH URETH BARD -A: Performed by: THORACIC SURGERY (CARDIOTHORACIC VASCULAR SURGERY)

## 2020-07-23 PROCEDURE — 77030003037 HC SUT WRE STRNOTMY AEMC -B: Performed by: THORACIC SURGERY (CARDIOTHORACIC VASCULAR SURGERY)

## 2020-07-23 PROCEDURE — 99223 1ST HOSP IP/OBS HIGH 75: CPT | Performed by: INTERNAL MEDICINE

## 2020-07-23 PROCEDURE — 83735 ASSAY OF MAGNESIUM: CPT

## 2020-07-23 PROCEDURE — 77030002520 HC INSRT CLMP LATIS STLTH AMR -B: Performed by: THORACIC SURGERY (CARDIOTHORACIC VASCULAR SURGERY)

## 2020-07-23 PROCEDURE — 77030033070 HC RLD QLC FPCH COR-KNOT LSIS -C: Performed by: THORACIC SURGERY (CARDIOTHORACIC VASCULAR SURGERY)

## 2020-07-23 PROCEDURE — 85027 COMPLETE CBC AUTOMATED: CPT

## 2020-07-23 PROCEDURE — 71045 X-RAY EXAM CHEST 1 VIEW: CPT

## 2020-07-23 PROCEDURE — C1769 GUIDE WIRE: HCPCS | Performed by: THORACIC SURGERY (CARDIOTHORACIC VASCULAR SURGERY)

## 2020-07-23 PROCEDURE — 77030010827: Performed by: THORACIC SURGERY (CARDIOTHORACIC VASCULAR SURGERY)

## 2020-07-23 PROCEDURE — 74011000258 HC RX REV CODE- 258: Performed by: THORACIC SURGERY (CARDIOTHORACIC VASCULAR SURGERY)

## 2020-07-23 PROCEDURE — 74011000250 HC RX REV CODE- 250: Performed by: INTERNAL MEDICINE

## 2020-07-23 PROCEDURE — 74011250636 HC RX REV CODE- 250/636: Performed by: ANESTHESIOLOGY

## 2020-07-23 PROCEDURE — 77010033711 HC HIGH FLOW OXYGEN

## 2020-07-23 PROCEDURE — 77030020407 HC IV BLD WRMR ST 3M -A: Performed by: NURSE ANESTHETIST, CERTIFIED REGISTERED

## 2020-07-23 PROCEDURE — 30233R1 TRANSFUSION OF NONAUTOLOGOUS PLATELETS INTO PERIPHERAL VEIN, PERCUTANEOUS APPROACH: ICD-10-PCS | Performed by: THORACIC SURGERY (CARDIOTHORACIC VASCULAR SURGERY)

## 2020-07-23 PROCEDURE — 82947 ASSAY GLUCOSE BLOOD QUANT: CPT

## 2020-07-23 PROCEDURE — 87075 CULTR BACTERIA EXCEPT BLOOD: CPT

## 2020-07-23 PROCEDURE — 77030003010 HC SUT SURG STL J&J -B: Performed by: THORACIC SURGERY (CARDIOTHORACIC VASCULAR SURGERY)

## 2020-07-23 PROCEDURE — 77030018547 HC SUT ETHBND1 J&J -B: Performed by: THORACIC SURGERY (CARDIOTHORACIC VASCULAR SURGERY)

## 2020-07-23 PROCEDURE — 02RF08Z REPLACEMENT OF AORTIC VALVE WITH ZOOPLASTIC TISSUE, OPEN APPROACH: ICD-10-PCS | Performed by: THORACIC SURGERY (CARDIOTHORACIC VASCULAR SURGERY)

## 2020-07-23 PROCEDURE — 77030005401 HC CATH RAD ARRO -A: Performed by: NURSE ANESTHETIST, CERTIFIED REGISTERED

## 2020-07-23 PROCEDURE — 77030019908 HC STETH ESOPH SIMS -A: Performed by: NURSE ANESTHETIST, CERTIFIED REGISTERED

## 2020-07-23 PROCEDURE — P9012 CRYOPRECIPITATE EACH UNIT: HCPCS

## 2020-07-23 PROCEDURE — 94002 VENT MGMT INPAT INIT DAY: CPT

## 2020-07-23 PROCEDURE — 77030012390 HC DRN CHST BTL GTNG -B: Performed by: THORACIC SURGERY (CARDIOTHORACIC VASCULAR SURGERY)

## 2020-07-23 PROCEDURE — 77030003422 HC NDL ASPIR NOVO -A: Performed by: THORACIC SURGERY (CARDIOTHORACIC VASCULAR SURGERY)

## 2020-07-23 PROCEDURE — 74011000250 HC RX REV CODE- 250: Performed by: THORACIC SURGERY (CARDIOTHORACIC VASCULAR SURGERY)

## 2020-07-23 PROCEDURE — 77030005518 HC CATH URETH FOL 2W BARD -B: Performed by: THORACIC SURGERY (CARDIOTHORACIC VASCULAR SURGERY)

## 2020-07-23 PROCEDURE — 77030032994 HC SOL INJ SOD CL 0.9% LFCR 500ML

## 2020-07-23 PROCEDURE — 93005 ELECTROCARDIOGRAM TRACING: CPT | Performed by: PHYSICIAN ASSISTANT

## 2020-07-23 PROCEDURE — P9035 PLATELET PHERES LEUKOREDUCED: HCPCS

## 2020-07-23 PROCEDURE — 85730 THROMBOPLASTIN TIME PARTIAL: CPT

## 2020-07-23 PROCEDURE — 77030031139 HC SUT VCRL2 J&J -A: Performed by: THORACIC SURGERY (CARDIOTHORACIC VASCULAR SURGERY)

## 2020-07-23 PROCEDURE — 86580 TB INTRADERMAL TEST: CPT | Performed by: PHYSICIAN ASSISTANT

## 2020-07-23 PROCEDURE — 74011636637 HC RX REV CODE- 636/637: Performed by: THORACIC SURGERY (CARDIOTHORACIC VASCULAR SURGERY)

## 2020-07-23 PROCEDURE — 77030018793 HC ORG SUT GAB FRAT TELE -B: Performed by: THORACIC SURGERY (CARDIOTHORACIC VASCULAR SURGERY)

## 2020-07-23 PROCEDURE — 84132 ASSAY OF SERUM POTASSIUM: CPT

## 2020-07-23 PROCEDURE — P9045 ALBUMIN (HUMAN), 5%, 250 ML: HCPCS | Performed by: NURSE ANESTHETIST, CERTIFIED REGISTERED

## 2020-07-23 PROCEDURE — 77030041469 HC VLV AORT INSPIRIS EDWD -K4: Performed by: THORACIC SURGERY (CARDIOTHORACIC VASCULAR SURGERY)

## 2020-07-23 PROCEDURE — C1894 INTRO/SHEATH, NON-LASER: HCPCS | Performed by: THORACIC SURGERY (CARDIOTHORACIC VASCULAR SURGERY)

## 2020-07-23 PROCEDURE — 77030013292 HC BOWL MX PRSM J&J -A: Performed by: NURSE ANESTHETIST, CERTIFIED REGISTERED

## 2020-07-23 PROCEDURE — 77030039425 HC BLD LARYNG TRULITE DISP TELE -A: Performed by: NURSE ANESTHETIST, CERTIFIED REGISTERED

## 2020-07-23 PROCEDURE — 77030002986 HC SUT PROL J&J -A: Performed by: THORACIC SURGERY (CARDIOTHORACIC VASCULAR SURGERY)

## 2020-07-23 PROCEDURE — 77030025827 HC BG BLD DNR AUTLG MEDT -A: Performed by: THORACIC SURGERY (CARDIOTHORACIC VASCULAR SURGERY)

## 2020-07-23 PROCEDURE — 77030014007 HC SPNG HEMSTAT J&J -B: Performed by: THORACIC SURGERY (CARDIOTHORACIC VASCULAR SURGERY)

## 2020-07-23 PROCEDURE — B24BZZ4 ULTRASONOGRAPHY OF HEART WITH AORTA, TRANSESOPHAGEAL: ICD-10-PCS | Performed by: THORACIC SURGERY (CARDIOTHORACIC VASCULAR SURGERY)

## 2020-07-23 PROCEDURE — 74011250636 HC RX REV CODE- 250/636: Performed by: NURSE ANESTHETIST, CERTIFIED REGISTERED

## 2020-07-23 PROCEDURE — 36600 WITHDRAWAL OF ARTERIAL BLOOD: CPT

## 2020-07-23 PROCEDURE — 77030013064 HC TRNSF BLD FENW -A

## 2020-07-23 PROCEDURE — 77030016564 HC BLD STRNL SAW4 CNMD -B: Performed by: THORACIC SURGERY (CARDIOTHORACIC VASCULAR SURGERY)

## 2020-07-23 PROCEDURE — 74011000302 HC RX REV CODE- 302: Performed by: PHYSICIAN ASSISTANT

## 2020-07-23 PROCEDURE — C1729 CATH, DRAINAGE: HCPCS | Performed by: THORACIC SURGERY (CARDIOTHORACIC VASCULAR SURGERY)

## 2020-07-23 PROCEDURE — 77030003034 HC SUT WRE CRD J&J -B: Performed by: THORACIC SURGERY (CARDIOTHORACIC VASCULAR SURGERY)

## 2020-07-23 PROCEDURE — 76010000155 HC AUTO TRANSFUSION/CELL SAVER: Performed by: THORACIC SURGERY (CARDIOTHORACIC VASCULAR SURGERY)

## 2020-07-23 PROCEDURE — 85018 HEMOGLOBIN: CPT

## 2020-07-23 PROCEDURE — 77030016376 HC TBNG MON PRSS ARMD -A: Performed by: THORACIC SURGERY (CARDIOTHORACIC VASCULAR SURGERY)

## 2020-07-23 PROCEDURE — 77030013794 HC KT TRNSDUC BLD EDWD -B: Performed by: NURSE ANESTHETIST, CERTIFIED REGISTERED

## 2020-07-23 PROCEDURE — 30233M1 TRANSFUSION OF NONAUTOLOGOUS PLASMA CRYOPRECIPITATE INTO PERIPHERAL VEIN, PERCUTANEOUS APPROACH: ICD-10-PCS | Performed by: THORACIC SURGERY (CARDIOTHORACIC VASCULAR SURGERY)

## 2020-07-23 PROCEDURE — 85610 PROTHROMBIN TIME: CPT

## 2020-07-23 PROCEDURE — 77030002966 HC SUT PDS J&J -A: Performed by: THORACIC SURGERY (CARDIOTHORACIC VASCULAR SURGERY)

## 2020-07-23 PROCEDURE — 74011000258 HC RX REV CODE- 258: Performed by: NURSE ANESTHETIST, CERTIFIED REGISTERED

## 2020-07-23 PROCEDURE — P9045 ALBUMIN (HUMAN), 5%, 250 ML: HCPCS | Performed by: THORACIC SURGERY (CARDIOTHORACIC VASCULAR SURGERY)

## 2020-07-23 PROCEDURE — 36430 TRANSFUSION BLD/BLD COMPNT: CPT

## 2020-07-23 PROCEDURE — 77030034936 HC DEV MIN COR-KNOT KT LSIS -F: Performed by: THORACIC SURGERY (CARDIOTHORACIC VASCULAR SURGERY)

## 2020-07-23 PROCEDURE — 77030002888 HC SUT CHRMC J&J -A: Performed by: THORACIC SURGERY (CARDIOTHORACIC VASCULAR SURGERY)

## 2020-07-23 PROCEDURE — 77030020751 HC FLTR TBNG TRNSFUS HAEM -A: Performed by: THORACIC SURGERY (CARDIOTHORACIC VASCULAR SURGERY)

## 2020-07-23 PROCEDURE — 88305 TISSUE EXAM BY PATHOLOGIST: CPT

## 2020-07-23 PROCEDURE — 77030041244 HC CBL PACE EXT TEMP REMG -B: Performed by: THORACIC SURGERY (CARDIOTHORACIC VASCULAR SURGERY)

## 2020-07-23 PROCEDURE — P9016 RBC LEUKOCYTES REDUCED: HCPCS

## 2020-07-23 PROCEDURE — 82803 BLOOD GASES ANY COMBINATION: CPT

## 2020-07-23 PROCEDURE — 74011250636 HC RX REV CODE- 250/636

## 2020-07-23 PROCEDURE — 77030018548 HC SUT ETHBND2 J&J -B: Performed by: THORACIC SURGERY (CARDIOTHORACIC VASCULAR SURGERY)

## 2020-07-23 PROCEDURE — 77030002970 HC SUT PLEDG TELE -A: Performed by: THORACIC SURGERY (CARDIOTHORACIC VASCULAR SURGERY)

## 2020-07-23 PROCEDURE — 02PA08Z REMOVAL OF ZOOPLASTIC TISSUE FROM HEART, OPEN APPROACH: ICD-10-PCS | Performed by: THORACIC SURGERY (CARDIOTHORACIC VASCULAR SURGERY)

## 2020-07-23 PROCEDURE — 77030034927 HC PK PROC CPB INSPIRE PERF LIVA -F: Performed by: THORACIC SURGERY (CARDIOTHORACIC VASCULAR SURGERY)

## 2020-07-23 PROCEDURE — 77030027138 HC INCENT SPIROMETER -A

## 2020-07-23 PROCEDURE — 74011000250 HC RX REV CODE- 250

## 2020-07-23 PROCEDURE — 76010000198 HC CV SURG 5 TO 5.5 HR INTENSV-TIER 1: Performed by: THORACIC SURGERY (CARDIOTHORACIC VASCULAR SURGERY)

## 2020-07-23 PROCEDURE — 77030018836 HC SOL IRR NACL ICUM -A: Performed by: THORACIC SURGERY (CARDIOTHORACIC VASCULAR SURGERY)

## 2020-07-23 PROCEDURE — 77030012890

## 2020-07-23 PROCEDURE — 77030037088 HC TUBE ENDOTRACH ORAL NSL COVD-A: Performed by: NURSE ANESTHETIST, CERTIFIED REGISTERED

## 2020-07-23 PROCEDURE — 77030013797 HC KT TRNSDUC PRSSR EDWD -A: Performed by: THORACIC SURGERY (CARDIOTHORACIC VASCULAR SURGERY)

## 2020-07-23 PROCEDURE — 87102 FUNGUS ISOLATION CULTURE: CPT

## 2020-07-23 DEVICE — VALVE AORT 25MM REPL RESILIENT BOV PERICARD CO CHROMIUM ALLY: Type: IMPLANTABLE DEVICE | Site: AORTA | Status: FUNCTIONAL

## 2020-07-23 RX ORDER — CEFAZOLIN SODIUM/WATER 2 G/20 ML
2 SYRINGE (ML) INTRAVENOUS ONCE
Status: COMPLETED | OUTPATIENT
Start: 2020-07-23 | End: 2020-07-23

## 2020-07-23 RX ORDER — NALOXONE HYDROCHLORIDE 0.4 MG/ML
0.4 INJECTION, SOLUTION INTRAMUSCULAR; INTRAVENOUS; SUBCUTANEOUS AS NEEDED
Status: DISCONTINUED | OUTPATIENT
Start: 2020-07-23 | End: 2020-07-24

## 2020-07-23 RX ORDER — SODIUM CHLORIDE 9 MG/ML
250 INJECTION, SOLUTION INTRAVENOUS AS NEEDED
Status: DISCONTINUED | OUTPATIENT
Start: 2020-07-23 | End: 2020-07-24 | Stop reason: SDUPTHER

## 2020-07-23 RX ORDER — ACETAMINOPHEN 325 MG/1
650 TABLET ORAL
Status: DISCONTINUED | OUTPATIENT
Start: 2020-07-23 | End: 2020-07-24

## 2020-07-23 RX ORDER — SODIUM CHLORIDE, SODIUM LACTATE, POTASSIUM CHLORIDE, CALCIUM CHLORIDE 600; 310; 30; 20 MG/100ML; MG/100ML; MG/100ML; MG/100ML
INJECTION, SOLUTION INTRAVENOUS
Status: DISCONTINUED | OUTPATIENT
Start: 2020-07-23 | End: 2020-07-23 | Stop reason: HOSPADM

## 2020-07-23 RX ORDER — LIDOCAINE HYDROCHLORIDE 20 MG/ML
INJECTION, SOLUTION EPIDURAL; INFILTRATION; INTRACAUDAL; PERINEURAL AS NEEDED
Status: DISCONTINUED | OUTPATIENT
Start: 2020-07-23 | End: 2020-07-23 | Stop reason: HOSPADM

## 2020-07-23 RX ORDER — MORPHINE SULFATE 10 MG/ML
3-5 INJECTION, SOLUTION INTRAMUSCULAR; INTRAVENOUS
Status: DISCONTINUED | OUTPATIENT
Start: 2020-07-23 | End: 2020-07-24

## 2020-07-23 RX ORDER — SODIUM CHLORIDE 0.9 % (FLUSH) 0.9 %
5-40 SYRINGE (ML) INJECTION AS NEEDED
Status: DISCONTINUED | OUTPATIENT
Start: 2020-07-23 | End: 2020-07-24

## 2020-07-23 RX ORDER — LIDOCAINE HYDROCHLORIDE 10 MG/ML
0.1 INJECTION INFILTRATION; PERINEURAL AS NEEDED
Status: DISCONTINUED | OUTPATIENT
Start: 2020-07-23 | End: 2020-07-23 | Stop reason: HOSPADM

## 2020-07-23 RX ORDER — EPHEDRINE SULFATE/0.9% NACL/PF 50 MG/5 ML
SYRINGE (ML) INTRAVENOUS AS NEEDED
Status: DISCONTINUED | OUTPATIENT
Start: 2020-07-23 | End: 2020-07-23 | Stop reason: HOSPADM

## 2020-07-23 RX ORDER — HEPARIN SODIUM 1000 [USP'U]/ML
INJECTION, SOLUTION INTRAVENOUS; SUBCUTANEOUS AS NEEDED
Status: DISCONTINUED | OUTPATIENT
Start: 2020-07-23 | End: 2020-07-23 | Stop reason: HOSPADM

## 2020-07-23 RX ORDER — MIDAZOLAM HYDROCHLORIDE 1 MG/ML
INJECTION, SOLUTION INTRAMUSCULAR; INTRAVENOUS AS NEEDED
Status: DISCONTINUED | OUTPATIENT
Start: 2020-07-23 | End: 2020-07-23 | Stop reason: HOSPADM

## 2020-07-23 RX ORDER — SODIUM CHLORIDE 0.9 % (FLUSH) 0.9 %
5-40 SYRINGE (ML) INJECTION EVERY 8 HOURS
Status: DISCONTINUED | OUTPATIENT
Start: 2020-07-23 | End: 2020-07-24

## 2020-07-23 RX ORDER — SODIUM BICARBONATE 84 MG/ML
100 INJECTION, SOLUTION INTRAVENOUS ONCE
Status: COMPLETED | OUTPATIENT
Start: 2020-07-23 | End: 2020-07-23

## 2020-07-23 RX ORDER — NITROGLYCERIN 20 MG/100ML
10-100 INJECTION INTRAVENOUS
Status: DISCONTINUED | OUTPATIENT
Start: 2020-07-23 | End: 2020-07-24

## 2020-07-23 RX ORDER — MIDAZOLAM HYDROCHLORIDE 1 MG/ML
2 INJECTION, SOLUTION INTRAMUSCULAR; INTRAVENOUS
Status: DISCONTINUED | OUTPATIENT
Start: 2020-07-23 | End: 2020-07-23 | Stop reason: HOSPADM

## 2020-07-23 RX ORDER — CHLORHEXIDINE GLUCONATE 1.2 MG/ML
10 RINSE ORAL 2 TIMES DAILY
Status: DISCONTINUED | OUTPATIENT
Start: 2020-07-23 | End: 2020-07-24

## 2020-07-23 RX ORDER — CEFAZOLIN SODIUM/WATER 2 G/20 ML
2 SYRINGE (ML) INTRAVENOUS EVERY 8 HOURS
Status: DISCONTINUED | OUTPATIENT
Start: 2020-07-24 | End: 2020-07-27

## 2020-07-23 RX ORDER — ROCURONIUM BROMIDE 10 MG/ML
INJECTION, SOLUTION INTRAVENOUS AS NEEDED
Status: DISCONTINUED | OUTPATIENT
Start: 2020-07-23 | End: 2020-07-23 | Stop reason: HOSPADM

## 2020-07-23 RX ORDER — CARVEDILOL 3.12 MG/1
3.12 TABLET ORAL 2 TIMES DAILY WITH MEALS
Status: DISCONTINUED | OUTPATIENT
Start: 2020-07-23 | End: 2020-07-28 | Stop reason: HOSPADM

## 2020-07-23 RX ORDER — SODIUM CHLORIDE 9 MG/ML
INJECTION, SOLUTION INTRAVENOUS
Status: DISCONTINUED | OUTPATIENT
Start: 2020-07-23 | End: 2020-07-23 | Stop reason: HOSPADM

## 2020-07-23 RX ORDER — AMIODARONE HYDROCHLORIDE 200 MG/1
200 TABLET ORAL 2 TIMES DAILY
Status: DISCONTINUED | OUTPATIENT
Start: 2020-07-23 | End: 2020-07-24

## 2020-07-23 RX ORDER — MIDAZOLAM HYDROCHLORIDE 1 MG/ML
1 INJECTION, SOLUTION INTRAMUSCULAR; INTRAVENOUS
Status: DISCONTINUED | OUTPATIENT
Start: 2020-07-23 | End: 2020-07-24

## 2020-07-23 RX ORDER — POTASSIUM CHLORIDE 14.9 MG/ML
10 INJECTION INTRAVENOUS AS NEEDED
Status: DISCONTINUED | OUTPATIENT
Start: 2020-07-23 | End: 2020-07-24

## 2020-07-23 RX ORDER — ALBUMIN HUMAN 50 G/1000ML
25 SOLUTION INTRAVENOUS AS NEEDED
Status: DISCONTINUED | OUTPATIENT
Start: 2020-07-23 | End: 2020-07-24

## 2020-07-23 RX ORDER — MAGNESIUM SULFATE 1 G/100ML
1 INJECTION INTRAVENOUS AS NEEDED
Status: DISCONTINUED | OUTPATIENT
Start: 2020-07-23 | End: 2020-07-24

## 2020-07-23 RX ORDER — DOBUTAMINE HYDROCHLORIDE 200 MG/100ML
2-10 INJECTION INTRAVENOUS
Status: DISCONTINUED | OUTPATIENT
Start: 2020-07-23 | End: 2020-07-24

## 2020-07-23 RX ORDER — ETOMIDATE 2 MG/ML
INJECTION INTRAVENOUS AS NEEDED
Status: DISCONTINUED | OUTPATIENT
Start: 2020-07-23 | End: 2020-07-23 | Stop reason: HOSPADM

## 2020-07-23 RX ORDER — PROPOFOL 10 MG/ML
5-50 VIAL (ML) INTRAVENOUS
Status: DISCONTINUED | OUTPATIENT
Start: 2020-07-23 | End: 2020-07-24

## 2020-07-23 RX ORDER — AMIODARONE HYDROCHLORIDE 200 MG/1
600 TABLET ORAL ONCE
Status: COMPLETED | OUTPATIENT
Start: 2020-07-23 | End: 2020-07-23

## 2020-07-23 RX ORDER — SODIUM CHLORIDE 9 MG/ML
25 INJECTION, SOLUTION INTRAVENOUS CONTINUOUS
Status: DISCONTINUED | OUTPATIENT
Start: 2020-07-23 | End: 2020-07-24

## 2020-07-23 RX ORDER — ALBUMIN HUMAN 50 G/1000ML
SOLUTION INTRAVENOUS AS NEEDED
Status: DISCONTINUED | OUTPATIENT
Start: 2020-07-23 | End: 2020-07-23 | Stop reason: HOSPADM

## 2020-07-23 RX ORDER — SODIUM CHLORIDE, SODIUM LACTATE, POTASSIUM CHLORIDE, CALCIUM CHLORIDE 600; 310; 30; 20 MG/100ML; MG/100ML; MG/100ML; MG/100ML
75 INJECTION, SOLUTION INTRAVENOUS CONTINUOUS
Status: DISCONTINUED | OUTPATIENT
Start: 2020-07-23 | End: 2020-07-23 | Stop reason: HOSPADM

## 2020-07-23 RX ORDER — GUAIFENESIN 100 MG/5ML
81 LIQUID (ML) ORAL DAILY
Status: DISCONTINUED | OUTPATIENT
Start: 2020-07-24 | End: 2020-07-24

## 2020-07-23 RX ORDER — CEFAZOLIN SODIUM 1 G/3ML
INJECTION, POWDER, FOR SOLUTION INTRAMUSCULAR; INTRAVENOUS AS NEEDED
Status: DISCONTINUED | OUTPATIENT
Start: 2020-07-23 | End: 2020-07-23 | Stop reason: HOSPADM

## 2020-07-23 RX ORDER — CEFAZOLIN SODIUM/WATER 2 G/20 ML
2 SYRINGE (ML) INTRAVENOUS EVERY 8 HOURS
Status: DISCONTINUED | OUTPATIENT
Start: 2020-07-24 | End: 2020-07-23

## 2020-07-23 RX ORDER — AMIODARONE HYDROCHLORIDE 200 MG/1
600 TABLET ORAL
COMMUNITY
End: 2020-07-28

## 2020-07-23 RX ORDER — VECURONIUM BROMIDE FOR INJECTION 1 MG/ML
INJECTION, POWDER, LYOPHILIZED, FOR SOLUTION INTRAVENOUS AS NEEDED
Status: DISCONTINUED | OUTPATIENT
Start: 2020-07-23 | End: 2020-07-23 | Stop reason: HOSPADM

## 2020-07-23 RX ORDER — OXYCODONE AND ACETAMINOPHEN 5; 325 MG/1; MG/1
1 TABLET ORAL
Status: DISCONTINUED | OUTPATIENT
Start: 2020-07-23 | End: 2020-07-28 | Stop reason: HOSPADM

## 2020-07-23 RX ORDER — PROTAMINE SULFATE 10 MG/ML
INJECTION, SOLUTION INTRAVENOUS AS NEEDED
Status: DISCONTINUED | OUTPATIENT
Start: 2020-07-23 | End: 2020-07-23 | Stop reason: HOSPADM

## 2020-07-23 RX ORDER — ONDANSETRON 2 MG/ML
4 INJECTION INTRAMUSCULAR; INTRAVENOUS
Status: DISCONTINUED | OUTPATIENT
Start: 2020-07-23 | End: 2020-07-24

## 2020-07-23 RX ORDER — SUFENTANIL CITRATE 50 UG/ML
INJECTION EPIDURAL; INTRAVENOUS AS NEEDED
Status: DISCONTINUED | OUTPATIENT
Start: 2020-07-23 | End: 2020-07-23 | Stop reason: HOSPADM

## 2020-07-23 RX ORDER — CEFAZOLIN SODIUM/WATER 2 G/20 ML
2 SYRINGE (ML) INTRAVENOUS EVERY 8 HOURS
Status: COMPLETED | OUTPATIENT
Start: 2020-07-23 | End: 2020-07-24

## 2020-07-23 RX ORDER — NOREPINEPHRINE BITARTRATE/D5W 4MG/250ML
.01-.5 PLASTIC BAG, INJECTION (ML) INTRAVENOUS
Status: DISCONTINUED | OUTPATIENT
Start: 2020-07-23 | End: 2020-07-24

## 2020-07-23 RX ORDER — DEXTROSE, SODIUM CHLORIDE, AND POTASSIUM CHLORIDE 5; .45; .15 G/100ML; G/100ML; G/100ML
25 INJECTION INTRAVENOUS CONTINUOUS
Status: DISCONTINUED | OUTPATIENT
Start: 2020-07-23 | End: 2020-07-24

## 2020-07-23 RX ORDER — DEXTROSE 50 % IN WATER (D50W) INTRAVENOUS SYRINGE
25 AS NEEDED
Status: DISCONTINUED | OUTPATIENT
Start: 2020-07-23 | End: 2020-07-24

## 2020-07-23 RX ADMIN — SODIUM CHLORIDE 1 UNITS/HR: 900 INJECTION, SOLUTION INTRAVENOUS at 11:47

## 2020-07-23 RX ADMIN — MIDAZOLAM 1 MG: 1 INJECTION INTRAMUSCULAR; INTRAVENOUS at 08:44

## 2020-07-23 RX ADMIN — MIDAZOLAM 1 MG: 1 INJECTION INTRAMUSCULAR; INTRAVENOUS at 08:29

## 2020-07-23 RX ADMIN — PHENYLEPHRINE HYDROCHLORIDE 120 MCG: 10 INJECTION INTRAVENOUS at 12:55

## 2020-07-23 RX ADMIN — DEXTROSE MONOHYDRATE 16 MCG: 5 INJECTION, SOLUTION INTRAVENOUS at 12:49

## 2020-07-23 RX ADMIN — PHENYLEPHRINE HYDROCHLORIDE 120 MCG: 10 INJECTION INTRAVENOUS at 10:31

## 2020-07-23 RX ADMIN — Medication 3 AMPULE: at 06:18

## 2020-07-23 RX ADMIN — DEXTROSE MONOHYDRATE 16 MCG: 5 INJECTION, SOLUTION INTRAVENOUS at 12:46

## 2020-07-23 RX ADMIN — PHENYLEPHRINE HYDROCHLORIDE 120 MCG: 10 INJECTION INTRAVENOUS at 12:42

## 2020-07-23 RX ADMIN — VECURONIUM BROMIDE 5 MG: 1 INJECTION, POWDER, LYOPHILIZED, FOR SOLUTION INTRAVENOUS at 10:00

## 2020-07-23 RX ADMIN — SODIUM CHLORIDE, SODIUM LACTATE, POTASSIUM CHLORIDE, AND CALCIUM CHLORIDE: 600; 310; 30; 20 INJECTION, SOLUTION INTRAVENOUS at 08:16

## 2020-07-23 RX ADMIN — PHENYLEPHRINE HYDROCHLORIDE 120 MCG: 10 INJECTION INTRAVENOUS at 12:51

## 2020-07-23 RX ADMIN — PROTAMINE SULFATE 200 MG: 10 INJECTION, SOLUTION INTRAVENOUS at 12:20

## 2020-07-23 RX ADMIN — CHLORHEXIDINE GLUCONATE 10 ML: 1.2 RINSE ORAL at 17:07

## 2020-07-23 RX ADMIN — DEXTROSE MONOHYDRATE 0.02 MCG/KG/MIN: 5 INJECTION, SOLUTION INTRAVENOUS at 12:57

## 2020-07-23 RX ADMIN — DEXTROSE MONOHYDRATE 0.08 MCG/KG/MIN: 5 INJECTION, SOLUTION INTRAVENOUS at 22:03

## 2020-07-23 RX ADMIN — ALBUMIN (HUMAN) 25 G: 12.5 INJECTION, SOLUTION INTRAVENOUS at 15:10

## 2020-07-23 RX ADMIN — AMIODARONE HYDROCHLORIDE 200 MG: 200 TABLET ORAL at 20:32

## 2020-07-23 RX ADMIN — PHENYLEPHRINE HYDROCHLORIDE 60 MCG: 10 INJECTION INTRAVENOUS at 10:21

## 2020-07-23 RX ADMIN — PHENYLEPHRINE HYDROCHLORIDE 60 MCG: 10 INJECTION INTRAVENOUS at 09:40

## 2020-07-23 RX ADMIN — PHENYLEPHRINE HYDROCHLORIDE 120 MCG: 10 INJECTION INTRAVENOUS at 12:31

## 2020-07-23 RX ADMIN — PHENYLEPHRINE HYDROCHLORIDE 120 MCG: 10 INJECTION INTRAVENOUS at 12:21

## 2020-07-23 RX ADMIN — MIDAZOLAM 5 MG: 1 INJECTION INTRAMUSCULAR; INTRAVENOUS at 11:38

## 2020-07-23 RX ADMIN — DEXTROSE MONOHYDRATE 16 MCG: 5 INJECTION, SOLUTION INTRAVENOUS at 13:04

## 2020-07-23 RX ADMIN — MIDAZOLAM 2 MG: 1 INJECTION INTRAMUSCULAR; INTRAVENOUS at 08:21

## 2020-07-23 RX ADMIN — HEPARIN SODIUM 25000 UNITS: 1000 INJECTION, SOLUTION INTRAVENOUS; SUBCUTANEOUS at 10:14

## 2020-07-23 RX ADMIN — DEXTROSE MONOHYDRATE 8 MCG: 5 INJECTION, SOLUTION INTRAVENOUS at 12:33

## 2020-07-23 RX ADMIN — Medication 5 MG: at 10:17

## 2020-07-23 RX ADMIN — Medication 5 MG: at 10:26

## 2020-07-23 RX ADMIN — DOBUTAMINE IN DEXTROSE 3 MCG/KG/MIN: 200 INJECTION, SOLUTION INTRAVENOUS at 14:40

## 2020-07-23 RX ADMIN — SUFENTANIL CITRATE 50 MCG: 50 INJECTION EPIDURAL; INTRAVENOUS at 09:47

## 2020-07-23 RX ADMIN — TUBERCULIN PURIFIED PROTEIN DERIVATIVE 5 UNITS: 5 INJECTION, SOLUTION INTRADERMAL at 21:44

## 2020-07-23 RX ADMIN — SODIUM CHLORIDE, SODIUM LACTATE, POTASSIUM CHLORIDE, AND CALCIUM CHLORIDE: 600; 310; 30; 20 INJECTION, SOLUTION INTRAVENOUS at 08:55

## 2020-07-23 RX ADMIN — PHENYLEPHRINE HYDROCHLORIDE 60 MCG: 10 INJECTION INTRAVENOUS at 10:26

## 2020-07-23 RX ADMIN — SODIUM CHLORIDE: 9 INJECTION, SOLUTION INTRAVENOUS at 08:55

## 2020-07-23 RX ADMIN — VECURONIUM BROMIDE 5 MG: 1 INJECTION, POWDER, LYOPHILIZED, FOR SOLUTION INTRAVENOUS at 09:15

## 2020-07-23 RX ADMIN — DEXTROSE MONOHYDRATE 16 MCG: 5 INJECTION, SOLUTION INTRAVENOUS at 12:42

## 2020-07-23 RX ADMIN — PHENYLEPHRINE HYDROCHLORIDE 120 MCG: 10 INJECTION INTRAVENOUS at 12:36

## 2020-07-23 RX ADMIN — EPINEPHRINE 0.02 MCG/KG/MIN: 1 INJECTION INTRAMUSCULAR; INTRAVENOUS; SUBCUTANEOUS at 12:10

## 2020-07-23 RX ADMIN — DEXTROSE MONOHYDRATE 16 MCG: 5 INJECTION, SOLUTION INTRAVENOUS at 12:36

## 2020-07-23 RX ADMIN — PHENYLEPHRINE HYDROCHLORIDE 40 MCG/MIN: 10 INJECTION INTRAVENOUS at 19:01

## 2020-07-23 RX ADMIN — Medication 10 ML: at 21:51

## 2020-07-23 RX ADMIN — PHENYLEPHRINE HYDROCHLORIDE 60 MCG: 10 INJECTION INTRAVENOUS at 10:17

## 2020-07-23 RX ADMIN — PHENYLEPHRINE HYDROCHLORIDE 120 MCG: 10 INJECTION INTRAVENOUS at 13:03

## 2020-07-23 RX ADMIN — AMIODARONE HYDROCHLORIDE 600 MG: 200 TABLET ORAL at 06:17

## 2020-07-23 RX ADMIN — ETOMIDATE 10 MG: 2 INJECTION, SOLUTION INTRAVENOUS at 08:29

## 2020-07-23 RX ADMIN — DEXTROSE MONOHYDRATE, SODIUM CHLORIDE, AND POTASSIUM CHLORIDE 25 ML/HR: 50; 4.5; 1.49 INJECTION, SOLUTION INTRAVENOUS at 14:43

## 2020-07-23 RX ADMIN — VECURONIUM BROMIDE 5 MG: 1 INJECTION, POWDER, LYOPHILIZED, FOR SOLUTION INTRAVENOUS at 10:45

## 2020-07-23 RX ADMIN — Medication 5 MG: at 09:08

## 2020-07-23 RX ADMIN — PHENYLEPHRINE HYDROCHLORIDE 240 MCG: 10 INJECTION INTRAVENOUS at 12:32

## 2020-07-23 RX ADMIN — PHENYLEPHRINE HYDROCHLORIDE 120 MCG: 10 INJECTION INTRAVENOUS at 12:38

## 2020-07-23 RX ADMIN — SUFENTANIL CITRATE 50 MCG: 50 INJECTION EPIDURAL; INTRAVENOUS at 08:29

## 2020-07-23 RX ADMIN — ROCURONIUM BROMIDE 50 MG: 10 INJECTION, SOLUTION INTRAVENOUS at 08:30

## 2020-07-23 RX ADMIN — Medication 10 MG: at 09:40

## 2020-07-23 RX ADMIN — LIDOCAINE HYDROCHLORIDE 50 MG: 20 INJECTION, SOLUTION EPIDURAL; INFILTRATION; INTRACAUDAL; PERINEURAL at 08:29

## 2020-07-23 RX ADMIN — SODIUM BICARBONATE 100 MEQ: 84 INJECTION, SOLUTION INTRAVENOUS at 20:15

## 2020-07-23 RX ADMIN — MORPHINE SULFATE 3 MG: 10 INJECTION INTRAVENOUS at 18:26

## 2020-07-23 RX ADMIN — OXYCODONE HYDROCHLORIDE AND ACETAMINOPHEN 1 TABLET: 5; 325 TABLET ORAL at 21:44

## 2020-07-23 RX ADMIN — PHENYLEPHRINE HYDROCHLORIDE 120 MCG: 10 INJECTION INTRAVENOUS at 12:24

## 2020-07-23 RX ADMIN — Medication 2 G: at 09:24

## 2020-07-23 RX ADMIN — PHENYLEPHRINE HYDROCHLORIDE 120 MCG: 10 INJECTION INTRAVENOUS at 08:43

## 2020-07-23 RX ADMIN — SODIUM CHLORIDE, SODIUM LACTATE, POTASSIUM CHLORIDE, AND CALCIUM CHLORIDE 75 ML/HR: 600; 310; 30; 20 INJECTION, SOLUTION INTRAVENOUS at 06:18

## 2020-07-23 RX ADMIN — MORPHINE SULFATE 3 MG: 10 INJECTION INTRAVENOUS at 21:03

## 2020-07-23 RX ADMIN — PHENYLEPHRINE HYDROCHLORIDE 120 MCG: 10 INJECTION INTRAVENOUS at 10:29

## 2020-07-23 RX ADMIN — ALBUMIN (HUMAN) 25 G: 12.5 INJECTION, SOLUTION INTRAVENOUS at 17:26

## 2020-07-23 RX ADMIN — FAMOTIDINE 20 MG: 10 INJECTION, SOLUTION INTRAVENOUS at 20:32

## 2020-07-23 RX ADMIN — DEXTROSE MONOHYDRATE 16 MCG: 5 INJECTION, SOLUTION INTRAVENOUS at 12:55

## 2020-07-23 RX ADMIN — ROCURONIUM BROMIDE 50 MG: 10 INJECTION, SOLUTION INTRAVENOUS at 11:38

## 2020-07-23 RX ADMIN — PHENYLEPHRINE HYDROCHLORIDE 240 MCG: 10 INJECTION INTRAVENOUS at 12:22

## 2020-07-23 RX ADMIN — ALBUMIN HUMAN 250 ML: 0.05 INJECTION, SOLUTION INTRAVENOUS at 12:35

## 2020-07-23 RX ADMIN — AMINOCAPROIC ACID 10 G: 250 INJECTION, SOLUTION INTRAVENOUS at 08:55

## 2020-07-23 RX ADMIN — Medication 2 G: at 12:28

## 2020-07-23 RX ADMIN — Medication 10 MG: at 08:43

## 2020-07-23 RX ADMIN — SODIUM CHLORIDE 25 ML/HR: 9 INJECTION, SOLUTION INTRAVENOUS at 13:30

## 2020-07-23 RX ADMIN — SUFENTANIL CITRATE 50 MCG: 50 INJECTION EPIDURAL; INTRAVENOUS at 08:30

## 2020-07-23 RX ADMIN — DEXTROSE MONOHYDRATE 16 MCG: 5 INJECTION, SOLUTION INTRAVENOUS at 12:38

## 2020-07-23 RX ADMIN — SUFENTANIL CITRATE 50 MCG: 50 INJECTION EPIDURAL; INTRAVENOUS at 09:40

## 2020-07-23 RX ADMIN — Medication 10 MG: at 08:31

## 2020-07-23 RX ADMIN — PHENYLEPHRINE HYDROCHLORIDE 120 MCG: 10 INJECTION INTRAVENOUS at 08:31

## 2020-07-23 RX ADMIN — PHENYLEPHRINE HYDROCHLORIDE 60 MCG: 10 INJECTION INTRAVENOUS at 12:28

## 2020-07-23 RX ADMIN — DEXTROSE MONOHYDRATE 8 MCG: 5 INJECTION, SOLUTION INTRAVENOUS at 12:31

## 2020-07-23 RX ADMIN — SUFENTANIL CITRATE 50 MCG: 50 INJECTION EPIDURAL; INTRAVENOUS at 09:33

## 2020-07-23 RX ADMIN — Medication 1 AMPULE: at 20:32

## 2020-07-23 RX ADMIN — AMINOCAPROIC ACID 1 G/HR: 250 INJECTION, SOLUTION INTRAVENOUS at 09:33

## 2020-07-23 RX ADMIN — PHENYLEPHRINE HYDROCHLORIDE 240 MCG: 10 INJECTION INTRAVENOUS at 12:23

## 2020-07-23 RX ADMIN — SODIUM CHLORIDE: 9 INJECTION, SOLUTION INTRAVENOUS at 08:40

## 2020-07-23 RX ADMIN — Medication 40 ML: at 15:44

## 2020-07-23 RX ADMIN — CEFAZOLIN SODIUM 2 G: 100 INJECTION, POWDER, LYOPHILIZED, FOR SOLUTION INTRAVENOUS at 19:43

## 2020-07-23 RX ADMIN — MIDAZOLAM 1 MG: 1 INJECTION INTRAMUSCULAR; INTRAVENOUS at 08:25

## 2020-07-23 RX ADMIN — LIDOCAINE HYDROCHLORIDE 50 MG: 20 INJECTION, SOLUTION EPIDURAL; INFILTRATION; INTRACAUDAL; PERINEURAL at 10:25

## 2020-07-23 RX ADMIN — SODIUM CHLORIDE: 9 INJECTION, SOLUTION INTRAVENOUS at 13:14

## 2020-07-23 RX ADMIN — Medication 5 MG: at 10:21

## 2020-07-23 RX ADMIN — CHLORHEXIDINE GLUCONATE 10 ML: 1.2 RINSE ORAL at 14:42

## 2020-07-23 NOTE — PROGRESS NOTES
Dual skin assessment performed. MS incision is CDI with no breakthrough drainage present. Right groin Arterial line is CDI no bleeding or hematoma present. Allevyn is in place on sacrum and this was peeled back to reveal blanching redness, and no breakdown present. The pt's heels were visualized and no breakdown is present. Ct site has some drainage, but pressure was held and oozing ceased. No further abnormalities to integument noted.

## 2020-07-23 NOTE — PROGRESS NOTES
TRANSFER - IN REPORT:    Verbal report received from [facility] RN(name) on Emily Edmonds  being received from OR(unit) for routine post - op      Report consisted of patients Situation, Background, Assessment and   Recommendations(SBAR). Information from the following report(s) OR Summary, Procedure Summary and Intake/Output was reviewed with the receiving nurse. Per anesthesia on admission patient intubation Normal    Collaborative team agrees of surgeon, anesthesia, RT, and RN agrees to proceed with CV weaning protocol        Opportunity for questions and clarification was provided. Assessment completed upon patients arrival to unit and care assumed.     Primary RN is Columba Zhao

## 2020-07-23 NOTE — CONSULTS
Cardiovascular ICU Consult Note: 7/23/2020  Bradley Johnston  Admission Date: 7/23/2020     The patient's chart is reviewed and the patient is discussed with the staff. Subjective:     Patient is seen at the request of Dr. Vijaya Siddiqui for respiratory management status post cardiac surgery. Patient had AVR with closure of abscess of pericardial patch. He is currently is sedated in CV-ICU and orally intubated receiving mechanical ventilation. He was admitted in May with a STEMI. He was found to have an occluded OM and was treated medically. His echo showed AV vegetation. Blood cultures were + for strept mutans. He has been followed by the infectious disease team for antibiotic therapy. He underwent teeth extraction on 6/4. He smoked for about 40 years before quitting in 2018. His preop PFTs showed restriction. He had a hematology evaluation for an elevated factor 8 and it was still elevated which apparently increases his risk of thrombosis by 5 fold. Prior to Admission Medications   Prescriptions Last Dose Informant Patient Reported? Taking?   amiodarone (CORDARONE) 200 mg tablet 7/22/2020 at 1830  Yes Yes   Sig: Take 600 mg by mouth. aspirin 81 mg chewable tablet 7/22/2020 at 1700  Yes Yes   Sig: Take 81 mg by mouth nightly. atorvastatin (LIPITOR) 20 mg tablet 7/22/2020 at Unknown time  No Yes   Sig: TAKE 1 TABLET BY MOUTH NIGHTLY.   carvediloL (COREG) 3.125 mg tablet 7/23/2020 at 0330  No Yes   Sig: Take 1 Tab by mouth two (2) times daily (with meals) for 30 days. escitalopram oxalate (LEXAPRO) 10 mg tablet 7/22/2020 at Unknown time  No Yes   Sig: Take 1 Tab by mouth daily. Patient taking differently: Take 10 mg by mouth nightly. ferrous sulfate 325 mg (65 mg iron) tablet 7/22/2020 at Unknown time  No Yes   Sig: Take 1 Tab by mouth daily (with breakfast) for 30 days.    lisinopriL (PRINIVIL, ZESTRIL) 2.5 mg tablet 7/22/2020 at Unknown time  No Yes   Sig: Take 1 Tab by mouth two (2) times a day for 30 days. spironolactone (ALDACTONE) 25 mg tablet 2020 at Unknown time  No Yes   Sig: Take 1 Tab by mouth daily for 30 days. Facility-Administered Medications: None       Review of Systems    Review of systems not obtained due to patient factors.     Past Medical History:   Diagnosis Date    CAD (coronary artery disease)      AVR --mi 5/15/2020-- no stents-- followed by dr Maddy Davis addiction in remission [facility])     none since     Dyslipidemia     NSTEMI (non-ST elevated myocardial infarction) ([facility] 2020    IDEGO on CPAP     DOES NOT WEAR CPAP AT HS    [facility]     S/P aortic valve replacement 2018    Seizures ([facility] 2001    x 1--- ETOH  WITHDRAWAL    Sigmoid diverticulosis     Streptococcal endocarditis dx 2020    rec rocephin daily x 6 weeks-- to be completed 20-- per dr Leona Waller note-- thought to be r/t bad teeth-- had extractions     Past Surgical History:   Procedure Laterality Date    CARDIAC SURG PROCEDURE UNLIST  2018    AVR    HX HEART CATHETERIZATION  2018    non obstructive disease, severe AS    HX HERNIA REPAIR Left 2018    inguinal     HX VASCULAR ACCESS Right 2020    PICC-- IN PLACE -- RIGHT UPPER ARM     Social History     Socioeconomic History    Marital status:      Spouse name: Not on file    Number of children: Not on file    Years of education: Not on file    Highest education level: Not on file   Occupational History    Occupation: Works at [address] resource strain: Not on file    Food insecurity     Worry: Not on file     Inability: Not on file   [employer] needs     Medical: Not on file     Non-medical: Not on file   Tobacco Use    Smoking status: Former Smoker     Packs/day: 0.50     Years: 40.00     Pack years: 20.00     Types: Cigarettes     Last attempt to quit: 2018     Years since quittin.3    Smokeless tobacco: Never Used   Substance and Sexual Activity    Alcohol use: Not Currently     Comment: none since 3/2020    Drug use: No     Comment: none 2001    Sexual activity: Not on file   Lifestyle    Physical activity     Days per week: Not on file     Minutes per session: Not on file    Stress: Not on file   Relationships    Social connections     Talks on phone: Not on file     Gets together: Not on file     Attends [employer] service: Not on file     Active member of club or organization: Not on file     Attends meetings of clubs or organizations: Not on file     Relationship status: Not on file    Intimate partner violence     Fear of current or ex partner: Not on file     Emotionally abused: Not on file     Physically abused: Not on file     Forced sexual activity: Not on file   Other Topics Concern    Not on file   Social History Narrative    Not on file     Family History   Problem Relation Age of Onset    Cancer Mother     Coronary Artery Disease Mother     Heart Disease Brother 64        MI X 3    Coronary Artery Disease Brother     Cancer Sister         breast     No Known Allergies    Current Facility-Administered Medications   Medication Dose Route Frequency    alcohol 62% (NOZIN) nasal  1 Ampule  1 Ampule Topical Q12H    0.9% sodium chloride infusion  25 mL/hr IntraVENous CONTINUOUS    dextrose 5% - 0.45% NaCl with KCl 20 mEq/L infusion  25 mL/hr IntraVENous CONTINUOUS    sodium chloride (NS) flush 5-40 mL  5-40 mL IntraVENous Q8H    sodium chloride (NS) flush 5-40 mL  5-40 mL IntraVENous PRN    acetaminophen (TYLENOL) tablet 650 mg  650 mg Oral Q4H PRN    oxyCODONE-acetaminophen (PERCOCET) 5-325 mg per tablet 1 Tab  1 Tab Oral Q4H PRN    morphine 10 mg/ml injection 3-5 mg  3-5 mg IntraVENous Q1H PRN    naloxone (NARCAN) injection 0.4 mg  0.4 mg IntraVENous PRN    DOBUTamine (DOBUTREX) 500 mg/250 mL (2,000 mcg/mL) infusion  2-10 mcg/kg/min IntraVENous TITRATE    EPINEPHrine (ADRENALIN) 4 mg in 0.9% sodium chloride 250 mL infusion  0.01-0.5 mcg/kg/min IntraVENous TITRATE    nitroglycerin (Tridil) 200 mcg/ml infusion   mcg/min IntraVENous TITRATE    PHENYLephrine (ALHAJI-SYNEPHRINE) 30 mg in 0.9% sodium chloride 250 mL infusion   mcg/min IntraVENous TITRATE    NOREPINephrine (LEVOPHED) 4 mg in 5% dextrose 250 mL infusion  0.01-0.5 mcg/kg/min IntraVENous TITRATE    niCARdipine in Saline (CARDENE) 25 MG/250 mL infusion kit  2.5-15 mg/hr IntraVENous TITRATE    amiodarone (CORDARONE) tablet 200 mg  200 mg Oral BID    ondansetron (ZOFRAN) injection 4 mg  4 mg IntraVENous Q4H PRN    insulin regular (NOVOLIN R, HUMULIN R) 100 Units in 0.9% sodium chloride 100 mL infusion  1 Units/hr IntraVENous TITRATE    dextrose (D50W) injection syrg 12.5 g  25 mL IntraVENous PRN    [START ON 7/24/2020] aspirin chewable tablet 81 mg  81 mg Oral DAILY    magnesium sulfate 1 g/100 ml IVPB (premix or compounded)  1 g IntraVENous PRN    potassium chloride 10 mEq in 50 ml IVPB  10 mEq IntraVENous PRN    midazolam (VERSED) injection 1 mg  1 mg IntraVENous Q1H PRN    propofol (DIPRIVAN) 10 mg/mL infusion  5-50 mcg/kg/min IntraVENous TITRATE    chlorhexidine (PERIDEX) 0.12 % mouthwash 10 mL  10 mL Oral BID    tuberculin injection 5 Units  5 Units IntraDERMal ONCE    famotidine (PF) (PEPCID) 20 mg in 0.9% sodium chloride 10 mL injection  20 mg IntraVENous Q12H    carvediloL (COREG) tablet 3.125 mg  3.125 mg Oral BID WITH MEALS    0.9% sodium chloride infusion 250 mL  250 mL IntraVENous PRN    [START ON 7/24/2020] ceFAZolin (ANCEF) 2 g/20 mL in sterile water IV syringe  2 g IntraVENous Q8H    ceFAZolin (ANCEF) 2 g/20 mL in sterile water IV syringe  2 g IntraVENous Q8H    0.9% sodium chloride infusion 250 mL  250 mL IntraVENous PRN    albumin human 5% (BUMINATE) solution 25 g  25 g IntraVENous PRN         Objective:     Vitals:    07/23/20 1515 07/23/20 1530 07/23/20 1546 07/23/20 1600   BP:       Pulse: 75 72 69 70   Resp: 18 18 18 18   Temp:    97.5 °F (36.4 °C)   SpO2: 100% 100% 100% 100%   Weight:       Height:           Intake and Output:   No intake/output data recorded. 07/23 0701 - 07/23 1900  In: 6361 [I.V.:2400]  Out: [address] [facility] [[id number]]    Physical Exam:          Constitutional:  Sedated, intubated and mechanically ventilated. HEENT:  Sclera clear, pupils equal, oral mucosa moist and orally intubated  RESPIRATORY: clear; + air leak  CARDIOVASCULAR:  RRR with no M,G,R;  ABDOMEN:  soft; no bowel sounds present  EXTREMITIES:  cold feet with no cyanosis,no lower leg edema. SKIN:  no jaundice or ecchymosis   NEURO:  sedated. Musculoskeletal: cannot assess - sedated        CHEST XRAY:          LINES:  Gomez, swan toyin, arterial line. Oral ETT, oral gastric tube, 1 chest tube    DRIPS:  Levo, ary, dobutamine, insulin    CI:  2.1    Ventilator Settings  Mode FIO2 Rate Tidal Volume Pressure PEEP   PRVC  40 %    0.5 ml            Peak airway pressure: 19 cm H2O   Minute ventilation: 9.05 l/min     ABG: No results for input(s): PH, PCO2, PO2, HCO3 in the last 72 hours.    ABG:    Lab Results   Component Value Date/Time    PHI 7.32 (L) 07/23/2020 01:37 PM    PCO2I 36.3 07/23/2020 01:37 PM    PO2I 158 (H) 07/23/2020 01:37 PM    HCO3I 18.7 (L) 07/23/2020 01:37 PM    FIO2I 40 07/23/2020 01:37 PM         LAB  Recent Labs     07/23/20  1334   WBC 21.1*   HGB 10.1*   HCT 30.9*   PLT 76*   INR 2.1     Recent Labs     07/23/20  1334   *   K 4.2   *   CO2 19*   *   BUN 24*   CREA 1.10   MG 3.2*   CA 6.9*       Assessment and Plan :  (Medical Decision Making)     Hospital Problems  Date Reviewed: 6/18/2020          Codes Class Noted POA    Endocarditis of aortic valve ICD-10-CM: I35.8  ICD-9-CM: 424.1  7/23/2020 Yes    Strep  mutans    Encounter for weaning from ventilator [facility]) ICD-10-CM: Z99.11  ICD-9-CM: V46.13  7/23/2020 Unknown    Wean vent per protocol since ABG acceptable. [facility]ocyt[facility] ([facility] ICD-10-CM: D69.6  ICD-9-CM: 287.5  7/23/2020 Unknown    Moderate. No bleeding after platelet tx. Hypernatremia ICD-10-CM: E87.0  ICD-9-CM: 276.0  7/23/2020 Unknown    Mild. Likely from IVF    Hypocalcemia ICD-10-CM: E83.51  ICD-9-CM: 275.41  7/23/2020 Unknown    Repeat in AM.     Leukocytosis ICD-10-CM: F40.058  ICD-9-CM: 288.60  7/23/2020 Unknown    Follow. Check PCT if persists. Aortic stenosis ICD-10-CM: I35.0  ICD-9-CM: 424.1  7/10/2020 Yes        Elevated factor VIII level ICD-10-CM: R79.1  ICD-9-CM: 790.92  6/18/2020 Yes    Risk factor to thrombosis. Diabetes mellitus, type II ([facility] (Chronic) ICD-10-CM: E11.9  ICD-9-CM: 250.00  5/20/2020 Yes        Dyslipidemia (Chronic) ICD-10-CM: E78.5  ICD-9-CM: 272.4  Unknown Yes        Status post aortic valve replacement (Chronic) ICD-10-CM: Z95.2  ICD-9-CM: V43.3  4/12/2018 Yes    On multiple drips          Plan:          Wean drips per protocol. Wean vent per protocol. Defer to Dr. Iraida Aaron regarding further anticoagulation for high risk for thrombosis. CMP in AM to assess calcium.       Theemre Sandra MD    More than 50% of time documented was spent face-to-face contact with the patient and in the care of the patient on the floor/unit where the patient is located

## 2020-07-23 NOTE — PROGRESS NOTES
Patient out from operating room and placed on the ventilator on documented settings. Patient is orally intubated with a # 8.0 ET Tube secured at the 22 cm jaylin at the teeth. Breath sounds are diminished. Trachea is midline. Negative for subcutaneous air, chest excursion is symmetric. Negative for pitting edema. Patient is also Negative for cyanosis. All alarms are set and audible. Resuscitation bag is at the head of the bed.       Ventilator Settings  Mode FIO2 Rate Tidal Volume Pressure PEEP I:E Ratio   PRVC  41 %   18 0.5 ml       8        Peak airway pressure: 19 cm H2O   Minute ventilation: 9.05 l/min         Marcus Donovan, RT

## 2020-07-23 NOTE — ANESTHESIA PROCEDURE NOTES
Central Line Placement    Start time: 7/23/2020 8:39 AM  End time: 7/23/2020 8:53 AM  Performed by: Kimberley Ely MD  Authorized by: Kimberley Ely MD     Indications: central pressure monitoring, need for vasopressors and vascular access  Preanesthetic Checklist: patient identified, risks and benefits discussed, anesthesia consent, site marked, patient being monitored and timeout performed    Timeout Time: 08:38       Pre-procedure: All elements of maximal sterile barrier technique followed? Yes    2% Chlorhexidine for cutaneous antisepsis, Hand hygiene performed prior to catheter insertion and Ultrasound guidance    Sterile Ultrasound Technique followed?: Yes        Ultrasound Image Stored? Image stored    Procedure:   Prep:  Chlorhexidine  Location: internal jugular  Orientation:  Right  Patient position:  Trendelenburg  Catheter type:  Double lumen  Catheter size:  9 Fr    Number of attempts:  1  Successful placement: Yes      Assessment:   Post-procedure:  Catheter secured and sterile dressing applied  Assessment:  Blood return through all ports, free fluid flow and guidewire removal verified  Insertion:  Uncomplicated  Patient tolerance:  Patient tolerated the procedure well with no immediate complications  Potential access site was examined with ultrasound and the acceptable patent access site was selected. Real-time ultrasound guidance utilized for 18G Jelco needle access to IJ. Venous placement confirmed with manometry and wire visualization in vein on ultrasound (image was recorded for permanent record). Double-lumen 9Fr catheter placed over wire using Seldinger technique. Wire removed and placed off of field. PA catheter floated without complication to depth of 52 cm.

## 2020-07-23 NOTE — BRIEF OP NOTE
Brief Postoperative Note    Patient: Michelle Gardner  YOB: 1960  MRN: [id number]    Date of Procedure: 7/23/2020     Pre-Op Diagnosis: Endocarditis of aortic valve [I35.8]    Post-Op Diagnosis: Same as preoperative diagnosis. Procedure(s):  AORTIC VALVE REPLACEMENT (AVR) / DEBRIDEMENT & CLOSURE OF ABCESS WITH PERICARDIAL PATCH  STERNOTOMY REDO, lysis myocardial adhesions  YARON/ANES PROBE    Surgeon(s): Allyson Cook MD    Surgical Assistant: None    Anesthesia: General     Estimated Blood Loss (mL): Minimal    Complications: None    Specimens:   ID Type Source Tests Collected by Time Destination   1 : Aortic Valve Fresh Aortic Valve  Allyson Cook MD 7/23/2020 1103 Pathology   1 : Aortic Valve Tissue Aortic Valve CULTURE, ANAEROBIC, CULTURE, TISSUE W GRAM STAIN, CULTURE, Ricardo Brothers MD 7/23/2020 1055 Microbiology   2 : Abscess Wound Heart CULTURE, ANAEROBIC, CULTURE, WOUND W GRAM STAIN, CULTURE, Ricardo Brothers MD 7/23/2020 1118 Microbiology        Implants:   Implant Name Type Inv.  Item Serial No.  Lot No. LRB No. Used Action   VALVE AORTIC 25MM -- [employer]ILIA - [id number]  VALVE AORTIC 25MM -- Dory Ion [id number] [employer]  N/A 1 Implanted       Drains:   Orogastric Tube 07/23/20 (Active)       Findings: old vegetation on leaflet edge, old healed abscess L and non comissure    Electronically Signed by Toy Meza MD on 7/23/2020 at 1:24 PM

## 2020-07-23 NOTE — PROGRESS NOTES
's pre-procedure visit and prayer with patient as requested.     Alejo Kevin MDiv, BS  Board Certified

## 2020-07-23 NOTE — PERIOP NOTES
TRANSFER - OUT REPORT:    Verbal report given to PARTH Chavez on aGbriela Richmond  being transferred to CVICU for routine progression of care       Report consisted of patients Situation, Background, Assessment and   Recommendations(SBAR). Information from the following report(s) OR Summary was reviewed with the receiving nurse. Lines:   PICC Single Lumen 05/75/46 Right;Basilic (Active)       Double Lumen 07/23/20 Right Internal jugular (Active)       Cherene Derrek Dual 07/23/20 Right Neck (Active)       Peripheral IV 07/23/20 Left Hand (Active)   Site Assessment Clean, dry, & intact 07/23/20 0615   Phlebitis Assessment 0 07/23/20 0615   Infiltration Assessment 0 07/23/20 0615   Dressing Status Clean, dry, & intact 07/23/20 0615   Dressing Type Transparent 07/23/20 0615   Hub Color/Line Status Green 07/23/20 0615       Peripheral IV 07/23/20 Right Forearm (Active)       Arterial Line 07/23/20 Left Radial artery (Active)        Opportunity for questions and clarification was provided.       Patient transported with:   Monitor  O2 @ 15 liters  Registered Nurse   CRNA  MDA

## 2020-07-23 NOTE — PERIOP NOTES
Family updated at 10:54 AM by Jeannie Coffey RN. Spoke with patient's sister. Obtained four digit code.

## 2020-07-23 NOTE — ANESTHESIA PROCEDURE NOTES
Arterial Line Placement    Start time: 7/23/2020 8:22 AM  End time: 7/23/2020 8:27 AM  Performed by: Colleen Vogt CRNA  Authorized by: Eduardo Mancilla MD     Pre-Procedure  Indications:  Arterial pressure monitoring and blood sampling  Preanesthetic Checklist: patient identified, risks and benefits discussed, anesthesia consent, site marked, patient being monitored, timeout performed and patient being monitored    Timeout Time: 08:22        Procedure:   Prep:  Alcohol and ChloraPrep  Seldinger Technique?: Yes    Orientation:  Left  Location:  Radial artery  Catheter size:  20 G  Number of attempts:  1  Cont Cardiac Output Sensor: No      Assessment:   Post-procedure:  Line secured and sterile dressing applied  Patient Tolerance:  Patient tolerated the procedure well with no immediate complications

## 2020-07-23 NOTE — ANESTHESIA PROCEDURE NOTES
YARON  Date/Time: 7/23/2020 9:15 AM      Procedure Details: probe placement, image aquisition & interpretation    Risks and benefits discussed with the patient and plans are to proceed    Procedure Note    Performed by: Marissa Ramos MD  Authorized by: Marissa Ramos MD       Indications: assessment of ascending aorta and assessment of surgical repair  Modalities: 2D, CF  Probe Type: multiplane  Insertion: atraumatic  Patient Status: intubated and sedated    Echocardiographic and Doppler Measurements   Aorta  Size  Diam(cm)  Dissection PlaqueThick(mm)  Plaque Mobile    Ascending dilated   0-3 No    Arch normal        Descending normal   0-3 No          Valves  Annulus  Stenosis  Area/Grad  Regurg  Leaflet   Morph  Leaflet   Motion    Aortic bioprosthetic moderate  0 calcified, thickened restricted    Mitral normal   2+ normal normal    Tricuspid normal   2+  normal          Atria  Size  SEC (smoke)  Thrombus  Tumor  Device    Rt Atrium dilated        Lt Atrium dilated         Interatrial Septum Morphology: normal    Interventricular Septum Morphology: normal    Ventricle  Cavity Size  Cavity Dimension Hypertrophy  Thrombus  Gloal FXN  EF    RV dilated    moderately impaired     LV dilated    moderately impaired          Post Intervention Follow-up Study  Ventricular Global Function: unchanged  Ventricular Regional Function: unchanged     Valve  Function  Regurgitation  Area    Aortic improved 0     Mitral no change 2+     Tricuspid no change 2+     Prosthetic        Complications: None

## 2020-07-23 NOTE — PROGRESS NOTES
07/23/20 1933   Patient Observations   Pulse (Heart Rate) 80   Resp Rate 9   O2 Sat (%) 100 %   Vent Settings   FIO2 (%) 39 %   SpO2/FIO2 Ratio 256.41   Back-Up Rate 10   Pressure Support (cm H2O) 10 cm H2O   PEEP/VENT (cm H2O) 8 cm H20   Insp Rise Time % 50 %   Flow Trigger 3   Ventilator Measurements   Resp Rate Observed 9   Vt Exhaled (Machine Breath) (ml) 0.64 ml   Vt Spont (ml) 0.64 ml   Ve Observed (l/min) 5.96 l/min   PIP Observed (cm H2O) 18 cm H2O   Plateau Pressure (cm H2O) 0 cm H2O   MAP (cm H2O) 9.8   I:E Ratio Actual 1:4.20   Auto PEEP Observed (cm H2O) 0 cm H2O   Dynamic Compliance (ml/cm H20) 92 ml/cm H20   Static Compliance (ml/cm H20) 0 ml/cm H20   Safety & Alarms   Pressure Max 40 cm H2O   Ve Min 2.05   Ve Max 24.5   Vent Method/Mode   Ventilation Method Conventional   Ventilator Mode Pressure support   Ventilator Mode ID SPONT       Pt vent mode changed to PSV. Pt responsive and breathing spontaneously.  Will monitor fro any decompensation in VS.

## 2020-07-23 NOTE — ANESTHESIA POSTPROCEDURE EVALUATION
Procedure(s):  AORTIC VALVE REPLACEMENT (AVR) / DEBRIDEMENT & CLOSURE OF ABCESS WITH PERICARDIAL PATCH  STERNOTOMY REDO  YARON/ANES PROBE.    general    Anesthesia Post Evaluation      Multimodal analgesia: multimodal analgesia used between 6 hours prior to anesthesia start to PACU discharge  Patient location during evaluation: ICU  Patient participation: complete - patient participated  Level of consciousness: obtunded/minimal responses  Pain management: adequate  Airway patency: patent  Anesthetic complications: no  Cardiovascular status: acceptable and hemodynamically stable  Respiratory status: acceptable and ETT  Hydration status: acceptable  Post anesthesia nausea and vomiting:  none  Final Post Anesthesia Temperature Assessment:  Normothermia (36.0-37.5 degrees C)      INITIAL Post-op Vital signs:   Vitals Value Taken Time   BP     Temp     Pulse 64 7/23/2020  2:22 PM   Resp 18 7/23/2020  2:22 PM   SpO2 100 % 7/23/2020  2:22 PM   Vitals shown include unvalidated device data.

## 2020-07-23 NOTE — CONSULTS
Infectious Disease Consult    Today's Date: 7/23/2020   Admit Date: 7/23/2020    Impression:   1. Strep mutans prosthetic AV endocarditis, 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 BCpositive 5/18/20-5/24/20. [facility] 5/29/20 negative. antbx stopped prematurely 10 days prior to EOT 6/30/20 resumed 7/11/20  ·  s/p (7/23) re-do AVR, intra-op findings of old/healed vegetation and healed eliza-valve abscess: op cultures pending  2. Hx diverticulosis   3. Dental caries, CT maxillofacial negative, s/p (6/4) extraction   4. Hx AVR 2018  5. DM, A1c 7    Plan:   · Follow op cultures  · Resume Cefazolin until cx resulted    Anti-infectives:   · Cefazolin 7/23-  · CTX 5/23-6/30, resume 7/11-7/20    Subjective:   Date of Consultation:  July 23, 2020  Referring Physician: Dr Orlando Varghese    Patient is a 62 y.o. male well known to ID for recent IE treatment. He was initially admitted to [facility], admitted with chest discomfort, diarrhea without abdominal pain or obvious blood in the stool, maribel LE edema, fatigue and a generalized rash to both legs. Denied fever, chills, sweats, procedures/dental work, sore teeth/mouth ulcers, dysuria/UTI, URI or cough prior to arrival. CT chest negative, trop elevated to 30s, TTE noting a mobile mass associated with prosthetic AV with stenosis. BC were collected 2 days into admission, although he had received no antbx, with Strep mutans 5/18, 5/22. YARON confirmed vegetation on the prosthetic AV, with obstruction noted of 2 of 3 leaflets of the valve. 6 weeks of Ceftriaxone were planned, EOT 7/10/20, with outpatient follow up with CT sx to complete re-do AVR. The patient cancelled his infusion appointment after last dose 6/30/20. Seen by myself in the office 7/10/20, at the time decision was made to check [facility] and then resume antbx, which he for until 7/20. BC negative. He was admitted to undergo AVR today: findings of olg vegetation on the leaflet, healed abscess: op cultures are pending.  ID consulted for continued recommendations. Pt seen in the ICU, post op, intubated, unable to participate in HPI. Data obtained from chart review, discussed with Dr Marcella Shahid and [facility]. Patient Active Problem List   Diagnosis Code    Status post aortic valve replacement L07.5    Diastolic CHF, chronic (HCC) I50.32    Recurrent depression ([facility] F33.9    CAD (coronary artery disease) I25.10    DIEGO on CPAP G47.33, Z99.89    Dyslipidemia E78.5    Sigmoid diverticulosis K57.30    Essential hypertension I10    NSTEMI (non-ST elevated myocardial infarction) ([facility] I21.4    COVID-19 ruled out Z03.818    Petechiae R23.3    Anemia D64.9    Diabetes mellitus, type II ([facility] E11.9    Vitamin D deficiency E55.9    Endocarditis of prosthetic valve ([facility] T82. 6XXA, I38    Elevated factor VIII level R79.1    Iron deficiency anemia D50.9    High plasma von Willebrand factor (vWF) R79.89    Aortic stenosis I35.0    Endocarditis of aortic valve I35.8     Past Medical History:   Diagnosis Date    CAD (coronary artery disease)      AVR 2018--mi 5/15/2020-- no stents-- followed by dr Harjit Avila addiction in remission ([facility]     none since 2001    Dyslipidemia     DIEGO on CPAP     DOES NOT WEAR CPAP AT HS    Recurrent depression ([facility]     S/P aortic valve replacement 04/2018    Seizures ([facility] 2001    x 1--- ETOH  WITHDRAWAL    Sigmoid diverticulosis     Streptococcal endocarditis dx 5/2020    rec rocephin daily x 6 weeks-- to be completed 7/19/20-- per dr Judge Gain note-- thought to be r/t bad teeth-- had extractions      Family History   Problem Relation Age of Onset    Cancer Mother     Coronary Artery Disease Mother     Heart Disease Brother 64        MI X 3    Coronary Artery Disease Brother     Cancer Sister         breast      Social History     Tobacco Use    Smoking status: Former Smoker     Packs/day: 0.50     Years: 40.00     Pack years: 20.00     Types: Cigarettes     Last attempt to quit: 04/2018 Years since quittin.3    Smokeless tobacco: Never Used   Substance Use Topics    Alcohol use: Not Currently     Comment: none since 3/2020     Past Surgical History:   Procedure Laterality Date    CARDIAC SURG PROCEDURE UNLIST  2018    AVR    HX HEART CATHETERIZATION  2018    non obstructive disease, severe AS    HX HERNIA REPAIR Left 2018    inguinal     HX VASCULAR ACCESS Right 2020    PICC-- IN PLACE -- RIGHT UPPER ARM      Prior to Admission medications    Medication Sig Start Date End Date Taking? Authorizing Provider   amiodarone (CORDARONE) 200 mg tablet Take 600 mg by mouth. Yes Provider, Historical   spironolactone (ALDACTONE) 25 mg tablet Take 1 Tab by mouth daily for 30 days. 20 Yes Bassem Fonseca DO   carvediloL (COREG) 3.125 mg tablet Take 1 Tab by mouth two (2) times daily (with meals) for 30 days. 20 Yes Bassem Fonseca DO   ferrous sulfate 325 mg (65 mg iron) tablet Take 1 Tab by mouth daily (with breakfast) for 30 days. 20 Yes Bassem Fonseca DO   lisinopriL (PRINIVIL, ZESTRIL) 2.5 mg tablet Take 1 Tab by mouth two (2) times a day for 30 days. 20 Yes Bassem Fonseca DO   atorvastatin (LIPITOR) 20 mg tablet TAKE 1 TABLET BY MOUTH NIGHTLY. 6/10/20  Yes Sol Newman MD   escitalopram oxalate (LEXAPRO) 10 mg tablet Take 1 Tab by mouth daily. Patient taking differently: Take 10 mg by mouth nightly. 20  Yes Sol Newman MD   aspirin 81 mg chewable tablet Take 81 mg by mouth nightly. Yes Provider, Historical       No Known Allergies     Review of Systems:  A comprehensive review of systems was negative except for that written in the History of Present Illness.     Objective:     Visit Vitals  /69   Pulse 76   Temp 98.2 °F (36.8 °C)   Resp 14   Ht 6' 1\" (1.854 m)   Wt 66 kg (145 lb 6.4 oz)   SpO2 100%   BMI 19.18 kg/m²     Temp (24hrs), Av.3 °F (36.8 °C), Min:97.9 °F (36.6 °C), Max:98.8 °F (37.1 °C)       Lines:  Arterial Line:  , Central Venous Catheter:   , Peripheral IV:    and Cornland-Rosario, CTs, simon:   All intact    Physical Exam:    General:  Sedated and on the ventilator. No acute distress. Eyes:  Sclera anicteric. Pupils equal, round, reactive to light. Mouth/Throat: Orotracheal tube in place. Neck: Supple. Lungs:   Clear to auscultation bilaterally, good effort. Cardiovascular:  Regular rate and rhythm, soft murmur. Abdomen:   Soft, non-tender, bowel sounds normal, non-distended. Extremities: No cyanosis or edema. Skin: No acute rash or lesions. Sternal wound vac   Musculoskeletal:  No swelling or deformity. Lines/Devices:  Intact, no erythema, drainage, or tenderness. Psychiatric: Sedated and appears comfortable on ventilator. Data Review:     CBC:  Recent Labs     07/20/20  1526   WBC 8.7   GRANS 67   MONOS 11   EOS 4   ANEU 5.8   ABL 1.5   HGB 10.1*   HCT 31.6*   *       BMP:  Recent Labs     07/20/20  1526   CREA 1.30       LFTS:  Recent Labs     07/20/20  1526   TBILI 0.3   *   AP 86   TP 7.5   ALB 3.3       Microbiology:     All Micro Results     Procedure Component Value Units Date/Time    CULTURE, ANAEROBIC [[id number]]     Order Status:  Sent Specimen:  Abscess     CULTURE, Sophia Drown STAIN [[id number]]     Order Status:  Sent Specimen:  Wound from Abscess     CULTURE, ANAEROBIC [[id number]]     Order Status:  Sent Specimen:   Aortic Valve     CULTURE, Sophia Drown STAIN [[id number]]     Order Status:  Sent Specimen:  Wound from Surgical Specimen     CULTURE, FUNGUS [[id number]]     Order Status:  Sent Specimen:  Tissue           Imaging:   Reviewed     Signed By: Linden Gould NP     July 23, 2020

## 2020-07-24 ENCOUNTER — APPOINTMENT (OUTPATIENT)
Dept: GENERAL RADIOLOGY | Age: 60
DRG: 220 | End: 2020-07-24
Attending: PHYSICIAN ASSISTANT
Payer: COMMERCIAL

## 2020-07-24 PROBLEM — Z99.11 ENCOUNTER FOR WEANING FROM VENTILATOR (HCC): Status: RESOLVED | Noted: 2020-07-23 | Resolved: 2020-07-24

## 2020-07-24 LAB
ALBUMIN SERPL-MCNC: 2.9 G/DL (ref 3.2–4.6)
ALBUMIN/GLOB SERPL: 1.3 {RATIO} (ref 1.2–3.5)
ALP SERPL-CCNC: 39 U/L (ref 50–136)
ALT SERPL-CCNC: 37 U/L (ref 12–65)
ANION GAP SERPL CALC-SCNC: 6 MMOL/L (ref 7–16)
AST SERPL-CCNC: 26 U/L (ref 15–37)
ATRIAL RATE: 70 BPM
ATRIAL RATE: 82 BPM
BILIRUB DIRECT SERPL-MCNC: 0.2 MG/DL
BILIRUB SERPL-MCNC: 0.4 MG/DL (ref 0.2–1.1)
BLD PROD TYP BPU: NORMAL
BPU ID: NORMAL
BUN SERPL-MCNC: 25 MG/DL (ref 8–23)
CALCIUM SERPL-MCNC: 7.5 MG/DL (ref 8.3–10.4)
CALCULATED P AXIS, ECG09: 56 DEGREES
CALCULATED P AXIS, ECG09: 62 DEGREES
CALCULATED R AXIS, ECG10: 68 DEGREES
CALCULATED R AXIS, ECG10: 71 DEGREES
CALCULATED T AXIS, ECG11: 110 DEGREES
CALCULATED T AXIS, ECG11: 152 DEGREES
CHLORIDE SERPL-SCNC: 119 MMOL/L (ref 98–107)
CO2 SERPL-SCNC: 24 MMOL/L (ref 21–32)
CREAT SERPL-MCNC: 1.27 MG/DL (ref 0.8–1.5)
DIAGNOSIS, 93000: NORMAL
DIAGNOSIS, 93000: NORMAL
ERYTHROCYTE [DISTWIDTH] IN BLOOD BY AUTOMATED COUNT: 17.3 % (ref 11.9–14.6)
GLOBULIN SER CALC-MCNC: 2.3 G/DL (ref 2.3–3.5)
GLUCOSE BLD STRIP.AUTO-MCNC: 104 MG/DL (ref 65–100)
GLUCOSE BLD STRIP.AUTO-MCNC: 109 MG/DL (ref 65–100)
GLUCOSE BLD STRIP.AUTO-MCNC: 121 MG/DL (ref 65–100)
GLUCOSE BLD STRIP.AUTO-MCNC: 131 MG/DL (ref 65–100)
GLUCOSE BLD STRIP.AUTO-MCNC: 138 MG/DL (ref 65–100)
GLUCOSE BLD STRIP.AUTO-MCNC: 143 MG/DL (ref 65–100)
GLUCOSE BLD STRIP.AUTO-MCNC: 147 MG/DL (ref 65–100)
GLUCOSE BLD STRIP.AUTO-MCNC: 149 MG/DL (ref 65–100)
GLUCOSE BLD STRIP.AUTO-MCNC: 163 MG/DL (ref 65–100)
GLUCOSE BLD STRIP.AUTO-MCNC: 97 MG/DL (ref 65–100)
GLUCOSE SERPL-MCNC: 128 MG/DL (ref 65–100)
HCT VFR BLD AUTO: 24.8 % (ref 41.1–50.3)
HCT VFR BLD AUTO: 27.7 % (ref 41.1–50.3)
HGB BLD-MCNC: 7.7 G/DL (ref 13.6–17.2)
HGB BLD-MCNC: 8.6 G/DL (ref 13.6–17.2)
MAGNESIUM SERPL-MCNC: 2.6 MG/DL (ref 1.8–2.4)
MCH RBC QN AUTO: 28.8 PG (ref 26.1–32.9)
MCHC RBC AUTO-ENTMCNC: 31 G/DL (ref 31.4–35)
MCV RBC AUTO: 92.9 FL (ref 79.6–97.8)
MM INDURATION POC: 0 MM (ref 0–5)
NRBC # BLD: 0 K/UL (ref 0–0.2)
P-R INTERVAL, ECG05: 258 MS
P-R INTERVAL, ECG05: 280 MS
PLATELET # BLD AUTO: 68 K/UL (ref 150–450)
PMV BLD AUTO: 9.8 FL (ref 9.4–12.3)
POTASSIUM SERPL-SCNC: 4.2 MMOL/L (ref 3.5–5.1)
PPD POC: NEGATIVE NEGATIVE
PROT SERPL-MCNC: 5.2 G/DL (ref 6.3–8.2)
Q-T INTERVAL, ECG07: 362 MS
Q-T INTERVAL, ECG07: 418 MS
QRS DURATION, ECG06: 84 MS
QRS DURATION, ECG06: 90 MS
QTC CALCULATION (BEZET), ECG08: 422 MS
QTC CALCULATION (BEZET), ECG08: 451 MS
RBC # BLD AUTO: 2.67 M/UL (ref 4.23–5.6)
SODIUM SERPL-SCNC: 149 MMOL/L (ref 136–145)
STATUS OF UNIT,%ST: NORMAL
UNIT DIVISION, %UDIV: 0
VENTRICULAR RATE, ECG03: 70 BPM
VENTRICULAR RATE, ECG03: 82 BPM
WBC # BLD AUTO: 7.9 K/UL (ref 4.3–11.1)

## 2020-07-24 PROCEDURE — 36600 WITHDRAWAL OF ARTERIAL BLOOD: CPT

## 2020-07-24 PROCEDURE — 80076 HEPATIC FUNCTION PANEL: CPT

## 2020-07-24 PROCEDURE — 74011000250 HC RX REV CODE- 250: Performed by: PHYSICIAN ASSISTANT

## 2020-07-24 PROCEDURE — 74011250637 HC RX REV CODE- 250/637: Performed by: THORACIC SURGERY (CARDIOTHORACIC VASCULAR SURGERY)

## 2020-07-24 PROCEDURE — 99233 SBSQ HOSP IP/OBS HIGH 50: CPT | Performed by: INTERNAL MEDICINE

## 2020-07-24 PROCEDURE — 80048 BASIC METABOLIC PNL TOTAL CA: CPT

## 2020-07-24 PROCEDURE — 71045 X-RAY EXAM CHEST 1 VIEW: CPT

## 2020-07-24 PROCEDURE — 74011250636 HC RX REV CODE- 250/636: Performed by: NURSE PRACTITIONER

## 2020-07-24 PROCEDURE — 36430 TRANSFUSION BLD/BLD COMPNT: CPT

## 2020-07-24 PROCEDURE — 74011636637 HC RX REV CODE- 636/637: Performed by: THORACIC SURGERY (CARDIOTHORACIC VASCULAR SURGERY)

## 2020-07-24 PROCEDURE — 77010033711 HC HIGH FLOW OXYGEN

## 2020-07-24 PROCEDURE — 74011000250 HC RX REV CODE- 250: Performed by: NURSE PRACTITIONER

## 2020-07-24 PROCEDURE — 82962 GLUCOSE BLOOD TEST: CPT

## 2020-07-24 PROCEDURE — 77030032994 HC SOL INJ SOD CL 0.9% LFCR 500ML

## 2020-07-24 PROCEDURE — 74011250636 HC RX REV CODE- 250/636: Performed by: PHYSICIAN ASSISTANT

## 2020-07-24 PROCEDURE — 74011250637 HC RX REV CODE- 250/637: Performed by: PHYSICIAN ASSISTANT

## 2020-07-24 PROCEDURE — 74011250636 HC RX REV CODE- 250/636: Performed by: THORACIC SURGERY (CARDIOTHORACIC VASCULAR SURGERY)

## 2020-07-24 PROCEDURE — 77030013064 HC TRNSF BLD FENW -A

## 2020-07-24 PROCEDURE — 85027 COMPLETE CBC AUTOMATED: CPT

## 2020-07-24 PROCEDURE — 65660000004 HC RM CVT STEPDOWN

## 2020-07-24 PROCEDURE — 83735 ASSAY OF MAGNESIUM: CPT

## 2020-07-24 PROCEDURE — 36592 COLLECT BLOOD FROM PICC: CPT

## 2020-07-24 PROCEDURE — 74011000250 HC RX REV CODE- 250: Performed by: THORACIC SURGERY (CARDIOTHORACIC VASCULAR SURGERY)

## 2020-07-24 PROCEDURE — 85018 HEMOGLOBIN: CPT

## 2020-07-24 PROCEDURE — P9016 RBC LEUKOCYTES REDUCED: HCPCS

## 2020-07-24 PROCEDURE — 93005 ELECTROCARDIOGRAM TRACING: CPT | Performed by: PHYSICIAN ASSISTANT

## 2020-07-24 RX ORDER — ACETAMINOPHEN 325 MG/1
650 TABLET ORAL
Status: DISCONTINUED | OUTPATIENT
Start: 2020-07-24 | End: 2020-07-28 | Stop reason: HOSPADM

## 2020-07-24 RX ORDER — AMIODARONE HYDROCHLORIDE 200 MG/1
200 TABLET ORAL EVERY 12 HOURS
Status: DISCONTINUED | OUTPATIENT
Start: 2020-07-24 | End: 2020-07-24

## 2020-07-24 RX ORDER — SODIUM CHLORIDE 0.9 % (FLUSH) 0.9 %
5-40 SYRINGE (ML) INJECTION AS NEEDED
Status: DISCONTINUED | OUTPATIENT
Start: 2020-07-24 | End: 2020-07-28 | Stop reason: HOSPADM

## 2020-07-24 RX ORDER — FUROSEMIDE 10 MG/ML
20 INJECTION INTRAMUSCULAR; INTRAVENOUS ONCE
Status: COMPLETED | OUTPATIENT
Start: 2020-07-24 | End: 2020-07-24

## 2020-07-24 RX ORDER — ATORVASTATIN CALCIUM 40 MG/1
20 TABLET, FILM COATED ORAL
Status: DISCONTINUED | OUTPATIENT
Start: 2020-07-24 | End: 2020-07-28 | Stop reason: HOSPADM

## 2020-07-24 RX ORDER — FAMOTIDINE 20 MG/1
20 TABLET, FILM COATED ORAL 2 TIMES DAILY
Status: DISCONTINUED | OUTPATIENT
Start: 2020-07-24 | End: 2020-07-28 | Stop reason: HOSPADM

## 2020-07-24 RX ORDER — ONDANSETRON 2 MG/ML
4 INJECTION INTRAMUSCULAR; INTRAVENOUS
Status: DISCONTINUED | OUTPATIENT
Start: 2020-07-24 | End: 2020-07-28 | Stop reason: HOSPADM

## 2020-07-24 RX ORDER — NITROGLYCERIN 0.4 MG/1
0.4 TABLET SUBLINGUAL
Status: DISCONTINUED | OUTPATIENT
Start: 2020-07-24 | End: 2020-07-28 | Stop reason: HOSPADM

## 2020-07-24 RX ORDER — LANOLIN ALCOHOL/MO/W.PET/CERES
400 CREAM (GRAM) TOPICAL
Status: DISCONTINUED | OUTPATIENT
Start: 2020-07-24 | End: 2020-07-28 | Stop reason: HOSPADM

## 2020-07-24 RX ORDER — LANOLIN ALCOHOL/MO/W.PET/CERES
325 CREAM (GRAM) TOPICAL
Status: DISCONTINUED | OUTPATIENT
Start: 2020-07-25 | End: 2020-07-28 | Stop reason: HOSPADM

## 2020-07-24 RX ORDER — ASPIRIN 81 MG/1
81 TABLET ORAL DAILY
Status: DISCONTINUED | OUTPATIENT
Start: 2020-07-25 | End: 2020-07-28 | Stop reason: HOSPADM

## 2020-07-24 RX ORDER — DOCUSATE SODIUM 100 MG/1
100 CAPSULE, LIQUID FILLED ORAL DAILY
Status: DISCONTINUED | OUTPATIENT
Start: 2020-07-25 | End: 2020-07-24

## 2020-07-24 RX ORDER — TRAMADOL HYDROCHLORIDE 50 MG/1
50 TABLET ORAL
Status: DISCONTINUED | OUTPATIENT
Start: 2020-07-24 | End: 2020-07-28 | Stop reason: HOSPADM

## 2020-07-24 RX ORDER — AMIODARONE HYDROCHLORIDE 200 MG/1
200 TABLET ORAL EVERY 12 HOURS
Status: DISCONTINUED | OUTPATIENT
Start: 2020-07-24 | End: 2020-07-28 | Stop reason: HOSPADM

## 2020-07-24 RX ORDER — POTASSIUM CHLORIDE 20 MEQ/1
20 TABLET, EXTENDED RELEASE ORAL
Status: DISCONTINUED | OUTPATIENT
Start: 2020-07-24 | End: 2020-07-28 | Stop reason: HOSPADM

## 2020-07-24 RX ORDER — FUROSEMIDE 40 MG/1
40 TABLET ORAL DAILY
Status: COMPLETED | OUTPATIENT
Start: 2020-07-25 | End: 2020-07-27

## 2020-07-24 RX ORDER — POTASSIUM CHLORIDE 20 MEQ/1
40 TABLET, EXTENDED RELEASE ORAL
Status: DISCONTINUED | OUTPATIENT
Start: 2020-07-24 | End: 2020-07-28 | Stop reason: HOSPADM

## 2020-07-24 RX ORDER — MAG HYDROX/ALUMINUM HYD/SIMETH 200-200-20
30 SUSPENSION, ORAL (FINAL DOSE FORM) ORAL
Status: DISCONTINUED | OUTPATIENT
Start: 2020-07-24 | End: 2020-07-28 | Stop reason: HOSPADM

## 2020-07-24 RX ORDER — AMOXICILLIN 250 MG
1 CAPSULE ORAL DAILY
Status: DISCONTINUED | OUTPATIENT
Start: 2020-07-25 | End: 2020-07-28 | Stop reason: HOSPADM

## 2020-07-24 RX ORDER — ESCITALOPRAM OXALATE 10 MG/1
10 TABLET ORAL
Status: DISCONTINUED | OUTPATIENT
Start: 2020-07-24 | End: 2020-07-28 | Stop reason: HOSPADM

## 2020-07-24 RX ORDER — POTASSIUM CHLORIDE 750 MG/1
10 TABLET, EXTENDED RELEASE ORAL DAILY
Status: COMPLETED | OUTPATIENT
Start: 2020-07-25 | End: 2020-07-27

## 2020-07-24 RX ORDER — MUPIROCIN 20 MG/G
OINTMENT TOPICAL 2 TIMES DAILY
Status: DISCONTINUED | OUTPATIENT
Start: 2020-07-24 | End: 2020-07-24

## 2020-07-24 RX ORDER — SODIUM CHLORIDE 9 MG/ML
250 INJECTION, SOLUTION INTRAVENOUS AS NEEDED
Status: DISCONTINUED | OUTPATIENT
Start: 2020-07-24 | End: 2020-07-28 | Stop reason: HOSPADM

## 2020-07-24 RX ORDER — SODIUM CHLORIDE 9 MG/ML
250 INJECTION, SOLUTION INTRAVENOUS AS NEEDED
Status: DISCONTINUED | OUTPATIENT
Start: 2020-07-24 | End: 2020-07-24 | Stop reason: SDUPTHER

## 2020-07-24 RX ORDER — SODIUM CHLORIDE 0.9 % (FLUSH) 0.9 %
5-40 SYRINGE (ML) INJECTION EVERY 8 HOURS
Status: DISCONTINUED | OUTPATIENT
Start: 2020-07-24 | End: 2020-07-28 | Stop reason: HOSPADM

## 2020-07-24 RX ADMIN — CEFAZOLIN SODIUM 2 G: 100 INJECTION, POWDER, LYOPHILIZED, FOR SOLUTION INTRAVENOUS at 04:50

## 2020-07-24 RX ADMIN — OXYCODONE HYDROCHLORIDE AND ACETAMINOPHEN 1 TABLET: 5; 325 TABLET ORAL at 05:54

## 2020-07-24 RX ADMIN — OXYCODONE HYDROCHLORIDE AND ACETAMINOPHEN 1 TABLET: 5; 325 TABLET ORAL at 02:04

## 2020-07-24 RX ADMIN — TAPENTADOL HYDROCHLORIDE 75 MG: 50 TABLET, FILM COATED ORAL at 22:04

## 2020-07-24 RX ADMIN — Medication 1 AMPULE: at 22:04

## 2020-07-24 RX ADMIN — Medication 30 ML: at 13:40

## 2020-07-24 RX ADMIN — ATORVASTATIN CALCIUM 20 MG: 10 TABLET, FILM COATED ORAL at 22:05

## 2020-07-24 RX ADMIN — TAPENTADOL HYDROCHLORIDE 75 MG: 50 TABLET, FILM COATED ORAL at 17:51

## 2020-07-24 RX ADMIN — AMIODARONE HYDROCHLORIDE 200 MG: 200 TABLET ORAL at 08:52

## 2020-07-24 RX ADMIN — Medication 30 ML: at 06:04

## 2020-07-24 RX ADMIN — CEFAZOLIN SODIUM 2 G: 100 INJECTION, POWDER, LYOPHILIZED, FOR SOLUTION INTRAVENOUS at 11:47

## 2020-07-24 RX ADMIN — Medication 1 AMPULE: at 08:52

## 2020-07-24 RX ADMIN — TAPENTADOL HYDROCHLORIDE 75 MG: 50 TABLET, FILM COATED ORAL at 11:47

## 2020-07-24 RX ADMIN — FAMOTIDINE 20 MG: 10 INJECTION, SOLUTION INTRAVENOUS at 08:52

## 2020-07-24 RX ADMIN — FAMOTIDINE 20 MG: 20 TABLET, FILM COATED ORAL at 22:04

## 2020-07-24 RX ADMIN — INSULIN HUMAN 5 UNITS: 100 INJECTION, SOLUTION PARENTERAL at 22:06

## 2020-07-24 RX ADMIN — Medication 10 ML: at 22:05

## 2020-07-24 RX ADMIN — CEFAZOLIN SODIUM 2 G: 100 INJECTION, POWDER, LYOPHILIZED, FOR SOLUTION INTRAVENOUS at 22:11

## 2020-07-24 RX ADMIN — AMIODARONE HYDROCHLORIDE 200 MG: 200 TABLET ORAL at 22:05

## 2020-07-24 RX ADMIN — FUROSEMIDE 20 MG: 10 INJECTION INTRAMUSCULAR; INTRAVENOUS at 11:49

## 2020-07-24 RX ADMIN — ESCITALOPRAM OXALATE 10 MG: 10 TABLET ORAL at 22:05

## 2020-07-24 NOTE — PROGRESS NOTES
[facility]   Drips verified at bedside

## 2020-07-24 NOTE — PROGRESS NOTES
Cardiovascular ICU Consult Note: 7/24/2020  Alyssa Hall  Admission Date: 7/23/2020     The patient's chart is reviewed and the patient is discussed with the staff. Alyssa Hall is a 34RRR with h/o elevated Factor VIII (prothrombotic risk 5x normal), prior smoking history, 40year smoking history & recent strep mutans endocarditis  who underwent AVR with closure of abscess with pericardial patch. Subjective:     Extubated overnight uneventfully. Currently on 40% airvo. Hypernatremic and platelets remain low. Received cryo, platelets and 2U PRBCs in last 24h  Corrected calcium 8.4    Prior to Admission Medications   Prescriptions Last Dose Informant Patient Reported? Taking?   amiodarone (CORDARONE) 200 mg tablet 7/22/2020 at 1830  Yes Yes   Sig: Take 600 mg by mouth. aspirin 81 mg chewable tablet 7/22/2020 at 1700  Yes Yes   Sig: Take 81 mg by mouth nightly. atorvastatin (LIPITOR) 20 mg tablet 7/22/2020 at Unknown time  No Yes   Sig: TAKE 1 TABLET BY MOUTH NIGHTLY.   carvediloL (COREG) 3.125 mg tablet 7/23/2020 at 0330  No Yes   Sig: Take 1 Tab by mouth two (2) times daily (with meals) for 30 days. escitalopram oxalate (LEXAPRO) 10 mg tablet 7/22/2020 at Unknown time  No Yes   Sig: Take 1 Tab by mouth daily. Patient taking differently: Take 10 mg by mouth nightly. ferrous sulfate 325 mg (65 mg iron) tablet 7/22/2020 at Unknown time  No Yes   Sig: Take 1 Tab by mouth daily (with breakfast) for 30 days. lisinopriL (PRINIVIL, ZESTRIL) 2.5 mg tablet 7/22/2020 at Unknown time  No Yes   Sig: Take 1 Tab by mouth two (2) times a day for 30 days. spironolactone (ALDACTONE) 25 mg tablet 7/22/2020 at Unknown time  No Yes   Sig: Take 1 Tab by mouth daily for 30 days.       Facility-Administered Medications: None     REVIEW OF SYSTEMS:  Constitutional:  There is no history of fever, chills, night sweats, weight loss, weight gain, persistent fatigue, or lethargy/hypersomnolence. CV: No chest pain, pressure, discomfort, palpitations, orthopnea, murmurs, or edema. GI: No dysphagia, heartburn reflux, nausea/vomiting, diarrhea, abdominal pain, or bleeding. Neuro: There is no history of AMS, persistent headache, decreased level of consciousness, seizures, or motor or sensory deficits.       Past Medical History:   Diagnosis Date    CAD (coronary artery disease)      AVR 2018--mi 5/15/2020-- no stents-- followed by dr Frances Mcguire addiction in remission [facility])     none since 2001    Dyslipidemia     NSTEMI (non-ST elevated myocardial infarction) ([facility] 5/16/2020    DIEGO on CPAP     DOES NOT WEAR CPAP AT HS    Recurrent depression ([facility]     S/P aortic valve replacement 04/2018    Seizures ([facility] 2001    x 1--- ETOH  WITHDRAWAL    Sigmoid diverticulosis     Streptococcal endocarditis dx 5/2020    rec rocephin daily x 6 weeks-- to be completed 7/19/20-- per dr Rojas Phlegm note-- thought to be r/t bad teeth-- had extractions     Past Surgical History:   Procedure Laterality Date    CARDIAC SURG PROCEDURE UNLIST  04/12/2018    AVR    HX HEART CATHETERIZATION  02/23/2018    non obstructive disease, severe AS    HX HERNIA REPAIR Left 06/14/2018    inguinal     HX VASCULAR ACCESS Right 05/2020    PICC-- IN PLACE -- RIGHT UPPER ARM     Social History     Socioeconomic History    Marital status:      Spouse name: Not on file    Number of children: Not on file    Years of education: Not on file    Highest education level: Not on file   Occupational History    Occupation: Works at [address] [employer] [address] resource strain: Not on file    Food insecurity     Worry: Not on file     Inability: Not on file   [employer] needs     Medical: Not on file     Non-medical: Not on file   Tobacco Use    Smoking status: Former Smoker     Packs/day: 0.50     Years: 40.00     Pack years: 20.00     Types: Cigarettes     Last attempt to quit: 2018     Years since quittin.3    Smokeless tobacco: Never Used   Substance and Sexual Activity    Alcohol use: Not Currently     Comment: none since 3/2020    Drug use: No     Comment: none 2001    Sexual activity: Not on file   Lifestyle    Physical activity     Days per week: Not on file     Minutes per session: Not on file    Stress: Not on file   Relationships    Social connections     Talks on phone: Not on file     Gets together: Not on file     Attends [employer] service: Not on file     Active member of club or organization: Not on file     Attends meetings of clubs or organizations: Not on file     Relationship status: Not on file    Intimate partner violence     Fear of current or ex partner: Not on file     Emotionally abused: Not on file     Physically abused: Not on file     Forced sexual activity: Not on file   Other Topics Concern    Not on file   Social History Narrative    Not on file     Family History   Problem Relation Age of Onset    Cancer Mother     Coronary Artery Disease Mother     Heart Disease Brother 64        MI X 3    Coronary Artery Disease Brother     Cancer Sister         breast     No Known Allergies    Current Facility-Administered Medications   Medication Dose Route Frequency    0.9% sodium chloride infusion 250 mL  250 mL IntraVENous PRN    alcohol 62% (NOZIN) nasal  1 Ampule  1 Ampule Topical Q12H    0.9% sodium chloride infusion  25 mL/hr IntraVENous CONTINUOUS    dextrose 5% - 0.45% NaCl with KCl 20 mEq/L infusion  25 mL/hr IntraVENous CONTINUOUS    sodium chloride (NS) flush 5-40 mL  5-40 mL IntraVENous Q8H    sodium chloride (NS) flush 5-40 mL  5-40 mL IntraVENous PRN    acetaminophen (TYLENOL) tablet 650 mg  650 mg Oral Q4H PRN    oxyCODONE-acetaminophen (PERCOCET) 5-325 mg per tablet 1 Tab  1 Tab Oral Q4H PRN    morphine 10 mg/ml injection 3-5 mg  3-5 mg IntraVENous Q1H PRN    naloxone (NARCAN) injection 0.4 mg  0.4 mg IntraVENous PRN    DOBUTamine (DOBUTREX) 500 mg/250 mL (2,000 mcg/mL) infusion  2-10 mcg/kg/min IntraVENous TITRATE    EPINEPHrine (ADRENALIN) 4 mg in 0.9% sodium chloride 250 mL infusion  0.01-0.5 mcg/kg/min IntraVENous TITRATE    nitroglycerin (Tridil) 200 mcg/ml infusion   mcg/min IntraVENous TITRATE    PHENYLephrine (ALHAJI-SYNEPHRINE) 30 mg in 0.9% sodium chloride 250 mL infusion   mcg/min IntraVENous TITRATE    NOREPINephrine (LEVOPHED) 4 mg in 5% dextrose 250 mL infusion  0.01-0.5 mcg/kg/min IntraVENous TITRATE    niCARdipine in Saline (CARDENE) 25 MG/250 mL infusion kit  2.5-15 mg/hr IntraVENous TITRATE    amiodarone (CORDARONE) tablet 200 mg  200 mg Oral BID    ondansetron (ZOFRAN) injection 4 mg  4 mg IntraVENous Q4H PRN    insulin regular (NOVOLIN R, HUMULIN R) 100 Units in 0.9% sodium chloride 100 mL infusion  1 Units/hr IntraVENous TITRATE    dextrose (D50W) injection syrg 12.5 g  25 mL IntraVENous PRN    aspirin chewable tablet 81 mg  81 mg Oral DAILY    magnesium sulfate 1 g/100 ml IVPB (premix or compounded)  1 g IntraVENous PRN    potassium chloride 10 mEq in 50 ml IVPB  10 mEq IntraVENous PRN    midazolam (VERSED) injection 1 mg  1 mg IntraVENous Q1H PRN    propofol (DIPRIVAN) 10 mg/mL infusion  5-50 mcg/kg/min IntraVENous TITRATE    chlorhexidine (PERIDEX) 0.12 % mouthwash 10 mL  10 mL Oral BID    tuberculin injection 5 Units  5 Units IntraDERMal ONCE    famotidine (PF) (PEPCID) 20 mg in 0.9% sodium chloride 10 mL injection  20 mg IntraVENous Q12H    carvediloL (COREG) tablet 3.125 mg  3.125 mg Oral BID WITH MEALS    0.9% sodium chloride infusion 250 mL  250 mL IntraVENous PRN    ceFAZolin (ANCEF) 2 g/20 mL in sterile water IV syringe  2 g IntraVENous Q8H    ceFAZolin (ANCEF) 2 g/20 mL in sterile water IV syringe  2 g IntraVENous Q8H    0.9% sodium chloride infusion 250 mL  250 mL IntraVENous PRN    albumin human 5% (BUMINATE) solution 25 g  25 g IntraVENous PRN  0.9% sodium chloride infusion 250 mL  250 mL IntraVENous PRN         Objective:     Vitals:    07/24/20 0315 07/24/20 0330 07/24/20 0345 07/24/20 0400   BP:  92/50  90/52   Pulse: 64 68 68 79   Resp: 15 15 17 24   Temp:       SpO2: 100% 100% 100% 100%   Weight:       Height:           Intake and Output:   07/22 0701 - 07/23 1900  In: 5007.1 [I.V.:4171.1]  Out: 2370 [SQEWU:2219]  07/23 1901 - 07/24 0700  In: 310   Out: 630 [Urine:550]    Physical Exam:          Constitutional:  Sedated, orally intubated and mechanically ventilated. EENMT:  Sclera clear, pupils equal, oral mucosa moist and orally intubated  Respiratory: CTA  Cardiovascular:  RRR with no M,G,R;  Gastrointestinal:  soft; NA bowel sounds present  Musculoskeletal:  warm with no cyanosis  SKIN:  no jaundice or ecchymosis   Neurologic:  sedated but no gross neuro deficits  Psychiatric:  sedated and unable to assess at this time    CXR:            DRIPS:  Dobutamine, insulin, levo    ABG:   Recent Labs     07/23/20 2053 07/23/20 2009 07/23/20  1337   PHI 7.39 7.27* 7.32*   PCO2I 41.4 41.2 36.3   PO2I 155* 164* 158*   HCO3I 25.2 19.0* 18.7*        LAB  Recent Labs     07/24/20 0304 07/23/20 2133 07/23/20 1740 07/23/20  1334   WBC 7.9  --   --  21.1*   HGB 7.7* 8.5* 7.6* 10.1*   HCT 24.8* 26.0* 24.1* 30.9*   PLT 68*  --   --  76*   INR  --   --   --  2.1     Recent Labs     07/24/20 0302 07/23/20 2133 07/23/20 1740 07/23/20  1334   *  --   --  147*   K 4.2 4.4 4.3 4.2   *  --   --  120*   CO2 24  --   --  19*   *  --   --  129*   BUN 25*  --   --  24*   CREA 1.27  --   --  1.10   MG 2.6* 2.7* 2.9* 3.2*   CA 7.5*  --   --  6.9*     No results for input(s): LCAD, LAC in the last 72 hours.     Assessment and Plan :  (Medical Decision Making)     Hospital Problems  Date Reviewed: 6/18/2020          Codes Class Noted POA    Endocarditis of aortic valve ICD-10-CM: I35.8  ICD-9-CM: 424.1  7/23/2020 Yes        Encounter for weaning from ventilator [facility]) ICD-10-CM: Z99.11  ICD-9-CM: V46.13  7/23/2020 Unknown        Thrombocytopenia (HCC) ICD-10-CM: D69.6  ICD-9-CM: 287.5  7/23/2020 Unknown        Hypernatremia ICD-10-CM: E87.0  ICD-9-CM: 276.0  7/23/2020 Unknown        Hypocalcemia ICD-10-CM: E83.51  ICD-9-CM: 275.41  7/23/2020 Unknown        Leukocytosis ICD-10-CM: D72.829  ICD-9-CM: 288.60  7/23/2020 Unknown        Aortic stenosis ICD-10-CM: I35.0  ICD-9-CM: 424.1  7/10/2020 Yes        Elevated factor VIII level ICD-10-CM: R79.1  ICD-9-CM: 790.92  6/18/2020 Yes        Diabetes mellitus, type II (HCC) (Chronic) ICD-10-CM: E11.9  ICD-9-CM: 250.00  5/20/2020 Yes        Dyslipidemia (Chronic) ICD-10-CM: E78.5  ICD-9-CM: 272.4  Unknown Yes        * (Principal) Status post aortic valve replacement (Chronic) ICD-10-CM: Z95.2  ICD-9-CM: V43.3  4/12/2018 Yes              Plan:   --continue ancef  --monitor platelets, consider hematology consultation  --continue hypotonic fluids for hypernatremia  --wean oxygen as able. More than 50% of the time documented was spent in face-to-face contact with the patient and in the care of the patient on the floor/unit where the patient is located. Thank you for this referral.  We appreciate the opportunity to participate in this patient's care. Will follow along with you.     Chip Stone MD

## 2020-07-24 NOTE — PROGRESS NOTES
TRANSFER - OUT REPORT:    Verbal report given to Suhail saenz RN on Emily Edmonds  being transferred to [facility] room 2226 for routine progression of care       Report consisted of patients Situation, Background, Assessment and Recommendations(SBAR). Information from the following report(s) SBAR, Procedure Summary, Intake/Output, MAR, Recent Results and Cardiac Rhythm NSR 1 degree avb was reviewed with the receiving nurse. Opportunity for questions and clarification was provided.

## 2020-07-24 NOTE — PROGRESS NOTES
6FR arterial sheath removed from right groin using sterile technique. Manual pressure held over site for 15 minutes. Hemostasis achieved. Right groin without bleeding without hematoma. Sterile gauze placed over site and secured with tegaderm. 5lb sandbag placed over site. Patient instructed to keep right leg straight and still and head on pillow. Patient verbalizes understanding.

## 2020-07-24 NOTE — CDMP QUERY
Pt admitted with Endocarditis of aortic valve s/p AVR  /Pt noted to have drop in hemoglobin 10.1 to 7.6 If possible, please document in the progress notes and d/c summary if you are evaluating and / or treating any of the following:     Anemia due to acute blood loss   Anemia due to chronic blood loss   Anemia due to iron deficiency   Anemia due to postoperative blood loss (please specify if expected or a complication of the surgery)   Anemia due to chronic disease, please specify disease   Dilutional anemia   Other, please specify   Clinically unable to determine    The medical record reflects the following:     Risk Factors: 62 yr, hx elevated Factor VIII  s/p AVR       Clinical Indicators: admit HGB 10.1 dropped to 7.6, platelet 68, CT drainage 350ml       Treatment: transfused PRBC, lab monitoring        Thanks,  Alyeda Zhao, [facility]  Compliant Documentation Management Program  [phone]

## 2020-07-24 NOTE — OP NOTES
[facility]  OPERATIVE REPORT    Name:  Tino Blue  MR#:  [id number]  :  1960  ACCOUNT #:  [de-identified]  DATE OF SERVICE:  2020    PREOPERATIVE DIAGNOSIS:  Prosthetic aortic valve endocarditis. 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 PROCEDURE PERFORMED:  1. Redo sternotomy. 2.  Lysis of myocardial adhesions. 3.  Debridement and pericardial patch closure of annular abscess. 4.  Explant of previous aortic valve, which was a Magna Ease. 5.  Aortic valve replacement with a 25-mm Inspiris aortic valve. SURGEON:  Mariza Shaw. Diane Mclean MD    ASSISTANT:      ANESTHESIA:  Endotracheal with YARON. COMPLICATIONS:  None. SPECIMENS REMOVED:  .    IMPLANTS:  .    ESTIMATED BLOOD LOSS:  Minimal.    OPERATIVE FINDINGS:  Moderate myocardial adhesions. Aorta was soft with palpable plaque. He did have about 2-cm old-appearing healed abscess at the location of the left and noncoronary commissure of the previous valve. There appeared to be an old healed vegetation involving the valve leaflet edges. Cultures were taken of the abscess and leaflet were sent for culture. INDICATIONS:  The patient is a 55-year-old gentleman well known to me who is status post aortic valve by me in 2018. He presented to the hospital with a STEMI. Left heart cath showed an occluded OM. No intervention was done at that time. Echo showed a mobile mass on the LVOT side of his prosthetic aortic valve. He did have positive blood cultures for gram-positive cocci. He was subsequently treated with antibiotics and allowed to recover from his completed myocardial infarction. He did note to have significant dental caries and underwent extraction. DESCRIPTION:  After informed consent was obtained, the patient was brought to the operative suite and placed in supine position.   General endotracheal anesthesia was induced without difficulty. Appropriate monitoring lines were placed by Anesthesia as well as transesophageal echo. He was prepped and draped in the usual sterile fashion. The previous incision was then entered. Dissection was carried down to the sternum, and  wires were identified, isolated, clipped and removed without difficulty. Next, using the oscillating sternal saw, the previous sternotomy was then reopened, being extremely careful to avoid any damage to deep structures. Bone hooks were then placed and the heart was dissected free off the posterior table of the sternum. Jurado retractor was then placed and adhesions were then taken down. These were really not too bad, moderate in nature. The right atrium was freed up as well as the aorta. Aorta palpated and noted be soft. At this point, heparinization was given. The right pulmonary vein was identified. Pursestrings were placed into the aorta, the right atrium and the right pulmonary vein. After adequate ACT was obtained, he was cannulated through these pursestrings to get cardiopulmonary bypass, which was then instituted without difficulty. Good decompression of the heart. The aorta was then cross-clamped. He received a single dose of [place] cardioplegia. A left ventricular vent was placed in the pulmonary vein. The aorta was then opened through the previous incision given excellent visualization of the old aortic valve. There did appear to be a healed vegetation involving the leaflet edges. This was removed and sent for culture. At this point, each individual Cor-Knot was grasped, cut and removed. The valve explanted relatively easy. There were multiple pledgets, which were removed; however, there were still pledgets very deep into the tissue and these were left in place. After removing the valve, there appeared to be an old healed abscess in the area of where the for the non and left coronary cusp.   This was swabbed and these cultures were sent to Pathology. The abscess was debrided, thoroughly irrigated. Then, using a piece of the patient's pericardium and a running 5-0 Prolene, he underwent patch closure. At this point, the annulus was debrided. It measured to a 25-mm Inspiris. Pledgeted sutures were then placed around the annulus. The valve was brought up into the field. Sutures were passed through the sewing ring and the valve seated without difficulty. Both left and right coronary cusp was easily visualized. At this point, the aortotomy was closed in double layer fashion with pledgeted 4-0 Prolene. He was warmed to 37 degrees centigrade, given a hot shot dose of cardioplegia. Meticulous de-airing maneuvers were undertaken and noted to be adequate by YARON. Cross-clamp was removed. Ventricular pacing wires were placed upon the heart, brought out through a separate stab incision and affixed to the skin. He was just very briefly VVI paced, but soon resumed his normal rhythm. He was weaned from cardiopulmonary bypass without difficulty, decannulated. All pursestrings were tied and noted to be hemostatic. Protamine sulfate was given. Meticulous hemostasis was achieved. The pericardium was copiously irrigated with warm saline. A single 32-Lux[place]bourgish mediastinal tube was placed, brought out through a separate stab incision and affixed to the skin. The sternum was reapproximated with stainless steel wires, fascia was closed with #1 PDS, subcutaneous was closed with 3-0 Vicryl and the skin was closed with 4-0 Vicryl subcuticular. All instruments and sponge count was correct at the end of the case.         MD IVAN [id number]  D:  07/23/2020 13:40  T:  07/23/2020 22:08  JOB #:  [id number]

## 2020-07-24 NOTE — PROGRESS NOTES
Dr Stevie Warren updated on ABG results and patient status.   Order received for 2 amps sodium bicarb and recheck ABG

## 2020-07-24 NOTE — PROGRESS NOTES
Patient's CI > 2.3 since Dobutrex gtt was stopped. Dr. Laron Dandy to bedside, gave order to dc Connee Avers catheter. The Luisana Majestic catheter was removed without incident. No ectopy seen on the telemetry tracing during removal. Blue cap placed over Connee Avers insertion port. Of note. The patient's Left radial arterial line was discontinued at 830. Manual compression applied for 7 minutes and hemostasis achieved. A 2x2 and tegaderm were placed over this site and the patient was instructed to avoid pushing or pulling body weight with left arm. No bleeding or hematoma noted.  Pt verbalized understanding

## 2020-07-24 NOTE — PROGRESS NOTES
Infectious Disease Consult    Today's Date: 2020   Admit Date: 2020    Impression:   1. Strep mutans prosthetic AV endocarditis, 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 BCpositive 20-20. [facility] 20 negative. antbx stopped prematurely 10 days prior to EOT 20 resumed -  ·  s/p () re-do AVR, intra-op findings of old/healed vegetation and healed eliza-valve abscess: op cultures pending  2. Hx diverticulosis   3. Dental caries, CT maxillofacial negative, s/p () extraction   4. Hx AVR   5. DM, A1c 7    Plan:   · Follow intra-op cultures  · Continue Cefazolin for now, could transition to Ceftriaxone  · Duration/route to be determined, but planning antbx treatment again after discussion with CT sx    Will plan to check chart this week, however will not formally see unless called  Anti-infectives:   · Cefazolin -  · CTX -, resume -    Subjective:   Seen in the ICU, extubated and now no hi-flow O2. Reports pain, however no other complaints. Briefly reviewed ID treatment plan. No Known Allergies     Review of Systems:  A comprehensive review of systems was negative except for that written in the History of Present Illness. Objective:     Visit Vitals  /62   Pulse 72   Temp 98.7 °F (37.1 °C)   Resp 22   Ht 6' 1\" (1.854 m)   Wt 71.3 kg (157 lb 3 oz)   SpO2 100%   BMI 20.74 kg/m²     Temp (24hrs), Av.4 °F (36.9 °C), Min:97.5 °F (36.4 °C), Max:99.2 °F (37.3 °C)       Lines:  Arterial Line:  , Central Venous Catheter:   , Peripheral IV:  CTs, simon:   All intact    Physical Exam:    General:  Awake, interactive   Eyes:  Sclera anicteric. Pupils equal, round, reactive to light. Mouth/Throat: OP clear/moist, missing teeth   Neck: Supple. Lungs:   Clear to auscultation bilaterally, good effort, Hi flow O2   Cardiovascular:  Regular rate and rhythm, soft murmur. Abdomen:   Soft, non-tender, bowel sounds normal, non-distended. Extremities: No cyanosis or edema. Skin: No acute rash or lesions. Sternal wound vac, intact   Musculoskeletal:  No swelling or deformity. Lines/Devices:  Intact, no erythema, drainage, or tenderness. Psychiatric: Sedated and appears comfortable on ventilator. Data Review:     CBC:  Recent Labs     07/24/20  0947 07/24/20  0304 07/23/20 2133  07/23/20  1334   WBC  --  7.9  --   --  21.1*   HGB 8.6* 7.7* 8.5*   < > 10.1*   HCT 27.7* 24.8* 26.0*   < > 30.9*   PLT  --  68*  --   --  76*    < > = values in this interval not displayed. BMP:  Recent Labs     07/24/20  0302 07/23/20 2133 07/23/20  1740 07/23/20  1334   CREA 1.27  --   --  1.10   BUN 25*  --   --  24*   *  --   --  147*   K 4.2 4.4 4.3 4.2   *  --   --  120*   CO2 24  --   --  19*   AGAP 6*  --   --  8   *  --   --  129*       LFTS:  Recent Labs     07/24/20  0302   TBILI 0.4   ALT 37   AP 39*   TP 5.2*   ALB 2.9*       Microbiology:     All Micro Results     Procedure Component Value Units Date/Time    CULTURE, TISSUE Thereasa Gravely STAIN [[id number]] Collected:  07/23/20 1115    Order Status:  Completed Specimen:   Aortic Valve Updated:  07/24/20 1314     Special Requests: SURGICAL SPECIMEN        GRAM STAIN 0 TO 1 WBCS SEEN PER OIF      NO DEFINITE ORGANISM SEEN        Culture result: NO GROWTH 1 DAY       CULTURE, Mae Padilla [[id number]] Collected:  07/23/20 1118    Order Status:  Completed Specimen:  Wound from Abscess Updated:  07/24/20 1313     Special Requests: NO SPECIAL REQUESTS        GRAM STAIN 0 TO 2 WBCS SEEN PER OIF      NO DEFINITE ORGANISM SEEN        Culture result: NO GROWTH 1 DAY       FUNGUS CULTURE AND SMEAR [[id number]] Collected:  07/23/20 1115    Order Status:  Completed Specimen:  Miscellaneous sample Updated:  07/24/20 1236     Source ABSCESS        Fungus stain Direct Inoculation     Fungus (Mycology) Culture Other source received     Comment: (NOTE)  Performed At: [facility] [address] [id number]  Courtney Maldonado MD IW:[id number]         [facility] CULTURE AND SMEAR [[id number]] Collected:  07/23/20 1115    Order Status:  Completed Specimen:  Miscellaneous sample Updated:  07/24/20 1236     Source AORTIC VALVE        Fungus stain Tissue Grinding     Fungus (Mycology) Culture Other source received     Comment: (NOTE)  Performed At: [address] [id number]  Courtney Maldonado MD SB:[id number]         CULTURE, ANAEROBIC [[id number]] Collected:  07/23/20 1118    Order Status:  Completed Specimen:  Abscess Updated:  07/24/20 0906     Special Requests: NO SPECIAL REQUESTS        Culture result:       NO ANAEROBIC GROWTH IN 1 DAY          CULTURE, ANAEROBIC [[id number]] Collected:  07/23/20 1115    Order Status:  Completed Specimen:   Aortic Valve Updated:  07/24/20 0905     Special Requests: NO SPECIAL REQUESTS        Culture result:       NO ANAEROBIC GROWTH IN 1 DAY          CULTURE, Pervis Flavors STAIN [[id number]] Collected:  07/23/20 1115    Order Status:  Canceled Specimen:  Wound from Surgical Specimen     CULTURE, FUNGUS [[id number]]     Order Status:  Sent Specimen:  Tissue           Imaging:   Reviewed     Signed By: Chantale Coates NP     July 24, 2020

## 2020-07-24 NOTE — PROGRESS NOTES
Inquired about the status of patient's CPAP. The pt reports that he has not been using CPAP because there is a loose wire. The patient also stated that \"my family doctor said don't worry about using it in the hospital\" I educated him on the importance of compliance with his CPAP.  He verbalized understanding but reports no one can bring it up to the hospital.

## 2020-07-24 NOTE — PROGRESS NOTES
POD 1 Day Post-Op    Procedure:  Procedure(s):  AORTIC VALVE REPLACEMENT (AVR) / DEBRIDEMENT & CLOSURE OF ABCESS WITH PERICARDIAL PATCH  STERNOTOMY REDO  YARON/ANES PROBE      Subjective:     Patient has No significant medical complaints      Objective:     Patient Vitals for the past 8 hrs:   BP Temp Pulse Resp SpO2 Weight   07/24/20 0928   69 13 100 %    07/24/20 0916 (!) 85/47  70 19 100 %    07/24/20 0900 (!) 85/51 98.1 °F (36.7 °C) 66 20 99 %    07/24/20 0855     99 %    07/24/20 0847 (!) 82/48  66 18 100 %    07/24/20 0846 (!) 80/47  67 25 100 %    07/24/20 0845   69 17 (!) 86 %    07/24/20 0831 (!) 87/48  68 17 100 %    07/24/20 0830   69 24 92 %    07/24/20 0815   66 17 100 %    07/24/20 0811 (!) 84/50  69 18 100 %    07/24/20 0800 94/51  74 12 100 %    07/24/20 0745   73 16 98 %    07/24/20 0732 (!) 89/53  87 24 (!) 89 %    07/24/20 0730   82 15 99 %    07/24/20 0728   80 16 100 %    07/24/20 0727   81 12 100 %    07/24/20 0726   71 25 100 %    07/24/20 0725   72 17 100 %    07/24/20 0724   72 17 100 %    07/24/20 0723   70 21 100 %    07/24/20 0722   70 20 100 %    07/24/20 0721   69 17 100 %    07/24/20 0720   69 18 100 %    07/24/20 0719   69 14 100 %    07/24/20 0718   70 15 100 %    07/24/20 0717   71 15 100 %    07/24/20 0716   69 15 100 %    07/24/20 0715   70 16 100 %    07/24/20 0700 108/57 97.7 °F (36.5 °C) 73 29 100 %    07/24/20 0630 107/53  69 15 100 %    07/24/20 0615   74 23 100 %    07/24/20 0600 111/52 97.9 °F (36.6 °C) 75 21     07/24/20 0545   67 20 100 %    07/24/20 0530 113/55  70 17 100 % 157 lb 3 oz (71.3 kg)   07/24/20 0515   75 22 100 %    07/24/20 0500 (!) 86/48 97.8 °F (36.6 °C) 70 30 100 %    07/24/20 0446  97.8 °F (36.6 °C)       07/24/20 0445   70 15 100 %    07/24/20 0430 90/55  69 17 100 %    07/24/20 0415   71 27 100 %    07/24/20 0400 90/52  79 24 100 %    07/24/20 0345   68 17 100 %    20 0330 92/50  68 15 100 %    20 0315   64 15 100 %    20 0312     100 %    20 0300 (!) 87/48  65 20 100 %    20 0245   66 14 100 %    20 0230 92/53  66 15 100 %    20 0215   70 18 100 %    20 0200 91/52 98.2 °F (36.8 °C) 73 13 100 %    20 0145   72 14 100 %      Temp (24hrs), Av.4 °F (36.9 °C), Min:97.5 °F (36.4 °C), Max:99.2 °F (37.3 °C)        Hemodynamics  PAP Systolic: 29 PAP  CO (l/min): 5.7 l/min CO  CI (l/min/m2): 3 l/min/m2 CI     07 -  1900  In: 748.7 [P.O.:360; I.V.:78.7]  Out: 125 [Urine:120]  1901 -  0700  In: 6264.1 [I.V.:5118.1]  Out: 2681 [Urine:2687]    CT Drainage  CT Total Volume 1: 350 mL         Chest Tube Total Volume(s)  CT Total Volume 1: 350 mL  Volume 1 Chest Tubes: Chest tube 1 total of all CT's    Heart:  regular rate and rhythm, S1, S2 normal, no murmur, click, rub or gallop  Lung:  clear to auscultation bilaterally  Neuro: Grossly non focal  Incisions: Clean, dry, and intact    Labs:  Recent Results (from the past 24 hour(s))   POC CG8I    Collection Time: 20 10:23 AM   Result Value Ref Range    pH (POC) 7.41 7.35 - 7.45      pCO2 (POC) 40.7 35 - 45 MMHG    pO2 (POC) 293 (H) 75 - 100 MMHG    HCO3 (POC) 26.0 22 - 26 MMOL/L    sO2 (POC) 100 (H) 95 - 98 %    Base excess (POC) 1 mmol/L    Sodium,  136 - 145 MMOL/L    Potassium, POC 4.3 3.5 - 5.1 MMOL/L    Glucose,  (H) 65 - 100 MG/DL    Calcium, ionized (POC) 1.24 1.12 - 1.32 mmol/L   POC CG8I    Collection Time: 20 10:46 AM   Result Value Ref Range    pH (POC) 7.39 7.35 - 7.45      pCO2 (POC) 42.1 35 - 45 MMHG    pO2 (POC) 314 (H) 75 - 100 MMHG    HCO3 (POC) 25.6 22 - 26 MMOL/L    sO2 (POC) 100 (H) 95 - 98 %    Base excess (POC) 1 mmol/L    Sodium,  136 - 145 MMOL/L    Potassium, POC 3.8 3.5 - 5.1 MMOL/L    Glucose,  (H) 65 - 100 MG/DL    Calcium, ionized (POC) 1.18 1.12 - 1.32 mmol/L   CULTURE, ANAEROBIC    Collection Time: 07/23/20 11:15 AM    Specimen: Aortic Valve   Result Value Ref Range    Special Requests: NO SPECIAL REQUESTS      Culture result: NO ANAEROBIC GROWTH IN 1 DAY     CULTURE, TISSUE W GRAM STAIN    Collection Time: 07/23/20 11:15 AM    Specimen: Aortic Valve   Result Value Ref Range    Special Requests: SURGICAL SPECIMEN      GRAM STAIN PENDING     Culture result: NO GROWTH 1 DAY     CULTURE, ANAEROBIC    Collection Time: 07/23/20 11:18 AM    Specimen: Abscess   Result Value Ref Range    Special Requests: NO SPECIAL REQUESTS      Culture result: NO ANAEROBIC GROWTH IN 1 DAY     CULTURE, WOUND W GRAM STAIN    Collection Time: 07/23/20 11:18 AM    Specimen: Abscess; Wound   Result Value Ref Range    Special Requests: NO SPECIAL REQUESTS      GRAM STAIN PENDING     Culture result: NO GROWTH 1 DAY     POC CG8I    Collection Time: 07/23/20 11:18 AM   Result Value Ref Range    pH (POC) 7.33 (L) 7.35 - 7.45      pCO2 (POC) 48.5 (H) 35 - 45 MMHG    pO2 (POC) 331 (H) 75 - 100 MMHG    HCO3 (POC) 25.4 22 - 26 MMOL/L    sO2 (POC) 100 (H) 95 - 98 %    Base deficit (POC) 1 mmol/L    Sodium,  136 - 145 MMOL/L    Potassium, POC 5.0 3.5 - 5.1 MMOL/L    Glucose,  (H) 65 - 100 MG/DL    Calcium, ionized (POC) 1.20 1. 12 - 1.32 mmol/L   POC CG8I    Collection Time: 07/23/20 11:46 AM   Result Value Ref Range    pH (POC) 7.31 (L) 7.35 - 7.45      pCO2 (POC) 47.7 (H) 35 - 45 MMHG    pO2 (POC) 324 (H) 75 - 100 MMHG    HCO3 (POC) 24.1 22 - 26 MMOL/L    sO2 (POC) 100 (H) 95 - 98 %    Base deficit (POC) 2 mmol/L    Sodium,  136 - 145 MMOL/L    Potassium, POC 5.2 (H) 3.5 - 5.1 MMOL/L    Glucose,  (H) 65 - 100 MG/DL    Calcium, ionized (POC) 1.16 1.12 - 1.32 mmol/L   POC CG8I    Collection Time: 07/23/20 12:15 PM   Result Value Ref Range    pH (POC) 7.36 7.35 - 7.45      pCO2 (POC) 41.6 35 - 45 MMHG    pO2 (POC) 310 (H) 75 - 100 MMHG    HCO3 (POC) 23.4 22 - 26 MMOL/L sO2 (POC) 100 (H) 95 - 98 %    Base deficit (POC) 2 mmol/L    Sodium,  136 - 145 MMOL/L    Potassium, POC 5.6 (H) 3.5 - 5.1 MMOL/L    Glucose,  (H) 65 - 100 MG/DL    Calcium, ionized (POC) 1.04 (L) 1.12 - 1.32 mmol/L   TEG GLOBAL HEMOSTASIS    Collection Time: 07/23/20 12:38 PM   Result Value Ref Range    Citrated Kaolin R-Time 6.4 4.6 - 9.1 MINS    Citrated Kaolin K-Time 1.9 0.8 - 2.1 MINS    Citrated Kaolin Angle 64.8 63 - 78 deg    Citrated Kaolin Maximum Amplitude 52 52 - 69 mm    Citrated RapidTEG Maximum Amplitude 46.6 (L) 52 - 70 mm    Citrated Kaolin/Heparinase R-Time 7.2 4.3 - 8.3 MINS    Citrated Functional Fibrinogen Maximum Amplitude 13.7 (L) 15 - 32 mm    Functional Fibrinogen Level 250 (L) 278 - 581 mg/dL   POC CG8I    Collection Time: 07/23/20 12:39 PM   Result Value Ref Range    pH (POC) 7.35 7.35 - 7.45      pCO2 (POC) 41.2 35 - 45 MMHG    pO2 (POC) 521 (H) 75 - 100 MMHG    HCO3 (POC) 23.0 22 - 26 MMOL/L    sO2 (POC) 100 (H) 95 - 98 %    Base deficit (POC) 2 mmol/L    Sodium,  136 - 145 MMOL/L    Potassium, POC 4.7 3.5 - 5.1 MMOL/L    Glucose,  (H) 65 - 100 MG/DL    Calcium, ionized (POC) 1.31 1.12 - 2.07 mmol/L   METABOLIC PANEL, BASIC    Collection Time: 07/23/20  1:34 PM   Result Value Ref Range    Sodium 147 (H) 136 - 145 mmol/L    Potassium 4.2 3.5 - 5.1 mmol/L    Chloride 120 (H) 98 - 107 mmol/L    CO2 19 (L) 21 - 32 mmol/L    Anion gap 8 7 - 16 mmol/L    Glucose 129 (H) 65 - 100 mg/dL    BUN 24 (H) 8 - 23 MG/DL    Creatinine 1.10 0.8 - 1.5 MG/DL    GFR est AA >60 >60 ml/min/1.73m2    GFR est non-AA >60 >60 ml/min/1.73m2    Calcium 6.9 (L) 8.3 - 10.4 MG/DL   MAGNESIUM    Collection Time: 07/23/20  1:34 PM   Result Value Ref Range    Magnesium 3.2 (H) 1.8 - 2.4 mg/dL   CBC W/O DIFF    Collection Time: 07/23/20  1:34 PM   Result Value Ref Range    WBC 21.1 (H) 4.3 - 11.1 K/uL    RBC 3.47 (L) 4.23 - 5.6 M/uL    HGB 10.1 (L) 13.6 - 17.2 g/dL    HCT 30.9 (L) 41.1 - 50.3 %    MCV 89.0 79.6 - 97.8 FL    MCH 29.1 26.1 - 32.9 PG    MCHC 32.7 31.4 - 35.0 g/dL    RDW 16.6 (H) 11.9 - 14.6 %    PLATELET 76 (L) 307 - 450 K/uL    MPV 9.3 (L) 9.4 - 12.3 FL    ABSOLUTE NRBC 0.00 0.0 - 0.2 K/uL   PTT    Collection Time: 07/23/20  1:34 PM   Result Value Ref Range    aPTT 57.9 (H) 24.3 - 35.4 SEC   PROTHROMBIN TIME + INR    Collection Time: 07/23/20  1:34 PM   Result Value Ref Range    Prothrombin time 24.1 (H) 12.0 - 14.7 sec    INR 2.1     FIBRINOGEN    Collection Time: 07/23/20  1:34 PM   Result Value Ref Range    Fibrinogen 150 (L) 190 - 501 mg/dL   GLUCOSE, POC    Collection Time: 07/23/20  1:34 PM   Result Value Ref Range    Glucose (POC) 134 (H) 65 - 100 mg/dL   POC G3    Collection Time: 07/23/20  1:37 PM   Result Value Ref Range    Device: VENT      FIO2 (POC) 40 %    pH (POC) 7.32 (L) 7.35 - 7.45      pCO2 (POC) 36.3 35 - 45 MMHG    pO2 (POC) 158 (H) 75 - 100 MMHG    HCO3 (POC) 18.7 (L) 22 - 26 MMOL/L    sO2 (POC) 99 (H) 95 - 98 %    Base deficit (POC) 7 mmol/L    Mode Pressure regulated volume control      Tidal volume 500 ml    Set Rate 18 bpm    PEEP/CPAP (POC) 8 cmH2O    Allens test (POC) NOT APPLICABLE      Site DRAWN FROM ARTERIAL LINE      Specimen type (POC) ARTERIAL      Performed by Jyoti     CO2, POC 20 MMOL/L    Respiratory comment: NurseNotified     Exhaled minute volume 9.00 L/min    COLLECT TIME 1,337     CRYOPRECIPITATE, ALLOCATE    Collection Time: 07/23/20  2:30 PM   Result Value Ref Range    Unit number [id number]     Blood component type CRYO,5U Thaw     Unit division 00     Status of unit TRANSFUSED     Unit number [id number]     Blood component type CRYO,5U Thaw     Unit division 00     Status of unit TRANSFUSED    PLATELETS, ALLOCATE    Collection Time: 07/23/20  2:30 PM   Result Value Ref Range    Unit number [id number]     Blood component type PLTPH LR,2     Unit division 00     Status of unit TRANSFUSED    GLUCOSE, POC    Collection Time: 07/23/20  3:21 PM   Result Value Ref Range    Glucose (POC) 128 (H) 65 - 100 mg/dL   TEG GLOBAL HEMOSTASIS    Collection Time: 07/23/20  3:25 PM   Result Value Ref Range    Citrated Kaolin R-Time 7.2 4.6 - 9.1 MINS    Citrated Kaolin K-Time 1.8 0.8 - 2.1 MINS    Citrated Kaolin Angle 68.9 63 - 78 deg    Citrated Kaolin Maximum Amplitude 52.9 52 - 69 mm    Citrated RapidTEG Maximum Amplitude 50.1 (L) 52 - 70 mm    Citrated Kaolin/Heparinase R-Time 6.8 4.3 - 8.3 MINS    Citrated Functional Fibrinogen Maximum Amplitude 15 15 - 32 mm    Functional Fibrinogen Level 273.7 (L) 278 - 581 mg/dL   GLUCOSE, POC    Collection Time: 07/23/20  4:36 PM   Result Value Ref Range    Glucose (POC) 133 (H) 65 - 100 mg/dL   GLUCOSE, POC    Collection Time: 07/23/20  5:31 PM   Result Value Ref Range    Glucose (POC) 134 (H) 65 - 100 mg/dL   MAGNESIUM    Collection Time: 07/23/20  5:40 PM   Result Value Ref Range    Magnesium 2.9 (H) 1.8 - 2.4 mg/dL   HGB & HCT    Collection Time: 07/23/20  5:40 PM   Result Value Ref Range    HGB 7.6 (L) 13.6 - 17.2 g/dL    HCT 24.1 (L) 41.1 - 50.3 %   POTASSIUM    Collection Time: 07/23/20  5:40 PM   Result Value Ref Range    Potassium 4.3 3.5 - 5.1 mmol/L   EKG, 12 LEAD, SUBSEQUENT    Collection Time: 07/23/20  6:12 PM   Result Value Ref Range    Ventricular Rate 82 BPM    Atrial Rate 82 BPM    P-R Interval 258 ms    QRS Duration 84 ms    Q-T Interval 362 ms    QTC Calculation (Bezet) 422 ms    Calculated P Axis 56 degrees    Calculated R Axis 68 degrees    Calculated T Axis 152 degrees    Diagnosis       Sinus rhythm with 1st degree A-V block  Nonspecific T wave abnormality  Abnormal ECG  When compared with ECG of 16-JUL-2020 08:51,  Vent.  rate has increased BY  31 BPM  Confirmed by JOHNATHON MARINA (), Quentin Cotto ([id number]) on 7/24/2020 5:55:39 AM     GLUCOSE, POC    Collection Time: 07/23/20  7:13 PM   Result Value Ref Range    Glucose (POC) 136 (H) 65 - 100 mg/dL   GLUCOSE, POC    Collection Time: 07/23/20 8:07 PM   Result Value Ref Range    Glucose (POC) 127 (H) 65 - 100 mg/dL   POC G3    Collection Time: 07/23/20  8:09 PM   Result Value Ref Range    Device: VENT      FIO2 (POC) 40 %    pH (POC) 7.27 (L) 7.35 - 7.45      pCO2 (POC) 41.2 35 - 45 MMHG    pO2 (POC) 164 (H) 75 - 100 MMHG    HCO3 (POC) 19.0 (L) 22 - 26 MMOL/L    sO2 (POC) 99 (H) 95 - 98 %    Base deficit (POC) 7 mmol/L    Mode VENTILATOR/SPONTANEOUS/PRESSURE SUPPORT      PEEP/CPAP (POC) 8 cmH2O    Allens test (POC) NOT APPLICABLE      Total resp. rate 16      Site DRAWN FROM ARTERIAL LINE      Specimen type (POC) ARTERIAL      Performed by [facility]st[facility]len[facility]BS     CO2, POC 20 MMOL/L    Critical value read back 20:09     Respiratory comment: NurseNotified     Exhaled minute volume 7.20 L/min    COLLECT TIME 2,005     POC G3    Collection Time: 07/23/20  8:53 PM   Result Value Ref Range    Device: VENT      FIO2 (POC) 40 %    pH (POC) 7.39 7.35 - 7.45      pCO2 (POC) 41.4 35 - 45 MMHG    pO2 (POC) 155 (H) 75 - 100 MMHG    HCO3 (POC) 25.2 22 - 26 MMOL/L    sO2 (POC) 99 (H) 95 - 98 %    Base excess (POC) 0 mmol/L    Mode VENTILATOR/SPONTANEOUS/PRESSURE SUPPORT      PEEP/CPAP (POC) 8 cmH2O    Allens test (POC) NOT APPLICABLE      Total resp.  rate 16      Site DRAWN FROM ARTERIAL LINE      Specimen type (POC) ARTERIAL      Performed by [facility]     CO2, POC 26 MMOL/L    Critical value read back 20:55     Respiratory comment: PhysicianNotified     Exhaled minute volume 7.15 L/min    COLLECT TIME 2,052     HGB & HCT    Collection Time: 07/23/20  9:33 PM   Result Value Ref Range    HGB 8.5 (L) 13.6 - 17.2 g/dL    HCT 26.0 (L) 41.1 - 50.3 %   POTASSIUM    Collection Time: 07/23/20  9:33 PM   Result Value Ref Range    Potassium 4.4 3.5 - 5.1 mmol/L   MAGNESIUM    Collection Time: 07/23/20  9:33 PM   Result Value Ref Range    Magnesium 2.7 (H) 1.8 - 2.4 mg/dL   GLUCOSE, POC    Collection Time: 07/23/20  9:38 PM   Result Value Ref Range    Glucose (POC) 133 (H) 65 - 100 mg/dL   GLUCOSE, POC    Collection Time: 07/23/20 11:33 PM   Result Value Ref Range    Glucose (POC) 142 (H) 65 - 100 mg/dL   GLUCOSE, POC    Collection Time: 07/24/20 12:27 AM   Result Value Ref Range    Glucose (POC) 143 (H) 65 - 100 mg/dL   GLUCOSE, POC    Collection Time: 07/24/20  1:17 AM   Result Value Ref Range    Glucose (POC) 147 (H) 65 - 100 mg/dL   GLUCOSE, POC    Collection Time: 07/24/20  2:08 AM   Result Value Ref Range    Glucose (POC) 149 (H) 65 - 100 mg/dL   MAGNESIUM    Collection Time: 07/24/20  3:02 AM   Result Value Ref Range    Magnesium 2.6 (H) 1.8 - 2.4 mg/dL   METABOLIC PANEL, BASIC    Collection Time: 07/24/20  3:02 AM   Result Value Ref Range    Sodium 149 (H) 136 - 145 mmol/L    Potassium 4.2 3.5 - 5.1 mmol/L    Chloride 119 (H) 98 - 107 mmol/L    CO2 24 21 - 32 mmol/L    Anion gap 6 (L) 7 - 16 mmol/L    Glucose 128 (H) 65 - 100 mg/dL    BUN 25 (H) 8 - 23 MG/DL    Creatinine 1.27 0.8 - 1.5 MG/DL    GFR est AA >60 >60 ml/min/1.73m2    GFR est non-AA >60 >60 ml/min/1.73m2    Calcium 7.5 (L) 8.3 - 10.4 MG/DL   HEPATIC FUNCTION PANEL    Collection Time: 07/24/20  3:02 AM   Result Value Ref Range    Protein, total 5.2 (L) 6.3 - 8.2 g/dL    Albumin 2.9 (L) 3.2 - 4.6 g/dL    Globulin 2.3 2.3 - 3.5 g/dL    A-G Ratio 1.3 1.2 - 3.5      Bilirubin, total 0.4 0.2 - 1.1 MG/DL    Bilirubin, direct 0.2 <0.4 MG/DL    Alk.  phosphatase 39 (L) 50 - 136 U/L    AST (SGOT) 26 15 - 37 U/L    ALT (SGPT) 37 12 - 65 U/L   CBC W/O DIFF    Collection Time: 07/24/20  3:04 AM   Result Value Ref Range    WBC 7.9 4.3 - 11.1 K/uL    RBC 2.67 (L) 4.23 - 5.6 M/uL    HGB 7.7 (L) 13.6 - 17.2 g/dL    HCT 24.8 (L) 41.1 - 50.3 %    MCV 92.9 79.6 - 97.8 FL    MCH 28.8 26.1 - 32.9 PG    MCHC 31.0 (L) 31.4 - 35.0 g/dL    RDW 17.3 (H) 11.9 - 14.6 %    PLATELET 68 (L) 534 - 450 K/uL    MPV 9.8 9.4 - 12.3 FL    ABSOLUTE NRBC 0.00 0.0 - 0.2 K/uL   GLUCOSE, POC    Collection Time: 07/24/20  3:08 AM   Result Value Ref Range Glucose (POC) 138 (H) 65 - 100 mg/dL   GLUCOSE, POC    Collection Time: 07/24/20  5:01 AM   Result Value Ref Range    Glucose (POC) 121 (H) 65 - 100 mg/dL   GLUCOSE, POC    Collection Time: 07/24/20  6:06 AM   Result Value Ref Range    Glucose (POC) 109 (H) 65 - 100 mg/dL   GLUCOSE, POC    Collection Time: 07/24/20  7:03 AM   Result Value Ref Range    Glucose (POC) 104 (H) 65 - 100 mg/dL   GLUCOSE, POC    Collection Time: 07/24/20  8:05 AM   Result Value Ref Range    Glucose (POC) 97 65 - 100 mg/dL       Assessment:     Principal Problem:    Status post aortic valve replacement (4/12/2018)    Active Problems:    Hypoxia (4/12/2018)      Dyslipidemia ()      Diabetes mellitus, type II ([address] (5/20/2020)      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

## 2020-07-24 NOTE — PROGRESS NOTES
Respiratory Mechanics completed and are as follows:  Weaning Parameters  Spontaneous Breathing Trial Complete: Yes  Resp Rate Observed: 15  Ve: 6.6  VT: 441  RSBI: 34  NIF: 20  VC: 1500  PaO2/FiO2 Ratio: 375 mmHg  [place]arton Agitation Sedation Scale (RASS): Alert and calm  Patient extubated to a HFNC @ 40L and 50%. Patient is able to communicate and is negative for stridor. Breath sounds are clear. No complications with extubation.      Keshawn Greeen

## 2020-07-24 NOTE — PROGRESS NOTES
TIMEOUT performed prior to extubation on Martha Vela with RT, primary RN, and charge RN present on 7/23/2020 at 10:09 PM.     Per anesthesia team on admission, patient's intubation was Normal    ABG results as follows:    Lab Results   Component Value Date/Time    pH (POC) 7.39 07/23/2020 08:53 PM    pCO2 (POC) 41.4 07/23/2020 08:53 PM    pO2 (POC) 155 (H) 07/23/2020 08:53 PM    HCO3 (POC) 25.2 07/23/2020 08:53 PM    sO2 (POC) 99 (H) 07/23/2020 08:53 PM    Base deficit (POC) 7 07/23/2020 08:09 PM         The patient is hemodynamically stable and can demonstrate the following on a consistent basis:  follow commands , elevate head off the pillow, nods appropriately to questions. Dr. Alyssa Han notified of weaning parameters and order to extubate obtained. Patient extubated by (RT name) RT Mikey   to (Type of O2 Device) Airvo at (Amount of of O2) 47% without complication.

## 2020-07-25 ENCOUNTER — APPOINTMENT (OUTPATIENT)
Dept: GENERAL RADIOLOGY | Age: 60
DRG: 220 | End: 2020-07-25
Attending: THORACIC SURGERY (CARDIOTHORACIC VASCULAR SURGERY)
Payer: COMMERCIAL

## 2020-07-25 PROBLEM — E87.0 HYPERNATREMIA: Status: RESOLVED | Noted: 2020-07-23 | Resolved: 2020-07-25

## 2020-07-25 LAB
ABO + RH BLD: NORMAL
ANION GAP SERPL CALC-SCNC: 5 MMOL/L (ref 7–16)
BACTERIA SPEC CULT: NORMAL
BACTERIA SPEC CULT: NORMAL
BLD PROD TYP BPU: NORMAL
BLOOD GROUP ANTIBODIES SERPL: NORMAL
BPU ID: NORMAL
BUN SERPL-MCNC: 23 MG/DL (ref 8–23)
CALCIUM SERPL-MCNC: 8 MG/DL (ref 8.3–10.4)
CHLORIDE SERPL-SCNC: 111 MMOL/L (ref 98–107)
CO2 SERPL-SCNC: 27 MMOL/L (ref 21–32)
CREAT SERPL-MCNC: 1.24 MG/DL (ref 0.8–1.5)
CROSSMATCH RESULT,%XM: NORMAL
ERYTHROCYTE [DISTWIDTH] IN BLOOD BY AUTOMATED COUNT: 16.8 % (ref 11.9–14.6)
GLUCOSE BLD STRIP.AUTO-MCNC: 114 MG/DL (ref 65–100)
GLUCOSE BLD STRIP.AUTO-MCNC: 134 MG/DL (ref 65–100)
GLUCOSE BLD STRIP.AUTO-MCNC: 137 MG/DL (ref 65–100)
GLUCOSE BLD STRIP.AUTO-MCNC: 139 MG/DL (ref 65–100)
GLUCOSE SERPL-MCNC: 138 MG/DL (ref 65–100)
GRAM STN SPEC: NORMAL
HCT VFR BLD AUTO: 31.5 % (ref 41.1–50.3)
HGB BLD-MCNC: 10.4 G/DL (ref 13.6–17.2)
MAGNESIUM SERPL-MCNC: 2.3 MG/DL (ref 1.8–2.4)
MCH RBC QN AUTO: 30.1 PG (ref 26.1–32.9)
MCHC RBC AUTO-ENTMCNC: 33 G/DL (ref 31.4–35)
MCV RBC AUTO: 91.3 FL (ref 79.6–97.8)
MM INDURATION POC: 0 MM (ref 0–5)
NRBC # BLD: 0 K/UL (ref 0–0.2)
PLATELET # BLD AUTO: 63 K/UL (ref 150–450)
PMV BLD AUTO: 10.3 FL (ref 9.4–12.3)
POTASSIUM SERPL-SCNC: 4 MMOL/L (ref 3.5–5.1)
PPD POC: NEGATIVE NEGATIVE
RBC # BLD AUTO: 3.45 M/UL (ref 4.23–5.6)
SERVICE CMNT-IMP: NORMAL
SERVICE CMNT-IMP: NORMAL
SODIUM SERPL-SCNC: 143 MMOL/L (ref 136–145)
SPECIMEN EXP DATE BLD: NORMAL
STATUS OF UNIT,%ST: NORMAL
UNIT DIVISION, %UDIV: 0
WBC # BLD AUTO: 10.6 K/UL (ref 4.3–11.1)

## 2020-07-25 PROCEDURE — 94660 CPAP INITIATION&MGMT: CPT

## 2020-07-25 PROCEDURE — 83735 ASSAY OF MAGNESIUM: CPT

## 2020-07-25 PROCEDURE — 82962 GLUCOSE BLOOD TEST: CPT

## 2020-07-25 PROCEDURE — 77030012890

## 2020-07-25 PROCEDURE — 36592 COLLECT BLOOD FROM PICC: CPT

## 2020-07-25 PROCEDURE — 99232 SBSQ HOSP IP/OBS MODERATE 35: CPT | Performed by: INTERNAL MEDICINE

## 2020-07-25 PROCEDURE — 74011000250 HC RX REV CODE- 250: Performed by: THORACIC SURGERY (CARDIOTHORACIC VASCULAR SURGERY)

## 2020-07-25 PROCEDURE — 97161 PT EVAL LOW COMPLEX 20 MIN: CPT

## 2020-07-25 PROCEDURE — 97530 THERAPEUTIC ACTIVITIES: CPT

## 2020-07-25 PROCEDURE — 65660000004 HC RM CVT STEPDOWN

## 2020-07-25 PROCEDURE — 74011250637 HC RX REV CODE- 250/637: Performed by: THORACIC SURGERY (CARDIOTHORACIC VASCULAR SURGERY)

## 2020-07-25 PROCEDURE — 74011250636 HC RX REV CODE- 250/636: Performed by: THORACIC SURGERY (CARDIOTHORACIC VASCULAR SURGERY)

## 2020-07-25 PROCEDURE — 80048 BASIC METABOLIC PNL TOTAL CA: CPT

## 2020-07-25 PROCEDURE — 85027 COMPLETE CBC AUTOMATED: CPT

## 2020-07-25 PROCEDURE — 71046 X-RAY EXAM CHEST 2 VIEWS: CPT

## 2020-07-25 RX ADMIN — POTASSIUM CHLORIDE 20 MEQ: 20 TABLET, EXTENDED RELEASE ORAL at 22:03

## 2020-07-25 RX ADMIN — TAPENTADOL HYDROCHLORIDE 75 MG: 50 TABLET, FILM COATED ORAL at 03:21

## 2020-07-25 RX ADMIN — FUROSEMIDE 40 MG: 40 TABLET ORAL at 09:20

## 2020-07-25 RX ADMIN — POTASSIUM CHLORIDE 20 MEQ: 20 TABLET, EXTENDED RELEASE ORAL at 05:47

## 2020-07-25 RX ADMIN — ATORVASTATIN CALCIUM 20 MG: 10 TABLET, FILM COATED ORAL at 21:51

## 2020-07-25 RX ADMIN — CARVEDILOL 3.12 MG: 3.12 TABLET, FILM COATED ORAL at 17:13

## 2020-07-25 RX ADMIN — TAPENTADOL HYDROCHLORIDE 75 MG: 50 TABLET, FILM COATED ORAL at 17:13

## 2020-07-25 RX ADMIN — POTASSIUM CHLORIDE 10 MEQ: 750 TABLET, EXTENDED RELEASE ORAL at 09:20

## 2020-07-25 RX ADMIN — CEFAZOLIN SODIUM 2 G: 100 INJECTION, POWDER, LYOPHILIZED, FOR SOLUTION INTRAVENOUS at 21:42

## 2020-07-25 RX ADMIN — Medication 1 AMPULE: at 09:20

## 2020-07-25 RX ADMIN — FAMOTIDINE 20 MG: 20 TABLET, FILM COATED ORAL at 09:20

## 2020-07-25 RX ADMIN — ESCITALOPRAM OXALATE 10 MG: 10 TABLET ORAL at 21:42

## 2020-07-25 RX ADMIN — Medication 10 ML: at 21:46

## 2020-07-25 RX ADMIN — TAPENTADOL HYDROCHLORIDE 75 MG: 50 TABLET, FILM COATED ORAL at 21:51

## 2020-07-25 RX ADMIN — SENNOSIDES AND DOCUSATE SODIUM 1 TABLET: 8.6; 5 TABLET ORAL at 09:18

## 2020-07-25 RX ADMIN — AMIODARONE HYDROCHLORIDE 200 MG: 200 TABLET ORAL at 09:20

## 2020-07-25 RX ADMIN — FAMOTIDINE 20 MG: 20 TABLET, FILM COATED ORAL at 17:13

## 2020-07-25 RX ADMIN — Medication 1 AMPULE: at 21:42

## 2020-07-25 RX ADMIN — CEFAZOLIN SODIUM 2 G: 100 INJECTION, POWDER, LYOPHILIZED, FOR SOLUTION INTRAVENOUS at 17:16

## 2020-07-25 RX ADMIN — CEFAZOLIN SODIUM 2 G: 100 INJECTION, POWDER, LYOPHILIZED, FOR SOLUTION INTRAVENOUS at 05:46

## 2020-07-25 RX ADMIN — FERROUS SULFATE TAB 325 MG (65 MG ELEMENTAL FE) 325 MG: 325 (65 FE) TAB at 09:19

## 2020-07-25 RX ADMIN — Medication 10 ML: at 17:15

## 2020-07-25 RX ADMIN — Medication 10 ML: at 05:47

## 2020-07-25 RX ADMIN — AMIODARONE HYDROCHLORIDE 200 MG: 200 TABLET ORAL at 21:42

## 2020-07-25 RX ADMIN — TAPENTADOL HYDROCHLORIDE 75 MG: 50 TABLET, FILM COATED ORAL at 09:18

## 2020-07-25 RX ADMIN — CARVEDILOL 3.12 MG: 3.12 TABLET, FILM COATED ORAL at 09:19

## 2020-07-25 NOTE — PROGRESS NOTES
POD 2 Day Post-Op    Procedure:  Procedure(s):  AORTIC VALVE REPLACEMENT (AVR) / DEBRIDEMENT & CLOSURE OF ABCESS WITH PERICARDIAL PATCH  STERNOTOMY REDO  YARON/ANES PROBE      Subjective / Interim Events:     Patient has No significant medical complaints; remains on 2L NC.       Objective:     Patient Vitals for the past 8 hrs:   BP Temp Pulse Resp SpO2   20 0300 113/59 98.2 °F (36.8 °C) 84 16 94 %   20 2352     96 %   20 2339 103/63 98.5 °F (36.9 °C) 86 20      Temp (24hrs), Av.1 °F (36.7 °C), Min:97.1 °F (36.2 °C), Max:99 °F (37.2 °C)          Heart:  regular rate and rhythm, S1, S2 normal, no murmur, click, rub or gallop  Lung:  clear to auscultation bilaterally  Neuro: Grossly non focal  Incisions: Clean, dry, and intact    Labs:  Recent Results (from the past 24 hour(s))   GLUCOSE, POC    Collection Time: 20  5:01 AM   Result Value Ref Range    Glucose (POC) 121 (H) 65 - 100 mg/dL   GLUCOSE, POC    Collection Time: 20  6:06 AM   Result Value Ref Range    Glucose (POC) 109 (H) 65 - 100 mg/dL   GLUCOSE, POC    Collection Time: 20  7:03 AM   Result Value Ref Range    Glucose (POC) 104 (H) 65 - 100 mg/dL   GLUCOSE, POC    Collection Time: 20  8:05 AM   Result Value Ref Range    Glucose (POC) 97 65 - 100 mg/dL   HGB & HCT    Collection Time: 20  9:47 AM   Result Value Ref Range    HGB 8.6 (L) 13.6 - 17.2 g/dL    HCT 27.7 (L) 41.1 - 50.3 %   EKG, 12 LEAD, SUBSEQUENT    Collection Time: 20  3:21 PM   Result Value Ref Range    Ventricular Rate 70 BPM    Atrial Rate 70 BPM    P-R Interval 280 ms    QRS Duration 90 ms    Q-T Interval 418 ms    QTC Calculation (Bezet) 451 ms    Calculated P Axis 62 degrees    Calculated R Axis 71 degrees    Calculated T Axis 110 degrees    Diagnosis       Sinus rhythm with 1st degree A-V block  Moderate voltage criteria for LVH, may be normal variant  Nonspecific T wave abnormality  Abnormal ECG  When compared with ECG of 23-JUL-2020 18:12,  Non-specific change in ST segment in Inferior leads  Confirmed by JOHNATHON MARINA (), Jackie Jozef ([id number]) on 7/24/2020 3:40:56 PM     GLUCOSE, POC    Collection Time: 07/24/20  4:24 PM   Result Value Ref Range    Glucose (POC) 131 (H) 65 - 100 mg/dL   GLUCOSE, POC    Collection Time: 07/24/20  8:37 PM   Result Value Ref Range    Glucose (POC) 163 (H) 65 - 100 mg/dL   PLEASE READ & DOCUMENT PPD TEST IN 24 HRS    Collection Time: 07/24/20 10:15 PM   Result Value Ref Range    PPD Negative Negative    mm Induration 0 0 - 5 mm       Assessment:     Principal Problem:    Status post aortic valve replacement (4/12/2018)    Active Problems:    Hypoxia (4/12/2018)      Dyslipidemia ()      Diabetes mellitus, type II ([address] (5/20/2020)      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 Check Hct today. Wean oxygen as tolerated.

## 2020-07-25 NOTE — PROGRESS NOTES
Problem: Mobility Impaired (Adult and Pediatric)  Goal: *Acute Goals and Plan of Care (Insert Text)  Note:   LTG:  (1.)Mr. Opal Santos will move from supine to sit and sit to supine , scoot up and down, and roll side to side in bed with INDEPENDENT within 7 treatment day(s). (2.)Mr. Opal Santos will transfer from bed to chair and chair to bed with INDEPENDENT using the least restrictive device within 7 treatment day(s). (3.)Mr. Opal Santos will ambulate with INDEPENDENT for 200+ feet with the least restrictive device within 7 treatment day(s). ________________________________________________________________________________________________       PHYSICAL THERAPY: Initial Assessment and AM 7/25/2020  INPATIENT: PT Visit Days : 1  Payor: Bere Mcintyre / Plan: [facility] [address] / Product Type: PPO /       NAME/AGE/GENDER: Kathy Johnson is a 62 y.o. male   PRIMARY DIAGNOSIS: Endocarditis of aortic valve [I35.8]  Endocarditis of aortic valve [I35.8] Status post aortic valve replacement Status post aortic valve replacement  Procedure(s) (LRB):  AORTIC VALVE REPLACEMENT (AVR) / DEBRIDEMENT & CLOSURE OF ABCESS WITH PERICARDIAL PATCH (N/A)  STERNOTOMY REDO (N/A)  YARON/ANES PROBE (N/A)  2 Days Post-Op  ICD-10: Treatment Diagnosis:    Generalized Muscle Weakness (M62.81)  Difficulty in walking, Not elsewhere classified (R26.2)   Precaution/Allergies:  Patient has no known allergies. ASSESSMENT:     Mr. Opal Santos presents sitting in chair at arrival and willing to participate. He was on 2L and 98%. RA was 94%. He was able to stand with CGA, walk 60ft with no AD CGA. His gait pattern is short unsteady steps. Pt had no balance challenges during session. He returned to room and sat in chair. He was on RA entire session and stayed above 94%. Reported O2 sat to RN. He did say that he felt slightly winded with 60ft.  His impairments include decreased functional mobility, decreased balance, decreased strength, decreased safety awareness, increased risk for falls. Pt would benefit from further PT while in house to address these impairments to help improve to prior level of independence. This section established at most recent assessment   PROBLEM LIST (Impairments causing functional limitations):  Decreased Strength  Decreased ADL/Functional Activities  Decreased Transfer Abilities  Decreased Ambulation Ability/Technique  Decreased Balance  Increased Pain  Decreased Activity Tolerance  Decreased Pacing Skills  Increased Fatigue  Increased Shortness of Breath  Decreased Flexibility/Joint Mobility   INTERVENTIONS PLANNED: (Benefits and precautions of physical therapy have been discussed with the patient.)  Balance Exercise  Bed Mobility  Gait Training  Home Exercise Program (HEP)  Neuromuscular Re-education/Strengthening  Range of Motion (ROM)  Therapeutic Activites  Therapeutic Exercise/Strengthening  Transfer Training     TREATMENT PLAN: Frequency/Duration: twice daily for duration of hospital stay  Rehabilitation Potential For Stated Goals: Good     REHAB RECOMMENDATIONS (at time of discharge pending progress):    Placement: It is my opinion, based on this patient's performance to date, that Mr. Shawn Dykes may benefit from participating in 1-2 additional therapy sessions in order to continue to assess for rehab potential and then make recommendation for disposition at discharge. Equipment:   None at this time              HISTORY:   History of Present Injury/Illness (Reason for Referral):  DATE OF SERVICE:  07/23/2020     PREOPERATIVE DIAGNOSIS:  Prosthetic aortic valve endocarditis. 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 PROCEDURE PERFORMED:  1. Redo sternotomy. 2.  Lysis of myocardial adhesions. 3.  Debridement and pericardial patch closure of annular abscess. 4.  Explant of previous aortic valve, which was a Magna Ease.   5.  Aortic valve replacement with a 25-mm Inspiris aortic valve. Past Medical History/Comorbidities:   Mr. Monique Skinner  has a past medical history of CAD (coronary artery disease), Drug addiction in remission [facility]), Dyslipidemia, NSTEMI (non-ST elevated myocardial infarction) ([facility] [address] (2020), DIEGO on CPAP, Recurrent depression ([facility] [address] S/P aortic valve replacement (2018), Seizures [facility] (), Sigmoid diverticulosis, and Streptococcal endocarditis (dx 2020). Mr. Monique Skinner  has a past surgical history that includes hx hernia repair (Left, 2018); pr cardiac surg procedure unlist (2018); hx heart catheterization (2018); and hx vascular access (Right, 2020). Social History/Living Environment:   Home Environment: Trailer/mobile home  # Steps to Enter: 1  One/Two Story Residence: One story  Living Alone: Yes  Support Systems: Friends \ neighbors  Patient Expects to be Discharged to[de-identified] Trailer/mobile home  Current DME Used/Available at Home: None  Prior Level of Function/Work/Activity:  Pt lives alone in [facility] with all ADLs, no AD used. He drives, but presently has  license and wishes to return to driving and possibly work when cleared.       Number of Personal Factors/Comorbidities that affect the Plan of Care: 0: LOW COMPLEXITY   EXAMINATION:   Most Recent Physical Functioning:   Gross Assessment:  AROM: Generally decreased, functional  Strength: Generally decreased, functional  Coordination: Within functional limits  Tone: Normal  Sensation: Intact               Posture:     Balance:  Sitting: Intact  Standing: Impaired  Standing - Static: Good;Fair  Standing - Dynamic : Good;Fair Bed Mobility:     Wheelchair Mobility:     Transfers:  Sit to Stand: Contact guard assistance  Stand to Sit: Contact guard assistance  Gait:     Distance (ft): 60 Feet (ft)  Ambulation - Level of Assistance: Contact guard assistance      Body Structures Involved:  Nerves  Eyes and Ears  Heart  Lungs  Bones  Joints  Muscles  Ligaments Body Functions Affected:  Mental  Sensory/Pain  Cardio  Respiratory  Neuromusculoskeletal  Movement Related  Skin Related Activities and Participation Affected:  General Tasks and Demands  Mobility  Self Care  Domestic Life  Community, Social and [place]   Number of elements that affect the Plan of Care: 1-2: LOW COMPLEXITY   CLINICAL PRESENTATION:   Presentation: Stable and uncomplicated: LOW COMPLEXITY   CLINICAL DECISION MAKIN[address] [place] [address] AM-PAC 6 Clicks   Basic Mobility Inpatient Short Form  How much difficulty does the patient currently have. .. Unable A Lot A Little None   1. Turning over in bed (including adjusting bedclothes, sheets and blankets)? [] 1   [] 2   [x] 3   [] 4   2. Sitting down on and standing up from a chair with arms ( e.g., wheelchair, bedside commode, etc.)   [] 1   [x] 2   [] 3   [] 4   3. Moving from lying on back to sitting on the side of the bed? [] 1   [] 2   [x] 3   [] 4   How much help from another person does the patient currently need. .. Total A Lot A Little None   4. Moving to and from a bed to a chair (including a wheelchair)? [] 1   [] 2   [] 3   [x] 4   5. Need to walk in hospital room? [] 1   [] 2   [x] 3   [] 4   6. Climbing 3-5 steps with a railing? [] 1   [] 2   [x] 3   [] 4   © , Trustees of [address] [place] [address], under license to [employer] All rights reserved      Score:  Initial: 18 Most Recent: X (Date: -- )    Interpretation of Tool:  Represents activities that are increasingly more difficult (i.e. Bed mobility, Transfers, Gait). Medical Necessity:     Skilled intervention continues to be required due to decreased function. Reason for Services/Other Comments:  Patient continues to require skilled intervention due to   medical complications and patient unable to attend/participate in therapy as expected  .    Use of outcome tool(s) and clinical judgement create a POC that gives a: Clear prediction of patient's progress: LOW COMPLEXITY            TREATMENT:   (In addition to Assessment/Re-Assessment sessions the following treatments were rendered)   Pre-treatment Symptoms/Complaints:  none  Pain: Initial:   Pain Intensity 1: 0  Post Session:  0     Therapeutic Activity: (    10min):  Therapeutic activities including Chair transfers and Ambulation on level ground to improve mobility, strength, balance, and coordination. Required minimal   to promote static and dynamic balance in standing, promote coordination of bilateral, lower extremity(s), and promote motor control of bilateral, lower extremity(s). Braces/Orthotics/Lines/Etc:   O2 Device: Nasal cannula  Treatment/Session Assessment:    Response to Treatment:  see above  Interdisciplinary Collaboration:   Physical Therapist  Registered Nurse  After treatment position/precautions:   Up in chair  Call light within reach  RN notified   Compliance with Program/Exercises: Will assess as treatment progresses  Recommendations/Intent for next treatment session: \"Next visit will focus on advancements to more challenging activities and reduction in assistance provided\".   Total Treatment Duration:  PT Patient Time In/Time Out  Time In: 0855  Time Out: [address] [facility]

## 2020-07-25 NOTE — PROGRESS NOTES
Problem: Pressure Injury - Risk of  Goal: *Prevention of pressure injury  Description: Document Reilly Scale and appropriate interventions in the flowsheet. Outcome: Progressing Towards Goal  Note: Pressure Injury Interventions:  Sensory Interventions: Assess changes in LOC         Activity Interventions: Increase time out of bed, Pressure redistribution bed/mattress(bed type), PT/OT evaluation    Mobility Interventions: Pressure redistribution bed/mattress (bed type)    Nutrition Interventions: Document food/fluid/supplement intake    Friction and Shear Interventions: Apply protective barrier, creams and emollients                Problem: Patient Education: Go to Patient Education Activity  Goal: Patient/Family Education  Outcome: Progressing Towards Goal     Problem: Falls - Risk of  Goal: *Absence of Falls  Description: Document Unruly Fall Risk and appropriate interventions in the flowsheet.   Outcome: Progressing Towards Goal  Note: Fall Risk Interventions:  Mobility Interventions: Bed/chair exit alarm, Patient to call before getting OOB    Mentation Interventions: Adequate sleep, hydration, pain control, Bed/chair exit alarm    Medication Interventions: Bed/chair exit alarm    Elimination Interventions: Call light in reach              Problem: Patient Education: Go to Patient Education Activity  Goal: Patient/Family Education  Outcome: Progressing Towards Goal

## 2020-07-25 NOTE — PROGRESS NOTES
Bedside and Verbal shift change report given to Dimitri Lara (oncoming nurse) by Roxi Morales (offgoing nurse). Report included the following information SBAR, Kardex, MAR, Recent Results and Cardiac Rhythm NSR.

## 2020-07-25 NOTE — PROGRESS NOTES
Assessment completed upon patient's arrival to unit and care assumed. Dual skin assessment completed with Luis Olmedo RN. No redness or breakdown noted. MS incision with 4 x 4/dry dressing. Pacing wires isolated and taped to chest.      .

## 2020-07-25 NOTE — PROGRESS NOTES
Gerhardt Look  Admission Date: 7/23/2020             Daily Progress Note: 7/25/2020   The patient's chart is reviewed and the patient is discussed with the staff. Gerhardt Look is a 54JPS with h/o elevated Factor VIII (prothrombotic risk 5x normal), prior smoking history, 40year smoking history & recent strep mutans endocarditis  who underwent AVR with closure of abscess with pericardial patch. Subjective:   Currently on 2 LPM via NC with O2 sat 94%. RN at bedside changing chest incision dressing. Sitting up in recliner. Review of Systems  Constitutional:  There is no history of fever, chills, night sweats, weight loss, weight gain, persistent fatigue, or lethargy/hypersomnolence. CV: No chest pain, pressure, discomfort, palpitations, orthopnea, murmurs, or edema. GI: No dysphagia, heartburn reflux, nausea/vomiting, diarrhea, abdominal pain, or bleeding. Neuro:   There is no history of AMS, persistent headache, decreased level of consciousness, seizures, or motor or sensory deficits    Current Facility-Administered Medications   Medication Dose Route Frequency    0.9% sodium chloride infusion 250 mL  250 mL IntraVENous PRN    escitalopram oxalate (LEXAPRO) tablet 10 mg  10 mg Oral QHS    ferrous sulfate tablet 325 mg  325 mg Oral DAILY WITH BREAKFAST    sodium chloride (NS) flush 5-40 mL  5-40 mL IntraVENous Q8H    sodium chloride (NS) flush 5-40 mL  5-40 mL IntraVENous PRN    furosemide (LASIX) tablet 40 mg  40 mg Oral DAILY    alum-mag hydroxide-simeth (MYLANTA) oral suspension 30 mL  30 mL Oral Q4H PRN    famotidine (PEPCID) tablet 20 mg  20 mg Oral BID    ondansetron (ZOFRAN) injection 4 mg  4 mg IntraVENous Q6H PRN    acetaminophen (TYLENOL) tablet 650 mg  650 mg Oral Q4H PRN    atorvastatin (LIPITOR) tablet 20 mg  20 mg Oral QHS    insulin regular (NOVOLIN R, HUMULIN R) injection 0-15 Units  0-15 Units SubCUTAneous AC&HS    amiodarone (CORDARONE) tablet 200 mg  200 mg Oral Q12H    aspirin delayed-release tablet 81 mg  81 mg Oral DAILY    magnesium oxide (MAG-OX) tablet 400 mg  400 mg Oral TID PRN    magnesium oxide (MAG-OX) tablet 400 mg  400 mg Oral QID PRN    potassium chloride (KLOR-CON) tablet 10 mEq  10 mEq Oral DAILY    potassium chloride (K-DUR, KLOR-CON) SR tablet 20 mEq  20 mEq Oral BID PRN    potassium chloride (K-DUR, KLOR-CON) SR tablet 40 mEq  40 mEq Oral BID PRN    nitroglycerin (NITROSTAT) tablet 0.4 mg  0.4 mg SubLINGual Q5MIN PRN    traMADoL (ULTRAM) tablet 50 mg  50 mg Oral Q6H PRN    tapentadoL (NUCYNTA) tablet 75 mg  75 mg Oral Q4H PRN    senna-docusate (PERICOLACE) 8.6-50 mg per tablet 1 Tab  1 Tab Oral DAILY    alcohol 62% (NOZIN) nasal  1 Ampule  1 Ampule Topical Q12H    oxyCODONE-acetaminophen (PERCOCET) 5-325 mg per tablet 1 Tab  1 Tab Oral Q4H PRN    carvediloL (COREG) tablet 3.125 mg  3.125 mg Oral BID WITH MEALS    ceFAZolin (ANCEF) 2 g/20 mL in sterile water IV syringe  2 g IntraVENous Q8H         Objective:     Vitals:    07/24/20 2339 07/24/20 2352 07/25/20 0300 07/25/20 0709   BP: 103/63  113/59 116/65   Pulse: 86  84 78   Resp: 20  16 14   Temp: 98.5 °F (36.9 °C)  98.2 °F (36.8 °C) 97.5 °F (36.4 °C)   SpO2:  96% 94% 99%   Weight:   155 lb (70.3 kg)    Height:         Intake and Output:   07/23 1901 - 07/25 0700  In: 3405.7 [P.O.:1440; I.V.:1035.7]  Out: 3290 [Urine:3090]  No intake/output data recorded.       Intake/Output Summary (Last 24 hours) at 7/25/2020 0802  Last data filed at 7/25/2020 0300  Gross per 24 hour   Intake 1838.74 ml   Output 1965 ml   Net -126.26 ml       Physical Exam:            GEN: well developed and in no acute distress,   HEENT:  PERRL, EOMI, no alar flaring or epistaxis, oral mucosa moist without cyanosis,   NECK:  no JVD, no retractions, no thyromegaly or masses,   LUNGS:  Clear, on 2 LPM via NC  HEART:  RRR with no M,G,R;  ABDOMEN:  soft with no tenderness, positive bowel sounds present  EXTREMITIES:  warm with no cyanosis, no edema  SKIN:  no jaundice or ecchymosis   NEURO:  alert and oriented, grossly non-focal      CHEST XRAY:   7/24/2020        PFT Results  (Last 3 results in the past 10 years)               04/06/18 0914  PFT BEDSIDE SCREENING Final result            LAB  Recent Labs     07/25/20  0430 07/24/20  0947 07/24/20  0304  07/23/20  1334   WBC 10.6  --  7.9  --  21.1*   HGB 10.4* 8.6* 7.7*   < > 10.1*   HCT 31.5* 27.7* 24.8*   < > 30.9*   PLT 63*  --  68*  --  76*    < > = values in this interval not displayed. Recent Labs     07/25/20  0430 07/24/20  0302 07/23/20  2133  07/23/20  1334    149*  --   --  147*   K 4.0 4.2 4.4   < > 4.2   * 119*  --   --  120*   CO2 27 24  --   --  19*   * 128*  --   --  129*   BUN 23 25*  --   --  24*   CREA 1.24 1.27  --   --  1.10   MG 2.3 2.6* 2.7*   < > 3.2*   INR  --   --   --   --  2.1    < > = values in this interval not displayed. ABG:  No results found for: PH, PHI, PCO2, PCO2I, PO2, PO2I, HCO3, HCO3I, FIO2, FIO2I    Cultures:   Results     Procedure Component Value Units Date/Time    CULTURE, ANAEROBIC [[id number]] Collected:  07/23/20 1118    Order Status:  Completed Specimen:  Abscess Updated:  07/24/20 0906     Special Requests: NO SPECIAL REQUESTS        Culture result:       NO ANAEROBIC GROWTH IN 1 DAY          CULTURE, [place] STAIN [[id number]] Collected:  07/23/20 1118    Order Status:  Completed Specimen:  Wound from Abscess Updated:  07/24/20 1313     Special Requests: NO SPECIAL REQUESTS        GRAM STAIN 0 TO 2 WBCS SEEN PER OIF      NO DEFINITE ORGANISM SEEN        Culture result: NO GROWTH 1 DAY       CULTURE, ANAEROBIC [[id number]] Collected:  07/23/20 1115    Order Status:  Completed Specimen:   Aortic Valve Updated:  07/24/20 0905     Special Requests: NO SPECIAL REQUESTS        Culture result:       NO ANAEROBIC GROWTH IN 1 DAY          CULTURE, [place] STAIN [[id number]] Collected: 07/23/20 1115    Order Status:  Canceled Specimen:  Wound from Surgical Specimen     CULTURE, TISSUE W Ivonne Segundo [[id number]] Collected:  07/23/20 1115    Order Status:  Completed Specimen: Aortic Valve Updated:  07/24/20 1314     Special Requests: SURGICAL SPECIMEN        GRAM STAIN 0 TO 1 WBCS SEEN PER OIF      NO DEFINITE ORGANISM SEEN        Culture result: NO GROWTH 1 DAY       FUNGUS CULTURE AND SMEAR [[id number]] Collected:  07/23/20 1115    Order Status:  Completed Specimen:  Miscellaneous sample Updated:  07/24/20 1236     Source AORTIC VALVE        Fungus stain Tissue Grinding     Fungus (Mycology) Culture Other source received     Comment: (NOTE)  Performed At: [address] [id number]  Geraldine Calderon MD RZ:[id number]         . [address] [[id number]] Collected:  07/23/20 1115    Order Status:  Completed Specimen:  Miscellaneous sample Updated:  07/24/20 1236     Source ABSCESS        Fungus stain Direct Inoculation     Fungus (Mycology) Culture Other source received     Comment: (NOTE)  Performed At: [address] [id number]  Geraldine Calderon MD SI:[id number]         Miller Mcmillan [[id number]]     Order Status:  Canceled Specimen:  Tissue     CULTURE, URINE [[id number]] Collected:  07/16/20 0906    Order Status:  Completed Specimen:  Urine from Clean catch Updated:  07/18/20 0843     Special Requests: NO SPECIAL REQUESTS        Culture result: NO GROWTH 2 DAYS       MSSA/MRSA SC BY PCR, NASAL SWAB [[id number]] Collected:  07/16/20 0906    Order Status:  Completed Specimen:  Swab Updated:  07/16/20 1304     Special Requests: NO SPECIAL REQUESTS        Culture result:       SA target not detected. A MRSA NEGATIVE, SA NEGATIVE test result does not preclude MRSA or SA nasal colonization.                   Assessment:     Hospital Problems  Date Reviewed: 6/18/2020          Codes Class Noted POA    Endocarditis of aortic valve ICD-10-CM: I35.8  ICD-9-CM: 424.1  7/23/2020 Yes        Thrombocytopenia (HCC) ICD-10-CM: D69.6  ICD-9-CM: 287.5  7/23/2020 Unknown        Hypocalcemia ICD-10-CM: E83.51  ICD-9-CM: 275.41  7/23/2020 Unknown        Leukocytosis ICD-10-CM: D72.829  ICD-9-CM: 288.60  7/23/2020 Unknown        Aortic stenosis ICD-10-CM: I35.0  ICD-9-CM: 424.1  7/10/2020 Yes        Elevated factor VIII level ICD-10-CM: R79.1  ICD-9-CM: 790.92  6/18/2020 Yes        Diabetes mellitus, type II (HCC) (Chronic) ICD-10-CM: E11.9  ICD-9-CM: 250.00  5/20/2020 Yes        Dyslipidemia (Chronic) ICD-10-CM: E78.5  ICD-9-CM: 272.4  Unknown Yes        DIEGO (obstructive sleep apnea) ICD-10-CM: G47.33  ICD-9-CM: 327.23  5/17/2018 Unknown        * (Principal) Status post aortic valve replacement (Chronic) ICD-10-CM: Z95.2  ICD-9-CM: V43.3  4/12/2018 Yes        Hypoxia ICD-10-CM: R09.02  ICD-9-CM: 799.02  4/12/2018 Unknown              PLAN:   --cont ancef D3, infectious disease following>>cultures pending  --monitor platelets (63 this AM), may need to consider hematology consult?   --wean O2 as able  --encourage mobilization per CVSD protocol  --encourage IS  --hypernatremia now resolved  --pt reports DIEGO, uses CPAP at home, it's broken. Set up for in-hospital CPAP this PM>>he thinks he was followed by [facility] for his first sleep study, but unsure. He has not been seen in our office. --outpt sleep study, possibly do this at home, since with COVID 19 pandemic it is harder to get into the clinic  -- to assist with CPAP home needs        Armando Aranda NP    More than 50% of time documented was spent in face-to-face contact with the patient and in the care of the patient on the floor/unit where the patient is located. Lungs: CTA b/l.  No wheezing or rhonchi  Heart S1 and S2 audible, no murmers or rubs appreciated  Other        Can f/u in the sleep lab in few weeks and see if can get old records from his prior sleep institution and can just get a new machine. If not able to get old records then will need dedicated sleep study. Respiratory status is stable at this time. Will sign off for now    I have spoken with and examined the patient. I have reviewed the history, examination, assessment, and plan and agree with the above. Denis Becker MD      This note was signed electronically. Errors are unfortunately her likely due to dictation software.

## 2020-07-25 NOTE — PROGRESS NOTES
Problem: Mobility Impaired (Adult and Pediatric)  Goal: *Acute Goals and Plan of Care (Insert Text)  Note:   LTG:  (1.)Mr. Federico Reina will move from supine to sit and sit to supine , scoot up and down, and roll side to side in bed with INDEPENDENT within 7 treatment day(s). (2.)Mr. Federico Reina will transfer from bed to chair and chair to bed with INDEPENDENT using the least restrictive device within 7 treatment day(s). (3.)Mr. Federico Reina will ambulate with INDEPENDENT for 200+ feet with the least restrictive device within 7 treatment day(s). ________________________________________________________________________________________________       PHYSICAL THERAPY: Daily Note and PM 7/25/2020  INPATIENT: PT Visit Days : 1  Payor: Pepe Pérez / Plan: [place] [employer] [address] / Product Type: PPO /       NAME/AGE/GENDER: Too Ding is a 62 y.o. male   PRIMARY DIAGNOSIS: Endocarditis of aortic valve [I35.8]  Endocarditis of aortic valve [I35.8] Status post aortic valve replacement Status post aortic valve replacement  Procedure(s) (LRB):  AORTIC VALVE REPLACEMENT (AVR) / DEBRIDEMENT & CLOSURE OF ABCESS WITH PERICARDIAL PATCH (N/A)  STERNOTOMY REDO (N/A)  YARON/ANES PROBE (N/A)  2 Days Post-Op  ICD-10: Treatment Diagnosis:    · Generalized Muscle Weakness (M62.81)  · Difficulty in walking, Not elsewhere classified (R26.2)   Precaution/Allergies:  Patient has no known allergies. ASSESSMENT:     Mr. Federico Reina presents sitting in chair and happy to walk, on RA. He stood with SBA and walked 2 full laps of the unit without assistance. His sats were good while walking and he had no LOB. Great progress. Will likely see tomorrow then discharge to independent ambulation. This section established at most recent assessment   PROBLEM LIST (Impairments causing functional limitations):  1. Decreased Strength  2. Decreased ADL/Functional Activities  3. Decreased Transfer Abilities  4.  Decreased Ambulation Ability/Technique  5. Decreased Balance  6. Increased Pain  7. Decreased Activity Tolerance  8. Decreased Pacing Skills  9. Increased Fatigue  10. Increased Shortness of Breath  11. Decreased Flexibility/Joint Mobility   INTERVENTIONS PLANNED: (Benefits and precautions of physical therapy have been discussed with the patient.)  1. Balance Exercise  2. Bed Mobility  3. Gait Training  4. Home Exercise Program (HEP)  5. Neuromuscular Re-education/Strengthening  6. Range of Motion (ROM)  7. Therapeutic Activites  8. Therapeutic Exercise/Strengthening  9. Transfer Training     TREATMENT PLAN: Frequency/Duration: twice daily for duration of hospital stay  Rehabilitation Potential For Stated Goals: Good     REHAB RECOMMENDATIONS (at time of discharge pending progress):    Placement: It is my opinion, based on this patient's performance to date, that Mr. Janie Christian may benefit from being discharged with NO further skilled therapy due to the high likelihood of returning to baseline. Equipment:    None at this time              HISTORY:   History of Present Injury/Illness (Reason for Referral):  DATE OF SERVICE:  07/23/2020     PREOPERATIVE DIAGNOSIS:  Prosthetic aortic valve endocarditis. 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 PROCEDURE PERFORMED:  1. Redo sternotomy. 2.  Lysis of myocardial adhesions. 3.  Debridement and pericardial patch closure of annular abscess. 4.  Explant of previous aortic valve, which was a Magna Ease. 5.  Aortic valve replacement with a 25-mm Inspiris aortic valve.   Past Medical History/Comorbidities:   Mr. Janie Christian  has a past medical history of CAD (coronary artery disease), Drug addiction in remission [facility]), Dyslipidemia, NSTEMI (non-ST elevated myocardial infarction) ([facility] [address] (5/16/2020), DIEGO on CPAP, Recurrent depression ([facility] [address] S/P aortic valve replacement (04/2018), Seizures ([facility] [address] (2001), Sigmoid diverticulosis, and Streptococcal endocarditis (dx 2020). Mr. Harris Stevenson  has a past surgical history that includes hx hernia repair (Left, 2018); pr cardiac surg procedure unlist (2018); hx heart catheterization (2018); and hx vascular access (Right, 2020). Social History/Living Environment:   Home Environment: Trailer/mobile home  # Steps to Enter: 1  One/Two Story Residence: One story  Living Alone: Yes  Support Systems: Friends \ neighbors  Patient Expects to be Discharged to[de-identified] Trailer/mobile home  Current DME Used/Available at Home: None  Prior Level of Function/Work/Activity:  Pt lives alone in [facility], [place] with all ADLs, no AD used. He drives, but presently has  license and wishes to return to driving and possibly work when cleared. Number of Personal Factors/Comorbidities that affect the Plan of Care: 0: LOW COMPLEXITY   EXAMINATION:   Most Recent Physical Functioning:   Gross Assessment:                  Posture:     Balance:    Bed Mobility:     Wheelchair Mobility:     Transfers:  Sit to Stand: Stand-by assistance  Stand to Sit: Independent  Gait:     Step Length: Left shortened;Right shortened  Gait Abnormalities: Decreased step clearance; Steppage gait;Trunk sway increased  Distance (ft): 500 Feet (ft)  Ambulation - Level of Assistance: Stand-by assistance;Contact guard assistance      Body Structures Involved:  1. Nerves  2. Eyes and Ears  3. Heart  4. Lungs  5. Bones  6. Joints  7. Muscles  8. Ligaments Body Functions Affected:  1. Mental  2. Sensory/Pain  3. Cardio  4. Respiratory  5. Neuromusculoskeletal  6. Movement Related  7. Skin Related Activities and Participation Affected:  1. General Tasks and Demands  2. Mobility  3. Self Care  4. Domestic Life  5.  Community, Social and [place]   Number of elements that affect the Plan of Care: 1-2: LOW COMPLEXITY   CLINICAL PRESENTATION:   Presentation: Stable and uncomplicated: LOW COMPLEXITY   CLINICAL DECISION MAKING: [facility] Mobility Inpatient Short Form  How much difficulty does the patient currently have. .. Unable A Lot A Little None   1. Turning over in bed (including adjusting bedclothes, sheets and blankets)? [] 1   [] 2   [x] 3   [] 4   2. Sitting down on and standing up from a chair with arms ( e.g., wheelchair, bedside commode, etc.)   [] 1   [x] 2   [] 3   [] 4   3. Moving from lying on back to sitting on the side of the bed? [] 1   [] 2   [x] 3   [] 4   How much help from another person does the patient currently need. .. Total A Lot A Little None   4. Moving to and from a bed to a chair (including a wheelchair)? [] 1   [] 2   [] 3   [x] 4   5. Need to walk in hospital room? [] 1   [] 2   [x] 3   [] 4   6. Climbing 3-5 steps with a railing? [] 1   [] 2   [x] 3   [] 4   © 2007, Trustees of [address] [place] [address] [address] [address], under license to [employer]. All rights reserved      Score:  Initial: 18 Most Recent: X (Date: -- )    Interpretation of Tool:  Represents activities that are increasingly more difficult (i.e. Bed mobility, Transfers, Gait). Medical Necessity:     · Skilled intervention continues to be required due to decreased function. Reason for Services/Other Comments:  · Patient continues to require skilled intervention due to   · medical complications and patient unable to attend/participate in therapy as expected  · . Use of outcome tool(s) and clinical judgement create a POC that gives a: Clear prediction of patient's progress: LOW COMPLEXITY            TREATMENT:   (In addition to Assessment/Re-Assessment sessions the following treatments were rendered)   Pre-treatment Symptoms/Complaints:  none  Pain: Initial:   Pain Intensity 1: 0  Post Session:  0     Therapeutic Activity: (    10min):  Therapeutic activities including Chair transfers and Ambulation on level ground to improve mobility, strength, balance, and coordination.   Required SBA for safety with ambulation. Braces/Orthotics/Lines/Etc:   · O2 Device: Nasal cannula  Treatment/Session Assessment:    · Response to Treatment:  see above  · Interdisciplinary Collaboration:   o Physical Therapy Assistant  o Registered Nurse  · After treatment position/precautions:   o RN notified  o in bathroom   · Compliance with Program/Exercises: Compliant all of the time  · Recommendations/Intent for next treatment session: \"Next visit will focus on advancements to more challenging activities and reduction in assistance provided\".   Total Treatment Duration:  PT Patient Time In/Time Out  Time In: 1315  Time Out: 1325    Phuong Calabrese, PTA

## 2020-07-26 LAB
ERYTHROCYTE [DISTWIDTH] IN BLOOD BY AUTOMATED COUNT: 16.6 % (ref 11.9–14.6)
GLUCOSE BLD STRIP.AUTO-MCNC: 103 MG/DL (ref 65–100)
GLUCOSE BLD STRIP.AUTO-MCNC: 122 MG/DL (ref 65–100)
HCT VFR BLD AUTO: 31.1 % (ref 41.1–50.3)
HGB BLD-MCNC: 10.1 G/DL (ref 13.6–17.2)
MCH RBC QN AUTO: 29.8 PG (ref 26.1–32.9)
MCHC RBC AUTO-ENTMCNC: 32.5 G/DL (ref 31.4–35)
MCV RBC AUTO: 91.7 FL (ref 79.6–97.8)
MM INDURATION POC: 0 MM (ref 0–5)
NRBC # BLD: 0 K/UL (ref 0–0.2)
PLATELET # BLD AUTO: 81 K/UL (ref 150–450)
PMV BLD AUTO: 10.6 FL (ref 9.4–12.3)
PPD POC: NEGATIVE NEGATIVE
RBC # BLD AUTO: 3.39 M/UL (ref 4.23–5.6)
WBC # BLD AUTO: 11.2 K/UL (ref 4.3–11.1)

## 2020-07-26 PROCEDURE — 74011250637 HC RX REV CODE- 250/637: Performed by: THORACIC SURGERY (CARDIOTHORACIC VASCULAR SURGERY)

## 2020-07-26 PROCEDURE — 74011000250 HC RX REV CODE- 250: Performed by: THORACIC SURGERY (CARDIOTHORACIC VASCULAR SURGERY)

## 2020-07-26 PROCEDURE — 82962 GLUCOSE BLOOD TEST: CPT

## 2020-07-26 PROCEDURE — 94660 CPAP INITIATION&MGMT: CPT

## 2020-07-26 PROCEDURE — 74011250636 HC RX REV CODE- 250/636: Performed by: THORACIC SURGERY (CARDIOTHORACIC VASCULAR SURGERY)

## 2020-07-26 PROCEDURE — 85027 COMPLETE CBC AUTOMATED: CPT

## 2020-07-26 PROCEDURE — 97530 THERAPEUTIC ACTIVITIES: CPT

## 2020-07-26 PROCEDURE — 36592 COLLECT BLOOD FROM PICC: CPT

## 2020-07-26 PROCEDURE — 65660000004 HC RM CVT STEPDOWN

## 2020-07-26 PROCEDURE — 77030012890

## 2020-07-26 RX ADMIN — TAPENTADOL HYDROCHLORIDE 75 MG: 50 TABLET, FILM COATED ORAL at 19:38

## 2020-07-26 RX ADMIN — Medication 1 AMPULE: at 08:43

## 2020-07-26 RX ADMIN — AMIODARONE HYDROCHLORIDE 200 MG: 200 TABLET ORAL at 08:44

## 2020-07-26 RX ADMIN — FUROSEMIDE 40 MG: 40 TABLET ORAL at 08:44

## 2020-07-26 RX ADMIN — TAPENTADOL HYDROCHLORIDE 75 MG: 50 TABLET, FILM COATED ORAL at 08:49

## 2020-07-26 RX ADMIN — CARVEDILOL 3.12 MG: 3.12 TABLET, FILM COATED ORAL at 08:44

## 2020-07-26 RX ADMIN — TAPENTADOL HYDROCHLORIDE 75 MG: 50 TABLET, FILM COATED ORAL at 03:53

## 2020-07-26 RX ADMIN — TAPENTADOL HYDROCHLORIDE 75 MG: 50 TABLET, FILM COATED ORAL at 14:23

## 2020-07-26 RX ADMIN — TRAMADOL HYDROCHLORIDE 50 MG: 50 TABLET ORAL at 10:44

## 2020-07-26 RX ADMIN — CEFAZOLIN SODIUM 2 G: 100 INJECTION, POWDER, LYOPHILIZED, FOR SOLUTION INTRAVENOUS at 22:52

## 2020-07-26 RX ADMIN — ATORVASTATIN CALCIUM 20 MG: 10 TABLET, FILM COATED ORAL at 22:52

## 2020-07-26 RX ADMIN — CEFAZOLIN SODIUM 2 G: 100 INJECTION, POWDER, LYOPHILIZED, FOR SOLUTION INTRAVENOUS at 14:25

## 2020-07-26 RX ADMIN — FAMOTIDINE 20 MG: 20 TABLET, FILM COATED ORAL at 08:44

## 2020-07-26 RX ADMIN — FERROUS SULFATE TAB 325 MG (65 MG ELEMENTAL FE) 325 MG: 325 (65 FE) TAB at 08:44

## 2020-07-26 RX ADMIN — Medication 10 ML: at 21:23

## 2020-07-26 RX ADMIN — ESCITALOPRAM OXALATE 10 MG: 10 TABLET ORAL at 21:21

## 2020-07-26 RX ADMIN — FAMOTIDINE 20 MG: 20 TABLET, FILM COATED ORAL at 17:18

## 2020-07-26 RX ADMIN — Medication 10 ML: at 06:19

## 2020-07-26 RX ADMIN — Medication 1 AMPULE: at 21:22

## 2020-07-26 RX ADMIN — CEFAZOLIN SODIUM 2 G: 100 INJECTION, POWDER, LYOPHILIZED, FOR SOLUTION INTRAVENOUS at 06:19

## 2020-07-26 RX ADMIN — Medication 10 ML: at 14:24

## 2020-07-26 RX ADMIN — Medication 10 ML: at 22:54

## 2020-07-26 RX ADMIN — AMIODARONE HYDROCHLORIDE 200 MG: 200 TABLET ORAL at 21:21

## 2020-07-26 RX ADMIN — CARVEDILOL 3.12 MG: 3.12 TABLET, FILM COATED ORAL at 17:18

## 2020-07-26 RX ADMIN — POTASSIUM CHLORIDE 10 MEQ: 750 TABLET, EXTENDED RELEASE ORAL at 08:44

## 2020-07-26 NOTE — PROGRESS NOTES
Problem: Mobility Impaired (Adult and Pediatric)  Goal: *Acute Goals and Plan of Care (Insert Text)  Note:   LTG:  (1.)Mr. Behzad Velazquez will move from supine to sit and sit to supine , scoot up and down, and roll side to side in bed with INDEPENDENT within 7 treatment day(s). (2.)Mr. Behzad Velazquez will transfer from bed to chair and chair to bed with INDEPENDENT using the least restrictive device within 7 treatment day(s). (3.)Mr. Behzad Velazquez will ambulate with INDEPENDENT for 200+ feet with the least restrictive device within 7 treatment day(s). ________________________________________________________________________________________________       PHYSICAL THERAPY: Daily Note, Discharge and AM 7/26/2020  INPATIENT: PT Visit Days : 2  Payor: Ann Mosley / Plan: [address] / Product Type: PPO /       NAME/AGE/GENDER: Renetta Dodson is a 62 y.o. male   PRIMARY DIAGNOSIS: Endocarditis of aortic valve [I35.8]  Endocarditis of aortic valve [I35.8] Status post aortic valve replacement Status post aortic valve replacement  Procedure(s) (LRB):  AORTIC VALVE REPLACEMENT (AVR) / DEBRIDEMENT & CLOSURE OF ABCESS WITH PERICARDIAL PATCH (N/A)  STERNOTOMY REDO (N/A)  YARON/ANES PROBE (N/A)  3 Days Post-Op  ICD-10: Treatment Diagnosis:    · Generalized Muscle Weakness (M62.81)  · Difficulty in walking, Not elsewhere classified (R26.2)   Precaution/Allergies:  Patient has no known allergies. ASSESSMENT:     Mr. Behzad Velazquez presents supine. He has an adjustable bed at home and was independent in getting out of bed here with head slightly up. He walked 750 feet without AD, went up and down the stair unit 4 times using railing all without difficulty. He was on RA with sats good and no SOB. He has met goals and is safe to continue ambulating halls independently until discharge. This section established at most recent assessment   PROBLEM LIST (Impairments causing functional limitations):  1.  Decreased Strength  2. Decreased ADL/Functional Activities  3. Decreased Transfer Abilities  4. Decreased Ambulation Ability/Technique  5. Decreased Balance  6. Increased Pain  7. Decreased Activity Tolerance  8. Decreased Pacing Skills  9. Increased Fatigue  10. Increased Shortness of Breath  11. Decreased Flexibility/Joint Mobility   INTERVENTIONS PLANNED: (Benefits and precautions of physical therapy have been discussed with the patient.)  1. Balance Exercise  2. Bed Mobility  3. Gait Training  4. Home Exercise Program (HEP)  5. Neuromuscular Re-education/Strengthening  6. Range of Motion (ROM)  7. Therapeutic Activites  8. Therapeutic Exercise/Strengthening  9. Transfer Training     TREATMENT PLAN: Frequency/Duration: twice daily for duration of hospital stay  Rehabilitation Potential For Stated Goals: Good     REHAB RECOMMENDATIONS (at time of discharge pending progress):    Placement: It is my opinion, based on this patient's performance to date, that Mr. Ronel Epstein may benefit from being discharged with NO further skilled therapy due to the high likelihood of returning to baseline. Equipment:    None at this time              HISTORY:   History of Present Injury/Illness (Reason for Referral):  DATE OF SERVICE:  07/23/2020     PREOPERATIVE DIAGNOSIS:  Prosthetic aortic valve endocarditis. 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 PROCEDURE PERFORMED:  1. Redo sternotomy. 2.  Lysis of myocardial adhesions. 3.  Debridement and pericardial patch closure of annular abscess. 4.  Explant of previous aortic valve, which was a Magna Ease. 5.  Aortic valve replacement with a 25-mm Inspiris aortic valve.   Past Medical History/Comorbidities:   Mr. Ronel Epstein  has a past medical history of CAD (coronary artery disease), Drug addiction in remission [facility]), Dyslipidemia, NSTEMI (non-ST elevated myocardial infarction) ([facility] [address] (5/16/2020), DIEGO on CPAP, Recurrent depression ([facility] [address] S/P aortic valve replacement (2018), Seizures ([facility] [address] (), Sigmoid diverticulosis, and Streptococcal endocarditis (dx 2020). Mr. Tonya Hi  has a past surgical history that includes hx hernia repair (Left, 2018); pr cardiac surg procedure unlist (2018); hx heart catheterization (2018); and hx vascular access (Right, 2020). Social History/Living Environment:   Home Environment: Trailer/mobile home  # Steps to Enter: 1  One/Two Story Residence: One story  Living Alone: Yes  Support Systems: Friends \ neighbors  Patient Expects to be Discharged to[de-identified] Trailer/mobile home  Current DME Used/Available at Home: None  Prior Level of Function/Work/Activity:  Pt lives alone in [facility], [place] with all ADLs, no AD used. He drives, but presently has  license and wishes to return to driving and possibly work when cleared. Number of Personal Factors/Comorbidities that affect the Plan of Care: 0: LOW COMPLEXITY   EXAMINATION:   Most Recent Physical Functioning:   Gross Assessment:                  Posture:     Balance:    Bed Mobility:  Supine to Sit: Modified independent  Wheelchair Mobility:     Transfers:  Sit to Stand: Independent  Stand to Sit: Independent  Gait:     Gait Abnormalities: Decreased step clearance  Distance (ft): 750 Feet (ft)  Ambulation - Level of Assistance: Independent  Number of Stairs Trained: 3(4x)  Stairs - Level of Assistance: Supervision  Rail Use: Both      Body Structures Involved:  1. Nerves  2. Eyes and Ears  3. Heart  4. Lungs  5. Bones  6. Joints  7. Muscles  8. Ligaments Body Functions Affected:  1. Mental  2. Sensory/Pain  3. Cardio  4. Respiratory  5. Neuromusculoskeletal  6. Movement Related  7. Skin Related Activities and Participation Affected:  1. General Tasks and Demands  2. Mobility  3. Self Care  4. Domestic Life  5.  Community, Social and [place]   Number of elements that affect the Plan of Care: 1-2: LOW COMPLEXITY CLINICAL PRESENTATION:   Presentation: Stable and uncomplicated: LOW COMPLEXITY   CLINICAL DECISION MAKIN[address] [place] [id number] AM-PAC 6 Clicks   Basic Mobility Inpatient Short Form  How much difficulty does the patient currently have. .. Unable A Lot A Little None   1. Turning over in bed (including adjusting bedclothes, sheets and blankets)? [] 1   [] 2   [x] 3   [] 4   2. Sitting down on and standing up from a chair with arms ( e.g., wheelchair, bedside commode, etc.)   [] 1   [x] 2   [] 3   [] 4   3. Moving from lying on back to sitting on the side of the bed? [] 1   [] 2   [x] 3   [] 4   How much help from another person does the patient currently need. .. Total A Lot A Little None   4. Moving to and from a bed to a chair (including a wheelchair)? [] 1   [] 2   [] 3   [x] 4   5. Need to walk in hospital room? [] 1   [] 2   [x] 3   [] 4   6. Climbing 3-5 steps with a railing? [] 1   [] 2   [x] 3   [] 4   © , Trustees of [address] [place], [place] [id number], under license to [employer]. All rights reserved      Score:  Initial: 18 Most Recent: X (Date: -- )    Interpretation of Tool:  Represents activities that are increasingly more difficult (i.e. Bed mobility, Transfers, Gait). Medical Necessity:     · Skilled intervention continues to be required due to decreased function. Reason for Services/Other Comments:  · Patient continues to require skilled intervention due to   · medical complications and patient unable to attend/participate in therapy as expected  · . Use of outcome tool(s) and clinical judgement create a POC that gives a: Clear prediction of patient's progress: LOW COMPLEXITY            TREATMENT:   (In addition to Assessment/Re-Assessment sessions the following treatments were rendered)   Pre-treatment Symptoms/Complaints:  none  Pain: Initial:   Pain Intensity 1: 2  Post Session:  0     Therapeutic Activity: (   15 min):   Therapeutic activities including bed transfers, Chair transfers, stair practice and Ambulation on level ground to improve mobility, strength, balance, and coordination. He does not require assistance with ambulation.     Braces/Orthotics/Lines/Etc:   · none  Treatment/Session Assessment:    · Response to Treatment:  see above  · Interdisciplinary Collaboration:   o Physical Therapy Assistant  o Registered Nurse  · After treatment position/precautions:   o Up in chair  o Bed/Chair-wheels locked  o RN notified   · Compliance with Program/Exercises: Compliant all of the time  · Recommendations/Intent for next treatment session: NA  Total Treatment Duration:  PT Patient Time In/Time Out  Time In: 0915  Time Out: 0930    Burak Acevedo PTA

## 2020-07-26 NOTE — PROGRESS NOTES
Pacing wires pulled per . Patient instructed to one hour bedrest with every 15 minute blood pressure checks for one hour. Pt voices understanding.

## 2020-07-26 NOTE — PROGRESS NOTES
Problem: Pressure Injury - Risk of  Goal: *Prevention of pressure injury  Description: Document Reilly Scale and appropriate interventions in the flowsheet. Outcome: Progressing Towards Goal  Note: Pressure Injury Interventions:  Sensory Interventions: Assess changes in LOC         Activity Interventions: Increase time out of bed, Pressure redistribution bed/mattress(bed type)    Mobility Interventions: Pressure redistribution bed/mattress (bed type)    Nutrition Interventions: Document food/fluid/supplement intake    Friction and Shear Interventions: Minimize layers                Problem: Patient Education: Go to Patient Education Activity  Goal: Patient/Family Education  Outcome: Progressing Towards Goal     Problem: Falls - Risk of  Goal: *Absence of Falls  Description: Document Unruly Fall Risk and appropriate interventions in the flowsheet.   Outcome: Progressing Towards Goal  Note: Fall Risk Interventions:  Mobility Interventions: Patient to call before getting OOB    Mentation Interventions: Adequate sleep, hydration, pain control, Bed/chair exit alarm    Medication Interventions: Patient to call before getting OOB, Teach patient to arise slowly    Elimination Interventions: Call light in reach              Problem: Patient Education: Go to Patient Education Activity  Goal: Patient/Family Education  Outcome: Progressing Towards Goal     Problem: Patient Education: Go to Patient Education Activity  Goal: Patient/Family Education  Outcome: Progressing Towards Goal You were seen by  Mello Maurer DO       1. Laboratory blood work has been ordered.  You will be notified by phone call or [employer] message when the results are available.       2. Stop Pravastatin and start ROSUVASTATIN  (Crestor) 20mg 1 tablet (20mg) once daily.    3. Stop Lisinopril 48 hours before starting Entresto 49-51 take 1 tablet twice daily.    4. Start a low sodium diet. Keep salt intake to 2,500 mg per day or less.        Keep fluid intake to 2 liters per day or less.         Please weigh yourself every morning.  Keep a log of these weights.  Call if you gain more than 3 pounds in one day or 5 pounds in one week, have increased shortness of breath, or have increased swelling in your legs, feet, ankles, or belly.  [phone]    5. A Echocardiogram has been ordered to be done in 3 month. You will be called to schedule this. You will receive instructions for testing at that time. You will be contacted with results.      6. Please try to minimize Carb and sugar intake.     You will follow up with [facility] in 3 months Echocardiogram prior to appointment, sooner if needed.       Please call the cardiology office with problems, questions, or concerns at [phone].    If you experience chest pain, chest pressure, chest tightness, shortness of breath, fainting, lightheadedness, nausea, vomiting, or other concerning symptoms, please report to the Emergency Department or call 911. These symptoms may be emergent, and best treated in the Emergency Department.     Cardiology Nurses  DAISY Cortes, MELLY CA, MELLY  [facility] (Unit 3C)  [phone]

## 2020-07-26 NOTE — PROGRESS NOTES
POD 3 Day Post-Op    Procedure:  Procedure(s):  AORTIC VALVE REPLACEMENT (AVR) / DEBRIDEMENT & CLOSURE OF ABCESS WITH PERICARDIAL PATCH  STERNOTOMY REDO  YARON/ANES PROBE      Subjective / Interim Events:     Patient has No significant medical complaints; remains on 1.5L NC. Had BM.     Objective:     Patient Vitals for the past 8 hrs:   BP Temp Pulse Resp SpO2   20 0346 103/57  73  91 %   20 2308   73  90 %   20 2307   76  (!) 88 %   20 2306 107/58 98.2 °F (36.8 °C) 75 18 92 %   20 2304     (!) 88 %     Temp (24hrs), Av.9 °F (36.6 °C), Min:97.1 °F (36.2 °C), Max:98.9 °F (37.2 °C)          Heart:  regular rate and rhythm, S1, S2 normal, no murmur, click, rub or gallop  Lung:  clear to auscultation bilaterally  Neuro: Grossly non focal  Incisions: Clean, dry, and intact    Labs:  Recent Results (from the past 24 hour(s))   GLUCOSE, POC    Collection Time: 20  6:32 AM   Result Value Ref Range    Glucose (POC) 134 (H) 65 - 100 mg/dL   GLUCOSE, POC    Collection Time: 20 11:20 AM   Result Value Ref Range    Glucose (POC) 137 (H) 65 - 100 mg/dL   GLUCOSE, POC    Collection Time: 20  3:51 PM   Result Value Ref Range    Glucose (POC) 114 (H) 65 - 100 mg/dL   GLUCOSE, POC    Collection Time: 20  8:50 PM   Result Value Ref Range    Glucose (POC) 139 (H) 65 - 100 mg/dL   PLEASE READ & DOCUMENT PPD TEST IN 48 HRS    Collection Time: 20  9:56 PM   Result Value Ref Range    PPD Negative Negative    mm Induration 0 0 - 5 mm       Assessment:     Principal Problem:    Status post aortic valve replacement (2018)    Active Problems:    Hypoxia (2018)      DIEGO (obstructive sleep apnea) (2018)      Dyslipidemia ()      Diabetes mellitus, type II ([address] (2020)      Elevated factor VIII level (2020)      Aortic stenosis (7/10/2020)      Endocarditis of aortic valve (2020)      Thrombocytopenia ([facility] [address] (2020)      Hypocalcemia (7/23/2020)      Leukocytosis (7/23/2020)        Plan/Recommendations/Medical Decision Making:       See orders  Hct stable after transfusions. Off oxygen.  + BM. Platelet count stable.

## 2020-07-26 NOTE — PROGRESS NOTES
Verbal bedside report given to oncoming RNMell Chi. Patient's situation, background, assessment and recommendations provided. Opportunity for questions provided. Oncoming RN assumed care of patient.

## 2020-07-27 LAB
ERYTHROCYTE [DISTWIDTH] IN BLOOD BY AUTOMATED COUNT: 16.3 % (ref 11.9–14.6)
HCT VFR BLD AUTO: 31.9 % (ref 41.1–50.3)
HGB BLD-MCNC: 10.4 G/DL (ref 13.6–17.2)
MAGNESIUM SERPL-MCNC: 1.8 MG/DL (ref 1.8–2.4)
MCH RBC QN AUTO: 29.9 PG (ref 26.1–32.9)
MCHC RBC AUTO-ENTMCNC: 32.6 G/DL (ref 31.4–35)
MCV RBC AUTO: 91.7 FL (ref 79.6–97.8)
NRBC # BLD: 0 K/UL (ref 0–0.2)
PLATELET # BLD AUTO: 95 K/UL (ref 150–450)
PMV BLD AUTO: 10 FL (ref 9.4–12.3)
POTASSIUM SERPL-SCNC: 3.2 MMOL/L (ref 3.5–5.1)
RBC # BLD AUTO: 3.48 M/UL (ref 4.23–5.6)
WBC # BLD AUTO: 10.2 K/UL (ref 4.3–11.1)

## 2020-07-27 PROCEDURE — 36592 COLLECT BLOOD FROM PICC: CPT

## 2020-07-27 PROCEDURE — 74011000258 HC RX REV CODE- 258: Performed by: NURSE PRACTITIONER

## 2020-07-27 PROCEDURE — 74011250636 HC RX REV CODE- 250/636: Performed by: THORACIC SURGERY (CARDIOTHORACIC VASCULAR SURGERY)

## 2020-07-27 PROCEDURE — 74011250637 HC RX REV CODE- 250/637: Performed by: THORACIC SURGERY (CARDIOTHORACIC VASCULAR SURGERY)

## 2020-07-27 PROCEDURE — 85027 COMPLETE CBC AUTOMATED: CPT

## 2020-07-27 PROCEDURE — 74011000250 HC RX REV CODE- 250: Performed by: THORACIC SURGERY (CARDIOTHORACIC VASCULAR SURGERY)

## 2020-07-27 PROCEDURE — 65660000004 HC RM CVT STEPDOWN

## 2020-07-27 PROCEDURE — 74011250636 HC RX REV CODE- 250/636: Performed by: NURSE PRACTITIONER

## 2020-07-27 PROCEDURE — 77030012890

## 2020-07-27 PROCEDURE — 83735 ASSAY OF MAGNESIUM: CPT

## 2020-07-27 PROCEDURE — 84132 ASSAY OF SERUM POTASSIUM: CPT

## 2020-07-27 RX ORDER — NITROGLYCERIN 0.4 MG/1
0.4 TABLET SUBLINGUAL
Qty: 2 BOTTLE | Refills: 4 | Status: SHIPPED | OUTPATIENT
Start: 2020-07-27 | End: 2021-10-05 | Stop reason: SDUPTHER

## 2020-07-27 RX ORDER — ACETAMINOPHEN 325 MG/1
650 TABLET ORAL
Status: SHIPPED | COMMUNITY
Start: 2020-07-27

## 2020-07-27 RX ORDER — TRAMADOL HYDROCHLORIDE 50 MG/1
50 TABLET ORAL
Qty: 30 TAB | Refills: 0 | Status: SHIPPED | OUTPATIENT
Start: 2020-07-27 | End: 2020-08-26

## 2020-07-27 RX ADMIN — SENNOSIDES AND DOCUSATE SODIUM 1 TABLET: 8.6; 5 TABLET ORAL at 09:26

## 2020-07-27 RX ADMIN — FAMOTIDINE 20 MG: 20 TABLET, FILM COATED ORAL at 09:26

## 2020-07-27 RX ADMIN — POTASSIUM CHLORIDE 40 MEQ: 20 TABLET, EXTENDED RELEASE ORAL at 13:13

## 2020-07-27 RX ADMIN — TRAMADOL HYDROCHLORIDE 50 MG: 50 TABLET ORAL at 23:20

## 2020-07-27 RX ADMIN — ATORVASTATIN CALCIUM 20 MG: 10 TABLET, FILM COATED ORAL at 21:44

## 2020-07-27 RX ADMIN — FERROUS SULFATE TAB 325 MG (65 MG ELEMENTAL FE) 325 MG: 325 (65 FE) TAB at 09:27

## 2020-07-27 RX ADMIN — CEFTRIAXONE 2 G: 2 INJECTION, POWDER, FOR SOLUTION INTRAMUSCULAR; INTRAVENOUS at 17:50

## 2020-07-27 RX ADMIN — OXYCODONE HYDROCHLORIDE AND ACETAMINOPHEN 1 TABLET: 5; 325 TABLET ORAL at 20:10

## 2020-07-27 RX ADMIN — OXYCODONE HYDROCHLORIDE AND ACETAMINOPHEN 1 TABLET: 5; 325 TABLET ORAL at 09:34

## 2020-07-27 RX ADMIN — Medication 10 ML: at 13:15

## 2020-07-27 RX ADMIN — TRAMADOL HYDROCHLORIDE 50 MG: 50 TABLET ORAL at 12:27

## 2020-07-27 RX ADMIN — POTASSIUM CHLORIDE 20 MEQ: 20 TABLET, EXTENDED RELEASE ORAL at 21:44

## 2020-07-27 RX ADMIN — CEFAZOLIN SODIUM 2 G: 100 INJECTION, POWDER, LYOPHILIZED, FOR SOLUTION INTRAVENOUS at 13:15

## 2020-07-27 RX ADMIN — FUROSEMIDE 40 MG: 40 TABLET ORAL at 09:26

## 2020-07-27 RX ADMIN — CARVEDILOL 3.12 MG: 3.12 TABLET, FILM COATED ORAL at 17:46

## 2020-07-27 RX ADMIN — ESCITALOPRAM OXALATE 10 MG: 10 TABLET ORAL at 21:01

## 2020-07-27 RX ADMIN — POTASSIUM CHLORIDE 20 MEQ: 20 TABLET, EXTENDED RELEASE ORAL at 21:04

## 2020-07-27 RX ADMIN — AMIODARONE HYDROCHLORIDE 200 MG: 200 TABLET ORAL at 21:01

## 2020-07-27 RX ADMIN — CEFAZOLIN SODIUM 2 G: 100 INJECTION, POWDER, LYOPHILIZED, FOR SOLUTION INTRAVENOUS at 06:00

## 2020-07-27 RX ADMIN — TAPENTADOL HYDROCHLORIDE 75 MG: 50 TABLET, FILM COATED ORAL at 06:16

## 2020-07-27 RX ADMIN — POTASSIUM CHLORIDE 10 MEQ: 750 TABLET, EXTENDED RELEASE ORAL at 09:27

## 2020-07-27 RX ADMIN — CARVEDILOL 3.12 MG: 3.12 TABLET, FILM COATED ORAL at 09:27

## 2020-07-27 RX ADMIN — Medication 10 ML: at 05:31

## 2020-07-27 RX ADMIN — FAMOTIDINE 20 MG: 20 TABLET, FILM COATED ORAL at 17:46

## 2020-07-27 RX ADMIN — OXYCODONE HYDROCHLORIDE AND ACETAMINOPHEN 1 TABLET: 5; 325 TABLET ORAL at 15:45

## 2020-07-27 RX ADMIN — TAPENTADOL HYDROCHLORIDE 75 MG: 50 TABLET, FILM COATED ORAL at 01:28

## 2020-07-27 RX ADMIN — Medication 10 ML: at 06:29

## 2020-07-27 RX ADMIN — Medication 1 AMPULE: at 09:26

## 2020-07-27 RX ADMIN — AMIODARONE HYDROCHLORIDE 200 MG: 200 TABLET ORAL at 09:27

## 2020-07-27 RX ADMIN — Medication 1 AMPULE: at 21:02

## 2020-07-27 RX ADMIN — Medication 10 ML: at 21:06

## 2020-07-27 NOTE — PROGRESS NOTES
Bedside shift change report given to myself (oncoming nurse) by Pb Stevens RN (offgoing nurse). Report included the following information SBAR, Kardex, Intake/Output, MAR, Recent Results and Cardiac Rhythm NSR with PVC's.

## 2020-07-27 NOTE — PROGRESS NOTES
Bedside shift change report given to myself (oncoming nurse) by Delilah Araujo RN (offgoing nurse). Report included the following information SBAR, Kardex, Procedure Summary, Intake/Output, MAR, Recent Results and Cardiac Rhythm NSR.

## 2020-07-27 NOTE — PROGRESS NOTES
Infectious Disease Note     Today's Date: 2020   Admit Date: 2020    Impression:   1. Strep mutans prosthetic AV endocarditis, 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 BCpositive 20-20. [facility] 20 negative. antbx stopped prematurely 10 days prior to EOT 20 resumed -  ·  s/p () re-do AVR, intra-op findings of old/healed vegetation and healed eliza-valve abscess: op cultures NG. No pathology   2. Hx diverticulosis   3. Dental caries, CT maxillofacial negative, s/p () extraction   4. Hx AVR   5. DM, A1c 7    Plan:   -Continue Rocephin 2g IV q24h x2 weeks, EOT 20  -OPAT orders written for [facility] infusion center. PICC in place  -No ID appt needed. Remove PICC at EOT. OK for discharge once infusion set up. Anti-infectives:   · Cefazolin -  · CTX -, resume -    Subjective:   Afebrile, sitting up in chair. No issues, ready to go home. No Known Allergies     Review of Systems:  A comprehensive review of systems was negative except for that written in the History of Present Illness. Objective:     Visit Vitals  /59   Pulse 61   Temp 97.8 °F (36.6 °C)   Resp 16   Ht 6' 1\" (1.854 m)   Wt 68.8 kg (151 lb 11.2 oz)   SpO2 93%   BMI 20.01 kg/m²     Temp (24hrs), Av.1 °F (36.7 °C), Min:97.6 °F (36.4 °C), Max:98.8 °F (37.1 °C)     Lines:      Physical Exam:    General:  Alert, cooperative, well noursished, well developed, appears stated age   Eyes:  Sclera anicteric. Pupils equally round and reactive to light. Mouth/Throat: Mucous membranes normal, oral pharynx clear   Neck: Supple   Lungs:   Clear to auscultation bilaterally, good effort   CV:  Regular rate and rhythm,no murmur, click, rub or gallop   Abdomen:   Soft, non-tender.  bowel sounds normal. non-distended   Extremities: No cyanosis or edema   Skin: Sternal incision well approximated    Lymph nodes: Cervical and supraclavicular normal   Musculoskeletal: No swelling or deformity   Lines/Devices:  Intact, no erythema, drainage or tenderness   Psych: Alert and oriented, normal mood affect given the setting           Data Review:     CBC:  Recent Labs     07/26/20  0408 07/25/20  0430 07/24/20  0947   WBC 11.2* 10.6  --    HGB 10.1* 10.4* 8.6*   HCT 31.1* 31.5* 27.7*   PLT 81* 63*  --        BMP:  Recent Labs     07/25/20  0430   CREA 1.24   BUN 23      K 4.0   *   CO2 27   AGAP 5*   *       LFTS:  No results for input(s): TBILI, ALT, AP, TP, ALB in the last 72 hours. No lab exists for component: SGOT    Microbiology:     All Micro Results     Procedure Component Value Units Date/Time    CULTURE, Izabella Lisandrocatalina [[id number]] Collected:  07/23/20 1118    Order Status:  Completed Specimen:  Wound from Abscess Updated:  07/25/20 1041     Special Requests: NO SPECIAL REQUESTS        GRAM STAIN 0 TO 2 WBCS SEEN PER OIF      NO DEFINITE ORGANISM SEEN        Culture result: NO GROWTH 2 DAYS       CULTURE, TISSUE Conny Igor STAIN [[id number]] Collected:  07/23/20 1115    Order Status:  Completed Specimen:   Aortic Valve Updated:  07/25/20 1040     Special Requests: SURGICAL SPECIMEN        GRAM STAIN 0 TO 1 WBCS SEEN PER OIF      NO DEFINITE ORGANISM SEEN        Culture result: NO GROWTH 2 DAYS       FUNGUS CULTURE AND SMEAR [[id number]] Collected:  07/23/20 1115    Order Status:  Completed Specimen:  Miscellaneous sample Updated:  07/24/20 1236     Source ABSCESS        Fungus stain Direct Inoculation     Fungus (Mycology) Culture Other source received     Comment: (NOTE)  Performed At: [address] [id number]  Adelita Galindo MD SI:[id number]         [facility] CULTURE AND SMEAR [[id number]] Collected:  07/23/20 1115    Order Status:  Completed Specimen:  Miscellaneous sample Updated:  07/24/20 1236     Source AORTIC VALVE        Fungus stain Tissue Grinding     Fungus (Mycology) Culture Other source received     Comment: (NOTE)  Performed At: [address] [id number]  Marcos Ramos MD DY:[id number]         Christine Jane [[id number]] Collected:  07/23/20 1118    Order Status:  Completed Specimen:  Abscess Updated:  07/24/20 0906     Special Requests: NO SPECIAL REQUESTS        Culture result:       NO ANAEROBIC GROWTH IN 1 DAY          CULTURE, ANAEROBIC [[id number]] Collected:  07/23/20 1115    Order Status:  Completed Specimen:   Aortic Valve Updated:  07/24/20 0905     Special Requests: NO SPECIAL REQUESTS        Culture result:       NO ANAEROBIC GROWTH IN 1 DAY          CULTURE, Doc Garvey STAIN [[id number]] Collected:  07/23/20 1115    Order Status:  Canceled Specimen:  Wound from Surgical Specimen     CULTURE, FUNGUS [[id number]]     Order Status:  Canceled Specimen:  Tissue           Imaging:   Reviewed     Signed By: Oliver Burris NP     July 27, 2020

## 2020-07-27 NOTE — PROGRESS NOTES
Problem: Falls - Risk of  Goal: *Absence of Falls  Description: Document Alphonso Host Fall Risk and appropriate interventions in the flowsheet.   Outcome: Progressing Towards Goal  Note: Fall Risk Interventions:  Mobility Interventions: Bed/chair exit alarm, Communicate number of staff needed for ambulation/transfer, Patient to call before getting OOB, Strengthening exercises (ROM-active/passive)    Mentation Interventions: Adequate sleep, hydration, pain control, Bed/chair exit alarm    Medication Interventions: Bed/chair exit alarm, Patient to call before getting OOB, Teach patient to arise slowly    Elimination Interventions: Bed/chair exit alarm, Call light in reach, Patient to call for help with toileting needs, Urinal in reach

## 2020-07-27 NOTE — PROGRESS NOTES
Bedside shift change report given to Renee Cabrera RN (oncoming nurse) by myself (offgoing nurse). Report included the following information SBAR, Kardex, Procedure Summary, Intake/Output, MAR, Recent Results and Cardiac Rhythm NSR.

## 2020-07-27 NOTE — PROGRESS NOTES
Bedside and Verbal shift change report given to self (oncoming nurse) by sergio   (offgoing nurse). Report included the following information SBAR and Kardex.

## 2020-07-27 NOTE — PROGRESS NOTES
Today's Date: 7/27/2020  Date of Admission: 7/23/2020    Chart Reviewed. Subjective:     Pt feels good. He is eager to go home. Appetite improved. Medications Reviewed. Objective:     Vitals:    07/27/20 0706 07/27/20 0926 07/27/20 1106 07/27/20 1532   BP: 125/59  107/55 121/63   Pulse: 61 73 65 65   Resp: 16  14 16   Temp: 97.8 °F (36.6 °C)  98.7 °F (37.1 °C) 98.3 °F (36.8 °C)   SpO2: 93%  94% 94%   Weight:       Height:           Intake and Output  Current Shift: 07/27 0701 - 07/27 1900  In: 480 [P.O.:480]  Out: 950 [Urine:950]   Last 3 Shifts: 07/25 1901 - 07/27 0700  In: 1020 [P.O.:1020]  Out: 6712 [Urine:2875]    Physical Exam:  General: Well Developed, Well Nourished, No Acute Distress, Alert & Oriented x 3, Appropriate mood  Neck: supple, no JVD  Heart: S1S2 with RRR without murmurs or gallops  Lungs: Clear throughout auscultation bilaterally without adventitious sounds  Abd: soft, nontender, nondistended, with good bowel sounds  Ext: no edema bilaterally  Sternal incision: clean, dry, and intact  Skin: warm and dry    LABS  Data Review:   Recent Labs     07/27/20  1220 07/26/20  0408 07/25/20  0430   NA  --   --  143   K 3.2*  --  4.0   MG 1.8  --  2.3   BUN  --   --  23   CREA  --   --  1.24   GLU  --   --  138*   WBC 10.2 11.2* 10.6   HGB 10.4* 10.1* 10.4*   HCT 31.9* 31.1* 31.5*   PLT 95* 81* 63*       Estimated Creatinine Clearance: 60.9 mL/min (by C-G formula based on SCr of 1.24 mg/dL).       Assessment/Plan:     Principal Problem:    Status post aortic valve replacement (4/12/2018)  Due to endocarditis, ASA held due to thrombocytopenia, on BB, no ACE-I due to low BP, continue statin, on room air, continue PT, ID recommends 2 more weeks of antibiotics    Active Problems:    Hypoxia (4/12/2018)  Resolved       DIEGO (obstructive sleep apnea) (5/17/2018)      Dyslipidemia ()  Statin      Aortic stenosis (7/10/2020)  S/p redo AVR       Endocarditis of aortic valve (7/23/2020)  As above, per ID will continue IV Rocephin until 8/6/20      Thrombocytopenia ([address] (7/23/2020)  Improved, repeat CBC in AM       Hypocalcemia (7/23/2020)        Leukocytosis (7/23/2020)  Resolved    Dispo  Home in AM, will continue IV antibiotics at infusion center         Rock Jose Juan PA-C

## 2020-07-27 NOTE — ROUTINE PROCESS
Cardiac Rehab: Spoke with patient regarding referral to cardiac rehab. Patient meets admission criteria based on redo AVR (07/23/20). Written information about Cardiac Rehab given and reviewed with patient. Discussed lifestyle modifications to promote cardiac wellness. Pt states he did not participate in the Cardiac Rehab program after his first AVR. Patient indicated that he may want to participate in the cardiac rehab program depending on his financial obligation. We will contact him ([phone]) with the information he requested about his insurance coverage for Cardiac Rehab. His Cardiologist is Dr. Ada Estrada. Thank you, Lupe Luna, JOEN, RN Cardiopulmonary Rehabilitation Nurse Liaison [facility]

## 2020-07-27 NOTE — PROGRESS NOTES
Care Management Interventions  PCP Verified by CM: Yes  Mode of Transport at Discharge: Other (see comment)(taxi)  Transition of Care Consult (CM Consult): Discharge Planning, Home Health  Discharge Durable Medical Equipment: No  Physical Therapy Consult: Yes  Occupational Therapy Consult: No  Speech Therapy Consult: No  Current Support Network: Own Home, Lives Alone  Confirm Follow Up Transport: Cab  The Plan for Transition of Care is Related to the Following Treatment Goals : home with home health  The Patient and/or Patient Representative was Provided with a Choice of Provider and Agrees with the Discharge Plan?: Yes  Name of the Patient Representative Who was Provided with a Choice of Provider and Agrees with the Discharge Plan: Mr. Felipe De La Vega of Choice List was Provided with Basic Dialogue that Supports the Patient's Individualized Plan of Care/Goals, Treatment Preferences and Shares the Quality Data Associated with the Providers?: Yes  [place] Resource Information Provided?: No  Discharge Location  Discharge Placement: Home with home health      This CM met with pt to complete assessment. Pt verified his PCP, insurance, emergency contact, and home address. He reports no difficulty obtaining his medications in the community. He lives at home alone with 1 step to enter and his home DME includes a cane. He confirms that at baseline prior to admission he is independent with his ADLs including bathing, dressing, cooking, and driving. We discussed discharge planning this day - pt plans on returning home when stable for discharge. We discussed the role and recommendation of home health at discharge - he is agreeable to referral being made. Reviewed [facility] agencies - he is agreeable to [facility] referral.  Referral made this day. No additional CM needs at this time. Will continue to follow.

## 2020-07-27 NOTE — PROGRESS NOTES
Patient states, \"My mask (CPAP) leaks at home since it is too big. \" CPAP connected to 2L at 90-93% overnight. His O2 sat on RA is 88-89% while in bed. Post ambulation from bed to recliner, O2 sat at 95-96% on 1L. Patient is currently on RA at 90-93%.

## 2020-07-27 NOTE — DISCHARGE SUMMARY
Discharge Summary     Patient ID:  Shelley Garcia  [id number]  72 y.o.  1960    Admit date: 7/23/2020    Discharge date:  7/28/2020    Admitting Physician: Ciera Gillis MD     Discharge Physician: NILTON Sharpe/Dr. Mauricio Ribeiro    Admission Diagnoses: Endocarditis of aortic valve [I35.8]  Endocarditis of aortic valve [I35.8]    Discharge Diagnoses:   Patient Active Problem List    Diagnosis Date Noted    Endocarditis of aortic valve 07/23/2020    Thrombocytopenia [facility] 07/23/2020    Hypocalcemia 07/23/2020    Leukocytosis 07/23/2020    Aortic stenosis 07/10/2020    Elevated factor VIII level 06/18/2020    Iron deficiency anemia 06/18/2020    High plasma von Willebrand factor (vWF) 06/18/2020    Diabetes mellitus, type II ([facility] 05/20/2020    Vitamin D deficiency 05/20/2020    Petechiae 05/16/2020    Anemia 05/16/2020    Essential hypertension 09/21/2018    Recurrent depression ([facility]     CAD (coronary artery disease)     DIEGO on CPAP     Dyslipidemia     Sigmoid diverticulosis     DIEGO (obstructive sleep apnea) 20/30/5415    Diastolic CHF, chronic ([facility] 05/07/2018    Status post aortic valve replacement 04/12/2018    Hypoxia 04/12/2018       Procedures this admission:  Redo sternotomy, debridement and pericardial patch closure of annular abscess, explant of previous Magna Ease aortic valve, and aortic valve replacement  Consults: Pulmonary/Intensive Care, Infectious Disease     Hospital Course: Patient presented for elective redo sternotomy/AVR. He was admitted to [facility] in May after presenting to the ER with chest pain that woke him from sleep. He had noted a rash on his legs and diarrhea over the past 2 weeks. Troponin was elevated on arrival to the ER. Chest CT was negative for PE for acute pulmonary disease. Troponin peaked at 36.10. Echo showed probable vegetation on AV and reduced LV EF. LHC showed an occluded OM with CATA O flow.  It was unclear if this was plaque rupture or embolic event. YARON was performed on 5/20 that showed a mobile mass on LVOT side of AV prosthesis. There was mild to moderate MR. Blood cultures were positive for strep mutans. ID followed patient. Oral surgery was consulted for poor dentition. He underwent multiple dental extractions. Repeat echocardiogram showed severe lateral wall dysfunction after completed infarct. Redo sternotomy/AVR was postponed. He was discharged home on IV antibiotics. He was seen back in the office on 7/1 and was scheduled for redo sternotomy/AVR. He underwent redo sternotomy, lysis of adhesions, debridement and pericardial patch closure of annular abscess, explant of previous Magna Ease aortic valve and aortic valve replacement with a 25mm Inspiris aortic valve on 7/23/20. Given OR findings of healed eliza-valve abscess, ID was consulted to follow as well. IV Cefazolin was continued. He was transfused for post op anemia. He was transferred to CV stepdown on POD 1. He was gradually weaned off of O2. He had thrombocytopenia that gradually improved. Cultures from remaining vegetation and healed abscess were negative. ID wanted to continue IV antibiotics until 8/6/20. The morning of 7/28/20, patient was feeling well and ambulating without any symptoms. Patient was determined stable and ready for discharge. Patient was instructed on the importance of medication compliance and outpatient follow up. For maximized medical therapy for CAD, patient will continue ASA, BB, ACE-I, and statin as well. ACE-I was resumed at discharge since BP improved. Aldactone was not resumed. These may be resumed on outpatient basis as tolerated. He will continue IV Rocephin until 8/6/20 at [address] [place] [address]. The patient will have close transitional care follow up with [facility] Cardiology Dr. Renetta Addison on August 14th at 9:45am [facility] [place]).    The patient will follow up with Dr. Corin Valencia on August 19th at 2:00pm and has been referred to cardiac rehab. DISPOSITION: The patient is being discharged home with home health services in stable condition on a low saturated fat, low cholesterol and low salt diet. The patient is instructed to advance activities as tolerated to the limit of fatigue or shortness of breath. The patient is instructed to avoid all heavy lifting or activities that strain the chest or upper arm muscles. Strenuous activity should be avoided. The patient is instructed to watch for signs of infection which include: increasing area of redness, fever or purulent drainage at the incision site. The patient is instructed to call the office or return to the ER for immediate evaluation for any shortness of breath or chest pain not relieved by NTG.     Discharge Exam:   Visit Vitals  /61   Pulse 66   Temp 97.6 °F (36.4 °C)   Resp 17   Ht 6' 1\" (1.854 m)   Wt 149 lb 1.6 oz (67.6 kg)   SpO2 93%   BMI 19.67 kg/m²         Physical Exam:  General: Well Developed, Well Nourished, No Acute Distress, Alert & Oriented  Neck: supple, no JVD  Heart: S1S2 with RRR without murmurs or gallops  Lungs: Clear throughout auscultation bilaterally without adventitious sounds  Abd: soft, nontender, nondistended, with good bowel sounds  Ext: warm, no edema  Sternal incision: clean, dry, and intact  Skin: warm and dry      Recent Results (from the past 24 hour(s))   POTASSIUM    Collection Time: 07/27/20 12:20 PM   Result Value Ref Range    Potassium 3.2 (L) 3.5 - 5.1 mmol/L   MAGNESIUM    Collection Time: 07/27/20 12:20 PM   Result Value Ref Range    Magnesium 1.8 1.8 - 2.4 mg/dL   CBC W/O DIFF    Collection Time: 07/27/20 12:20 PM   Result Value Ref Range    WBC 10.2 4.3 - 11.1 K/uL    RBC 3.48 (L) 4.23 - 5.6 M/uL    HGB 10.4 (L) 13.6 - 17.2 g/dL    HCT 31.9 (L) 41.1 - 50.3 %    MCV 91.7 79.6 - 97.8 FL    MCH 29.9 26.1 - 32.9 PG    MCHC 32.6 31.4 - 35.0 g/dL    RDW 16.3 (H) 11.9 - 14.6 %    PLATELET 95 (L) 066 - 450 K/uL    MPV 10.0 9.4 - 12.3 FL    ABSOLUTE NRBC 0.00 0.0 - 0.2 K/uL   CBC W/O DIFF    Collection Time: 07/28/20  3:41 AM   Result Value Ref Range    WBC 7.7 4.3 - 11.1 K/uL    RBC 3.28 (L) 4.23 - 5.6 M/uL    HGB 9.6 (L) 13.6 - 17.2 g/dL    HCT 30.4 (L) 41.1 - 50.3 %    MCV 92.7 79.6 - 97.8 FL    MCH 29.3 26.1 - 32.9 PG    MCHC 31.6 31.4 - 35.0 g/dL    RDW 16.1 (H) 11.9 - 14.6 %    PLATELET 98 (L) 675 - 450 K/uL    MPV 10.1 9.4 - 12.3 FL    ABSOLUTE NRBC 0.00 0.0 - 0.2 K/uL   MAGNESIUM    Collection Time: 07/28/20  3:41 AM   Result Value Ref Range    Magnesium 1.8 1.8 - 2.4 mg/dL   POTASSIUM    Collection Time: 07/28/20  3:41 AM   Result Value Ref Range    Potassium 3.9 3.5 - 5.1 mmol/L         Patient Instructions:   Current Discharge Medication List      START taking these medications    Details   cefTRIAXone 2 gram 2 g, ADDaptor 1 Device IVPB 2 g by IntraVENous route every twenty-four (24) hours for 9 days. Qty: 9 Dose, Refills: 0      acetaminophen (TYLENOL) 325 mg tablet Take 2 Tabs by mouth every six (6) hours as needed for Pain. nitroglycerin (NITROSTAT) 0.4 mg SL tablet 1 Tab by SubLINGual route every five (5) minutes as needed for Chest Pain. Up to 3 doses. Qty: 2 Bottle, Refills: 4      traMADoL (ULTRAM) 50 mg tablet Take 1 Tab by mouth every six (6) hours as needed for Pain for up to 30 days. Max Daily Amount: 200 mg. Qty: 30 Tab, Refills: 0    Associated Diagnoses: Status post aortic valve replacement         CONTINUE these medications which have NOT CHANGED    Details   carvediloL (COREG) 3.125 mg tablet Take 1 Tab by mouth two (2) times daily (with meals) for 30 days. Qty: 60 Tab, Refills: 5      ferrous sulfate 325 mg (65 mg iron) tablet Take 1 Tab by mouth daily (with breakfast) for 30 days. Qty: 30 Tab, Refills: 5      lisinopriL (PRINIVIL, ZESTRIL) 2.5 mg tablet Take 1 Tab by mouth two (2) times a day for 30 days.   Qty: 60 Tab, Refills: 5      atorvastatin (LIPITOR) 20 mg tablet TAKE 1 TABLET BY MOUTH NIGHTLY. Qty: 90 Tab, Refills: 1      escitalopram oxalate (LEXAPRO) 10 mg tablet Take 1 Tab by mouth daily. Qty: 30 Tab, Refills: 2      aspirin 81 mg chewable tablet Take 81 mg by mouth nightly.          STOP taking these medications       amiodarone (CORDARONE) 200 mg tablet Comments:   Reason for Stopping:         spironolactone (ALDACTONE) 25 mg tablet Comments:   Reason for Stopping:                 Signed:  Fransisca Gold PA-C  7/28/2020

## 2020-07-27 NOTE — PROGRESS NOTES
Verbal bedside report given to oncoming RN, Selvin Monreal. Patient's situation, background, assessment and recommendations provided. Opportunity for questions provided. Oncoming RN assumed care of patient.

## 2020-07-28 ENCOUNTER — HOSPITAL ENCOUNTER (OUTPATIENT)
Dept: INFUSION THERAPY | Age: 60
Discharge: HOME | End: 2020-07-28
Payer: COMMERCIAL

## 2020-07-28 VITALS
SYSTOLIC BLOOD PRESSURE: 115 MMHG | RESPIRATION RATE: 17 BRPM | DIASTOLIC BLOOD PRESSURE: 67 MMHG | OXYGEN SATURATION: 95 % | HEART RATE: 71 BPM | TEMPERATURE: 97.9 F | BODY MASS INDEX: 19.76 KG/M2 | WEIGHT: 149.1 LBS | HEIGHT: 73 IN

## 2020-07-28 VITALS
OXYGEN SATURATION: 92 % | SYSTOLIC BLOOD PRESSURE: 126 MMHG | RESPIRATION RATE: 16 BRPM | DIASTOLIC BLOOD PRESSURE: 69 MMHG | TEMPERATURE: 98.4 F | HEART RATE: 81 BPM

## 2020-07-28 LAB
ALBUMIN SERPL-MCNC: 2.9 G/DL (ref 3.2–4.6)
ALBUMIN/GLOB SERPL: 0.8 {RATIO} (ref 1.2–3.5)
ALP SERPL-CCNC: 80 U/L (ref 50–136)
ALT SERPL-CCNC: 15 U/L (ref 12–65)
AST SERPL-CCNC: 26 U/L (ref 15–37)
BILIRUB DIRECT SERPL-MCNC: 0.2 MG/DL
BILIRUB SERPL-MCNC: 0.7 MG/DL (ref 0.2–1.1)
CREAT SERPL-MCNC: 1.2 MG/DL (ref 0.8–1.5)
ERYTHROCYTE [DISTWIDTH] IN BLOOD BY AUTOMATED COUNT: 16.1 % (ref 11.9–14.6)
GLOBULIN SER CALC-MCNC: 3.6 G/DL (ref 2.3–3.5)
HCT VFR BLD AUTO: 30.4 % (ref 41.1–50.3)
HGB BLD-MCNC: 9.6 G/DL (ref 13.6–17.2)
MAGNESIUM SERPL-MCNC: 1.8 MG/DL (ref 1.8–2.4)
MCH RBC QN AUTO: 29.3 PG (ref 26.1–32.9)
MCHC RBC AUTO-ENTMCNC: 31.6 G/DL (ref 31.4–35)
MCV RBC AUTO: 92.7 FL (ref 79.6–97.8)
NRBC # BLD: 0 K/UL (ref 0–0.2)
PLATELET # BLD AUTO: 98 K/UL (ref 150–450)
PMV BLD AUTO: 10.1 FL (ref 9.4–12.3)
POTASSIUM SERPL-SCNC: 3.9 MMOL/L (ref 3.5–5.1)
PROT SERPL-MCNC: 6.5 G/DL (ref 6.3–8.2)
RBC # BLD AUTO: 3.28 M/UL (ref 4.23–5.6)
WBC # BLD AUTO: 7.7 K/UL (ref 4.3–11.1)

## 2020-07-28 PROCEDURE — 74011250636 HC RX REV CODE- 250/636: Performed by: INTERNAL MEDICINE

## 2020-07-28 PROCEDURE — 96365 THER/PROPH/DIAG IV INF INIT: CPT

## 2020-07-28 PROCEDURE — 85027 COMPLETE CBC AUTOMATED: CPT

## 2020-07-28 PROCEDURE — 84132 ASSAY OF SERUM POTASSIUM: CPT

## 2020-07-28 PROCEDURE — 83735 ASSAY OF MAGNESIUM: CPT

## 2020-07-28 PROCEDURE — 74011000258 HC RX REV CODE- 258: Performed by: INTERNAL MEDICINE

## 2020-07-28 PROCEDURE — 74011250637 HC RX REV CODE- 250/637: Performed by: THORACIC SURGERY (CARDIOTHORACIC VASCULAR SURGERY)

## 2020-07-28 PROCEDURE — 82565 ASSAY OF CREATININE: CPT

## 2020-07-28 PROCEDURE — 77030012890

## 2020-07-28 PROCEDURE — 80076 HEPATIC FUNCTION PANEL: CPT

## 2020-07-28 RX ORDER — SODIUM CHLORIDE 0.9 % (FLUSH) 0.9 %
10 SYRINGE (ML) INJECTION AS NEEDED
Status: DISCONTINUED | OUTPATIENT
Start: 2020-07-28 | End: 2020-07-30 | Stop reason: HOSPADM

## 2020-07-28 RX ADMIN — FAMOTIDINE 20 MG: 20 TABLET, FILM COATED ORAL at 09:26

## 2020-07-28 RX ADMIN — Medication 10 ML: at 05:20

## 2020-07-28 RX ADMIN — Medication 1 AMPULE: at 09:26

## 2020-07-28 RX ADMIN — SENNOSIDES AND DOCUSATE SODIUM 1 TABLET: 8.6; 5 TABLET ORAL at 09:26

## 2020-07-28 RX ADMIN — CEFTRIAXONE 2 G: 2 INJECTION, POWDER, FOR SOLUTION INTRAMUSCULAR; INTRAVENOUS at 15:46

## 2020-07-28 RX ADMIN — AMIODARONE HYDROCHLORIDE 200 MG: 200 TABLET ORAL at 09:26

## 2020-07-28 RX ADMIN — FERROUS SULFATE TAB 325 MG (65 MG ELEMENTAL FE) 325 MG: 325 (65 FE) TAB at 09:26

## 2020-07-28 RX ADMIN — CARVEDILOL 3.12 MG: 3.12 TABLET, FILM COATED ORAL at 09:26

## 2020-07-28 RX ADMIN — Medication 10 ML: at 16:20

## 2020-07-28 RX ADMIN — POTASSIUM CHLORIDE 20 MEQ: 20 TABLET, EXTENDED RELEASE ORAL at 06:17

## 2020-07-28 RX ADMIN — Medication 10 ML: at 15:45

## 2020-07-28 NOTE — PROGRESS NOTES
Bedside shift change report given to Elene Cockayne, RN (oncoming nurse) by myself (offgoing nurse). Report included the following information SBAR, Kardex, Procedure Summary, Intake/Output, MAR, Recent Results and Cardiac Rhythm NSR, SB with PVC's.

## 2020-07-28 NOTE — PROGRESS NOTES
Pt with discharge orders this day. Pt to return home. Pt with continued IV abx's until tentatively 8/6/20. Due to cost associated with his insurance, it is more affordable for pt to complete abx's at the infusion center. Referral faxed to [place]. Pt's first infusion scheduled for today at 4pm - pt is agreeable to time slot. This CM spoke with [facility] representative today and explained pt need to complete IV abx's outpatient due to financial burden - they are able to still provide service at home. Updated clinical information faxed this day. Pt voiced concern regarding how his CPAP mask fit at home. This CM called [facility] and they report the mask and supplies have to ordered through separate department and there is a pt financial liability. This CM provided name and number for pt to call to order replacement mask since pt reports finances are tight and pt can make the determination to request new supplies and pay that cost.  He is agreeable to this. No additional CM needs voiced at discharge. Milestones met. Care Management Interventions  PCP Verified by CM: Yes  Mode of Transport at Discharge:  Other (see comment)(taxi)  Transition of Care Consult (CM Consult): [facility] Planning[facility] [address] [facility]  Discharge Durable Medical Equipment: No  Physical Therapy Consult: Yes  Occupational Therapy Consult: No  Speech Therapy Consult: No  Current Support Network: Own Home, Lives Alone  Confirm Follow Up Transport: Cab  The Plan for Transition of Care is Related to the Following Treatment Goals : home with home health & outpatient infusion  The Patient and/or Patient Representative was Provided with a Choice of Provider and Agrees with the Discharge Plan?: Yes  Name of the Patient Representative Who was Provided with a Choice of Provider and Agrees with the Discharge Plan: Mr. Mimi Borja of Choice List was Provided with Basic Dialogue that Supports the Patient's Individualized Plan of Care/Goals, Treatment Preferences and Shares the Quality Data Associated with the Providers?: Yes  [place] Resource Information Provided?: No  Discharge Location  Discharge Placement: Home with home health

## 2020-07-28 NOTE — DISCHARGE INSTRUCTIONS
All patients with prosthetic valves are considered among those at highest risk for endocarditis (infection of a heart valve). It is important to maintain good oral health care with regular use of a manual or powered toothbrush, dental floss or other plaque-removing devices. The risk of an infection on the heart valve is generally the highest with dental procedures which includes routine dental cleaning. You will need to take antibiotics before most dental procedures or oral surgery. You will also need antibiotics before procedures such as a having tonsils removed or procedures involving the lungs. If you require surgery for a skin infection, you will also need antibiotics to prevent infection of a heart valve. Please contact your cardiologist or primary care provider for further instructions. DISCHARGE SUMMARY from Nurse    PATIENT INSTRUCTIONS:    Report the following to your surgeon:  · Excessive pain, swelling, redness or odor of or around the surgical area  · Temperature over 100.5  · Nausea and vomiting lasting longer than 4 hours or if unable to take medications  · Any signs of decreased circulation or nerve impairment to extremity: change in color, persistent  numbness, tingling, coldness or increase pain  · Any questions    What to do at Home:    *  Please give a list of your current medications to your Primary Care Provider. *  Please update this list whenever your medications are discontinued, doses are      changed, or new medications (including over-the-counter products) are added. *  Please carry medication information at all times in case of emergency situations. These are general instructions for a healthy lifestyle:    No smoking/ No tobacco products/ Avoid exposure to second hand smoke  Surgeon General's Warning:  Quitting smoking now greatly reduces serious risk to your health.     Obesity, smoking, and sedentary lifestyle greatly increases your risk for illness    A healthy diet, regular physical exercise & weight monitoring are important for maintaining a healthy lifestyle    You may be retaining fluid if you have a history of heart failure or if you experience any of the following symptoms:  Weight gain of 3 pounds or more overnight or 5 pounds in a week, increased swelling in our hands or feet or shortness of breath while lying flat in bed. Please call your doctor as soon as you notice any of these symptoms; do not wait until your next office visit. The discharge information has been reviewed with the patient. The patient verbalized understanding. Discharge medications reviewed with the patient and appropriate educational materials and side effects teaching were provided. ___________________________________________________________________________________________________________________________________    Patient Education      Aortic Valve Replacement Surgery: What to Expect at [address] [place] [address] Drive have had surgery to replace your heart's aortic valve. Your doctor did the surgery through a cut, called an incision, in your chest.  You will feel tired and sore for the first few weeks after surgery. You may have some brief, sharp pains on either side of your chest. Your chest, shoulders, and upper back may ache. The incision in your chest may be sore or swollen. These symptoms usually get better after 4 to 6 weeks. You will probably be able to do many of your usual activities after 4 to 6 weeks. But for at least 6 weeks, you will not be able to lift heavy objects or do activities that strain your chest or upper arm muscles. At first you may notice that you get tired easily and need to rest often. It may take 1 to 2 months to get your energy back. Some people find that they are more emotional after this surgery. You may cry easily or show emotion in ways that are unusual for you. This is common and may last for up to a year.  Some people get depressed after this surgery. Talk with your doctor if you have sadness that continues or you are concerned about how you are feeling. Treatment and other support can help you feel better. Even though you have a new aortic valve, it is still important to eat a heart-healthy diet, get regular exercise, not smoke, take your heart medicines, and reduce stress. Your doctor may recommend that you work with a nurse, a dietitian, and a physical therapist to make these changes. This is sometimes called cardiac rehabilitation. This care sheet gives you a general idea about how long it will take for you to recover. But each person recovers at a different pace. Follow the steps below to get better as quickly as possible. How can you care for yourself at home? Activity  · Rest when you feel tired. Getting enough sleep will help you recover. Try to sleep on your back while you heal. If your breastbone (sternum) was cut, healing usually takes about 4 to 6 weeks. · Try to walk each day. Start by walking a little more than you did the day before. Bit by bit, increase the amount you walk. Walking boosts blood flow and helps prevent pneumonia and constipation. · Avoid strenuous activities, such as bicycle riding, jogging, weight lifting, or heavy aerobic exercise, until your doctor says it is okay. · For 3 months, avoid activities that strain your chest or upper arm muscles. This includes pushing a  or vacuum, mopping floors, or swinging a golf club or tennis racquet. · For at least 6 weeks, avoid lifting anything that would make you strain. This may include a child, heavy grocery bags and milk containers, a heavy briefcase or backpack, or cat litter or dog food bags. · Hold a pillow firmly over your chest incision when you cough or take deep breaths. This will support your chest and reduce your pain. · Do breathing exercises at home as instructed by your doctor. This will help prevent pneumonia.   · Ask your doctor when you can drive again. · You will probably need to take 4 to 12 weeks off from work. It depends on the type of work you do and how you feel. · You may shower as usual. Pat the incision dry. Do not take a bath for the first 3 weeks, or until your doctor tells you it is okay. · Do not swim or use a hot tub for at least 1 month, or until your doctor says it is okay. · Ask your doctor when it is okay for you to have sex. Diet  · Eat a heart-healthy, low-salt diet. If you have not been eating this way, talk to your doctor. You also may want to talk to a dietitian. A dietitian can help you plan meals and learn about healthy foods. · Drink plenty of fluids (unless your doctor tells you not to). · You may notice that your bowel movements are not regular right after your surgery. This is common. Try to avoid constipation and straining with bowel movements. You may want to take a fiber supplement every day. If you have not had a bowel movement after a couple of days, ask your doctor about taking a mild laxative. Medicines  · Your doctor will tell you if and when you can restart your medicines. He or she will also give you instructions about taking any new medicines. · If you take aspirin or some other blood thinner, ask your doctor if and when to start taking it again. Make sure that you understand exactly what your doctor wants you to do. · Be safe with medicines. Take your medicines exactly as prescribed. Call your doctor if you think you are having a problem with your medicine. · Take pain medicines exactly as directed. ? If the doctor gave you a prescription medicine for pain, take it as prescribed. ? If you are not taking a prescription pain medicine, ask your doctor if you can take an over-the-counter medicine. ? Do not take aspirin, ibuprofen (Advil, Motrin), naproxen (Aleve), or other nonsteroidal anti-inflammatory drugs (NSAIDs) unless your doctor says it is okay.   · If you think your pain medicine is making you sick to your stomach:  ? Take your medicine after meals (unless your doctor has told you not to). ? Ask your doctor for a different pain medicine. · If your doctor prescribed antibiotics, take them as directed. Do not stop taking them just because you feel better. You need to take the full course of antibiotics. · Your doctor may give you a blood thinner to prevent blood clots. If you take a blood thinner, be sure you get instructions about how to take your medicine safely. Blood thinners can cause serious bleeding problems. Incision care  · If you have strips of tape on the incision the doctor made, leave the tape on for a week or until it falls off. · Wash the area daily with warm, soapy water and pat it dry. Don't use hydrogen peroxide or alcohol, which may delay healing. You may cover the area with a gauze bandage if it weeps or rubs against clothing. Change the bandage every day. · Keep the area clean and dry. Other instructions  · Keep track of your weight. Weigh yourself every day at the same time of day, on the same scale, in the same amount of clothing. A sudden increase in weight can be a sign of a problem with your heart. Tell your doctor if you suddenly gain weight, such as 3 pounds or more in 2 to 3 days. · Be sure to tell all your doctors and your dentist that you have an artificial aortic valve. This is important, because you may need to take antibiotics before certain procedures to prevent infection. Follow-up care is a key part of your treatment and safety. Be sure to make and go to all appointments, and call your doctor if you are having problems. It's also a good idea to know your test results and keep a list of the medicines you take. When should you call for help? Call 911 anytime you think you may need emergency care. For example, call if:  · You passed out (lost consciousness). · You have trouble breathing.   · You have severe pain in your chest.  · You have symptoms of a stroke. These may include:  ? Sudden numbness, tingling, weakness, or loss of movement in your face, arm, or leg, especially on only one side of your body. ? Sudden vision changes. ? Sudden trouble speaking. ? Sudden confusion or trouble understanding simple statements. ? Sudden problems with walking or balance. ? A sudden, severe headache that is different from past headaches. · You have symptoms of a heart attack. These may include:  ? Chest pain or pressure, or a strange feeling in the chest.  ? Sweating. ? Shortness of breath. ? Nausea or vomiting. ? Pain, pressure, or a strange feeling in the back, neck, jaw, or upper belly or in one or both shoulders or arms. ? Lightheadedness or sudden weakness. ? A fast or irregular heartbeat. After you call 911, the  may tell you to chew 1 adult-strength or 2 to 4 low-dose aspirin. Wait for an ambulance. Do not try to drive yourself. Call your doctor now or seek immediate medical care if:  · You have pain that does not get better after you take pain medicine. · You have loose stitches, or your incision comes open. · Bright red blood has soaked through the bandage over your incision. · You have signs of infection, such as:  ? Increased pain, swelling, warmth, or redness. ? Red streaks leading from the incision. ? Pus draining from the incision. ? A fever. · You have signs of a blood clot, such as:  ? Pain in your calf, back of the knee, thigh, or groin. ? Redness and swelling in your leg or groin. · You have new or changed symptoms of heart failure, such as:  ? New or increased shortness of breath. ? New or worse swelling in your legs, ankles, or feet. ? Sudden weight gain, such as more than 2 to 3 pounds in a day or 5 pounds in a week. (Your doctor may suggest a different range of weight gain.)  ? Feeling dizzy or lightheaded or like you may faint. ? Feeling so tired or weak that you cannot do your usual activities. ?  Not sleeping well. Shortness of breath wakes you at night. You need extra pillows to prop yourself up to breathe easier. Watch closely for changes in your health, and be sure to contact your doctor if you have any problems. Where can you learn more? Go to http://ruthy-marshal.info/  Enter D936 in the search box to learn more about \"Aortic Valve Replacement Surgery: What to Expect at Home. \"  Current as of: December 16, 2019               Content Version: 12.5  © 2153-4451 [employer]. Care instructions adapted under license by [employer] (which disclaims liability or warranty for this information). If you have questions about a medical condition or this instruction, always ask your healthcare professional. [address] any warranty or liability for your use of this information. Heart-Healthy Diet: Care Instructions  Your Care Instructions     A heart-healthy diet has lots of vegetables, fruits, nuts, beans, and whole grains, and is low in salt. It limits foods that are high in saturated fat, such as meats, cheeses, and fried foods. It may be hard to change your diet, but even small changes can lower your risk of heart attack and heart disease. Follow-up care is a key part of your treatment and safety. Be sure to make and go to all appointments, and call your doctor if you are having problems. It's also a good idea to know your test results and keep a list of the medicines you take. How can you care for yourself at home? Watch your portions  · Learn what a serving is. A \"serving\" and a \"portion\" are not always the same thing. Make sure that you are not eating larger portions than are recommended. For example, a serving of pasta is ½ cup. A serving size of meat is 2 to 3 ounces. A 3-ounce serving is about the size of a deck of cards. Measure serving sizes until you are good at [place]" them.  Keep in mind that restaurants often serve portions that are 2 or 3 times the size of one serving. · To keep your energy level up and keep you from feeling hungry, eat often but in smaller portions. · Eat only the number of calories you need to stay at a healthy weight. If you need to lose weight, eat fewer calories than your body burns (through exercise and other physical activity). Eat more fruits and vegetables  · Eat a variety of fruit and vegetables every day. Dark green, deep orange, red, or yellow fruits and vegetables are especially good for you. Examples include spinach, carrots, peaches, and berries. · Keep carrots, celery, and other veggies handy for snacks. Buy fruit that is in season and store it where you can see it so that you will be tempted to eat it. · Cook dishes that have a lot of veggies in them, such as stir-fries and soups. Limit saturated and trans fat  · Read food labels, and try to avoid saturated and trans fats. They increase your risk of heart disease. · Use olive or canola oil when you cook. · Bake, broil, grill, or steam foods instead of frying them. · Choose lean meats instead of high-fat meats such as hot dogs and sausages. Cut off all visible fat when you prepare meat. · Eat fish, skinless poultry, and meat alternatives such as soy products instead of high-fat meats. Soy products, such as tofu, may be especially good for your heart. · Choose low-fat or fat-free milk and dairy products. Eat foods high in fiber  · Eat a variety of grain products every day. Include whole-grain foods that have lots of fiber and nutrients. Examples of whole-grain foods include oats, whole wheat bread, and brown rice. · Buy whole-grain breads and cereals, instead of white bread or pastries. Limit salt and sodium  · Limit how much salt and sodium you eat to help lower your blood pressure. · Taste food before you salt it. Add only a little salt when you think you need it. With time, your taste buds will adjust to less salt.   · Eat fewer snack items, fast foods, and other high-salt, processed foods. Check food labels for the amount of sodium in packaged foods. · Choose low-sodium versions of canned goods (such as soups, vegetables, and beans). Limit sugar  · Limit drinks and foods with added sugar. These include candy, desserts, and soda pop. Limit alcohol  · Limit alcohol to no more than 2 drinks a day for men and 1 drink a day for women. Too much alcohol can cause health problems. When should you call for help? Watch closely for changes in your health, and be sure to contact your doctor if:  · You would like help planning heart-healthy meals. Where can you learn more? Go to http://ruthyFilmastermarshal.info/  Enter V137 in the search box to learn more about \"Heart-Healthy Diet: Care Instructions. \"  Current as of: August 22, 2019               Content Version: 12.5  © 2409-8726 [employer]. Care instructions adapted under license by [employer] (which disclaims liability or warranty for this information). If you have questions about a medical condition or this instruction, always ask your healthcare professional. [place] [address] any warranty or liability for your use of this information. Patient Education        Cardiac Rehabilitation: Care Instructions  Your Care Instructions     Cardiac rehabilitation is a program for people who have a heart problem, such as a heart attack, heart failure, or a heart valve disease. The program includes exercise, lifestyle changes, education, and emotional support. Cardiac rehab can help you improve the quality of your life through better overall health. It can help you lose weight and feel better about yourself. On your cardiac rehab team, you may have your doctor, a nurse specialist, a dietitian, and a physical therapist. They will design your cardiac rehab program specifically for you.  You will learn how to reduce your risk for heart problems, how to manage stress, and how to eat a heart-healthy diet. By the end of the program, you will be ready to maintain a healthier lifestyle on your own. Follow-up care is a key part of your treatment and safety. Be sure to make and go to all appointments, and call your doctor if you are having problems. It's also a good idea to know your test results and keep a list of the medicines you take. How can you care for yourself at home? · Take your medicines exactly as prescribed. Call your doctor if you think you are having a problem with your medicine. You will get more details on the specific medicines your doctor prescribes. · Weigh yourself every day if your doctor tells you to. Watch for sudden weight gain. Weigh yourself on the same scale with the same amount of clothing at the same time of day. · Plan your meals so that you are eating heart-healthy foods. ? Eat a variety of foods daily. Fresh fruits and vegetables and whole-grains are good choices. ? Limit your fat intake, especially saturated and trans fat. ? Limit salt (sodium). ? Increase fiber in your diet. ? Limit alcohol. · Learn how to take your pulse so that you can track your heart rate during exercise. · Always check with your doctor before you begin a new exercise program.  · Warm up before you exercise and cool down afterward for at least 15 minutes each. This will help your heart gradually prepare for and recover from exercise and avoid pushing your heart too hard. · Stop exercising if you have any unusual discomfort, such as chest pain. · Do not smoke. Smoking can make heart problems worse. If you need help quitting, talk to your doctor about stop-smoking programs and medicines. These can increase your chances of quitting for good. When should you call for help? [id number] anytime you think you may need emergency care. For example, call if:  · You have severe trouble breathing.   · You cough up pink, foamy mucus and you have trouble breathing. · You have symptoms of a heart attack. These may include:  ? Chest pain or pressure, or a strange feeling in the chest.  ? Sweating. ? Shortness of breath. ? Nausea or vomiting. ? Pain, pressure, or a strange feeling in the back, neck, jaw, or upper belly or in one or both shoulders or arms. ? Lightheadedness or sudden weakness. ? A fast or irregular heartbeat. After you call 911, the  may tell you to chew 1 adult-strength or 2 to 4 low-dose aspirin. Wait for an ambulance. Do not try to drive yourself. · You have angina symptoms (such as chest pain or pressure) that do not go away with rest or are not getting better within 5 minutes after you take a dose of nitroglycerin. · You have symptoms of a stroke. These may include:  ? Sudden numbness, tingling, weakness, or loss of movement in your face, arm, or leg, especially on only one side of your body. ? Sudden vision changes. ? Sudden trouble speaking. ? Sudden confusion or trouble understanding simple statements. ? Sudden problems with walking or balance. ? A sudden, severe headache that is different from past headaches. · You passed out (lost consciousness). Call your doctor now or seek immediate medical care if:  · You have new or increased shortness of breath. · You are dizzy or lightheaded, or you feel like you may faint. · You gain weight suddenly, such as more than 2 to 3 pounds in a day or 5 pounds in a week. (Your doctor may suggest a different range of weight gain.)  · You have increased swelling in your legs, ankles, or feet. Watch closely for changes in your health, and be sure to contact your doctor if you have any problems. Where can you learn more? Go to http://ruthy-marshal.info/  Enter D709 in the search box to learn more about \"Cardiac Rehabilitation: Care Instructions. \"  Current as of: December 16, 2019               Content Version: 12.5  © 4225-8604 [employer].    Care instructions adapted under license by [employer] (which disclaims liability or warranty for this information). If you have questions about a medical condition or this instruction, always ask your healthcare professional. [address] any warranty or liability for your use of this information.

## 2020-07-28 NOTE — PROGRESS NOTES
Discharge instructions, follow up appointments and prescriptions reviewed with patient. Both verbalize understanding. All personal belongings taken with patient. Patient escorted to discharge area via wheelchair. Patient is stable at discharge. PICC not removed due to IV antibiotics for the next 2 weeks. Sutures previously removed.

## 2020-07-28 NOTE — PROGRESS NOTES
Bedside report received from [facility]. Opportunity for questions, concerns. Patient involved.

## 2020-07-28 NOTE — PROGRESS NOTES
Problem: Falls - Risk of  Goal: *Absence of Falls  Description: Document [place] Jones Fall Risk and appropriate interventions in the flowsheet.   Outcome: Progressing Towards Goal  Note: Fall Risk Interventions:  Mobility Interventions: Communicate number of staff needed for ambulation/transfer    Mentation Interventions: Bed/chair exit alarm    Medication Interventions: Teach patient to arise slowly    Elimination Interventions: Urinal in reach

## 2020-07-29 ENCOUNTER — HOSPITAL ENCOUNTER (OUTPATIENT)
Dept: INFUSION THERAPY | Age: 60
Discharge: HOME | End: 2020-07-29
Payer: COMMERCIAL

## 2020-07-29 VITALS
TEMPERATURE: 98.7 F | HEART RATE: 74 BPM | RESPIRATION RATE: 16 BRPM | DIASTOLIC BLOOD PRESSURE: 70 MMHG | OXYGEN SATURATION: 98 % | SYSTOLIC BLOOD PRESSURE: 120 MMHG

## 2020-07-29 PROCEDURE — 74011250636 HC RX REV CODE- 250/636: Performed by: INTERNAL MEDICINE

## 2020-07-29 PROCEDURE — 74011000258 HC RX REV CODE- 258: Performed by: INTERNAL MEDICINE

## 2020-07-29 PROCEDURE — 96365 THER/PROPH/DIAG IV INF INIT: CPT

## 2020-07-29 RX ORDER — SODIUM CHLORIDE 0.9 % (FLUSH) 0.9 %
10-30 SYRINGE (ML) INJECTION AS NEEDED
Status: DISCONTINUED | OUTPATIENT
Start: 2020-07-29 | End: 2020-07-31 | Stop reason: HOSPADM

## 2020-07-29 RX ADMIN — CEFTRIAXONE 2 G: 2 INJECTION, POWDER, FOR SOLUTION INTRAMUSCULAR; INTRAVENOUS at 16:00

## 2020-07-29 RX ADMIN — Medication 10 ML: at 15:59

## 2020-07-29 RX ADMIN — Medication 10 ML: at 16:31

## 2020-07-29 NOTE — PROGRESS NOTES
Arrived to the [facility]. Assessment complete. Rocephin completed. Patient tolerated without problems. Any issues or concerns during appointment: None. Patient aware of next infusion appointment on 7/30/2020 (date) at 36 (time). Discharged ambulatory.

## 2020-07-30 ENCOUNTER — HOSPITAL ENCOUNTER (OUTPATIENT)
Dept: INFUSION THERAPY | Age: 60
Discharge: HOME | End: 2020-07-30
Payer: COMMERCIAL

## 2020-07-30 VITALS
HEART RATE: 73 BPM | RESPIRATION RATE: 16 BRPM | DIASTOLIC BLOOD PRESSURE: 70 MMHG | SYSTOLIC BLOOD PRESSURE: 119 MMHG | OXYGEN SATURATION: 98 % | TEMPERATURE: 98.8 F

## 2020-07-30 PROCEDURE — 96365 THER/PROPH/DIAG IV INF INIT: CPT

## 2020-07-30 PROCEDURE — 74011250636 HC RX REV CODE- 250/636: Performed by: INTERNAL MEDICINE

## 2020-07-30 PROCEDURE — 74011000258 HC RX REV CODE- 258: Performed by: INTERNAL MEDICINE

## 2020-07-30 RX ORDER — SODIUM CHLORIDE 0.9 % (FLUSH) 0.9 %
10 SYRINGE (ML) INJECTION AS NEEDED
Status: DISCONTINUED | OUTPATIENT
Start: 2020-07-30 | End: 2020-08-01 | Stop reason: HOSPADM

## 2020-07-30 RX ADMIN — Medication 10 ML: at 15:52

## 2020-07-30 RX ADMIN — CEFTRIAXONE 2 G: 2 INJECTION, POWDER, FOR SOLUTION INTRAMUSCULAR; INTRAVENOUS at 15:53

## 2020-07-30 RX ADMIN — Medication 10 ML: at 16:23

## 2020-07-30 NOTE — PROGRESS NOTES
Arrived to the [facility]. Rocephin  completed. Patient tolerated well. Any issues or concerns during appointment: no.  Patient aware of next infusion appointment on 7/31/20 at  (date) at 36 (time). Discharged ambulatory with self.

## 2020-07-31 ENCOUNTER — HOSPITAL ENCOUNTER (OUTPATIENT)
Dept: INFUSION THERAPY | Age: 60
Discharge: HOME | End: 2020-07-31
Payer: COMMERCIAL

## 2020-07-31 VITALS
HEART RATE: 74 BPM | TEMPERATURE: 97.7 F | DIASTOLIC BLOOD PRESSURE: 64 MMHG | SYSTOLIC BLOOD PRESSURE: 116 MMHG | OXYGEN SATURATION: 94 % | RESPIRATION RATE: 16 BRPM

## 2020-07-31 LAB
BACTERIA SPEC CULT: NORMAL
BACTERIA SPEC CULT: NORMAL
SERVICE CMNT-IMP: NORMAL
SERVICE CMNT-IMP: NORMAL

## 2020-07-31 PROCEDURE — 74011000258 HC RX REV CODE- 258: Performed by: INTERNAL MEDICINE

## 2020-07-31 PROCEDURE — 74011250636 HC RX REV CODE- 250/636: Performed by: INTERNAL MEDICINE

## 2020-07-31 PROCEDURE — 96365 THER/PROPH/DIAG IV INF INIT: CPT

## 2020-07-31 RX ORDER — SODIUM CHLORIDE 0.9 % (FLUSH) 0.9 %
10 SYRINGE (ML) INJECTION AS NEEDED
Status: DISCONTINUED | OUTPATIENT
Start: 2020-07-31 | End: 2020-08-02 | Stop reason: HOSPADM

## 2020-07-31 RX ADMIN — Medication 10 ML: at 16:00

## 2020-07-31 RX ADMIN — Medication 10 ML: at 16:32

## 2020-07-31 RX ADMIN — CEFTRIAXONE 2 G: 2 INJECTION, POWDER, FOR SOLUTION INTRAMUSCULAR; INTRAVENOUS at 16:01

## 2020-07-31 NOTE — PROGRESS NOTES
Arrived to the [facility]. Rocephin completed. Patient tolerated well. Any issues or concerns during appointment: none. Patient aware of next infusion appointment on 8-1-20 (date) at 1600 (time). Discharged via ambulatory.

## 2020-07-31 NOTE — PROGRESS NOTES
Problem: Knowledge Deficit  Goal: *Participate in the learning process  Outcome: Progressing Towards Goal  Goal: *Verbalize description of procedure  Outcome: Progressing Towards Goal  Goal: *Verbalizes understanding and describes medication purposes and frequencies  Outcome: Progressing Towards Goal  Goal: *Knowledge of discharge instructions  Outcome: Progressing Towards Goal  Goal: Interventions  Outcome: Progressing Towards Goal     Problem: Patient Education: Go to Patient Education Activity  Goal: Patient/Family Education  Outcome: Progressing Towards Goal     Problem: Infection - Risk of, Central Venous Catheter-Associated Bloodstream Infection  Goal: *Absence of infection signs and symptoms  Outcome: Progressing Towards Goal     Problem: Patient Education:  Go to Education Activity  Goal: Patient/Family Education  Outcome: Progressing Towards Goal     Problem: Pain  Goal: *Control of Pain  Outcome: Progressing Towards Goal

## 2020-08-01 ENCOUNTER — HOSPITAL ENCOUNTER (OUTPATIENT)
Dept: INFUSION THERAPY | Age: 60
Discharge: HOME | End: 2020-08-01
Payer: COMMERCIAL

## 2020-08-01 VITALS
RESPIRATION RATE: 18 BRPM | WEIGHT: 148.8 LBS | SYSTOLIC BLOOD PRESSURE: 123 MMHG | TEMPERATURE: 97.3 F | DIASTOLIC BLOOD PRESSURE: 69 MMHG | BODY MASS INDEX: 19.63 KG/M2 | HEART RATE: 70 BPM | OXYGEN SATURATION: 96 %

## 2020-08-01 PROCEDURE — 96365 THER/PROPH/DIAG IV INF INIT: CPT

## 2020-08-01 PROCEDURE — 74011000258 HC RX REV CODE- 258: Performed by: INTERNAL MEDICINE

## 2020-08-01 PROCEDURE — 74011250636 HC RX REV CODE- 250/636: Performed by: INTERNAL MEDICINE

## 2020-08-01 RX ORDER — SODIUM CHLORIDE 0.9 % (FLUSH) 0.9 %
10 SYRINGE (ML) INJECTION AS NEEDED
Status: DISCONTINUED | OUTPATIENT
Start: 2020-08-01 | End: 2020-08-03 | Stop reason: HOSPADM

## 2020-08-01 RX ADMIN — CEFTRIAXONE SODIUM 2 G: 2 INJECTION, POWDER, FOR SOLUTION INTRAMUSCULAR; INTRAVENOUS at 16:00

## 2020-08-01 RX ADMIN — Medication 10 ML: at 16:45

## 2020-08-01 RX ADMIN — Medication 10 ML: at 15:45

## 2020-08-01 NOTE — PROGRESS NOTES
Arrived to the [facility]. Assessment and Rocephin infusion completed. Patient tolerated well. Any issues or concerns during appointment: none. Patient aware of next infusion appointment on 08/02/2020 at 1330. Discharged ambulatory.

## 2020-08-02 ENCOUNTER — HOSPITAL ENCOUNTER (OUTPATIENT)
Dept: INFUSION THERAPY | Age: 60
Discharge: HOME | End: 2020-08-02
Payer: COMMERCIAL

## 2020-08-02 VITALS
RESPIRATION RATE: 18 BRPM | OXYGEN SATURATION: 97 % | TEMPERATURE: 97.4 F | DIASTOLIC BLOOD PRESSURE: 64 MMHG | SYSTOLIC BLOOD PRESSURE: 128 MMHG | HEART RATE: 75 BPM

## 2020-08-02 PROCEDURE — 74011250636 HC RX REV CODE- 250/636: Performed by: INTERNAL MEDICINE

## 2020-08-02 PROCEDURE — 96365 THER/PROPH/DIAG IV INF INIT: CPT

## 2020-08-02 PROCEDURE — 74011000258 HC RX REV CODE- 258: Performed by: INTERNAL MEDICINE

## 2020-08-02 RX ORDER — SODIUM CHLORIDE 0.9 % (FLUSH) 0.9 %
10 SYRINGE (ML) INJECTION AS NEEDED
Status: DISCONTINUED | OUTPATIENT
Start: 2020-08-02 | End: 2020-08-04 | Stop reason: HOSPADM

## 2020-08-02 RX ORDER — SODIUM CHLORIDE 0.9 % (FLUSH) 0.9 %
10 SYRINGE (ML) INJECTION AS NEEDED
Status: CANCELLED | OUTPATIENT
Start: 2020-08-02

## 2020-08-02 RX ADMIN — CEFTRIAXONE 2 G: 2 INJECTION, POWDER, FOR SOLUTION INTRAMUSCULAR; INTRAVENOUS at 13:31

## 2020-08-02 RX ADMIN — Medication 10 ML: at 14:01

## 2020-08-02 RX ADMIN — Medication 10 ML: at 13:31

## 2020-08-02 NOTE — PROGRESS NOTES
Arrived to the [facility] to [facility]. Rocephin completed. Patient tolerated well. Any issues or concerns during appointment: none. Patient aware of next infusion appointment on 8/3 (date) at 8:30 AM (time). Discharged ambulatory.

## 2020-08-03 ENCOUNTER — HOSPITAL ENCOUNTER (OUTPATIENT)
Dept: INFUSION THERAPY | Age: 60
Discharge: HOME | End: 2020-08-03
Payer: COMMERCIAL

## 2020-08-03 VITALS
TEMPERATURE: 98.2 F | OXYGEN SATURATION: 97 % | DIASTOLIC BLOOD PRESSURE: 68 MMHG | RESPIRATION RATE: 18 BRPM | SYSTOLIC BLOOD PRESSURE: 126 MMHG | HEART RATE: 71 BPM

## 2020-08-03 LAB
ALBUMIN SERPL-MCNC: 3 G/DL (ref 3.2–4.6)
ALBUMIN/GLOB SERPL: 0.8 {RATIO} (ref 1.2–3.5)
ALP SERPL-CCNC: 91 U/L (ref 50–136)
ALT SERPL-CCNC: 23 U/L (ref 12–65)
AST SERPL-CCNC: 16 U/L (ref 15–37)
BASOPHILS # BLD: 0.1 K/UL (ref 0–0.2)
BASOPHILS NFR BLD: 1 % (ref 0–2)
BILIRUB DIRECT SERPL-MCNC: 0.1 MG/DL
BILIRUB SERPL-MCNC: 0.5 MG/DL (ref 0.2–1.1)
CREAT SERPL-MCNC: 1.2 MG/DL (ref 0.8–1.5)
DIFFERENTIAL METHOD BLD: ABNORMAL
EOSINOPHIL # BLD: 0.4 K/UL (ref 0–0.8)
EOSINOPHIL NFR BLD: 4 % (ref 0.5–7.8)
ERYTHROCYTE [DISTWIDTH] IN BLOOD BY AUTOMATED COUNT: 15.5 % (ref 11.9–14.6)
GLOBULIN SER CALC-MCNC: 3.7 G/DL (ref 2.3–3.5)
HCT VFR BLD AUTO: 32.9 % (ref 41.1–50.3)
HGB BLD-MCNC: 10.5 G/DL (ref 13.6–17.2)
IMM GRANULOCYTES # BLD AUTO: 0.1 K/UL (ref 0–0.5)
IMM GRANULOCYTES NFR BLD AUTO: 1 % (ref 0–5)
LYMPHOCYTES # BLD: 1.3 K/UL (ref 0.5–4.6)
LYMPHOCYTES NFR BLD: 14 % (ref 13–44)
MCH RBC QN AUTO: 29.2 PG (ref 26.1–32.9)
MCHC RBC AUTO-ENTMCNC: 31.9 G/DL (ref 31.4–35)
MCV RBC AUTO: 91.4 FL (ref 79.6–97.8)
MONOCYTES # BLD: 1 K/UL (ref 0.1–1.3)
MONOCYTES NFR BLD: 11 % (ref 4–12)
NEUTS SEG # BLD: 6.5 K/UL (ref 1.7–8.2)
NEUTS SEG NFR BLD: 70 % (ref 43–78)
NRBC # BLD: 0 K/UL (ref 0–0.2)
PLATELET # BLD AUTO: 182 K/UL (ref 150–450)
PMV BLD AUTO: 9.1 FL (ref 9.4–12.3)
PROT SERPL-MCNC: 6.7 G/DL (ref 6.3–8.2)
RBC # BLD AUTO: 3.6 M/UL (ref 4.23–5.67)
WBC # BLD AUTO: 9.3 K/UL (ref 4.3–11.1)

## 2020-08-03 PROCEDURE — 74011000258 HC RX REV CODE- 258: Performed by: INTERNAL MEDICINE

## 2020-08-03 PROCEDURE — 85025 COMPLETE CBC W/AUTO DIFF WBC: CPT

## 2020-08-03 PROCEDURE — 82565 ASSAY OF CREATININE: CPT

## 2020-08-03 PROCEDURE — 96365 THER/PROPH/DIAG IV INF INIT: CPT

## 2020-08-03 PROCEDURE — 74011250636 HC RX REV CODE- 250/636: Performed by: INTERNAL MEDICINE

## 2020-08-03 PROCEDURE — 80076 HEPATIC FUNCTION PANEL: CPT

## 2020-08-03 RX ORDER — SODIUM CHLORIDE 0.9 % (FLUSH) 0.9 %
10-20 SYRINGE (ML) INJECTION AS NEEDED
Status: DISCONTINUED | OUTPATIENT
Start: 2020-08-03 | End: 2020-08-05 | Stop reason: HOSPADM

## 2020-08-03 RX ADMIN — Medication 10 ML: at 16:11

## 2020-08-03 RX ADMIN — CEFTRIAXONE 2 G: 2 INJECTION, POWDER, FOR SOLUTION INTRAMUSCULAR; INTRAVENOUS at 15:40

## 2020-08-03 NOTE — PROGRESS NOTES
Arrived to infusion. Rocephin completed and labs drawn via PICC. Patient tolerated well. PICC dressing also changed  Denies new issues or concerns.    Next infusion appointment is scheduled for 8/4/20 at 4pm.  Discharged ambulatory with self.

## 2020-08-04 ENCOUNTER — HOSPITAL ENCOUNTER (OUTPATIENT)
Dept: INFUSION THERAPY | Age: 60
Discharge: HOME | End: 2020-08-04
Payer: COMMERCIAL

## 2020-08-04 VITALS
SYSTOLIC BLOOD PRESSURE: 132 MMHG | HEART RATE: 76 BPM | TEMPERATURE: 98.6 F | DIASTOLIC BLOOD PRESSURE: 71 MMHG | OXYGEN SATURATION: 96 % | RESPIRATION RATE: 18 BRPM

## 2020-08-04 PROCEDURE — 96365 THER/PROPH/DIAG IV INF INIT: CPT

## 2020-08-04 PROCEDURE — 74011000258 HC RX REV CODE- 258: Performed by: INTERNAL MEDICINE

## 2020-08-04 PROCEDURE — 74011250636 HC RX REV CODE- 250/636: Performed by: INTERNAL MEDICINE

## 2020-08-04 RX ORDER — SODIUM CHLORIDE 0.9 % (FLUSH) 0.9 %
10 SYRINGE (ML) INJECTION AS NEEDED
Status: DISCONTINUED | OUTPATIENT
Start: 2020-08-04 | End: 2020-08-06 | Stop reason: HOSPADM

## 2020-08-04 RX ADMIN — CEFTRIAXONE 2 G: 2 INJECTION, POWDER, FOR SOLUTION INTRAMUSCULAR; INTRAVENOUS at 16:19

## 2020-08-04 RX ADMIN — Medication 10 ML: at 16:51

## 2020-08-04 NOTE — PROGRESS NOTES
Arrived to infusion center for IV Rocephin. Tolerated  The antibiotic without signs of adverse reaction. No issues or concerns voiced. Aware  of next appointment on 8/5 at 0800. Discharged in satisfactory condition to home.

## 2020-08-05 ENCOUNTER — HOSPITAL ENCOUNTER (OUTPATIENT)
Dept: INFUSION THERAPY | Age: 60
Discharge: HOME | End: 2020-08-05
Payer: COMMERCIAL

## 2020-08-05 VITALS
DIASTOLIC BLOOD PRESSURE: 56 MMHG | HEART RATE: 72 BPM | RESPIRATION RATE: 18 BRPM | SYSTOLIC BLOOD PRESSURE: 124 MMHG | TEMPERATURE: 98.1 F | OXYGEN SATURATION: 97 %

## 2020-08-05 PROCEDURE — 74011250636 HC RX REV CODE- 250/636: Performed by: INTERNAL MEDICINE

## 2020-08-05 PROCEDURE — 74011000258 HC RX REV CODE- 258: Performed by: INTERNAL MEDICINE

## 2020-08-05 PROCEDURE — 96365 THER/PROPH/DIAG IV INF INIT: CPT

## 2020-08-05 RX ORDER — SODIUM CHLORIDE 0.9 % (FLUSH) 0.9 %
10 SYRINGE (ML) INJECTION AS NEEDED
Status: DISCONTINUED | OUTPATIENT
Start: 2020-08-05 | End: 2020-08-07 | Stop reason: HOSPADM

## 2020-08-05 RX ADMIN — CEFTRIAXONE 2 G: 2 INJECTION, POWDER, FOR SOLUTION INTRAMUSCULAR; INTRAVENOUS at 07:41

## 2020-08-05 RX ADMIN — Medication 10 ML: at 08:12

## 2020-08-05 RX ADMIN — Medication 10 ML: at 07:40

## 2020-08-05 NOTE — PROGRESS NOTES
[facility]phin completed. Patient tolerated well. Any issues or concerns during appointment: none. Patient aware of next infusion appointment on 8/6/20 at  1600. Discharged ambulatory.     Josselyn Navarrete RN

## 2020-08-06 ENCOUNTER — HOSPITAL ENCOUNTER (OUTPATIENT)
Dept: INFUSION THERAPY | Age: 60
Discharge: HOME | End: 2020-08-06
Payer: COMMERCIAL

## 2020-08-06 VITALS
OXYGEN SATURATION: 95 % | RESPIRATION RATE: 18 BRPM | TEMPERATURE: 98.7 F | SYSTOLIC BLOOD PRESSURE: 111 MMHG | HEART RATE: 81 BPM | DIASTOLIC BLOOD PRESSURE: 68 MMHG

## 2020-08-06 PROCEDURE — 74011000258 HC RX REV CODE- 258: Performed by: INTERNAL MEDICINE

## 2020-08-06 PROCEDURE — 74011250636 HC RX REV CODE- 250/636: Performed by: INTERNAL MEDICINE

## 2020-08-06 PROCEDURE — 96365 THER/PROPH/DIAG IV INF INIT: CPT

## 2020-08-06 RX ORDER — SODIUM CHLORIDE 0.9 % (FLUSH) 0.9 %
10 SYRINGE (ML) INJECTION AS NEEDED
Status: DISCONTINUED | OUTPATIENT
Start: 2020-08-06 | End: 2020-08-07 | Stop reason: HOSPADM

## 2020-08-06 RX ADMIN — CEFTRIAXONE 2 G: 2 INJECTION, POWDER, FOR SOLUTION INTRAMUSCULAR; INTRAVENOUS at 16:15

## 2020-08-06 RX ADMIN — Medication 10 ML: at 16:15

## 2020-08-06 RX ADMIN — Medication 10 ML: at 16:46

## 2020-08-06 NOTE — PROGRESS NOTES
Arrived to the [facility]. Rocephin infusion completed. Patient tolerated well. Any issues or concerns during appointment: NO.  Patient aware of next f/u appointment on 08/10/2020 (date) at [phone] (time). Discharged ambulatory.

## 2020-08-10 ENCOUNTER — HOSPITAL ENCOUNTER (OUTPATIENT)
Dept: LAB | Age: 60
Discharge: HOME | End: 2020-08-10
Payer: COMMERCIAL

## 2020-08-10 DIAGNOSIS — D50.9 IRON DEFICIENCY ANEMIA, UNSPECIFIED IRON DEFICIENCY ANEMIA TYPE: ICD-10-CM

## 2020-08-10 DIAGNOSIS — D64.9 ANEMIA, UNSPECIFIED TYPE: ICD-10-CM

## 2020-08-10 LAB
ALBUMIN SERPL-MCNC: 3.4 G/DL (ref 3.2–4.6)
ALBUMIN/GLOB SERPL: 0.9 {RATIO} (ref 1.2–3.5)
ALP SERPL-CCNC: 93 U/L (ref 50–136)
ALT SERPL-CCNC: 24 U/L (ref 12–65)
ANION GAP SERPL CALC-SCNC: 3 MMOL/L (ref 7–16)
AST SERPL-CCNC: 18 U/L (ref 15–37)
BASOPHILS # BLD: 0.1 K/UL (ref 0–0.2)
BASOPHILS NFR BLD: 2 % (ref 0–2)
BILIRUB SERPL-MCNC: 0.7 MG/DL (ref 0.2–1.1)
BUN SERPL-MCNC: 22 MG/DL (ref 8–23)
CALCIUM SERPL-MCNC: 9 MG/DL (ref 8.3–10.4)
CHLORIDE SERPL-SCNC: 107 MMOL/L (ref 98–107)
CO2 SERPL-SCNC: 29 MMOL/L (ref 21–32)
CREAT SERPL-MCNC: 1.2 MG/DL (ref 0.8–1.5)
DIFFERENTIAL METHOD BLD: ABNORMAL
EOSINOPHIL # BLD: 0.2 K/UL (ref 0–0.8)
EOSINOPHIL NFR BLD: 4 % (ref 0.5–7.8)
ERYTHROCYTE [DISTWIDTH] IN BLOOD BY AUTOMATED COUNT: 14.8 % (ref 11.9–14.6)
FERRITIN SERPL-MCNC: 1099 NG/ML (ref 8–388)
GLOBULIN SER CALC-MCNC: 4 G/DL (ref 2.3–3.5)
GLUCOSE SERPL-MCNC: 138 MG/DL (ref 65–100)
HCT VFR BLD AUTO: 37.1 % (ref 41.1–50.3)
HGB BLD-MCNC: 11.8 G/DL (ref 13.6–17.2)
IMM GRANULOCYTES # BLD AUTO: 0 K/UL (ref 0–0.5)
IMM GRANULOCYTES NFR BLD AUTO: 1 % (ref 0–5)
IRON SATN MFR SERPL: 22 %
IRON SERPL-MCNC: 62 UG/DL (ref 35–150)
LYMPHOCYTES # BLD: 1.1 K/UL (ref 0.5–4.6)
LYMPHOCYTES NFR BLD: 29 % (ref 13–44)
MCH RBC QN AUTO: 29.2 PG (ref 26.1–32.9)
MCHC RBC AUTO-ENTMCNC: 31.8 G/DL (ref 31.4–35)
MCV RBC AUTO: 91.8 FL (ref 79.6–97.8)
MONOCYTES # BLD: 0.8 K/UL (ref 0.1–1.3)
MONOCYTES NFR BLD: 20 % (ref 4–12)
NEUTS SEG # BLD: 1.6 K/UL (ref 1.7–8.2)
NEUTS SEG NFR BLD: 44 % (ref 43–78)
NRBC # BLD: 0 K/UL (ref 0–0.2)
PLATELET # BLD AUTO: 237 K/UL (ref 150–450)
PLATELET COMMENTS,PCOM: ADEQUATE
PMV BLD AUTO: 8.7 FL (ref 9.4–12.3)
POTASSIUM SERPL-SCNC: 3.9 MMOL/L (ref 3.5–5.1)
PROT SERPL-MCNC: 7.4 G/DL (ref 6.3–8.2)
RBC # BLD AUTO: 4.04 M/UL (ref 4.23–5.67)
RBC MORPH BLD: ABNORMAL
RBC MORPH BLD: ABNORMAL
SODIUM SERPL-SCNC: 139 MMOL/L (ref 136–145)
TIBC SERPL-MCNC: 287 UG/DL (ref 250–450)
VIT B12 SERPL-MCNC: 444 PG/ML (ref 193–986)
WBC # BLD AUTO: 3.8 K/UL (ref 4.3–11.1)
WBC MORPH BLD: ABNORMAL

## 2020-08-10 PROCEDURE — 82607 VITAMIN B-12: CPT

## 2020-08-10 PROCEDURE — 36415 COLL VENOUS BLD VENIPUNCTURE: CPT

## 2020-08-10 PROCEDURE — 83540 ASSAY OF IRON: CPT

## 2020-08-10 PROCEDURE — 85025 COMPLETE CBC W/AUTO DIFF WBC: CPT

## 2020-08-10 PROCEDURE — 82728 ASSAY OF FERRITIN: CPT

## 2020-08-10 PROCEDURE — 80053 COMPREHEN METABOLIC PANEL: CPT

## 2020-08-13 ENCOUNTER — HOSPITAL ENCOUNTER (OUTPATIENT)
Dept: INFUSION THERAPY | Age: 60
Discharge: HOME | End: 2020-08-13
Payer: COMMERCIAL

## 2020-08-13 VITALS
RESPIRATION RATE: 18 BRPM | HEART RATE: 75 BPM | DIASTOLIC BLOOD PRESSURE: 56 MMHG | SYSTOLIC BLOOD PRESSURE: 114 MMHG | TEMPERATURE: 98 F

## 2020-08-13 PROCEDURE — 99213 OFFICE O/P EST LOW 20 MIN: CPT

## 2020-08-13 RX ORDER — SODIUM CHLORIDE 0.9 % (FLUSH) 0.9 %
10 SYRINGE (ML) INJECTION EVERY 8 HOURS
Status: DISCONTINUED | OUTPATIENT
Start: 2020-08-13 | End: 2020-08-15 | Stop reason: HOSPADM

## 2020-08-13 NOTE — PROGRESS NOTES
Arrived to the [facility]. Assessment complete. PICC line removed. Pressure held for 5 minutes and antibiotic ointment applied to site, gauze and Tegaderm dressing applied . Instructed to leave in place for 24 hrs  Patient tolerated without problems.    Any issues or concerns during appointment: None  Instructed to Infectious diease office  with any side effects or concerns  No future appointment at this time  Discharged ambulatory

## 2020-08-14 PROBLEM — I50.22 SYSTOLIC CHF, CHRONIC (HCC): Status: ACTIVE | Noted: 2020-08-14

## 2020-08-17 ENCOUNTER — HOSPITAL ENCOUNTER (OUTPATIENT)
Dept: CARDIAC REHAB | Age: 60
Discharge: HOME | End: 2020-08-17

## 2020-08-17 NOTE — ROUTINE PROCESS
Dear Dr. Bob Snyder,    Thank you for referring your patient,Mr. Molina Paredes ( 1960), to the Cardiopulmonary Rehabilitation Program at [address] [place] [address]. He is a good candidate for the Cardiac Rehab Program and should see improvements with regular participation. We will be addressing appropriate interventions for modifiable risk factors with your patient during the next 12 weeks. We will contact you with any issues or concerns that may arise, or you can follow your patient's progress through [address] [place] [address] at any time. A final summary will be sent to you when the program is completed. Again, thank you for the referral. If we can be of further assistance, please feel free to contact the Cardiopulmonary Rehab staff at [phone].     Sincerely,    JOE ChambersN, RN  Cardiopulmonary Rehabilitation Nurse Liaison  [facility]

## 2020-08-21 LAB
FUNGUS CULTURE, RFCO2T: NEGATIVE
FUNGUS CULTURE, RFCO2T: NEGATIVE
FUNGUS SMEAR, RFCO1T: NORMAL
FUNGUS SMEAR, RFCO1T: NORMAL
FUNGUS SPEC CULT: NORMAL
FUNGUS SPEC CULT: NORMAL
FUNGUS STAIN, 188244: NORMAL
FUNGUS STAIN, 188244: NORMAL
REFLEX TO ID, RFCO3T: NORMAL
REFLEX TO ID, RFCO3T: NORMAL
SPECIMEN SOURCE: NORMAL

## 2020-08-27 ENCOUNTER — HOSPITAL ENCOUNTER (OUTPATIENT)
Dept: CARDIAC REHAB | Age: 60
Discharge: HOME | End: 2020-08-27
Payer: COMMERCIAL

## 2020-08-27 VITALS — WEIGHT: 138.6 LBS | HEIGHT: 73 IN | BODY MASS INDEX: 18.37 KG/M2

## 2020-08-27 PROCEDURE — 93798 PHYS/QHP OP CAR RHAB W/ECG: CPT

## 2020-09-01 ENCOUNTER — HOSPITAL ENCOUNTER (OUTPATIENT)
Dept: CARDIAC REHAB | Age: 60
Discharge: HOME | End: 2020-09-01
Payer: COMMERCIAL

## 2020-09-01 VITALS — WEIGHT: 139.4 LBS | BODY MASS INDEX: 18.39 KG/M2

## 2020-09-01 PROCEDURE — 93798 PHYS/QHP OP CAR RHAB W/ECG: CPT

## 2020-09-03 ENCOUNTER — HOSPITAL ENCOUNTER (OUTPATIENT)
Dept: CARDIAC REHAB | Age: 60
Discharge: HOME | End: 2020-09-03
Payer: COMMERCIAL

## 2020-09-03 PROCEDURE — 93798 PHYS/QHP OP CAR RHAB W/ECG: CPT

## 2020-09-08 ENCOUNTER — HOSPITAL ENCOUNTER (OUTPATIENT)
Dept: CARDIAC REHAB | Age: 60
Discharge: HOME | End: 2020-09-08
Payer: COMMERCIAL

## 2020-09-08 VITALS — BODY MASS INDEX: 18.47 KG/M2 | WEIGHT: 140 LBS

## 2020-09-08 PROCEDURE — 93798 PHYS/QHP OP CAR RHAB W/ECG: CPT

## 2020-09-10 ENCOUNTER — HOSPITAL ENCOUNTER (OUTPATIENT)
Dept: CARDIAC REHAB | Age: 60
Discharge: HOME | End: 2020-09-10
Payer: COMMERCIAL

## 2020-09-10 VITALS — WEIGHT: 139.4 LBS | BODY MASS INDEX: 18.39 KG/M2

## 2020-09-10 PROCEDURE — 93798 PHYS/QHP OP CAR RHAB W/ECG: CPT

## 2020-09-15 ENCOUNTER — HOSPITAL ENCOUNTER (OUTPATIENT)
Dept: CARDIAC REHAB | Age: 60
Discharge: HOME | End: 2020-09-15
Payer: COMMERCIAL

## 2020-09-15 VITALS — WEIGHT: 141.6 LBS | BODY MASS INDEX: 18.68 KG/M2

## 2020-09-15 PROCEDURE — 93798 PHYS/QHP OP CAR RHAB W/ECG: CPT

## 2020-09-17 ENCOUNTER — HOSPITAL ENCOUNTER (OUTPATIENT)
Dept: CARDIAC REHAB | Age: 60
Discharge: HOME | End: 2020-09-17
Payer: COMMERCIAL

## 2020-09-17 PROCEDURE — 93798 PHYS/QHP OP CAR RHAB W/ECG: CPT

## 2020-09-22 ENCOUNTER — HOSPITAL ENCOUNTER (OUTPATIENT)
Dept: CARDIAC REHAB | Age: 60
Discharge: HOME | End: 2020-09-22
Payer: COMMERCIAL

## 2020-09-22 PROCEDURE — 93798 PHYS/QHP OP CAR RHAB W/ECG: CPT

## 2020-09-23 PROBLEM — I50.32 DIASTOLIC CHF, CHRONIC (HCC): Chronic | Status: RESOLVED | Noted: 2018-05-07 | Resolved: 2020-09-23

## 2020-09-24 ENCOUNTER — HOSPITAL ENCOUNTER (OUTPATIENT)
Dept: CARDIAC REHAB | Age: 60
Discharge: HOME | End: 2020-09-24
Payer: COMMERCIAL

## 2020-09-24 PROCEDURE — 93798 PHYS/QHP OP CAR RHAB W/ECG: CPT

## 2020-09-27 PROBLEM — E11.9 DIABETES MELLITUS, TYPE II (HCC): Chronic | Status: RESOLVED | Noted: 2020-05-20 | Resolved: 2020-09-27

## 2020-09-27 PROBLEM — D50.9 IRON DEFICIENCY ANEMIA: Chronic | Status: RESOLVED | Noted: 2020-06-18 | Resolved: 2020-09-27

## 2020-09-27 PROBLEM — I35.8 ENDOCARDITIS OF AORTIC VALVE: Status: RESOLVED | Noted: 2020-07-23 | Resolved: 2020-09-27

## 2020-09-27 PROBLEM — E83.51 HYPOCALCEMIA: Status: RESOLVED | Noted: 2020-07-23 | Resolved: 2020-09-27

## 2020-09-27 PROBLEM — R79.1 ELEVATED FACTOR VIII LEVEL: Status: RESOLVED | Noted: 2020-06-18 | Resolved: 2020-09-27

## 2020-09-27 PROBLEM — R09.02 HYPOXIA: Status: RESOLVED | Noted: 2018-04-12 | Resolved: 2020-09-27

## 2020-09-27 PROBLEM — I50.22 SYSTOLIC CHF, CHRONIC (HCC): Status: RESOLVED | Noted: 2020-08-14 | Resolved: 2020-09-27

## 2020-09-27 PROBLEM — D69.6 THROMBOCYTOPENIA (HCC): Status: RESOLVED | Noted: 2020-07-23 | Resolved: 2020-09-27

## 2020-09-27 PROBLEM — R23.3 PETECHIAE: Status: RESOLVED | Noted: 2020-05-16 | Resolved: 2020-09-27

## 2020-09-27 PROBLEM — I35.0 AORTIC STENOSIS: Status: RESOLVED | Noted: 2020-07-10 | Resolved: 2020-09-27

## 2020-09-27 PROBLEM — D64.9 ANEMIA: Status: RESOLVED | Noted: 2020-05-16 | Resolved: 2020-09-27

## 2020-09-27 PROBLEM — D72.829 LEUKOCYTOSIS: Status: RESOLVED | Noted: 2020-07-23 | Resolved: 2020-09-27

## 2020-09-29 ENCOUNTER — HOSPITAL ENCOUNTER (OUTPATIENT)
Dept: CARDIAC REHAB | Age: 60
Discharge: HOME | End: 2020-09-29
Payer: COMMERCIAL

## 2020-09-29 VITALS — BODY MASS INDEX: 19.26 KG/M2 | WEIGHT: 146 LBS

## 2020-09-29 PROCEDURE — 93798 PHYS/QHP OP CAR RHAB W/ECG: CPT

## 2020-10-01 ENCOUNTER — HOSPITAL ENCOUNTER (OUTPATIENT)
Dept: CARDIAC REHAB | Age: 60
Discharge: HOME | End: 2020-10-01
Payer: COMMERCIAL

## 2020-10-01 VITALS — WEIGHT: 145 LBS | BODY MASS INDEX: 19.13 KG/M2

## 2020-10-01 PROCEDURE — 93798 PHYS/QHP OP CAR RHAB W/ECG: CPT

## 2020-10-05 ENCOUNTER — HOSPITAL ENCOUNTER (OUTPATIENT)
Dept: LAB | Age: 60
Discharge: HOME | End: 2020-10-05
Payer: COMMERCIAL

## 2020-10-05 DIAGNOSIS — D64.9 ANEMIA, UNSPECIFIED TYPE: ICD-10-CM

## 2020-10-05 DIAGNOSIS — R79.89 HIGH PLASMA VON WILLEBRAND FACTOR (VWF): ICD-10-CM

## 2020-10-05 LAB
ALBUMIN SERPL-MCNC: 4.1 G/DL (ref 3.2–4.6)
ALBUMIN/GLOB SERPL: 1.1 {RATIO} (ref 1.2–3.5)
ALP SERPL-CCNC: 94 U/L (ref 50–136)
ALT SERPL-CCNC: 39 U/L (ref 12–65)
ANION GAP SERPL CALC-SCNC: 6 MMOL/L (ref 7–16)
AST SERPL-CCNC: 24 U/L (ref 15–37)
BASOPHILS # BLD: 0.1 K/UL (ref 0–0.2)
BASOPHILS NFR BLD: 1 % (ref 0–2)
BILIRUB SERPL-MCNC: 0.6 MG/DL (ref 0.2–1.1)
BUN SERPL-MCNC: 33 MG/DL (ref 8–23)
CALCIUM SERPL-MCNC: 9 MG/DL (ref 8.3–10.4)
CHLORIDE SERPL-SCNC: 105 MMOL/L (ref 98–107)
CO2 SERPL-SCNC: 28 MMOL/L (ref 21–32)
CREAT SERPL-MCNC: 1.3 MG/DL (ref 0.8–1.5)
DIFFERENTIAL METHOD BLD: ABNORMAL
EOSINOPHIL # BLD: 0.4 K/UL (ref 0–0.8)
EOSINOPHIL NFR BLD: 4 % (ref 0.5–7.8)
ERYTHROCYTE [DISTWIDTH] IN BLOOD BY AUTOMATED COUNT: 14.2 % (ref 11.9–14.6)
FERRITIN SERPL-MCNC: 909 NG/ML (ref 8–388)
GLOBULIN SER CALC-MCNC: 3.9 G/DL (ref 2.3–3.5)
GLUCOSE SERPL-MCNC: 89 MG/DL (ref 65–100)
HCT VFR BLD AUTO: 41.6 % (ref 41.1–50.3)
HGB BLD-MCNC: 13.5 G/DL (ref 13.6–17.2)
IMM GRANULOCYTES # BLD AUTO: 0.1 K/UL (ref 0–0.5)
IMM GRANULOCYTES NFR BLD AUTO: 1 % (ref 0–5)
IRON SATN MFR SERPL: 23 %
IRON SERPL-MCNC: 76 UG/DL (ref 35–150)
LYMPHOCYTES # BLD: 1.8 K/UL (ref 0.5–4.6)
LYMPHOCYTES NFR BLD: 17 % (ref 13–44)
MCH RBC QN AUTO: 29.4 PG (ref 26.1–32.9)
MCHC RBC AUTO-ENTMCNC: 32.5 G/DL (ref 31.4–35)
MCV RBC AUTO: 90.6 FL (ref 79.6–97.8)
MONOCYTES # BLD: 0.9 K/UL (ref 0.1–1.3)
MONOCYTES NFR BLD: 9 % (ref 4–12)
NEUTS SEG # BLD: 7.4 K/UL (ref 1.7–8.2)
NEUTS SEG NFR BLD: 70 % (ref 43–78)
NRBC # BLD: 0 K/UL (ref 0–0.2)
PLATELET # BLD AUTO: 153 K/UL (ref 150–450)
PMV BLD AUTO: 9.2 FL (ref 9.4–12.3)
POTASSIUM SERPL-SCNC: 4.5 MMOL/L (ref 3.5–5.1)
PROT SERPL-MCNC: 8 G/DL (ref 6.3–8.2)
RBC # BLD AUTO: 4.59 M/UL (ref 4.23–5.67)
SODIUM SERPL-SCNC: 139 MMOL/L (ref 136–145)
TIBC SERPL-MCNC: 325 UG/DL (ref 250–450)
WBC # BLD AUTO: 10.6 K/UL (ref 4.3–11.1)

## 2020-10-05 PROCEDURE — 82728 ASSAY OF FERRITIN: CPT

## 2020-10-05 PROCEDURE — 36415 COLL VENOUS BLD VENIPUNCTURE: CPT

## 2020-10-05 PROCEDURE — 85025 COMPLETE CBC W/AUTO DIFF WBC: CPT

## 2020-10-05 PROCEDURE — 83540 ASSAY OF IRON: CPT

## 2020-10-05 PROCEDURE — 80053 COMPREHEN METABOLIC PANEL: CPT

## 2020-10-05 PROCEDURE — 85240 CLOT FACTOR VIII AHG 1 STAGE: CPT

## 2020-10-06 ENCOUNTER — HOSPITAL ENCOUNTER (OUTPATIENT)
Dept: CARDIAC REHAB | Age: 60
Discharge: HOME | End: 2020-10-06
Payer: COMMERCIAL

## 2020-10-06 VITALS — WEIGHT: 147 LBS | BODY MASS INDEX: 19.39 KG/M2

## 2020-10-06 LAB
FACT VIII ACT/NOR PPP: 168 % (ref 56–140)
INTERPRETATION, 910378, CSIR1: ABNORMAL
VWF AG ACT/NOR PPP IA: 218 % (ref 50–200)
VWF:RCO ACT/NOR PPP PL AGG: 235 % (ref 50–200)

## 2020-10-06 PROCEDURE — 93798 PHYS/QHP OP CAR RHAB W/ECG: CPT

## 2020-10-08 ENCOUNTER — HOSPITAL ENCOUNTER (OUTPATIENT)
Dept: CARDIAC REHAB | Age: 60
Discharge: HOME | End: 2020-10-08
Payer: COMMERCIAL

## 2020-10-08 PROCEDURE — 93798 PHYS/QHP OP CAR RHAB W/ECG: CPT

## 2020-10-13 ENCOUNTER — HOSPITAL ENCOUNTER (OUTPATIENT)
Dept: CARDIAC REHAB | Age: 60
Discharge: HOME | End: 2020-10-13
Payer: COMMERCIAL

## 2020-10-13 VITALS — WEIGHT: 148.8 LBS | BODY MASS INDEX: 19.63 KG/M2

## 2020-10-13 PROCEDURE — 93798 PHYS/QHP OP CAR RHAB W/ECG: CPT

## 2020-10-15 ENCOUNTER — HOSPITAL ENCOUNTER (OUTPATIENT)
Dept: CARDIAC REHAB | Age: 60
Discharge: HOME | End: 2020-10-15
Payer: COMMERCIAL

## 2020-10-15 PROCEDURE — 93798 PHYS/QHP OP CAR RHAB W/ECG: CPT

## 2020-10-20 ENCOUNTER — HOSPITAL ENCOUNTER (OUTPATIENT)
Dept: CARDIAC REHAB | Age: 60
Discharge: HOME | End: 2020-10-20
Payer: COMMERCIAL

## 2020-10-20 VITALS — WEIGHT: 152.4 LBS | BODY MASS INDEX: 20.11 KG/M2

## 2020-10-20 PROCEDURE — 93798 PHYS/QHP OP CAR RHAB W/ECG: CPT

## 2020-10-22 ENCOUNTER — HOSPITAL ENCOUNTER (OUTPATIENT)
Dept: CARDIAC REHAB | Age: 60
Discharge: HOME | End: 2020-10-22
Payer: COMMERCIAL

## 2020-10-22 PROCEDURE — 93798 PHYS/QHP OP CAR RHAB W/ECG: CPT

## 2020-10-27 ENCOUNTER — HOSPITAL ENCOUNTER (OUTPATIENT)
Dept: CARDIAC REHAB | Age: 60
Discharge: HOME | End: 2020-10-27
Payer: COMMERCIAL

## 2020-10-27 VITALS — BODY MASS INDEX: 19.97 KG/M2 | WEIGHT: 151.4 LBS

## 2020-10-27 PROCEDURE — 93798 PHYS/QHP OP CAR RHAB W/ECG: CPT

## 2020-10-29 ENCOUNTER — HOSPITAL ENCOUNTER (OUTPATIENT)
Dept: CARDIAC REHAB | Age: 60
Discharge: HOME | End: 2020-10-29
Payer: COMMERCIAL

## 2020-10-29 VITALS — BODY MASS INDEX: 20 KG/M2 | WEIGHT: 151.6 LBS

## 2020-10-29 PROCEDURE — 93798 PHYS/QHP OP CAR RHAB W/ECG: CPT

## 2020-11-02 ENCOUNTER — HOSPITAL ENCOUNTER (OUTPATIENT)
Dept: CARDIAC REHAB | Age: 60
Discharge: HOME | End: 2020-11-02
Payer: COMMERCIAL

## 2020-11-02 PROCEDURE — 93798 PHYS/QHP OP CAR RHAB W/ECG: CPT

## 2020-11-03 ENCOUNTER — APPOINTMENT (OUTPATIENT)
Dept: CARDIAC REHAB | Age: 60
End: 2020-11-03
Payer: COMMERCIAL

## 2020-11-05 ENCOUNTER — HOSPITAL ENCOUNTER (OUTPATIENT)
Dept: CARDIAC REHAB | Age: 60
Discharge: HOME | End: 2020-11-05
Payer: COMMERCIAL

## 2020-11-05 VITALS — WEIGHT: 153 LBS | BODY MASS INDEX: 20.19 KG/M2

## 2020-11-05 PROCEDURE — 93798 PHYS/QHP OP CAR RHAB W/ECG: CPT

## 2020-11-10 ENCOUNTER — HOSPITAL ENCOUNTER (OUTPATIENT)
Dept: CARDIAC REHAB | Age: 60
Discharge: HOME | End: 2020-11-10
Payer: COMMERCIAL

## 2020-11-10 VITALS — BODY MASS INDEX: 20.42 KG/M2 | WEIGHT: 154.8 LBS

## 2020-11-10 PROCEDURE — 93798 PHYS/QHP OP CAR RHAB W/ECG: CPT

## 2020-11-12 ENCOUNTER — HOSPITAL ENCOUNTER (OUTPATIENT)
Dept: CARDIAC REHAB | Age: 60
Discharge: HOME | End: 2020-11-12
Payer: COMMERCIAL

## 2020-11-12 PROCEDURE — 93798 PHYS/QHP OP CAR RHAB W/ECG: CPT

## 2020-11-17 ENCOUNTER — HOSPITAL ENCOUNTER (OUTPATIENT)
Dept: CARDIAC REHAB | Age: 60
Discharge: HOME | End: 2020-11-17
Payer: COMMERCIAL

## 2020-11-17 VITALS — BODY MASS INDEX: 20.66 KG/M2 | WEIGHT: 156.6 LBS

## 2020-11-17 PROCEDURE — 93798 PHYS/QHP OP CAR RHAB W/ECG: CPT

## 2020-11-19 ENCOUNTER — HOSPITAL ENCOUNTER (OUTPATIENT)
Dept: CARDIAC REHAB | Age: 60
Discharge: HOME | End: 2020-11-19
Payer: COMMERCIAL

## 2020-11-19 PROCEDURE — 93798 PHYS/QHP OP CAR RHAB W/ECG: CPT

## 2020-11-23 ENCOUNTER — HOSPITAL ENCOUNTER (OUTPATIENT)
Dept: CARDIAC REHAB | Age: 60
Discharge: HOME | End: 2020-11-23
Payer: COMMERCIAL

## 2020-11-23 PROCEDURE — 93798 PHYS/QHP OP CAR RHAB W/ECG: CPT

## 2020-11-24 ENCOUNTER — HOSPITAL ENCOUNTER (OUTPATIENT)
Dept: CARDIAC REHAB | Age: 60
Discharge: HOME | End: 2020-11-24
Payer: COMMERCIAL

## 2020-11-24 VITALS — WEIGHT: 156.7 LBS | BODY MASS INDEX: 20.67 KG/M2

## 2020-11-24 PROCEDURE — 93798 PHYS/QHP OP CAR RHAB W/ECG: CPT

## 2020-11-30 ENCOUNTER — APPOINTMENT (OUTPATIENT)
Dept: CARDIAC REHAB | Age: 60
End: 2020-11-30
Payer: COMMERCIAL

## 2020-12-01 ENCOUNTER — HOSPITAL ENCOUNTER (OUTPATIENT)
Dept: CARDIAC REHAB | Age: 60
Discharge: HOME | End: 2020-12-01
Payer: COMMERCIAL

## 2020-12-01 VITALS — WEIGHT: 159.6 LBS | BODY MASS INDEX: 21.06 KG/M2

## 2020-12-01 PROCEDURE — 93798 PHYS/QHP OP CAR RHAB W/ECG: CPT

## 2020-12-03 ENCOUNTER — HOSPITAL ENCOUNTER (OUTPATIENT)
Dept: CARDIAC REHAB | Age: 60
Discharge: HOME | End: 2020-12-03
Payer: COMMERCIAL

## 2020-12-03 VITALS — WEIGHT: 158.8 LBS | BODY MASS INDEX: 20.95 KG/M2

## 2020-12-03 PROCEDURE — 93798 PHYS/QHP OP CAR RHAB W/ECG: CPT

## 2020-12-07 ENCOUNTER — HOSPITAL ENCOUNTER (OUTPATIENT)
Dept: CARDIAC REHAB | Age: 60
Discharge: HOME | End: 2020-12-07
Payer: COMMERCIAL

## 2020-12-07 PROCEDURE — 93798 PHYS/QHP OP CAR RHAB W/ECG: CPT

## 2020-12-08 ENCOUNTER — APPOINTMENT (OUTPATIENT)
Dept: CARDIAC REHAB | Age: 60
End: 2020-12-08
Payer: COMMERCIAL

## 2020-12-10 ENCOUNTER — APPOINTMENT (OUTPATIENT)
Dept: CARDIAC REHAB | Age: 60
End: 2020-12-10
Payer: COMMERCIAL

## 2020-12-11 ENCOUNTER — APPOINTMENT (OUTPATIENT)
Dept: CARDIAC REHAB | Age: 60
End: 2020-12-11
Payer: COMMERCIAL

## 2020-12-15 ENCOUNTER — HOSPITAL ENCOUNTER (OUTPATIENT)
Dept: CARDIAC REHAB | Age: 60
End: 2020-12-15
Payer: COMMERCIAL

## 2020-12-17 ENCOUNTER — APPOINTMENT (OUTPATIENT)
Dept: CARDIAC REHAB | Age: 60
End: 2020-12-17
Payer: COMMERCIAL

## 2020-12-18 ENCOUNTER — HOSPITAL ENCOUNTER (OUTPATIENT)
Dept: CARDIAC REHAB | Age: 60
Discharge: HOME | End: 2020-12-18
Payer: COMMERCIAL

## 2020-12-18 VITALS — BODY MASS INDEX: 20.87 KG/M2 | WEIGHT: 158.2 LBS

## 2020-12-18 PROCEDURE — 93798 PHYS/QHP OP CAR RHAB W/ECG: CPT

## 2020-12-22 ENCOUNTER — APPOINTMENT (OUTPATIENT)
Dept: CARDIAC REHAB | Age: 60
End: 2020-12-22
Payer: COMMERCIAL

## 2020-12-29 ENCOUNTER — APPOINTMENT (OUTPATIENT)
Dept: CARDIAC REHAB | Age: 60
End: 2020-12-29
Payer: COMMERCIAL

## 2020-12-29 NOTE — PROGRESS NOTES
Script resent. Spiritual Care Visit, initial visit. Visited with patient at bedside. Prayed for patient's healing and health. Visit by Stephen Huynh, Staff .  Julieta, Cindy.B., B.A.

## 2021-08-06 PROBLEM — F32.9 REACTIVE DEPRESSION: Status: ACTIVE | Noted: 2021-08-06

## 2021-08-17 ENCOUNTER — HOSPITAL ENCOUNTER (OUTPATIENT)
Dept: CT IMAGING | Age: 61
Discharge: HOME | End: 2021-08-17
Attending: INTERNAL MEDICINE
Payer: COMMERCIAL

## 2021-08-17 DIAGNOSIS — Z87.891 HISTORY OF SMOKING 30 OR MORE PACK YEARS: ICD-10-CM

## 2021-08-17 PROCEDURE — 71271 CT THORAX LUNG CANCER SCR C-: CPT

## 2021-08-18 NOTE — PROGRESS NOTES
Talked to pt and informed him, per Dr. Nida Sandhoff, LDCT revealed stable 5 mm LLL pulmonary nodule, recheck in 1 year.

## 2021-10-05 PROBLEM — R91.1 PULMONARY NODULE: Status: ACTIVE | Noted: 2021-10-05

## 2022-03-18 PROBLEM — R79.89 HIGH PLASMA VON WILLEBRAND FACTOR (VWF): Status: ACTIVE | Noted: 2020-06-18

## 2022-03-18 PROBLEM — Z95.2 STATUS POST AORTIC VALVE REPLACEMENT: Status: ACTIVE | Noted: 2018-04-12

## 2022-03-19 PROBLEM — I10 ESSENTIAL HYPERTENSION: Status: ACTIVE | Noted: 2018-09-21

## 2022-03-19 PROBLEM — R91.1 PULMONARY NODULE: Status: ACTIVE | Noted: 2021-10-05

## 2022-03-19 PROBLEM — G47.33 OSA (OBSTRUCTIVE SLEEP APNEA): Status: ACTIVE | Noted: 2018-05-17

## 2022-03-19 PROBLEM — E55.9 VITAMIN D DEFICIENCY: Status: ACTIVE | Noted: 2020-05-20

## 2022-03-19 PROBLEM — F32.9 REACTIVE DEPRESSION: Status: ACTIVE | Noted: 2021-08-06

## 2022-04-08 PROBLEM — N52.8 OTHER MALE ERECTILE DYSFUNCTION: Status: ACTIVE | Noted: 2022-04-08

## 2022-04-11 PROBLEM — N52.8 OTHER MALE ERECTILE DYSFUNCTION: Status: ACTIVE | Noted: 2022-04-08

## 2022-05-16 ENCOUNTER — HOSPITAL ENCOUNTER (OUTPATIENT)
Dept: GENERAL RADIOLOGY | Age: 62
Discharge: HOME | End: 2022-05-16
Payer: COMMERCIAL

## 2022-05-16 DIAGNOSIS — M25.511 ACUTE PAIN OF RIGHT SHOULDER: ICD-10-CM

## 2022-05-16 PROCEDURE — 73030 X-RAY EXAM OF SHOULDER: CPT

## 2022-05-16 NOTE — DISCHARGE INSTRUCTIONS
HEART CATHETERIZATION/ANGIOGRAPHY DISCHARGE INSTRUCTIONS    1. Check puncture site frequently for swelling or bleeding. If there is any bleeding, lie down and apply pressure over the area with a clean towel or washcloth. Call 911. Notify your doctor for any redness, swelling, drainage, or oozing from the puncture site. Notify your doctor for any fever or chills. 2. If the extremity becomes cold, numb, or painful call [facility] at [phone].  3. Activity should be limited for the next 48 hours. Climb stairs as little as possible and avoid any stooping, bending, or strenuous activity for 48 hours. No heavy lifting (anything over 10 pounds) for 3 days. 4. You may resume your usual diet. Drink more fluids than usual.  5. Have a responsible person drive you home and stay with you for at least 24 hours after your heart catheterization/angiography. Do not drive for the next 24 hours. 6. You may remove bandage from your Right wrist in 24 hours. You may shower in 24 hours. No tub baths, hot tubs, or swimming for 1 week. Do not place any lotions, creams, powders, or ointments over puncture site for 1 week. You may place a clean band-aid over the puncture site each day for 5 days. Change daily. 7. Please continue your medications as prescribed by your physician. I have read the above instructions and have had the opportunity to ask questions.       Patient: ________________________   Date: 2/23/2018    Witness: _______________________   Date: 2/23/2018 ATTG: : transfer to CDU for continue observation, ENT to follow, IV abx, and freq checks.

## 2022-05-18 NOTE — PROGRESS NOTES
LMOVM informing pt, per Dr. Hansa Duval, R shoulder xray WNL, no acute abnormality or joint derangement noted; is to call if no improvement.

## 2022-07-20 ENCOUNTER — COMMUNITY OUTREACH (OUTPATIENT)
Dept: INTERNAL MEDICINE CLINIC | Facility: CLINIC | Age: 62
End: 2022-07-20

## 2022-07-20 NOTE — PROGRESS NOTES
Patient's HM shows they are overdue for Colorectal Screening. Care Everywhere and  files searched with success. HM updated with cologuard report.

## 2022-11-01 ENCOUNTER — PATIENT MESSAGE (OUTPATIENT)
Dept: INTERNAL MEDICINE CLINIC | Facility: CLINIC | Age: 62
End: 2022-11-01

## 2022-11-01 DIAGNOSIS — I10 ESSENTIAL (PRIMARY) HYPERTENSION: ICD-10-CM

## 2022-11-01 DIAGNOSIS — M54.32 SCIATICA, LEFT SIDE: Primary | ICD-10-CM

## 2022-11-02 RX ORDER — CARVEDILOL 3.12 MG/1
3.12 TABLET ORAL 2 TIMES DAILY
Qty: 180 TABLET | Refills: 1 | Status: SHIPPED | OUTPATIENT
Start: 2022-11-02

## 2022-11-02 RX ORDER — CYCLOBENZAPRINE HCL 5 MG
5 TABLET ORAL NIGHTLY PRN
Qty: 90 TABLET | Refills: 0 | Status: SHIPPED | OUTPATIENT
Start: 2022-11-02

## 2022-11-02 NOTE — TELEPHONE ENCOUNTER
From: Angela Hackett  To: Dr. Cifuentes Fill: 11/1/2022 11:51 AM EDT  Subject: change pharmacy    hello My car is disabled,needs expensive repair. So I transfered all my prescriptions from [address] [place] [address] to [facility] DR JAVON WOODY in [address] [facility] [facility] as to be closer to home. I received the refills of atorvastatin,  escitalopram,and lisinopril. I did not receive the carvedilol. Will you call into [facility] DR JAVON WOODY as well as all others need refills except for the 3 I have mentioned above I have. I really need the carvedilol as I am almost out of.  Thank Jose Lux 48- [phone]

## 2022-11-02 NOTE — TELEPHONE ENCOUNTER
Pt is requesting cyclobenzaprine to be sent to [facility] DR JAVON WOODY.  Please advise    Thank you    Momo Ritchie

## 2022-12-05 ENCOUNTER — OFFICE VISIT (OUTPATIENT)
Dept: CARDIOLOGY CLINIC | Age: 62
End: 2022-12-05
Payer: COMMERCIAL

## 2022-12-05 VITALS
HEIGHT: 73 IN | WEIGHT: 170 LBS | BODY MASS INDEX: 22.53 KG/M2 | HEART RATE: 53 BPM | DIASTOLIC BLOOD PRESSURE: 66 MMHG | SYSTOLIC BLOOD PRESSURE: 126 MMHG

## 2022-12-05 DIAGNOSIS — I10 ESSENTIAL HYPERTENSION: ICD-10-CM

## 2022-12-05 DIAGNOSIS — G47.33 OSA (OBSTRUCTIVE SLEEP APNEA): ICD-10-CM

## 2022-12-05 DIAGNOSIS — I50.22 CHRONIC SYSTOLIC HEART FAILURE (HCC): Primary | ICD-10-CM

## 2022-12-05 DIAGNOSIS — E78.5 DYSLIPIDEMIA: ICD-10-CM

## 2022-12-05 DIAGNOSIS — I25.10 CORONARY ARTERY DISEASE INVOLVING NATIVE CORONARY ARTERY OF NATIVE HEART WITHOUT ANGINA PECTORIS: ICD-10-CM

## 2022-12-05 PROCEDURE — 93000 ELECTROCARDIOGRAM COMPLETE: CPT | Performed by: INTERNAL MEDICINE

## 2022-12-05 PROCEDURE — 3074F SYST BP LT 130 MM HG: CPT | Performed by: INTERNAL MEDICINE

## 2022-12-05 PROCEDURE — 3078F DIAST BP <80 MM HG: CPT | Performed by: INTERNAL MEDICINE

## 2022-12-05 PROCEDURE — 99214 OFFICE O/P EST MOD 30 MIN: CPT | Performed by: INTERNAL MEDICINE

## 2022-12-05 NOTE — PROGRESS NOTES
[address], [address] [employer] [facility], [address] [place] [address]  PHONE: [phone]     22    NAME:  Elisa Hatfield  : 1960  MRN: [id number]       SUBJECTIVE:   Elisa Hatfield is a 57 y.o. 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 He underwent multiple dental extractions   Echo 2020: EF 40-45%, normal AVR. Echo 2022: EF 20%, AV mean 20         Feeling better, no CP, angina, JOHNSON. he has NYHA Class II sx now. NO new edema. Home -130. Walking some now. Patient denies recent history of orthopnea, PND, excessive dizziness and/or syncope. No new angina. Quit smoking, over >40 pack years now. Past Medical History, Past Surgical History, Family history, Social History, and Medications were all reviewed with the patient today and updated as necessary. Current Outpatient Medications   Medication Sig Dispense Refill    carvedilol (COREG) 3.125 MG tablet Take 1 tablet by mouth 2 times daily 180 tablet 1    cyclobenzaprine (FLEXERIL) 5 MG tablet Take 1 tablet by mouth nightly as needed for Muscle spasms 90 tablet 0    acetaminophen (TYLENOL) 325 MG tablet Take 650 mg by mouth every 6 hours as needed      aspirin 81 MG chewable tablet Take 81 mg by mouth      atorvastatin (LIPITOR) 20 MG tablet TAKE 1 TABLET BY MOUTH NIGHTLY.       diclofenac sodium (VOLTAREN) 1 % GEL Apply 2 g topically 4 times daily      escitalopram (LEXAPRO) 10 MG tablet Take 10 mg by mouth daily      ferrous sulfate (FE TABS 325) 325 (65 Fe) MG EC tablet Take 325 mg by mouth daily      lisinopril (PRINIVIL;ZESTRIL) 2.5 MG tablet Take 2.5 mg by mouth 2 times daily      nitroGLYCERIN (NITROSTAT) 0.4 MG SL tablet Place 0.4 mg under the tongue      sildenafil (VIAGRA) 25 MG tablet Take 25 mg by mouth as needed       No current facility-administered medications for this visit. No Known Allergies  Patient Active Problem List    Diagnosis Date Noted    Other male erectile dysfunction 2022    Pulmonary nodule 10/05/2021     LDCT 21: 5 mm LLL pulmonary nodule, needs f/u in 1 year;         Dyslipidemia     Chronic systolic heart failure ([facility]     Diabetes mellitus type 2, diet-controlled ([facility]     CAD (coronary artery disease)      Nonobstructive        Reactive depression 2021    Sigmoid diverticulosis     High plasma von Willebrand factor (vWF) 2020    Vitamin D deficiency 2020    Essential hypertension 2018    ASIM (obstructive sleep apnea) 2018    Status post aortic valve replacement 2018      Past Surgical History:   Procedure Laterality Date    CARDIAC CATHETERIZATION  2018    non obstructive disease, severe AS    HERNIA REPAIR Left 2018    inguinal     OTHER SURGICAL HISTORY  2021    Cologuard negative    IA CARDIAC SURG PROCEDURE UNLIST  2018    AVR    VASCULAR SURGERY Right 2020    PICC-- IN PLACE -- RIGHT UPPER ARM     Family History   Problem Relation Age of Onset    Coronary Art Dis Mother     Cancer Mother     Heart Disease Brother 64        MI X 3    Breast Cancer Sister     Coronary Art Dis Brother      Social History     Tobacco Use    Smoking status: Former     Packs/day: 0.50     Types: Cigarettes     Quit date: 2018     Years since quittin.6    Smokeless tobacco: Never   Substance Use Topics    Alcohol use:  Yes     Alcohol/week: 2.0 standard drinks         ROS:    No obvious pertinent positives on review of systems except for what was outlined in the HPI today. PHYSICAL EXAM:     /66   Pulse 53   Ht 6' 1\" (1.854 m)   Wt 170 lb (77.1 kg)   BMI 22.43 kg/m²    General/Constitutional:   Alert and oriented x 3, no acute distress  HEENT:   normocephalic, atraumatic, moist mucous membranes  Neck:   No JVD or carotid bruits bilaterally  Cardiovascular:   regular rate and rhythm, 2/6 SM  Pulmonary:   clear to auscultation bilaterally, no respiratory distress  Abdomen:   soft, non-tender, non-distended  Ext:   No sig LE edema bilaterally  Skin:  warm and dry, no obvious rashes seen  Neuro:   no obvious sensory or motor deficits  Psychiatric:   normal mood and affect      EKG Today and independently reviewed by me: sinus rhythm, normal intervals and non-specific ST/T wave changes.   Sinus betty      Lab Results   Component Value Date/Time     04/04/2022 11:39 AM    K 4.6 04/04/2022 11:39 AM    CL 98 04/04/2022 11:39 AM    CO2 24 04/04/2022 11:39 AM    BUN 25 04/04/2022 11:39 AM    CREATININE 1.12 04/04/2022 11:39 AM    GLUCOSE 102 04/04/2022 11:39 AM    CALCIUM 9.1 04/04/2022 11:39 AM        Lab Results   Component Value Date    WBC 9.9 10/01/2021    HGB 14.7 10/01/2021    HCT 44.5 10/01/2021    MCV 94 10/01/2021     10/01/2021       Lab Results   Component Value Date    TSH 1.650 10/01/2021       Lab Results   Component Value Date    LABA1C 5.8 (H) 04/04/2022     Lab Results   Component Value Date     04/04/2022       Lab Results   Component Value Date    CHOL 115 04/04/2022    CHOL 91 (L) 10/01/2021    CHOL <50 05/18/2020     Lab Results   Component Value Date    TRIG 117 04/04/2022    TRIG 61 10/01/2021    TRIG 80 05/18/2020     Lab Results   Component Value Date    HDL 56 04/04/2022    HDL 47 10/01/2021    HDL 14 (L) 05/18/2020     Lab Results   Component Value Date    LDLCALC 38 04/04/2022    LDLCALC 30 10/01/2021    [address] Cannot be calculated 05/18/2020     Lab Results   Component Value Date    LABVLDL 16 05/18/2020    LABVLDL 16 03/13/2020    VLDL 21 04/04/2022    VLDL 14 10/01/2021     Lab Results   Component Value Date    CHOLHDLRATIO Cannot be calculated 05/18/2020           I have Independently reviewed prior care notes, any ER records available, cardiac testing, labs and results with the patient and before seeing the patient today. Also independently reviewed outside records when available. ASSESSMENT:    Ronnie Joseph was seen today for congestive heart failure and results. Diagnoses and all orders for this visit:    Chronic systolic heart failure (HCC)  -     EKG 12 Lead  -     Transthoracic echocardiogram (TTE) complete with contrast, bubble, strain, and 3D PRN; Future    Essential hypertension    Coronary artery disease involving native coronary artery of native heart without angina pectoris  -     Transthoracic echocardiogram (TTE) complete with contrast, bubble, strain, and 3D PRN; Future    ASIM (obstructive sleep apnea)    Dyslipidemia        PLAN:    1. S/p AVR:  remain on ASA. IE proph reviewed. AV mean gradient noted, EF down. Will need to follow. He has no angina, no worsening HF sx now. Follow, echo in 3 mos. 2. ASIM: stay on CPAP      3. CAD: remain on BB, ASA and statin. No angina now. No CP. Better now. The patient has been instructed to call with any angina or equivalent as reviewed today. All questions were answered with the patient voicing complete understanding. 4. HTN/HPL: remain on meds. 5. SHF: remain on GDMT, remain on Ace and coreg. EF now 20-25%. He has no angina at all. No worsening HF sx now. Check another echo in 3 mos, may need ICD referral.  Follow, he is back working some. Patient has been instructed and agrees to call our office with any issues or other concerns related to their cardiac condition(s) and/or complaint(s).         Return in about 3 months (around 3/5/2023).        ZION SLAUGHTER,   12/5/2022

## 2022-12-09 DIAGNOSIS — I10 ESSENTIAL HYPERTENSION: Primary | ICD-10-CM

## 2022-12-09 RX ORDER — LISINOPRIL 2.5 MG/1
2.5 TABLET ORAL 2 TIMES DAILY
Qty: 180 TABLET | Refills: 1 | Status: SHIPPED | OUTPATIENT
Start: 2022-12-09

## 2022-12-29 RX ORDER — NITROGLYCERIN 0.4 MG/1
0.4 TABLET SUBLINGUAL EVERY 5 MIN PRN
Qty: 25 TABLET | Refills: 11 | Status: SHIPPED | OUTPATIENT
Start: 2022-12-29

## 2022-12-29 RX ORDER — ATORVASTATIN CALCIUM 20 MG/1
20 TABLET, FILM COATED ORAL DAILY
Qty: 90 TABLET | Refills: 3 | Status: SHIPPED | OUTPATIENT
Start: 2022-12-29

## 2023-01-09 DIAGNOSIS — F32.9 REACTIVE DEPRESSION: Primary | ICD-10-CM

## 2023-01-09 DIAGNOSIS — I10 ESSENTIAL (PRIMARY) HYPERTENSION: ICD-10-CM

## 2023-01-09 RX ORDER — CARVEDILOL 3.12 MG/1
3.12 TABLET ORAL 2 TIMES DAILY
Qty: 180 TABLET | Refills: 1 | Status: SHIPPED | OUTPATIENT
Start: 2023-01-09

## 2023-01-09 RX ORDER — ESCITALOPRAM OXALATE 10 MG/1
10 TABLET ORAL DAILY
Qty: 90 TABLET | Refills: 0 | Status: SHIPPED | OUTPATIENT
Start: 2023-01-09

## 2023-01-09 NOTE — TELEPHONE ENCOUNTER
Patient requesting refill on Lexapro and carvedilol.  Send to [facility] in [facility] [place]

## 2023-03-08 DIAGNOSIS — M54.32 SCIATICA, LEFT SIDE: ICD-10-CM

## 2023-03-08 RX ORDER — CYCLOBENZAPRINE HCL 5 MG
TABLET ORAL
Qty: 90 TABLET | Refills: 0 | OUTPATIENT
Start: 2023-03-08

## 2023-03-17 DIAGNOSIS — M54.32 SCIATICA, LEFT SIDE: ICD-10-CM

## 2023-03-17 RX ORDER — CYCLOBENZAPRINE HCL 5 MG
TABLET ORAL
Qty: 90 TABLET | Refills: 0 | OUTPATIENT
Start: 2023-03-17

## 2023-03-22 DIAGNOSIS — F32.9 REACTIVE DEPRESSION: ICD-10-CM

## 2023-03-22 RX ORDER — ESCITALOPRAM OXALATE 10 MG/1
TABLET ORAL
Qty: 30 TABLET | Refills: 0 | OUTPATIENT
Start: 2023-03-22

## 2023-03-29 DIAGNOSIS — F32.9 REACTIVE DEPRESSION: ICD-10-CM

## 2023-03-29 DIAGNOSIS — M54.32 SCIATICA, LEFT SIDE: ICD-10-CM

## 2023-03-29 RX ORDER — ESCITALOPRAM OXALATE 10 MG/1
10 TABLET ORAL DAILY
Qty: 90 TABLET | Refills: 3 | Status: SHIPPED | OUTPATIENT
Start: 2023-03-29

## 2023-03-29 RX ORDER — CYCLOBENZAPRINE HCL 5 MG
5 TABLET ORAL NIGHTLY PRN
Qty: 90 TABLET | Refills: 0 | Status: SHIPPED | OUTPATIENT
Start: 2023-03-29

## 2023-03-29 NOTE — ED TRIAGE NOTES
Pt brought in by EMS from home c/o chest pain that woke him up out of his sleep, denies it radiating. EMS gave 1 nitro and 324 aspirin. Initial bp 126/70, after nitro 98/60 and gave 300cc ns. Pt denies shob, n/v. 100.8 oral. Pt states it was a throbbing pain, \"felt like something was going to jump out of my chest\". Pt states has had a stent placed.  Pt masked on arrival. No pt states she's legally /No

## 2023-05-04 NOTE — PROGRESS NOTES
TRANSFER - IN REPORT:    Verbal report received from Clifford(name) on Christopher Masker  being received from [facility] ICU(unit) for routine progression of care      Report consisted of patients Situation, Background, Assessment and   Recommendations(SBAR). Information from the following report(s) SBAR and Kardex was reviewed with the receiving nurse. Opportunity for questions and clarification was provided.       Report given to oncoming RN Kenneth Benz concentrated

## 2023-05-26 ENCOUNTER — OFFICE VISIT (OUTPATIENT)
Age: 63
End: 2023-05-26
Payer: COMMERCIAL

## 2023-05-26 VITALS
WEIGHT: 166 LBS | BODY MASS INDEX: 22 KG/M2 | HEIGHT: 73 IN | DIASTOLIC BLOOD PRESSURE: 64 MMHG | HEART RATE: 60 BPM | SYSTOLIC BLOOD PRESSURE: 112 MMHG

## 2023-05-26 DIAGNOSIS — I50.22 CHRONIC SYSTOLIC HEART FAILURE (HCC): Primary | ICD-10-CM

## 2023-05-26 DIAGNOSIS — Z95.2 STATUS POST AORTIC VALVE REPLACEMENT: ICD-10-CM

## 2023-05-26 DIAGNOSIS — I25.10 CORONARY ARTERY DISEASE INVOLVING NATIVE CORONARY ARTERY OF NATIVE HEART WITHOUT ANGINA PECTORIS: ICD-10-CM

## 2023-05-26 DIAGNOSIS — E78.5 DYSLIPIDEMIA: ICD-10-CM

## 2023-05-26 DIAGNOSIS — G47.33 OSA (OBSTRUCTIVE SLEEP APNEA): ICD-10-CM

## 2023-05-26 DIAGNOSIS — I10 ESSENTIAL HYPERTENSION: ICD-10-CM

## 2023-05-26 DIAGNOSIS — I10 ESSENTIAL (PRIMARY) HYPERTENSION: ICD-10-CM

## 2023-05-26 PROCEDURE — 99214 OFFICE O/P EST MOD 30 MIN: CPT | Performed by: INTERNAL MEDICINE

## 2023-05-26 PROCEDURE — 3074F SYST BP LT 130 MM HG: CPT | Performed by: INTERNAL MEDICINE

## 2023-05-26 PROCEDURE — 3078F DIAST BP <80 MM HG: CPT | Performed by: INTERNAL MEDICINE

## 2023-05-26 RX ORDER — SILDENAFIL 50 MG/1
50 TABLET, FILM COATED ORAL PRN
Qty: 30 TABLET | Refills: 3 | Status: SHIPPED | OUTPATIENT
Start: 2023-05-26

## 2023-05-26 RX ORDER — LISINOPRIL 2.5 MG/1
2.5 TABLET ORAL 2 TIMES DAILY
Qty: 180 TABLET | Refills: 1 | Status: SHIPPED | OUTPATIENT
Start: 2023-05-26

## 2023-05-26 RX ORDER — CARVEDILOL 3.12 MG/1
3.12 TABLET ORAL 2 TIMES DAILY
Qty: 180 TABLET | Refills: 1 | Status: SHIPPED | OUTPATIENT
Start: 2023-05-26

## 2023-05-26 NOTE — PROGRESS NOTES
[address], [address] [facility], [address] [place] [address]  PHONE: [phone]     23    NAME:  Tami Marsh  : 1960  MRN: [id number]       SUBJECTIVE:   Tami Marsh is a 57 y.o. 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 He underwent multiple dental extractions   Echo 2020: EF 40-45%, normal AVR. Echo 2022: EF 20%, AV mean 20  Echo 3/2023: EF 40%, AV mean 18         Feeling better, no CP, angina, JOHNSON. he has NYHA Class II sx now. walking some, busy at work. No CP, pressure. Active work. NO new edema. Home -130. Patient denies recent history of orthopnea, PND, excessive dizziness and/or syncope. No new angina. Quit smoking, over >40 pack years now. Past Medical History, Past Surgical History, Family history, Social History, and Medications were all reviewed with the patient today and updated as necessary.      Current Outpatient Medications   Medication Sig Dispense Refill    carvedilol (COREG) 3.125 MG tablet Take 1 tablet by mouth 2 times daily 180 tablet 1    lisinopril (PRINIVIL;ZESTRIL) 2.5 MG tablet Take 1 tablet by mouth 2 times daily 180 tablet 1    escitalopram (LEXAPRO) 10 MG tablet Take 1 tablet by mouth daily 90 tablet 3    cyclobenzaprine (FLEXERIL) 5 MG tablet Take 1 tablet by mouth nightly as needed for Muscle spasms 90 tablet 0    nitroGLYCERIN (NITROSTAT) 0.4 MG SL tablet Place 1 tablet under

## 2023-07-05 ENCOUNTER — HOSPITAL ENCOUNTER (OUTPATIENT)
Dept: LAB | Age: 63
Discharge: HOME | End: 2023-07-08

## 2023-07-05 DIAGNOSIS — E55.9 VITAMIN D DEFICIENCY: ICD-10-CM

## 2023-07-05 DIAGNOSIS — I10 ESSENTIAL HYPERTENSION: ICD-10-CM

## 2023-07-05 DIAGNOSIS — E78.5 DYSLIPIDEMIA: ICD-10-CM

## 2023-07-05 DIAGNOSIS — E11.9 DIABETES MELLITUS TYPE 2, DIET-CONTROLLED (HCC): ICD-10-CM

## 2023-07-05 LAB
25(OH)D3 SERPL-MCNC: 28.3 NG/ML (ref 30–100)
ALBUMIN SERPL-MCNC: 3.7 G/DL (ref 3.2–4.6)
ALBUMIN/GLOB SERPL: 1.1 (ref 0.4–1.6)
ALP SERPL-CCNC: 65 U/L (ref 50–136)
ALT SERPL-CCNC: 47 U/L (ref 12–65)
ANION GAP SERPL CALC-SCNC: 3 MMOL/L (ref 2–11)
APPEARANCE UR: ABNORMAL
AST SERPL-CCNC: 29 U/L (ref 15–37)
BACTERIA URNS QL MICRO: NEGATIVE /HPF
BASOPHILS # BLD: 0.1 K/UL (ref 0–0.2)
BASOPHILS NFR BLD: 1 % (ref 0–2)
BILIRUB SERPL-MCNC: 1 MG/DL (ref 0.2–1.1)
BILIRUB UR QL: NEGATIVE
BUN SERPL-MCNC: 33 MG/DL (ref 8–23)
CALCIUM SERPL-MCNC: 9 MG/DL (ref 8.3–10.4)
CASTS URNS QL MICRO: ABNORMAL /LPF (ref 0–2)
CHLORIDE SERPL-SCNC: 102 MMOL/L (ref 101–110)
CHOLEST SERPL-MCNC: 86 MG/DL
CO2 SERPL-SCNC: 28 MMOL/L (ref 21–32)
COLOR UR: ABNORMAL
CREAT SERPL-MCNC: 1.2 MG/DL (ref 0.8–1.5)
CREAT UR-MCNC: 251 MG/DL
DIFFERENTIAL METHOD BLD: NORMAL
EOSINOPHIL # BLD: 0.5 K/UL (ref 0–0.8)
EOSINOPHIL NFR BLD: 6 % (ref 0.5–7.8)
EPI CELLS #/AREA URNS HPF: ABNORMAL /HPF (ref 0–5)
ERYTHROCYTE [DISTWIDTH] IN BLOOD BY AUTOMATED COUNT: 12.9 % (ref 11.9–14.6)
GLOBULIN SER CALC-MCNC: 3.4 G/DL (ref 2.8–4.5)
GLUCOSE SERPL-MCNC: 98 MG/DL (ref 65–100)
GLUCOSE UR STRIP.AUTO-MCNC: NEGATIVE MG/DL
HCT VFR BLD AUTO: 44.8 % (ref 41.1–50.3)
HDLC SERPL-MCNC: 59 MG/DL (ref 40–60)
HDLC SERPL: 1.5
HGB BLD-MCNC: 14.6 G/DL (ref 13.6–17.2)
HGB UR QL STRIP: ABNORMAL
IMM GRANULOCYTES # BLD AUTO: 0 K/UL (ref 0–0.5)
IMM GRANULOCYTES NFR BLD AUTO: 0 % (ref 0–5)
KETONES UR QL STRIP.AUTO: 15 MG/DL
LDLC SERPL CALC-MCNC: 16 MG/DL
LEUKOCYTE ESTERASE UR QL STRIP.AUTO: NEGATIVE
LYMPHOCYTES # BLD: 1.5 K/UL (ref 0.5–4.6)
LYMPHOCYTES NFR BLD: 15 % (ref 13–44)
MCH RBC QN AUTO: 30.9 PG (ref 26.1–32.9)
MCHC RBC AUTO-ENTMCNC: 32.6 G/DL (ref 31.4–35)
MCV RBC AUTO: 94.9 FL (ref 82–102)
MICROALBUMIN UR-MCNC: 3.04 MG/DL
MICROALBUMIN/CREAT UR-RTO: 12 MG/G (ref 0–30)
MONOCYTES # BLD: 0.9 K/UL (ref 0.1–1.3)
MONOCYTES NFR BLD: 9 % (ref 4–12)
MUCOUS THREADS URNS QL MICRO: 0 /LPF
NEUTS SEG # BLD: 6.7 K/UL (ref 1.7–8.2)
NEUTS SEG NFR BLD: 69 % (ref 43–78)
NITRITE UR QL STRIP.AUTO: NEGATIVE
NRBC # BLD: 0 K/UL (ref 0–0.2)
PH UR STRIP: 5.5 (ref 5–9)
PLATELET # BLD AUTO: 157 K/UL (ref 150–450)
PMV BLD AUTO: 10 FL (ref 9.4–12.3)
POTASSIUM SERPL-SCNC: 4.4 MMOL/L (ref 3.5–5.1)
PROT SERPL-MCNC: 7.1 G/DL (ref 6.3–8.2)
PROT UR STRIP-MCNC: NEGATIVE MG/DL
RBC # BLD AUTO: 4.72 M/UL (ref 4.23–5.6)
RBC #/AREA URNS HPF: ABNORMAL /HPF (ref 0–5)
SODIUM SERPL-SCNC: 133 MMOL/L (ref 133–143)
SP GR UR REFRACTOMETRY: 1.03 (ref 1–1.02)
T4 FREE SERPL-MCNC: 0.9 NG/DL (ref 0.78–1.46)
TRIGL SERPL-MCNC: 55 MG/DL (ref 35–150)
TSH, 3RD GENERATION: 0.9 UIU/ML (ref 0.36–3.74)
URINE CULTURE IF INDICATED: ABNORMAL
UROBILINOGEN UR QL STRIP.AUTO: 0.2 EU/DL (ref 0.2–1)
VLDLC SERPL CALC-MCNC: 11 MG/DL (ref 6–23)
WBC # BLD AUTO: 9.7 K/UL (ref 4.3–11.1)
WBC URNS QL MICRO: ABNORMAL /HPF (ref 0–4)

## 2023-07-06 LAB
EST. AVERAGE GLUCOSE BLD GHB EST-MCNC: 117 MG/DL
HBA1C MFR BLD: 5.7 % (ref 4.8–5.6)

## 2023-07-11 ENCOUNTER — OFFICE VISIT (OUTPATIENT)
Dept: INTERNAL MEDICINE CLINIC | Facility: CLINIC | Age: 63
End: 2023-07-11
Payer: COMMERCIAL

## 2023-07-11 VITALS
BODY MASS INDEX: 21.74 KG/M2 | HEART RATE: 57 BPM | WEIGHT: 164 LBS | TEMPERATURE: 97.8 F | HEIGHT: 73 IN | OXYGEN SATURATION: 96 % | DIASTOLIC BLOOD PRESSURE: 72 MMHG | SYSTOLIC BLOOD PRESSURE: 118 MMHG

## 2023-07-11 DIAGNOSIS — I50.22 CHRONIC SYSTOLIC HEART FAILURE (HCC): ICD-10-CM

## 2023-07-11 DIAGNOSIS — I25.10 CORONARY ARTERY DISEASE INVOLVING NATIVE CORONARY ARTERY OF NATIVE HEART WITHOUT ANGINA PECTORIS: ICD-10-CM

## 2023-07-11 DIAGNOSIS — Z00.00 ENCOUNTER FOR WELL ADULT EXAM WITHOUT ABNORMAL FINDINGS: Primary | ICD-10-CM

## 2023-07-11 DIAGNOSIS — R91.1 PULMONARY NODULE: ICD-10-CM

## 2023-07-11 DIAGNOSIS — E55.9 VITAMIN D DEFICIENCY: ICD-10-CM

## 2023-07-11 DIAGNOSIS — I10 ESSENTIAL HYPERTENSION: ICD-10-CM

## 2023-07-11 DIAGNOSIS — E78.5 DYSLIPIDEMIA: ICD-10-CM

## 2023-07-11 DIAGNOSIS — G47.33 OSA (OBSTRUCTIVE SLEEP APNEA): ICD-10-CM

## 2023-07-11 DIAGNOSIS — E11.9 DIABETES MELLITUS TYPE 2, DIET-CONTROLLED (HCC): ICD-10-CM

## 2023-07-11 PROCEDURE — 3078F DIAST BP <80 MM HG: CPT | Performed by: NURSE PRACTITIONER

## 2023-07-11 PROCEDURE — 3074F SYST BP LT 130 MM HG: CPT | Performed by: NURSE PRACTITIONER

## 2023-07-11 PROCEDURE — 99396 PREV VISIT EST AGE 40-64: CPT | Performed by: NURSE PRACTITIONER

## 2023-07-11 SDOH — ECONOMIC STABILITY: INCOME INSECURITY: HOW HARD IS IT FOR YOU TO PAY FOR THE VERY BASICS LIKE FOOD, HOUSING, MEDICAL CARE, AND HEATING?: NOT HARD AT ALL

## 2023-07-11 SDOH — ECONOMIC STABILITY: FOOD INSECURITY: WITHIN THE PAST 12 MONTHS, YOU WORRIED THAT YOUR FOOD WOULD RUN OUT BEFORE YOU GOT MONEY TO BUY MORE.: NEVER TRUE

## 2023-07-11 SDOH — ECONOMIC STABILITY: FOOD INSECURITY: WITHIN THE PAST 12 MONTHS, THE FOOD YOU BOUGHT JUST DIDN'T LAST AND YOU DIDN'T HAVE MONEY TO GET MORE.: NEVER TRUE

## 2023-07-11 SDOH — ECONOMIC STABILITY: HOUSING INSECURITY
IN THE LAST 12 MONTHS, WAS THERE A TIME WHEN YOU DID NOT HAVE A STEADY PLACE TO SLEEP OR SLEPT IN A SHELTER (INCLUDING NOW)?: NO

## 2023-07-11 ASSESSMENT — PATIENT HEALTH QUESTIONNAIRE - PHQ9
SUM OF ALL RESPONSES TO PHQ QUESTIONS 1-9: 0
SUM OF ALL RESPONSES TO PHQ9 QUESTIONS 1 & 2: 0
8. MOVING OR SPEAKING SO SLOWLY THAT OTHER PEOPLE COULD HAVE NOTICED. OR THE OPPOSITE, BEING SO FIGETY OR RESTLESS THAT YOU HAVE BEEN MOVING AROUND A LOT MORE THAN USUAL: 0
10. IF YOU CHECKED OFF ANY PROBLEMS, HOW DIFFICULT HAVE THESE PROBLEMS MADE IT FOR YOU TO DO YOUR WORK, TAKE CARE OF THINGS AT HOME, OR GET ALONG WITH OTHER PEOPLE: 0
9. THOUGHTS THAT YOU WOULD BE BETTER OFF DEAD, OR OF HURTING YOURSELF: 0
SUM OF ALL RESPONSES TO PHQ QUESTIONS 1-9: 0
6. FEELING BAD ABOUT YOURSELF - OR THAT YOU ARE A FAILURE OR HAVE LET YOURSELF OR YOUR FAMILY DOWN: 0
2. FEELING DOWN, DEPRESSED OR HOPELESS: 0
1. LITTLE INTEREST OR PLEASURE IN DOING THINGS: 0
5. POOR APPETITE OR OVEREATING: 0
3. TROUBLE FALLING OR STAYING ASLEEP: 0
7. TROUBLE CONCENTRATING ON THINGS, SUCH AS READING THE NEWSPAPER OR WATCHING TELEVISION: 0
4. FEELING TIRED OR HAVING LITTLE ENERGY: 0

## 2023-07-11 ASSESSMENT — ENCOUNTER SYMPTOMS
NAUSEA: 0
WHEEZING: 0
VOMITING: 0
COUGH: 0
CONSTIPATION: 0
SORE THROAT: 0
SHORTNESS OF BREATH: 0
ABDOMINAL PAIN: 0
DIARRHEA: 0

## 2023-07-11 NOTE — PATIENT INSTRUCTIONS
TRANSPORTATION RESOURCES    Medicaid Mirtha Sober: Candie Frank. NEW NAME - [employer]   Phone:  [phone]  Must call for a ride at least 3 days before your appointment. Call Monday- Friday 8:00am to 5:00pm.    Transportation is available for MD appts, dialysis, x-rays, lab work, drug store, or other medical appointments. [address] on Aging  What they offer: Provides assistance and medical transport to adults 60 years and older. Phone: [phone]    [employer]   What they offer: Charge a fee for transport (not free)  Phone: [phone]    Transportation on Demand   What they offer: Charge a fee for transport (not free)  Phone: 21 [phone] they offer: Lui Collado is accessible via an online platform that connects patients with non-emergency medical transportation (NEMT.) [employer] is a comprehensive solution which supports all levels of transport: rideshare (Lyft/Uber), Taxis, wheelchair vans, stretcher vehicles, ALS/BLS, and all payors when and where they are needed. https://[employer]oS[employer]/      Need additional resources? Call 211 or visit Find Help:  https://www. findhelp.org/

## 2023-07-11 NOTE — PROGRESS NOTES
Well Adult Note  Name: Nilo Vizcarra Date: 2023   MRN: [id number] Sex: Male   Age: 57 y.o. Ethnicity: Non- / Non    : 1960 Race: White (non-)      Darion Coffey is here for well adult exam.  History:    Patient is followed by cardiology (Dr. Brianna Isaac) for CAD, AVR and systolic heart failure. Echo 3/23 EF 40%. No CP, dyspnea, edema. BP at home has been 110-120/70s. Continues aspirin, statin, lisinopril and carvedilol. Repeat echo in 12 months. Not checking blood sugar at home. Has cut back on carbohydrates in his diet. The patient is a 57 y.o. male who is seen for evaluation of  hyperlipidemia. He was tested because history of HLD, CAD, DM. His last labs showed Total cholesterol of 86, HDL 59, LDL 16,  Triglycerides 55. Admits to poor compliance with CPAP. History of pulmonary nodules. Last CT 2021 showed stable 5 mm left lower lobe pulmonary nodule. Recommended repeat in 1 year. Taking OTC vitamin D supplement daily. He is unsure of dose. Review of Systems   Constitutional:  Negative for chills and fever. HENT:  Negative for congestion, ear pain and sore throat. Respiratory:  Negative for cough, shortness of breath and wheezing. Cardiovascular:  Negative for chest pain, palpitations and leg swelling. Gastrointestinal:  Negative for abdominal pain, constipation, diarrhea, nausea and vomiting. Genitourinary:  Negative for dysuria and hematuria. Neurological:  Negative for dizziness, light-headedness and headaches. Psychiatric/Behavioral:  Negative for dysphoric mood, self-injury and suicidal ideas. No Known Allergies      Prior to Visit Medications    Medication Sig Taking?  Authorizing Provider   diclofenac sodium (VOLTAREN) 1 % GEL Apply 2 g topically 4 times daily Yes Annel León APRN - CNP   carvedilol (COREG) 3.125 MG tablet Take 1 tablet by mouth 2 times daily Yes Jaime Aguilar,    lisinopril

## 2023-08-25 ENCOUNTER — TELEPHONE (OUTPATIENT)
Dept: INTERNAL MEDICINE CLINIC | Facility: CLINIC | Age: 63
End: 2023-08-25

## 2023-08-25 NOTE — TELEPHONE ENCOUNTER
I cannot contact them without a telephone number. Also need clarification as to what is needed because we do not complete PA's for radiology.

## 2023-09-25 ENCOUNTER — TELEPHONE (OUTPATIENT)
Age: 63
End: 2023-09-25

## 2023-09-25 NOTE — TELEPHONE ENCOUNTER
Pt.is calling reporting that he had a pain in his back and left hip this am.He can not get through to his PCP he is wanting HIRAL to order an XRAY. I advised he head to Urgent Care or ER if PCP not available. Call Hipolito Gallegos for heart related issues. Pt.v/u.

## 2023-10-31 ENCOUNTER — TELEPHONE (OUTPATIENT)
Dept: INTERNAL MEDICINE CLINIC | Facility: CLINIC | Age: 63
End: 2023-10-31

## 2023-10-31 DIAGNOSIS — I10 ESSENTIAL (PRIMARY) HYPERTENSION: ICD-10-CM

## 2023-10-31 DIAGNOSIS — I10 ESSENTIAL HYPERTENSION: ICD-10-CM

## 2023-10-31 RX ORDER — NITROGLYCERIN 0.4 MG/1
0.4 TABLET SUBLINGUAL EVERY 5 MIN PRN
Qty: 25 TABLET | Refills: 11 | Status: CANCELLED | OUTPATIENT
Start: 2023-10-31

## 2023-10-31 RX ORDER — ATORVASTATIN CALCIUM 20 MG/1
TABLET, FILM COATED ORAL
Qty: 90 TABLET | Refills: 3 | Status: SHIPPED | OUTPATIENT
Start: 2023-10-31

## 2023-10-31 RX ORDER — NITROGLYCERIN 0.4 MG/1
0.4 TABLET SUBLINGUAL EVERY 5 MIN PRN
Qty: 25 TABLET | Refills: 11 | Status: SHIPPED | OUTPATIENT
Start: 2023-10-31

## 2023-10-31 RX ORDER — CARVEDILOL 3.12 MG/1
3.12 TABLET ORAL 2 TIMES DAILY
Qty: 180 TABLET | Refills: 3 | Status: SHIPPED | OUTPATIENT
Start: 2023-10-31

## 2023-10-31 RX ORDER — ATORVASTATIN CALCIUM 20 MG/1
20 TABLET, FILM COATED ORAL DAILY
Qty: 90 TABLET | Refills: 3 | Status: SHIPPED | OUTPATIENT
Start: 2023-10-31

## 2023-10-31 RX ORDER — LISINOPRIL 2.5 MG/1
2.5 TABLET ORAL 2 TIMES DAILY
Qty: 180 TABLET | Refills: 3 | Status: SHIPPED | OUTPATIENT
Start: 2023-10-31

## 2023-10-31 NOTE — TELEPHONE ENCOUNTER
Patient left message stating he is changing pharmacy and would like All his medication called in to [facility] on [address]  in [place]. Phone [phone]. Please call patient to let him know this has been done.

## 2023-10-31 NOTE — TELEPHONE ENCOUNTER
----- Message from Danielito Huerta sent at 10/31/2023  2:36 PM EDT -----  Subject: Refill Request    QUESTIONS  Name of Medication? diclofenac sodium (VOLTAREN) 1 % GEL  Patient-reported dosage and instructions? 1%  How many days do you have left? 0  Preferred Pharmacy? [facility] #[id number]  Pharmacy phone number (if available)? [phone]  ---------------------------------------------------------------------------  --------------,  Name of Medication? nitroGLYCERIN (NITROSTAT) 0.4 MG SL tablet  Patient-reported dosage and instructions? Unsure of dosage  How many days do you have left? 0  Preferred Pharmacy? [facility]/PHARMACY #[id number]  Pharmacy phone number (if available)? [phone]  ---------------------------------------------------------------------------  --------------,  Name of Medication? atorvastatin (LIPITOR) 20 MG tablet  Patient-reported dosage and instructions? 20MG  How many days do you have left? 10  Preferred Pharmacy? [facility]/PH[facility]AC[facility] #[id number]  Pharmacy phone number (if available)? [phone]  ---------------------------------------------------------------------------  --------------  Rolanda Gilford INFO  What is the best way for the office to contact you? OK to leave message on   voicemail  Preferred Call Back Phone Number? [phone]  ---------------------------------------------------------------------------  --------------  SCRIPT ANSWERS  Relationship to Patient?  Self

## 2023-10-31 NOTE — TELEPHONE ENCOUNTER
----- Message from Yohannes Miller sent at 10/31/2023  2:32 PM EDT -----  Subject: Message to Provider    QUESTIONS  Information for Provider? Pt would like his preferred pharmacy update on   his medical chart. [facility] [address] [place] [address] in [place]. Zip code is [address]. Their phone is [phone]. Please advise   ---------------------------------------------------------------------------  --------------  Antione CASTRO  [phone]; OK to leave message on voicemail  ---------------------------------------------------------------------------  --------------  SCRIPT ANSWERS  Relationship to Patient?  Self

## 2023-10-31 NOTE — TELEPHONE ENCOUNTER
Requested Prescriptions     Pending Prescriptions Disp Refills    atorvastatin (LIPITOR) 20 MG tablet [Pharmacy Med Name: Atorvastatin Calcium 20 MG Oral Tablet] 90 tablet 3     Sig: TAKE 1 TABLET BY MOUTH ONCE DAILY NIGHTLY

## 2023-11-01 RX ORDER — ATORVASTATIN CALCIUM 20 MG/1
20 TABLET, FILM COATED ORAL DAILY
Qty: 90 TABLET | Refills: 3 | Status: SHIPPED | OUTPATIENT
Start: 2023-11-01

## 2023-11-24 DIAGNOSIS — I10 ESSENTIAL HYPERTENSION: ICD-10-CM

## 2023-11-24 RX ORDER — LISINOPRIL 2.5 MG/1
2.5 TABLET ORAL 2 TIMES DAILY
Qty: 180 TABLET | Refills: 3 | Status: SHIPPED | OUTPATIENT
Start: 2023-11-24

## 2023-11-24 NOTE — TELEPHONE ENCOUNTER
Requested Prescriptions     Pending Prescriptions Disp Refills    lisinopril (PRINIVIL;ZESTRIL) 2.5 MG tablet [Pharmacy Med Name: Lisinopril 2.5 MG Oral Tablet] 180 tablet 3     Sig: Take 1 tablet by mouth twice daily     Rx Verified

## 2023-11-28 ENCOUNTER — OFFICE VISIT (OUTPATIENT)
Age: 63
End: 2023-11-28
Payer: MEDICARE

## 2023-11-28 VITALS
BODY MASS INDEX: 20.2 KG/M2 | WEIGHT: 152.4 LBS | HEART RATE: 51 BPM | SYSTOLIC BLOOD PRESSURE: 108 MMHG | DIASTOLIC BLOOD PRESSURE: 72 MMHG | HEIGHT: 73 IN

## 2023-11-28 DIAGNOSIS — I10 ESSENTIAL HYPERTENSION: ICD-10-CM

## 2023-11-28 DIAGNOSIS — G47.33 OSA (OBSTRUCTIVE SLEEP APNEA): ICD-10-CM

## 2023-11-28 DIAGNOSIS — I50.22 CHRONIC SYSTOLIC HEART FAILURE (HCC): Primary | ICD-10-CM

## 2023-11-28 DIAGNOSIS — I25.10 CORONARY ARTERY DISEASE INVOLVING NATIVE CORONARY ARTERY OF NATIVE HEART WITHOUT ANGINA PECTORIS: ICD-10-CM

## 2023-11-28 PROCEDURE — 3074F SYST BP LT 130 MM HG: CPT | Performed by: INTERNAL MEDICINE

## 2023-11-28 PROCEDURE — 1123F ACP DISCUSS/DSCN MKR DOCD: CPT | Performed by: INTERNAL MEDICINE

## 2023-11-28 PROCEDURE — 3078F DIAST BP <80 MM HG: CPT | Performed by: INTERNAL MEDICINE

## 2023-11-28 PROCEDURE — 99214 OFFICE O/P EST MOD 30 MIN: CPT | Performed by: INTERNAL MEDICINE

## 2023-11-28 PROCEDURE — 93000 ELECTROCARDIOGRAM COMPLETE: CPT | Performed by: INTERNAL MEDICINE

## 2023-11-28 RX ORDER — PNV NO.95/FERROUS FUM/FOLIC AC 28MG-0.8MG
TABLET ORAL
COMMUNITY

## 2023-11-28 NOTE — PROGRESS NOTES
been instructed to call with any angina or equivalent as reviewed today. All questions were answered with the patient voicing complete understanding. 4. HTN/HPL: remain on meds. HR low, follow on coreg, doing well now. 5. SHF: remain on GDMT, remain on Ace and coreg. EF 40%, better now, no ICD needed now. Echo 6 mos. Labs in Jan.          Patient has been instructed and agrees to call our office with any issues or other concerns related to their cardiac condition(s) and/or complaint(s). Return in about 6 months (around 5/28/2024).        Chucky Rey, DO  11/28/2023

## 2024-01-08 ENCOUNTER — HOSPITAL ENCOUNTER (OUTPATIENT)
Dept: LAB | Age: 64
Discharge: HOME | End: 2024-01-11

## 2024-01-08 DIAGNOSIS — E55.9 VITAMIN D DEFICIENCY: ICD-10-CM

## 2024-01-08 DIAGNOSIS — I10 ESSENTIAL HYPERTENSION: ICD-10-CM

## 2024-01-08 DIAGNOSIS — F32.9 REACTIVE DEPRESSION: ICD-10-CM

## 2024-01-08 DIAGNOSIS — E78.5 DYSLIPIDEMIA: ICD-10-CM

## 2024-01-08 DIAGNOSIS — E11.9 DIABETES MELLITUS TYPE 2, DIET-CONTROLLED (HCC): ICD-10-CM

## 2024-01-08 LAB
25(OH)D3 SERPL-MCNC: 36.9 NG/ML (ref 30–100)
ALBUMIN SERPL-MCNC: 3.8 G/DL (ref 3.2–4.6)
ALBUMIN/GLOB SERPL: 1.3 (ref 0.4–1.6)
ALP SERPL-CCNC: 59 U/L (ref 50–136)
ALT SERPL-CCNC: 42 U/L (ref 12–65)
ANION GAP SERPL CALC-SCNC: 4 MMOL/L (ref 2–11)
APPEARANCE UR: CLEAR
AST SERPL-CCNC: 27 U/L (ref 15–37)
BASOPHILS # BLD: 0.1 K/UL (ref 0–0.2)
BASOPHILS NFR BLD: 1 % (ref 0–2)
BILIRUB SERPL-MCNC: 0.8 MG/DL (ref 0.2–1.1)
BILIRUB UR QL: NEGATIVE
BUN SERPL-MCNC: 30 MG/DL (ref 8–23)
CALCIUM SERPL-MCNC: 8.7 MG/DL (ref 8.3–10.4)
CHLORIDE SERPL-SCNC: 107 MMOL/L (ref 103–113)
CHOLEST SERPL-MCNC: 113 MG/DL
CO2 SERPL-SCNC: 29 MMOL/L (ref 21–32)
COLOR UR: ABNORMAL
CREAT SERPL-MCNC: 1 MG/DL (ref 0.8–1.5)
DIFFERENTIAL METHOD BLD: ABNORMAL
EOSINOPHIL # BLD: 0.5 K/UL (ref 0–0.8)
EOSINOPHIL NFR BLD: 6 % (ref 0.5–7.8)
ERYTHROCYTE [DISTWIDTH] IN BLOOD BY AUTOMATED COUNT: 13.2 % (ref 11.9–14.6)
GLOBULIN SER CALC-MCNC: 3 G/DL (ref 2.8–4.5)
GLUCOSE SERPL-MCNC: 98 MG/DL (ref 65–100)
GLUCOSE UR STRIP.AUTO-MCNC: NEGATIVE MG/DL
HCT VFR BLD AUTO: 44.1 % (ref 41.1–50.3)
HDLC SERPL-MCNC: 69 MG/DL (ref 40–60)
HDLC SERPL: 1.6
HGB BLD-MCNC: 14.6 G/DL (ref 13.6–17.2)
HGB UR QL STRIP: NEGATIVE
IMM GRANULOCYTES # BLD AUTO: 0.1 K/UL (ref 0–0.5)
IMM GRANULOCYTES NFR BLD AUTO: 1 % (ref 0–5)
KETONES UR QL STRIP.AUTO: 15 MG/DL
LDLC SERPL CALC-MCNC: 20.8 MG/DL
LEUKOCYTE ESTERASE UR QL STRIP.AUTO: NEGATIVE
LYMPHOCYTES # BLD: 1 K/UL (ref 0.5–4.6)
LYMPHOCYTES NFR BLD: 10 % (ref 13–44)
MCH RBC QN AUTO: 31.7 PG (ref 26.1–32.9)
MCHC RBC AUTO-ENTMCNC: 33.1 G/DL (ref 31.4–35)
MCV RBC AUTO: 95.7 FL (ref 82–102)
MONOCYTES # BLD: 0.7 K/UL (ref 0.1–1.3)
MONOCYTES NFR BLD: 7 % (ref 4–12)
NEUTS SEG # BLD: 7.5 K/UL (ref 1.7–8.2)
NEUTS SEG NFR BLD: 75 % (ref 43–78)
NITRITE UR QL STRIP.AUTO: NEGATIVE
NRBC # BLD: 0 K/UL (ref 0–0.2)
PH UR STRIP: 5.5 (ref 5–9)
PLATELET # BLD AUTO: 149 K/UL (ref 150–450)
PMV BLD AUTO: 9.7 FL (ref 9.4–12.3)
POTASSIUM SERPL-SCNC: 4.1 MMOL/L (ref 3.5–5.1)
PROT SERPL-MCNC: 6.8 G/DL (ref 6.3–8.2)
PROT UR STRIP-MCNC: NEGATIVE MG/DL
RBC # BLD AUTO: 4.61 M/UL (ref 4.23–5.6)
SODIUM SERPL-SCNC: 140 MMOL/L (ref 136–146)
SP GR UR REFRACTOMETRY: 1.02 (ref 1–1.02)
TRIGL SERPL-MCNC: 116 MG/DL (ref 35–150)
TSH, 3RD GENERATION: 1.09 UIU/ML (ref 0.36–3.74)
UROBILINOGEN UR QL STRIP.AUTO: 0.2 EU/DL (ref 0.2–1)
VLDLC SERPL CALC-MCNC: 23.2 MG/DL (ref 6–23)
WBC # BLD AUTO: 9.7 K/UL (ref 4.3–11.1)

## 2024-01-08 RX ORDER — ESCITALOPRAM OXALATE 10 MG/1
10 TABLET ORAL DAILY
Qty: 90 TABLET | Refills: 0 | Status: SHIPPED | OUTPATIENT
Start: 2024-01-08

## 2024-01-08 NOTE — TELEPHONE ENCOUNTER
Patient requesting refills for Escitalopram and Diclofernac  please send to [facility] on [address].

## 2024-01-09 LAB
EST. AVERAGE GLUCOSE BLD GHB EST-MCNC: 111 MG/DL
HBA1C MFR BLD: 5.5 % (ref 4.8–5.6)

## 2024-01-15 ENCOUNTER — OFFICE VISIT (OUTPATIENT)
Dept: INTERNAL MEDICINE CLINIC | Facility: CLINIC | Age: 64
End: 2024-01-15
Payer: COMMERCIAL

## 2024-01-15 VITALS
RESPIRATION RATE: 16 BRPM | SYSTOLIC BLOOD PRESSURE: 138 MMHG | WEIGHT: 158.4 LBS | TEMPERATURE: 98.6 F | BODY MASS INDEX: 20.99 KG/M2 | HEIGHT: 73 IN | DIASTOLIC BLOOD PRESSURE: 70 MMHG | OXYGEN SATURATION: 98 % | HEART RATE: 61 BPM

## 2024-01-15 DIAGNOSIS — Z13.6 ENCOUNTER FOR ABDOMINAL AORTIC ANEURYSM (AAA) SCREENING: ICD-10-CM

## 2024-01-15 DIAGNOSIS — E55.9 VITAMIN D DEFICIENCY: ICD-10-CM

## 2024-01-15 DIAGNOSIS — R35.1 NOCTURIA: ICD-10-CM

## 2024-01-15 DIAGNOSIS — Z23 NEED FOR INFLUENZA VACCINATION: ICD-10-CM

## 2024-01-15 DIAGNOSIS — I25.10 CORONARY ARTERY DISEASE INVOLVING NATIVE CORONARY ARTERY OF NATIVE HEART WITHOUT ANGINA PECTORIS: ICD-10-CM

## 2024-01-15 DIAGNOSIS — I10 ESSENTIAL HYPERTENSION: Primary | ICD-10-CM

## 2024-01-15 DIAGNOSIS — R91.1 PULMONARY NODULE: ICD-10-CM

## 2024-01-15 DIAGNOSIS — I50.22 CHRONIC SYSTOLIC HEART FAILURE (HCC): ICD-10-CM

## 2024-01-15 DIAGNOSIS — E78.5 DYSLIPIDEMIA: ICD-10-CM

## 2024-01-15 DIAGNOSIS — E11.9 DIABETES MELLITUS TYPE 2, DIET-CONTROLLED (HCC): ICD-10-CM

## 2024-01-15 DIAGNOSIS — G47.33 OSA (OBSTRUCTIVE SLEEP APNEA): ICD-10-CM

## 2024-01-15 DIAGNOSIS — Z11.4 SCREENING FOR HIV (HUMAN IMMUNODEFICIENCY VIRUS): ICD-10-CM

## 2024-01-15 PROBLEM — I33.0 ENDOCARDITIS OF PROSTHETIC VALVE: Status: ACTIVE | Noted: 2020-06-02

## 2024-01-15 PROBLEM — Z86.79 HISTORY OF ENDOCARDITIS: Status: ACTIVE | Noted: 2020-06-02

## 2024-01-15 PROBLEM — T82.6XXA ENDOCARDITIS OF PROSTHETIC VALVE (HCC): Status: ACTIVE | Noted: 2020-06-02

## 2024-01-15 PROBLEM — I38 ENDOCARDITIS OF PROSTHETIC VALVE (HCC): Status: ACTIVE | Noted: 2020-06-02

## 2024-01-15 PROCEDURE — 3075F SYST BP GE 130 - 139MM HG: CPT | Performed by: INTERNAL MEDICINE

## 2024-01-15 PROCEDURE — 3078F DIAST BP <80 MM HG: CPT | Performed by: INTERNAL MEDICINE

## 2024-01-15 PROCEDURE — 3044F HG A1C LEVEL LT 7.0%: CPT | Performed by: INTERNAL MEDICINE

## 2024-01-15 PROCEDURE — 99214 OFFICE O/P EST MOD 30 MIN: CPT | Performed by: INTERNAL MEDICINE

## 2024-01-15 PROCEDURE — 1123F ACP DISCUSS/DSCN MKR DOCD: CPT | Performed by: INTERNAL MEDICINE

## 2024-01-15 PROCEDURE — 90471 IMMUNIZATION ADMIN: CPT | Performed by: INTERNAL MEDICINE

## 2024-01-15 PROCEDURE — 90694 VACC AIIV4 NO PRSRV 0.5ML IM: CPT | Performed by: INTERNAL MEDICINE

## 2024-01-15 ASSESSMENT — ENCOUNTER SYMPTOMS
SHORTNESS OF BREATH: 0
BLOOD IN STOOL: 0
ABDOMINAL PAIN: 0
COUGH: 0
RHINORRHEA: 0

## 2024-01-15 ASSESSMENT — PATIENT HEALTH QUESTIONNAIRE - PHQ9
2. FEELING DOWN, DEPRESSED OR HOPELESS: 0
SUM OF ALL RESPONSES TO PHQ QUESTIONS 1-9: 0
SUM OF ALL RESPONSES TO PHQ QUESTIONS 1-9: 0
1. LITTLE INTEREST OR PLEASURE IN DOING THINGS: 0
SUM OF ALL RESPONSES TO PHQ QUESTIONS 1-9: 0
SUM OF ALL RESPONSES TO PHQ QUESTIONS 1-9: 0
SUM OF ALL RESPONSES TO PHQ9 QUESTIONS 1 & 2: 0

## 2024-02-23 ENCOUNTER — HOSPITAL ENCOUNTER (OUTPATIENT)
Dept: CT IMAGING | Age: 64
Discharge: HOME | End: 2024-02-26
Attending: INTERNAL MEDICINE

## 2024-02-23 DIAGNOSIS — R91.1 PULMONARY NODULE: ICD-10-CM

## 2024-02-28 RX ORDER — SILDENAFIL 50 MG/1
50 TABLET, FILM COATED ORAL PRN
Qty: 30 TABLET | Refills: 6 | Status: SHIPPED | OUTPATIENT
Start: 2024-02-28

## 2024-02-28 RX ORDER — NITROGLYCERIN 0.4 MG/1
0.4 TABLET SUBLINGUAL EVERY 5 MIN PRN
Qty: 25 TABLET | Refills: 11 | Status: SHIPPED | OUTPATIENT
Start: 2024-02-28

## 2024-02-28 NOTE — TELEPHONE ENCOUNTER
Patient needs refill of Sildenafil 50 mg and nitroglycerin, to [facility] pharmacy on [address]

## 2024-03-05 ENCOUNTER — TELEPHONE (OUTPATIENT)
Age: 64
End: 2024-03-05

## 2024-03-05 NOTE — TELEPHONE ENCOUNTER
Sildenafil 50mg   [facility] [address] [facility]     Ask for this a couple of weeks ago and the wrong thing was sent in. Please call

## 2024-03-07 NOTE — TELEPHONE ENCOUNTER
Pt called back advised Rx was called in 2/28/24. Pt states Rx was now $1000. Advised to use GoodRx and can get at a lower cost for $.20.15 and not to run it through the insurance. Pt states thank you and will call back if having any other issues.

## 2024-03-08 ENCOUNTER — TELEPHONE (OUTPATIENT)
Age: 64
End: 2024-03-08

## 2024-03-08 NOTE — TELEPHONE ENCOUNTER
Pt returning Elo's phone call.  Looking for call back but did not know what it was in regards to. Last encounter 3/6 closed.

## 2024-04-01 ENCOUNTER — TELEPHONE (OUTPATIENT)
Age: 64
End: 2024-04-01

## 2024-04-01 DIAGNOSIS — F32.9 REACTIVE DEPRESSION: ICD-10-CM

## 2024-04-01 DIAGNOSIS — M54.32 SCIATICA, LEFT SIDE: ICD-10-CM

## 2024-04-01 NOTE — TELEPHONE ENCOUNTER
Patient called stating he has the following issues :    Patient is having a difficult time having his Rx for  sildenafil (VIAGRA) 50 MG tablet  refilled.  Cost is $1000 per pharmacy.  Patient would like to know if there may be another alternative lower priced one.  Is a prior authorization needed.      Please call and advise

## 2024-04-01 NOTE — TELEPHONE ENCOUNTER
Pt called, requ refill of Escitalopram 10 mg (6 left), flexeril 5 mg (none left)    Sent to [address]

## 2024-04-01 NOTE — TELEPHONE ENCOUNTER
Pt called back advised Dr. Olea not in office, but should be able to get generic name instead of brand name. Advised pt to call pharmacy to have filled had generic. Also advised pt to try the goodRx to see if can get med for a better price.  Pt gave a verbal understanding

## 2024-04-04 RX ORDER — ESCITALOPRAM OXALATE 10 MG/1
10 TABLET ORAL DAILY
Qty: 90 TABLET | Refills: 0 | Status: SHIPPED | OUTPATIENT
Start: 2024-04-04

## 2024-04-04 RX ORDER — CYCLOBENZAPRINE HCL 5 MG
5 TABLET ORAL NIGHTLY PRN
Qty: 90 TABLET | Refills: 0 | Status: SHIPPED | OUTPATIENT
Start: 2024-04-04

## 2024-05-07 ENCOUNTER — TELEPHONE (OUTPATIENT)
Age: 64
End: 2024-05-07

## 2024-05-07 NOTE — TELEPHONE ENCOUNTER
Patient called stating he has the following concerns :    Needs to discuss his Echo Results      Please call and advise.

## 2024-05-22 ENCOUNTER — OFFICE VISIT (OUTPATIENT)
Age: 64
End: 2024-05-22
Payer: MEDICARE

## 2024-05-22 ENCOUNTER — TELEPHONE (OUTPATIENT)
Age: 64
End: 2024-05-22

## 2024-05-22 VITALS
HEIGHT: 73 IN | DIASTOLIC BLOOD PRESSURE: 50 MMHG | WEIGHT: 156.8 LBS | HEART RATE: 58 BPM | SYSTOLIC BLOOD PRESSURE: 112 MMHG | BODY MASS INDEX: 20.78 KG/M2

## 2024-05-22 DIAGNOSIS — E78.5 DYSLIPIDEMIA: ICD-10-CM

## 2024-05-22 DIAGNOSIS — I25.10 CORONARY ARTERY DISEASE INVOLVING NATIVE CORONARY ARTERY OF NATIVE HEART WITHOUT ANGINA PECTORIS: ICD-10-CM

## 2024-05-22 DIAGNOSIS — I50.22 CHRONIC SYSTOLIC HEART FAILURE (HCC): Primary | ICD-10-CM

## 2024-05-22 DIAGNOSIS — I10 ESSENTIAL HYPERTENSION: ICD-10-CM

## 2024-05-22 PROCEDURE — 99214 OFFICE O/P EST MOD 30 MIN: CPT | Performed by: INTERNAL MEDICINE

## 2024-05-22 PROCEDURE — 3074F SYST BP LT 130 MM HG: CPT | Performed by: INTERNAL MEDICINE

## 2024-05-22 PROCEDURE — 3078F DIAST BP <80 MM HG: CPT | Performed by: INTERNAL MEDICINE

## 2024-05-22 PROCEDURE — 1123F ACP DISCUSS/DSCN MKR DOCD: CPT | Performed by: INTERNAL MEDICINE

## 2024-05-22 NOTE — PROGRESS NOTES
[address] [facility], [address] [place] [address]  PHONE: [phone]     24    NAME:  Graham Hoskins  : 1960  MRN: [id number]       SUBJECTIVE:   Graham Hoskins is a 63 y.o. 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  No new JOHNSON, SOB.  Busy at work.  No CP, pressure.  Active work.    No new angina.      Patient denies recent history of orthopnea, PND, excessive dizziness and/or syncope.        Quit smoking, over >40 pack years now.             Past Medical History, Past Surgical History, Family history, Social History, and Medications were all reviewed with the patient today and updated as necessary.     Current Outpatient Medications   Medication Sig Dispense Refill    escitalopram (LEXAPRO) 10 MG tablet Take 1 tablet by mouth daily 90 tablet 0    cyclobenzaprine (FLEXERIL) 5 MG tablet Take 1 tablet by mouth nightly as needed for Muscle spasms 90 tablet 0    diclofenac sodium (VOLTAREN) 1 % GEL Apply 2 g topically 4 times daily 100 g 0    nitroGLYCERIN (NITROSTAT) 0.4 MG SL tablet Place 1 tablet under the tongue every 5 minutes as needed for Chest pain 25 tablet 11    sildenafil (VIAGRA) 50 MG tablet Take 1 tablet by mouth as needed for Erectile Dysfunction 30 tablet 6    lisinopril (PRINIVIL;ZESTRIL) 2.5 MG tablet Take 1 tablet by mouth twice daily 180 tablet 3

## 2024-05-22 NOTE — TELEPHONE ENCOUNTER
Pt wants to know if he needs to have a echo prior to his next 6 month appt in November? Pt would appreciate a call back

## 2024-06-29 DIAGNOSIS — F32.9 REACTIVE DEPRESSION: ICD-10-CM

## 2024-06-30 DIAGNOSIS — F32.9 REACTIVE DEPRESSION: ICD-10-CM

## 2024-06-30 RX ORDER — ESCITALOPRAM OXALATE 10 MG/1
10 TABLET ORAL DAILY
Qty: 90 TABLET | Refills: 0 | OUTPATIENT
Start: 2024-06-30

## 2024-07-01 RX ORDER — ATORVASTATIN CALCIUM 20 MG/1
20 TABLET, FILM COATED ORAL DAILY
Qty: 90 TABLET | Refills: 3 | Status: SHIPPED | OUTPATIENT
Start: 2024-07-01

## 2024-07-01 RX ORDER — ESCITALOPRAM OXALATE 10 MG/1
10 TABLET ORAL DAILY
Qty: 90 TABLET | Refills: 0 | Status: SHIPPED | OUTPATIENT
Start: 2024-07-01

## 2024-07-03 RX ORDER — NITROGLYCERIN 0.4 MG/1
0.4 TABLET SUBLINGUAL EVERY 5 MIN PRN
Qty: 25 TABLET | Refills: 11 | OUTPATIENT
Start: 2024-07-03

## 2024-07-05 NOTE — PROGRESS NOTES
R shoulder xray WNL, no acute abnormality or joint derangement noted; is to call if no improvement; 1 = Total assistance

## 2024-07-09 NOTE — TELEPHONE ENCOUNTER
Patient called for a Medication Refill Request    Name of Medication : diclofenac sodium (VOLTAREN)     Strength of Medication: 1 % GEL     Directions: Apply 2 g topically 4 times daily     Preferred Pharmacy: [facility] at [address] [facility], [place] [address]     Last Appt. Date: 1/15/2024    Next Appt. Date: 7/23/2024

## 2024-07-15 ENCOUNTER — TELEPHONE (OUTPATIENT)
Dept: INTERNAL MEDICINE CLINIC | Facility: CLINIC | Age: 64
End: 2024-07-15

## 2024-07-15 NOTE — TELEPHONE ENCOUNTER
Pt states medicine was not  sent to pharmacy    efill Request     Name of Medication : diclofenac sodium (VOLTAREN)      Strength of Medication: 1 % GEL      Directions: Apply 2 g topically 4 times daily      Preferred Pharmacy: [facility] at [address] [facility], [place] [address]      Last Appt. Date: 1/15/2024     Next Appt. Date: 7/23/2024

## 2024-07-16 ENCOUNTER — HOSPITAL ENCOUNTER (OUTPATIENT)
Dept: LAB | Age: 64
Discharge: HOME | End: 2024-07-19

## 2024-07-16 ENCOUNTER — TELEPHONE (OUTPATIENT)
Dept: INTERNAL MEDICINE CLINIC | Facility: CLINIC | Age: 64
End: 2024-07-16

## 2024-07-16 DIAGNOSIS — E78.5 DYSLIPIDEMIA: ICD-10-CM

## 2024-07-16 DIAGNOSIS — R35.1 NOCTURIA: ICD-10-CM

## 2024-07-16 DIAGNOSIS — I10 ESSENTIAL HYPERTENSION: ICD-10-CM

## 2024-07-16 DIAGNOSIS — Z11.4 SCREENING FOR HIV (HUMAN IMMUNODEFICIENCY VIRUS): ICD-10-CM

## 2024-07-16 DIAGNOSIS — E11.9 DIABETES MELLITUS TYPE 2, DIET-CONTROLLED (HCC): ICD-10-CM

## 2024-07-16 LAB
ALBUMIN SERPL-MCNC: 3.7 G/DL (ref 3.2–4.6)
ALBUMIN/GLOB SERPL: 1.4 (ref 1–1.9)
ALP SERPL-CCNC: 79 U/L (ref 40–129)
ALT SERPL-CCNC: 17 U/L (ref 12–65)
ANION GAP SERPL CALC-SCNC: 8 MMOL/L (ref 9–18)
APPEARANCE UR: CLEAR
AST SERPL-CCNC: 21 U/L (ref 15–37)
BASOPHILS # BLD: 0.1 K/UL (ref 0–0.2)
BASOPHILS NFR BLD: 1 % (ref 0–2)
BILIRUB SERPL-MCNC: 0.4 MG/DL (ref 0–1.2)
BILIRUB UR QL: NEGATIVE
BUN SERPL-MCNC: 25 MG/DL (ref 8–23)
CALCIUM SERPL-MCNC: 9 MG/DL (ref 8.8–10.2)
CHLORIDE SERPL-SCNC: 104 MMOL/L (ref 98–107)
CHOLEST SERPL-MCNC: 82 MG/DL (ref 0–200)
CO2 SERPL-SCNC: 28 MMOL/L (ref 20–28)
COLOR UR: NORMAL
CREAT SERPL-MCNC: 1.17 MG/DL (ref 0.8–1.3)
CREAT UR-MCNC: 76.7 MG/DL (ref 39–259)
DIFFERENTIAL METHOD BLD: ABNORMAL
EOSINOPHIL # BLD: 0.7 K/UL (ref 0–0.8)
EOSINOPHIL NFR BLD: 10 % (ref 0.5–7.8)
ERYTHROCYTE [DISTWIDTH] IN BLOOD BY AUTOMATED COUNT: 13.1 % (ref 11.9–14.6)
EST. AVERAGE GLUCOSE BLD GHB EST-MCNC: 124 MG/DL
GLOBULIN SER CALC-MCNC: 2.6 G/DL (ref 2.3–3.5)
GLUCOSE SERPL-MCNC: 95 MG/DL (ref 70–99)
GLUCOSE UR STRIP.AUTO-MCNC: NEGATIVE MG/DL
HBA1C MFR BLD: 6 % (ref 0–5.6)
HCT VFR BLD AUTO: 42.5 % (ref 41.1–50.3)
HDLC SERPL-MCNC: 40 MG/DL (ref 40–60)
HDLC SERPL: 2 (ref 0–5)
HGB BLD-MCNC: 13.5 G/DL (ref 13.6–17.2)
HGB UR QL STRIP: NEGATIVE
HIV 1+2 AB+HIV1 P24 AG SERPL QL IA: NONREACTIVE
HIV 1/2 RESULT COMMENT: NORMAL
IMM GRANULOCYTES # BLD AUTO: 0 K/UL (ref 0–0.5)
IMM GRANULOCYTES NFR BLD AUTO: 1 % (ref 0–5)
KETONES UR QL STRIP.AUTO: NEGATIVE MG/DL
LDLC SERPL CALC-MCNC: 32 MG/DL (ref 0–100)
LEUKOCYTE ESTERASE UR QL STRIP.AUTO: NEGATIVE
LYMPHOCYTES # BLD: 1.1 K/UL (ref 0.5–4.6)
LYMPHOCYTES NFR BLD: 15 % (ref 13–44)
MCH RBC QN AUTO: 29.3 PG (ref 26.1–32.9)
MCHC RBC AUTO-ENTMCNC: 31.8 G/DL (ref 31.4–35)
MCV RBC AUTO: 92.4 FL (ref 82–102)
MICROALBUMIN UR-MCNC: <1.2 MG/DL (ref 0–20)
MICROALBUMIN/CREAT UR-RTO: NORMAL MG/G (ref 0–30)
MONOCYTES # BLD: 0.7 K/UL (ref 0.1–1.3)
MONOCYTES NFR BLD: 9 % (ref 4–12)
NEUTS SEG # BLD: 4.6 K/UL (ref 1.7–8.2)
NEUTS SEG NFR BLD: 64 % (ref 43–78)
NITRITE UR QL STRIP.AUTO: NEGATIVE
NRBC # BLD: 0 K/UL (ref 0–0.2)
PH UR STRIP: 5.5 (ref 5–9)
PLATELET # BLD AUTO: 131 K/UL (ref 150–450)
PMV BLD AUTO: 10.6 FL (ref 9.4–12.3)
POTASSIUM SERPL-SCNC: 4.2 MMOL/L (ref 3.5–5.1)
PROT SERPL-MCNC: 6.3 G/DL (ref 6.3–8.2)
PROT UR STRIP-MCNC: NEGATIVE MG/DL
PSA FREE MFR SERPL: 47.5 %
PSA FREE SERPL-MCNC: 0.4 NG/ML
PSA SERPL-MCNC: 0.8 NG/ML (ref 0–4)
RBC # BLD AUTO: 4.6 M/UL (ref 4.23–5.6)
SODIUM SERPL-SCNC: 141 MMOL/L (ref 136–145)
SP GR UR REFRACTOMETRY: 1.01 (ref 1–1.02)
T4 FREE SERPL-MCNC: 0.8 NG/DL (ref 0.9–1.7)
TRIGL SERPL-MCNC: 50 MG/DL (ref 0–150)
TSH, 3RD GENERATION: 1.46 UIU/ML (ref 0.27–4.2)
UROBILINOGEN UR QL STRIP.AUTO: 0.2 EU/DL (ref 0.2–1)
VLDLC SERPL CALC-MCNC: 10 MG/DL (ref 6–23)
WBC # BLD AUTO: 7.1 K/UL (ref 4.3–11.1)

## 2024-07-16 NOTE — TELEPHONE ENCOUNTER
Pt  states drugstore is out of  prescription Voltaren    OTC is $20.00 and he can't afford it   Dr. Zeus Rico Dr. Leal Hailey Foster dr greer

## 2024-07-16 NOTE — TELEPHONE ENCOUNTER
Talked to pt and informed him to call around to other [facility] and [facility] and see who has it in stock and let us know.

## 2024-07-22 NOTE — PROGRESS NOTES
Bedside and Verbal shift change report given to [facility] and Melanie Peerz RN (oncoming nurse) by [employer] (offgoing nurse). Report included the following information SBAR, Kardex, ED Summary, Procedure Summary, Intake/Output, MAR, Recent Results, Cardiac Rhythm NSR and Alarm Parameters . Satisfactory

## 2024-08-14 ENCOUNTER — TELEPHONE (OUTPATIENT)
Dept: SLEEP MEDICINE | Age: 64
End: 2024-08-14

## 2024-08-14 NOTE — TELEPHONE ENCOUNTER
LVM reminding pt of appt on 8/16/24. Also asked pt if he has a CPAP machine to bring it with him to appt.

## 2024-08-16 ENCOUNTER — OFFICE VISIT (OUTPATIENT)
Dept: SLEEP MEDICINE | Age: 64
End: 2024-08-16

## 2024-08-16 VITALS
HEIGHT: 73 IN | RESPIRATION RATE: 17 BRPM | SYSTOLIC BLOOD PRESSURE: 121 MMHG | OXYGEN SATURATION: 95 % | DIASTOLIC BLOOD PRESSURE: 56 MMHG | BODY MASS INDEX: 20.67 KG/M2 | WEIGHT: 156 LBS | HEART RATE: 52 BPM

## 2024-08-16 DIAGNOSIS — R06.83 SNORING: ICD-10-CM

## 2024-08-16 DIAGNOSIS — Z78.9 DIFFICULTY WITH CPAP USE: ICD-10-CM

## 2024-08-16 DIAGNOSIS — G47.33 OSA (OBSTRUCTIVE SLEEP APNEA): Primary | ICD-10-CM

## 2024-08-16 ASSESSMENT — SLEEP AND FATIGUE QUESTIONNAIRES
HOW LIKELY ARE YOU TO NOD OFF OR FALL ASLEEP WHILE WATCHING TV: HIGH CHANCE OF DOZING
HOW LIKELY ARE YOU TO NOD OFF OR FALL ASLEEP WHEN YOU ARE A PASSENGER IN A CAR FOR AN HOUR WITHOUT A BREAK: SLIGHT CHANCE OF DOZING
HOW LIKELY ARE YOU TO NOD OFF OR FALL ASLEEP WHILE SITTING AND READING: HIGH CHANCE OF DOZING
HOW LIKELY ARE YOU TO NOD OFF OR FALL ASLEEP IN A CAR, WHILE STOPPED FOR A FEW MINUTES IN TRAFFIC: WOULD NEVER DOZE
HOW LIKELY ARE YOU TO NOD OFF OR FALL ASLEEP WHILE LYING DOWN TO REST IN THE AFTERNOON WHEN CIRCUMSTANCES PERMIT: SLIGHT CHANCE OF DOZING
HOW LIKELY ARE YOU TO NOD OFF OR FALL ASLEEP WHILE SITTING AND TALKING TO SOMEONE: WOULD NEVER DOZE
ESS TOTAL SCORE: 8
HOW LIKELY ARE YOU TO NOD OFF OR FALL ASLEEP WHILE SITTING INACTIVE IN A PUBLIC PLACE: WOULD NEVER DOZE
HOW LIKELY ARE YOU TO NOD OFF OR FALL ASLEEP WHILE SITTING QUIETLY AFTER LUNCH WITHOUT ALCOHOL: WOULD NEVER DOZE

## 2024-08-16 NOTE — PROGRESS NOTES
[facility]  [address]  [place] [address]  [phone]    Patient Name:  Graham Hoskins  YOB: 1960      Office Visit 8/16/2024    CHIEF COMPLAINT:    Chief Complaint   Patient presents with    New Patient       HISTORY OF PRESENT ILLNESS:      The patient presents in outpatient consultation at the request of Dr. Rdz for management of obstructive sleep apnea.  Significant PMH of CHF, diabetes, hypertension, CAD, and ASIM on CPAP.     The diagnostic polysomnography was notable for an apnea hypopnea index of 16.0 including 9 obstructive apneas, 2 mixed apneas, 5 central apneas, and 48 obstructive hypopneas.  Oxygen desaturations are low as 85% were noted with SpO2 less than 89% for a total of 2.4 minutes of the test.  Significant cardiac arrhythmias were not evident.  The patient was noted to have 56.5 limb movements with a limb movement arousal index being about 2.5.      He reports that he is not used CPAP for over 3 years.  States that his primary care provider and his cardiologist are concerned with his heart history that he needs to be wearing his CPAP nightly to control his sleep apnea.  He states that the last time he used his machine it made him sick and he would like to get a new CPAP machine.  We did discuss that in order to get a new CPAP machine we would need to get a new sleep study so insurance will pay for the machine.  He is in agreement with doing a split-night study and we will order him a new CPAP after the study is completed.  He denies ever really having any problems with sleep.  States that he has been told in the past that he snores and appears to have disordered breathing while sleeping.  He reports that currently he is falling asleep easily at night and awakens in the morning fully refreshed.  Denies any excessive daytime sleepiness or fatigue.  [place] score is 8/24.  He denies having any symptoms of restless leg syndrome.

## 2024-08-16 NOTE — PATIENT INSTRUCTIONS
Split-night sleep study ordered  Order new CPAP machine and supplies after studies completed  Recommendations as above  Follow-up after sleep study or 3 months after starting CPAP or sooner if needed

## 2024-08-24 DIAGNOSIS — M54.32 SCIATICA, LEFT SIDE: ICD-10-CM

## 2024-08-27 RX ORDER — CYCLOBENZAPRINE HCL 5 MG
5 TABLET ORAL NIGHTLY PRN
Qty: 90 TABLET | Refills: 0 | Status: SHIPPED | OUTPATIENT
Start: 2024-08-27

## 2024-09-04 SDOH — ECONOMIC STABILITY: FOOD INSECURITY: WITHIN THE PAST 12 MONTHS, THE FOOD YOU BOUGHT JUST DIDN'T LAST AND YOU DIDN'T HAVE MONEY TO GET MORE.: SOMETIMES TRUE

## 2024-09-04 SDOH — HEALTH STABILITY: PHYSICAL HEALTH: ON AVERAGE, HOW MANY MINUTES DO YOU ENGAGE IN EXERCISE AT THIS LEVEL?: 20 MIN

## 2024-09-04 SDOH — ECONOMIC STABILITY: FOOD INSECURITY: WITHIN THE PAST 12 MONTHS, YOU WORRIED THAT YOUR FOOD WOULD RUN OUT BEFORE YOU GOT MONEY TO BUY MORE.: SOMETIMES TRUE

## 2024-09-04 SDOH — ECONOMIC STABILITY: INCOME INSECURITY: HOW HARD IS IT FOR YOU TO PAY FOR THE VERY BASICS LIKE FOOD, HOUSING, MEDICAL CARE, AND HEATING?: SOMEWHAT HARD

## 2024-09-04 SDOH — HEALTH STABILITY: PHYSICAL HEALTH: ON AVERAGE, HOW MANY DAYS PER WEEK DO YOU ENGAGE IN MODERATE TO STRENUOUS EXERCISE (LIKE A BRISK WALK)?: 4 DAYS

## 2024-09-04 SDOH — ECONOMIC STABILITY: TRANSPORTATION INSECURITY
IN THE PAST 12 MONTHS, HAS LACK OF TRANSPORTATION KEPT YOU FROM MEETINGS, WORK, OR FROM GETTING THINGS NEEDED FOR DAILY LIVING?: YES

## 2024-09-04 ASSESSMENT — LIFESTYLE VARIABLES
HAS A RELATIVE, FRIEND, DOCTOR, OR ANOTHER HEALTH PROFESSIONAL EXPRESSED CONCERN ABOUT YOUR DRINKING OR SUGGESTED YOU CUT DOWN: NO
HOW OFTEN DO YOU HAVE A DRINK CONTAINING ALCOHOL: 3
HAVE YOU OR SOMEONE ELSE BEEN INJURED AS A RESULT OF YOUR DRINKING: NO
HOW OFTEN DURING THE LAST YEAR HAVE YOU BEEN UNABLE TO REMEMBER WHAT HAPPENED THE NIGHT BEFORE BECAUSE YOU HAD BEEN DRINKING: NEVER
HOW OFTEN DURING THE LAST YEAR HAVE YOU FOUND THAT YOU WERE NOT ABLE TO STOP DRINKING ONCE YOU HAD STARTED: NEVER
HOW OFTEN DURING THE LAST YEAR HAVE YOU HAD A FEELING OF GUILT OR REMORSE AFTER DRINKING: NEVER
HOW OFTEN DURING THE LAST YEAR HAVE YOU BEEN UNABLE TO REMEMBER WHAT HAPPENED THE NIGHT BEFORE BECAUSE YOU HAD BEEN DRINKING: NEVER
HOW OFTEN DURING THE LAST YEAR HAVE YOU NEEDED AN ALCOHOLIC DRINK FIRST THING IN THE MORNING TO GET YOURSELF GOING AFTER A NIGHT OF HEAVY DRINKING: NEVER
HAS A RELATIVE, FRIEND, DOCTOR, OR ANOTHER HEALTH PROFESSIONAL EXPRESSED CONCERN ABOUT YOUR DRINKING OR SUGGESTED YOU CUT DOWN: NO
HOW OFTEN DURING THE LAST YEAR HAVE YOU FAILED TO DO WHAT WAS NORMALLY EXPECTED FROM YOU BECAUSE OF DRINKING: NEVER
HOW OFTEN DO YOU HAVE A DRINK CONTAINING ALCOHOL: 2-4 TIMES A MONTH
HOW MANY STANDARD DRINKS CONTAINING ALCOHOL DO YOU HAVE ON A TYPICAL DAY: 2
HOW MANY STANDARD DRINKS CONTAINING ALCOHOL DO YOU HAVE ON A TYPICAL DAY: 3 OR 4
HOW OFTEN DURING THE LAST YEAR HAVE YOU FAILED TO DO WHAT WAS NORMALLY EXPECTED FROM YOU BECAUSE OF DRINKING: NEVER
HAVE YOU OR SOMEONE ELSE BEEN INJURED AS A RESULT OF YOUR DRINKING: NO
HOW OFTEN DURING THE LAST YEAR HAVE YOU HAD A FEELING OF GUILT OR REMORSE AFTER DRINKING: NEVER
HOW OFTEN DURING THE LAST YEAR HAVE YOU NEEDED AN ALCOHOLIC DRINK FIRST THING IN THE MORNING TO GET YOURSELF GOING AFTER A NIGHT OF HEAVY DRINKING: NEVER
HOW OFTEN DO YOU HAVE SIX OR MORE DRINKS ON ONE OCCASION: 1
HOW OFTEN DURING THE LAST YEAR HAVE YOU FOUND THAT YOU WERE NOT ABLE TO STOP DRINKING ONCE YOU HAD STARTED: NEVER

## 2024-09-04 ASSESSMENT — PATIENT HEALTH QUESTIONNAIRE - PHQ9
SUM OF ALL RESPONSES TO PHQ QUESTIONS 1-9: 0
SUM OF ALL RESPONSES TO PHQ QUESTIONS 1-9: 0
SUM OF ALL RESPONSES TO PHQ9 QUESTIONS 1 & 2: 0
SUM OF ALL RESPONSES TO PHQ QUESTIONS 1-9: 0
2. FEELING DOWN, DEPRESSED OR HOPELESS: NOT AT ALL
1. LITTLE INTEREST OR PLEASURE IN DOING THINGS: NOT AT ALL
SUM OF ALL RESPONSES TO PHQ QUESTIONS 1-9: 0

## 2024-09-04 NOTE — ROUTINE PROCESS
Bedside and verbal shift report received from [facility], [address] 
 Preop Patient Instructions for [facility] access hospital Surgeries and Procedures    Welcome! We want you to have the best experience! Thank you for choosing us and trusting us with your care. If you have any questions regarding your preop instructions, please call:  [phone] 7:30am-4pm M-F  IF you have questions day before surgery before 8:30pm or morning of surgery after 5am THEN call [phone]   Call your surgeon immediately if you feel that you cannot make it for surgery for any reason (i.e. cold symptoms, fever, family emergency etc.).    WHEN SHOULD I ARRIVE?    Your hospital arrival time will be given the afternoon of the business day prior to your surgical date, via text message or phone call after 4:00 PM. Once you accept the text, we will know you received your arrival time. If your surgery falls the day after a holiday or on a Monday, you will be notified the prior business day.     WHERE DO I GO?    When driving, follow the signs to the Main Entrance, and then follow signs for Parking lot A, which is located to the right of the Main Entrance if you are facing the building.   is available M-F 8-4pm. Walk through the main entrance on the ground level. Then, walk to the East elevators, which are located past the front lobby desk on the right. The [facility] is located up one level on the first floor. Exit the elevator and the reception desk is to your right.    TRANSPORTATION    IMPORTANT SAFETY! You must have a ride arranged to get home and have a responsible adult stay with you for 24 hours after the surgery. You cannot drive or use public transportation or rideshare by yourself.    CAN I HAVE VISITORS?    Yes, two visitors are allowed in the [facility] due to space limitations. Additional visitors would be directed to the waiting area. More visitors may be allowed in the overnight units during visitor hours. Minor visitors must be accompanied by an adult at all times.  Masking  is optional for patients, visitors, and the clinical team with exceptions for higher risk patients.  The clinical team has discretion to limit visitors if a visitor presents a health or safety risk to the patient, themselves, or the care team.   Visitor accommodations are subject to change depending on community infection concerns.      WHAT SHOULD I BRING?    Photo ID and insurance card  Do not bring valuables to the hospital  Bring any asthma/COPD inhalers, eyeglasses, dentures or hearing aids (bring cases)  Home medication list    IF you are staying overnight bring:  CPAP machine - if you use one for sleep apnea.  Self-care items such as hairbrush or hair tie.    HOW TO DRESS, SHOWER OR BATHE THE NIGHT BEFORE SURGERY?    CHG Instructions  Before surgery patients take an important role in prevention of a surgical site infection by using a special wash. A preoperative shower or bath with a special soap called chlorhexidine gluconate (CHG) 4% will need to be done. A common name for this soap is Hibiclens or Aplicare, but any of the mentioned brands are acceptable for use. If there is an allergy to CHG or for any other reason that washing with CHG is not possible, please follow the instructions below BUT use an antibacterial soap.    Patient Instructions for Skin Cleaning for baths or showers:   Please read the \"Drug Facts\" for CHG information and directions on the bottle:   Do not use on the head or face, eyes, ears or mouth.   Do not use in the genital area.   Do not use directly on stoma for ostomy.  Do not use if allergic to chlorhexidine gluconate or any other Ingredient listed. Instead use an antibacterial soap.    Steps for a Bath or Shower the Night Before and Morning of Surgery:  Please remove any nail polish including toes.  When bathing or showering wash hair as usual with regular shampoo.  Rinse hair and body thoroughly to remove any shampoo residue.   Wash face with regular soap or water only.   Wash  genital area with regular soap or water only, NOT with CHG.   Thoroughly rinse body with warm water from the neck down.   Turn off the water to prevent rinsing the CHG soap off too soon.   Apply CHG soap and leave on for one minute. Pay special attention to the area of surgery.   Avoid any open skin areas including open wounds. Your physician should be made aware of these wounds and provide instructions for them.  If having back surgery, please have someone assist in applying CHG to the back area.   Rinse thoroughly with warm water.   Do not use regular soap after applying and rinsing CHG.   Pat dry with a fresh towel.   Do not apply lotion, powders or perfumes.   Do not wear jewelry, this also applies to permanent jewelry. If it is not removed it could result in a burn, or the cancellation of your surgery.   Do not wear makeup, including mascara.  Put on clean clothes and sleep in fresh bed sheets.   Sleeping with pet animals can be a source of infection.   *If a cast or splint is in place that is not to be removed, then sponge bathe exposed skin areas from the neck down.  Please brush and floss the night before and morning of your procedure with a new toothbrush.    WHAT CAN I EAT BEFORE I ARRIVE FOR SURGERY?    8 hours prior to ARRIVAL time: Only if allowed by your surgeon, you can eat a full meal. No solid foods after this.     2 hours before ARRIVAL time: You may drink approved clear liquids up to 2 hours before your arrival time.     Approved Clear liquids: Water, Propel, Gatorade (any color except red), Apple juice without pulp (non-organic); Pedialyte, 7-up/ Sprite, PLAIN Black coffee or tea without milk products or lemon. NO NON-DAIRY CREAMERS *If you have diabetes choose low carb/sugar or no carb/sugar options.     Unacceptable Clear liquids: Coffee or tea with milk products, orange juice, alcohol, soup broth, powder flavoring packets     2 hours before arrival time: Begin drinking 10 oz. of Pre-Surgery  (high carbohydrate) drink.  Finish this drink within 30 minutes; if you do not finish before then, STOP. If your arrival time is before 6am, take your drink at 4:30 and finish by 5am.     IMPORTANT SAFETY! FAILURE TO FOLLOW THIS MAY RESULT IN EITHER DELAY OR CANCELLATION OF YOUR PROCEDURE DUE TO THE RISK OF ASPIRATION.    WHEN SHOULD I STOP TAKING MY MEDICATIONS?    Before Surgery Hold (Do Not Take) these Medications  Medications and Supplements:  - Morning of surgery hold any Vitamins AND for 2 weeks prior hold any Vitamin E or Herbals     Anticoagulation:none on med list    Antidiabetic:none on med list    ALEKSANDR Risk (Diuretics, ACE-I/ARB, NSAIDs):   - 5 days before surgery hold (ibuprofen (MOTRIN) 200 MG tablet [[id number]])      Continue all other medications unless an alternative plan was advised with the surgeon and/or specialist.

## 2024-09-05 ENCOUNTER — OFFICE VISIT (OUTPATIENT)
Dept: INTERNAL MEDICINE CLINIC | Facility: CLINIC | Age: 64
End: 2024-09-05

## 2024-09-05 VITALS
BODY MASS INDEX: 20.36 KG/M2 | WEIGHT: 153.6 LBS | TEMPERATURE: 98.4 F | DIASTOLIC BLOOD PRESSURE: 70 MMHG | SYSTOLIC BLOOD PRESSURE: 102 MMHG | OXYGEN SATURATION: 96 % | RESPIRATION RATE: 16 BRPM | HEIGHT: 73 IN | HEART RATE: 50 BPM

## 2024-09-05 DIAGNOSIS — I50.22 CHRONIC SYSTOLIC HEART FAILURE (HCC): ICD-10-CM

## 2024-09-05 DIAGNOSIS — Z13.5 GLAUCOMA SCREENING: ICD-10-CM

## 2024-09-05 DIAGNOSIS — G47.33 OSA (OBSTRUCTIVE SLEEP APNEA): ICD-10-CM

## 2024-09-05 DIAGNOSIS — L60.0 INGROWN NAIL OF SECOND TOE OF LEFT FOOT: ICD-10-CM

## 2024-09-05 DIAGNOSIS — I10 ESSENTIAL HYPERTENSION: ICD-10-CM

## 2024-09-05 DIAGNOSIS — F32.9 REACTIVE DEPRESSION: ICD-10-CM

## 2024-09-05 DIAGNOSIS — I25.10 CORONARY ARTERY DISEASE INVOLVING NATIVE CORONARY ARTERY OF NATIVE HEART WITHOUT ANGINA PECTORIS: ICD-10-CM

## 2024-09-05 DIAGNOSIS — E78.5 DYSLIPIDEMIA: ICD-10-CM

## 2024-09-05 DIAGNOSIS — E55.9 VITAMIN D DEFICIENCY: ICD-10-CM

## 2024-09-05 DIAGNOSIS — Z00.00 WELCOME TO MEDICARE PREVENTIVE VISIT: Primary | ICD-10-CM

## 2024-09-05 DIAGNOSIS — Z12.11 SCREEN FOR COLON CANCER: ICD-10-CM

## 2024-09-05 DIAGNOSIS — R91.1 PULMONARY NODULE: ICD-10-CM

## 2024-09-05 DIAGNOSIS — E11.9 DIABETES MELLITUS TYPE 2, DIET-CONTROLLED (HCC): ICD-10-CM

## 2024-09-05 RX ORDER — CARVEDILOL 3.12 MG/1
3.12 TABLET ORAL 2 TIMES DAILY
Qty: 180 TABLET | Refills: 3 | Status: SHIPPED | OUTPATIENT
Start: 2024-09-05

## 2024-09-05 RX ORDER — ATORVASTATIN CALCIUM 20 MG/1
20 TABLET, FILM COATED ORAL DAILY
Qty: 90 TABLET | Refills: 3 | Status: SHIPPED | OUTPATIENT
Start: 2024-09-05

## 2024-09-05 RX ORDER — ESCITALOPRAM OXALATE 10 MG/1
10 TABLET ORAL DAILY
Qty: 90 TABLET | Refills: 3 | Status: SHIPPED | OUTPATIENT
Start: 2024-09-05

## 2024-09-05 RX ORDER — GLUCOSAMINE HCL/CHONDROITIN SU 500-400 MG
CAPSULE ORAL
Qty: 100 STRIP | Refills: 3 | Status: SHIPPED | OUTPATIENT
Start: 2024-09-05

## 2024-09-05 RX ORDER — LANCETS 30 GAUGE
1 EACH MISCELLANEOUS DAILY
Qty: 100 EACH | Refills: 3 | Status: SHIPPED | OUTPATIENT
Start: 2024-09-05

## 2024-09-05 RX ORDER — LISINOPRIL 2.5 MG/1
2.5 TABLET ORAL 2 TIMES DAILY
Qty: 180 TABLET | Refills: 3 | Status: SHIPPED | OUTPATIENT
Start: 2024-09-05

## 2024-09-05 RX ORDER — BLOOD-GLUCOSE METER
1 KIT MISCELLANEOUS DAILY
Qty: 1 KIT | Refills: 0 | Status: SHIPPED | OUTPATIENT
Start: 2024-09-05

## 2024-09-05 ASSESSMENT — VISUAL ACUITY
OS_CC: 20/30
OD_CC: 20/30

## 2024-09-05 ASSESSMENT — ENCOUNTER SYMPTOMS
ABDOMINAL PAIN: 0
COUGH: 0
SHORTNESS OF BREATH: 0
BLOOD IN STOOL: 0
RHINORRHEA: 0

## 2024-09-05 NOTE — PROGRESS NOTES
[facility]      2024    Patient Name: Graham Hoskins  :  1960    Subjective:    Chief Complaint:  Chief Complaint   Patient presents with    Welcome To Medicare     Pt is here for Welcome to Medicare visit and to review labs.          HPI Here for Welcome to Medicare visit; patient had fasting labs done recently and results were reviewed in detail today;   He has ASIM, saw Holger De Luna NP last month; splint night sleep study ordered along with new CPAP machine and supplies; he is to f/u in 3 months; records reviewed; he has sleep study scheduled 24;   He has CHF, saw Dr. Olea in May; he is to continue present meds, f/u in 6 months; records reviewed;   He has hx of pulmonary nodule, had CT chest 2025, nodule stable in size, report reviewed with him in detail toda;   Colonoscopy: Cologuard  negative  Eye exam: referral ordered  Dental exam: 2024, now has dentures  Pneumovax 20: 2024  Prevnar 13:  Shingrix:   Tdap:   Fluvax:  RSV: 2024  Moderna Covid 19: , , 2021  Moderna booster: , , 22  HTN:  Patient compliant with taking blood pressure medications: Yes  Discussed importance of following low sodium DASH diet, increasing physical activity, taking medications as ordered, decreasing alcohol intake, keeping f/u visits to recheck blood pressure, monitoring blood pressure at home and keeping a log, with goal blood pressure <140/90.  Home bp readings are: good  Diabetes:  Blood sugar readings: does not monitor  Patient compliant with low carbohydrate diet, with occasional dietary indiscretions.  Patient is sedentary and does not exercise.  Regular exercise recommended.  Patient advised on foot care and regular foot exam.  Last eye exam: referral ordered  Last HgbA1c:   Lab Results   Component Value Date    LABA1C 6.0 (H) 2024     Lab Results   Component Value Date     2024          Past Medical History:   Diagnosis Date

## 2024-09-05 NOTE — PATIENT INSTRUCTIONS
strange feeling in the back, neck, jaw, or upper belly or in one or both shoulders or arms.     Lightheadedness or sudden weakness.     A fast or irregular heartbeat.   After you call 911, the  may tell you to chew 1 adult-strength or 2 to 4 low-dose aspirin. Wait for an ambulance. Do not try to drive yourself.  Watch closely for changes in your health, and be sure to contact your doctor if you have any problems.  Where can you learn more?  Go to https://www.[employer].net/patientEd and enter F075 to learn more about \"A Healthy Heart: Care Instructions.\"  Current as of: June 24, 2023  Content Version: 14.1  © 1983-5913 [employer].   Care instructions adapted under license by [employer]. If you have questions about a medical condition or this instruction, always ask your healthcare professional. [employer] disclaims any warranty or liability for your use of this information.      Personalized Preventive Plan for Graham Hoskins - 9/5/2024  Medicare offers a range of preventive health benefits. Some of the tests and screenings are paid in full while other may be subject to a deductible, co-insurance, and/or copay.    Some of these benefits include a comprehensive review of your medical history including lifestyle, illnesses that may run in your family, and various assessments and screenings as appropriate.    After reviewing your medical record and screening and assessments performed today your provider may have ordered immunizations, labs, imaging, and/or referrals for you.  A list of these orders (if applicable) as well as your Preventive Care list are included within your After Visit Summary for your review.    Other Preventive Recommendations:    A preventive eye exam performed by an eye specialist is recommended every 1-2 years to screen for glaucoma; cataracts, macular degeneration, and other eye disorders.  A preventive dental visit is recommended every 6 months.  Try

## 2024-09-11 ENCOUNTER — HOSPITAL ENCOUNTER (OUTPATIENT)
Dept: SLEEP MEDICINE | Age: 64
Discharge: HOME | End: 2024-09-14

## 2024-09-12 DIAGNOSIS — E78.5 DYSLIPIDEMIA: ICD-10-CM

## 2024-09-12 DIAGNOSIS — E11.9 DIABETES MELLITUS TYPE 2, DIET-CONTROLLED (HCC): ICD-10-CM

## 2024-09-13 RX ORDER — ATORVASTATIN CALCIUM 20 MG/1
20 TABLET, FILM COATED ORAL DAILY
Qty: 90 TABLET | Refills: 3 | Status: SHIPPED | OUTPATIENT
Start: 2024-09-13

## 2024-09-13 RX ORDER — BLOOD-GLUCOSE METER
1 KIT MISCELLANEOUS DAILY
Qty: 1 KIT | Refills: 0 | Status: SHIPPED | OUTPATIENT
Start: 2024-09-13

## 2024-09-13 RX ORDER — LANCETS 30 GAUGE
1 EACH MISCELLANEOUS DAILY
Qty: 100 EACH | Refills: 3 | Status: SHIPPED | OUTPATIENT
Start: 2024-09-13

## 2024-09-13 RX ORDER — GLUCOSAMINE HCL/CHONDROITIN SU 500-400 MG
CAPSULE ORAL
Qty: 100 STRIP | Refills: 3 | Status: SHIPPED | OUTPATIENT
Start: 2024-09-13

## 2024-09-24 LAB — NONINV COLON CA DNA+OCC BLD SCRN STL QL: NEGATIVE

## 2024-10-08 NOTE — TELEPHONE ENCOUNTER
Medication Refill Request    Name of Medication : diclofenac    Strength of Medication: gel    Directions: 4 times daily    30 day or 90 day supply: 30    Preferred Pharmacy: [facility] [address] in [place]    Last Appt. Date: 9/5/24    Next Appt. Date: none    Additional Information For Provider:

## 2024-10-10 ENCOUNTER — TELEPHONE (OUTPATIENT)
Dept: INTERNAL MEDICINE CLINIC | Facility: CLINIC | Age: 64
End: 2024-10-10

## 2024-10-10 NOTE — TELEPHONE ENCOUNTER
Pt would like to know if the doctor has received the test results from a recent sleep study indicating that the pt needs a C-pap machine and would like a prescription for the C-pap.

## 2024-10-14 NOTE — TELEPHONE ENCOUNTER
Talked to pt and informed him for some reason, his sleep study results are not showing up in his chart.  Talked to Eri Rodriguez, Referral Coordinator, and she stated she sent a message to their (sleep center)  to see if she knows where the results are.  She will let me know when she receives an answer.

## 2024-10-15 NOTE — TELEPHONE ENCOUNTER
Pt called for a Medication Refill Request    Name of Medication : diclofenac sodium (VOLTAREN) 1 % GEL     Directions: Apply 2 g topically 4 times daily     Preferred Pharmacy: [facility] at [address] [facility], [place] [address] - [phone]     Last Appt. Date: 9/5/2024    Next Appt. Date: none    Additional Information For Provider:  Stated this is the second time he called about the refill and is also requesting an update regarding his C-pap machine.

## 2024-10-23 ENCOUNTER — TELEPHONE (OUTPATIENT)
Age: 64
End: 2024-10-23

## 2024-10-23 NOTE — TELEPHONE ENCOUNTER
Pt called in stating he went for a sleep study with Richard Gonzalez on 9/11 and has not yet got the results and would like to see if Dr Olea can get them?

## 2024-10-23 NOTE — TELEPHONE ENCOUNTER
Please call him, ASIM not severe, needs to discuss with PCP re leg movements with sleep, restless leg syndrome.    Thanks

## 2024-10-28 ENCOUNTER — TELEPHONE (OUTPATIENT)
Dept: INTERNAL MEDICINE CLINIC | Facility: CLINIC | Age: 64
End: 2024-10-28

## 2024-10-29 ENCOUNTER — TELEPHONE (OUTPATIENT)
Dept: SLEEP MEDICINE | Age: 64
End: 2024-10-29

## 2024-10-29 NOTE — TELEPHONE ENCOUNTER
Maria Esther, Mr. Hoskins had a sleep study on 9/11/2024. You recommended \"Consider HST\".  What does HST mean? Patient is concerned because he was never contacted from your office with test results or further recommendations.

## 2024-10-29 NOTE — TELEPHONE ENCOUNTER
Patient needs return appt/ AHI - 0.6 Lowest SaO2-88%  further evaluation for periodic limb movements and treatment options        Forward to schedulers

## 2024-11-06 NOTE — PROGRESS NOTES
[facility]  [address]  [place] [address] [phone]    Patient Name:  Graham Hoskins  YOB: 1960    Graham Hoskins, was evaluated through a synchronous (real-time) audio-video encounter. The patient (or guardian if applicable) is aware that this is a billable service, which includes applicable co-pays. This Virtual Visit was conducted with patient's (and/or legal guardian's) consent. Patient identification was verified, and a caregiver was present when appropriate.   The patient was located at Home: [address]  [place] [address]  Provider was located at Facility (Appt Dept): [address]  [place] [address]  Confirm you are appropriately licensed, registered, or certified to deliver care in the state where the patient is located as indicated above. If you are not or unsure, please re-schedule the visit: Yes, I confirm.          --KALEB Wood on 11/7/2024 at 12:25 PM             Office Visit 11/7/2024    CHIEF COMPLAINT:    Chief Complaint   Patient presents with    Follow-up    Sleep Study    Results       HISTORY OF PRESENT ILLNESS:  Patient is being seen today via virtual visit. Pt had a PSG on 10/24/24 with an AHI of 0.6/hr with desaturations to 88%. Pt only slept 57% of the study and he had significant PLMs.   Pt reports that he thinks that he slept well during his sleep study. He does not sleep well normally at night because he moves his legs around a lot at night according to his wife. He is not on any medications for RLS.   He is on iron supplementation for anemia. He has not had his ferritin checked since 2020. He is agreeable to having it rechecked. He is scheduled to see Dr. Olea in a few weeks and can get labs drawn then.   We reviewed his sleep study in detail. He is aware that he needs a home sleep study. Pt  is agreeable. Orders have been placed for HST.           11/7/2024    11:37 AM

## 2024-11-07 ENCOUNTER — TELEMEDICINE (OUTPATIENT)
Dept: SLEEP MEDICINE | Age: 64
End: 2024-11-07
Payer: MEDICAID

## 2024-11-07 DIAGNOSIS — Z78.9 DIFFICULTY WITH CPAP USE: ICD-10-CM

## 2024-11-07 DIAGNOSIS — G47.61 PLMD (PERIODIC LIMB MOVEMENT DISORDER): ICD-10-CM

## 2024-11-07 DIAGNOSIS — G47.33 OSA (OBSTRUCTIVE SLEEP APNEA): Primary | ICD-10-CM

## 2024-11-07 PROCEDURE — 99214 OFFICE O/P EST MOD 30 MIN: CPT | Performed by: PHYSICIAN ASSISTANT

## 2024-11-07 PROCEDURE — 1123F ACP DISCUSS/DSCN MKR DOCD: CPT | Performed by: PHYSICIAN ASSISTANT

## 2024-11-07 RX ORDER — GABAPENTIN 100 MG/1
100 CAPSULE ORAL NIGHTLY
Qty: 30 CAPSULE | Refills: 5 | Status: SHIPPED | OUTPATIENT
Start: 2024-11-07 | End: 2025-05-06

## 2024-11-07 ASSESSMENT — SLEEP AND FATIGUE QUESTIONNAIRES
HOW LIKELY ARE YOU TO NOD OFF OR FALL ASLEEP WHILE WATCHING TV: HIGH CHANCE OF DOZING
HOW LIKELY ARE YOU TO NOD OFF OR FALL ASLEEP WHILE SITTING AND TALKING TO SOMEONE: WOULD NEVER DOZE
HOW LIKELY ARE YOU TO NOD OFF OR FALL ASLEEP WHILE LYING DOWN TO REST IN THE AFTERNOON WHEN CIRCUMSTANCES PERMIT: MODERATE CHANCE OF DOZING
HOW LIKELY ARE YOU TO NOD OFF OR FALL ASLEEP WHILE SITTING AND READING: HIGH CHANCE OF DOZING
HOW LIKELY ARE YOU TO NOD OFF OR FALL ASLEEP WHILE SITTING INACTIVE IN A PUBLIC PLACE: WOULD NEVER DOZE
HOW LIKELY ARE YOU TO NOD OFF OR FALL ASLEEP WHEN YOU ARE A PASSENGER IN A CAR FOR AN HOUR WITHOUT A BREAK: MODERATE CHANCE OF DOZING
HOW LIKELY ARE YOU TO NOD OFF OR FALL ASLEEP IN A CAR, WHILE STOPPED FOR A FEW MINUTES IN TRAFFIC: WOULD NEVER DOZE
ESS TOTAL SCORE: 11
HOW LIKELY ARE YOU TO NOD OFF OR FALL ASLEEP WHILE SITTING QUIETLY AFTER LUNCH WITHOUT ALCOHOL: SLIGHT CHANCE OF DOZING

## 2024-11-20 DIAGNOSIS — F32.9 REACTIVE DEPRESSION: ICD-10-CM

## 2024-11-20 DIAGNOSIS — E78.5 DYSLIPIDEMIA: ICD-10-CM

## 2024-11-20 DIAGNOSIS — M54.32 SCIATICA, LEFT SIDE: ICD-10-CM

## 2024-11-20 DIAGNOSIS — E11.9 DIABETES MELLITUS TYPE 2, DIET-CONTROLLED (HCC): ICD-10-CM

## 2024-11-21 RX ORDER — ATORVASTATIN CALCIUM 20 MG/1
20 TABLET, FILM COATED ORAL DAILY
Qty: 90 TABLET | Refills: 3 | Status: SHIPPED | OUTPATIENT
Start: 2024-11-21

## 2024-11-21 RX ORDER — CYCLOBENZAPRINE HCL 5 MG
5 TABLET ORAL NIGHTLY PRN
Qty: 90 TABLET | Refills: 0 | Status: SHIPPED | OUTPATIENT
Start: 2024-11-21

## 2024-11-21 RX ORDER — ESCITALOPRAM OXALATE 10 MG/1
10 TABLET ORAL DAILY
Qty: 90 TABLET | Refills: 3 | Status: SHIPPED | OUTPATIENT
Start: 2024-11-21

## 2024-11-21 RX ORDER — LANCETS 30 GAUGE
1 EACH MISCELLANEOUS DAILY
Qty: 100 EACH | Refills: 3 | Status: SHIPPED | OUTPATIENT
Start: 2024-11-21

## 2024-11-21 RX ORDER — GLUCOSAMINE HCL/CHONDROITIN SU 500-400 MG
CAPSULE ORAL
Qty: 100 STRIP | Refills: 3 | Status: SHIPPED | OUTPATIENT
Start: 2024-11-21

## 2024-11-22 ENCOUNTER — OFFICE VISIT (OUTPATIENT)
Age: 64
End: 2024-11-22
Payer: MEDICARE

## 2024-11-22 VITALS
DIASTOLIC BLOOD PRESSURE: 66 MMHG | WEIGHT: 156 LBS | HEART RATE: 52 BPM | BODY MASS INDEX: 20.67 KG/M2 | SYSTOLIC BLOOD PRESSURE: 110 MMHG | HEIGHT: 73 IN

## 2024-11-22 DIAGNOSIS — I10 ESSENTIAL HYPERTENSION: ICD-10-CM

## 2024-11-22 DIAGNOSIS — I50.22 CHRONIC SYSTOLIC HEART FAILURE (HCC): Primary | ICD-10-CM

## 2024-11-22 DIAGNOSIS — E78.5 DYSLIPIDEMIA: ICD-10-CM

## 2024-11-22 DIAGNOSIS — I25.10 CORONARY ARTERY DISEASE INVOLVING NATIVE CORONARY ARTERY OF NATIVE HEART WITHOUT ANGINA PECTORIS: ICD-10-CM

## 2024-11-22 PROCEDURE — 3017F COLORECTAL CA SCREEN DOC REV: CPT | Performed by: INTERNAL MEDICINE

## 2024-11-22 PROCEDURE — 1123F ACP DISCUSS/DSCN MKR DOCD: CPT | Performed by: INTERNAL MEDICINE

## 2024-11-22 PROCEDURE — G8428 CUR MEDS NOT DOCUMENT: HCPCS | Performed by: INTERNAL MEDICINE

## 2024-11-22 PROCEDURE — 99214 OFFICE O/P EST MOD 30 MIN: CPT | Performed by: INTERNAL MEDICINE

## 2024-11-22 PROCEDURE — 1036F TOBACCO NON-USER: CPT | Performed by: INTERNAL MEDICINE

## 2024-11-22 PROCEDURE — 3078F DIAST BP <80 MM HG: CPT | Performed by: INTERNAL MEDICINE

## 2024-11-22 PROCEDURE — 3074F SYST BP LT 130 MM HG: CPT | Performed by: INTERNAL MEDICINE

## 2024-11-22 PROCEDURE — G8482 FLU IMMUNIZE ORDER/ADMIN: HCPCS | Performed by: INTERNAL MEDICINE

## 2024-11-22 PROCEDURE — G8420 CALC BMI NORM PARAMETERS: HCPCS | Performed by: INTERNAL MEDICINE

## 2024-11-22 RX ORDER — CHOLECALCIFEROL (VITAMIN D3) 25 MCG
2000 TABLET ORAL DAILY
COMMUNITY

## 2024-11-22 RX ORDER — TADALAFIL 20 MG/1
20 TABLET ORAL DAILY PRN
Qty: 15 TABLET | Refills: 3 | Status: SHIPPED | OUTPATIENT
Start: 2024-11-22

## 2024-11-22 NOTE — PROGRESS NOTES
[address] [employer] [facility], [address] [place] [address]  PHONE: [phone]     24    NAME:  Graham Hoskins  : 1960  MRN: [id number]       SUBJECTIVE:   Graham Hoskins is a 63 y.o. male seen for a follow up visit regarding the following:     Chief Complaint   Patient presents with    Congestive Heart Failure    Coronary Artery Disease       HPI: Here for AVR and CAD   18: 25mm pericardial AVR      NSTEMI admission, Echo 2020: EF 40%   LHC 2020: mild to mod CAD   2020 AV endocarditis admission: redo sternotomy, lysis of adhesions, debridement and pericardial patch closure of annular abscess, explant of previous Magna Ease aortic valve and aortic valve  replacement with a 25mm Inspiris aortic valve  Echo 2022: EF 20%, AV mean 20  Echo 3/2023: EF 40%, AV mean 18  Echo 2024: EF 45%, AV mean 14        Getting CPAP soon. Walking more now.  No new JOHNSON, SOB.  Busy at work.  No CP, pressure.  Active work.    No new angina.   Patient denies recent history of orthopnea, PND, excessive dizziness and/or syncope.         Quit smoking, over >40 pack years now.           Past Medical History, Past Surgical History, Family history, Social History, and Medications were all reviewed with the patient today and updated as necessary.     Current Outpatient Medications   Medication Sig Dispense Refill    vitamin D3 (CHOLECALCIFEROL) 25 MCG (1000 UT) TABS tablet Take 2 tablets by mouth daily      cyclobenzaprine (FLEXERIL) 5 MG tablet Take 1 tablet by mouth nightly as needed for Muscle spasms 90 tablet 0    escitalopram (LEXAPRO) 10 MG tablet Take 1 tablet by mouth daily 90 tablet 3    atorvastatin (LIPITOR) 20 MG tablet Take 1 tablet by mouth daily 90 tablet 3    Lancets MISC 1 each by Does not apply route daily One Touch lancets, check blood sugars daily, DM2, E11.9 100 each 3    blood glucose monitor strips One Touch test strips, check blood sugars daily, DM2, E11.9 100 strip 3    gabapentin

## 2024-12-08 DIAGNOSIS — E78.5 DYSLIPIDEMIA: ICD-10-CM

## 2024-12-08 DIAGNOSIS — F32.9 REACTIVE DEPRESSION: ICD-10-CM

## 2024-12-08 DIAGNOSIS — E11.9 DIABETES MELLITUS TYPE 2, DIET-CONTROLLED (HCC): ICD-10-CM

## 2024-12-08 DIAGNOSIS — M54.32 SCIATICA, LEFT SIDE: ICD-10-CM

## 2024-12-08 RX ORDER — GABAPENTIN 100 MG/1
100 CAPSULE ORAL NIGHTLY
Qty: 30 CAPSULE | Refills: 5 | Status: CANCELLED | OUTPATIENT
Start: 2024-12-08 | End: 2025-06-06

## 2024-12-09 RX ORDER — ESCITALOPRAM OXALATE 10 MG/1
10 TABLET ORAL DAILY
Qty: 90 TABLET | Refills: 3 | Status: SHIPPED | OUTPATIENT
Start: 2024-12-09

## 2024-12-09 RX ORDER — ATORVASTATIN CALCIUM 20 MG/1
20 TABLET, FILM COATED ORAL DAILY
Qty: 90 TABLET | Refills: 3 | Status: SHIPPED | OUTPATIENT
Start: 2024-12-09

## 2024-12-09 RX ORDER — LANCETS 30 GAUGE
1 EACH MISCELLANEOUS DAILY
Qty: 100 EACH | Refills: 3 | Status: SHIPPED | OUTPATIENT
Start: 2024-12-09

## 2024-12-09 RX ORDER — CYCLOBENZAPRINE HCL 5 MG
5 TABLET ORAL NIGHTLY PRN
Qty: 90 TABLET | Refills: 0 | Status: SHIPPED | OUTPATIENT
Start: 2024-12-09

## 2024-12-09 RX ORDER — NITROGLYCERIN 0.4 MG/1
0.4 TABLET SUBLINGUAL EVERY 5 MIN PRN
Qty: 25 TABLET | Refills: 11 | Status: SHIPPED | OUTPATIENT
Start: 2024-12-09

## 2024-12-09 RX ORDER — GLUCOSAMINE HCL/CHONDROITIN SU 500-400 MG
CAPSULE ORAL
Qty: 100 STRIP | Refills: 3 | Status: SHIPPED | OUTPATIENT
Start: 2024-12-09

## 2024-12-13 ENCOUNTER — TELEPHONE (OUTPATIENT)
Dept: INTERNAL MEDICINE CLINIC | Facility: CLINIC | Age: 64
End: 2024-12-13

## 2024-12-13 DIAGNOSIS — M25.59 PAIN IN OTHER JOINT: Primary | ICD-10-CM

## 2024-12-13 RX ORDER — DICLOFENAC SODIUM 25 MG/1
25 TABLET, DELAYED RELEASE ORAL 2 TIMES DAILY PRN
Qty: 60 TABLET | Refills: 1 | Status: SHIPPED | OUTPATIENT
Start: 2024-12-13

## 2024-12-13 NOTE — TELEPHONE ENCOUNTER
Pt came in and stated that ins will not cover the diclofenac cream anymore and would like to know if you will send in the pill form for him

## 2024-12-22 ENCOUNTER — HOSPITAL ENCOUNTER (OUTPATIENT)
Dept: SLEEP CENTER | Age: 64
Discharge: HOME | End: 2024-12-24
Payer: MEDICARE

## 2024-12-22 PROCEDURE — 95806 SLEEP STUDY UNATT&RESP EFFT: CPT

## 2025-02-05 ENCOUNTER — PATIENT MESSAGE (OUTPATIENT)
Dept: SLEEP MEDICINE | Age: 65
End: 2025-02-05

## 2025-02-05 DIAGNOSIS — G47.33 OSA (OBSTRUCTIVE SLEEP APNEA): Primary | ICD-10-CM

## 2025-02-06 DIAGNOSIS — M25.59 PAIN IN OTHER JOINT: ICD-10-CM

## 2025-02-07 RX ORDER — DICLOFENAC SODIUM 25 MG/1
25 TABLET, DELAYED RELEASE ORAL 2 TIMES DAILY PRN
Qty: 60 TABLET | Refills: 0 | OUTPATIENT
Start: 2025-02-07

## 2025-02-12 NOTE — TELEPHONE ENCOUNTER
Pt would like to try CPAP again. CPAP orders placed, please place orders in GoScripts and I will sign it.     Thank you.

## 2025-02-20 ENCOUNTER — TELEPHONE (OUTPATIENT)
Dept: INTERNAL MEDICINE CLINIC | Facility: CLINIC | Age: 65
End: 2025-02-20

## 2025-02-20 NOTE — TELEPHONE ENCOUNTER
Patient would like All medication sent to [facility]  Rx order # [id number]. Patient would like  a clinical care team to call regarding Tadalafil 20 mg. Patient states this is not working.

## 2025-02-20 NOTE — TELEPHONE ENCOUNTER
Called pt wondering if can increase cialis since the 20 mg is not work or if can switch back to the viagra since that seem to have helped better?

## 2025-02-20 NOTE — TELEPHONE ENCOUNTER
Patient states he now uses [facility] - he has an order number but needs dr to authorize scripts    Order # [id number]

## 2025-02-21 RX ORDER — SILDENAFIL 100 MG/1
100 TABLET, FILM COATED ORAL DAILY PRN
Qty: 30 TABLET | Refills: 0 | Status: SHIPPED | OUTPATIENT
Start: 2025-02-21

## 2025-02-25 ENCOUNTER — TELEPHONE (OUTPATIENT)
Age: 65
End: 2025-02-25

## 2025-02-25 NOTE — TELEPHONE ENCOUNTER
Advised pt the viagra increased dose was called in. But to call back if not received at the pharmacy.

## 2025-02-25 NOTE — TELEPHONE ENCOUNTER
Patient called stating he has the following issue :    Changing/increasing Cialis, see chart note 2/20  Wanting to know if this has been sent in   Please call and advise

## 2025-03-07 DIAGNOSIS — M25.59 PAIN IN OTHER JOINT: ICD-10-CM

## 2025-03-07 DIAGNOSIS — F32.9 REACTIVE DEPRESSION: ICD-10-CM

## 2025-03-07 DIAGNOSIS — E78.5 DYSLIPIDEMIA: ICD-10-CM

## 2025-03-07 DIAGNOSIS — I10 ESSENTIAL HYPERTENSION: ICD-10-CM

## 2025-03-07 DIAGNOSIS — M54.32 SCIATICA, LEFT SIDE: ICD-10-CM

## 2025-03-07 RX ORDER — LISINOPRIL 2.5 MG/1
2.5 TABLET ORAL 2 TIMES DAILY
Qty: 180 TABLET | Refills: 0 | Status: SHIPPED | OUTPATIENT
Start: 2025-03-07

## 2025-03-07 RX ORDER — GABAPENTIN 100 MG/1
100 CAPSULE ORAL NIGHTLY
Qty: 30 CAPSULE | Refills: 0 | Status: SHIPPED | OUTPATIENT
Start: 2025-03-07 | End: 2025-09-03

## 2025-03-07 RX ORDER — DICLOFENAC SODIUM 25 MG/1
25 TABLET, DELAYED RELEASE ORAL 2 TIMES DAILY PRN
Qty: 60 TABLET | Refills: 0 | Status: SHIPPED | OUTPATIENT
Start: 2025-03-07

## 2025-03-07 RX ORDER — ATORVASTATIN CALCIUM 20 MG/1
20 TABLET, FILM COATED ORAL DAILY
Qty: 90 TABLET | Refills: 0 | Status: SHIPPED | OUTPATIENT
Start: 2025-03-07

## 2025-03-07 RX ORDER — CARVEDILOL 3.12 MG/1
3.12 TABLET ORAL 2 TIMES DAILY
Qty: 180 TABLET | Refills: 0 | Status: SHIPPED | OUTPATIENT
Start: 2025-03-07

## 2025-03-07 RX ORDER — ESCITALOPRAM OXALATE 10 MG/1
10 TABLET ORAL DAILY
Qty: 90 TABLET | Refills: 0 | Status: SHIPPED | OUTPATIENT
Start: 2025-03-07

## 2025-03-07 RX ORDER — CYCLOBENZAPRINE HCL 5 MG
5 TABLET ORAL NIGHTLY PRN
Qty: 90 TABLET | Refills: 0 | Status: SHIPPED | OUTPATIENT
Start: 2025-03-07

## 2025-03-07 NOTE — TELEPHONE ENCOUNTER
You4 hours ago (10:19 AM)     VL  I called pt at 1017x2 no  and no vm set up. I called pts wife at 1019         Note        Michelle Cooley routed conversation to You6 hours ago (9:05 AM)     Graham Hoskins \"Adrien\"  P [facility] Clinical Staff (supporting Gema Rdz MD)17 hours ago (9:32 PM)       Could you please contact [facility]? I'm trying to get my medicine delivered thru [facility] to my home.Thanks Adrien Hoskins.

## 2025-03-07 NOTE — TELEPHONE ENCOUNTER
Medication Refill Request    Name of Medication : carvedilol    Strength of Medication: 3.125 mg    Directions: 1 tab twice daily    30 day or 90 day supply: 90    Preferred Pharmacy: [facility]    Last Appt. Date: 9/5/24    Next Appt. Date: 4/1/25    Additional Information For Provider: needed within 3 days      Medication Refill Request    Name of Medication : lisinopril    Strength of Medication: 2.5 mg    Directions: 1 tab twice daily    30 day or 90 day supply: 90 day    Preferred Pharmacy: [facility]    Last Appt. Date: 9/5/24    Next Appt. Date: 4/1/25    Additional Information For Provider: needed within 3 days      Medication Refill Request    Name of Medication : atorvastatin    Strength of Medication: 20 mg    Directions: 1 tab daily    30 day or 90 day supply: 90 day    Preferred Pharmacy: [facility]    Last Appt. Date: 9/5/24    Next Appt. Date: 4/1/25    Additional Information For Provider: needed within 3 days      Medication Refill Request    Name of Medication : diclofenac    Strength of Medication: 25 mg    Directions: 1 tab twice daily as needed    30 day or 90 day supply: 30 day    Preferred Pharmacy: [facility]    Last Appt. Date: 9/5/24    Next Appt. Date: 4/1/25    Additional Information For Provider: needed within 3 days      Medication Refill Request    Name of Medication : Gabapentin    Strength of Medication: 100 mg    Directions: 1 tab nightly    30 day or 90 day supply: 180 tab    Preferred Pharmacy: [facility]    Last Appt. Date: 9/5/24    Next Appt. Date: 4/1/25    Additional Information For Provider: needed within 3 days      Medication Refill Request    Name of Medication : cyclobenzaprine    Strength of Medication: 5 mg    Directions: 1 tab as needed for muscle spasms    30 day or 90 day supply: 90 day    Preferred Pharmacy: [facility]    Last Appt. Date: 9/5/24    Next Appt. Date: 4/1/25    Additional Information For Provider: needed within 3 days      Medication Refill Request    Name of

## 2025-03-10 ENCOUNTER — RESULTS FOLLOW-UP (OUTPATIENT)
Dept: INTERNAL MEDICINE CLINIC | Facility: CLINIC | Age: 65
End: 2025-03-10

## 2025-03-10 ENCOUNTER — LAB (OUTPATIENT)
Dept: INTERNAL MEDICINE CLINIC | Facility: CLINIC | Age: 65
End: 2025-03-10

## 2025-03-10 DIAGNOSIS — E55.9 VITAMIN D DEFICIENCY: ICD-10-CM

## 2025-03-10 DIAGNOSIS — E11.9 DIABETES MELLITUS TYPE 2, DIET-CONTROLLED (HCC): ICD-10-CM

## 2025-03-10 DIAGNOSIS — E78.5 DYSLIPIDEMIA: ICD-10-CM

## 2025-03-10 LAB
25(OH)D3 SERPL-MCNC: 27.5 NG/ML (ref 30–100)
ALBUMIN SERPL-MCNC: 3.8 G/DL (ref 3.2–4.6)
ALBUMIN/GLOB SERPL: 1.3 (ref 1–1.9)
ALP SERPL-CCNC: 73 U/L (ref 40–129)
ALT SERPL-CCNC: 26 U/L (ref 8–55)
ANION GAP SERPL CALC-SCNC: 8 MMOL/L (ref 7–16)
APPEARANCE UR: CLEAR
AST SERPL-CCNC: 23 U/L (ref 15–37)
BASOPHILS # BLD: 0.05 K/UL (ref 0–0.2)
BASOPHILS NFR BLD: 0.6 % (ref 0–2)
BILIRUB SERPL-MCNC: 0.5 MG/DL (ref 0–1.2)
BILIRUB UR QL: NEGATIVE
BUN SERPL-MCNC: 27 MG/DL (ref 8–23)
CALCIUM SERPL-MCNC: 9.3 MG/DL (ref 8.8–10.2)
CHLORIDE SERPL-SCNC: 101 MMOL/L (ref 98–107)
CHOLEST SERPL-MCNC: 79 MG/DL (ref 0–200)
CO2 SERPL-SCNC: 31 MMOL/L (ref 20–29)
COLOR UR: NORMAL
CREAT SERPL-MCNC: 1.3 MG/DL (ref 0.8–1.3)
DIFFERENTIAL METHOD BLD: ABNORMAL
EOSINOPHIL # BLD: 0.63 K/UL (ref 0–0.8)
EOSINOPHIL NFR BLD: 6.9 % (ref 0.5–7.8)
ERYTHROCYTE [DISTWIDTH] IN BLOOD BY AUTOMATED COUNT: 13.7 % (ref 11.9–14.6)
EST. AVERAGE GLUCOSE BLD GHB EST-MCNC: 119 MG/DL
GLOBULIN SER CALC-MCNC: 3 G/DL (ref 2.3–3.5)
GLUCOSE SERPL-MCNC: 96 MG/DL (ref 70–99)
GLUCOSE UR STRIP.AUTO-MCNC: NEGATIVE MG/DL
HBA1C MFR BLD: 5.8 % (ref 0–5.6)
HCT VFR BLD AUTO: 42.7 % (ref 41.1–50.3)
HDLC SERPL-MCNC: 39 MG/DL (ref 40–60)
HDLC SERPL: 2 (ref 0–5)
HGB BLD-MCNC: 13.8 G/DL (ref 13.6–17.2)
HGB UR QL STRIP: NEGATIVE
IMM GRANULOCYTES # BLD AUTO: 0.05 K/UL (ref 0–0.5)
IMM GRANULOCYTES NFR BLD AUTO: 0.6 % (ref 0–5)
KETONES UR QL STRIP.AUTO: NEGATIVE MG/DL
LDLC SERPL CALC-MCNC: 24 MG/DL (ref 0–100)
LEUKOCYTE ESTERASE UR QL STRIP.AUTO: NEGATIVE
LYMPHOCYTES # BLD: 1.6 K/UL (ref 0.5–4.6)
LYMPHOCYTES NFR BLD: 17.6 % (ref 13–44)
MCH RBC QN AUTO: 29.5 PG (ref 26.1–32.9)
MCHC RBC AUTO-ENTMCNC: 32.3 G/DL (ref 31.4–35)
MCV RBC AUTO: 91.2 FL (ref 82–102)
MONOCYTES # BLD: 1.01 K/UL (ref 0.1–1.3)
MONOCYTES NFR BLD: 11.1 % (ref 4–12)
NEUTS SEG # BLD: 5.75 K/UL (ref 1.7–8.2)
NEUTS SEG NFR BLD: 63.2 % (ref 43–78)
NITRITE UR QL STRIP.AUTO: NEGATIVE
NRBC # BLD: 0 K/UL (ref 0–0.2)
PH UR STRIP: 5.5 (ref 5–9)
PLATELET # BLD AUTO: 148 K/UL (ref 150–450)
PMV BLD AUTO: 10.2 FL (ref 9.4–12.3)
POTASSIUM SERPL-SCNC: 4.4 MMOL/L (ref 3.5–5.1)
PROT SERPL-MCNC: 6.8 G/DL (ref 6.3–8.2)
PROT UR STRIP-MCNC: NEGATIVE MG/DL
RBC # BLD AUTO: 4.68 M/UL (ref 4.23–5.6)
SODIUM SERPL-SCNC: 139 MMOL/L (ref 136–145)
SP GR UR REFRACTOMETRY: 1.02 (ref 1–1.02)
T4 FREE SERPL-MCNC: 1 NG/DL (ref 0.9–1.7)
TRIGL SERPL-MCNC: 77 MG/DL (ref 0–150)
TSH, 3RD GENERATION: 2.39 UIU/ML (ref 0.27–4.2)
UROBILINOGEN UR QL STRIP.AUTO: 0.2 EU/DL (ref 0.2–1)
VLDLC SERPL CALC-MCNC: 15 MG/DL (ref 6–23)
WBC # BLD AUTO: 9.1 K/UL (ref 4.3–11.1)

## 2025-03-27 DIAGNOSIS — M25.59 PAIN IN OTHER JOINT: ICD-10-CM

## 2025-03-27 RX ORDER — DICLOFENAC SODIUM 25 MG/1
25 TABLET, DELAYED RELEASE ORAL 2 TIMES DAILY PRN
Qty: 60 TABLET | Refills: 11 | OUTPATIENT
Start: 2025-03-27

## 2025-03-29 ENCOUNTER — PATIENT MESSAGE (OUTPATIENT)
Dept: SLEEP MEDICINE | Age: 65
End: 2025-03-29

## 2025-03-29 DIAGNOSIS — M54.32 SCIATICA, LEFT SIDE: ICD-10-CM

## 2025-03-30 DIAGNOSIS — M54.32 SCIATICA, LEFT SIDE: ICD-10-CM

## 2025-03-31 RX ORDER — GABAPENTIN 100 MG/1
100 CAPSULE ORAL NIGHTLY
Qty: 30 CAPSULE | Refills: 11 | OUTPATIENT
Start: 2025-03-31

## 2025-03-31 RX ORDER — GABAPENTIN 100 MG/1
100 CAPSULE ORAL NIGHTLY
Qty: 90 CAPSULE | Refills: 1 | Status: SHIPPED | OUTPATIENT
Start: 2025-03-31 | End: 2025-09-27

## 2025-03-31 NOTE — TELEPHONE ENCOUNTER
Orders Placed This Encounter    gabapentin (NEURONTIN) 100 MG capsule     Sig: Take 1 capsule by mouth nightly for 180 days. Intended supply: 30 days     Dispense:  90 capsule     Refill:  1

## 2025-04-01 ENCOUNTER — OFFICE VISIT (OUTPATIENT)
Dept: INTERNAL MEDICINE CLINIC | Facility: CLINIC | Age: 65
End: 2025-04-01
Payer: MEDICARE

## 2025-04-01 VITALS
DIASTOLIC BLOOD PRESSURE: 62 MMHG | HEIGHT: 73 IN | HEART RATE: 64 BPM | BODY MASS INDEX: 21.84 KG/M2 | WEIGHT: 164.8 LBS | RESPIRATION RATE: 16 BRPM | OXYGEN SATURATION: 96 % | SYSTOLIC BLOOD PRESSURE: 124 MMHG | TEMPERATURE: 98.8 F

## 2025-04-01 DIAGNOSIS — E55.9 VITAMIN D DEFICIENCY: ICD-10-CM

## 2025-04-01 DIAGNOSIS — J30.1 SEASONAL ALLERGIC RHINITIS DUE TO POLLEN: ICD-10-CM

## 2025-04-01 DIAGNOSIS — G47.33 OSA (OBSTRUCTIVE SLEEP APNEA): ICD-10-CM

## 2025-04-01 DIAGNOSIS — R91.1 PULMONARY NODULE: ICD-10-CM

## 2025-04-01 DIAGNOSIS — E78.5 DYSLIPIDEMIA: ICD-10-CM

## 2025-04-01 DIAGNOSIS — Z13.6 ENCOUNTER FOR ABDOMINAL AORTIC ANEURYSM (AAA) SCREENING: ICD-10-CM

## 2025-04-01 DIAGNOSIS — I10 ESSENTIAL HYPERTENSION: Primary | ICD-10-CM

## 2025-04-01 DIAGNOSIS — I50.22 CHRONIC SYSTOLIC HEART FAILURE (HCC): ICD-10-CM

## 2025-04-01 DIAGNOSIS — I25.10 CORONARY ARTERY DISEASE INVOLVING NATIVE CORONARY ARTERY OF NATIVE HEART WITHOUT ANGINA PECTORIS: ICD-10-CM

## 2025-04-01 DIAGNOSIS — R35.1 NOCTURIA: ICD-10-CM

## 2025-04-01 DIAGNOSIS — E11.9 DIABETES MELLITUS TYPE 2, DIET-CONTROLLED (HCC): ICD-10-CM

## 2025-04-01 PROBLEM — R06.83 SNORING: Status: RESOLVED | Noted: 2024-08-16 | Resolved: 2025-04-01

## 2025-04-01 PROCEDURE — 1123F ACP DISCUSS/DSCN MKR DOCD: CPT | Performed by: INTERNAL MEDICINE

## 2025-04-01 PROCEDURE — 99214 OFFICE O/P EST MOD 30 MIN: CPT | Performed by: INTERNAL MEDICINE

## 2025-04-01 PROCEDURE — G8427 DOCREV CUR MEDS BY ELIG CLIN: HCPCS | Performed by: INTERNAL MEDICINE

## 2025-04-01 PROCEDURE — 3017F COLORECTAL CA SCREEN DOC REV: CPT | Performed by: INTERNAL MEDICINE

## 2025-04-01 PROCEDURE — 3074F SYST BP LT 130 MM HG: CPT | Performed by: INTERNAL MEDICINE

## 2025-04-01 PROCEDURE — 1036F TOBACCO NON-USER: CPT | Performed by: INTERNAL MEDICINE

## 2025-04-01 PROCEDURE — G8420 CALC BMI NORM PARAMETERS: HCPCS | Performed by: INTERNAL MEDICINE

## 2025-04-01 PROCEDURE — 1159F MED LIST DOCD IN RCRD: CPT | Performed by: INTERNAL MEDICINE

## 2025-04-01 PROCEDURE — 1126F AMNT PAIN NOTED NONE PRSNT: CPT | Performed by: INTERNAL MEDICINE

## 2025-04-01 PROCEDURE — 3044F HG A1C LEVEL LT 7.0%: CPT | Performed by: INTERNAL MEDICINE

## 2025-04-01 PROCEDURE — 3078F DIAST BP <80 MM HG: CPT | Performed by: INTERNAL MEDICINE

## 2025-04-01 PROCEDURE — 1160F RVW MEDS BY RX/DR IN RCRD: CPT | Performed by: INTERNAL MEDICINE

## 2025-04-01 PROCEDURE — 2022F DILAT RTA XM EVC RTNOPTHY: CPT | Performed by: INTERNAL MEDICINE

## 2025-04-01 RX ORDER — ERGOCALCIFEROL 1.25 MG/1
50000 CAPSULE, LIQUID FILLED ORAL WEEKLY
Qty: 12 CAPSULE | Refills: 1 | Status: SHIPPED | OUTPATIENT
Start: 2025-04-01

## 2025-04-01 RX ORDER — FLUTICASONE PROPIONATE 50 MCG
2 SPRAY, SUSPENSION (ML) NASAL DAILY
Qty: 48 G | Refills: 3 | Status: SHIPPED | OUTPATIENT
Start: 2025-04-01

## 2025-04-01 SDOH — ECONOMIC STABILITY: FOOD INSECURITY: WITHIN THE PAST 12 MONTHS, THE FOOD YOU BOUGHT JUST DIDN'T LAST AND YOU DIDN'T HAVE MONEY TO GET MORE.: NEVER TRUE

## 2025-04-01 SDOH — ECONOMIC STABILITY: FOOD INSECURITY: WITHIN THE PAST 12 MONTHS, YOU WORRIED THAT YOUR FOOD WOULD RUN OUT BEFORE YOU GOT MONEY TO BUY MORE.: NEVER TRUE

## 2025-04-01 ASSESSMENT — ENCOUNTER SYMPTOMS
COUGH: 0
RHINORRHEA: 1
SHORTNESS OF BREATH: 0
BLOOD IN STOOL: 0
ABDOMINAL PAIN: 0

## 2025-04-01 NOTE — PROGRESS NOTES
motion and neck supple.   Skin:     General: Skin is warm and dry.   Neurological:      General: No focal deficit present.      Mental Status: He is alert and oriented to person, place, and time. Mental status is at baseline.   Psychiatric:         Mood and Affect: Mood normal.         Behavior: Behavior normal.           Assessment/Plan:    Graham \"Adrien\" was seen today for follow-up.    Diagnoses and all orders for this visit:    Essential hypertension  Stable, continue medication, diet.     -     Urinalysis; Future    Chronic systolic heart failure (HCC)  Stable, continue medication, diet.     Coronary artery disease involving native coronary artery of native heart without angina pectoris  Stable, continue medication, diet.     ASIM (obstructive sleep apnea)  He is compliant w/ CPAP o/n and feeling well;     Pulmonary nodule  -     CT CHEST WO CONTRAST; Future    Diabetes mellitus type 2, diet-controlled (HCC)  Stable, continue medication, diet.     -     External Referral to Ophthalmology  -     Hemoglobin A1C; Future  -     Albumin/Creatinine Ratio, Urine; Future    Dyslipidemia  Recommended low fat, low cholesterol diet, regular exercise.    -     T4, Free; Future  -     TSH; Future  -     CBC with Auto Differential; Future  -     Comprehensive Metabolic Panel; Future  -     Lipid Panel; Future    Vitamin D deficiency  Instructed to take medications as prescribed, in addition to vitamin D 1130-4554 IU qd otc. Will monitor level.    -     vitamin D (ERGOCALCIFEROL) 1.25 MG (46582 UT) CAPS capsule; Take 1 capsule by mouth once a week    Encounter for abdominal aortic aneurysm (AAA) screening  -     Vascular AAA screening; Future    Nocturia  -     PSA, Total and Free; Future  -     Urinalysis; Future    Seasonal allergic rhinitis due to pollen  Instructed to take medications as prescribed, and to call if no improvement in symptoms.     -     fluticasone (FLONASE) 50 MCG/ACT nasal spray; 2 sprays by Each Nostril

## 2025-04-02 DIAGNOSIS — M25.59 PAIN IN OTHER JOINT: ICD-10-CM

## 2025-04-02 RX ORDER — DICLOFENAC SODIUM 25 MG/1
25 TABLET, DELAYED RELEASE ORAL 2 TIMES DAILY PRN
Qty: 60 TABLET | Refills: 11 | OUTPATIENT
Start: 2025-04-02

## 2025-04-07 ENCOUNTER — TELEPHONE (OUTPATIENT)
Dept: INTERNAL MEDICINE CLINIC | Facility: CLINIC | Age: 65
End: 2025-04-07

## 2025-04-07 ENCOUNTER — PATIENT MESSAGE (OUTPATIENT)
Dept: INTERNAL MEDICINE CLINIC | Facility: CLINIC | Age: 65
End: 2025-04-07

## 2025-04-07 DIAGNOSIS — M25.59 PAIN IN OTHER JOINT: ICD-10-CM

## 2025-04-07 DIAGNOSIS — Z13.6 SCREENING FOR CARDIOVASCULAR CONDITION: Primary | ICD-10-CM

## 2025-04-07 NOTE — TELEPHONE ENCOUNTER
Dx code attached to vascular AAA screening needs to be changed from screening to a diagnostic code for insurance to cover, please.

## 2025-04-07 NOTE — TELEPHONE ENCOUNTER
I do not know what code to use for this; have patient contact his insurance company regarding this and to let us know;

## 2025-04-07 NOTE — TELEPHONE ENCOUNTER
Adrianna from central scheduling called stating the diagnostic needs to be changed to ABN Trigger for the Vascular AAA screening referral.

## 2025-04-08 ENCOUNTER — TELEPHONE (OUTPATIENT)
Dept: INTERNAL MEDICINE CLINIC | Facility: CLINIC | Age: 65
End: 2025-04-08

## 2025-04-08 ENCOUNTER — PATIENT MESSAGE (OUTPATIENT)
Dept: INTERNAL MEDICINE CLINIC | Facility: CLINIC | Age: 65
End: 2025-04-08

## 2025-04-08 DIAGNOSIS — E78.5 DYSLIPIDEMIA: ICD-10-CM

## 2025-04-08 DIAGNOSIS — M54.32 SCIATICA, LEFT SIDE: ICD-10-CM

## 2025-04-08 DIAGNOSIS — M25.59 PAIN IN OTHER JOINT: ICD-10-CM

## 2025-04-08 DIAGNOSIS — F32.9 REACTIVE DEPRESSION: ICD-10-CM

## 2025-04-08 RX ORDER — ATORVASTATIN CALCIUM 20 MG/1
20 TABLET, FILM COATED ORAL DAILY
Qty: 90 TABLET | Refills: 3 | OUTPATIENT
Start: 2025-04-08

## 2025-04-08 RX ORDER — ESCITALOPRAM OXALATE 10 MG/1
10 TABLET ORAL DAILY
Qty: 90 TABLET | Refills: 3 | OUTPATIENT
Start: 2025-04-08

## 2025-04-08 RX ORDER — DICLOFENAC SODIUM 25 MG/1
25 TABLET, DELAYED RELEASE ORAL 2 TIMES DAILY PRN
Qty: 60 TABLET | Refills: 11 | OUTPATIENT
Start: 2025-04-08

## 2025-04-08 RX ORDER — CYCLOBENZAPRINE HCL 5 MG
5 TABLET ORAL NIGHTLY PRN
Qty: 90 TABLET | Refills: 0 | OUTPATIENT
Start: 2025-04-08

## 2025-04-08 NOTE — TELEPHONE ENCOUNTER
87227 CPT Code    Triple A vascular screening    Patient called in and asked that I give this information to Dr Rdz, He states that our office called him and asked him to do this. I am not sure who called him and see no notes on this- Can you help me?

## 2025-04-10 PROBLEM — Z13.6 SCREENING FOR CARDIOVASCULAR CONDITION: Status: ACTIVE | Noted: 2025-04-10

## 2025-04-10 NOTE — TELEPHONE ENCOUNTER
Please change the cptcode 17808 for my AAA vascular screening so the patient can schedule this appt and send a new order    Francine @ [phone] Central Scheduling

## 2025-04-10 NOTE — TELEPHONE ENCOUNTER
I used the Z13.6 code, still getting message that test is not covered; give patient radiology number and they can tell him the cost of the test, and he can decide about scheduling it, in case insurance does not cover it; we have run into this same issue for other patients, and some plans just do not cover this test; he may want to contact his Medicare plan regarding this;

## 2025-04-14 DIAGNOSIS — E78.5 DYSLIPIDEMIA: ICD-10-CM

## 2025-04-14 DIAGNOSIS — M54.32 SCIATICA, LEFT SIDE: ICD-10-CM

## 2025-04-14 DIAGNOSIS — M25.59 PAIN IN OTHER JOINT: ICD-10-CM

## 2025-04-14 DIAGNOSIS — F32.9 REACTIVE DEPRESSION: ICD-10-CM

## 2025-04-14 PROBLEM — F17.211 CIGARETTE NICOTINE DEPENDENCE IN REMISSION: Status: ACTIVE | Noted: 2025-04-14

## 2025-04-14 PROBLEM — Z13.6 SCREENING FOR ISCHEMIC HEART DISEASE: Status: ACTIVE | Noted: 2025-04-14

## 2025-04-14 RX ORDER — DICLOFENAC SODIUM 25 MG/1
25 TABLET, DELAYED RELEASE ORAL 2 TIMES DAILY PRN
Qty: 60 TABLET | Refills: 0 | Status: CANCELLED | OUTPATIENT
Start: 2025-04-14

## 2025-04-14 RX ORDER — GABAPENTIN 100 MG/1
100 CAPSULE ORAL NIGHTLY
Qty: 90 CAPSULE | Refills: 1 | Status: CANCELLED | OUTPATIENT
Start: 2025-04-14 | End: 2025-10-11

## 2025-04-14 RX ORDER — DICLOFENAC SODIUM 25 MG/1
25 TABLET, DELAYED RELEASE ORAL 2 TIMES DAILY PRN
Qty: 60 TABLET | Refills: 0 | Status: SHIPPED | OUTPATIENT
Start: 2025-04-14

## 2025-04-14 RX ORDER — ATORVASTATIN CALCIUM 20 MG/1
20 TABLET, FILM COATED ORAL DAILY
Qty: 90 TABLET | Refills: 3 | OUTPATIENT
Start: 2025-04-14

## 2025-04-14 RX ORDER — ESCITALOPRAM OXALATE 10 MG/1
10 TABLET ORAL DAILY
Qty: 90 TABLET | Refills: 3 | OUTPATIENT
Start: 2025-04-14

## 2025-04-14 RX ORDER — CYCLOBENZAPRINE HCL 5 MG
5 TABLET ORAL NIGHTLY PRN
Qty: 90 TABLET | Refills: 0 | OUTPATIENT
Start: 2025-04-14

## 2025-04-14 RX ORDER — DICLOFENAC SODIUM 25 MG/1
25 TABLET, DELAYED RELEASE ORAL 2 TIMES DAILY PRN
Qty: 60 TABLET | Refills: 11 | OUTPATIENT
Start: 2025-04-14

## 2025-04-14 NOTE — TELEPHONE ENCOUNTER
Pt is asking for this medication on two different messages. Please disregard other message and use this one. Thank you, Michelle

## 2025-04-14 NOTE — TELEPHONE ENCOUNTER
Unfortunately, every time I try to place this order, no matter which code or suggested code is used, I get a message that the test will not be covered; I have requested that a  come to the office and assist me in ordering this test, as multiple attempts have been made by be to order this test for the patient, with no avail. I will be happy to meet with  at their earliest availability; There have been numerous back and forth messages about this test, close to a dozen. I am very eager to resolve the ordering of this test for the patient and am formally requesting assistance from  regarding the placing and proper coding for this test. Please schedule first available formal consultation with  regarding this test.

## 2025-04-14 NOTE — TELEPHONE ENCOUNTER
Patient is asking for the same medication on two different message treads. Please disregard this message and use the previous one. This note has been closed at this time.

## 2025-04-15 ENCOUNTER — TELEPHONE (OUTPATIENT)
Dept: PULMONOLOGY | Age: 65
End: 2025-04-15

## 2025-04-15 DIAGNOSIS — M54.32 SCIATICA, LEFT SIDE: ICD-10-CM

## 2025-04-15 RX ORDER — GABAPENTIN 100 MG/1
100 CAPSULE ORAL NIGHTLY
Qty: 90 CAPSULE | Refills: 1 | Status: SHIPPED | OUTPATIENT
Start: 2025-04-15 | End: 2025-10-12

## 2025-04-15 NOTE — TELEPHONE ENCOUNTER
Orders Placed This Encounter    gabapentin (NEURONTIN) 100 MG capsule     Sig: Take 1 capsule by mouth nightly for 180 days.     Dispense:  90 capsule     Refill:  1

## 2025-04-15 NOTE — TELEPHONE ENCOUNTER
Received fax request for clarification for Neurontin Rx.   Instructions are for 1 cap nightly, dispensed 180 and noted for 30 day supply.   Need to resent Rx to [facility] w/ clarification of dose and how many day supply.

## 2025-04-21 ENCOUNTER — RESULTS FOLLOW-UP (OUTPATIENT)
Dept: INTERNAL MEDICINE CLINIC | Facility: CLINIC | Age: 65
End: 2025-04-21

## 2025-05-04 DIAGNOSIS — M25.59 PAIN IN OTHER JOINT: ICD-10-CM

## 2025-05-05 RX ORDER — DICLOFENAC SODIUM 25 MG/1
25 TABLET, DELAYED RELEASE ORAL 2 TIMES DAILY PRN
Qty: 60 TABLET | Refills: 11 | OUTPATIENT
Start: 2025-05-05

## 2025-05-05 ASSESSMENT — SLEEP AND FATIGUE QUESTIONNAIRES
HOW LIKELY ARE YOU TO NOD OFF OR FALL ASLEEP WHILE SITTING QUIETLY AFTER LUNCH WITHOUT ALCOHOL: SLIGHT CHANCE OF DOZING
ESS TOTAL SCORE: 11
HOW LIKELY ARE YOU TO NOD OFF OR FALL ASLEEP IN A CAR, WHILE STOPPED FOR A FEW MINUTES IN TRAFFIC: WOULD NEVER DOZE
HOW LIKELY ARE YOU TO NOD OFF OR FALL ASLEEP WHILE SITTING INACTIVE IN A PUBLIC PLACE: SLIGHT CHANCE OF DOZING
HOW LIKELY ARE YOU TO NOD OFF OR FALL ASLEEP WHILE SITTING AND READING: HIGH CHANCE OF DOZING
HOW LIKELY ARE YOU TO NOD OFF OR FALL ASLEEP IN A CAR, WHILE STOPPED FOR A FEW MINUTES IN TRAFFIC: WOULD NEVER DOZE
HOW LIKELY ARE YOU TO NOD OFF OR FALL ASLEEP WHILE LYING DOWN TO REST IN THE AFTERNOON WHEN CIRCUMSTANCES PERMIT: MODERATE CHANCE OF DOZING
HOW LIKELY ARE YOU TO NOD OFF OR FALL ASLEEP WHILE WATCHING TV: HIGH CHANCE OF DOZING
HOW LIKELY ARE YOU TO NOD OFF OR FALL ASLEEP WHILE LYING DOWN TO REST IN THE AFTERNOON WHEN CIRCUMSTANCES PERMIT: MODERATE CHANCE OF DOZING
HOW LIKELY ARE YOU TO NOD OFF OR FALL ASLEEP WHILE SITTING AND TALKING TO SOMEONE: WOULD NEVER DOZE
HOW LIKELY ARE YOU TO NOD OFF OR FALL ASLEEP WHEN YOU ARE A PASSENGER IN A CAR FOR AN HOUR WITHOUT A BREAK: SLIGHT CHANCE OF DOZING
HOW LIKELY ARE YOU TO NOD OFF OR FALL ASLEEP WHEN YOU ARE A PASSENGER IN A CAR FOR AN HOUR WITHOUT A BREAK: SLIGHT CHANCE OF DOZING
HOW LIKELY ARE YOU TO NOD OFF OR FALL ASLEEP WHILE SITTING AND TALKING TO SOMEONE: WOULD NEVER DOZE
HOW LIKELY ARE YOU TO NOD OFF OR FALL ASLEEP WHILE WATCHING TV: HIGH CHANCE OF DOZING
HOW LIKELY ARE YOU TO NOD OFF OR FALL ASLEEP WHILE SITTING INACTIVE IN A PUBLIC PLACE: SLIGHT CHANCE OF DOZING
HOW LIKELY ARE YOU TO NOD OFF OR FALL ASLEEP WHILE SITTING QUIETLY AFTER LUNCH WITHOUT ALCOHOL: SLIGHT CHANCE OF DOZING
HOW LIKELY ARE YOU TO NOD OFF OR FALL ASLEEP WHILE SITTING AND READING: HIGH CHANCE OF DOZING

## 2025-05-07 ENCOUNTER — OFFICE VISIT (OUTPATIENT)
Dept: SLEEP MEDICINE | Age: 65
End: 2025-05-07
Payer: MEDICARE

## 2025-05-07 VITALS
RESPIRATION RATE: 17 BRPM | OXYGEN SATURATION: 94 % | HEART RATE: 65 BPM | DIASTOLIC BLOOD PRESSURE: 70 MMHG | WEIGHT: 166 LBS | HEIGHT: 73 IN | SYSTOLIC BLOOD PRESSURE: 124 MMHG | BODY MASS INDEX: 22 KG/M2

## 2025-05-07 DIAGNOSIS — G47.33 OSA (OBSTRUCTIVE SLEEP APNEA): Primary | ICD-10-CM

## 2025-05-07 DIAGNOSIS — M54.32 SCIATICA, LEFT SIDE: ICD-10-CM

## 2025-05-07 DIAGNOSIS — G47.34 NOCTURNAL HYPOXEMIA: ICD-10-CM

## 2025-05-07 DIAGNOSIS — G47.10 HYPERSOMNIA: ICD-10-CM

## 2025-05-07 DIAGNOSIS — F32.9 REACTIVE DEPRESSION: ICD-10-CM

## 2025-05-07 DIAGNOSIS — R06.83 SNORING: ICD-10-CM

## 2025-05-07 DIAGNOSIS — G47.61 PERIODIC LIMB MOVEMENT DISORDER (PLMD): ICD-10-CM

## 2025-05-07 PROCEDURE — G8427 DOCREV CUR MEDS BY ELIG CLIN: HCPCS | Performed by: NURSE PRACTITIONER

## 2025-05-07 PROCEDURE — G2211 COMPLEX E/M VISIT ADD ON: HCPCS | Performed by: NURSE PRACTITIONER

## 2025-05-07 PROCEDURE — 3074F SYST BP LT 130 MM HG: CPT | Performed by: NURSE PRACTITIONER

## 2025-05-07 PROCEDURE — G8420 CALC BMI NORM PARAMETERS: HCPCS | Performed by: NURSE PRACTITIONER

## 2025-05-07 PROCEDURE — 1036F TOBACCO NON-USER: CPT | Performed by: NURSE PRACTITIONER

## 2025-05-07 PROCEDURE — 99213 OFFICE O/P EST LOW 20 MIN: CPT | Performed by: NURSE PRACTITIONER

## 2025-05-07 PROCEDURE — 3078F DIAST BP <80 MM HG: CPT | Performed by: NURSE PRACTITIONER

## 2025-05-07 PROCEDURE — 1123F ACP DISCUSS/DSCN MKR DOCD: CPT | Performed by: NURSE PRACTITIONER

## 2025-05-07 PROCEDURE — 3017F COLORECTAL CA SCREEN DOC REV: CPT | Performed by: NURSE PRACTITIONER

## 2025-05-07 PROCEDURE — 1159F MED LIST DOCD IN RCRD: CPT | Performed by: NURSE PRACTITIONER

## 2025-05-07 ASSESSMENT — ENCOUNTER SYMPTOMS
VOICE CHANGE: 0
APNEA: 0
SORE THROAT: 0
SHORTNESS OF BREATH: 0

## 2025-05-07 NOTE — PROGRESS NOTES
(Medicare Managed)      AMB Supply Order  Order Details     DME Location:[facility]   Order Date: 5/7/2025   The primary encounter diagnosis was ASIM (obstructive sleep apnea). Diagnoses of Periodic limb movement disorder (PLMD), Nocturnal hypoxemia, Hypersomnia, Reactive depression, Snoring, and Sciatica, left side were also pertinent to this visit.             (  X   )Supplies Needed       APAP Machine   (     ) CPAP Unit  (     ) Auto CPAP Unit  (     ) BiLevel Unit  (     ) Auto BiLevel Unit  (     ) ASV   (     ) Bilevel ST      Length of need: 12 months    Pressure:  6-12 cmH20  EPR: 2     Starting Ramp Pressure:   cm H20  Ramp Time: min      Patient had a diagnostic Apnea Hypopnea Index (AHI) of :  5.0  *SUPPLIES* Replace all as needed, or per coverage guidelines     Masks Type:  ( x   ) -Full Face Mask (1 per 3 mon)  (  x  ) -Full Mask (1 per month) Interface/Cushion      (  ) -Nasal Mask (1 per 3 mon)  (  ) - Nasal Mask (1 per month) Interface/Cushion  (     ) -Pillow (2 per mon)  (     ) -Xvysmskhh (1 per 6 mon)            Other Supplies:    (   X  )-Sckprqcm (1 per 6 mon)  (   X  )-Anmipk Tubing (1 per 3 mon)  (   X  )- Disposable Filter (2 per mon)  (   x  )-Gdyxvd Humidifier (1 per year)     ( x    )-Flhvbqntm (sometimes used with Full Face Mask) (1 per 6 mos)  (    )-Tubing-without heat (1 per 3 mos)  (     )-Non-Disposable Filter (1 per 6 mos)  (  x   )-Water Chamber (1 per 6 mos)  (     )-Humidifier non-heated (1 per 5 yrs)      Signed Date: 5/7/2025  Electronically Signed By: BEHZAD Toribio  Electronically Dated:  5/7/2025       Collaborating Physician: Dr. Cain    Today's office visit was spent  reviewing test results, prognosis, importance of compliance, education about disease process, benefits of medications, instructions for management of acute flare-ups, and follow up plans.  Total time

## 2025-05-10 PROBLEM — Z13.6 SCREENING FOR CARDIOVASCULAR CONDITION: Status: RESOLVED | Noted: 2025-04-10 | Resolved: 2025-05-10

## 2025-05-13 ENCOUNTER — TELEPHONE (OUTPATIENT)
Dept: INTERNAL MEDICINE CLINIC | Facility: CLINIC | Age: 65
End: 2025-05-13

## 2025-05-13 NOTE — TELEPHONE ENCOUNTER
I saw Adrien in the clinic today and I am concerned about his depression. I advised him to start taking the Lexapro in the AM, as the PM dose may disrupt his sleep.  I wanted to reach out to you in case you wanted to reach out to him about a dose adjustment.     LVM 5/13/25 for the pt to make an apt for medication adjustment

## 2025-05-13 NOTE — TELEPHONE ENCOUNTER
Pt called back, pt asks for call back on why he needs an appt for medication adjustment. Pt declined to schedule at this time.

## 2025-05-14 PROBLEM — Z13.6 SCREENING FOR ISCHEMIC HEART DISEASE: Status: RESOLVED | Noted: 2025-04-14 | Resolved: 2025-05-14

## 2025-05-20 ENCOUNTER — OFFICE VISIT (OUTPATIENT)
Age: 65
End: 2025-05-20
Payer: MEDICARE

## 2025-05-20 VITALS
DIASTOLIC BLOOD PRESSURE: 70 MMHG | WEIGHT: 168 LBS | SYSTOLIC BLOOD PRESSURE: 108 MMHG | BODY MASS INDEX: 22.26 KG/M2 | HEIGHT: 73 IN | HEART RATE: 60 BPM

## 2025-05-20 DIAGNOSIS — E78.5 DYSLIPIDEMIA: ICD-10-CM

## 2025-05-20 DIAGNOSIS — I25.10 CORONARY ARTERY DISEASE INVOLVING NATIVE CORONARY ARTERY OF NATIVE HEART WITHOUT ANGINA PECTORIS: ICD-10-CM

## 2025-05-20 DIAGNOSIS — Z86.79 HISTORY OF ENDOCARDITIS: ICD-10-CM

## 2025-05-20 DIAGNOSIS — I10 ESSENTIAL HYPERTENSION: ICD-10-CM

## 2025-05-20 DIAGNOSIS — I50.22 CHRONIC SYSTOLIC HEART FAILURE (HCC): Primary | ICD-10-CM

## 2025-05-20 DIAGNOSIS — G47.33 OSA (OBSTRUCTIVE SLEEP APNEA): ICD-10-CM

## 2025-05-20 PROCEDURE — 99214 OFFICE O/P EST MOD 30 MIN: CPT | Performed by: INTERNAL MEDICINE

## 2025-05-20 PROCEDURE — 3074F SYST BP LT 130 MM HG: CPT | Performed by: INTERNAL MEDICINE

## 2025-05-20 PROCEDURE — 1036F TOBACCO NON-USER: CPT | Performed by: INTERNAL MEDICINE

## 2025-05-20 PROCEDURE — 3017F COLORECTAL CA SCREEN DOC REV: CPT | Performed by: INTERNAL MEDICINE

## 2025-05-20 PROCEDURE — 3078F DIAST BP <80 MM HG: CPT | Performed by: INTERNAL MEDICINE

## 2025-05-20 PROCEDURE — G8428 CUR MEDS NOT DOCUMENT: HCPCS | Performed by: INTERNAL MEDICINE

## 2025-05-20 PROCEDURE — 1126F AMNT PAIN NOTED NONE PRSNT: CPT | Performed by: INTERNAL MEDICINE

## 2025-05-20 PROCEDURE — 1123F ACP DISCUSS/DSCN MKR DOCD: CPT | Performed by: INTERNAL MEDICINE

## 2025-05-20 PROCEDURE — G8420 CALC BMI NORM PARAMETERS: HCPCS | Performed by: INTERNAL MEDICINE

## 2025-05-20 RX ORDER — SILDENAFIL 100 MG/1
100 TABLET, FILM COATED ORAL DAILY PRN
Qty: 30 TABLET | Refills: 2 | Status: SHIPPED | OUTPATIENT
Start: 2025-05-20 | End: 2025-05-22 | Stop reason: SDUPTHER

## 2025-05-20 NOTE — PROGRESS NOTES
non-distended  Ext:   No sig LE edema bilaterally  Skin:  warm and dry, no obvious rashes seen  Neuro:   no obvious sensory or motor deficits  Psychiatric:   normal mood and affect      Lab Results   Component Value Date/Time     03/10/2025 10:39 AM    K 4.4 03/10/2025 10:39 AM     03/10/2025 10:39 AM    CO2 31 03/10/2025 10:39 AM    BUN 27 03/10/2025 10:39 AM    CREATININE 1.30 03/10/2025 10:39 AM    GLUCOSE 96 03/10/2025 10:39 AM    CALCIUM 9.3 03/10/2025 10:39 AM        Lab Results   Component Value Date    WBC 9.1 03/10/2025    HGB 13.8 03/10/2025    HCT 42.7 03/10/2025    MCV 91.2 03/10/2025     (L) 03/10/2025       Lab Results   Component Value Date    TSH 2.390 03/10/2025       Lab Results   Component Value Date    LABA1C 5.8 (H) 03/10/2025     Lab Results   Component Value Date     03/10/2025       Lab Results   Component Value Date    CHOL 79 03/10/2025    CHOL 82 07/16/2024    CHOL 113 01/08/2024     Lab Results   Component Value Date    TRIG 77 03/10/2025    TRIG 50 07/16/2024    TRIG 116 01/08/2024     Lab Results   Component Value Date    HDL 39 (L) 03/10/2025    HDL 40 07/16/2024    HDL 69 (H) 01/08/2024     No components found for: \"LDLCHOLESTEROL\", \"LDLCALC\"  Lab Results   Component Value Date    VLDL 15 03/10/2025    VLDL 10 07/16/2024    VLDL 23.2 (H) 01/08/2024     Lab Results   Component Value Date    CHOLHDLRATIO 2.0 03/10/2025    CHOLHDLRATIO 2.0 07/16/2024    CHOLHDLRATIO 1.6 01/08/2024     Lab Results   Component Value Date    LDL 24 03/10/2025           05/06/24    ECHO (TTE) COMPLETE (PRN CONTRAST/BUBBLE/STRAIN/3D) 05/06/2024 12:27 PM (Final)    Interpretation Summary    Left Ventricle: Mildly reduced left ventricular systolic function with a visually estimated EF of 45 - 50%. Left ventricle size is normal. Mildly increased wall thickness. Findings consistent with concentric remodeling. Mild global hypokinesis present. Abnormal diastolic function.    Mitral Valve:

## 2025-05-21 ENCOUNTER — OFFICE VISIT (OUTPATIENT)
Dept: INTERNAL MEDICINE CLINIC | Facility: CLINIC | Age: 65
End: 2025-05-21
Payer: MEDICARE

## 2025-05-21 VITALS
HEART RATE: 64 BPM | WEIGHT: 167.8 LBS | BODY MASS INDEX: 22.24 KG/M2 | SYSTOLIC BLOOD PRESSURE: 110 MMHG | OXYGEN SATURATION: 96 % | DIASTOLIC BLOOD PRESSURE: 60 MMHG | TEMPERATURE: 97.5 F | HEIGHT: 73 IN

## 2025-05-21 DIAGNOSIS — I10 ESSENTIAL HYPERTENSION: ICD-10-CM

## 2025-05-21 DIAGNOSIS — F32.89 OTHER DEPRESSION: ICD-10-CM

## 2025-05-21 DIAGNOSIS — E78.5 DYSLIPIDEMIA: ICD-10-CM

## 2025-05-21 PROCEDURE — G8420 CALC BMI NORM PARAMETERS: HCPCS | Performed by: INTERNAL MEDICINE

## 2025-05-21 PROCEDURE — 1159F MED LIST DOCD IN RCRD: CPT | Performed by: INTERNAL MEDICINE

## 2025-05-21 PROCEDURE — 1036F TOBACCO NON-USER: CPT | Performed by: INTERNAL MEDICINE

## 2025-05-21 PROCEDURE — 3078F DIAST BP <80 MM HG: CPT | Performed by: INTERNAL MEDICINE

## 2025-05-21 PROCEDURE — 99213 OFFICE O/P EST LOW 20 MIN: CPT | Performed by: INTERNAL MEDICINE

## 2025-05-21 PROCEDURE — 3074F SYST BP LT 130 MM HG: CPT | Performed by: INTERNAL MEDICINE

## 2025-05-21 PROCEDURE — 1123F ACP DISCUSS/DSCN MKR DOCD: CPT | Performed by: INTERNAL MEDICINE

## 2025-05-21 PROCEDURE — G8427 DOCREV CUR MEDS BY ELIG CLIN: HCPCS | Performed by: INTERNAL MEDICINE

## 2025-05-21 PROCEDURE — 3017F COLORECTAL CA SCREEN DOC REV: CPT | Performed by: INTERNAL MEDICINE

## 2025-05-21 PROCEDURE — 1160F RVW MEDS BY RX/DR IN RCRD: CPT | Performed by: INTERNAL MEDICINE

## 2025-05-21 RX ORDER — ESCITALOPRAM OXALATE 20 MG/1
20 TABLET ORAL DAILY
Qty: 90 TABLET | Refills: 1 | Status: SHIPPED | OUTPATIENT
Start: 2025-05-21

## 2025-05-21 RX ORDER — ATORVASTATIN CALCIUM 20 MG/1
20 TABLET, FILM COATED ORAL DAILY
Qty: 90 TABLET | Refills: 1 | Status: SHIPPED | OUTPATIENT
Start: 2025-05-21

## 2025-05-21 RX ORDER — LISINOPRIL 2.5 MG/1
2.5 TABLET ORAL 2 TIMES DAILY
Qty: 180 TABLET | Refills: 1 | Status: SHIPPED | OUTPATIENT
Start: 2025-05-21

## 2025-05-21 RX ORDER — CARVEDILOL 3.12 MG/1
3.12 TABLET ORAL 2 TIMES DAILY
Qty: 180 TABLET | Refills: 1 | Status: SHIPPED | OUTPATIENT
Start: 2025-05-21

## 2025-05-21 ASSESSMENT — ENCOUNTER SYMPTOMS
BLOOD IN STOOL: 0
RHINORRHEA: 0
SHORTNESS OF BREATH: 0
COUGH: 0
ABDOMINAL PAIN: 0

## 2025-05-21 ASSESSMENT — PATIENT HEALTH QUESTIONNAIRE - PHQ9
SUM OF ALL RESPONSES TO PHQ QUESTIONS 1-9: 9
10. IF YOU CHECKED OFF ANY PROBLEMS, HOW DIFFICULT HAVE THESE PROBLEMS MADE IT FOR YOU TO DO YOUR WORK, TAKE CARE OF THINGS AT HOME, OR GET ALONG WITH OTHER PEOPLE: SOMEWHAT DIFFICULT
SUM OF ALL RESPONSES TO PHQ QUESTIONS 1-9: 9
9. THOUGHTS THAT YOU WOULD BE BETTER OFF DEAD, OR OF HURTING YOURSELF: NOT AT ALL
6. FEELING BAD ABOUT YOURSELF - OR THAT YOU ARE A FAILURE OR HAVE LET YOURSELF OR YOUR FAMILY DOWN: MORE THAN HALF THE DAYS
5. POOR APPETITE OR OVEREATING: NOT AT ALL
8. MOVING OR SPEAKING SO SLOWLY THAT OTHER PEOPLE COULD HAVE NOTICED. OR THE OPPOSITE, BEING SO FIGETY OR RESTLESS THAT YOU HAVE BEEN MOVING AROUND A LOT MORE THAN USUAL: SEVERAL DAYS
4. FEELING TIRED OR HAVING LITTLE ENERGY: MORE THAN HALF THE DAYS
7. TROUBLE CONCENTRATING ON THINGS, SUCH AS READING THE NEWSPAPER OR WATCHING TELEVISION: NOT AT ALL
3. TROUBLE FALLING OR STAYING ASLEEP: MORE THAN HALF THE DAYS
1. LITTLE INTEREST OR PLEASURE IN DOING THINGS: NOT AT ALL
2. FEELING DOWN, DEPRESSED OR HOPELESS: MORE THAN HALF THE DAYS

## 2025-05-21 NOTE — PROGRESS NOTES
[facility]      2025    Patient Name: Graham Hoskins  :  1960    Subjective:    Chief Complaint:  Chief Complaint   Patient presents with    Medication Adjustment     Regarding escitalopram         HPI Here for f/u, needs refills on medications;   He has depression, taking Lexapro 10 mg qd, has been feeling more depressed recently; his sister has recurrence of cancer and he does not have a working car, can't visit her; he recently started back on CPAP and is sleeping better;     Past Medical History:   Diagnosis Date    Aortic valve replaced 2020     redo sternotomy, lysis of adhesions, debridement and pericardial patch closure of annular abscess, explant of previous Magna Ease aortic valve and aortic valve replacement with a 25mm Inspiris aortic valve    CAD (coronary artery disease)      AVR 2018--mi 5/15/2020-- no stents-- followed by dr brooks    Chronic systolic heart failure ([facility])     EF 40-45% ECHO 2020    Depression, recurrent     Diabetes mellitus type 2, diet-controlled ([facility])     Drug addiction in remission ([facility])     since     Dyslipidemia     Hypertension     NSTEMI (non-ST elevated myocardial infarction) ([facility]) 2020    ASIM on CPAP     DOES NOT WEAR CPAP AT HS    Recurrent depression     S/P aortic valve replacement 2018    Seizures ([facility]) 2001    x 1--- ETOH  WITHDRAWAL    Sigmoid diverticulosis     Streptococcal endocarditis dx 2020    treated [facility] Rocephin daily x 6 weeks.        Past Surgical History:   Procedure Laterality Date    CARDIAC CATHETERIZATION  2018    non obstructive disease, severe AS    CARDIAC PACEMAKER PLACEMENT      HERNIA REPAIR Left 2018    inguinal     OTHER SURGICAL HISTORY  2021    Cologuard negative    AK UNLISTED PROCEDURE CARDIAC SURGERY  2018    AVR    VASCULAR SURGERY Right 2020    PICC-- IN PLACE -- RIGHT UPPER ARM       Family History   Problem Relation Age of Onset    Coronary Art Dis Mother

## 2025-05-22 DIAGNOSIS — M25.59 PAIN IN OTHER JOINT: ICD-10-CM

## 2025-05-22 RX ORDER — SILDENAFIL 100 MG/1
100 TABLET, FILM COATED ORAL DAILY PRN
Qty: 30 TABLET | Refills: 2 | Status: SHIPPED | OUTPATIENT
Start: 2025-05-22

## 2025-05-22 RX ORDER — SILDENAFIL 100 MG/1
100 TABLET, FILM COATED ORAL DAILY PRN
Qty: 30 TABLET | Refills: 2 | Status: CANCELLED | OUTPATIENT
Start: 2025-05-22

## 2025-05-22 NOTE — TELEPHONE ENCOUNTER
Requested Prescriptions     Pending Prescriptions Disp Refills    sildenafil (VIAGRA) 100 MG tablet 30 tablet 2     Sig: Take 1 tablet by mouth daily as needed for Erectile Dysfunction

## 2025-05-22 NOTE — TELEPHONE ENCOUNTER
Pt calling about rx for Viagra. Pt states this rx needs to go [facility] on [address] in [place] [phone]    Pt states that [facility] would not fill it and needs to go to [facility].

## 2025-05-22 NOTE — TELEPHONE ENCOUNTER
. Called pt no answer left message to call back to advise Rx has been called in to preferred pharmacy.

## 2025-05-22 NOTE — TELEPHONE ENCOUNTER
Refills have been requested for the following medications:         diclofenac (VOLTAREN) 25 MG EC tablet [Dr. Gema Rdz MD]     Preferred pharmacy: [address] - P [phone] - F [phone]  Medication refill  must have inadvertently got left off when seen yesterday.   Last visit 05/21/25. Next visit 09/02/25

## 2025-05-23 RX ORDER — DICLOFENAC SODIUM 25 MG/1
25 TABLET, DELAYED RELEASE ORAL 2 TIMES DAILY PRN
Qty: 60 TABLET | Refills: 0 | Status: SHIPPED | OUTPATIENT
Start: 2025-05-23 | End: 2025-06-11 | Stop reason: SDUPTHER

## 2025-06-11 DIAGNOSIS — M25.59 PAIN IN OTHER JOINT: ICD-10-CM

## 2025-06-11 DIAGNOSIS — M54.32 SCIATICA, LEFT SIDE: ICD-10-CM

## 2025-06-12 RX ORDER — DICLOFENAC SODIUM 25 MG/1
25 TABLET, DELAYED RELEASE ORAL 2 TIMES DAILY PRN
Qty: 60 TABLET | Refills: 0 | Status: SHIPPED | OUTPATIENT
Start: 2025-06-12 | End: 2025-07-24 | Stop reason: SDUPTHER

## 2025-06-19 DIAGNOSIS — G47.61 PLMD (PERIODIC LIMB MOVEMENT DISORDER): Primary | ICD-10-CM

## 2025-06-19 RX ORDER — GABAPENTIN 100 MG/1
100 CAPSULE ORAL NIGHTLY
Qty: 90 CAPSULE | Refills: 1 | Status: SHIPPED | OUTPATIENT
Start: 2025-06-19 | End: 2025-12-16

## 2025-06-19 NOTE — PROGRESS NOTES
Per patient request, refill completed.    Orders Placed This Encounter    gabapentin (NEURONTIN) 100 MG capsule     Sig: Take 1 capsule by mouth nightly for 180 days.     Dispense:  90 capsule     Refill:  1

## 2025-06-28 NOTE — TELEPHONE ENCOUNTER
28-Jun-2025 00:57 Patient called saying he was having chest pain this morning. Then he said hip & leg pain and he could not walk. Patient does stock shelves and lifts [place] said he called his primary and they would not do anything for him. Please call this patient to assist. Patient was also asking for recommendations to have x-rays done.

## 2025-06-30 RX ORDER — GABAPENTIN 100 MG/1
100 CAPSULE ORAL NIGHTLY
Qty: 90 CAPSULE | Refills: 1 | OUTPATIENT
Start: 2025-06-30 | End: 2025-12-27

## 2025-07-02 DIAGNOSIS — M25.59 PAIN IN OTHER JOINT: ICD-10-CM

## 2025-07-02 RX ORDER — DICLOFENAC SODIUM 25 MG/1
25 TABLET, DELAYED RELEASE ORAL 2 TIMES DAILY PRN
Qty: 60 TABLET | Refills: 11 | OUTPATIENT
Start: 2025-07-02

## 2025-07-24 DIAGNOSIS — M25.59 PAIN IN OTHER JOINT: ICD-10-CM

## 2025-07-24 NOTE — TELEPHONE ENCOUNTER
Medication Refill Request    Name of Medication : Voltaren    Strength of Medication: 25 Mg    Directions: 1 tablet by mouth 2x daily    30 day or 90 day supply:     Preferred Pharmacy: [facility]    Last Appt. Date: 5/21/25    Next Appt. Date: 9/9/25    Additional Information For Provider:

## 2025-07-25 RX ORDER — DICLOFENAC SODIUM 25 MG/1
25 TABLET, DELAYED RELEASE ORAL 2 TIMES DAILY PRN
Qty: 60 TABLET | Refills: 1 | Status: SHIPPED | OUTPATIENT
Start: 2025-07-25

## 2025-09-02 ENCOUNTER — LAB (OUTPATIENT)
Dept: INTERNAL MEDICINE CLINIC | Facility: CLINIC | Age: 65
End: 2025-09-02

## 2025-09-02 DIAGNOSIS — I10 ESSENTIAL HYPERTENSION: ICD-10-CM

## 2025-09-02 DIAGNOSIS — E11.9 DIABETES MELLITUS TYPE 2, DIET-CONTROLLED (HCC): ICD-10-CM

## 2025-09-02 DIAGNOSIS — R35.1 NOCTURIA: ICD-10-CM

## 2025-09-02 DIAGNOSIS — E78.5 DYSLIPIDEMIA: ICD-10-CM

## 2025-09-02 LAB
ALBUMIN SERPL-MCNC: 4 G/DL (ref 3.2–4.6)
ALBUMIN/GLOB SERPL: 1.3 (ref 1–1.9)
ALP SERPL-CCNC: 73 U/L (ref 40–129)
ALT SERPL-CCNC: 29 U/L (ref 8–55)
ANION GAP SERPL CALC-SCNC: 9 MMOL/L (ref 7–16)
APPEARANCE UR: CLEAR
AST SERPL-CCNC: 26 U/L (ref 15–37)
BASOPHILS # BLD: 0.08 K/UL (ref 0–0.2)
BASOPHILS NFR BLD: 0.9 % (ref 0–2)
BILIRUB SERPL-MCNC: 0.8 MG/DL (ref 0–1.2)
BILIRUB UR QL: NEGATIVE
BUN SERPL-MCNC: 30 MG/DL (ref 8–23)
CALCIUM SERPL-MCNC: 9.4 MG/DL (ref 8.8–10.2)
CHLORIDE SERPL-SCNC: 102 MMOL/L (ref 98–107)
CHOLEST SERPL-MCNC: 92 MG/DL (ref 0–200)
CO2 SERPL-SCNC: 29 MMOL/L (ref 20–29)
COLOR UR: NORMAL
CREAT SERPL-MCNC: 1.31 MG/DL (ref 0.8–1.3)
CREAT UR-MCNC: 129 MG/DL (ref 39–259)
DIFFERENTIAL METHOD BLD: ABNORMAL
EOSINOPHIL # BLD: 0.67 K/UL (ref 0–0.8)
EOSINOPHIL NFR BLD: 7.3 % (ref 0.5–7.8)
ERYTHROCYTE [DISTWIDTH] IN BLOOD BY AUTOMATED COUNT: 13.6 % (ref 11.9–14.6)
EST. AVERAGE GLUCOSE BLD GHB EST-MCNC: 124 MG/DL
GLOBULIN SER CALC-MCNC: 3.1 G/DL (ref 2.3–3.5)
GLUCOSE SERPL-MCNC: 89 MG/DL (ref 70–99)
GLUCOSE UR STRIP.AUTO-MCNC: NEGATIVE MG/DL
HBA1C MFR BLD: 6 % (ref 0–5.6)
HCT VFR BLD AUTO: 46.7 % (ref 41.1–50.3)
HDLC SERPL-MCNC: 35 MG/DL (ref 40–60)
HDLC SERPL: 2.6 (ref 0–5)
HGB BLD-MCNC: 15 G/DL (ref 13.6–17.2)
HGB UR QL STRIP: NEGATIVE
IMM GRANULOCYTES # BLD AUTO: 0.07 K/UL (ref 0–0.5)
IMM GRANULOCYTES NFR BLD AUTO: 0.8 % (ref 0–5)
KETONES UR QL STRIP.AUTO: NEGATIVE MG/DL
LDLC SERPL CALC-MCNC: 39 MG/DL (ref 0–100)
LEUKOCYTE ESTERASE UR QL STRIP.AUTO: NEGATIVE
LYMPHOCYTES # BLD: 1.7 K/UL (ref 0.5–4.6)
LYMPHOCYTES NFR BLD: 18.4 % (ref 13–44)
MCH RBC QN AUTO: 30 PG (ref 26.1–32.9)
MCHC RBC AUTO-ENTMCNC: 32.1 G/DL (ref 31.4–35)
MCV RBC AUTO: 93.4 FL (ref 82–102)
MICROALBUMIN UR-MCNC: <1.2 MG/DL (ref 0–20)
MICROALBUMIN/CREAT UR-RTO: NORMAL MG/G (ref 0–30)
MONOCYTES # BLD: 0.92 K/UL (ref 0.1–1.3)
MONOCYTES NFR BLD: 10 % (ref 4–12)
NEUTS SEG # BLD: 5.8 K/UL (ref 1.7–8.2)
NEUTS SEG NFR BLD: 62.6 % (ref 43–78)
NITRITE UR QL STRIP.AUTO: NEGATIVE
NRBC # BLD: 0 K/UL (ref 0–0.2)
PH UR STRIP: 5.5 (ref 5–9)
PLATELET # BLD AUTO: 142 K/UL (ref 150–450)
PMV BLD AUTO: 10.5 FL (ref 9.4–12.3)
POTASSIUM SERPL-SCNC: 4.4 MMOL/L (ref 3.5–5.1)
PROT SERPL-MCNC: 7.1 G/DL (ref 6.3–8.2)
PROT UR STRIP-MCNC: NEGATIVE MG/DL
PSA FREE MFR SERPL: 32.8 %
PSA FREE SERPL-MCNC: 0.5 NG/ML
PSA SERPL-MCNC: 1.5 NG/ML (ref 0–4)
RBC # BLD AUTO: 5 M/UL (ref 4.23–5.6)
SODIUM SERPL-SCNC: 140 MMOL/L (ref 136–145)
SP GR UR REFRACTOMETRY: 1.02 (ref 1–1.02)
T4 FREE SERPL-MCNC: 1 NG/DL (ref 0.9–1.7)
TRIGL SERPL-MCNC: 86 MG/DL (ref 0–150)
TSH, 3RD GENERATION: 1.61 UIU/ML (ref 0.27–4.2)
UROBILINOGEN UR QL STRIP.AUTO: 0.2 EU/DL (ref 0.2–1)
VLDLC SERPL CALC-MCNC: 17 MG/DL (ref 6–23)
WBC # BLD AUTO: 9.2 K/UL (ref 4.3–11.1)

## (undated) DEVICE — WARMER SCP STRL DISP

## (undated) DEVICE — COR-KNOT® QUICK LOAD® SINGLES: Brand: COR-KNOT® QUICK LOAD®

## (undated) DEVICE — SKIN MARKER,REGULAR TIP WITH RULER AND LABELS: Brand: DEVON

## (undated) DEVICE — MASTISOL ADHESIVE LIQ 2/3ML

## (undated) DEVICE — BUTTON SWITCH PENCIL BLADE ELECTRODE, HOLSTER: Brand: EDGE

## (undated) DEVICE — SOLUTION IV 1000ML 0.9% SOD CHL

## (undated) DEVICE — AMD ANTIMICROBIAL GAUZE SPONGES,12 PLY USP TYPE VII, 0.2% POLYHEXAMETHYLENE BIGUANIDE HCI (PHMB): Brand: CURITY

## (undated) DEVICE — NON-REM POLYHESIVE PATIENT RETURN ELECTRODE: Brand: VALLEYLAB

## (undated) DEVICE — BLADE SCALP SURG BARD-PARK 11 --

## (undated) DEVICE — [HIGH FLOW INSUFFLATOR,  DO NOT USE IF PACKAGE IS DAMAGED,  KEEP DRY,  KEEP AWAY FROM SUNLIGHT,  PROTECT FROM HEAT AND RADIOACTIVE SOURCES.]: Brand: PNEUMOSURE

## (undated) DEVICE — SPONGE LAP 18X18IN STRL -- 5/PK

## (undated) DEVICE — GOWN,REINF,POLY,ECL,PP SLV,XL: Brand: MEDLINE

## (undated) DEVICE — BLADE SURG NO20 S STL STR DISP GLASSVAN

## (undated) DEVICE — NEEDLE INTRO 18GA L6.35CM SPR GWIRE

## (undated) DEVICE — DRAPE INSTR ARM ROBOTIC ENDOWRIST DA VINCI S

## (undated) DEVICE — SUT PROL 4-0 30IN SH1 DA BLU --

## (undated) DEVICE — CLAMP INSERT: Brand: STEALTH® CLAMP INSERT

## (undated) DEVICE — CATHETER THOR 32FR L23IN PVC 6 EYELET STR ATRAUM

## (undated) DEVICE — SUTURE SILK PERMAHAND PRECUT 6 X 30 IN SZ 1 BLK BRAID A307H

## (undated) DEVICE — CABG-AVR DR WILLIAMS: Brand: MEDLINE INDUSTRIES, INC.

## (undated) DEVICE — SOLUTION IRRIG 3000ML 0.9% SOD CHL FLX CONT 0797208] ICU MEDICAL INC]

## (undated) DEVICE — BLADE SURG NO15 S STL STR DISP GLASSVAN

## (undated) DEVICE — BLADE SAW W10XL54MM FOR PRI REPEAT STRNOTMY

## (undated) DEVICE — SUT PROL 3-0 36IN SH DA BLU --

## (undated) DEVICE — SS SUTURE, 3 PER SLEEVE: Brand: MYO/WIRE II

## (undated) DEVICE — SPONGE GZ W4XL4IN COT 12 PLY TYP VII WVN C FLD DSGN

## (undated) DEVICE — SUTURE ETHBND EXCEL 2-0 L30IN NONABSORBABLE GRN L26MM SH MX563

## (undated) DEVICE — (D)PREP SKN CHLRAPRP APPL 26ML -- CONVERT TO ITEM 371833

## (undated) DEVICE — COVER,TABLE,44X76,STERILE: Brand: MEDLINE

## (undated) DEVICE — MINOR SPLIT GENERAL: Brand: MEDLINE INDUSTRIES, INC.

## (undated) DEVICE — MEDI-TRACE CADENCE ADULT, DEFIBRILLATION ELECTRODE -RTS  (10 PR/PK) - ZOLL: Brand: MEDI-TRACE CADENCE

## (undated) DEVICE — DRAPE SLUSH DISC W44XL66IN ST FOR RND BSIN HUSH SLUSH SYS

## (undated) DEVICE — CATH URETH INTMIT ROB 14FR FUN -- USE ITEM 179521

## (undated) DEVICE — ELECTRO LUBE IS A SINGLE PATIENT USE DEVICE THAT IS INTENDED TO BE USED ON ELECTROSURGICAL ELECTRODES TO REDUCE STICKING.: Brand: KEY SURGICAL ELECTRO LUBE

## (undated) DEVICE — TIP COVER ACCESSORY

## (undated) DEVICE — 1840 FOAM BLOCK NEEDLE COUNTER: Brand: DEVON

## (undated) DEVICE — (D)STRIP SKN CLSR 0.5X4IN WHT --

## (undated) DEVICE — (D)CATH SUC TRCH OPN 14FR 22IN -- DISC BY MFR

## (undated) DEVICE — CONTAINER,SPECIMEN,O.R.STRL,4.5OZ: Brand: MEDLINE

## (undated) DEVICE — KENDALL SCD EXPRESS SLEEVES, KNEE LENGTH, MEDIUM: Brand: KENDALL SCD

## (undated) DEVICE — COR-KNOT MINI® COMBO KITBASE PACKAGE TYPE - KITEACH STERILE PACKAGE KIT CONTAINS (2) SINGLE PATIENT USE COR-KNOT MINI® DEVICES AND (12) COR-KNOT® QUICK LOADS®.: Brand: COR-KNOT MINI®

## (undated) DEVICE — SUT CHRMC 3-0 27IN SH BRN --

## (undated) DEVICE — SYR 50ML LR LCK 1ML GRAD NSAF --

## (undated) DEVICE — SUTURE VCRL SZ 4-0 L27IN ABSRB UD L19MM PS-2 3/8 CIR PRIM J426H

## (undated) DEVICE — SURGIFOAM SPNG SZ 100

## (undated) DEVICE — 3M™ TEGADERM™ TRANSPARENT FILM DRESSING FRAME STYLE, 1626W, 4 IN X 4-3/4 IN (10 CM X 12 CM), 50/CT 4CT/CASE: Brand: 3M™ TEGADERM™

## (undated) DEVICE — SUTURE PDS II SZ 1 L36IN ABSRB VLT L48MM CTX 1/2 CIR Z371T

## (undated) DEVICE — SUTURE VCRL SZ 3-0 L36IN ABSRB UD L36MM CT-1 1/2 CIR J944H

## (undated) DEVICE — PAD,NON-ADHERENT,3X8,STERILE,LF,1/PK: Brand: MEDLINE

## (undated) DEVICE — INTENDED FOR TISSUE SEPARATION, AND OTHER PROCEDURES THAT REQUIRE A SHARP SURGICAL BLADE TO PUNCTURE OR CUT.: Brand: BARD-PARKER SAFETY BLADES SIZE 11, STERILE

## (undated) DEVICE — SUTURE VCRL SZ 2-0 L27IN ABSRB UD L26MM SH 1/2 CIR J417H

## (undated) DEVICE — Device

## (undated) DEVICE — DRAPE SHT 3 QTR PROXIMA 53X77 --

## (undated) DEVICE — SUTURE ETHBND EXCEL SZ 0 L18IN NONABSORBABLE GRN L36MM CT-1 CX21D

## (undated) DEVICE — SUTURE S STL SZ 6 L18IN NONABSORBABLE SIL L48MM CCS 1/2 CIR M654G

## (undated) DEVICE — TRAY FOLEY 16FR TEMP SENS F400 --

## (undated) DEVICE — NEEDLE HYPO 21GA L1.5IN GRN POLYPR HUB S STL THN WALL IV

## (undated) DEVICE — ARTERIAL EXTENSIONS 6" PVC EXTENSION TUBE W/MALE LUER LOCK AND STOPCOCK: Brand: ARTERIAL EXTENSIONS

## (undated) DEVICE — MEGASUTURECUT ND: Brand: ENDOWRIST;DAVINCI SI

## (undated) DEVICE — 48" PROBE COVER W/GEL, ULTRASOUND, STERILE: Brand: SITE-RITE

## (undated) DEVICE — TRAY CATH 16F DRN BG LTX -- CONVERT TO ITEM 363158

## (undated) DEVICE — REM POLYHESIVE ADULT PATIENT RETURN ELECTRODE: Brand: VALLEYLAB

## (undated) DEVICE — CATHETER ETER TY TEMP SENS F MBO W URIN M FOLLOWS CDC GUIDELINES TO

## (undated) DEVICE — INTENDED USED TO PROTECT, TAG AND HELP LOCATED SUTURES DURING SURGERY: Brand: STERION®SUTURE AID BOOTIES

## (undated) DEVICE — SUTURE TEMP PACE WIRE SZ 2-0 L24IN NONABSORBABLE LIGHT/DARK

## (undated) DEVICE — 3000CC GUARDIAN II: Brand: GUARDIAN

## (undated) DEVICE — INTENT TO BE USED WITH SUTURE MATERIAL FOR TISSUE CLOSURE: Brand: RICHARD-ALLAN® NEEDLE 1/2 CIRCLE TAPER

## (undated) DEVICE — CANNULA SEAL

## (undated) DEVICE — 6 FOOT DISPOSABLE EXTENSION CABLE WITH SAFE CONNECT / SCREW-DOWN

## (undated) DEVICE — MONOPOLAR CURVED SCISSORS: Brand: ENDOWRIST

## (undated) DEVICE — SUTURE NONABSORBABLE MONOFILAMENT 5-0 C-1 1X24 IN PROLENE 8725H

## (undated) DEVICE — 3M™ IOBAN™ 2 ANTIMICROBIAL INCISE DRAPE 6650EZ: Brand: IOBAN™ 2

## (undated) DEVICE — HEAD AND NECK: Brand: MEDLINE INDUSTRIES, INC.

## (undated) DEVICE — VISUALIZATION SYSTEM: Brand: CLEARIFY

## (undated) DEVICE — PLEDGET VASC W3/16XL3/8IN THK1/16IN PTFE SFT

## (undated) DEVICE — CATH SUC CTRL PRT TRIFLO 14FR --

## (undated) DEVICE — DRAPE,CHEST,FENES,15X10,STERIL: Brand: MEDLINE

## (undated) DEVICE — DRAPE ROBOTIC CAM HD DISP ENDOWRIST DA VINCI SI

## (undated) DEVICE — PROGRASP FORCEPS: Brand: ENDOWRIST;DAVINCI SI

## (undated) DEVICE — COVER EQUIP ABSRB TBL MOJAVE SHT L228XW101CM

## (undated) DEVICE — GLOVE SURG SZ 7 L11.2IN THK9.8MIL STRW LTX POLYMER BEAD CUF

## (undated) DEVICE — STRIP,CLOSURE,WOUND,MEDI-STRIP,1/2X4: Brand: MEDLINE

## (undated) DEVICE — DRAIN CHEST ADL/PED DRY/WET -- PLEUR-EVAC

## (undated) DEVICE — DRAPE ROBOTIC CAM ARM DISP ENDOWRIST DA VINCI SI

## (undated) DEVICE — 2000CC GUARDIAN II: Brand: GUARDIAN

## (undated) DEVICE — PACK PROCEDURE SURG OPEN HRT BRINGBACK

## (undated) DEVICE — SUTURE V-LOC 180 SZ 3-0 L9IN ABSRB GRN L26MM V-20 1/2 CIR VLOCL0644

## (undated) DEVICE — SUT CHRMC 4-0 27IN SH BRN --

## (undated) DEVICE — INTRODUCER SHTH STD 8 FRX11 CM FLX KINK RESIST CANN AVNT +

## (undated) DEVICE — BLADE SCALP SURG BARD-PARK 10 --

## (undated) DEVICE — BONE WAX WHITE: Brand: BONE WAX WHITE

## (undated) DEVICE — LAP CHOLE: Brand: MEDLINE INDUSTRIES, INC.

## (undated) DEVICE — PRESSURE MONITORING LINES 6FT. M/M: Brand: PRESSURE MONITORING LINES

## (undated) DEVICE — GOWN,REINFORCED,POLY,AURORA,XXLARGE,STR: Brand: MEDLINE

## (undated) DEVICE — CARDINAL HEALTH FLEXIBLE LIGHT HANDLE COVER: Brand: CARDINAL HEALTH

## (undated) DEVICE — SUTURE SZ 0 27IN 5/8 CIR UR-6  TAPER PT VIOLET ABSRB VICRYL J603H

## (undated) DEVICE — TROCARS: Brand: KII® BALLOON BLUNT TIP SYSTEM

## (undated) DEVICE — GARMENT,MEDLINE,DVT,INT,CALF,MED, GEN2: Brand: MEDLINE

## (undated) DEVICE — SYR 10ML CTRL LR LCK NSAF LF --

## (undated) DEVICE — OBTRTR BLDELSS 8MM DISP -- DA VINCI - SNGL USE

## (undated) DEVICE — COR-KNOT® QUICK LOAD® 6-POUCH: Brand: COR-KNOT® QUICK LOAD®